# Patient Record
Sex: MALE | Race: WHITE | NOT HISPANIC OR LATINO | Employment: OTHER | URBAN - METROPOLITAN AREA
[De-identification: names, ages, dates, MRNs, and addresses within clinical notes are randomized per-mention and may not be internally consistent; named-entity substitution may affect disease eponyms.]

---

## 2017-01-12 ENCOUNTER — TELEPHONE (OUTPATIENT)
Dept: INTERNAL MEDICINE | Facility: CLINIC | Age: 62
End: 2017-01-12

## 2017-01-12 NOTE — TELEPHONE ENCOUNTER
Pt's wife stated that pt had started to feel better since office visit of 12/30/16, but now is starting to get post nasal drip and wants to know what pt should take.  Advised pt's wife that pt can take Zyrtec or Claritin 10 mg once daily and to continue using Flonase nasal spray once daily and can also try Mucinex DM.  Pt's wife verbalized understanding.

## 2017-01-12 NOTE — TELEPHONE ENCOUNTER
----- Message from Luiza Ghosh sent at 1/12/2017  9:29 AM CST -----  Contact: wife/Lisa Forbes  States she would like to see if he could be put on some sinus medicine, he is a heart patient. States she would like to speak to Leidy. Please call Lisa Forbes at [phone]. Thank you

## 2017-01-23 ENCOUNTER — HOSPITAL ENCOUNTER (OUTPATIENT)
Dept: RADIOLOGY | Facility: HOSPITAL | Age: 62
Discharge: HOME | End: 2017-01-23
Attending: INTERNAL MEDICINE
Payer: COMMERCIAL

## 2017-01-23 DIAGNOSIS — R91.1 LUNG NODULE: ICD-10-CM

## 2017-01-23 PROCEDURE — 71260 CT THORAX DX C+: CPT | Mod: TC,PO

## 2017-01-23 PROCEDURE — 71260 CT THORAX DX C+: CPT | Mod: 26,,, | Performed by: RADIOLOGY

## 2017-01-23 PROCEDURE — 25500020 PHARM REV CODE 255: Mod: PO | Performed by: INTERNAL MEDICINE

## 2017-01-23 RX ORDER — BUPROPION HYDROCHLORIDE 150 MG/1
TABLET, EXTENDED RELEASE ORAL
Qty: 30 TABLET | Refills: 0 | Status: SHIPPED | OUTPATIENT
Start: 2017-01-23 | End: 2017-01-30 | Stop reason: SDUPTHER

## 2017-01-23 RX ADMIN — IOHEXOL 75 ML: 350 INJECTION, SOLUTION INTRAVENOUS at 08:01

## 2017-01-26 ENCOUNTER — TELEPHONE (OUTPATIENT)
Dept: INTERNAL MEDICINE | Facility: CLINIC | Age: 62
End: 2017-01-26

## 2017-01-26 DIAGNOSIS — R91.1 LUNG NODULE: Primary | ICD-10-CM

## 2017-01-26 RX ORDER — LANCETS
1 EACH MISCELLANEOUS 2 TIMES DAILY
Qty: 100 EACH | Refills: 2 | Status: SHIPPED | OUTPATIENT
Start: 2017-01-26 | End: 2018-07-30 | Stop reason: SDUPTHER

## 2017-01-26 NOTE — TELEPHONE ENCOUNTER
----- Message from Jocelin Medina sent at 1/26/2017 12:52 PM CST -----  Contact: pt wife - davis  States she is rtn nurses call and can be reached at [phone]//thanks/dbw

## 2017-01-26 NOTE — TELEPHONE ENCOUNTER
----- Message from Nona Will sent at 1/26/2017  9:53 AM CST -----  Contact: wife  Pt wife states need a prescription for pt test strips and lancets cause the pt has a new meter-Contour next-ez...[phone](please notify when sent)        .  [facility] [address]  [address] \[facility]\"" [address]  Phone: [phone] Fax: [phone]

## 2017-01-26 NOTE — TELEPHONE ENCOUNTER
Notified pt's wife that scripts for test strips and lancets have been sent to pharmacy.  Pt's wife verbalized understanding.

## 2017-01-26 NOTE — TELEPHONE ENCOUNTER
Spoke with pt's wife, Lisa, informed her that it is reassuring that lung nodules have not changed. Recommend to repeat CT of chest in one year. Pt's wife states pt will schedule at upcoming appointment.

## 2017-01-30 ENCOUNTER — OFFICE VISIT (OUTPATIENT)
Dept: INTERNAL MEDICINE | Facility: CLINIC | Age: 62
End: 2017-01-30
Payer: COMMERCIAL

## 2017-01-30 VITALS
HEART RATE: 73 BPM | SYSTOLIC BLOOD PRESSURE: 122 MMHG | BODY MASS INDEX: 36.69 KG/M2 | TEMPERATURE: 97 F | WEIGHT: 242.06 LBS | HEIGHT: 68 IN | OXYGEN SATURATION: 97 % | DIASTOLIC BLOOD PRESSURE: 66 MMHG

## 2017-01-30 DIAGNOSIS — E66.01 MORBID OBESITY, UNSPECIFIED OBESITY TYPE: ICD-10-CM

## 2017-01-30 DIAGNOSIS — I15.2 HYPERTENSION ASSOCIATED WITH DIABETES: ICD-10-CM

## 2017-01-30 DIAGNOSIS — E11.59 HYPERTENSION ASSOCIATED WITH DIABETES: ICD-10-CM

## 2017-01-30 DIAGNOSIS — E55.9 VITAMIN D DEFICIENCY: ICD-10-CM

## 2017-01-30 DIAGNOSIS — E11.9 TYPE 2 DIABETES MELLITUS WITHOUT COMPLICATION, WITHOUT LONG-TERM CURRENT USE OF INSULIN: ICD-10-CM

## 2017-01-30 DIAGNOSIS — I25.10 CORONARY ARTERY DISEASE INVOLVING NATIVE CORONARY ARTERY OF NATIVE HEART WITHOUT ANGINA PECTORIS: ICD-10-CM

## 2017-01-30 DIAGNOSIS — E11.69 HYPERLIPIDEMIA ASSOCIATED WITH TYPE 2 DIABETES MELLITUS: ICD-10-CM

## 2017-01-30 DIAGNOSIS — E78.5 HYPERLIPIDEMIA ASSOCIATED WITH TYPE 2 DIABETES MELLITUS: ICD-10-CM

## 2017-01-30 DIAGNOSIS — R91.1 PULMONARY NODULE: ICD-10-CM

## 2017-01-30 DIAGNOSIS — F17.200 TOBACCO DEPENDENCE SYNDROME: ICD-10-CM

## 2017-01-30 DIAGNOSIS — F41.9 ANXIETY: ICD-10-CM

## 2017-01-30 DIAGNOSIS — J44.9 CHRONIC OBSTRUCTIVE PULMONARY DISEASE, UNSPECIFIED COPD TYPE: ICD-10-CM

## 2017-01-30 PROCEDURE — 99214 OFFICE O/P EST MOD 30 MIN: CPT | Mod: S$GLB,,, | Performed by: INTERNAL MEDICINE

## 2017-01-30 PROCEDURE — 3074F SYST BP LT 130 MM HG: CPT | Mod: S$GLB,,, | Performed by: INTERNAL MEDICINE

## 2017-01-30 PROCEDURE — 99999 PR PBB SHADOW E&M-EST. PATIENT-LVL III: CPT | Mod: PBBFAC,,, | Performed by: INTERNAL MEDICINE

## 2017-01-30 PROCEDURE — 1159F MED LIST DOCD IN RCRD: CPT | Mod: S$GLB,,, | Performed by: INTERNAL MEDICINE

## 2017-01-30 PROCEDURE — 3046F HEMOGLOBIN A1C LEVEL >9.0%: CPT | Mod: S$GLB,,, | Performed by: INTERNAL MEDICINE

## 2017-01-30 PROCEDURE — 4010F ACE/ARB THERAPY RXD/TAKEN: CPT | Mod: S$GLB,,, | Performed by: INTERNAL MEDICINE

## 2017-01-30 PROCEDURE — 3078F DIAST BP <80 MM HG: CPT | Mod: S$GLB,,, | Performed by: INTERNAL MEDICINE

## 2017-01-30 RX ORDER — BUPROPION HYDROCHLORIDE 150 MG/1
150 TABLET, EXTENDED RELEASE ORAL DAILY
Qty: 90 TABLET | Refills: 3 | Status: SHIPPED | OUTPATIENT
Start: 2017-01-30 | End: 2018-02-25 | Stop reason: SDUPTHER

## 2017-01-30 RX ORDER — METFORMIN HYDROCHLORIDE 1000 MG/1
1000 TABLET ORAL 2 TIMES DAILY WITH MEALS
Qty: 180 TABLET | Refills: 3 | Status: SHIPPED | OUTPATIENT
Start: 2017-01-30 | End: 2017-12-27 | Stop reason: SDUPTHER

## 2017-01-30 NOTE — PROGRESS NOTES
"Subjective:      Patient ID: Stefan Forbes Jr. is a 61 y.o. male.    Chief Complaint: Follow-up    HPI Comments: 62 yo with Patient Active Problem List:     Uncontrolled diabetes mellitus type 2 without complications     Hypertension associated with diabetes     Hyperlipidemia associated with type 2 diabetes mellitus     COPD (chronic obstructive pulmonary disease)     CAD (coronary artery disease)     Vitamin D deficiency     Tobacco dependence syndrome     Osteoarthritis of knee     Pulmonary nodule    Here today for management of multiple medical problems present for months to years.  He reports compliance with his medications without significant side effects.  He has decreased his tobacco use and no longer smokes cigarettes.  However he does occasionally smoke partial cigar.  His blood pressure is doing well with current antihypertensives.  He reports compliance with his cardiology follow-up.  He is working with an orthopedist for his knee arthritis.  He reports that his breathing is much improved since his last visit and has decreased his use of albuterol.  He is compliant with his Spiriva.  He reports that his glucose has been elevated at home.    Review of Systems   Constitutional: Negative for chills and fever.   HENT: Negative for ear pain and sore throat.    Respiratory: Negative for cough.    Cardiovascular: Negative for chest pain.   Gastrointestinal: Negative for abdominal pain and blood in stool.   Genitourinary: Negative for dysuria and hematuria.   Neurological: Negative for seizures and syncope.     Objective:     Visit Vitals    /66 (BP Location: Right arm, Patient Position: Sitting)    Pulse 73    Temp 97.3 °F (36.3 °C) (Tympanic)    Ht 5' 8" (1.727 m)    Wt 109.8 kg (242 lb 1 oz)    SpO2 97%    BMI 36.81 kg/m2       Physical Exam   Constitutional: He is oriented to person, place, and time. He appears well-developed and well-nourished. No distress.   HENT:   Head: Normocephalic and " atraumatic.   Mouth/Throat: Oropharynx is clear and moist.   Eyes: EOM are normal. Pupils are equal, round, and reactive to light.   Neck: Neck supple. No thyromegaly present.   Cardiovascular: Normal rate and regular rhythm.    Pulmonary/Chest: Breath sounds normal. He has no wheezes. He has no rales.   Abdominal: Soft. Bowel sounds are normal. There is no tenderness.   Musculoskeletal: He exhibits no edema.   Lymphadenopathy:     He has no cervical adenopathy.   Neurological: He is alert and oriented to person, place, and time.   Skin: Skin is warm and dry.   Psychiatric: He has a normal mood and affect. His behavior is normal.     Lab Visit on 01/23/2017   Component Date Value Ref Range Status    Hemoglobin A1C 01/23/2017 9.1* 4.5 - 6.2 % Final    Comment: According to ADA guidelines, hemoglobin A1C <7.0% represents  optimal control in non-pregnant diabetic patients.  Different  metrics may apply to specific populations.   Standards of Medical Care in Diabetes - 2016.  For the purpose of screening for the presence of diabetes:  <5.7%     Consistent with the absence of diabetes  5.7-6.4%  Consistent with increasing risk for diabetes   (prediabetes)  >or=6.5%  Consistent with diabetes  Currently no consensus exists for use of hemoglobin A1C  for diagnosis of diabetes for children.      Estimated Avg Glucose 01/23/2017 214* 68 - 131 mg/dL Final       Assessment:     1. Uncontrolled type 2 diabetes mellitus without complication, with long-term current use of insulin    2. Chronic obstructive pulmonary disease, unspecified COPD type    3. Hyperlipidemia associated with type 2 diabetes mellitus    4. Hypertension associated with diabetes    5. Coronary artery disease involving native coronary artery of native heart without angina pectoris    6. Vitamin D deficiency    7. Pulmonary nodule    8. Tobacco dependence syndrome    9. Anxiety    10. Type 2 diabetes mellitus without complication, without long-term current use  of insulin    11. Morbid obesity, unspecified obesity type      Plan:   Uncontrolled type 2 diabetes mellitus without complication, with long-term current use of insulin  -     Hemoglobin A1c; Future; Expected date: 4/30/17  -     metformin (GLUCOPHAGE) 1000 MG tablet; Take 1 tablet (1,000 mg total) by mouth 2 (two) times daily with meals.  Dispense: 180 tablet; Refill: 3    Chronic obstructive pulmonary disease, unspecified COPD type    Hyperlipidemia associated with type 2 diabetes mellitus  Comments:  controlled    Hypertension associated with diabetes  Comments:  controlled    Coronary artery disease involving native coronary artery of native heart without angina pectoris    Vitamin D deficiency  -     Vitamin D; Future; Expected date: 5/2/17    Pulmonary nodule  Comments:  stable      Tobacco dependence syndrome  Comments:  off cigarettes  still occ cigars    Anxiety  -     buPROPion (WELLBUTRIN SR) 150 MG TBSR 12 hr tablet; Take 1 tablet (150 mg total) by mouth once daily.  Dispense: 90 tablet; Refill: 3    Type 2 diabetes mellitus without complication, without long-term current use of insulin  -     liraglutide 0.6 mg/0.1 mL, 18 mg/3 mL, subq PNIJ (VICTOZA 2-CHARLES) 0.6 mg/0.1 mL (18 mg/3 mL) PnIj; Inject 0.6 mg into the skin once daily.  Dispense: 3 mL; Refill: 0    Morbid obesity, unspecified obesity type    Increase metformin to 1000mg bid.       Lab Frequency Next Occurrence   CT Chest Without Contrast Once 2/27/2017   Hemoglobin A1c Once 4/30/2017   Vitamin D Once 5/2/2017         Return in about 3 months (around 4/30/2017), or if symptoms worsen or fail to improve.

## 2017-01-30 NOTE — MR AVS SNAPSHOT
[facility] Internal Medicine  [address] [place] [address] [place]  [facility] [address]  Phone: [phone]  Fax: [phone]                  Stefan Forbes JrSb   2017 11:00 AM   Office Visit    Description:  Male : 1955   Provider:  Wallace Fisher MD   Department:  [facility] - Internal Medicine           Reason for Visit     Follow-up           Diagnoses this Visit        Comments    Uncontrolled type 2 diabetes mellitus without complication, with long-term current use of insulin    -  Primary     Chronic obstructive pulmonary disease, unspecified COPD type         Hyperlipidemia associated with type 2 diabetes mellitus     controlled    Hypertension associated with diabetes     controlled    Coronary artery disease involving native coronary artery of native heart without angina pectoris         Vitamin D deficiency         Pulmonary nodule     stable      Tobacco dependence syndrome     off cigarettes  still occ cigars    Anxiety         Type 2 diabetes mellitus without complication, without long-term current use of insulin                To Do List           Future Appointments        Provider Department Dept Phone    2017 9:15 AM LABORATORY, [facility] [phone]      Goals (5 Years of Data)     None      Follow-Up and Disposition     Return if symptoms worsen or fail to improve.       These Medications        Disp Refills Start End    buPROPion (WELLBUTRIN SR) 150 MG TBSR 12 hr tablet 90 tablet 3 2017     Take 1 tablet (150 mg total) by mouth once daily. - Oral    Pharmacy: [facility] [address] [place] [address] Ph #: [phone]       liraglutide 0.6 mg/0.1 mL, 18 mg/3 mL, subq PNIJ (VICTOZA 2-CHARLES) 0.6 mg/0.1 mL (18 mg/3 mL) PnIj 3 mL 0 2017    Inject 0.6 mg into the skin once daily. - Subcutaneous    Pharmacy: [facility] [address] Ph #: [phone]       metformin (GLUCOPHAGE) 1000 MG  tablet 180 tablet 3 1/30/2017 1/30/2018    Take 1 tablet (1,000 mg total) by mouth 2 (two) times daily with meals. - Oral    Pharmacy: [facility] [address] #: [phone]         [facility]     [facility] Nurse Care Line - 24/7 Assistance  Registered nurses in the [facility] provide clinical advisement, health education, appointment booking, and other advisory services.  Call for this free service at [phone].             Medications           START taking these NEW medications        Refills    liraglutide 0.6 mg/0.1 mL, 18 mg/3 mL, subq PNIJ (VICTOZA 2-CHARLES) 0.6 mg/0.1 mL (18 mg/3 mL) PnIj 0    Sig: Inject 0.6 mg into the skin once daily.    Class: Normal    Route: Subcutaneous    metformin (GLUCOPHAGE) 1000 MG tablet 3    Sig: Take 1 tablet (1,000 mg total) by mouth 2 (two) times daily with meals.    Class: Normal    Route: Oral      CHANGE how you are taking these medications     Start Taking Instead of    buPROPion (WELLBUTRIN SR) 150 MG TBSR 12 hr tablet buPROPion (WELLBUTRIN SR) 150 MG TBSR 12 hr tablet    Dosage:  Take 1 tablet (150 mg total) by mouth once daily. Dosage:  TAKE ONE TABLET BY MOUTH ONCE DAILY    Reason for Change:  Reorder       STOP taking these medications     methylPREDNISolone (MEDROL, CHARLES,) 4 mg tablet use as directed           Verify that the below list of medications is an accurate representation of the medications you are currently taking.  If none reported, the list may be blank. If incorrect, please contact your healthcare provider. Carry this list with you in case of emergency.           Current Medications     acetaminophen (TYLENOL) 500 MG tablet Take 500 mg by mouth as needed.    albuterol (PROAIR HFA) 90 mcg/actuation inhaler Inhale 2 puffs into the lungs every 6 (six) hours as needed for Wheezing or Shortness of Breath.    aspirin 325 MG tablet Take 325 mg by mouth once daily.    blood sugar diagnostic  "(BLOOD GLUCOSE TEST) Strp 1 strip by Misc.(Non-Drug; Combo Route) route 2 (two) times daily.    BLOOD-GLUCOSE METER (CONTOUR NEXT EZ METER MISC) by Misc.(Non-Drug; Combo Route) route.    buPROPion (WELLBUTRIN SR) 150 MG TBSR 12 hr tablet Take 1 tablet (150 mg total) by mouth once daily.    cholecalciferol, vitamin D3, (VITAMIN D3) 1,000 unit capsule Take 1,000 Units by mouth once daily.    fluticasone (FLONASE) 50 mcg/actuation nasal spray 2 sprays by Each Nare route once daily. prn    lancets Misc 1 lancet by Misc.(Non-Drug; Combo Route) route 2 (two) times daily.    lisinopril (PRINIVIL,ZESTRIL) 5 MG tablet TAKE ONE TABLET BY MOUTH ONCE DAILY    NITROGLYCERIN (NITROSTAT SL) Place 1 tablet under the tongue as needed.    omeprazole (PRILOSEC) 40 MG capsule Take 1 capsule (40 mg total) by mouth once daily.    simvastatin (ZOCOR) 20 MG tablet Take 1 tablet (20 mg total) by mouth every evening.    tiotropium (SPIRIVA) 18 mcg inhalation capsule Inhale 1 capsule (18 mcg total) into the lungs once daily.    liraglutide 0.6 mg/0.1 mL, 18 mg/3 mL, subq PNIJ (VICTOZA 2-CHARLES) 0.6 mg/0.1 mL (18 mg/3 mL) PnIj Inject 0.6 mg into the skin once daily.    metformin (GLUCOPHAGE) 1000 MG tablet Take 1 tablet (1,000 mg total) by mouth 2 (two) times daily with meals.           Clinical Reference Information           Vital Signs - Last Recorded  Most recent update: 1/30/2017 11:18 AM by Salome Soto MA    BP Pulse Temp Ht Wt SpO2    122/66 (BP Location: Right arm, Patient Position: Sitting) 73 97.3 °F (36.3 °C) (Tympanic) 5' 8" (1.727 m) 109.8 kg (242 lb 1 oz) 97%    BMI                36.81 kg/m2          Blood Pressure          Most Recent Value    BP  122/66      Allergies as of 1/30/2017     No Known Allergies      Immunizations Administered on Date of Encounter - 1/30/2017     None      Orders Placed During Today's Visit     Future Labs/Procedures Expected by Expires    Hemoglobin A1c  4/30/2017 7/29/2017    Vitamin D  " 5/2/2017 (Approximate) 7/30/2017      Instructions    Increase metformin to 1000mg bid.        Smoking Cessation     If you would like to quit smoking:   You may be eligible for free services if you are a [place] resident and started smoking cigarettes before September 1, 1988.  Call the [employer]oking [employer]essation [employer] (SCT) toll free at ([phone] [phone] or ([phone].   Call 800-QUIT-NOW if you do not meet the above criteria.

## 2017-01-31 ENCOUNTER — TELEPHONE (OUTPATIENT)
Dept: PHARMACY | Facility: CLINIC | Age: 62
End: 2017-01-31

## 2017-01-31 NOTE — TELEPHONE ENCOUNTER
Called and spoke with Patient's wife, Lisa.  Notified Ms. Gonzalez PA on Victoza was approved with a copayment of $40.00.  With Ms. Gonzalez's permission signed patient up for a Victoza copayment savings card reducing copay to $25.00.  Patient or wife will  and [facility] will  on medication.      PA Information:  [employer] [facility]  [phone]  Case ID# [id number]  Approval Dates: 1/31/17-1/31/18    Copayment Savings Card Information:  [place] [employer]  Rx Bin: [id number]  Rx Group: [id number]  Rx PCN : [employer]  ID #: [id number]

## 2017-02-01 ENCOUNTER — TELEPHONE (OUTPATIENT)
Dept: INTERNAL MEDICINE | Facility: CLINIC | Age: 62
End: 2017-02-01

## 2017-02-01 NOTE — TELEPHONE ENCOUNTER
Notified pt's wife that it is Dr. Fisher's recommendation to take the medication at any time of day as long as it's the same time everyday.  Pt's wife verbalized understanding.

## 2017-02-01 NOTE — TELEPHONE ENCOUNTER
Pt's wife wants to know if you have a recommendation on what time of day pt should take Victoza.  Stated pt is taking metformin with breakfast and dinner and blood glucose levels are running around 180 during the day.  Advised pt's wife that Victoza can be taken at any time of day as long as it's taken at the same time each day.  Pt's wife still wanted recommendation from you.

## 2017-02-01 NOTE — TELEPHONE ENCOUNTER
Yes, any time of day is ok. Try to take around same time each day. Does not need to be taken with food.

## 2017-02-01 NOTE — TELEPHONE ENCOUNTER
----- Message from Salome Hardwick sent at 2/1/2017  1:11 PM CST -----  Contact: davis/wife  Would like to speak to nurse about pt. Please call back at [phone]. Thanks//cdb

## 2017-02-06 ENCOUNTER — TELEPHONE (OUTPATIENT)
Dept: INTERNAL MEDICINE | Facility: CLINIC | Age: 62
End: 2017-02-06

## 2017-02-06 NOTE — TELEPHONE ENCOUNTER
----- Message from Luiza Ghosh sent at 2/6/2017  9:06 AM CST -----  Contact: Lisa Talboterson/wife  States he needs the Contour Next EZ test strips called in. Pt uses     [facility] [address] [facility] [place] - [address]  [address] \[facility]\"" [address]  Phone: [phone] Fax: [phone]    Please call Lisa Forbes at [phone].  Thank you

## 2017-02-06 NOTE — TELEPHONE ENCOUNTER
Notified pt's wife that test strips for Contour Next EZ were prescribed on 1/26/17 to [facility] in [place].  Pt stated pharmacist told her they did not have prescription.  Attempted to contact pharmacy; kept getting busy signal.

## 2017-02-07 NOTE — TELEPHONE ENCOUNTER
Spoke with pharmacist who stated they did receive test strip prescription on 1/26/17 and that pt picked up test strips on 2/6/17.

## 2017-02-24 RX ORDER — SIMVASTATIN 20 MG/1
TABLET, FILM COATED ORAL
Qty: 30 TABLET | Refills: 0 | Status: SHIPPED | OUTPATIENT
Start: 2017-02-24 | End: 2017-03-26 | Stop reason: SDUPTHER

## 2017-03-02 ENCOUNTER — TELEPHONE (OUTPATIENT)
Dept: INTERNAL MEDICINE | Facility: CLINIC | Age: 62
End: 2017-03-02

## 2017-03-02 NOTE — TELEPHONE ENCOUNTER
Spoke with pt wife gave her the names of Dr Murry and Dr Castro in ENT that can a look at his sinus. Pt wife verbalized understanding

## 2017-03-02 NOTE — TELEPHONE ENCOUNTER
----- Message from Kathrine Sanchez sent at 3/2/2017 11:26 AM CST -----  Contact: Cindy/wife  Cindy called to speak with the nurse; she wants to know if Dr. Fisher can recommend a sinus doctor with [facility]. She can be contacted at [phone].    Thanks,  Kathrine

## 2017-03-13 ENCOUNTER — OFFICE VISIT (OUTPATIENT)
Dept: INTERNAL MEDICINE | Facility: CLINIC | Age: 62
End: 2017-03-13
Payer: COMMERCIAL

## 2017-03-13 ENCOUNTER — HOSPITAL ENCOUNTER (OUTPATIENT)
Dept: RADIOLOGY | Facility: HOSPITAL | Age: 62
Discharge: HOME | End: 2017-03-13
Attending: INTERNAL MEDICINE
Payer: COMMERCIAL

## 2017-03-13 VITALS
SYSTOLIC BLOOD PRESSURE: 132 MMHG | OXYGEN SATURATION: 96 % | WEIGHT: 239.63 LBS | HEIGHT: 68 IN | HEART RATE: 75 BPM | TEMPERATURE: 97 F | DIASTOLIC BLOOD PRESSURE: 76 MMHG | BODY MASS INDEX: 36.32 KG/M2

## 2017-03-13 DIAGNOSIS — E55.9 VITAMIN D DEFICIENCY: ICD-10-CM

## 2017-03-13 DIAGNOSIS — I25.10 CORONARY ARTERY DISEASE INVOLVING NATIVE CORONARY ARTERY OF NATIVE HEART WITHOUT ANGINA PECTORIS: ICD-10-CM

## 2017-03-13 DIAGNOSIS — J44.9 CHRONIC OBSTRUCTIVE PULMONARY DISEASE, UNSPECIFIED COPD TYPE: ICD-10-CM

## 2017-03-13 DIAGNOSIS — E11.69 HYPERLIPIDEMIA ASSOCIATED WITH TYPE 2 DIABETES MELLITUS: ICD-10-CM

## 2017-03-13 DIAGNOSIS — Z01.818 PREOP EXAMINATION: ICD-10-CM

## 2017-03-13 DIAGNOSIS — I15.2 HYPERTENSION ASSOCIATED WITH DIABETES: ICD-10-CM

## 2017-03-13 DIAGNOSIS — Z91.09 ENVIRONMENTAL ALLERGIES: ICD-10-CM

## 2017-03-13 DIAGNOSIS — E11.59 HYPERTENSION ASSOCIATED WITH DIABETES: ICD-10-CM

## 2017-03-13 DIAGNOSIS — E78.5 HYPERLIPIDEMIA ASSOCIATED WITH TYPE 2 DIABETES MELLITUS: ICD-10-CM

## 2017-03-13 DIAGNOSIS — R91.1 PULMONARY NODULE: ICD-10-CM

## 2017-03-13 DIAGNOSIS — M17.0 OSTEOARTHRITIS OF BOTH KNEES, UNSPECIFIED OSTEOARTHRITIS TYPE: Primary | ICD-10-CM

## 2017-03-13 DIAGNOSIS — M17.9 OSTEOARTHRITIS OF KNEE, UNSPECIFIED LATERALITY, UNSPECIFIED OSTEOARTHRITIS TYPE: ICD-10-CM

## 2017-03-13 PROCEDURE — 1160F RVW MEDS BY RX/DR IN RCRD: CPT | Mod: S$GLB,,, | Performed by: INTERNAL MEDICINE

## 2017-03-13 PROCEDURE — 99214 OFFICE O/P EST MOD 30 MIN: CPT | Mod: S$GLB,,, | Performed by: INTERNAL MEDICINE

## 2017-03-13 PROCEDURE — 4010F ACE/ARB THERAPY RXD/TAKEN: CPT | Mod: S$GLB,,, | Performed by: INTERNAL MEDICINE

## 2017-03-13 PROCEDURE — 3075F SYST BP GE 130 - 139MM HG: CPT | Mod: S$GLB,,, | Performed by: INTERNAL MEDICINE

## 2017-03-13 PROCEDURE — 3078F DIAST BP <80 MM HG: CPT | Mod: S$GLB,,, | Performed by: INTERNAL MEDICINE

## 2017-03-13 PROCEDURE — 71020 XR CHEST PA AND LATERAL: CPT | Mod: TC,PO

## 2017-03-13 PROCEDURE — 3046F HEMOGLOBIN A1C LEVEL >9.0%: CPT | Mod: S$GLB,,, | Performed by: INTERNAL MEDICINE

## 2017-03-13 PROCEDURE — 99999 PR PBB SHADOW E&M-EST. PATIENT-LVL III: CPT | Mod: PBBFAC,,, | Performed by: INTERNAL MEDICINE

## 2017-03-13 PROCEDURE — 71020 XR CHEST PA AND LATERAL: CPT | Mod: 26,,, | Performed by: RADIOLOGY

## 2017-03-13 RX ORDER — MONTELUKAST SODIUM 10 MG/1
10 TABLET ORAL NIGHTLY
Qty: 30 TABLET | Refills: 0 | Status: SHIPPED | OUTPATIENT
Start: 2017-03-13 | End: 2017-03-13 | Stop reason: SDUPTHER

## 2017-03-13 RX ORDER — MONTELUKAST SODIUM 10 MG/1
10 TABLET ORAL NIGHTLY
Qty: 30 TABLET | Refills: 5 | Status: SHIPPED | OUTPATIENT
Start: 2017-03-13 | End: 2017-03-31 | Stop reason: ALTCHOICE

## 2017-03-13 RX ORDER — LORATADINE 10 MG/1
10 TABLET ORAL EVERY MORNING
COMMUNITY
End: 2017-03-31 | Stop reason: ALTCHOICE

## 2017-03-13 RX ORDER — DIPHENHYDRAMINE HCL 25 MG
50 CAPSULE ORAL NIGHTLY
COMMUNITY
End: 2017-03-31

## 2017-03-13 RX ORDER — MONTELUKAST SODIUM 10 MG/1
10 TABLET ORAL NIGHTLY
Qty: 30 TABLET | Refills: 5 | Status: SHIPPED | OUTPATIENT
Start: 2017-03-13 | End: 2017-03-13 | Stop reason: SDUPTHER

## 2017-03-13 NOTE — PROGRESS NOTES
Subjective:      Patient ID: Stefan Forbes Jr. is a 61 y.o. male.    Chief Complaint: Pre-op Exam    HPI Comments: 60 yo with Patient Active Problem List:     Uncontrolled diabetes mellitus type 2 without complications     Hypertension associated with diabetes     Hyperlipidemia associated with type 2 diabetes mellitus     COPD (chronic obstructive pulmonary disease)     CAD (coronary artery disease)     Vitamin D deficiency     Tobacco dependence syndrome     Osteoarthritis of knee     Pulmonary nodule    Past Medical History:  No date: COPD (chronic obstructive pulmonary disease)  No date: Diabetes mellitus, type 2  No date: Hyperlipidemia  No date: Hypertension      Here today for preop exam.  He is able to walk up at least 2 flights of stairs without chest pain.  He does not have a history of chronic kidney disease, congestive heart failure, rheumatoid arthritis. Quit smoking 1 year ago.      Medications     tiotropium (SPIRIVA) 18 mcg inhalation capsule 18 mcg, Daily       simvastatin (ZOCOR) 20 MG tablet        omeprazole (PRILOSEC) 40 MG capsule 40 mg, Daily       NITROGLYCERIN (NITROSTAT SL) 1 tablet, As needed (PRN)       metformin (GLUCOPHAGE) 1000 MG tablet 1,000 mg, 2 times daily with meals       loratadine (CLARITIN) 10 mg tablet 10 mg, Every morning       lisinopril (PRINIVIL,ZESTRIL) 5 MG tablet        liraglutide 0.6 mg/0.1 mL, 18 mg/3 mL, subq PNIJ (VICTOZA 2-CHARLES) 0.6 mg/0.1 mL (18 mg/3 mL) PnIj 0.6 mg, Daily       lancets Misc 1 lancet, 2 times daily       diphenhydrAMINE (BENADRYL) 25 mg capsule 50 mg, Nightly       dextromethorphan-guaifenesin  mg (MUCINEX DM)  mg per 12 hr tablet 1 tablet, 2 times daily       cholecalciferol, vitamin D3, (VITAMIN D3) 1,000 unit capsule 1,000 Units, Daily       buPROPion (WELLBUTRIN SR) 150 MG TBSR 12 hr tablet 150 mg, Daily       BLOOD-GLUCOSE METER (CONTOUR NEXT EZ METER MISC)        blood sugar diagnostic (BLOOD GLUCOSE TEST) Strp 1 strip, 2  "times daily       aspirin 325 MG tablet 325 mg, Daily       albuterol (PROAIR HFA) 90 mcg/actuation inhaler 2 puff, Every 6 hours PRN       acetaminophen (TYLENOL) 500 MG tablet 500 mg, As needed (PRN)       All: nkda    Soc: quit tob 1 year ago. No etoh. No illicit drugs.    Past Surgical History:   ANGIOPLASTY  Arthroscopy of knee    family history includes Bone Cancer in his father; Diabetes and epilepsy in his mother. Sister with mi at age 40. Another sister with epilepsy, dm, copd.  No f/h of bleeding or clotting d/o.                         Review of Systems   Constitutional: Negative for chills and fever.   HENT: Negative for ear pain and sore throat.    Respiratory: Negative for cough.    Cardiovascular: Negative for chest pain.   Gastrointestinal: Negative for abdominal pain and blood in stool.   Genitourinary: Negative for dysuria and hematuria.   Neurological: Negative for seizures and syncope.     Objective:   /76 (BP Location: Right arm, Patient Position: Sitting)  Pulse 75  Temp 96.6 °F (35.9 °C) (Tympanic)   Ht 5' 8" (1.727 m)  Wt 108.7 kg (239 lb 10.2 oz)  SpO2 96%  BMI 36.44 kg/m2    Physical Exam   Constitutional: He is oriented to person, place, and time. He appears well-developed and well-nourished. No distress.   HENT:   Head: Normocephalic and atraumatic.   Mouth/Throat: Oropharynx is clear and moist.   Eyes: EOM are normal. Pupils are equal, round, and reactive to light.   Neck: Neck supple. Carotid bruit is not present. No thyromegaly present.   Cardiovascular: Normal rate and regular rhythm.    Pulmonary/Chest: Breath sounds normal. No respiratory distress. He has no wheezes. He has no rales.   Abdominal: Soft. Bowel sounds are normal. There is no tenderness.   Musculoskeletal: He exhibits no edema.   Lymphadenopathy:     He has no cervical adenopathy.   Neurological: He is alert and oriented to person, place, and time.   Skin: Skin is warm and dry.   Psychiatric: He has a normal " mood and affect. His behavior is normal.     Lab Visit on 03/13/2017   Component Date Value Ref Range Status    Specimen UA 03/13/2017 Urine, Clean Catch   Final    Color, UA 03/13/2017 Yellow  Yellow, Straw, Marguerite Final    Appearance, UA 03/13/2017 Clear  Clear Final    pH, UA 03/13/2017 8.0  5.0 - 8.0 Final    Specific Gravity, UA 03/13/2017 1.010  1.005 - 1.030 Final    Protein, UA 03/13/2017 Negative  Negative Final    Comment: Recommend a 24 hour urine protein or a urine   protein/creatinine ratio if globulin induced proteinuria is  clinically suspected.      Glucose, UA 03/13/2017 Negative  Negative Final    Ketones, UA 03/13/2017 Negative  Negative Final    Bilirubin (UA) 03/13/2017 Negative  Negative Final    Occult Blood UA 03/13/2017 Negative  Negative Final    Nitrite, UA 03/13/2017 Negative  Negative Final    Leukocytes, UA 03/13/2017 Negative  Negative Final   Lab Visit on 03/13/2017   Component Date Value Ref Range Status    Sodium 03/13/2017 132* 136 - 145 mmol/L Final    Potassium 03/13/2017 5.4* 3.5 - 5.1 mmol/L Final    *No Visible Hemolysis    Chloride 03/13/2017 96  95 - 110 mmol/L Final    CO2 03/13/2017 25  23 - 29 mmol/L Final    Glucose 03/13/2017 82  70 - 110 mg/dL Final    BUN, Bld 03/13/2017 8  6 - 20 mg/dL Final    Creatinine 03/13/2017 0.9  0.5 - 1.4 mg/dL Final    Calcium 03/13/2017 9.7  8.7 - 10.5 mg/dL Final    Total Protein 03/13/2017 7.4  6.0 - 8.4 g/dL Final    Albumin 03/13/2017 4.0  3.5 - 5.2 g/dL Final    Total Bilirubin 03/13/2017 0.2  0.1 - 1.0 mg/dL Final    Comment: For infants and newborns, interpretation of results should be based  on gestational age, weight and in agreement with clinical  observations.  Premature Infant recommended reference ranges:  Up to 24 hours.............<8.0 mg/dL  Up to 48 hours............<12.0 mg/dL  3-5 days..................<15.0 mg/dL  6-29 days.................<15.0 mg/dL      Alkaline Phosphatase 03/13/2017 75  55 -  135 U/L Final    AST 03/13/2017 14  10 - 40 U/L Final    ALT 03/13/2017 18  10 - 44 U/L Final    Anion Gap 03/13/2017 11  8 - 16 mmol/L Final    eGFR if African American 03/13/2017 >60.0  >60 mL/min/1.73 m^2 Final    eGFR if non African American 03/13/2017 >60.0  >60 mL/min/1.73 m^2 Final    Comment: Calculation used to obtain the estimated glomerular filtration  rate (eGFR) is the CKD-EPI equation. Since race is unknown   in our information system, the eGFR values for   -American and Non--American patients are given   for each creatinine result.      WBC 03/13/2017 9.31  3.90 - 12.70 K/uL Final    RBC 03/13/2017 4.34* 4.60 - 6.20 M/uL Final    Hemoglobin 03/13/2017 13.4* 14.0 - 18.0 g/dL Final    Hematocrit 03/13/2017 39.4* 40.0 - 54.0 % Final    MCV 03/13/2017 91  82 - 98 fL Final    MCH 03/13/2017 30.9  27.0 - 31.0 pg Final    MCHC 03/13/2017 34.0  32.0 - 36.0 % Final    RDW 03/13/2017 12.3  11.5 - 14.5 % Final    Platelets 03/13/2017 246  150 - 350 K/uL Final    MPV 03/13/2017 10.4  9.2 - 12.9 fL Final    Gran # 03/13/2017 6.3  1.8 - 7.7 K/uL Final    Lymph # 03/13/2017 2.0  1.0 - 4.8 K/uL Final    Mono # 03/13/2017 0.7  0.3 - 1.0 K/uL Final    Eos # 03/13/2017 0.3  0.0 - 0.5 K/uL Final    Baso # 03/13/2017 0.03  0.00 - 0.20 K/uL Final    Gran% 03/13/2017 67.9  38.0 - 73.0 % Final    Lymph% 03/13/2017 21.1  18.0 - 48.0 % Final    Mono% 03/13/2017 7.6  4.0 - 15.0 % Final    Eosinophil% 03/13/2017 2.8  0.0 - 8.0 % Final    Basophil% 03/13/2017 0.3  0.0 - 1.9 % Final    Differential Method 03/13/2017 Automated   Final    aPTT 03/13/2017 28.2  21.0 - 32.0 sec Final    aPTT therapeutic range = 39-69 seconds    Prothrombin Time 03/13/2017 10.5  9.0 - 12.5 sec Final    INR 03/13/2017 1.0  0.8 - 1.2 Final    Comment: Coumadin Therapy:  2.0 - 3.0 for INR for all indicators except mechanical heart valves  and antiphospholipid syndromes which should use 2.5 - 3.5.      Group & Rh  03/13/2017 A POS   Final    Indirect Nelson 03/13/2017 NEG   Final     Reviewed By Joe Fisher MD on 3/13/2017  5:59 PM   External Result Report   External Result Report   Narrative   Comparison: None    Technique: PA and lateral views of the chest was obtained    Findings: The cardiac and mediastinal silhouettes are within normal limits.   The lungs are clear bilaterally. Visualized osseous structures demonstrate no acute abnormality.   Impression     No acute findings                        Assessment:     1. Osteoarthritis of both knees, unspecified osteoarthritis type    2. Hypertension associated with diabetes    3. Hyperlipidemia associated with type 2 diabetes mellitus    4. Uncontrolled type 2 diabetes mellitus without complication, without long-term current use of insulin    5. Chronic obstructive pulmonary disease, unspecified COPD type    6. Coronary artery disease involving native coronary artery of native heart without angina pectoris    7. Vitamin D deficiency    8. Osteoarthritis of knee, unspecified laterality, unspecified osteoarthritis type    9. Pulmonary nodule    10. Environmental allergies    11. Preop examination      Plan:   Osteoarthritis of both knees, unspecified osteoarthritis type    Hypertension associated with diabetes    Hyperlipidemia associated with type 2 diabetes mellitus    Uncontrolled type 2 diabetes mellitus without complication, without long-term current use of insulin    Chronic obstructive pulmonary disease, unspecified COPD type  Comments:  stable on spiriva    Coronary artery disease involving native coronary artery of native heart without angina pectoris    Vitamin D deficiency    Osteoarthritis of knee, unspecified laterality, unspecified osteoarthritis type    Pulmonary nodule    Environmental allergies  -     Discontinue: montelukast (SINGULAIR) 10 mg tablet; Take 1 tablet (10 mg total) by mouth every evening.  Dispense: 30 tablet; Refill: 0  -      Discontinue: montelukast (SINGULAIR) 10 mg tablet; Take 1 tablet (10 mg total) by mouth every evening.  Dispense: 30 tablet; Refill: 5  -     montelukast (SINGULAIR) 10 mg tablet; Take 1 tablet (10 mg total) by mouth every evening.  Dispense: 30 tablet; Refill: 5    Preop examination  Comments:  labs and xray as per surgeon request  Orders:  -     Comprehensive metabolic panel; Future; Expected date: 3/13/17  -     CBC auto differential; Future; Expected date: 3/13/17  -     X-Ray Chest PA And Lateral; Future; Expected date: 3/13/17  -     Urinalysis; Future; Expected date: 3/13/17  -     APTT; Future; Expected date: 3/13/17  -     Protime-INR; Future; Expected date: 3/13/17  -     TYPE & SCREEN; Future; Expected date: 3/13/17      Pt has seen cards and pulmonary for preop evals.    Please fax all ct chest reports to Dr. Blanco.     Mr. Forbes is cleared for óscar knee replacements from a general medicine standpoint. He has been cleared by cards and pulmonology.    Lab Frequency Next Occurrence   CT Chest Without Contrast Once 2/27/2017   Hemoglobin A1c Once 4/30/2017   Vitamin D Once 5/2/2017         Return if symptoms worsen or fail to improve.

## 2017-03-13 NOTE — MR AVS SNAPSHOT
[facility] Internal Medicine  [address] [place] [address]  Phone: [phone]  Fax: [phone]                  Stefan Forbes Jr.   3/13/2017 1:40 PM   Office Visit    Description:  Male : 1955   Provider:  Wallace Fisher MD   Department:  [facility] - Internal Medicine           Reason for Visit     Pre-op Exam           Diagnoses this Visit        Comments    Hypertension associated with diabetes    -  Primary     Hyperlipidemia associated with type 2 diabetes mellitus         Uncontrolled type 2 diabetes mellitus without complication, without long-term current use of insulin         Chronic obstructive pulmonary disease, unspecified COPD type     stable on spiriva    Coronary artery disease involving native coronary artery of native heart without angina pectoris         Vitamin D deficiency         Osteoarthritis of knee, unspecified laterality, unspecified osteoarthritis type         Pulmonary nodule         Environmental allergies         Preop examination     labs and xray as per surgeon request    Osteoarthritis of both knees, unspecified osteoarthritis type                To Do List           Future Appointments        Provider Department Dept Phone    3/13/2017 2:15 PM [facility] XR2 [facility] [phone]    3/13/2017 2:45 PM LAB, SAME DAY [facility] [phone]    3/13/2017 4:00 PM SPECIMEN, [facility] [phone]    2017 9:15 AM LABORATORY, [facility] [phone]      Goals (5 Years of Data)     None      Follow-Up and Disposition     Return if symptoms worsen or fail to improve.       These Medications        Disp Refills Start End    montelukast (SINGULAIR) 10 mg tablet 30 tablet 5 3/13/2017 2017    Take 1 tablet (10 mg total) by mouth every evening. - Oral    Pharmacy: [facility] [place]  [address] Ph #: [phone]          [facility]     [facility] Nurse Care Line - 24/7 Assistance  Registered nurses in the [facility] provide clinical advisement, health education, appointment booking, and other advisory services.  Call for this free service at [phone].             Medications           START taking these NEW medications        Refills    montelukast (SINGULAIR) 10 mg tablet 5    Sig: Take 1 tablet (10 mg total) by mouth every evening.    Class: Normal    Route: Oral           Verify that the below list of medications is an accurate representation of the medications you are currently taking.  If none reported, the list may be blank. If incorrect, please contact your healthcare provider. Carry this list with you in case of emergency.           Current Medications     acetaminophen (TYLENOL) 500 MG tablet Take 500 mg by mouth as needed.    albuterol (PROAIR HFA) 90 mcg/actuation inhaler Inhale 2 puffs into the lungs every 6 (six) hours as needed for Wheezing or Shortness of Breath.    aspirin 325 MG tablet Take 325 mg by mouth once daily.    blood sugar diagnostic (BLOOD GLUCOSE TEST) Strp 1 strip by Misc.(Non-Drug; Combo Route) route 2 (two) times daily.    BLOOD-GLUCOSE METER (CONTOUR NEXT EZ METER MISC) by Misc.(Non-Drug; Combo Route) route.    buPROPion (WELLBUTRIN SR) 150 MG TBSR 12 hr tablet Take 1 tablet (150 mg total) by mouth once daily.    cholecalciferol, vitamin D3, (VITAMIN D3) 1,000 unit capsule Take 1,000 Units by mouth once daily.    dextromethorphan-guaifenesin  mg (MUCINEX DM)  mg per 12 hr tablet Take 1 tablet by mouth 2 (two) times daily.    diphenhydrAMINE (BENADRYL) 25 mg capsule Take 50 mg by mouth every evening.    lancets Misc 1 lancet by Misc.(Non-Drug; Combo Route) route 2 (two) times daily.    liraglutide 0.6 mg/0.1 mL, 18 mg/3 mL, subq PNIJ (VICTOZA 2-CHARLES) 0.6 mg/0.1 mL (18 mg/3 mL) PnIj Inject 0.6 mg into the skin once daily.    lisinopril (PRINIVIL,ZESTRIL) 5 MG  "tablet TAKE ONE TABLET BY MOUTH ONCE DAILY    loratadine (CLARITIN) 10 mg tablet Take 10 mg by mouth every morning.    metformin (GLUCOPHAGE) 1000 MG tablet Take 1 tablet (1,000 mg total) by mouth 2 (two) times daily with meals.    NITROGLYCERIN (NITROSTAT SL) Place 1 tablet under the tongue as needed.    omeprazole (PRILOSEC) 40 MG capsule Take 1 capsule (40 mg total) by mouth once daily.    simvastatin (ZOCOR) 20 MG tablet TAKE ONE TABLET BY MOUTH IN THE EVENING    tiotropium (SPIRIVA) 18 mcg inhalation capsule Inhale 1 capsule (18 mcg total) into the lungs once daily.    montelukast (SINGULAIR) 10 mg tablet Take 1 tablet (10 mg total) by mouth every evening.           Clinical Reference Information           Your Vitals Were     BP Pulse Temp Height Weight SpO2    132/76 (BP Location: Right arm, Patient Position: Sitting) 75 96.6 °F (35.9 °C) (Tympanic) 5' 8" (1.727 m) 108.7 kg (239 lb 10.2 oz) 96%    BMI                36.44 kg/m2          Blood Pressure          Most Recent Value    BP  132/76      Allergies as of 3/13/2017     No Known Allergies      Immunizations Administered on Date of Encounter - 3/13/2017     None      Orders Placed During Today's Visit     Future Labs/Procedures Expected by Expires    APTT  3/13/2017 (Approximate) 5/12/2018    CBC auto differential  3/13/2017 5/12/2018    Comprehensive metabolic panel  3/13/2017 3/13/2018    Protime-INR  3/13/2017 5/12/2018    TYPE & SCREEN  3/13/2017 5/12/2018    Urinalysis  3/13/2017 6/11/2017    X-Ray Chest PA And Lateral  3/13/2017 3/13/2018      Language Assistance Services     ATTENTION: Language assistance services are available, free of charge. Please call [phone].      ATENCIÓN: Si habla nicole, tiene a esparza disposición servicios gratuitos de asistencia lingüística. Llame al [phone].     CHÚ Ý: N?u b?n nói Ti?ng Vi?t, có các d?ch v? h? tr? ngôn ng? mi?n phí dành cho b?n. G?i s? [phone].         [facility] " complies with applicable Federal civil rights laws and does not discriminate on the basis of race, color, national origin, age, disability, or sex.

## 2017-03-14 ENCOUNTER — TELEPHONE (OUTPATIENT)
Dept: INTERNAL MEDICINE | Facility: CLINIC | Age: 62
End: 2017-03-14

## 2017-03-14 NOTE — TELEPHONE ENCOUNTER
"Pt's wife stated pt took Singulair last night. This morning, heard pt make two gurgling sounds and then pt "crashed to the floor."  Pt was unresponsive for 15 to 20 seconds.  Pt's wife took pt's BP: 134/77; fasting blood sugar: 134.  Pt's wife stated pt hit his head on an air purifier.  Pt did not remember falling.  Pt's wife stated pt does not have a knot on head or any cut or gash, just a red kishor.  Stated pt feels fine now.  Advised pt's wife that she should call 911 if pt falls again.  Pt's wife verbalized understanding and wants to know if Singulair could cause pt to "sleep so hard it's difficult for him to fully wake up."  Please advise.  "

## 2017-03-14 NOTE — TELEPHONE ENCOUNTER
singulair unlikely to cause passing out, but stop the medication and see if pt can be evaluated with open appointment with me or [facility] this week.

## 2017-03-14 NOTE — TELEPHONE ENCOUNTER
----- Message from Salome Stovall sent at 3/14/2017  8:51 AM CDT -----  Contact: pt wife- Lisa  States she thinks the patient may have passed out when he was getting out of bed. Please adv/call [phone].//thanks. cw

## 2017-03-14 NOTE — TELEPHONE ENCOUNTER
Notified pt's wife that I haven't yet received any message back from Dr. Fisher, but as soon as I do, will call her with information.  Pt's wife verbalized understanding.

## 2017-03-14 NOTE — TELEPHONE ENCOUNTER
----- Message from Brianda Can sent at 3/14/2017  3:55 PM CDT -----  Pt is returning a call from nurse in regards to a f/u from a previous phone call.            Please call pt back at [phone]

## 2017-03-15 ENCOUNTER — TELEPHONE (OUTPATIENT)
Dept: INTERNAL MEDICINE | Facility: CLINIC | Age: 62
End: 2017-03-15

## 2017-03-15 ENCOUNTER — CLINICAL SUPPORT (OUTPATIENT)
Dept: CARDIOLOGY | Facility: CLINIC | Age: 62
End: 2017-03-15
Payer: COMMERCIAL

## 2017-03-15 ENCOUNTER — LAB VISIT (OUTPATIENT)
Dept: LAB | Facility: HOSPITAL | Age: 62
End: 2017-03-15
Attending: FAMILY MEDICINE
Payer: COMMERCIAL

## 2017-03-15 ENCOUNTER — OFFICE VISIT (OUTPATIENT)
Dept: INTERNAL MEDICINE | Facility: CLINIC | Age: 62
End: 2017-03-15
Payer: COMMERCIAL

## 2017-03-15 VITALS
BODY MASS INDEX: 36.12 KG/M2 | TEMPERATURE: 96 F | HEIGHT: 68 IN | WEIGHT: 238.31 LBS | SYSTOLIC BLOOD PRESSURE: 124 MMHG | DIASTOLIC BLOOD PRESSURE: 66 MMHG

## 2017-03-15 DIAGNOSIS — E11.59 HYPERTENSION ASSOCIATED WITH DIABETES: ICD-10-CM

## 2017-03-15 DIAGNOSIS — J44.9 CHRONIC OBSTRUCTIVE PULMONARY DISEASE, UNSPECIFIED COPD TYPE: ICD-10-CM

## 2017-03-15 DIAGNOSIS — E11.69 HYPERLIPIDEMIA ASSOCIATED WITH TYPE 2 DIABETES MELLITUS: ICD-10-CM

## 2017-03-15 DIAGNOSIS — J30.2 SEASONAL ALLERGIC RHINITIS, UNSPECIFIED ALLERGIC RHINITIS TRIGGER: ICD-10-CM

## 2017-03-15 DIAGNOSIS — R55 SYNCOPE AND COLLAPSE: Primary | ICD-10-CM

## 2017-03-15 DIAGNOSIS — R91.1 PULMONARY NODULE: ICD-10-CM

## 2017-03-15 DIAGNOSIS — R55 SYNCOPE AND COLLAPSE: ICD-10-CM

## 2017-03-15 DIAGNOSIS — E78.5 HYPERLIPIDEMIA ASSOCIATED WITH TYPE 2 DIABETES MELLITUS: ICD-10-CM

## 2017-03-15 DIAGNOSIS — I15.2 HYPERTENSION ASSOCIATED WITH DIABETES: ICD-10-CM

## 2017-03-15 DIAGNOSIS — I25.10 CORONARY ARTERY DISEASE INVOLVING NATIVE CORONARY ARTERY OF NATIVE HEART WITHOUT ANGINA PECTORIS: ICD-10-CM

## 2017-03-15 LAB
ANION GAP SERPL CALC-SCNC: 8 MMOL/L
BUN SERPL-MCNC: 8 MG/DL
CALCIUM SERPL-MCNC: 9.4 MG/DL
CHLORIDE SERPL-SCNC: 95 MMOL/L
CO2 SERPL-SCNC: 28 MMOL/L
CREAT SERPL-MCNC: 0.9 MG/DL
EST. GFR  (AFRICAN AMERICAN): >60 ML/MIN/1.73 M^2
EST. GFR  (NON AFRICAN AMERICAN): >60 ML/MIN/1.73 M^2
GLUCOSE SERPL-MCNC: 77 MG/DL
POTASSIUM SERPL-SCNC: 4.7 MMOL/L
SODIUM SERPL-SCNC: 131 MMOL/L

## 2017-03-15 PROCEDURE — 3074F SYST BP LT 130 MM HG: CPT | Mod: S$GLB,,, | Performed by: FAMILY MEDICINE

## 2017-03-15 PROCEDURE — 1160F RVW MEDS BY RX/DR IN RCRD: CPT | Mod: S$GLB,,, | Performed by: FAMILY MEDICINE

## 2017-03-15 PROCEDURE — 99214 OFFICE O/P EST MOD 30 MIN: CPT | Mod: S$GLB,,, | Performed by: FAMILY MEDICINE

## 2017-03-15 PROCEDURE — 3046F HEMOGLOBIN A1C LEVEL >9.0%: CPT | Mod: S$GLB,,, | Performed by: FAMILY MEDICINE

## 2017-03-15 PROCEDURE — 4010F ACE/ARB THERAPY RXD/TAKEN: CPT | Mod: S$GLB,,, | Performed by: FAMILY MEDICINE

## 2017-03-15 PROCEDURE — 3078F DIAST BP <80 MM HG: CPT | Mod: S$GLB,,, | Performed by: FAMILY MEDICINE

## 2017-03-15 PROCEDURE — 93000 ELECTROCARDIOGRAM COMPLETE: CPT | Mod: S$GLB,,, | Performed by: NUCLEAR MEDICINE

## 2017-03-15 PROCEDURE — 36415 COLL VENOUS BLD VENIPUNCTURE: CPT | Mod: PO

## 2017-03-15 PROCEDURE — 99999 PR PBB SHADOW E&M-EST. PATIENT-LVL III: CPT | Mod: PBBFAC,,, | Performed by: FAMILY MEDICINE

## 2017-03-15 PROCEDURE — 80048 BASIC METABOLIC PNL TOTAL CA: CPT

## 2017-03-15 NOTE — TELEPHONE ENCOUNTER
----- Message from Jake Collier sent at 3/15/2017  9:43 AM CDT -----  Contact: [phone]   Returning call, please call back @ [phone].  Thanks-[facility]

## 2017-03-15 NOTE — MR AVS SNAPSHOT
[facility] Internal Medicine  [address] [place] [address]  Phone: [phone]  Fax: [phone]                  Stefan Forbes Jr.   3/15/2017 12:40 PM   Office Visit    Description:  Male : 1955   Provider:  Karla Alexis MD   Department:  [facility] - Internal Medicine           Reason for Visit     Loss of Consciousness     Fall           Diagnoses this Visit        Comments    Syncope and collapse    -  Primary     Hypertension associated with diabetes         Chronic obstructive pulmonary disease, unspecified COPD type         Coronary artery disease involving native coronary artery of native heart without angina pectoris         Pulmonary nodule         Seasonal allergic rhinitis, unspecified allergic rhinitis trigger         Hyperlipidemia associated with type 2 diabetes mellitus         Uncontrolled type 2 diabetes mellitus without complication, without long-term current use of insulin                To Do List           Future Appointments        Provider Department Dept Phone    [facility]2017 [phone]    3/15/2017 2:00 PM [facility]AT[facility], [facility] [phone]    3/16/2017 9:45 AM [facility] [phone]    2017 9:15 AM LABORATORY, [facility] [phone]      Goals (5 Years of Data)     None      Follow-Up and Disposition     Return if symptoms worsen or fail to improve.      [facility]     [facility] Care [facility] -  Assistance  Registered nurses in the [facility] provide clinical advisement, health education, appointment booking, and other advisory services.  Call for this free service at [phone].             Medications                Verify that the below list of medications is an accurate representation of the medications you are currently taking.  If none reported, the list may be blank. If incorrect, please contact your  healthcare provider. Carry this list with you in case of emergency.           Current Medications     acetaminophen (TYLENOL) 500 MG tablet Take 500 mg by mouth as needed.    albuterol (PROAIR HFA) 90 mcg/actuation inhaler Inhale 2 puffs into the lungs every 6 (six) hours as needed for Wheezing or Shortness of Breath.    aspirin 325 MG tablet Take 325 mg by mouth once daily.    blood sugar diagnostic (BLOOD GLUCOSE TEST) Strp 1 strip by Misc.(Non-Drug; Combo Route) route 2 (two) times daily.    BLOOD-GLUCOSE METER (CONTOUR NEXT EZ METER MISC) by Misc.(Non-Drug; Combo Route) route.    buPROPion (WELLBUTRIN SR) 150 MG TBSR 12 hr tablet Take 1 tablet (150 mg total) by mouth once daily.    cholecalciferol, vitamin D3, (VITAMIN D3) 1,000 unit capsule Take 1,000 Units by mouth once daily.    dextromethorphan-guaifenesin  mg (MUCINEX DM)  mg per 12 hr tablet Take 1 tablet by mouth 2 (two) times daily.    diphenhydrAMINE (BENADRYL) 25 mg capsule Take 50 mg by mouth every evening.    lancets Misc 1 lancet by Misc.(Non-Drug; Combo Route) route 2 (two) times daily.    liraglutide 0.6 mg/0.1 mL, 18 mg/3 mL, subq PNIJ (VICTOZA 2-CHARLES) 0.6 mg/0.1 mL (18 mg/3 mL) PnIj Inject 0.6 mg into the skin once daily.    lisinopril (PRINIVIL,ZESTRIL) 5 MG tablet TAKE ONE TABLET BY MOUTH ONCE DAILY    loratadine (CLARITIN) 10 mg tablet Take 10 mg by mouth every morning.    metformin (GLUCOPHAGE) 1000 MG tablet Take 1 tablet (1,000 mg total) by mouth 2 (two) times daily with meals.    montelukast (SINGULAIR) 10 mg tablet Take 1 tablet (10 mg total) by mouth every evening.    NITROGLYCERIN (NITROSTAT SL) Place 1 tablet under the tongue as needed.    omeprazole (PRILOSEC) 40 MG capsule Take 1 capsule (40 mg total) by mouth once daily.    simvastatin (ZOCOR) 20 MG tablet TAKE ONE TABLET BY MOUTH IN THE EVENING    tiotropium (SPIRIVA) 18 mcg inhalation capsule Inhale 1 capsule (18 mcg total) into the lungs once daily.           Clinical  "Reference Information           Your Vitals Were     BP Temp Height Weight BMI    124/66 (BP Location: Right arm, Patient Position: Sitting) 96.4 °F (35.8 °C) (Tympanic) 5' 8" (1.727 m) 108.1 kg (238 lb 5.1 oz) 36.24 kg/m2      Blood Pressure          Most Recent Value    BP  124/66      Allergies as of 3/15/2017     No Known Allergies      Immunizations Administered on Date of Encounter - 3/15/2017     None      Orders Placed During Today's Visit     Future Labs/Procedures Expected by Expires    Basic metabolic panel  3/15/2017 5/14/2018    US Carotid Bilateral  3/15/2017 3/15/2018    EKG 12-lead  As directed 3/15/2018      Language Assistance Services     ATTENTION: Language assistance services are available, free of charge. Please call [phone].      ATENCIÓN: Si zina rivera, tiene a esparza disposición servicios gratuitos de asistencia lingüística. Llame al [phone].     CHÚ Ý: N?u b?n nói Ti?ng Vi?t, có các d?ch v? h? tr? ngôn ng? mi?n phí dành cho b?n. G?i s? [phone].         [facility] complies with applicable Federal civil rights laws and does not discriminate on the basis of race, color, national origin, age, disability, or sex.        "

## 2017-03-15 NOTE — TELEPHONE ENCOUNTER
Pt's wife stated pt would like to be seen today if possible.  Scheduled pt with Dr. Alexis for today at 12:40 pm.

## 2017-03-15 NOTE — TELEPHONE ENCOUNTER
Spoke with pt's wife, Lisa, notified her that per Dr. Fisher, singulair is unlikely to cause pt to pass out, pt can stop medication and come to clinic this week to be evaluated by Dr. Fisher or midlevel. Pt's wife stated pt took singulair last night with no problems. She stated she will talk to pt and give office a call back.

## 2017-03-15 NOTE — PROGRESS NOTES
Subjective:       Patient ID: Stefan Forbes Jr. is a 61 y.o. male.    Chief Complaint: Loss of Consciousness (15-20 seconds, 3/14/17) and Fall (3/14/17)    HPI Comments: 61-year-old male patient of Dr. Fisher with Patient Active Problem List:     Uncontrolled type 2 diabetes mellitus without complication, without long-term current use of insulin     Hypertension associated with diabetes     Hyperlipidemia associated with type 2 diabetes mellitus     COPD (chronic obstructive pulmonary disease)     CAD (coronary artery disease)     Vitamin D deficiency     Osteoarthritis of knee     Pulmonary nodule  Here for syncopal episode yesterday morning.  Patient reported that he took Singulair which has been prescribed recently, for ALLERGIES Monday night, after his visit with PCP.   Patient has been taking Claritin in the morning, Mucinex during the day and Benadryl at bedtime, but continues to have thick productive cough with mucus early in the morning, patient reported that he had very thick mucus Tuesday morning when he woke up, sat at the edge of the bed, and was gurgling, and the next minute he saw himself on the floor, wife reported that probably he lost consciousness 15-20 seconds.   Wife did not witness the event that she was, hearing him gurgling and when she came in to see him he was on the floor  Never had any syncopal episodes  Denies of any lightheadedness or vertigo-like symptoms, ringing sensation to the ears.  Denies of any chest pain or shortness of breath or wheezing.   Patient with history of COPD, and CAD, has been cleared by his specialists pulmonology and cardiologist Dr. Perry, for upcoming surgery bilateral knee replacement on 4/4/17.   Patient reported that he checked his blood glucose levels which was 134 immediately after syncopal episode with history of diabetes, and his blood pressure was 134/84.  Patient was concerned with her Singulair made him have syncopal episode, but reported that  "he took it last night, without any difficulty.  Denies of any palpitations  With history of pulmonary nodule patient recently had CT chest showing no acute changes.         Loss of Consciousness   Pertinent negatives include no abdominal pain, chest pain, dizziness, fever, headaches, light-headedness, nausea, palpitations or vomiting.   Fall   Pertinent negatives include no abdominal pain, fever, headaches, nausea or vomiting.     Review of Systems   Constitutional: Negative for appetite change, fatigue and fever.   HENT: Positive for postnasal drip. Negative for congestion, ear pain, sneezing, sore throat, tinnitus and trouble swallowing.    Eyes: Negative for visual disturbance.   Respiratory: Negative for shortness of breath.    Cardiovascular: Positive for syncope. Negative for chest pain and palpitations.   Gastrointestinal: Negative for abdominal pain, nausea and vomiting.   Musculoskeletal: Negative for myalgias.   Skin: Negative for rash.   Neurological: Positive for syncope. Negative for dizziness, tremors, light-headedness and headaches.   Psychiatric/Behavioral: Negative for sleep disturbance.         /66 (BP Location: Right arm, Patient Position: Sitting)  Temp 96.4 °F (35.8 °C) (Tympanic)   Ht 5' 8" (1.727 m)  Wt 108.1 kg (238 lb 5.1 oz)  BMI 36.24 kg/m2  Objective:      Physical Exam   Constitutional: He is oriented to person, place, and time. He appears well-developed and well-nourished.   HENT:   Head: Normocephalic and atraumatic.   Right Ear: External ear normal.   Left Ear: External ear normal.   Mouth/Throat: Oropharynx is clear and moist.   Postnasal drip noted in the posterior pharyngeal area   Cardiovascular: Normal rate, regular rhythm and normal heart sounds.    No murmur heard.  Negative for carotid bruit bilaterally   Pulmonary/Chest: Effort normal and breath sounds normal. He has no wheezes.   Abdominal: Soft. Bowel sounds are normal. There is no tenderness.   Musculoskeletal: " He exhibits no edema.   Lymphadenopathy:     He has no cervical adenopathy.   Neurological: He is alert and oriented to person, place, and time.   Skin: Skin is warm and dry. No rash noted.   Psychiatric: He has a normal mood and affect.         Assessment:       1. Syncope and collapse    2. Hypertension associated with diabetes    3. Chronic obstructive pulmonary disease, unspecified COPD type    4. Coronary artery disease involving native coronary artery of native heart without angina pectoris    5. Pulmonary nodule    6. Seasonal allergic rhinitis, unspecified allergic rhinitis trigger    7. Hyperlipidemia associated with type 2 diabetes mellitus    8. Uncontrolled type 2 diabetes mellitus without complication, without long-term current use of insulin        Plan:   Syncope and collapse  -     Basic metabolic panel; Future; Expected date: 3/15/17  -     EKG 12-lead; Future  -     US Carotid Bilateral; Future; Expected date: 3/15/17  Reviewed recent labs showing low sodium and elevated potassium.  Will get EKG and carotid Doppler with history of CAD for further evaluation.  Patient was informed that Singulair probably does not have caused his syncopal episode.  Advised to continue taking Singulair at this time and can discontinue Benadryl at bedtime, patient can continue Claritin in the morning and Mucinex during the day , drink adequate fluids to avoid excess mucus   with postnasal drip .  Likely vasovagal syncope .   Encouraged to follow-up with cardiology , if having any similar symptoms     Hypertension associated with diabetes-blood pressure stable today , continue current regimen     Chronic obstructive pulmonary disease, unspecified COPD type-stable on albuterol inhaler as needed and Spiriva     Coronary artery disease involving native coronary artery of native heart without angina pectoris-followed by cardiology Dr. Perry , currently taking aspirin 325 mg     Pulmonary nodule-reviewed CT scan showing  no acute findings other than stable pulmonary nodule     Seasonal allergic rhinitis, unspecified allergic rhinitis trigger-continue Claritin during the day , and Singulair in the evening and discontinue Benadryl at bedtime     Hyperlipidemia associated with type 2 diabetes mellitus-currently taking simvastatin 20 mg daily     Uncontrolled type 2 diabetes mellitus without complication, without long-term current use of insulin-reports that his blood glucose levels has been stable since started on Victoza and increase metformin to thousand gram twice daily, blood glucose levels has been running in the range of 110s to 130s, previous A1c was uncontrolled.

## 2017-03-15 NOTE — TELEPHONE ENCOUNTER
----- Message from Tatum Thacker sent at 3/15/2017 10:26 AM CDT -----  Contact: wife - Lisa Mcmahon - Ms Gonzalez is returning your call as requested - please call again at [phone]

## 2017-03-16 ENCOUNTER — HOSPITAL ENCOUNTER (OUTPATIENT)
Dept: RADIOLOGY | Facility: HOSPITAL | Age: 62
Discharge: HOME | End: 2017-03-16
Attending: FAMILY MEDICINE
Payer: COMMERCIAL

## 2017-03-16 DIAGNOSIS — R55 SYNCOPE AND COLLAPSE: ICD-10-CM

## 2017-03-16 PROCEDURE — 93880 EXTRACRANIAL BILAT STUDY: CPT | Mod: 26,,, | Performed by: RADIOLOGY

## 2017-03-16 PROCEDURE — 93880 EXTRACRANIAL BILAT STUDY: CPT | Mod: TC,PO

## 2017-03-17 ENCOUNTER — OFFICE VISIT (OUTPATIENT)
Dept: OTOLARYNGOLOGY | Facility: CLINIC | Age: 62
End: 2017-03-17
Payer: COMMERCIAL

## 2017-03-17 VITALS
HEART RATE: 66 BPM | DIASTOLIC BLOOD PRESSURE: 80 MMHG | TEMPERATURE: 98 F | WEIGHT: 239.44 LBS | HEIGHT: 68 IN | SYSTOLIC BLOOD PRESSURE: 141 MMHG | BODY MASS INDEX: 36.29 KG/M2

## 2017-03-17 DIAGNOSIS — J44.1 COPD EXACERBATION: ICD-10-CM

## 2017-03-17 DIAGNOSIS — J32.9 SINUSITIS, UNSPECIFIED CHRONICITY, UNSPECIFIED LOCATION: ICD-10-CM

## 2017-03-17 DIAGNOSIS — J32.9 CHRONIC SINUSITIS, UNSPECIFIED LOCATION: Primary | ICD-10-CM

## 2017-03-17 PROCEDURE — 3079F DIAST BP 80-89 MM HG: CPT | Mod: S$GLB,,, | Performed by: OTOLARYNGOLOGY

## 2017-03-17 PROCEDURE — 1160F RVW MEDS BY RX/DR IN RCRD: CPT | Mod: S$GLB,,, | Performed by: OTOLARYNGOLOGY

## 2017-03-17 PROCEDURE — 99999 PR PBB SHADOW E&M-EST. PATIENT-LVL III: CPT | Mod: PBBFAC,,, | Performed by: OTOLARYNGOLOGY

## 2017-03-17 PROCEDURE — 3077F SYST BP >= 140 MM HG: CPT | Mod: S$GLB,,, | Performed by: OTOLARYNGOLOGY

## 2017-03-17 PROCEDURE — 99203 OFFICE O/P NEW LOW 30 MIN: CPT | Mod: S$GLB,,, | Performed by: OTOLARYNGOLOGY

## 2017-03-17 RX ORDER — LEVOFLOXACIN 500 MG/1
500 TABLET, FILM COATED ORAL DAILY
Qty: 21 TABLET | Refills: 0 | Status: SHIPPED | OUTPATIENT
Start: 2017-03-17 | End: 2017-03-22 | Stop reason: ALTCHOICE

## 2017-03-17 RX ORDER — GUAIFENESIN 600 MG/1
1200 TABLET, EXTENDED RELEASE ORAL 2 TIMES DAILY
Qty: 40 TABLET | Refills: 0 | Status: SHIPPED | OUTPATIENT
Start: 2017-03-17 | End: 2017-03-27

## 2017-03-17 RX ORDER — FLUTICASONE PROPIONATE 50 MCG
SPRAY, SUSPENSION (ML) NASAL
Qty: 16 G | Refills: 0 | Status: SHIPPED | OUTPATIENT
Start: 2017-03-17 | End: 2017-09-12

## 2017-03-17 NOTE — PATIENT INSTRUCTIONS
PLEASE PERFORM SINUS RINSES 2-3 TIMES DAILY for the next 3 weeks.            DIRECTIONS FOR SINUS SALINE RINSE To see demonstration: Enter http://www.youtube.com/watch?[email or web address] into the browser address box, or go to You tube, and under the search box, enter sinus rinse. Click on [facility] Sinus Rinse Video    Step 1    Step 1 Please wash your hands. Fill the clean bottle with the designated volume of warm distilled water, filtered water or previously boiled water. You may warm the water in a microwave but we recommend that you warm it in increments of five seconds. This is to avoid excessive heating of the water and damage to the device or scalding your nasal passage.    Step 2    Step 2 Cut the SINUS RINSE mixture packet at the corner and pour its contents into the bottle. Tighten the cap & tube on the bottle securely, place one finger over the tip of the cap and shake the bottle gently to dissolve the mixture.      Step 3    Step 3 Standing in front of a sink, bend forward to your comfort level and tilt your head down. Keeping your mouth open without holding your breath, place cap snugly against your nasal passage and SQUEEZE BOTTLE GENTLY until the solution starts draining from the OPPOSITE nasal passage or from your mouth. Keep squeezing the bottle GENTLY until at least 1/4 to 1/2 (60 to 120 mL) of the bottle is used for a proper rinse. Do not swallow the solution.    Step 4    Blow your nose gently, without pinching your nose completely because this will apply pressure on the    eardrums. If tolerable, sniff in any residual solution remaining in the nasal passage once or twice prior to    blowing your nose as this may clean out the posterior nasopharyngeal area (the area at the back of your    nasal passage). Some solution will reach the back of the throat, so please spit it out. To help improve    drainage of any residual solution, blow your nose gently while tilting your head to the opposite side  of    the nasal passage that you just rinsed.    Step 5    Now repeat steps 3 & 4 for your other nasal passage.    Step 6 Air dry the Sinus Rinse bottle, cap, and tube on a clean paper towel, a glass plate to store the bottle cap and tube. If there is any solution leftover, please discard it. We recommend you make a fresh solution each time you rinse. Rinse 5 times each day, OR as directed by your physician. Warnings: DO NOT RINSE IF NASAL PASSAGE IS COMPLETELY BLOCKED OR IF YOU HAVE AN EAR INFECTION OR BLOCKED EARS. If you have had ear surgery, please contact your physician prior to irrigation. If you experience any pressure in your ears, stop the rinse and get further directions from your physician or contact our office during regular business hours. To avoid any ear discomfort: Heat the solution to lukewarm, do not use hot, boiling or cold water. Keep your mouth open. Do not hold your breath and if possible make the sound KASHIF....KASHIF... Make sure to take the position as shown. Gently squeeze 1/4 of the bottle at a time (60mL / 2 ounces). Stop the rinse if you feel any solution sensation near the ears. You may rinse with a partially blocked nasal passage. Please do not use for any other purposes. Please rinse at least ONE HOUR PRIOR to bedtime, in order to avoid any residual solution dripping in the throat.    >> Before using the SINUS RINSE kit, please inspect the cap, tube and bottle carefully for wear and    tear. If any of the components appear discolored or cracked, please contact [facility] to obtain a    replacement. You must follow the cleaning instructions provided in this brochure or cleaning instruction    card prior to each use.    >> The SINUS RINSE bottle and mixture are to be used only for nasalirrigation. Do not use for any other    purposes.    >> We recommend that you use the rinse ONE HOUR PRIOR to bedtime in order to avoid any residual    solution dripping in the throat.    Tips to avoid ear  discomfort while rinsing    If you have had ear surgery, please contact your physician prior to irrigation. Do not use if you have an    ear infection or blocked ears. Rinse with lukewarm water. Keep your mouth open. Do not hold your    breath while rinsing. While rinsing, make sure to tilt your. Gently squeeze the bottle while rinsing; do    not squeeze the bottle very forcefully. Stop the rinse if you feel a sensation of fluid near your ears.    Tips to avoid unexpected drainage after rinsing    In rare situations, especially if you have had sinus surgery, the saline solution can pool in the sinus    cavities and nasal passages and then drip from your nostrils hours after rinsing. To avoid this harmless    but annoying inconvenience, take one extra step after rinsing: lean forward, tilt your head sideways and    gently blow your nose. Then, tilt your head to the other side and blow again. You may need to repeat    this several times. This will help rid your nasal passages of any excess mucus and remaining saline    solution. If you find yourself experiencing delayed drainage often, do not rinse right before leaving your    house or going to bed.

## 2017-03-17 NOTE — MR AVS SNAPSHOT
[facility]  [address]  [place] [address]  Phone: [phone]  Fax: [phone]                  Stefan NOE Acampo    3/17/2017 3:15 PM   Office Visit    Description:  Male : 1955   Provider:  Stefan Castro MD   Department:  [facility] - E[facility]           Reason for Visit     Nasal Congestion     Chest Congestion           Diagnoses this Visit        Comments    Chronic sinusitis, unspecified location    -  Primary            To Do List           Future Appointments        Provider Department Dept Phone    2017 9:15 AM LABORATORY, [facility] [place] [address] [phone]      Goals (5 Years of Data)     None       These Medications        Disp Refills Start End    levoFLOXacin (LEVAQUIN) 500 MG tablet 21 tablet 0 3/17/2017 2017    Take 1 tablet (500 mg total) by mouth once daily. - Oral    Pharmacy: [facility] - [place], [place] - [address] Ph #: [phone]       guaifenesin (MUCINEX) 600 mg 12 hr tablet 40 tablet 0 3/17/2017 3/27/2017    Take 2 tablets (1,200 mg total) by mouth 2 (two) times daily. - Oral    Pharmacy: [facility] - [place], [place] - [address] Ph #: [phone]         [facility]     [facility] -  Assistance  Registered nurses in the [facility] provide clinical advisement, health education, appointment booking, and other advisory services.  Call for this free service at [phone].             Medications           START taking these NEW medications        Refills    levoFLOXacin (LEVAQUIN) 500 MG tablet 0    Sig: Take 1 tablet (500 mg total) by mouth once daily.    Class: Normal    Route: Oral    guaifenesin (MUCINEX) 600 mg 12 hr tablet 0    Sig: Take 2 tablets (1,200 mg total) by mouth 2 (two) times daily.    Class: Normal    Route: Oral      STOP taking these medications     dextromethorphan-guaifenesin  mg (MUCINEX DM)  mg per 12 hr tablet  Take 1 tablet by mouth 2 (two) times daily.           Verify that the below list of medications is an accurate representation of the medications you are currently taking.  If none reported, the list may be blank. If incorrect, please contact your healthcare provider. Carry this list with you in case of emergency.           Current Medications     acetaminophen (TYLENOL) 500 MG tablet Take 500 mg by mouth as needed.    albuterol (PROAIR HFA) 90 mcg/actuation inhaler Inhale 2 puffs into the lungs every 6 (six) hours as needed for Wheezing or Shortness of Breath.    aspirin 325 MG tablet Take 325 mg by mouth once daily.    blood sugar diagnostic (BLOOD GLUCOSE TEST) Strp 1 strip by Misc.(Non-Drug; Combo Route) route 2 (two) times daily.    BLOOD-GLUCOSE METER (CONTOUR NEXT EZ METER MISC) by Misc.(Non-Drug; Combo Route) route.    buPROPion (WELLBUTRIN SR) 150 MG TBSR 12 hr tablet Take 1 tablet (150 mg total) by mouth once daily.    cholecalciferol, vitamin D3, (VITAMIN D3) 1,000 unit capsule Take 1,000 Units by mouth once daily.    diphenhydrAMINE (BENADRYL) 25 mg capsule Take 50 mg by mouth every evening.    fluticasone (FLONASE) 50 mcg/actuation nasal spray SPRAY 2 SPRAYS IN EACH NOSTRIL ONCE DAILY    lancets Misc 1 lancet by Misc.(Non-Drug; Combo Route) route 2 (two) times daily.    liraglutide 0.6 mg/0.1 mL, 18 mg/3 mL, subq PNIJ (VICTOZA 2-CHARLES) 0.6 mg/0.1 mL (18 mg/3 mL) PnIj Inject 0.6 mg into the skin once daily.    lisinopril (PRINIVIL,ZESTRIL) 5 MG tablet TAKE ONE TABLET BY MOUTH ONCE DAILY    loratadine (CLARITIN) 10 mg tablet Take 10 mg by mouth every morning.    metformin (GLUCOPHAGE) 1000 MG tablet Take 1 tablet (1,000 mg total) by mouth 2 (two) times daily with meals.    montelukast (SINGULAIR) 10 mg tablet Take 1 tablet (10 mg total) by mouth every evening.    NITROGLYCERIN (NITROSTAT SL) Place 1 tablet under the tongue as needed.    omeprazole (PRILOSEC) 40 MG capsule Take 1 capsule (40 mg total) by  "mouth once daily.    simvastatin (ZOCOR) 20 MG tablet TAKE ONE TABLET BY MOUTH IN THE EVENING    tiotropium (SPIRIVA) 18 mcg inhalation capsule Inhale 1 capsule (18 mcg total) into the lungs once daily.    guaifenesin (MUCINEX) 600 mg 12 hr tablet Take 2 tablets (1,200 mg total) by mouth 2 (two) times daily.    levoFLOXacin (LEVAQUIN) 500 MG tablet Take 1 tablet (500 mg total) by mouth once daily.           Clinical Reference Information           Your Vitals Were     BP Pulse Temp Height Weight BMI    141/80 66 97.8 °F (36.6 °C) 5' 8" (1.727 m) 108.6 kg (239 lb 6.7 oz) 36.4 kg/m2      Blood Pressure          Most Recent Value    BP  (!)  141/80      Allergies as of 3/17/2017     No Known Allergies      Immunizations Administered on Date of Encounter - 3/17/2017     None      Instructions    PLEASE PERFORM SINUS RINSES 2-3 TIMES DAILY for the next 3 weeks.            DIRECTIONS FOR SINUS SALINE RINSE To see demonstration: Enter [email or web address]/watch?[email or web address] into the browser address box, or go to You tube, and under the search box, enter sinus rinse. Click on [facility] Sinus Rinse Video    Step 1    Step 1 Please wash your hands. Fill the clean bottle with the designated volume of warm distilled water, filtered water or previously boiled water. You may warm the water in a microwave but we recommend that you warm it in increments of five seconds. This is to avoid excessive heating of the water and damage to the device or scalding your nasal passage.    Step 2    Step 2 Cut the SINUS RINSE mixture packet at the corner and pour its contents into the bottle. Tighten the cap & tube on the bottle securely, place one finger over the tip of the cap and shake the bottle gently to dissolve the mixture.      Step 3    Step 3 Standing in front of a sink, bend forward to your comfort level and tilt your head down. Keeping your mouth open without holding your breath, place cap snugly against your nasal passage " and SQUEEZE BOTTLE GENTLY until the solution starts draining from the OPPOSITE nasal passage or from your mouth. Keep squeezing the bottle GENTLY until at least 1/4 to 1/2 (60 to 120 mL) of the bottle is used for a proper rinse. Do not swallow the solution.    Step 4    Blow your nose gently, without pinching your nose completely because this will apply pressure on the    eardrums. If tolerable, sniff in any residual solution remaining in the nasal passage once or twice prior to    blowing your nose as this may clean out the posterior nasopharyngeal area (the area at the back of your    nasal passage). Some solution will reach the back of the throat, so please spit it out. To help improve    drainage of any residual solution, blow your nose gently while tilting your head to the opposite side of    the nasal passage that you just rinsed.    Step 5    Now repeat steps 3 & 4 for your other nasal passage.    Step 6 Air dry the Sinus Rinse bottle, cap, and tube on a clean paper towel, a glass plate to store the bottle cap and tube. If there is any solution leftover, please discard it. We recommend you make a fresh solution each time you rinse. Rinse 5 times each day, OR as directed by your physician. Warnings: DO NOT RINSE IF NASAL PASSAGE IS COMPLETELY BLOCKED OR IF YOU HAVE AN EAR INFECTION OR BLOCKED EARS. If you have had ear surgery, please contact your physician prior to irrigation. If you experience any pressure in your ears, stop the rinse and get further directions from your physician or contact our office during regular business hours. To avoid any ear discomfort: Heat the solution to lukewarm, do not use hot, boiling or cold water. Keep your mouth open. Do not hold your breath and if possible make the sound KASHIF....KASHIF... Make sure to take the position as shown. Gently squeeze 1/4 of the bottle at a time (60mL / 2 ounces). Stop the rinse if you feel any solution sensation near the ears. You may rinse with a  partially blocked nasal passage. Please do not use for any other purposes. Please rinse at least ONE HOUR PRIOR to bedtime, in order to avoid any residual solution dripping in the throat.    >> Before using the SINUS RINSE kit, please inspect the cap, tube and bottle carefully for wear and    tear. If any of the components appear discolored or cracked, please contact [employer] to obtain a    replacement. You must follow the cleaning instructions provided in this brochure or cleaning instruction    card prior to each use.    >> The SINUS RINSE bottle and mixture are to be used only for nasalirrigation. Do not use for any other    purposes.    >> We recommend that you use the rinse ONE HOUR PRIOR to bedtime in order to avoid any residual    solution dripping in the throat.    Tips to avoid ear discomfort while rinsing    If you have had ear surgery, please contact your physician prior to irrigation. Do not use if you have an    ear infection or blocked ears. Rinse with lukewarm water. Keep your mouth open. Do not hold your    breath while rinsing. While rinsing, make sure to tilt your. Gently squeeze the bottle while rinsing; do    not squeeze the bottle very forcefully. Stop the rinse if you feel a sensation of fluid near your ears.    Tips to avoid unexpected drainage after rinsing    In rare situations, especially if you have had sinus surgery, the saline solution can pool in the sinus    cavities and nasal passages and then drip from your nostrils hours after rinsing. To avoid this harmless    but annoying inconvenience, take one extra step after rinsing: lean forward, tilt your head sideways and    gently blow your nose. Then, tilt your head to the other side and blow again. You may need to repeat    this several times. This will help rid your nasal passages of any excess mucus and remaining saline    solution. If you find yourself experiencing delayed drainage often, do not rinse right before leaving  your    house or going to bed.             Language Assistance Services     ATTENTION: Language assistance services are available, free of charge. Please call [phone].      ATENCIÓN: Si habla nicole, tiene a esparza disposición servicios gratuitos de asistencia lingüística. Llame al [phone].     CHÚ Ý: N?u b?n nói Ti?ng Vi?t, có các d?ch v? h? tr? ngôn ng? mi?n phí dành cho b?n. G?i s? [phone].         [facility] complies with applicable Federal civil rights laws and does not discriminate on the basis of race, color, national origin, age, disability, or sex.

## 2017-03-19 NOTE — PROGRESS NOTES
Subjective:   Patient: Stefan Forbes Jr. [id number], :1955   Visit date:3/17/2017 2:55 PM    Chief Complaint:  Nasal Congestion and Chest Congestion (mucous)    HPI:  Stefan is a 61 y.o. male is here for evaluation of the following issue(s):    MAJOR SYMPTOMS:  No  Purulent anterior nasal discharge  Yes  Purulent or discolored posterior nasal discharge  Yes  Nasal congestion or obstruction  Yes  Facial Congestion or fullness  No  Hyposmia or anosmia  No  Fever    MINOR SYMPTOMS:  No  Headache  Yes  Ear pain, pressure, or fullness  No  Halitosis  No  Dental pain  Yes  Fatigue    The diagnosis of sinusitis is based on the presence of at least 2 major or 1 major and at least 2 minor symptoms.  Stefan does meet this criteria.  Clinical Practice Guideline for Acute Bacterial Rhinosinusitis in Children and Adults. Clin Infect Dis 2012; 54:e72    Onset:  2 months ago  Exacerbating factors: No  Relieving factors: No  Timing:  worsening    Review of Systems:  Gen:  []fever   []fatigue  HENT:  []nosebleeds  []dental problem   Eyes:  []photophobia  []visual disturbance  Resp:  [x]chest tightness []wheezing  Card:  []chest pain  []leg swelling  GI:  []abdominal pain []blood in stool  :  []dysuria  []hematuria  Musc:  []joint swelling  []gait problem  Skin:  []color change  []pallor  Neuro:  []seizures  []numbness  Hem:  []bruise/bleed easily  Psych:  []hallucinations  []behavioral problems  Allergy/Imm: has No Known Allergies.    His meds, allergies, medical, surgical, social & family histories were reviewed & updated:  -     He has a current medication list which includes the following prescription(s): acetaminophen, albuterol, aspirin, blood sugar diagnostic, blood-glucose meter, bupropion, cholecalciferol (vitamin d3), diphenhydramine, fluticasone, lancets, liraglutide 0.6 mg/0.1 ml (18 mg/3 ml) subq pnij, lisinopril, loratadine, metformin, montelukast, nitroglycerin, omeprazole, simvastatin, tiotropium, guaifenesin,  "and levofloxacin.  -     He  has a past medical history of COPD (chronic obstructive pulmonary disease); Diabetes mellitus, type 2; Hyperlipidemia; and Hypertension.   -     He  does not have any pertinent problems on file.   -     He  has a past surgical history that includes Angioplasty.  -     He  reports that he quit smoking about 14 months ago. His smoking use included Cigars. He has never used smokeless tobacco. He reports that he does not drink alcohol or use illicit drugs.  -     His family history includes Cancer in his father; Diabetes in his mother.  -     He has No Known Allergies.    Objective:     Physical Exam:  Vitals:  BP (!) 141/80  Pulse 66  Temp 97.8 °F (36.6 °C)  Ht 5' 8" (1.727 m)  Wt 108.6 kg (239 lb 6.7 oz)  BMI 36.4 kg/m2  Appearance:  Well-developed, well-nourished.  Communication:  Able to communicate, no hoarseness.  Head & Face:  Normocephalic, atraumatic, no sinus tenderness, normal facial strength.  Eyes:  Extraocular motions intact.  Ears:  Otoscopy of external auditory canals and tympanic membranes was normal, clinical speech reception thresholds grossly intact, no mass/lesion of auricle.  Nose: Significant erythema, edema   Mouth:  No mass/lesion of lips, teeth, gums, hard/soft palate, tongue, tonsils, or oropharynx.  Neck & Lymphatics:  No cervical lymphadenopathy, no neck mass/crepitus/ asymmetry, trachea is midline, no thyroid enlargement/tenderness/mass.  Neuro/Psych: Alert with normal mood and affect.   Abdominal: Normal appearance.   Respiration/Chest:  Symmetric expansion during respiration, normal respiratory effort.  Skin:  Warm and intact  Cardiovascular:  No peripheral vascular edema or varicosities.  Unable to visualize larynx on mirror exam.    Assessment & Plan:   Stefan was seen today for nasal congestion and chest congestion.    Diagnoses and all orders for this visit:    Chronic sinusitis, unspecified location  -     levoFLOXacin (LEVAQUIN) 500 MG tablet; Take 1 " tablet (500 mg total) by mouth once daily.  -     guaifenesin (MUCINEX) 600 mg 12 hr tablet; Take 2 tablets (1,200 mg total) by mouth 2 (two) times daily.      UPPER RESPIRATORY INFECTION  The diagnosis of acute rhinosinusitis is based on the presence of at least 2 major or 1 major and at least 2 minor symptoms.  Stefan does meet this criteria. However, history alone has poor sensitivity for distinguishing acute bacterial from viral rhinosinusitis.  ABRS is suggested by persistent symptoms or signs of ARS lasting ?10 days without clinical improvement, onset of severe symptoms or signs (high fever, purulent nasal discharge, or facial pain) for at least three to four consecutive days at the beginning of illness, or initially symptom improvement followed by worsening symptoms or signs.   Based on this, I would recommend mucinex and levaquin.     We discussed his medical conditions, treatments and plan.  Stefan should return to clinic if any issues arise (symptoms worsen or persist), otherwise we will see him back in the clinic only as needed.

## 2017-03-20 ENCOUNTER — TELEPHONE (OUTPATIENT)
Dept: INTERNAL MEDICINE | Facility: CLINIC | Age: 62
End: 2017-03-20

## 2017-03-22 ENCOUNTER — TELEPHONE (OUTPATIENT)
Dept: OTOLARYNGOLOGY | Facility: CLINIC | Age: 62
End: 2017-03-22

## 2017-03-22 DIAGNOSIS — E11.9 TYPE 2 DIABETES MELLITUS WITHOUT COMPLICATION, WITHOUT LONG-TERM CURRENT USE OF INSULIN: ICD-10-CM

## 2017-03-22 RX ORDER — LIRAGLUTIDE 6 MG/ML
INJECTION SUBCUTANEOUS
Qty: 6 ML | Refills: 0 | Status: SHIPPED | OUTPATIENT
Start: 2017-03-22 | End: 2017-05-16 | Stop reason: SDUPTHER

## 2017-03-22 RX ORDER — AMOXICILLIN AND CLAVULANATE POTASSIUM 875; 125 MG/1; MG/1
1 TABLET, FILM COATED ORAL 2 TIMES DAILY
Qty: 28 TABLET | Refills: 0 | Status: SHIPPED | OUTPATIENT
Start: 2017-03-22 | End: 2017-04-05

## 2017-03-22 NOTE — TELEPHONE ENCOUNTER
Returned call to patient, spoke with wife as she was the contacting person. Patient was speaking in the background regarding concerns of Levaquin 500 mg given. They are inquiring to see if there is another antibiotic that is just as effective and safer. She states that she has read numerous reviews about the Levaquin and feels that they do not want to take this particular one nor anything in the Levaquin family. She also states that he is still really stuffy in his head and has coughing fits still with this being day 6 of the antibiotic.  Wishes to have an alternate sent into the [facility].

## 2017-03-22 NOTE — TELEPHONE ENCOUNTER
----- Message from Salome Stovall sent at 3/22/2017  8:43 AM CDT -----  Contact: pt wife- Lisa   States calling regarding the medication that patient is currently taking for a sinus infection. Please adv/call [phone].//thanks. cw

## 2017-03-26 DIAGNOSIS — I10 ESSENTIAL HYPERTENSION: ICD-10-CM

## 2017-03-27 RX ORDER — SIMVASTATIN 20 MG/1
TABLET, FILM COATED ORAL
Qty: 30 TABLET | Refills: 0 | Status: SHIPPED | OUTPATIENT
Start: 2017-03-27 | End: 2017-05-02 | Stop reason: SDUPTHER

## 2017-03-27 RX ORDER — LISINOPRIL 5 MG/1
TABLET ORAL
Qty: 90 TABLET | Refills: 0 | Status: SHIPPED | OUTPATIENT
Start: 2017-03-27 | End: 2017-06-25 | Stop reason: SDUPTHER

## 2017-03-31 ENCOUNTER — OFFICE VISIT (OUTPATIENT)
Dept: INTERNAL MEDICINE | Facility: CLINIC | Age: 62
End: 2017-03-31
Payer: COMMERCIAL

## 2017-03-31 ENCOUNTER — TELEPHONE (OUTPATIENT)
Dept: INTERNAL MEDICINE | Facility: CLINIC | Age: 62
End: 2017-03-31

## 2017-03-31 VITALS
SYSTOLIC BLOOD PRESSURE: 118 MMHG | WEIGHT: 233.25 LBS | TEMPERATURE: 97 F | DIASTOLIC BLOOD PRESSURE: 78 MMHG | HEART RATE: 71 BPM | OXYGEN SATURATION: 97 % | HEIGHT: 68 IN | BODY MASS INDEX: 35.35 KG/M2

## 2017-03-31 DIAGNOSIS — E11.69 HYPERLIPIDEMIA ASSOCIATED WITH TYPE 2 DIABETES MELLITUS: ICD-10-CM

## 2017-03-31 DIAGNOSIS — R55 VASOVAGAL SYNCOPE: ICD-10-CM

## 2017-03-31 DIAGNOSIS — I15.2 HYPERTENSION ASSOCIATED WITH DIABETES: ICD-10-CM

## 2017-03-31 DIAGNOSIS — E11.59 HYPERTENSION ASSOCIATED WITH DIABETES: ICD-10-CM

## 2017-03-31 DIAGNOSIS — J44.9 CHRONIC OBSTRUCTIVE PULMONARY DISEASE, UNSPECIFIED COPD TYPE: ICD-10-CM

## 2017-03-31 DIAGNOSIS — M17.9 OSTEOARTHRITIS OF KNEE, UNSPECIFIED LATERALITY, UNSPECIFIED OSTEOARTHRITIS TYPE: Primary | ICD-10-CM

## 2017-03-31 DIAGNOSIS — Z01.818 PREOP EXAMINATION: ICD-10-CM

## 2017-03-31 DIAGNOSIS — J32.9 SINUSITIS, UNSPECIFIED CHRONICITY, UNSPECIFIED LOCATION: ICD-10-CM

## 2017-03-31 DIAGNOSIS — E78.5 HYPERLIPIDEMIA ASSOCIATED WITH TYPE 2 DIABETES MELLITUS: ICD-10-CM

## 2017-03-31 PROCEDURE — 99999 PR PBB SHADOW E&M-EST. PATIENT-LVL III: CPT | Mod: PBBFAC,,, | Performed by: INTERNAL MEDICINE

## 2017-03-31 PROCEDURE — 1160F RVW MEDS BY RX/DR IN RCRD: CPT | Mod: S$GLB,,, | Performed by: INTERNAL MEDICINE

## 2017-03-31 PROCEDURE — 3074F SYST BP LT 130 MM HG: CPT | Mod: S$GLB,,, | Performed by: INTERNAL MEDICINE

## 2017-03-31 PROCEDURE — 3046F HEMOGLOBIN A1C LEVEL >9.0%: CPT | Mod: S$GLB,,, | Performed by: INTERNAL MEDICINE

## 2017-03-31 PROCEDURE — 99213 OFFICE O/P EST LOW 20 MIN: CPT | Mod: S$GLB,,, | Performed by: INTERNAL MEDICINE

## 2017-03-31 PROCEDURE — 4010F ACE/ARB THERAPY RXD/TAKEN: CPT | Mod: S$GLB,,, | Performed by: INTERNAL MEDICINE

## 2017-03-31 PROCEDURE — 3078F DIAST BP <80 MM HG: CPT | Mod: S$GLB,,, | Performed by: INTERNAL MEDICINE

## 2017-03-31 RX ORDER — GUAIFENESIN 1200 MG/1
1 TABLET, EXTENDED RELEASE ORAL 2 TIMES DAILY
COMMUNITY
End: 2022-10-20 | Stop reason: ALTCHOICE

## 2017-03-31 NOTE — MR AVS SNAPSHOT
[facility]  [address] [place] [address]  Phone: [phone]  Fax: [phone]                  Stefan Forbes Jr.   3/31/2017 11:20 AM   Office Visit    Description:  Male : 1955   Provider:  Wallace Fisher MD   Department:  [facility]           Reason for Visit     Follow-up           Diagnoses this Visit        Comments    Chronic obstructive pulmonary disease, unspecified COPD type    -  Primary     Preop cardiovascular exam                To Do List           Future Appointments        Provider Department Dept Phone    2017 9:15 AM LABORATORY, [facility] [phone]      Goals (5 Years of Data)     None      Follow-Up and Disposition     Return if symptoms worsen or fail to improve.      [facility]     [facility] Care Line -  Assistance  Unless otherwise directed by your provider, please contact [facility], our nurse care line that is available for  assistance.     Registered nurses in the [facility] provide: appointment scheduling, clinical advisement, health education, and other advisory services.  Call: [phone] (toll free)               Medications           STOP taking these medications     loratadine (CLARITIN) 10 mg tablet Take 10 mg by mouth every morning.    montelukast (SINGULAIR) 10 mg tablet Take 1 tablet (10 mg total) by mouth every evening.    diphenhydrAMINE (BENADRYL) 25 mg capsule Take 50 mg by mouth every evening.           Verify that the below list of medications is an accurate representation of the medications you are currently taking.  If none reported, the list may be blank. If incorrect, please contact your healthcare provider. Carry this list with you in case of emergency.           Current Medications     acetaminophen (TYLENOL) 500 MG tablet Take 500 mg by mouth as needed.    albuterol (PROAIR HFA) 90 mcg/actuation inhaler Inhale 2 puffs into the  "lungs every 6 (six) hours as needed for Wheezing or Shortness of Breath.    amoxicillin-clavulanate 875-125mg (AUGMENTIN) 875-125 mg per tablet Take 1 tablet by mouth 2 (two) times daily.    blood sugar diagnostic (BLOOD GLUCOSE TEST) Strp 1 strip by Misc.(Non-Drug; Combo Route) route 2 (two) times daily.    BLOOD-GLUCOSE METER (CONTOUR NEXT EZ METER MISC) by Misc.(Non-Drug; Combo Route) route.    buPROPion (WELLBUTRIN SR) 150 MG TBSR 12 hr tablet Take 1 tablet (150 mg total) by mouth once daily.    cholecalciferol, vitamin D3, (VITAMIN D3) 1,000 unit capsule Take 1,000 Units by mouth once daily.    fluticasone (FLONASE) 50 mcg/actuation nasal spray SPRAY 2 SPRAYS IN EACH NOSTRIL ONCE DAILY    guaifenesin (MUCINEX) 1,200 mg Ta12 Take 1 tablet by mouth 2 (two) times daily.    lancets Misc 1 lancet by Misc.(Non-Drug; Combo Route) route 2 (two) times daily.    lisinopril (PRINIVIL,ZESTRIL) 5 MG tablet TAKE ONE TABLET BY MOUTH ONCE DAILY    metformin (GLUCOPHAGE) 1000 MG tablet Take 1 tablet (1,000 mg total) by mouth 2 (two) times daily with meals.    NITROGLYCERIN (NITROSTAT SL) Place 1 tablet under the tongue as needed.    omeprazole (PRILOSEC) 40 MG capsule Take 1 capsule (40 mg total) by mouth once daily.    simvastatin (ZOCOR) 20 MG tablet TAKE ONE TABLET BY MOUTH IN THE EVENING    tiotropium (SPIRIVA) 18 mcg inhalation capsule Inhale 1 capsule (18 mcg total) into the lungs once daily.    VICTOZA 2-CHARLES 0.6 mg/0.1 mL (18 mg/3 mL) PnIj INJECT 0.6 MG INTO THE SKIN ONCE DAILY    aspirin 325 MG tablet Take 325 mg by mouth once daily.           Clinical Reference Information           Your Vitals Were     BP Pulse Temp Height Weight SpO2    118/78 (BP Location: Right arm, Patient Position: Sitting) 71 97.2 °F (36.2 °C) (Tympanic) 5' 8" (1.727 m) 105.8 kg (233 lb 4 oz) 97%    BMI                35.47 kg/m2          Blood Pressure          Most Recent Value    BP  118/78      Allergies as of 3/31/2017     No Known " Allergies      Immunizations Administered on Date of Encounter - 3/31/2017     None      Language Assistance Services     ATTENTION: Language assistance services are available, free of charge. Please call [phone].      ATENCIÓN: Si znia rivera, tiene a esparza disposición servicios gratuitos de asistencia lingüística. Llame al [phone].     CHÚ Ý: N?u b?n nói Ti?ng Vi?t, có các d?ch v? h? tr? ngôn ng? mi?n phí dành cho b?n. G?i s? [phone].         [facility] complies with applicable Federal civil rights laws and does not discriminate on the basis of race, color, national origin, age, disability, or sex.

## 2017-03-31 NOTE — PROGRESS NOTES
Subjective:      Patient ID: Stefan Forbes Jr. is a 61 y.o. male.    Chief Complaint: Follow-up (from sinus infection; surgery on 4/4/17)    HPI Comments: 60 yo with Patient Active Problem List:     Uncontrolled type 2 diabetes mellitus without complication, without long-term current use of insulin     Hypertension associated with diabetes     Hyperlipidemia associated with type 2 diabetes mellitus     COPD (chronic obstructive pulmonary disease)     CAD (coronary artery disease)     Vitamin D deficiency     Osteoarthritis of knee     Pulmonary nodule    Past Medical History:  No date: COPD (chronic obstructive pulmonary disease)  No date: Diabetes mellitus, type 2  No date: Hyperlipidemia  No date: Hypertension    Here today for f/u on syncopal episodes. Told by Ortho to see pcp again prior to surgery of knee planned for April 4. He is able to walk up at least 2 flights of stairs without chest pain. Rides stationary bike for 15 to 30 min without cp or dyspnea.  Pt experienced 2 syncopal episodes in past 3 weeks , both occurring during severe coughing spells. Both lasting a few seconds. Last episode 1 week ago. None since then. Saw ENT, Matthew,  3/17 and dx with chronic sinusitis.  S/p 6 days of levaquin followed by augmentin. Continues with saline flush. Now on day 9/14. Symptoms of ha have resolved. Severe cough has resolved. Continues with occ dry cough which is chronic for years. Post nasal drip is nearly resolved.  Nasal d/c has greatly improved in thickness and color. Feeling much better overall.     Current Medications:     acetaminophen (TYLENOL) 500 MG tablet 500 mg, As needed (PRN)       albuterol (PROAIR HFA) 90 mcg/actuation inhaler 2 puff, Every 6 hours PRN       amoxicillin-clavulanate 875-125mg (AUGMENTIN) 875-125 mg per tablet 1 tablet, 2 times daily       aspirin 325 MG tablet 325 mg, Daily       blood sugar diagnostic (BLOOD GLUCOSE TEST) Strp 1 strip, 2 times daily         Patient taking  "differently: 1 strip Misc.(Non-Drug; Combo Route) 3 times daily, Reported on 3/13/2017      BLOOD-GLUCOSE METER (CONTOUR NEXT EZ METER MISC)        buPROPion (WELLBUTRIN SR) 150 MG TBSR 12 hr tablet 150 mg, Daily       cholecalciferol, vitamin D3, (VITAMIN D3) 1,000 unit capsule 1,000 Units, Daily       diphenhydrAMINE (BENADRYL) 25 mg capsule 50 mg, Nightly       fluticasone (FLONASE) 50 mcg/actuation nasal spray        guaifenesin (MUCINEX) 1,200 mg Ta12 1 tablet, 2 times daily       lancets Misc 1 lancet, 2 times daily         Patient taking differently: 1 lancet Misc.(Non-Drug; Combo Route) 3 times daily, Reported on 3/13/2017      lisinopril (PRINIVIL,ZESTRIL) 5 MG tablet        loratadine (CLARITIN) 10 mg tablet 10 mg, Every morning       metformin (GLUCOPHAGE) 1000 MG tablet 1,000 mg, 2 times daily with meals       montelukast (SINGULAIR) 10 mg tablet 10 mg, Nightly       NITROGLYCERIN (NITROSTAT SL) 1 tablet, As needed (PRN)       omeprazole (PRILOSEC) 40 MG capsule 40 mg, Daily       simvastatin (ZOCOR) 20 MG tablet        tiotropium (SPIRIVA) 18 mcg inhalation capsule 18 mcg, Daily       VICTOZA 2-CHARLES 0.6 mg/0.1 mL (18 mg/3 mL) PnIj            Neg carotid us 3/16/17.     Review of Systems   Constitutional: Negative for chills and fever.   HENT: Positive for postnasal drip (trace) and rhinorrhea. Negative for congestion, ear pain, sinus pressure and sore throat.    Respiratory: Positive for cough. Negative for shortness of breath and wheezing.    Cardiovascular: Negative for chest pain.   Gastrointestinal: Negative for abdominal pain and blood in stool.   Genitourinary: Negative for dysuria and hematuria.   Neurological: Negative for seizures.     Objective:   /78 (BP Location: Right arm, Patient Position: Sitting)  Pulse 71  Temp 97.2 °F (36.2 °C) (Tympanic)   Ht 5' 8" (1.727 m)  Wt 105.8 kg (233 lb 4 oz)  SpO2 97%  BMI 35.47 kg/m2    Physical Exam   Constitutional: He is oriented to person, " place, and time. He appears well-developed and well-nourished. No distress.   HENT:   Head: Normocephalic and atraumatic.   Mouth/Throat: Oropharynx is clear and moist.   Eyes: EOM are normal. Pupils are equal, round, and reactive to light.   Neck: Neck supple. Carotid bruit is not present. No thyromegaly present.   Cardiovascular: Normal rate and regular rhythm.    Pulmonary/Chest: Breath sounds normal. He has no wheezes. He has no rales.   Abdominal: Soft. Bowel sounds are normal. There is no tenderness.   Lymphadenopathy:     He has no cervical adenopathy.   Neurological: He is alert and oriented to person, place, and time. He displays normal reflexes. No cranial nerve deficit. He exhibits normal muscle tone. Coordination normal.   Skin: Skin is warm and dry.   Psychiatric: He has a normal mood and affect. His behavior is normal.       Assessment:     1. Osteoarthritis of knee, unspecified laterality, unspecified osteoarthritis type    2. Chronic obstructive pulmonary disease, unspecified COPD type    3. Hyperlipidemia associated with type 2 diabetes mellitus    4. Hypertension associated with diabetes    5. Sinusitis, unspecified chronicity, unspecified location    6. Vasovagal syncope    7. Preop examination      Plan:   Osteoarthritis of knee, unspecified laterality, unspecified osteoarthritis type    Chronic obstructive pulmonary disease, unspecified COPD type    Hyperlipidemia associated with type 2 diabetes mellitus    Hypertension associated with diabetes    Sinusitis, unspecified chronicity, unspecified location  Comments:  much improved  complete abx as directed by ent    Vasovagal syncope  Comments:  no episodes or severe cough in one week    Preop examination     patient's syncopal episodes are consistent with vasovagal syncope associated with severe coughing spells.  He has had a negative ultrasound of the carotids along with a normal EKG since his syncopal episodes.  I do not feel that these episodes  change his cardiovascular risk for his knee replacement surgery.    Lab Frequency Next Occurrence   CT Chest Without Contrast Once 2/27/2017   Hemoglobin A1c Once 4/30/2017   Vitamin D Once 5/2/2017         Return if symptoms worsen or fail to improve.

## 2017-04-21 ENCOUNTER — TELEPHONE (OUTPATIENT)
Dept: INTERNAL MEDICINE | Facility: CLINIC | Age: 62
End: 2017-04-21

## 2017-04-21 NOTE — TELEPHONE ENCOUNTER
Spoke with pt's wife who stated pt's fasting blood sugar level was 111 this morning.  Pt took metformin 1000 mg this morning.  At 1 pm, before lunch, blood sugar level was 70 and dropped to 66 minutes later.  Pt had no complaints of symptoms.  Pt ate a cookie, half of a [employer] sandwich, and a soda and level is now at 90.  Pt normally takes Victoza 0.6 mg at 1 pm, but has not taken it today.  Pt's wife is very concerned about pt's glucose levels.

## 2017-04-21 NOTE — TELEPHONE ENCOUNTER
Notified pt's wife of Dr. Fisher's recommendation to decrease metformin to 500 mg bid and to keep taking Victoza daily.  Pt's wife verbalized understanding and scheduled a f/u appt with Dr. Fisher for 4/26/17.

## 2017-04-26 ENCOUNTER — HOSPITAL ENCOUNTER (OUTPATIENT)
Dept: RADIOLOGY | Facility: HOSPITAL | Age: 62
Discharge: HOME | End: 2017-04-26
Attending: INTERNAL MEDICINE
Payer: COMMERCIAL

## 2017-04-26 ENCOUNTER — OFFICE VISIT (OUTPATIENT)
Dept: INTERNAL MEDICINE | Facility: CLINIC | Age: 62
End: 2017-04-26
Payer: COMMERCIAL

## 2017-04-26 VITALS
WEIGHT: 228.38 LBS | OXYGEN SATURATION: 98 % | BODY MASS INDEX: 34.61 KG/M2 | SYSTOLIC BLOOD PRESSURE: 124 MMHG | HEART RATE: 81 BPM | HEIGHT: 68 IN | DIASTOLIC BLOOD PRESSURE: 68 MMHG | TEMPERATURE: 97 F

## 2017-04-26 DIAGNOSIS — J32.9 SINUSITIS, UNSPECIFIED CHRONICITY, UNSPECIFIED LOCATION: Primary | ICD-10-CM

## 2017-04-26 DIAGNOSIS — J32.9 SINUSITIS, UNSPECIFIED CHRONICITY, UNSPECIFIED LOCATION: ICD-10-CM

## 2017-04-26 PROCEDURE — 3074F SYST BP LT 130 MM HG: CPT | Mod: S$GLB,,, | Performed by: INTERNAL MEDICINE

## 2017-04-26 PROCEDURE — 1160F RVW MEDS BY RX/DR IN RCRD: CPT | Mod: S$GLB,,, | Performed by: INTERNAL MEDICINE

## 2017-04-26 PROCEDURE — 3078F DIAST BP <80 MM HG: CPT | Mod: S$GLB,,, | Performed by: INTERNAL MEDICINE

## 2017-04-26 PROCEDURE — 99999 PR PBB SHADOW E&M-EST. PATIENT-LVL III: CPT | Mod: PBBFAC,,, | Performed by: INTERNAL MEDICINE

## 2017-04-26 PROCEDURE — 99213 OFFICE O/P EST LOW 20 MIN: CPT | Mod: S$GLB,,, | Performed by: INTERNAL MEDICINE

## 2017-04-26 PROCEDURE — 70486 CT MAXILLOFACIAL W/O DYE: CPT | Mod: 26,,, | Performed by: RADIOLOGY

## 2017-04-26 PROCEDURE — 70486 CT MAXILLOFACIAL W/O DYE: CPT | Mod: TC,PO

## 2017-04-26 RX ORDER — MELOXICAM 7.5 MG/1
TABLET ORAL
Refills: 0 | COMMUNITY
Start: 2017-04-06 | End: 2017-04-26

## 2017-04-26 RX ORDER — DOXYCYCLINE HYCLATE 100 MG
100 TABLET ORAL EVERY 12 HOURS
Qty: 20 TABLET | Refills: 0 | Status: SHIPPED | OUTPATIENT
Start: 2017-04-26 | End: 2017-05-01

## 2017-04-26 RX ORDER — TRAMADOL HYDROCHLORIDE 50 MG/1
50 TABLET ORAL 2 TIMES DAILY PRN
COMMUNITY
Start: 2017-04-20 | End: 2017-09-12

## 2017-04-26 RX ORDER — GABAPENTIN 300 MG/1
300 CAPSULE ORAL 2 TIMES DAILY
COMMUNITY
Start: 2017-04-24 | End: 2017-05-16

## 2017-04-26 RX ORDER — OXYCODONE AND ACETAMINOPHEN 10; 325 MG/1; MG/1
1 TABLET ORAL 2 TIMES DAILY PRN
COMMUNITY
Start: 2017-04-24 | End: 2017-09-12

## 2017-04-26 NOTE — PROGRESS NOTES
"Subjective:      Patient ID: Stefan Forbes Jr. is a 61 y.o. male.    Chief Complaint: Follow-up    HPI Comments: 60 yo with Patient Active Problem List:     Uncontrolled type 2 diabetes mellitus without complication, without long-term current use of insulin     Hypertension associated with diabetes     Hyperlipidemia associated with type 2 diabetes mellitus     COPD (chronic obstructive pulmonary disease)     CAD (coronary artery disease)     Vitamin D deficiency     Osteoarthritis of knee     Pulmonary nodule    Here today c/o post nasal drip, coughing small thick yellow sputum worsening over the past one week.  This is a recurrent problem.  It was resolved approximately one month ago after a short course of Levaquin followed by an extended course of Augmentin as prescribed by his ENT.  He has had similar symptoms multiple times in the past.  He denies chest congestion shortness of breath and wheezing.  He is not trying any medications for this currently.    Review of Systems   Constitutional: Negative for chills and fever.   HENT: Positive for congestion (nasal), postnasal drip, rhinorrhea and sinus pressure. Negative for ear pain and sore throat.    Respiratory: Positive for cough. Negative for shortness of breath and wheezing.    Cardiovascular: Negative for chest pain and palpitations.   Gastrointestinal: Negative for abdominal pain, blood in stool, nausea and vomiting.   Genitourinary: Negative for dysuria and hematuria.   Neurological: Negative for syncope.     Objective:   /68 (BP Location: Right arm, Patient Position: Sitting)  Pulse 81  Temp 96.8 °F (36 °C) (Tympanic)   Ht 5' 8" (1.727 m)  Wt 103.6 kg (228 lb 6.3 oz)  SpO2 98%  BMI 34.73 kg/m2    Physical Exam   Constitutional: He appears well-developed and well-nourished. No distress.   HENT:   Right Ear: Tympanic membrane normal.   Left Ear: Tympanic membrane normal.   Nose: Mucosal edema and rhinorrhea present. Right sinus exhibits no " maxillary sinus tenderness and no frontal sinus tenderness. Left sinus exhibits no maxillary sinus tenderness and no frontal sinus tenderness.   Mouth/Throat: Posterior oropharyngeal erythema present. No oropharyngeal exudate or posterior oropharyngeal edema.   Eyes: Conjunctivae and EOM are normal.   Cardiovascular: Normal rate and regular rhythm.    Pulmonary/Chest: Effort normal and breath sounds normal.   Musculoskeletal: He exhibits no edema.   Skin: Skin is warm and dry.   Psychiatric: He has a normal mood and affect. His behavior is normal.       Assessment:     1. Sinusitis, unspecified chronicity, unspecified location      Plan:   Sinusitis, unspecified chronicity, unspecified location  -     doxycycline (VIBRA-TABS) 100 MG tablet; Take 1 tablet (100 mg total) by mouth every 12 (twelve) hours.  Dispense: 20 tablet; Refill: 0  -     CT Sinuses without Contrast; Future; Expected date: 4/26/17    resume flonase, mucinex, and saline washes.  Needs f/u with ENT      Lab Frequency Next Occurrence   CT Chest Without Contrast Once 2/27/2017   Hemoglobin A1c Once 4/30/2017   Vitamin D Once 5/2/2017         Return in about 2 weeks (around 5/10/2017), or if symptoms worsen or fail to improve.

## 2017-04-26 NOTE — MR AVS SNAPSHOT
[facility]  [address] [address]  Phone: [phone]  Fax: [phone]                  Stefan NOE Leidy    2017 11:00 AM   Office Visit    Description:  Male : 1955   Provider:  Wallace Fisher MD   Department:  [facility] Internal Medicine           Reason for Visit     Follow-up           Diagnoses this Visit        Comments    Sinusitis, unspecified chronicity, unspecified location    -  Primary            To Do List           Future Appointments        Provider Department Dept Phone    2017 12:00 PM LABORATORY, [facility] [phone]    2017 1:30 PM [facility] CT1 LIMIT 500 LBS [facility] -  [phone]    2017 2:30 PM Stefan Castro MD [facility] [phone]    2017 11:00 AM Wallace Fisher MD [facility] Internal Medicine [phone]      Goals (5 Years of Data)     None      Follow-Up and Disposition     Return in about 2 weeks (around 5/10/2017), or if symptoms worsen or fail to improve.       These Medications        Disp Refills Start End    doxycycline (VIBRA-TABS) 100 MG tablet 20 tablet 0 2017    Take 1 tablet (100 mg total) by mouth every 12 (twelve) hours. - Oral    Pharmacy: [facility] [address] [place] [address] Ph #: [phone]         [facility]     [facility] Nurse Care Line -  Assistance  Unless otherwise directed by your provider, please contact [facility], our nurse care line that is available for  assistance.     Registered nurses in the [facility] provide: appointment scheduling, clinical advisement, health education, and other advisory services.  Call: [phone] (toll free)               Medications           START taking these NEW medications        Refills    doxycycline (VIBRA-TABS) 100 MG tablet 0    Sig: Take 1 tablet (100 mg total) by mouth every 12 (twelve) hours.    Class: Normal     Route: Oral      STOP taking these medications     meloxicam (MOBIC) 7.5 MG tablet TK 1 T PO  BID WF FOR 2 WEEKS           Verify that the below list of medications is an accurate representation of the medications you are currently taking.  If none reported, the list may be blank. If incorrect, please contact your healthcare provider. Carry this list with you in case of emergency.           Current Medications     acetaminophen (TYLENOL) 500 MG tablet Take 500 mg by mouth as needed.    albuterol (PROAIR HFA) 90 mcg/actuation inhaler Inhale 2 puffs into the lungs every 6 (six) hours as needed for Wheezing or Shortness of Breath.    aspirin 325 MG tablet Take 325 mg by mouth 2 (two) times daily.     blood sugar diagnostic (BLOOD GLUCOSE TEST) Strp 1 strip by Misc.(Non-Drug; Combo Route) route 2 (two) times daily.    BLOOD-GLUCOSE METER (CONTOUR NEXT EZ METER MISC) by Misc.(Non-Drug; Combo Route) route.    buPROPion (WELLBUTRIN SR) 150 MG TBSR 12 hr tablet Take 1 tablet (150 mg total) by mouth once daily.    cholecalciferol, vitamin D3, (VITAMIN D3) 1,000 unit capsule Take 1,000 Units by mouth once daily.    gabapentin (NEURONTIN) 300 MG capsule Take 300 mg by mouth 2 (two) times daily.     guaifenesin (MUCINEX) 1,200 mg Ta12 Take 1 tablet by mouth 2 (two) times daily.    lancets Misc 1 lancet by Misc.(Non-Drug; Combo Route) route 2 (two) times daily.    lisinopril (PRINIVIL,ZESTRIL) 5 MG tablet TAKE ONE TABLET BY MOUTH ONCE DAILY    metformin (GLUCOPHAGE) 1000 MG tablet Take 1 tablet (1,000 mg total) by mouth 2 (two) times daily with meals.    NITROGLYCERIN (NITROSTAT SL) Place 1 tablet under the tongue as needed.    omeprazole (PRILOSEC) 40 MG capsule Take 1 capsule (40 mg total) by mouth once daily.    oxycodone-acetaminophen (PERCOCET)  mg per tablet Take 1 tablet by mouth 2 (two) times daily as needed.     simvastatin (ZOCOR) 20 MG tablet TAKE ONE TABLET BY MOUTH IN THE EVENING    tiotropium (SPIRIVA) 18 mcg  "inhalation capsule Inhale 1 capsule (18 mcg total) into the lungs once daily.    tramadol (ULTRAM) 50 mg tablet Take 50 mg by mouth 2 (two) times daily as needed.     VICTOZA 2-CHARLES 0.6 mg/0.1 mL (18 mg/3 mL) PnIj INJECT 0.6 MG INTO THE SKIN ONCE DAILY    doxycycline (VIBRA-TABS) 100 MG tablet Take 1 tablet (100 mg total) by mouth every 12 (twelve) hours.    fluticasone (FLONASE) 50 mcg/actuation nasal spray SPRAY 2 SPRAYS IN EACH NOSTRIL ONCE DAILY           Clinical Reference Information           Your Vitals Were     BP Pulse Temp Height Weight SpO2    124/68 (BP Location: Right arm, Patient Position: Sitting) 81 96.8 °F (36 °C) (Tympanic) 5' 8" (1.727 m) 103.6 kg (228 lb 6.3 oz) 98%    BMI                34.73 kg/m2          Blood Pressure          Most Recent Value    BP  124/68      Allergies as of 4/26/2017     No Known Allergies      Immunizations Administered on Date of Encounter - 4/26/2017     None      Orders Placed During Today's Visit     Future Labs/Procedures Expected by Expires    CT Sinuses without Contrast  4/26/2017 4/26/2018      Language Assistance Services     ATTENTION: Language assistance services are available, free of charge. Please call [phone].      ATENCIÓN: Si carsonla nicole, tiene a esparza disposición servicios gratuitos de asistencia lingüística. Llame al [phone].     [facility] Ý: N?u b?n nói Ti?ng Vi?t, có các d?ch v? h? tr? ngôn ng? mi?n phí dành cho b?n. G?i s? [phone].         [facility] complies with applicable Federal civil rights laws and does not discriminate on the basis of race, color, national origin, age, disability, or sex.        "

## 2017-05-01 ENCOUNTER — OFFICE VISIT (OUTPATIENT)
Dept: OTOLARYNGOLOGY | Facility: CLINIC | Age: 62
End: 2017-05-01
Payer: COMMERCIAL

## 2017-05-01 VITALS
TEMPERATURE: 98 F | WEIGHT: 228.38 LBS | DIASTOLIC BLOOD PRESSURE: 82 MMHG | HEART RATE: 76 BPM | SYSTOLIC BLOOD PRESSURE: 121 MMHG | RESPIRATION RATE: 18 BRPM | HEIGHT: 68 IN | BODY MASS INDEX: 34.61 KG/M2

## 2017-05-01 DIAGNOSIS — H91.90 HEARING LOSS, UNSPECIFIED HEARING LOSS TYPE, UNSPECIFIED LATERALITY: ICD-10-CM

## 2017-05-01 DIAGNOSIS — J44.9 CHRONIC OBSTRUCTIVE PULMONARY DISEASE, UNSPECIFIED COPD TYPE: Primary | ICD-10-CM

## 2017-05-01 PROCEDURE — 99999 PR PBB SHADOW E&M-EST. PATIENT-LVL IV: CPT | Mod: PBBFAC,,, | Performed by: OTOLARYNGOLOGY

## 2017-05-01 PROCEDURE — 99214 OFFICE O/P EST MOD 30 MIN: CPT | Mod: S$GLB,,, | Performed by: OTOLARYNGOLOGY

## 2017-05-01 PROCEDURE — 1160F RVW MEDS BY RX/DR IN RCRD: CPT | Mod: S$GLB,,, | Performed by: OTOLARYNGOLOGY

## 2017-05-01 PROCEDURE — 3074F SYST BP LT 130 MM HG: CPT | Mod: S$GLB,,, | Performed by: OTOLARYNGOLOGY

## 2017-05-01 PROCEDURE — 3079F DIAST BP 80-89 MM HG: CPT | Mod: S$GLB,,, | Performed by: OTOLARYNGOLOGY

## 2017-05-01 NOTE — MR AVS SNAPSHOT
[facility] - ENT  [address] [facility] [address] [place] [address]  Phone: [phone]  Fax: [phone]                  Stefan Forbes Jr.   2017 2:30 PM   Office Visit    Description:  Male : 1955   Provider:  Stefan Castro MD   Department:  [facility] - ENT           Reason for Visit     Follow-up           Diagnoses this Visit        Comments    Chronic obstructive pulmonary disease, unspecified COPD type    -  Primary     Hearing loss, unspecified hearing loss type, unspecified laterality                To Do List           Future Appointments        Provider Department Dept Phone    2017 10:00 AM Cher Oconnor CCC-DANNY [facility] Audiology [phone]    2017 11:00 AM Wallace Fisher MD [facility] Internal Medicine [phone]      Goals (5 Years of Data)     None      [facility]     [facility] On Call Nurse Care Line -  Assistance  Unless otherwise directed by your provider, please contact [facility], our nurse care line that is available for  assistance.     Registered nurses in the [facility] On Call Center provide: appointment scheduling, clinical advisement, health education, and other advisory services.  Call: [phone] (toll free)               Medications           STOP taking these medications     doxycycline (VIBRA-TABS) 100 MG tablet Take 1 tablet (100 mg total) by mouth every 12 (twelve) hours.           Verify that the below list of medications is an accurate representation of the medications you are currently taking.  If none reported, the list may be blank. If incorrect, please contact your healthcare provider. Carry this list with you in case of emergency.           Current Medications     acetaminophen (TYLENOL) 500 MG tablet Take 500 mg by mouth as needed.    albuterol (PROAIR HFA) 90 mcg/actuation inhaler Inhale 2 puffs into the lungs every 6 (six) hours as needed for Wheezing or Shortness of Breath.    aspirin 325 MG tablet Take 325 mg by mouth 2 (two) times daily.   "   blood sugar diagnostic (BLOOD GLUCOSE TEST) Strp 1 strip by Misc.(Non-Drug; Combo Route) route 2 (two) times daily.    BLOOD-GLUCOSE METER (CONTOUR NEXT EZ METER MISC) by Misc.(Non-Drug; Combo Route) route.    buPROPion (WELLBUTRIN SR) 150 MG TBSR 12 hr tablet Take 1 tablet (150 mg total) by mouth once daily.    cholecalciferol, vitamin D3, (VITAMIN D3) 1,000 unit capsule Take 1,000 Units by mouth once daily.    gabapentin (NEURONTIN) 300 MG capsule Take 300 mg by mouth 2 (two) times daily.     guaifenesin (MUCINEX) 1,200 mg Ta12 Take 1 tablet by mouth 2 (two) times daily.    lancets Misc 1 lancet by Misc.(Non-Drug; Combo Route) route 2 (two) times daily.    lisinopril (PRINIVIL,ZESTRIL) 5 MG tablet TAKE ONE TABLET BY MOUTH ONCE DAILY    metformin (GLUCOPHAGE) 1000 MG tablet Take 1 tablet (1,000 mg total) by mouth 2 (two) times daily with meals.    omeprazole (PRILOSEC) 40 MG capsule Take 1 capsule (40 mg total) by mouth once daily.    oxycodone-acetaminophen (PERCOCET)  mg per tablet Take 1 tablet by mouth 2 (two) times daily as needed.     simvastatin (ZOCOR) 20 MG tablet TAKE ONE TABLET BY MOUTH IN THE EVENING    tiotropium (SPIRIVA) 18 mcg inhalation capsule Inhale 1 capsule (18 mcg total) into the lungs once daily.    tramadol (ULTRAM) 50 mg tablet Take 50 mg by mouth 2 (two) times daily as needed.     VICTOZA 2-CHARLES 0.6 mg/0.1 mL (18 mg/3 mL) PnIj INJECT 0.6 MG INTO THE SKIN ONCE DAILY    fluticasone (FLONASE) 50 mcg/actuation nasal spray SPRAY 2 SPRAYS IN EACH NOSTRIL ONCE DAILY    NITROGLYCERIN (NITROSTAT SL) Place 1 tablet under the tongue as needed.           Clinical Reference Information           Your Vitals Were     BP Pulse Temp Resp Height Weight    121/82 76 97.5 °F (36.4 °C) (Tympanic) 18 5' 8" (1.727 m) 103.6 kg (228 lb 6.3 oz)    BMI                34.73 kg/m2          Blood Pressure          Most Recent Value    BP  121/82      Allergies as of 5/1/2017     No Known Allergies    "   Immunizations Administered on Date of Encounter - 5/1/2017     None      Language Assistance Services     ATTENTION: Language assistance services are available, free of charge. Please call [phone].      ATENCIÓN: Si habcarson rivera, tiene a esparza disposición servicios gratuitos de asistencia lingüística. Llame al [phone].     CH[facility] Ý: N?u b?n nói Ti?ng Vi?t, có các d?ch v? h? tr? ngôn ng? mi?n phí dành cho b?n. G?i s? [phone].         [facility] complies with applicable Federal civil rights laws and does not discriminate on the basis of race, color, national origin, age, disability, or sex.

## 2017-05-01 NOTE — PROGRESS NOTES
Subjective:   Patient: Stefan Forbes Jr. [id number], :1955   Visit date:2017 3:00 PM    Chief Complaint:  Follow-up    HPI:  Stefan is a 61 y.o. male who is here for follow-up. He was last here in March with symptoms consistent with chronic sinusitis.  I placed him on levofloxacin as well as Mucinex.  He reports that after taking this, he had significant decrease in his cough.  He no longer experiences any facial pressure or pain but his cough has now become quite severe again.  He reports that it is thick and yellow.  He had a CT scan of the sinuses ordered by his primary care physician which I reviewed today.  He does have a history of COPD but has recently had essentially normal chest x-rays.  Finally, he reports that he has significant hearing loss bilaterally.  He does have a history of significant noise exposure at work.  He denies drainage from the ears or pain in the ears.  This is been going on for several years and has been progressively worse.  Hearing losses muffled with more difficulty with speech discrimination.  He notes that high or soft voices her particularly difficult such as his wife's.  He cannot identify any exacerbating or relieving factors.          Review of Systems:  -     Allergic/Immunologic: has No Known Allergies..  -     Constitutional: Current temp: 97.5 °F (36.4 °C) (Tympanic)    His meds, allergies, medical, surgical, social & family histories were reviewed & updated:  -     He has a current medication list which includes the following prescription(s): acetaminophen, albuterol, aspirin, blood sugar diagnostic, blood-glucose meter, bupropion, cholecalciferol (vitamin d3), gabapentin, guaifenesin, lancets, lisinopril, metformin, omeprazole, oxycodone-acetaminophen, simvastatin, tiotropium, tramadol, victoza 2-bruna, fluticasone, and nitroglycerin.  -     He  has a past medical history of COPD (chronic obstructive pulmonary disease); Diabetes mellitus, type 2; Hyperlipidemia;  "and Hypertension.   -     He  does not have any pertinent problems on file.   -     He  has a past surgical history that includes Angioplasty.  -     He  reports that he quit smoking about 16 months ago. His smoking use included Cigars. He has never used smokeless tobacco. He reports that he does not drink alcohol or use illicit drugs.  -     His family history includes Cancer in his father; Diabetes in his mother.  -     He has No Known Allergies.    Objective:     Physical Exam:  Vitals:  /82  Pulse 76  Temp 97.5 °F (36.4 °C) (Tympanic)   Resp 18  Ht 5' 8" (1.727 m)  Wt 103.6 kg (228 lb 6.3 oz)  BMI 34.73 kg/m2  Communication:  Able to communicate, no hoarseness.  Head & Face:  Normocephalic, atraumatic, no sinus tenderness.  Eyes:  Extraocular motions intact.  Ears:  Otoscopy of external auditory canals and tympanic membranes was normal, clinical speech reception thresholds grossly intact, no mass/lesion of auricle.  Nose:  No masses/lesions of external nose, nasal mucosa, septum, and turbinates were within normal limits.  Mouth:  No mass/lesion of lips, teeth, gums, hard/soft palate, tongue, tonsils, or oropharynx.  Neck & Lymphatics:  No cervical lymphadenopathy, no neck mass/crepitus/ asymmetry, trachea is midline, no thyroid enlargement/tenderness/mass.  Neuro/Psych: Alert with normal mood and affect.   Respiration/Chest:  Symmetric expansion during respiration, normal respiratory effort.  Skin:  Warm and intact.    Assessment & Plan:   Stefan was seen today for follow-up.    Diagnoses and all orders for this visit:    Chronic obstructive pulmonary disease, unspecified COPD type    Hearing loss, unspecified hearing loss type, unspecified laterality      Hearing loss- recommend audiogram  Will call with results    Chronic productive cough- no evidence of sinus disease on CT.  Likely secondary to COPD      We discussed his medical conditions, treatments and plan.  Stefan should return to clinic if any " issues arise (symptoms worsen or persist), otherwise we will see him back in the clinic only as needed.

## 2017-05-02 RX ORDER — SIMVASTATIN 20 MG/1
TABLET, FILM COATED ORAL
Qty: 30 TABLET | Refills: 0 | Status: SHIPPED | OUTPATIENT
Start: 2017-05-02 | End: 2017-05-25 | Stop reason: SDUPTHER

## 2017-05-05 RX ORDER — PEN NEEDLE, DIABETIC 31 GX5/16"
NEEDLE, DISPOSABLE MISCELLANEOUS
Qty: 100 EACH | Refills: 0 | Status: SHIPPED | OUTPATIENT
Start: 2017-05-05 | End: 2017-11-20

## 2017-05-16 ENCOUNTER — LAB VISIT (OUTPATIENT)
Dept: LAB | Facility: HOSPITAL | Age: 62
End: 2017-05-16
Attending: INTERNAL MEDICINE
Payer: COMMERCIAL

## 2017-05-16 ENCOUNTER — OFFICE VISIT (OUTPATIENT)
Dept: INTERNAL MEDICINE | Facility: CLINIC | Age: 62
End: 2017-05-16
Payer: COMMERCIAL

## 2017-05-16 VITALS
TEMPERATURE: 97 F | DIASTOLIC BLOOD PRESSURE: 68 MMHG | SYSTOLIC BLOOD PRESSURE: 136 MMHG | WEIGHT: 229.25 LBS | BODY MASS INDEX: 34.75 KG/M2 | HEART RATE: 62 BPM | OXYGEN SATURATION: 98 % | HEIGHT: 68 IN

## 2017-05-16 DIAGNOSIS — N41.9 PROSTATITIS, UNSPECIFIED PROSTATITIS TYPE: ICD-10-CM

## 2017-05-16 DIAGNOSIS — Z23 NEED FOR ZOSTER VACCINATION: ICD-10-CM

## 2017-05-16 DIAGNOSIS — E11.9 TYPE 2 DIABETES MELLITUS WITHOUT COMPLICATION, WITHOUT LONG-TERM CURRENT USE OF INSULIN: ICD-10-CM

## 2017-05-16 DIAGNOSIS — R39.11 HESITANCY: ICD-10-CM

## 2017-05-16 DIAGNOSIS — E11.59 HYPERTENSION ASSOCIATED WITH DIABETES: Primary | ICD-10-CM

## 2017-05-16 DIAGNOSIS — I15.2 HYPERTENSION ASSOCIATED WITH DIABETES: Primary | ICD-10-CM

## 2017-05-16 DIAGNOSIS — R09.82 POST-NASAL DRIP: ICD-10-CM

## 2017-05-16 LAB
BILIRUB UR QL STRIP: NEGATIVE
CLARITY UR: CLEAR
COLOR UR: YELLOW
GLUCOSE UR QL STRIP: NEGATIVE
HGB UR QL STRIP: NEGATIVE
KETONES UR QL STRIP: NEGATIVE
LEUKOCYTE ESTERASE UR QL STRIP: NEGATIVE
NITRITE UR QL STRIP: NEGATIVE
PH UR STRIP: 5 [PH] (ref 5–8)
PROT UR QL STRIP: NEGATIVE
SP GR UR STRIP: 1.02 (ref 1–1.03)
URN SPEC COLLECT METH UR: NORMAL

## 2017-05-16 PROCEDURE — 3078F DIAST BP <80 MM HG: CPT | Mod: S$GLB,,, | Performed by: INTERNAL MEDICINE

## 2017-05-16 PROCEDURE — 4010F ACE/ARB THERAPY RXD/TAKEN: CPT | Mod: S$GLB,,, | Performed by: INTERNAL MEDICINE

## 2017-05-16 PROCEDURE — 99999 PR PBB SHADOW E&M-EST. PATIENT-LVL III: CPT | Mod: PBBFAC,,, | Performed by: INTERNAL MEDICINE

## 2017-05-16 PROCEDURE — 3044F HG A1C LEVEL LT 7.0%: CPT | Mod: S$GLB,,, | Performed by: INTERNAL MEDICINE

## 2017-05-16 PROCEDURE — 99214 OFFICE O/P EST MOD 30 MIN: CPT | Mod: S$GLB,,, | Performed by: INTERNAL MEDICINE

## 2017-05-16 PROCEDURE — 1160F RVW MEDS BY RX/DR IN RCRD: CPT | Mod: S$GLB,,, | Performed by: INTERNAL MEDICINE

## 2017-05-16 PROCEDURE — 81003 URINALYSIS AUTO W/O SCOPE: CPT | Mod: PO

## 2017-05-16 PROCEDURE — 3075F SYST BP GE 130 - 139MM HG: CPT | Mod: S$GLB,,, | Performed by: INTERNAL MEDICINE

## 2017-05-16 RX ORDER — CIPROFLOXACIN 500 MG/1
500 TABLET ORAL 2 TIMES DAILY
Qty: 60 TABLET | Refills: 0 | Status: SHIPPED | OUTPATIENT
Start: 2017-05-16 | End: 2017-06-15

## 2017-05-16 NOTE — PROGRESS NOTES
"Subjective:      Patient ID: Stefan Forbes Jr. is a 62 y.o. male.    Chief Complaint: Follow-up    HPI Comments: 63 yo with Patient Active Problem List:     Uncontrolled type 2 diabetes mellitus without complication, without long-term current use of insulin     Hypertension associated with diabetes     Hyperlipidemia associated with type 2 diabetes mellitus     COPD (chronic obstructive pulmonary disease)     CAD (coronary artery disease)     Vitamin D deficiency     Osteoarthritis of knee     Pulmonary nodule    Here today for follow-up of hypertension and diabetes.  He also c/o 3 to 4 months of hesitancy, interrupted stream, incomplete emptying. Nocturia x  1 or 2. No pain. Admits to coffee daily. Some worsening of frequency with more coffee.  He is compliant with his diabetes and hypertension medications without significant side effects.  He reports that he continues with postnasal drip and some ALLERGY symptoms which induce coughing.  He has had multiple visits with the ENT clinic to have reported that this is not related to sinusitis but have not resolved his symptoms.    Review of Systems   Constitutional: Negative for chills and fever.   HENT: Positive for postnasal drip. Negative for ear pain and sinus pressure.    Respiratory: Positive for cough. Negative for shortness of breath and wheezing.    Cardiovascular: Negative for chest pain.   Gastrointestinal: Negative for abdominal pain and blood in stool.   Genitourinary: Positive for frequency and urgency. Negative for discharge, dysuria, enuresis, flank pain, hematuria, penile pain and penile swelling.   Neurological: Negative for seizures and syncope.     Objective:   /68 (BP Location: Right arm, Patient Position: Sitting)  Pulse 62  Temp 96.5 °F (35.8 °C) (Tympanic)   Ht 5' 8" (1.727 m)  Wt 104 kg (229 lb 4.5 oz)  SpO2 98%  BMI 34.86 kg/m2    Physical Exam   Constitutional: He is oriented to person, place, and time. He appears well-developed " and well-nourished. No distress.   HENT:   Head: Normocephalic and atraumatic.   Nose: Right sinus exhibits no maxillary sinus tenderness and no frontal sinus tenderness. Left sinus exhibits no maxillary sinus tenderness and no frontal sinus tenderness.   Mouth/Throat: Posterior oropharyngeal erythema present. No oropharyngeal exudate or posterior oropharyngeal edema.   Eyes: Conjunctivae and EOM are normal.   Neck: Neck supple. Carotid bruit is not present.   Cardiovascular: Normal rate and regular rhythm.    Pulmonary/Chest: Effort normal and breath sounds normal. No respiratory distress. He has no wheezes. He has no rales.   Abdominal: Soft. Bowel sounds are normal. There is no tenderness.   Genitourinary:   Genitourinary Comments: Prostate edematous with mild tenderness to palpation.  Worse on the left.  No nodules palpated.   Musculoskeletal: He exhibits no edema.   Lymphadenopathy:     He has no cervical adenopathy.   Neurological: He is alert and oriented to person, place, and time.   Skin: Skin is warm and dry.   Psychiatric: He has a normal mood and affect. His behavior is normal.       Assessment:     1. Hypertension associated with diabetes    2. Type 2 diabetes mellitus without complication, without long-term current use of insulin    3. Post-nasal drip    4. Need for zoster vaccination    5. Hesitancy    6. Prostatitis, unspecified prostatitis type      Plan:   Hypertension associated with diabetes  Controlled  Cont current meds    Type 2 diabetes mellitus without complication, without long-term current use of insulin  Much improved  Increase victoza to 1.2mg dialy  -     liraglutide 0.6 mg/0.1 mL, 18 mg/3 mL, subq PNIJ (VICTOZA 2-CHARLES) 0.6 mg/0.1 mL (18 mg/3 mL) PnIj; Inject 1.2 mg into the skin once daily at 6am.  Dispense: 18 mL; Refill: 3  -     Hemoglobin A1c; Future; Expected date: 9/13/17    Post-nasal drip  -     Ambulatory referral to Allergy    Need for zoster vaccination  -     Zoster Vaccine  - Live    Hesitancy  -     Urinalysis; Future; Expected date: 5/16/17    Prostatitis, unspecified prostatitis type  -     ciprofloxacin HCl (CIPRO) 500 MG tablet; Take 1 tablet (500 mg total) by mouth 2 (two) times daily.  Dispense: 60 tablet; Refill: 0        Lab Frequency Next Occurrence   CT Chest Without Contrast Once 2/27/2017         Return in about 4 months (around 9/16/2017), or if symptoms worsen or fail to improve.

## 2017-05-16 NOTE — MR AVS SNAPSHOT
[facility]  [address] [facility] [address] [place] [address]  Phone: [phone]  Fax: [phone]                  Stefan Forbes Jr.   2017 1:20 PM   Office Visit    Description:  Male : 1955   Provider:  Wallace Fisher MD   Department:  [facility]           Reason for Visit     Follow-up           Diagnoses this Visit        Comments    Hypertension associated with diabetes    -  Primary     Type 2 diabetes mellitus without complication, without long-term current use of insulin         Post-nasal drip         Need for zoster vaccination         Hesitancy         Prostatitis, unspecified prostatitis type                To Do List           Future Appointments        Provider Department Dept Phone    2017 2:40 PM SPECIMEN, [facility] [phone]    2017 9:00 AM LABORATORY, [facility] [phone]    2017 1:00 PM Wallace Fisher MD [facility] [phone]      Goals (5 Years of Data)     None      Follow-Up and Disposition     Return in about 4 months (around 2017), or if symptoms worsen or fail to improve.       These Medications        Disp Refills Start End    liraglutide 0.6 mg/0.1 mL, 18 mg/3 mL, subq PNIJ (VICTOZA 2-CHARLES) 0.6 mg/0.1 mL (18 mg/3 mL) PnIj 18 mL 3 2017     Inject 1.2 mg into the skin once daily at 6am. - Subcutaneous    Pharmacy: [facility] [address] [place] [address] RD Ph #: [phone]       ciprofloxacin HCl (CIPRO) 500 MG tablet 60 tablet 0 2017 6/15/2017    Take 1 tablet (500 mg total) by mouth 2 (two) times daily. - Oral    Pharmacy: [facility] [address] [place] [address] RD Ph #: [phone]         [facility] On [facility]     [facility] Nurse Care Line - 24/7 Assistance  Unless otherwise directed by your provider, please contact [facility], our nurse care line that is available for 24/7 assistance.  "    Registered nurses in the [facility] provide: appointment scheduling, clinical advisement, health education, and other advisory services.  Call: [phone] (toll free)               Medications           START taking these NEW medications        Refills    ciprofloxacin HCl (CIPRO) 500 MG tablet 0    Sig: Take 1 tablet (500 mg total) by mouth 2 (two) times daily.    Class: Normal    Route: Oral      CHANGE how you are taking these medications     Start Taking Instead of    liraglutide 0.6 mg/0.1 mL, 18 mg/3 mL, subq PNIJ (VICTOZA 2-CHARLES) 0.6 mg/0.1 mL (18 mg/3 mL) PnIj VICTOZA 2-CHARLES 0.6 mg/0.1 mL (18 mg/3 mL) PnIj    Dosage:  Inject 1.2 mg into the skin once daily at 6am. Dosage:  INJECT 0.6 MG INTO THE SKIN ONCE DAILY    Reason for Change:  Reorder       STOP taking these medications     gabapentin (NEURONTIN) 300 MG capsule Take 300 mg by mouth 2 (two) times daily.            Verify that the below list of medications is an accurate representation of the medications you are currently taking.  If none reported, the list may be blank. If incorrect, please contact your healthcare provider. Carry this list with you in case of emergency.           Current Medications     acetaminophen (TYLENOL) 500 MG tablet Take 500 mg by mouth as needed.    albuterol (PROAIR HFA) 90 mcg/actuation inhaler Inhale 2 puffs into the lungs every 6 (six) hours as needed for Wheezing or Shortness of Breath.    aspirin 325 MG tablet Take 325 mg by mouth 2 (two) times daily.     BD ULTRA-FINE BISI PEN NEEDLES 32 gauge x 5/32" Ndle USE AS DIRECTED WITH VICTOZA    blood sugar diagnostic (BLOOD GLUCOSE TEST) Strp 1 strip by Misc.(Non-Drug; Combo Route) route 2 (two) times daily.    BLOOD-GLUCOSE METER (CONTOUR NEXT EZ METER MISC) by Misc.(Non-Drug; Combo Route) route.    buPROPion (WELLBUTRIN SR) 150 MG TBSR 12 hr tablet Take 1 tablet (150 mg total) by mouth once daily.    cholecalciferol, vitamin D3, (VITAMIN D3) 1,000 unit " "capsule Take 1,000 Units by mouth once daily.    fluticasone (FLONASE) 50 mcg/actuation nasal spray SPRAY 2 SPRAYS IN EACH NOSTRIL ONCE DAILY    lancets Misc 1 lancet by Misc.(Non-Drug; Combo Route) route 2 (two) times daily.    liraglutide 0.6 mg/0.1 mL, 18 mg/3 mL, subq PNIJ (VICTOZA 2-CHARLES) 0.6 mg/0.1 mL (18 mg/3 mL) PnIj Inject 1.2 mg into the skin once daily at 6am.    lisinopril (PRINIVIL,ZESTRIL) 5 MG tablet TAKE ONE TABLET BY MOUTH ONCE DAILY    metformin (GLUCOPHAGE) 1000 MG tablet Take 1 tablet (1,000 mg total) by mouth 2 (two) times daily with meals.    omeprazole (PRILOSEC) 40 MG capsule Take 1 capsule (40 mg total) by mouth once daily.    oxycodone-acetaminophen (PERCOCET)  mg per tablet Take 1 tablet by mouth 2 (two) times daily as needed.     simvastatin (ZOCOR) 20 MG tablet TAKE ONE TABLET BY MOUTH IN THE EVENING    tiotropium (SPIRIVA) 18 mcg inhalation capsule Inhale 1 capsule (18 mcg total) into the lungs once daily.    tramadol (ULTRAM) 50 mg tablet Take 50 mg by mouth 2 (two) times daily as needed.     ciprofloxacin HCl (CIPRO) 500 MG tablet Take 1 tablet (500 mg total) by mouth 2 (two) times daily.    guaifenesin (MUCINEX) 1,200 mg Ta12 Take 1 tablet by mouth 2 (two) times daily.    NITROGLYCERIN (NITROSTAT SL) Place 1 tablet under the tongue as needed.           Clinical Reference Information           Your Vitals Were     BP Pulse Temp Height Weight SpO2    136/68 (BP Location: Right arm, Patient Position: Sitting) 62 96.5 °F (35.8 °C) (Tympanic) 5' 8" (1.727 m) 104 kg (229 lb 4.5 oz) 98%    BMI                34.86 kg/m2          Blood Pressure          Most Recent Value    BP  136/68      Allergies as of 5/16/2017     No Known Allergies      Immunizations Administered on Date of Encounter - 5/16/2017     Name Date Dose VIS Date Route    Zoster  Incomplete 0.65 mL 10/6/2009 Subcutaneous      Orders Placed During Today's Visit      Normal Orders This Visit    Ambulatory referral to " Allergy     Zoster Vaccine - Live     Future Labs/Procedures Expected by Expires    Urinalysis  5/16/2017 8/14/2017    Hemoglobin A1c  9/13/2017 (Approximate) 11/12/2017      Language Assistance Services     ATTENTION: Language assistance services are available, free of charge. Please call [phone].      ATENCIÓN: Si habla nicole, tiene a esparza disposición servicios gratuitos de asistencia lingüística. Llame al [phone].     CHÚ Ý: N?u b?n nói Ti?ng Vi?t, có các d?ch v? h? tr? ngôn ng? mi?n phí dành cho b?n. G?i s? [phone].         [facility] complies with applicable Federal civil rights laws and does not discriminate on the basis of race, color, national origin, age, disability, or sex.

## 2017-05-17 ENCOUNTER — TELEPHONE (OUTPATIENT)
Dept: INTERNAL MEDICINE | Facility: CLINIC | Age: 62
End: 2017-05-17

## 2017-05-17 NOTE — TELEPHONE ENCOUNTER
One month of cipro if the recommended treatment for prostatitis. Bactrim for one month is an alternative. At this point I recommend that pt see a urologist to get expert prostate exam to make sure pt does not take any unnecessary antibiotics. Let me know if referral is needed. Let urologist know of any medications that the orthopedist wants him to avoid.

## 2017-05-17 NOTE — TELEPHONE ENCOUNTER
Notified pt's wife of Dr. Fisher's recommendation to see a urologist prior to starting antibiotic.  Pt stated she will discuss recommendation with pt and call clinic in the morning.

## 2017-05-17 NOTE — TELEPHONE ENCOUNTER
Spoke with pt's wife, Lisa, she is calling in regards to the antibiotic that was prescribed at office visit on 5/16/17. She states that there are a lot of warning labels and side effects for Cipro and she talked with Dr. Hernandez's nurse and she wanted pt to clarify with Dr. Fisher if this medication is appropriate for patient to take for 30 days as it was prescribed.   Please advise?

## 2017-05-17 NOTE — TELEPHONE ENCOUNTER
----- Message from Varghese Chavez sent at 5/17/2017  1:00 PM CDT -----  Contact: pt wife/ Lisa  Attn: Maribell Norman is calling nurse staff regarding pt medication. Pt call back to be advised [phone] thanks

## 2017-05-17 NOTE — TELEPHONE ENCOUNTER
Spoke with pt's wife, Lisa, notified her that Cipro for one month is the recommended treatment for prostatitis. Another alternative is Bactrim for one month. Dr. Fisher does recommend to see a Urologist for a prostate exam to make sure pt is not taking any unnecessary antibiotics and to discuss with the Orthopedist which medications to avoid. Pt's wife verbalized understanding and stated she would talk to patient and see what he wants to do. Pt's wife would like patient to start taking Bactrim and would like medication sent to [facility].

## 2017-05-17 NOTE — TELEPHONE ENCOUNTER
rec pt see urology soon and prior to antibiotic initiation as antibiotic may change outcome of urologic exam.

## 2017-05-18 ENCOUNTER — TELEPHONE (OUTPATIENT)
Dept: INTERNAL MEDICINE | Facility: CLINIC | Age: 62
End: 2017-05-18

## 2017-05-18 NOTE — TELEPHONE ENCOUNTER
Spoke with pt's wife, Lisa, who stated pt would like an appt with urology.  Notified pt's wife that first available appt with Dr. Brito is in July.  Pt's wife stated she will contact insurance company to find out which urologists are in network to see if they can get an earlier appt.  Will call clinic back on Monday to let me know if appt with Dr. Brito needs to be scheduled.

## 2017-05-18 NOTE — TELEPHONE ENCOUNTER
----- Message from Kathrine Sanchez sent at 5/18/2017 11:59 AM CDT -----  Contact: Lisa/wife  Lisa returned call to Maribell. She can be contacted at [phone].    Thanks,  Kathrine

## 2017-05-22 RX ORDER — ALBUTEROL SULFATE 90 UG/1
2 AEROSOL, METERED RESPIRATORY (INHALATION) EVERY 6 HOURS PRN
Qty: 8.5 G | Refills: 3 | Status: SHIPPED | OUTPATIENT
Start: 2017-05-22 | End: 2017-09-12 | Stop reason: SDUPTHER

## 2017-05-23 ENCOUNTER — OFFICE VISIT (OUTPATIENT)
Dept: ALLERGY | Facility: CLINIC | Age: 62
End: 2017-05-23
Payer: COMMERCIAL

## 2017-05-23 VITALS
RESPIRATION RATE: 15 BRPM | HEART RATE: 70 BPM | SYSTOLIC BLOOD PRESSURE: 130 MMHG | HEIGHT: 70 IN | WEIGHT: 227.5 LBS | TEMPERATURE: 98 F | BODY MASS INDEX: 32.57 KG/M2 | DIASTOLIC BLOOD PRESSURE: 72 MMHG

## 2017-05-23 DIAGNOSIS — J30.89 ALLERGIC RHINITIS DUE TO OTHER ALLERGEN: ICD-10-CM

## 2017-05-23 DIAGNOSIS — E11.69 HYPERLIPIDEMIA ASSOCIATED WITH TYPE 2 DIABETES MELLITUS: ICD-10-CM

## 2017-05-23 DIAGNOSIS — J44.9 CHRONIC OBSTRUCTIVE PULMONARY DISEASE, UNSPECIFIED COPD TYPE: ICD-10-CM

## 2017-05-23 DIAGNOSIS — I15.2 HYPERTENSION ASSOCIATED WITH DIABETES: ICD-10-CM

## 2017-05-23 DIAGNOSIS — J31.0 CHRONIC RHINITIS: Primary | ICD-10-CM

## 2017-05-23 DIAGNOSIS — E11.59 HYPERTENSION ASSOCIATED WITH DIABETES: ICD-10-CM

## 2017-05-23 DIAGNOSIS — E78.5 HYPERLIPIDEMIA ASSOCIATED WITH TYPE 2 DIABETES MELLITUS: ICD-10-CM

## 2017-05-23 PROCEDURE — 99999 PR PBB SHADOW E&M-EST. PATIENT-LVL III: CPT | Mod: PBBFAC,,, | Performed by: ALLERGY & IMMUNOLOGY

## 2017-05-23 PROCEDURE — 3078F DIAST BP <80 MM HG: CPT | Mod: S$GLB,,, | Performed by: ALLERGY & IMMUNOLOGY

## 2017-05-23 PROCEDURE — 1160F RVW MEDS BY RX/DR IN RCRD: CPT | Mod: S$GLB,,, | Performed by: ALLERGY & IMMUNOLOGY

## 2017-05-23 PROCEDURE — 99204 OFFICE O/P NEW MOD 45 MIN: CPT | Mod: S$GLB,,, | Performed by: ALLERGY & IMMUNOLOGY

## 2017-05-23 PROCEDURE — 3075F SYST BP GE 130 - 139MM HG: CPT | Mod: S$GLB,,, | Performed by: ALLERGY & IMMUNOLOGY

## 2017-05-23 PROCEDURE — 3044F HG A1C LEVEL LT 7.0%: CPT | Mod: S$GLB,,, | Performed by: ALLERGY & IMMUNOLOGY

## 2017-05-23 PROCEDURE — 4010F ACE/ARB THERAPY RXD/TAKEN: CPT | Mod: S$GLB,,, | Performed by: ALLERGY & IMMUNOLOGY

## 2017-05-23 NOTE — PROGRESS NOTES
Chief complaint: Troublesome upper respiratory symptoms, drainage and coughing    This note was dictated using voice recognition software and may contain errors.    History:    He had a 10 AM appointment on Tuesday, May 23, 2017.    He stated he is been symptomatic since approximately February 2017.  Earlier this year he is had several appointments with Dr. Castro in ENT.  Endoscopy of the upper airway has been performed.  His CT scan of the paranasal sinuses was performed.  No significant sinusitis was noted on the CT scan.    He experiences excessive nasal mucus production.  This occurs in an unpredictable fashion.  Occasionally he experiences sneezing.  He may develop itching of the eyes upon occasion.  He has no history of glaucoma.  In the past and ALLERGY evaluation has not been performed.    He does not believe that he is ALLERGIC to any animals or foods.    Information in his medical record regarding his past medical history family history and social history was reviewed and updated today.  Significant additions if any are as noted above or below.    Past medical history: In the past he required cauterization of a blood vessel in the left nasal passage.  He is not had sinus surgery.  He experienced a myocardial infarction in 2012.  An angioplasty was performed at that time.  He does not have glaucoma.  He stated in his late teens he did experience symptoms consistent with ALLERGIC rhinitis and ALLERGIC conjunctivitis.  This was especially noted when he was engaged in the baling of hay.    Social history: currently he is not working due to disability.  About 5 weeks ago he had surgery on his right knee.  An artificial joint was placed.  He is anticipating having surgery on his left knee in the not too distant future.  He lives in a house.  The house has not had water damage.  There is no carpeting indoors.  6 dogs are present.  The house has central heating and cooling.  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  He stated that he has worked as a  and is looking forward to returning to work.    Review of systems: See above.  At the time of his appointment this morning he had no additional new symptoms to mentioned.    Exam: In general he is in no distress.  He is alert and oriented well-developed in good mood and attentive  Gait steady.  Currently he does walk with a cane.  Head no swelling noted  Skin no rash noted  Eyes sclera white conjunctiva pink  Nose patent no polyps seen  Mouth no swelling or inflammation of the lips tongue or in the throat noted  Heart regular rhythm no murmurs heard  Lungs clear to auscultation  Ears not inflamed tympanic membranes not inflamed  Neck no masses or thyromegaly noted  Lymph nodes no significant cervical or epitrochlear lymphadenopathy noted  Extremities no swelling or inflammation of the hands or legs noted    Impression:    #1 chronic rhinitis  #2 ALLERGIC rhinitis  #3 coronary artery disease status post myocardial infarction and status post angioplasty  #4 multiple other health concerns as noted in his medical record  #5 chronic obstructive lung disease occurring in a former smoker  #6 hypertension associated with diabetes    Assessment and plan:    His appointment was 50 minutes in duration spent entirely in face-to-face contact.  More than 50% of the visit was spent in counseling and coordination of care.    We discussed options to consider. At this time,  He has elected not to have any diagnostic immediate hypersensitivity skin testing performed.  He has elected to evaluate the use of Atrovent nasal spray 0.03%.    While he was here today I called his pharmacy in [place], at his request, and spoke with the pharmacist.  A prescription was issued for Atrovent nasal spray with several refills.  He may use 1 or 2 sprays in each nasal passage as often as every 8 hours if needed.    Using anatomical teaching models of the nose had neck and chest I reviewed  anatomy and pathophysiology with him.  I emphasized the importance of keeping his GE reflux under good control.  I reviewed with him that reflux may provoke respiratory symptoms such as those which she has been experiencing, in some individuals upon occasion.  I informed him that the nose is an organ.  We discussed ALLERGIC rhinitis and nonallergic rhinitis.  We discussed normal nasal functioning and also nasal malfunctioning for dysfunction.    He was instructed in the proper technique for using a nasal spray.    I reviewed with him that sinus disease may provoke some of the symptoms which he has been experiencing.  A CT scan of the paranasal sinuses which was recently performed did not reveal findings of significant sinus disease.    I have requested that he investigate as to whether or not the room unit air filter which he operates in his bedroom generates ozone when it is in operation.  I reviewed with him that ozone present in the indoor environment may provoke respiratory symptoms.    He was given the office phone number.  Should he not improve or have additional questions or concerns he was instructed to call.

## 2017-05-23 NOTE — LETTER
May 23, 2017      Wallace Fisher MD  [address] [facility] [place] [address]           [facility] - Allergy/ Immunology  [address] [facility] [place] [address]  Phone: [phone]  Fax: [phone]          Patient: Stefan Forbes Jr.   MR Number: [id number]   YOB: 1955   Date of Visit: 5/23/2017       Dear Dr. Wallace Fisher:    Thank you for referring Stefan Forbes to me for evaluation. Attached you will find relevant portions of my assessment and plan of care.    If you have questions, please do not hesitate to call me. I look forward to following Stefan Forbes along with you.    Sincerely,    Stefan Menjivar MD    Enclosure  CC:  No Recipients    If you would like to receive this communication electronically, please contact [email or web address] or [phone] to request more information on [employer] access.    For providers and/or their staff who would like to refer a patient to [facility], please contact us through our one-stop-shop provider referral line, [facility], at [phone].    If you feel you have received this communication in error or would no longer like to receive these types of communications, please e-mail [email or web address]

## 2017-05-25 RX ORDER — SIMVASTATIN 20 MG/1
TABLET, FILM COATED ORAL
Qty: 30 TABLET | Refills: 0 | Status: SHIPPED | OUTPATIENT
Start: 2017-05-25 | End: 2017-06-25 | Stop reason: SDUPTHER

## 2017-05-30 ENCOUNTER — TELEPHONE (OUTPATIENT)
Dept: INTERNAL MEDICINE | Facility: CLINIC | Age: 62
End: 2017-05-30

## 2017-05-30 NOTE — TELEPHONE ENCOUNTER
----- Message from Irma Stovall sent at 5/30/2017  1:39 PM CDT -----  Contact: wife  Request the pt lab results to be faxed to Dr. Sanchez at [phone]///[id number]

## 2017-06-05 ENCOUNTER — CLINICAL SUPPORT (OUTPATIENT)
Dept: AUDIOLOGY | Facility: CLINIC | Age: 62
End: 2017-06-05
Payer: COMMERCIAL

## 2017-06-05 DIAGNOSIS — H90.3 SENSORY HEARING LOSS, BILATERAL: Primary | ICD-10-CM

## 2017-06-05 PROCEDURE — 92557 COMPREHENSIVE HEARING TEST: CPT | Mod: S$GLB,,, | Performed by: AUDIOLOGIST

## 2017-06-05 PROCEDURE — 92567 TYMPANOMETRY: CPT | Mod: S$GLB,,, | Performed by: AUDIOLOGIST

## 2017-06-05 NOTE — PROGRESS NOTES
Stefan Talbotniharika Bland was seen 06/05/2017 for an audiological evaluation.  Patient complains of difficulty hearing over the past several years.  He claims that his hearing is not too bad, but his family members complain that they have to repeat frequently during conversation.  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     Results reveal a mild-to-severe sensorineural hearing loss 250-8000 Hz for the right ear, and  mild-to-severe sensorineural hearing loss 250-8000 Hz for the left ear.   Speech Reception Thresholds were  25 dBHL for the right ear and 15 dBHL for the left ear.   Word recognition scores were good for the right ear and good for the left ear.   Tympanograms were Type A for the right ear and Type A for the left ear.    Patient was counseled on the above findings.    REC:  [facility] should pt become interested  Annual audiogram

## 2017-06-25 DIAGNOSIS — I10 ESSENTIAL HYPERTENSION: ICD-10-CM

## 2017-06-25 DIAGNOSIS — K21.9 GASTROESOPHAGEAL REFLUX DISEASE, ESOPHAGITIS PRESENCE NOT SPECIFIED: ICD-10-CM

## 2017-06-25 RX ORDER — LISINOPRIL 5 MG/1
TABLET ORAL
Qty: 90 TABLET | Refills: 0 | Status: SHIPPED | OUTPATIENT
Start: 2017-06-25 | End: 2017-09-25 | Stop reason: SDUPTHER

## 2017-06-25 RX ORDER — OMEPRAZOLE 40 MG/1
CAPSULE, DELAYED RELEASE ORAL
Qty: 30 CAPSULE | Refills: 0 | Status: SHIPPED | OUTPATIENT
Start: 2017-06-25 | End: 2017-07-27 | Stop reason: SDUPTHER

## 2017-06-25 RX ORDER — SIMVASTATIN 20 MG/1
TABLET, FILM COATED ORAL
Qty: 30 TABLET | Refills: 0 | Status: SHIPPED | OUTPATIENT
Start: 2017-06-25 | End: 2017-07-31 | Stop reason: SDUPTHER

## 2017-06-27 ENCOUNTER — TELEPHONE (OUTPATIENT)
Dept: INTERNAL MEDICINE | Facility: CLINIC | Age: 62
End: 2017-06-27

## 2017-06-27 NOTE — TELEPHONE ENCOUNTER
Pt c/o diarrhea since increasing Victoza from 0.6 mg to 1.2 mg.  Stating he stopped taking Victoza for a few days and diarrhea stopped.  Started Victoza again and diarrhea started again.  Wants to know if he should decrease Victoza to 0.6 mg or try a different med.  Please advise.

## 2017-06-27 NOTE — TELEPHONE ENCOUNTER
Notified pt of recommendation to decrease Victoza to 0.6 mg dosing and to let us know if diarrhea does not resolve or worsens.  Pt verbalized understanding.

## 2017-06-27 NOTE — TELEPHONE ENCOUNTER
----- Message from Raul Alba sent at 6/27/2017 10:44 AM CDT -----  Contact: pt's spouse  She's calling in regards to a missed call, please advise, [phone] (home)

## 2017-07-07 ENCOUNTER — TELEPHONE (OUTPATIENT)
Dept: INTERNAL MEDICINE | Facility: CLINIC | Age: 62
End: 2017-07-07

## 2017-07-07 NOTE — TELEPHONE ENCOUNTER
----- Message from Salome Hardwick sent at 7/7/2017  9:27 AM CDT -----  Contact: davis/wife  Would like to speak to nurse about pt side effect to medication. Please call back at [phone]. thanks

## 2017-07-07 NOTE — TELEPHONE ENCOUNTER
Pt stated he started taking Victoza 0.6 mg daily on 6/28/17 and started having diarrhea again a few days later.  Stated he has had diarrhea for 4 days.  Please advise.

## 2017-07-11 ENCOUNTER — TELEPHONE (OUTPATIENT)
Dept: INTERNAL MEDICINE | Facility: CLINIC | Age: 62
End: 2017-07-11

## 2017-07-11 DIAGNOSIS — E11.9 CONTROLLED TYPE 2 DIABETES MELLITUS WITHOUT COMPLICATION, WITHOUT LONG-TERM CURRENT USE OF INSULIN: Primary | ICD-10-CM

## 2017-07-11 NOTE — TELEPHONE ENCOUNTER
Pt stated he stopped taking Victoza on 7/7/17 and diarrhea resolved on 7/9/17.  Please advise as to if pt should try to restart Victoza 0.6 mg or needs a different med prescribed.

## 2017-07-11 NOTE — TELEPHONE ENCOUNTER
----- Message from Jessie Domingo sent at 7/11/2017 10:39 AM CDT -----  Contact: Lisa - wife  Patient returned your call.  Call him at [phone].                                          zhao

## 2017-07-12 ENCOUNTER — TELEPHONE (OUTPATIENT)
Dept: INTERNAL MEDICINE | Facility: CLINIC | Age: 62
End: 2017-07-12

## 2017-07-12 NOTE — TELEPHONE ENCOUNTER
Notified pt that once weekly Trulicity has been sent to pharmacy and to completely discontinue Victoza.  Pt verbalized understanding.

## 2017-07-12 NOTE — TELEPHONE ENCOUNTER
----- Message from Jose Alfredo Stovall sent at 7/12/2017  8:23 AM CDT -----  Pt is returning a missed call.  Please advise [phone]

## 2017-07-14 ENCOUNTER — TELEPHONE (OUTPATIENT)
Dept: INTERNAL MEDICINE | Facility: CLINIC | Age: 62
End: 2017-07-14

## 2017-07-14 NOTE — TELEPHONE ENCOUNTER
----- Message from Margie Pendleton sent at 7/14/2017 10:12 AM CDT -----  Contact: Pt/Wife  Pt wife states that pt needs prior authorization for a medication. Please give pt wife a call at ..[phone] (home)

## 2017-07-14 NOTE — TELEPHONE ENCOUNTER
Notified pt's wife that I have initiated prior authorization on Trulicity and that it will take 24 to 72 business hours to receive determination from insurance company.  Pt's wife verbalized understanding.

## 2017-07-21 DIAGNOSIS — E11.9 TYPE 2 DIABETES MELLITUS WITHOUT COMPLICATION: ICD-10-CM

## 2017-07-27 DIAGNOSIS — K21.9 GASTROESOPHAGEAL REFLUX DISEASE, ESOPHAGITIS PRESENCE NOT SPECIFIED: ICD-10-CM

## 2017-07-27 RX ORDER — OMEPRAZOLE 40 MG/1
CAPSULE, DELAYED RELEASE ORAL
Qty: 30 CAPSULE | Refills: 0 | Status: SHIPPED | OUTPATIENT
Start: 2017-07-27 | End: 2017-08-24 | Stop reason: SDUPTHER

## 2017-07-31 RX ORDER — SIMVASTATIN 20 MG/1
TABLET, FILM COATED ORAL
Qty: 30 TABLET | Refills: 0 | Status: SHIPPED | OUTPATIENT
Start: 2017-07-31 | End: 2017-08-24 | Stop reason: SDUPTHER

## 2017-08-04 NOTE — TELEPHONE ENCOUNTER
----- Message from Sebastián Arroyo sent at 8/4/2017 11:19 AM CDT -----  Contact: [phone]  Would like to consult with nurse about meter test strips. Need contour next.. [facility]. Requesting 50 test strips. Please call pt back at [phone]. Thx. victoriano     [facility] [address]  [address] [facility] [address]  Phone: [phone] Fax: [phone]

## 2017-08-04 NOTE — TELEPHONE ENCOUNTER
Spoke with pt's wife, Lisa, she states that patient needs refill on test strips. Notified her that request would be sent to Dr. Fisher for approval. She verbalizied understanding.

## 2017-08-08 RX ORDER — ALBUTEROL SULFATE 90 UG/1
AEROSOL, METERED RESPIRATORY (INHALATION)
Qty: 9 EACH | Refills: 3 | Status: SHIPPED | OUTPATIENT
Start: 2017-08-08 | End: 2018-05-14 | Stop reason: SDUPTHER

## 2017-08-10 RX ORDER — TIOTROPIUM BROMIDE 18 UG/1
CAPSULE ORAL; RESPIRATORY (INHALATION)
Qty: 30 CAPSULE | Refills: 11 | Status: SHIPPED | OUTPATIENT
Start: 2017-08-10 | End: 2018-08-13 | Stop reason: SDUPTHER

## 2017-08-22 ENCOUNTER — TELEPHONE (OUTPATIENT)
Dept: INTERNAL MEDICINE | Facility: CLINIC | Age: 62
End: 2017-08-22

## 2017-08-22 NOTE — TELEPHONE ENCOUNTER
----- Message from Brittany Cardoso sent at 8/22/2017  1:02 PM CDT -----  Contact: Pt Spouse/Lisa  Caller request call from nurse to see when pt had his last tetanus shot because [facility], please contact caller at [phone]

## 2017-08-22 NOTE — TELEPHONE ENCOUNTER
Spoke with pt's wife, Lisa, she was wanting to know when patient had tetanus shot because he cut his hand and needs stitches. Informed her that according to our chart, he had tdap on 2/2/15. She verbalized understanding.

## 2017-08-24 DIAGNOSIS — K21.9 GASTROESOPHAGEAL REFLUX DISEASE, ESOPHAGITIS PRESENCE NOT SPECIFIED: ICD-10-CM

## 2017-08-24 RX ORDER — OMEPRAZOLE 40 MG/1
CAPSULE, DELAYED RELEASE ORAL
Qty: 30 CAPSULE | Refills: 4 | Status: SHIPPED | OUTPATIENT
Start: 2017-08-24 | End: 2018-01-27 | Stop reason: SDUPTHER

## 2017-08-24 RX ORDER — SIMVASTATIN 20 MG/1
TABLET, FILM COATED ORAL
Qty: 30 TABLET | Refills: 0 | Status: SHIPPED | OUTPATIENT
Start: 2017-08-24 | End: 2017-09-12 | Stop reason: ALTCHOICE

## 2017-08-29 ENCOUNTER — PATIENT OUTREACH (OUTPATIENT)
Dept: ADMINISTRATIVE | Facility: HOSPITAL | Age: 62
End: 2017-08-29

## 2017-09-05 ENCOUNTER — LAB VISIT (OUTPATIENT)
Dept: LAB | Facility: HOSPITAL | Age: 62
End: 2017-09-05
Attending: INTERNAL MEDICINE
Payer: COMMERCIAL

## 2017-09-05 DIAGNOSIS — E11.9 TYPE 2 DIABETES MELLITUS WITHOUT COMPLICATION: ICD-10-CM

## 2017-09-05 DIAGNOSIS — E11.9 TYPE 2 DIABETES MELLITUS WITHOUT COMPLICATION, WITHOUT LONG-TERM CURRENT USE OF INSULIN: ICD-10-CM

## 2017-09-05 LAB
CHOLEST SERPL-MCNC: 156 MG/DL
CHOLEST/HDLC SERPL: 3.1 {RATIO}
ESTIMATED AVG GLUCOSE: 120 MG/DL
HBA1C MFR BLD HPLC: 5.8 %
HDLC SERPL-MCNC: 50 MG/DL
HDLC SERPL: 32.1 %
LDLC SERPL CALC-MCNC: 80 MG/DL
NONHDLC SERPL-MCNC: 106 MG/DL
TRIGL SERPL-MCNC: 130 MG/DL

## 2017-09-05 PROCEDURE — 80061 LIPID PANEL: CPT

## 2017-09-05 PROCEDURE — 36415 COLL VENOUS BLD VENIPUNCTURE: CPT | Mod: PO

## 2017-09-05 PROCEDURE — 83036 HEMOGLOBIN GLYCOSYLATED A1C: CPT

## 2017-09-12 ENCOUNTER — OFFICE VISIT (OUTPATIENT)
Dept: INTERNAL MEDICINE | Facility: CLINIC | Age: 62
End: 2017-09-12
Payer: COMMERCIAL

## 2017-09-12 VITALS
HEART RATE: 74 BPM | OXYGEN SATURATION: 96 % | HEIGHT: 70 IN | TEMPERATURE: 97 F | WEIGHT: 238.31 LBS | DIASTOLIC BLOOD PRESSURE: 72 MMHG | BODY MASS INDEX: 34.12 KG/M2 | SYSTOLIC BLOOD PRESSURE: 122 MMHG

## 2017-09-12 DIAGNOSIS — E78.5 HYPERLIPIDEMIA ASSOCIATED WITH TYPE 2 DIABETES MELLITUS: ICD-10-CM

## 2017-09-12 DIAGNOSIS — R91.1 PULMONARY NODULE: ICD-10-CM

## 2017-09-12 DIAGNOSIS — E11.59 HYPERTENSION ASSOCIATED WITH DIABETES: ICD-10-CM

## 2017-09-12 DIAGNOSIS — I15.2 HYPERTENSION ASSOCIATED WITH DIABETES: ICD-10-CM

## 2017-09-12 DIAGNOSIS — I25.10 CORONARY ARTERY DISEASE INVOLVING NATIVE CORONARY ARTERY OF NATIVE HEART WITHOUT ANGINA PECTORIS: ICD-10-CM

## 2017-09-12 DIAGNOSIS — J44.9 CHRONIC OBSTRUCTIVE PULMONARY DISEASE, UNSPECIFIED COPD TYPE: ICD-10-CM

## 2017-09-12 DIAGNOSIS — Z00.00 ROUTINE GENERAL MEDICAL EXAMINATION AT A HEALTH CARE FACILITY: Primary | ICD-10-CM

## 2017-09-12 DIAGNOSIS — E11.69 HYPERLIPIDEMIA ASSOCIATED WITH TYPE 2 DIABETES MELLITUS: ICD-10-CM

## 2017-09-12 DIAGNOSIS — E11.9 CONTROLLED TYPE 2 DIABETES MELLITUS WITHOUT COMPLICATION, WITHOUT LONG-TERM CURRENT USE OF INSULIN: ICD-10-CM

## 2017-09-12 PROCEDURE — 99999 PR PBB SHADOW E&M-EST. PATIENT-LVL IV: CPT | Mod: PBBFAC,,, | Performed by: INTERNAL MEDICINE

## 2017-09-12 PROCEDURE — 99396 PREV VISIT EST AGE 40-64: CPT | Mod: S$GLB,,, | Performed by: INTERNAL MEDICINE

## 2017-09-12 RX ORDER — IPRATROPIUM BROMIDE 21 UG/1
2 SPRAY, METERED NASAL 2 TIMES DAILY
COMMUNITY
Start: 2017-08-12 | End: 2018-02-22

## 2017-09-12 RX ORDER — ROSUVASTATIN CALCIUM 10 MG/1
10 TABLET, COATED ORAL DAILY
Qty: 90 TABLET | Refills: 1 | Status: SHIPPED | OUTPATIENT
Start: 2017-09-12 | End: 2018-04-06 | Stop reason: SDUPTHER

## 2017-09-12 RX ORDER — TAMSULOSIN HYDROCHLORIDE 0.4 MG/1
1 CAPSULE ORAL DAILY
COMMUNITY
Start: 2017-07-19 | End: 2019-01-15 | Stop reason: SDUPTHER

## 2017-09-12 NOTE — PROGRESS NOTES
"Subjective:      Patient ID: Stefan Forbes Jr. is a 62 y.o. male.    Chief Complaint: Follow-up    63 yo with Patient Active Problem List:     Controlled type 2 diabetes mellitus without complication, without long-term current use of insulin     Hypertension associated with diabetes     Hyperlipidemia associated with type 2 diabetes mellitus     COPD (chronic obstructive pulmonary disease)     CAD (coronary artery disease)     Vitamin D deficiency     Osteoarthritis of knee     Pulmonary nodule     Routine general medical examination at a health care facility    Here today for annaul prev exam.  Compliant with meds without significant side effects.  He is feeling well in his usual state of health.  Home glucose and blood pressures have improved.    Social History    Marital status:              Spouse name:                       Years of education:                 Number of children:               Occupational History    None on file    Social History Main Topics    Smoking status: Former Smoker                                                                Packs/day: 0.00      Years: 0.00           Types: Cigars       Quit date: 12/30/2015    Smokeless tobacco: Never Used                        Alcohol use: No              Drug use: No              Sexual activity: Not on file          Other Topics            Concern    None on file    Social History Narrative    None on file            Review of Systems   Constitutional: Negative for chills and fever.   HENT: Negative for ear pain and sore throat.    Respiratory: Negative for cough.    Cardiovascular: Negative for chest pain.   Gastrointestinal: Negative for abdominal pain and blood in stool.   Genitourinary: Negative for dysuria and hematuria.   Neurological: Negative for seizures and syncope.     Objective:   /72 (BP Location: Right arm, Patient Position: Sitting)   Pulse 74   Temp 97.4 °F (36.3 °C) (Tympanic)   Ht 5' 10" (1.778 m)   Wt 108.1 " kg (238 lb 5.1 oz)   SpO2 96%   BMI 34.20 kg/m²     Physical Exam   Constitutional: He is oriented to person, place, and time. He appears well-developed and well-nourished. No distress.   HENT:   Head: Normocephalic and atraumatic.   Mouth/Throat: Oropharynx is clear and moist.   Eyes: EOM are normal. Pupils are equal, round, and reactive to light.   Neck: Neck supple. No thyromegaly present.   Cardiovascular: Normal rate and regular rhythm.    Pulses:       Dorsalis pedis pulses are 2+ on the right side, and 2+ on the left side.   Pulmonary/Chest: Breath sounds normal. He has no wheezes. He has no rales.   Abdominal: Soft. Bowel sounds are normal. There is no tenderness.   Musculoskeletal: He exhibits no edema.   Feet:   Right Foot:   Protective Sensation: 8 sites tested. 8 sites sensed.   Left Foot:   Protective Sensation: 8 sites tested. 8 sites sensed.   Lymphadenopathy:     He has no cervical adenopathy.   Neurological: He is alert and oriented to person, place, and time.   Skin: Skin is warm and dry.   Psychiatric: He has a normal mood and affect. His behavior is normal.     Lab Visit on 09/05/2017   Component Date Value Ref Range Status    Hemoglobin A1C 09/05/2017 5.8* 4.0 - 5.6 % Final    Comment: According to ADA guidelines, hemoglobin A1c <7.0% represents  optimal control in non-pregnant diabetic patients. Different  metrics may apply to specific patient populations.   Standards of Medical Care in Diabetes-2016.  For the purpose of screening for the presence of diabetes:  <5.7%     Consistent with the absence of diabetes  5.7-6.4%  Consistent with increasing risk for diabetes   (prediabetes)  >or=6.5%  Consistent with diabetes  Currently, no consensus exists for use of hemoglobin A1c  for diagnosis of diabetes for children.  This Hemoglobin A1c assay has significant interference with fetal   hemoglobin   (HbF). The results are invalid for patients with abnormal amounts of   HbF,   including those with  known Hereditary Persistence   of Fetal Hemoglobin. Heterozygous hemoglobin variants (HbAS, HbAC,   HbAD, HbAE, HbA2) do not significantly interfere with this assay;   however, presence of multiple variants in a sample may impact the %   interference.      Estimated Avg Glucose 09/05/2017 120  68 - 131 mg/dL Final    Cholesterol 09/05/2017 156  120 - 199 mg/dL Final    Comment: The National Cholesterol Education Program (NCEP) has set the  following guidelines (reference ranges) for Cholesterol:  Optimal.....................<200 mg/dL  Borderline High.............200-239 mg/dL  High........................> or = 240 mg/dL      Triglycerides 09/05/2017 130  30 - 150 mg/dL Final    Comment: The National Cholesterol Education Program (NCEP) has set the  following guidelines (reference values) for triglycerides:  Normal......................<150 mg/dL  Borderline High.............150-199 mg/dL  High........................200-499 mg/dL      HDL 09/05/2017 50  40 - 75 mg/dL Final    Comment: The National Cholesterol Education Program (NCEP) has set the  following guidelines (reference values) for HDL Cholesterol:  Low...............<40 mg/dL  Optimal...........>60 mg/dL      LDL Cholesterol 09/05/2017 80.0  63.0 - 159.0 mg/dL Final    Comment: The National Cholesterol Education Program (NCEP) has set the  following guidelines (reference values) for LDL Cholesterol:  Optimal.......................<130 mg/dL  Borderline High...............130-159 mg/dL  High..........................160-189 mg/dL  Very High.....................>190 mg/dL      HDL/Chol Ratio 09/05/2017 32.1  20.0 - 50.0 % Final    Total Cholesterol/HDL Ratio 09/05/2017 3.1  2.0 - 5.0 Final    Non-HDL Cholesterol 09/05/2017 106  mg/dL Final    Comment: Risk category and Non-HDL cholesterol goals:  Coronary heart disease (CHD)or equivalent (10-year risk of CHD >20%):  Non-HDL cholesterol goal     <130 mg/dL  Two or more CHD risk factors and 10-year  risk of CHD <= 20%:  Non-HDL cholesterol goal     <160 mg/dL  0 to 1 CHD risk factor:  Non-HDL cholesterol goal     <190 mg/dL         Assessment:     1. Routine general medical examination at a health care facility    2. Hyperlipidemia associated with type 2 diabetes mellitus    3. Controlled type 2 diabetes mellitus without complication, without long-term current use of insulin    4. Hypertension associated with diabetes    5. Coronary artery disease involving native coronary artery of native heart without angina pectoris    6. Chronic obstructive pulmonary disease, unspecified COPD type    7. Pulmonary nodule      Plan:   Routine general medical examination at a health care facility  Heart healthy diet and regular exercise  Health maintenance reviewed with patient    Hyperlipidemia associated with type 2 diabetes mellitus  Not at goal for patient with diabetes and heart disease  Change simvastatin to Crestor  -     rosuvastatin (CRESTOR) 10 MG tablet; Take 1 tablet (10 mg total) by mouth once daily.  Dispense: 90 tablet; Refill: 1  -     Lipid panel; Future; Expected date: 12/11/2017  -     ALT (SGPT); Future; Expected date: 12/11/2017    Controlled type 2 diabetes mellitus without complication, without long-term current use of insulin  -     Ambulatory referral to Optometry  -     Hemoglobin A1c; Future; Expected date: 03/11/2018    Hypertension associated with diabetes  Controlled    Coronary artery disease involving native coronary artery of native heart without angina pectoris  [facility] [place]  Continue aspirin    Chronic obstructive pulmonary disease, unspecified COPD type  Stable  Cont f/u and recs as per pulm      Pulmonary nodule  Sees [facility]  Cont f/u and recs as per pulm    Check with your pharmacy about your shingles vaccine.      Lab Frequency Next Occurrence   CT Chest Without Contrast Once 02/27/2017   Lipid panel Once 12/11/2017   ALT (SGPT) Once 12/11/2017   Hemoglobin A1c Once 03/11/2018        Problem List Items Addressed This Visit        Pulmonary    COPD (chronic obstructive pulmonary disease)    Pulmonary nodule    Overview     stable              Cardiac/Vascular    Hypertension associated with diabetes    Hyperlipidemia associated with type 2 diabetes mellitus    Relevant Medications    rosuvastatin (CRESTOR) 10 MG tablet    Other Relevant Orders    Lipid panel    ALT (SGPT)    CAD (coronary artery disease)       Endocrine    Controlled type 2 diabetes mellitus without complication, without long-term current use of insulin    Relevant Orders    Ambulatory referral to Optometry    Hemoglobin A1c       Other    Routine general medical examination at a health care facility - Primary      Other Visit Diagnoses    None.         Return in about 6 months (around 3/12/2018), or if symptoms worsen or fail to improve.

## 2017-09-25 ENCOUNTER — OFFICE VISIT (OUTPATIENT)
Dept: OPHTHALMOLOGY | Facility: CLINIC | Age: 62
End: 2017-09-25
Payer: COMMERCIAL

## 2017-09-25 DIAGNOSIS — I10 ESSENTIAL HYPERTENSION: ICD-10-CM

## 2017-09-25 DIAGNOSIS — E11.9 CONTROLLED TYPE 2 DIABETES MELLITUS WITHOUT COMPLICATION, WITHOUT LONG-TERM CURRENT USE OF INSULIN: ICD-10-CM

## 2017-09-25 DIAGNOSIS — D31.31 NEVUS OF CHOROID OF RIGHT EYE: Primary | ICD-10-CM

## 2017-09-25 PROCEDURE — 92250 FUNDUS PHOTOGRAPHY W/I&R: CPT | Mod: S$GLB,,, | Performed by: OPHTHALMOLOGY

## 2017-09-25 PROCEDURE — 99999 PR PBB SHADOW E&M-EST. PATIENT-LVL II: CPT | Mod: PBBFAC,,, | Performed by: OPHTHALMOLOGY

## 2017-09-25 PROCEDURE — 92014 COMPRE OPH EXAM EST PT 1/>: CPT | Mod: S$GLB,,, | Performed by: OPHTHALMOLOGY

## 2017-09-25 RX ORDER — LISINOPRIL 5 MG/1
TABLET ORAL
Qty: 90 TABLET | Refills: 0 | Status: SHIPPED | OUTPATIENT
Start: 2017-09-25 | End: 2018-02-06

## 2017-09-25 NOTE — LETTER
September 25, 2017      Wallace Fisher MD  [address] [facility] [place] [address]           [facility] - Ophthalmology  [address] [facility] [place] [address]  Phone: [phone]  Fax: [phone]          Patient: Stefan Forbes Jr.   MR Number: [id number]   YOB: 1955   Date of Visit: 9/25/2017       Dear Dr. Wallace Fisher:    Thank you for referring Stefan Forbes to me for evaluation. Attached you will find relevant portions of my assessment and plan of care.    If you have questions, please do not hesitate to call me. I look forward to following Stefan Forbes along with you.    Sincerely,    MICHAEL Sousa MD    Enclosure  CC:  No Recipients    If you would like to receive this communication electronically, please contact [email or web address] or [phone] to request more information on [employer] access.    For providers and/or their staff who would like to refer a patient to [facility], please contact us through our one-stop-shop provider referral line, [facility], at [phone].    If you feel you have received this communication in error or would no longer like to receive these types of communications, please e-mail [email or web address]

## 2017-09-25 NOTE — PROGRESS NOTES
===============================  09/25/2017   Stefan Forbes Jr.,   62 y.o. male   Last visit [facility]: :7/29/2016   Last visit eye dept. Visit date not found  [facility]:  Corrected distance visual acuity was 20/20 in the right eye and 20/25 in the left eye.  Tonometry     Tonometry (Applanation, 12:33 PM)       Right Left    Pressure 22 22               Not recorded         Not recorded        Chief Complaint   Patient presents with    CHOROIDAL NEVUS     1 YEAR CHOROIDAL NEVUS OF OD    Diabetic Eye Exam        HPI     CHOROIDAL NEVUS    Additional comments: 1 YEAR CHOROIDAL NEVUS OF OD           Comments   Dm since 2012  No bdr  Choroidal nevus of od       Last edited by Ayse Bear on 9/25/2017 12:27 PM. (History)      Read Studies:   Vitals  ________________  9/25/2017  Problem List Items Addressed This Visit        Eye/Vision problems    Controlled type 2 diabetes mellitus without complication, without long-term current use of insulin  No DR    Relevant Orders    Color Fundus Photography - [facility] - Both Eyes    Nevus of choroid of right eye - Primary  Benign, measurements below    Relevant Orders    Color Fundus Photography - [facility] - Both Eyes      Other Visit Diagnoses    None.       .  .      1678 x 241       ===========================

## 2017-10-04 ENCOUNTER — HOSPITAL ENCOUNTER (OUTPATIENT)
Dept: RADIOLOGY | Facility: HOSPITAL | Age: 62
Discharge: HOME | End: 2017-10-04
Attending: INTERNAL MEDICINE
Payer: COMMERCIAL

## 2017-10-04 ENCOUNTER — CLINICAL SUPPORT (OUTPATIENT)
Dept: CARDIOLOGY | Facility: CLINIC | Age: 62
End: 2017-10-04
Payer: COMMERCIAL

## 2017-10-04 ENCOUNTER — OFFICE VISIT (OUTPATIENT)
Dept: INTERNAL MEDICINE | Facility: CLINIC | Age: 62
End: 2017-10-04
Payer: COMMERCIAL

## 2017-10-04 VITALS
HEART RATE: 74 BPM | HEIGHT: 70 IN | WEIGHT: 234.56 LBS | SYSTOLIC BLOOD PRESSURE: 120 MMHG | TEMPERATURE: 97 F | OXYGEN SATURATION: 97 % | BODY MASS INDEX: 33.58 KG/M2 | DIASTOLIC BLOOD PRESSURE: 76 MMHG

## 2017-10-04 DIAGNOSIS — E11.69 HYPERLIPIDEMIA ASSOCIATED WITH TYPE 2 DIABETES MELLITUS: ICD-10-CM

## 2017-10-04 DIAGNOSIS — I25.10 CORONARY ARTERY DISEASE INVOLVING NATIVE CORONARY ARTERY OF NATIVE HEART WITHOUT ANGINA PECTORIS: ICD-10-CM

## 2017-10-04 DIAGNOSIS — J44.9 CHRONIC OBSTRUCTIVE PULMONARY DISEASE, UNSPECIFIED COPD TYPE: ICD-10-CM

## 2017-10-04 DIAGNOSIS — Z01.818 PREOP EXAMINATION: ICD-10-CM

## 2017-10-04 DIAGNOSIS — E11.9 CONTROLLED TYPE 2 DIABETES MELLITUS WITHOUT COMPLICATION, WITHOUT LONG-TERM CURRENT USE OF INSULIN: ICD-10-CM

## 2017-10-04 DIAGNOSIS — E78.5 HYPERLIPIDEMIA ASSOCIATED WITH TYPE 2 DIABETES MELLITUS: ICD-10-CM

## 2017-10-04 DIAGNOSIS — I15.2 HYPERTENSION ASSOCIATED WITH DIABETES: ICD-10-CM

## 2017-10-04 DIAGNOSIS — E55.9 VITAMIN D DEFICIENCY: ICD-10-CM

## 2017-10-04 DIAGNOSIS — E11.59 HYPERTENSION ASSOCIATED WITH DIABETES: ICD-10-CM

## 2017-10-04 DIAGNOSIS — R91.1 PULMONARY NODULE: ICD-10-CM

## 2017-10-04 DIAGNOSIS — M17.12 ARTHRITIS OF LEFT KNEE: Primary | ICD-10-CM

## 2017-10-04 PROBLEM — Z00.00 ROUTINE GENERAL MEDICAL EXAMINATION AT A HEALTH CARE FACILITY: Status: RESOLVED | Noted: 2017-09-12 | Resolved: 2017-10-04

## 2017-10-04 PROCEDURE — 99214 OFFICE O/P EST MOD 30 MIN: CPT | Mod: S$GLB,,, | Performed by: INTERNAL MEDICINE

## 2017-10-04 PROCEDURE — 71020 XR CHEST PA AND LATERAL: CPT | Mod: TC,PO

## 2017-10-04 PROCEDURE — 99999 PR PBB SHADOW E&M-EST. PATIENT-LVL III: CPT | Mod: PBBFAC,,, | Performed by: INTERNAL MEDICINE

## 2017-10-04 PROCEDURE — 71020 XR CHEST PA AND LATERAL: CPT | Mod: 26,,, | Performed by: RADIOLOGY

## 2017-10-04 PROCEDURE — 93000 ELECTROCARDIOGRAM COMPLETE: CPT | Mod: S$GLB,,, | Performed by: INTERNAL MEDICINE

## 2017-10-04 NOTE — PROGRESS NOTES
Subjective:      Patient ID: Stefan Forbes Jr. is a 62 y.o. male.    Chief Complaint: Pre-op Exam    63 yo with Past Medical History:  No date: COPD (chronic obstructive pulmonary disease)  No date: Diabetes mellitus, type 2  No date: Hyperlipidemia  No date: Hypertension  Patient Active Problem List:     Controlled type 2 diabetes mellitus without complication, without long-term current use of insulin     Hypertension associated with diabetes     Hyperlipidemia associated with type 2 diabetes mellitus     COPD (chronic obstructive pulmonary disease)     CAD (coronary artery disease)     Vitamin D deficiency     Osteoarthritis of knee     Pulmonary nodule     Nevus of choroid of right eye    Here today for preop for left knee replacement. No known h/o chf, ra, cvc, ckd. Has diabetes but not on insulin. Has cad followed by [facility]. Able to walk up at least one flight of stairs without cp or dyspnea. Walks 15 to 20 min daily without cp.  Past Surgical History:  No date: ANGIOPLASTY    Social History    Marital status:              Spouse name:                       Years of education:                 Number of children:               Occupational History    None on file    Social History Main Topics    Smoking status: Former Smoker                                                                  Types: Cigars       Quit date: 12/30/2015    Smokeless tobacco: Never Used                        Alcohol use: No              Drug use: No              Sexual activity: Not on file          Other Topics            Concern    None on file    Social History Narrative    None on file    Review of patient's allergies indicates:  No Known Allergies    Medications      tamsulosin (FLOMAX) 0.4 mg Cp24 1 capsule, Daily       SPIRIVA WITH HANDIHALER 18 mcg inhalation capsule        rosuvastatin (CRESTOR) 10 MG tablet 10 mg, Daily       PROAIR HFA 90 mcg/actuation inhaler        omeprazole (PRILOSEC) 40 MG  "capsule        NITROGLYCERIN (NITROSTAT SL) 1 tablet, As needed (PRN)       metformin (GLUCOPHAGE) 1000 MG tablet 1,000 mg, 2 times daily with meals       lisinopril (PRINIVIL,ZESTRIL) 5 MG tablet        lancets Misc 1 lancet, 2 times daily       ipratropium (ATROVENT) 0.03 % nasal spray 2 spray, 2 times daily       guaifenesin (MUCINEX) 1,200 mg Ta12 1 tablet, 2 times daily          dulaglutide 0.75 mg/0.5 mL PnIj 0.75 mg, Every 7 days       cholecalciferol, vitamin D3, (VITAMIN D3) 1,000 unit capsule 1,000 Units, Daily       buPROPion (WELLBUTRIN SR) 150 MG TBSR 12 hr tablet 150 mg, Daily       BLOOD-GLUCOSE METER (CONTOUR NEXT EZ METER MISC)        blood sugar diagnostic (BLOOD GLUCOSE TEST) Strp 1 strip, 3 times daily       BD ULTRA-FINE BISI PEN NEEDLES 32 gauge x 5/32" Ndle        aspirin 325 MG tablet 325 mg, Daily       acetaminophen (TYLENOL) 500 MG tablet 500 mg, As needed (PRN)                Review of Systems   Constitutional: Negative for chills and fever.   HENT: Negative for ear pain and sore throat.    Respiratory: Negative for cough.    Cardiovascular: Negative for chest pain.   Gastrointestinal: Negative for abdominal pain and blood in stool.   Genitourinary: Negative for dysuria and hematuria.   Neurological: Negative for seizures and syncope.     Objective:   /76 (BP Location: Right arm, Patient Position: Sitting)   Pulse 74   Temp 97.2 °F (36.2 °C) (Tympanic)   Ht 5' 10" (1.778 m)   Wt 106.4 kg (234 lb 9.1 oz)   SpO2 97%   BMI 33.66 kg/m²     Physical Exam   Constitutional: He is oriented to person, place, and time. He appears well-developed and well-nourished. No distress.   HENT:   Head: Normocephalic and atraumatic.   Mouth/Throat: Oropharynx is clear and moist.   Eyes: EOM are normal. Pupils are equal, round, and reactive to light.   Neck: Neck supple. Carotid bruit is not present. No thyromegaly present.   Cardiovascular: Normal rate and regular rhythm.    Pulses:       Dorsalis " pedis pulses are 2+ on the right side, and 2+ on the left side.   Pulmonary/Chest: Breath sounds normal. He has no wheezes. He has no rales.   Abdominal: Soft. Bowel sounds are normal. There is no tenderness.   Musculoskeletal: He exhibits no edema.   Feet:   Right Foot:   Protective Sensation: 8 sites tested. 8 sites sensed.   Skin Integrity: Negative for ulcer or blister.   Left Foot:   Protective Sensation: 8 sites tested. 8 sites sensed.   Skin Integrity: Negative for ulcer or blister.   Lymphadenopathy:     He has no cervical adenopathy.   Neurological: He is alert and oriented to person, place, and time.   Skin: Skin is warm and dry.   Psychiatric: He has a normal mood and affect. His behavior is normal.       Assessment:     1. Arthritis of left knee    2. Coronary artery disease involving native coronary artery of native heart without angina pectoris    3. Controlled type 2 diabetes mellitus without complication, without long-term current use of insulin    4. Chronic obstructive pulmonary disease, unspecified COPD type    5. Hyperlipidemia associated with type 2 diabetes mellitus    6. Hypertension associated with diabetes    7. Pulmonary nodule    8. Vitamin D deficiency    9. Preop examination      Plan:   Arthritis of left knee  Planning for replacement with Stefan Monique    Coronary artery disease involving native coronary artery of native heart without angina pectoris  Sees [facility]. Up to date with appts  -     EKG 12-lead; Future; Expected date: 10/04/2017    Controlled type 2 diabetes mellitus without complication, without long-term current use of insulin  stable    Chronic obstructive pulmonary disease, unspecified COPD type  -     X-Ray Chest PA And Lateral; Future; Expected date: 10/04/2017    Hyperlipidemia associated with type 2 diabetes mellitus  controlled    Hypertension associated with diabetes  Controlled  -     CBC auto differential; Future; Expected date: 10/04/2017  -      Basic metabolic panel; Future; Expected date: 10/04/2017    Pulmonary nodule  stable    Vitamin D deficiency  On otc replacement    Preop examination  -     CBC auto differential; Future; Expected date: 10/04/2017  -     Basic metabolic panel; Future; Expected date: 10/04/2017  -     EKG 12-lead; Future; Expected date: 10/04/2017  -     X-Ray Chest PA And Lateral; Future; Expected date: 10/04/2017  -     APTT; Future; Expected date: 10/04/2017  -     Protime-INR; Future; Expected date: 10/04/2017    Preop note to be completed once labs and radiology resulted    Preop addendum:    Lab Visit on 10/04/2017   Component Date Value Ref Range Status    WBC 10/04/2017 7.77  3.90 - 12.70 K/uL Final    RBC 10/04/2017 4.47* 4.60 - 6.20 M/uL Final    Hemoglobin 10/04/2017 13.3* 14.0 - 18.0 g/dL Final    Hematocrit 10/04/2017 40.4  40.0 - 54.0 % Final    MCV 10/04/2017 90  82 - 98 fL Final    MCH 10/04/2017 29.8  27.0 - 31.0 pg Final    MCHC 10/04/2017 32.9  32.0 - 36.0 g/dL Final    RDW 10/04/2017 13.2  11.5 - 14.5 % Final    Platelets 10/04/2017 239  150 - 350 K/uL Final    MPV 10/04/2017 10.9  9.2 - 12.9 fL Final    Gran # 10/04/2017 4.9  1.8 - 7.7 K/uL Final    Lymph # 10/04/2017 1.9  1.0 - 4.8 K/uL Final    Mono # 10/04/2017 0.7  0.3 - 1.0 K/uL Final    Eos # 10/04/2017 0.2  0.0 - 0.5 K/uL Final    Baso # 10/04/2017 0.02  0.00 - 0.20 K/uL Final    Gran% 10/04/2017 63.2  38.0 - 73.0 % Final    Lymph% 10/04/2017 23.9  18.0 - 48.0 % Final    Mono% 10/04/2017 9.3  4.0 - 15.0 % Final    Eosinophil% 10/04/2017 3.0  0.0 - 8.0 % Final    Basophil% 10/04/2017 0.3  0.0 - 1.9 % Final    Differential Method 10/04/2017 Automated   Final    Sodium 10/04/2017 134* 136 - 145 mmol/L Final    Potassium 10/04/2017 4.9  3.5 - 5.1 mmol/L Final    Chloride 10/04/2017 98  95 - 110 mmol/L Final    CO2 10/04/2017 27  23 - 29 mmol/L Final    Glucose 10/04/2017 87  70 - 110 mg/dL Final    BUN, Bld 10/04/2017 13  6 - 20  mg/dL Final    Creatinine 10/04/2017 0.9  0.5 - 1.4 mg/dL Final    Calcium 10/04/2017 9.7  8.7 - 10.5 mg/dL Final    Anion Gap 10/04/2017 9  8 - 16 mmol/L Final    eGFR if African American 10/04/2017 >60.0  >60 mL/min/1.73 m^2 Final    eGFR if non African American 10/04/2017 >60.0  >60 mL/min/1.73 m^2 Final    Comment: Calculation used to obtain the estimated glomerular filtration  rate (eGFR) is the CKD-EPI equation. Since race is unknown   in our information system, the eGFR values for   -American and Non--American patients are given   for each creatinine result.      aPTT 10/04/2017 27.4  21.0 - 32.0 sec Final    Prothrombin Time 10/04/2017 10.3  9.0 - 12.5 sec Final    INR 10/04/2017 0.9  0.8 - 1.2 Final    Comment: Coumadin Therapy:  2.0 - 3.0 for INR for all indicators except mechanical heart valves  and antiphospholipid syndromes which should use 2.5 - 3.5.       Result Image Hyperlink     Show images for EKG 12-lead    Reviewed By     Wallace Fisher MD on 10/5/2017 18:12      EKG 12-lead   Order: [id number]   Status:  Final result   Visible to patient:  Yes (Patient Portal) Next appt:  12/12/2017 at 08:05 AM in Lab (LABORATORY, [facility]) Dx:  Preop examination; Coronary artery di...   Narrative     Test Reason : I25.10  Blood Pressure :  mmHG  Vent. Rate : 070 BPM     Atrial Rate : 070 BPM     P-R Int : 142 ms          QRS Dur : 098 ms      QT Int : 362 ms       P-R-T Axes : 070 021 070 degrees     QTc Int : 390 ms    Normal sinus rhythm  Low voltage QRS  Possible Lateral infarct ,age undetermined  Abnormal ECG  No previous ECGs available  Confirmed by LAUREN MELTON, ERNESTINA ([id number]) on 10/5/2017 7:28:11 AM              Narrative     Comparison: 03/13/2017    2 views    Findings:    Chronic obstructive pulmonary disease changes without consolidation or effusion.  Heart and pulmonary vasculature are within normal limits the trachea is normal in position.  Prominent LEFT epicardial fat pad.  CP  angles are sharp.  Osteopenia and spondylosis.  Minimal accentuated kyphosis.   Impression           Stable exam without acute infiltrate.       Electronically signed by: NATHANIEL PHIPPS III, MD  Date: 10/04/17  Time: 14:13          Lab Frequency Next Occurrence   CT Chest Without Contrast Once 02/27/2017   Lipid panel Once 12/11/2017   ALT (SGPT) Once 12/11/2017   Hemoglobin A1c Once 03/11/2018   CBC auto differential Once 10/04/2017   Basic metabolic panel Once 10/04/2017   EKG 12-lead Once 10/04/2017   X-Ray Chest PA And Lateral Once 10/04/2017   APTT Once 10/04/2017   Protime-INR Once 10/04/2017     Mr. Forbes is acceptable for knee surgery from a general medicine standpoint. Recommend cardiac clearance per [facility]arton.     Problem List Items Addressed This Visit        Pulmonary    COPD (chronic obstructive pulmonary disease)    Relevant Orders    X-Ray Chest PA And Lateral (Completed)    Pulmonary nodule    Overview     stable              Cardiac/Vascular    Hypertension associated with diabetes    Relevant Orders    CBC auto differential    Basic metabolic panel    Hyperlipidemia associated with type 2 diabetes mellitus    CAD (coronary artery disease)    Relevant Orders    EKG 12-lead       Endocrine    Controlled type 2 diabetes mellitus without complication, without long-term current use of insulin    Vitamin D deficiency      Other Visit Diagnoses     Arthritis of left knee    -  Primary    Preop examination        Relevant Orders    CBC auto differential    Basic metabolic panel    EKG 12-lead    X-Ray Chest PA And Lateral (Completed)    APTT (Completed)    Protime-INR (Completed)          Return if symptoms worsen or fail to improve.

## 2017-10-06 ENCOUNTER — TELEPHONE (OUTPATIENT)
Dept: INTERNAL MEDICINE | Facility: CLINIC | Age: 62
End: 2017-10-06

## 2017-10-06 NOTE — TELEPHONE ENCOUNTER
----- Message from Brianda Can sent at 10/6/2017  2:56 PM CDT -----  Contact: Lisa Forbes (Spouse)  Lisa Forbes (Spouse) is requesting a call from nurse to discuss surgery date and other information.        Please call Lisa Forbes (Spouse) back at [phone]

## 2017-10-10 ENCOUNTER — TELEPHONE (OUTPATIENT)
Dept: INTERNAL MEDICINE | Facility: CLINIC | Age: 62
End: 2017-10-10

## 2017-10-10 NOTE — TELEPHONE ENCOUNTER
----- Message from Jose Alfredo Stovall sent at 10/10/2017 10:21 AM CDT -----  Pt is returning a missed call.  Please advise [phone]

## 2017-10-10 NOTE — TELEPHONE ENCOUNTER
Spoke with pt's wife, Lisa, notified her that we will fax notes and labs over to Dr. Obregon's office. Pt's wife verbalized understanding.

## 2017-10-10 NOTE — TELEPHONE ENCOUNTER
Please send a copy of my last PN, labs, ekg, cxr to Dr. Stefan Obregon. Fax [phone]. Please notify pt that fax has been completed.

## 2017-10-10 NOTE — TELEPHONE ENCOUNTER
Spoke with pt's wife, Lisa, she stated that she was returning a call, notified her that our office did not call her again. She verbalized understanding.

## 2017-10-24 ENCOUNTER — TELEPHONE (OUTPATIENT)
Dept: INTERNAL MEDICINE | Facility: CLINIC | Age: 62
End: 2017-10-24

## 2017-10-24 NOTE — TELEPHONE ENCOUNTER
----- Message from Madalyn Grajeda sent at 10/24/2017  8:48 AM CDT -----  Contact: Ana Charge nurse Dr. Barajas  Calling concerning last few months of medical records on patient with BMP and CMP and office notes. Please call Ana @ [phone] and fax # [phone]. Thanks, jm

## 2017-10-27 ENCOUNTER — TELEPHONE (OUTPATIENT)
Dept: INTERNAL MEDICINE | Facility: CLINIC | Age: 62
End: 2017-10-27

## 2017-10-27 ENCOUNTER — OFFICE VISIT (OUTPATIENT)
Dept: INTERNAL MEDICINE | Facility: CLINIC | Age: 62
End: 2017-10-27
Payer: COMMERCIAL

## 2017-10-27 VITALS
HEIGHT: 68 IN | TEMPERATURE: 97 F | OXYGEN SATURATION: 96 % | WEIGHT: 230.38 LBS | HEART RATE: 92 BPM | BODY MASS INDEX: 34.92 KG/M2

## 2017-10-27 DIAGNOSIS — J44.0 COPD WITH ACUTE BRONCHITIS: Primary | ICD-10-CM

## 2017-10-27 DIAGNOSIS — J20.9 COPD WITH ACUTE BRONCHITIS: Primary | ICD-10-CM

## 2017-10-27 PROCEDURE — 99213 OFFICE O/P EST LOW 20 MIN: CPT | Mod: S$GLB,,, | Performed by: PHYSICIAN ASSISTANT

## 2017-10-27 PROCEDURE — 99999 PR PBB SHADOW E&M-EST. PATIENT-LVL IV: CPT | Mod: PBBFAC,,, | Performed by: PHYSICIAN ASSISTANT

## 2017-10-27 RX ORDER — DOXYCYCLINE HYCLATE 100 MG
100 TABLET ORAL DAILY
Qty: 20 TABLET | Refills: 0 | Status: SHIPPED | OUTPATIENT
Start: 2017-10-27 | End: 2017-10-27 | Stop reason: SDUPTHER

## 2017-10-27 RX ORDER — OXYCODONE AND ACETAMINOPHEN 10; 325 MG/1; MG/1
1 TABLET ORAL EVERY 4 HOURS PRN
COMMUNITY
End: 2018-02-06

## 2017-10-27 RX ORDER — FUROSEMIDE 10 MG/ML
SOLUTION ORAL
COMMUNITY
End: 2017-11-02

## 2017-10-27 RX ORDER — DOXYCYCLINE HYCLATE 100 MG
100 TABLET ORAL DAILY
Qty: 20 TABLET | Refills: 0 | Status: SHIPPED | OUTPATIENT
Start: 2017-10-27 | End: 2017-11-06

## 2017-10-27 RX ORDER — TRAMADOL HYDROCHLORIDE 50 MG/1
50 TABLET ORAL EVERY 4 HOURS PRN
COMMUNITY
End: 2018-02-06

## 2017-10-27 NOTE — TELEPHONE ENCOUNTER
----- Message from Irma Stovall sent at 10/27/2017 11:33 AM CDT -----  Contact: wife  States the pt may have a poss chest infection and wants to be worked in today, pt can be reached at [phone]///[id number]

## 2017-10-27 NOTE — TELEPHONE ENCOUNTER
Spoke with pt's wife, Lisa, she states that patient is coughing up green phlegm and would like to be seen today. Informed her that Dr. Fisher is not in the clinic today but I can get him scheduled with another provider. She verbalized understanding and pt will see Mr. Ricks today at 4:20 pm.

## 2017-10-27 NOTE — PROGRESS NOTES
Subjective:       Patient ID: Stefan Forbes Jr. is a 62 y.o.W/ male.    Chief Complaint: Cough    HPI         He comes in today accompanied by his wife and he is in one of her wheelchairs.  He had his knee replaced a couple of weeks ago.  After his surgery, he developed some electrolyte imbalance and had to go back in the hospital for them to regulate his balance over about 4-5 days.  He just got out yesterday.  His wife says he's wheezing now little bit.  He does have COPD and he chronically brings up anywhere from clear to mostly whitish sputum out of his lungs.  Now his sputum has turned medium to dark green in color and has him worried.  He normally takes Mucinex 1200 mg each day.  He does not have any increased frequency of coughing.  He isn't straining any more than usual.  There has been no dyspnea of any modality more so than usual.  He hasn't broken not in any sweats and there has been no fever or chills or rigors.  His appetite has been good and he hasn't had any nausea or vomiting.  There has been no irregularity with his heart and no palpitations.  He does not have any swelling of his extremities any worse than usual.  He says the hospital are trying to get him to drink less than 50 to ounces of fluid per day because apparently he had a little SIADH.  He has had his usual amount of nasal congestion and drainage and is not out of the ordinary.  He doesn't have any sore throat or earache.    Review of Systems    Otherwise negative concerning the ENT, RESPIRATORY, PULMONARY, CV, VASCULAR, and GI system review.      Objective:      Physical Exam    ENT: His ears are normal.  Nose is open and clear without any turbinate edema or redness.  His pharynx appears normal with just a trace bit of posterior middle pharyngeal redness present.  There is no swelling to the uvula or the soft palate.  He doesn't have any tender glands or adenopathy present in the neck.  CHEST: Clear BS anterior to posterior.  There was  only a trace bit of wheeze present in his right upper anterior chest.  Otherwise his lungs are clear without any rhonchi or rales in the chest.  He is not in any respiratory distress.  He is not utilizing any rib muscles or accessory muscles to breathe.    Assessment:       1. COPD with acute bronchitis        Plan:     1.  Asked him to continue taking the Mucinex 1200 mg and we'll put him on doxycycline 100 mg twice a day for 10 days.  Recheck with us in 7-10 days if he is still bringing up discolored sputum or having any other new symptoms.

## 2017-11-02 ENCOUNTER — OFFICE VISIT (OUTPATIENT)
Dept: INTERNAL MEDICINE | Facility: CLINIC | Age: 62
End: 2017-11-02
Payer: COMMERCIAL

## 2017-11-02 ENCOUNTER — HOSPITAL ENCOUNTER (OUTPATIENT)
Dept: RADIOLOGY | Facility: HOSPITAL | Age: 62
Discharge: HOME | End: 2017-11-02
Attending: INTERNAL MEDICINE
Payer: COMMERCIAL

## 2017-11-02 VITALS
TEMPERATURE: 98 F | BODY MASS INDEX: 34.08 KG/M2 | HEIGHT: 68 IN | HEART RATE: 92 BPM | DIASTOLIC BLOOD PRESSURE: 74 MMHG | SYSTOLIC BLOOD PRESSURE: 110 MMHG | WEIGHT: 224.88 LBS | OXYGEN SATURATION: 95 %

## 2017-11-02 DIAGNOSIS — R91.1 PULMONARY NODULE: ICD-10-CM

## 2017-11-02 DIAGNOSIS — L72.3 SEBACEOUS CYST: ICD-10-CM

## 2017-11-02 DIAGNOSIS — I15.2 HYPERTENSION ASSOCIATED WITH DIABETES: ICD-10-CM

## 2017-11-02 DIAGNOSIS — E11.59 HYPERTENSION ASSOCIATED WITH DIABETES: ICD-10-CM

## 2017-11-02 DIAGNOSIS — R06.09 DOE (DYSPNEA ON EXERTION): ICD-10-CM

## 2017-11-02 DIAGNOSIS — E87.1 HYPONATREMIA: Primary | ICD-10-CM

## 2017-11-02 DIAGNOSIS — E87.1 HYPONATREMIA: ICD-10-CM

## 2017-11-02 DIAGNOSIS — J44.9 CHRONIC OBSTRUCTIVE PULMONARY DISEASE, UNSPECIFIED COPD TYPE: ICD-10-CM

## 2017-11-02 DIAGNOSIS — R05.9 COUGH: ICD-10-CM

## 2017-11-02 DIAGNOSIS — I26.99 OTHER PULMONARY EMBOLISM WITHOUT ACUTE COR PULMONALE, UNSPECIFIED CHRONICITY: ICD-10-CM

## 2017-11-02 PROCEDURE — 99999 PR PBB SHADOW E&M-EST. PATIENT-LVL V: CPT | Mod: PBBFAC,,, | Performed by: INTERNAL MEDICINE

## 2017-11-02 PROCEDURE — 71275 CT ANGIOGRAPHY CHEST: CPT | Mod: TC,PO

## 2017-11-02 PROCEDURE — 99215 OFFICE O/P EST HI 40 MIN: CPT | Mod: S$GLB,,, | Performed by: INTERNAL MEDICINE

## 2017-11-02 PROCEDURE — 71275 CT ANGIOGRAPHY CHEST: CPT | Mod: 26,,, | Performed by: RADIOLOGY

## 2017-11-02 PROCEDURE — 25500020 PHARM REV CODE 255: Mod: PO | Performed by: INTERNAL MEDICINE

## 2017-11-02 RX ORDER — GABAPENTIN 300 MG/1
300 CAPSULE ORAL 2 TIMES DAILY
COMMUNITY
Start: 2017-10-19 | End: 2018-02-06

## 2017-11-02 RX ORDER — CELECOXIB 200 MG/1
CAPSULE ORAL
COMMUNITY
Start: 2017-10-19 | End: 2017-11-02

## 2017-11-02 RX ORDER — TORSEMIDE 20 MG/1
1 TABLET ORAL EVERY OTHER DAY
Refills: 0 | COMMUNITY
Start: 2017-10-26 | End: 2018-04-13

## 2017-11-02 RX ORDER — HYDROCODONE BITARTRATE AND ACETAMINOPHEN 5; 325 MG/1; MG/1
1 TABLET ORAL
COMMUNITY
Start: 2017-08-25 | End: 2017-11-10 | Stop reason: DRUGHIGH

## 2017-11-02 RX ADMIN — IOHEXOL 100 ML: 350 INJECTION, SOLUTION INTRAVENOUS at 01:11

## 2017-11-02 NOTE — PROGRESS NOTES
Subjective:      Patient ID: Stefan Forbes Jr. is a 62 y.o. male.    Chief Complaint: Hospital Follow Up (BRG)    61 yo with Patient Active Problem List:     Controlled type 2 diabetes mellitus without complication, without long-term current use of insulin     Hypertension associated with diabetes     Hyperlipidemia associated with type 2 diabetes mellitus     COPD (chronic obstructive pulmonary disease)     CAD (coronary artery disease)     Vitamin D deficiency     Osteoarthritis of knee     Pulmonary nodule     Nevus of choroid of right eye    Here today for hosp d/c appt for hyponatremia.  Patient saw nephrology as inpatient with concern for SIADH possibly paraneoplastic.  Patient has history of lung nodules that have been unchanged on repeat CT scanning.  He does have a history of smoking.  Patient returned home after left knee replacement on October 17, 2017.  He was noted to have some shortness of breath and coughing which prompted him to present to the emergency room at the [facility] on 10/21.  Upon presentation the patient was found to be hyponatremic with a sodium of 118.  His chest x-ray was without any new findings.  His hemoglobin was 8.5.  His BNP was 21.  His urinalysis was unremarkable. tsh 1.66. Urine k 32. Cr 0.8. Am cortisol 21. Ultrasound venous for DVT was negative.  Urine sodium was 41, urine osmolality was 229.  Serum osmolality was 251.  During hospitalization lisinopril and Celebrex discontinued.  He was given salt and fluid restriction.  Upon discharge his sodium had risen to 124.  He was also discharged with a prescription for Demadex.  Patient reports that he continued with coughing sometimes productive along with some occasional wheezing and dyspnea.  He was seen in urgent care on October 27 and diagnosed with bronchitis.  He was prescribed doxycycline.  He feels that his cough is improving but he continues with some wheezing and dyspnea on exertion.  Today his  dyspnea on exertion occurs after approximately 20 yards.  Hospital discharge records were reviewed with the patient.  BMP collected by home health on 1027 revealed sodium of 127, potassium of 5.2, chloride 89, CO2 of 28, calcium 9.3, creatinine 0.87, BUN 20, GFR 90.  Patient reports compliance with his medications and discharge instructions.  He currently has follow-up with renal scheduled for November 10.  Patient also complain of a knot to his right chest present for over one year.  He feels that it is mildly enlarged and has some mild increased tenderness over the last 2 weeks.  No discharge.  No trauma.      Past Surgical History:   Procedure Laterality Date    ANGIOPLASTY      TOTAL KNEE ARTHROPLASTY       Social History     Social History    Marital status:      Spouse name: N/A    Number of children: N/A    Years of education: N/A     Occupational History    Not on file.     Social History Main Topics    Smoking status: Former Smoker     Types: Cigars     Quit date: 12/30/2015    Smokeless tobacco: Never Used    Alcohol use No    Drug use: No    Sexual activity: Not on file     Other Topics Concern    Not on file     Social History Narrative    No narrative on file     family history includes Cancer in his father; Diabetes in his mother.    Review of Systems   Constitutional: Negative for chills and fever.   HENT: Positive for congestion and postnasal drip. Negative for ear pain, rhinorrhea and sore throat.    Eyes: Negative for visual disturbance.   Respiratory: Positive for cough, shortness of breath and wheezing.    Cardiovascular: Negative for chest pain, palpitations and leg swelling.   Gastrointestinal: Negative for abdominal pain and blood in stool.   Genitourinary: Negative for dysuria and hematuria.   Musculoskeletal: Positive for arthralgias.   Skin: Negative for rash.   Neurological: Negative for seizures, syncope, weakness and headaches.   Psychiatric/Behavioral: Negative for  "agitation and confusion.       Objective:   /74 (BP Location: Right arm, Patient Position: Sitting)   Pulse 92   Temp 97.5 °F (36.4 °C) (Oral)   Ht 5' 8" (1.727 m)   Wt 102 kg (224 lb 13.9 oz)   SpO2 95%   BMI 34.19 kg/m²     Physical Exam   Constitutional: He is oriented to person, place, and time. He appears well-developed and well-nourished. No distress.   HENT:   Head: Normocephalic and atraumatic.   Mouth/Throat: Oropharynx is clear and moist.   Eyes: EOM are normal. Pupils are equal, round, and reactive to light.   Neck: Neck supple. No thyromegaly present.   Cardiovascular: Normal rate and regular rhythm.    Pulmonary/Chest: He has wheezes (right lower zone). He has no rales.   Abdominal: Soft. Bowel sounds are normal. There is no tenderness.   Musculoskeletal: He exhibits no edema.   Lymphadenopathy:     He has no cervical adenopathy.   Neurological: He is alert and oriented to person, place, and time.   Skin: Skin is warm and dry.   Sebaceous cyst to right lower chest.  Mild opaque discharge expressed.  No blood.  Very mild tenderness.  No redness or surrounding erythema or warmth.   Psychiatric: He has a normal mood and affect. His behavior is normal.     Stat labs:    Lab Visit on 11/02/2017   Component Date Value Ref Range Status    Specimen UA 11/02/2017 Urine, Clean Catch   Final    Color, UA 11/02/2017 Yellow  Yellow, Straw, Marguerite Final    Appearance, UA 11/02/2017 Clear  Clear Final    pH, UA 11/02/2017 5.0  5.0 - 8.0 Final    Specific Gravity, UA 11/02/2017 <=1.005* 1.005 - 1.030 Final    Protein, UA 11/02/2017 Negative  Negative Final    Comment: Recommend a 24 hour urine protein or a urine   protein/creatinine ratio if globulin induced proteinuria is  clinically suspected.      Glucose, UA 11/02/2017 Negative  Negative Final    Ketones, UA 11/02/2017 Negative  Negative Final    Bilirubin (UA) 11/02/2017 Negative  Negative Final    Occult Blood UA 11/02/2017 Negative  Negative " Final    Nitrite, UA 11/02/2017 Negative  Negative Final    Leukocytes, UA 11/02/2017 Negative  Negative Final   Lab Visit on 11/02/2017   Component Date Value Ref Range Status    WBC 11/02/2017 8.98  3.90 - 12.70 K/uL Final    RBC 11/02/2017 3.38* 4.60 - 6.20 M/uL Final    Hemoglobin 11/02/2017 10.2* 14.0 - 18.0 g/dL Final    Hematocrit 11/02/2017 31.5* 40.0 - 54.0 % Final    MCV 11/02/2017 93  82 - 98 fL Final    MCH 11/02/2017 30.2  27.0 - 31.0 pg Final    MCHC 11/02/2017 32.4  32.0 - 36.0 g/dL Final    RDW 11/02/2017 13.5  11.5 - 14.5 % Final    Platelets 11/02/2017 367* 150 - 350 K/uL Final    MPV 11/02/2017 8.8* 9.2 - 12.9 fL Final    Gran # 11/02/2017 5.8  1.8 - 7.7 K/uL Final    Lymph # 11/02/2017 2.0  1.0 - 4.8 K/uL Final    Mono # 11/02/2017 0.6  0.3 - 1.0 K/uL Final    Eos # 11/02/2017 0.5  0.0 - 0.5 K/uL Final    Baso # 11/02/2017 0.05  0.00 - 0.20 K/uL Final    Gran% 11/02/2017 64.2  38.0 - 73.0 % Final    Lymph% 11/02/2017 22.2  18.0 - 48.0 % Final    Mono% 11/02/2017 7.0  4.0 - 15.0 % Final    Eosinophil% 11/02/2017 6.0  0.0 - 8.0 % Final    Basophil% 11/02/2017 0.6  0.0 - 1.9 % Final    Differential Method 11/02/2017 Automated   Final    Sodium 11/02/2017 129* 136 - 145 mmol/L Final    Potassium 11/02/2017 4.5  3.5 - 5.1 mmol/L Final    Chloride 11/02/2017 93* 95 - 110 mmol/L Final    CO2 11/02/2017 27  23 - 29 mmol/L Final    Glucose 11/02/2017 113* 70 - 110 mg/dL Final    BUN, Bld 11/02/2017 26* 6 - 20 mg/dL Final    Creatinine 11/02/2017 1.0  0.5 - 1.4 mg/dL Final    Calcium 11/02/2017 9.6  8.7 - 10.5 mg/dL Final    Total Protein 11/02/2017 7.1  6.0 - 8.4 g/dL Final    Albumin 11/02/2017 3.5  3.5 - 5.2 g/dL Final    Total Bilirubin 11/02/2017 0.5  0.1 - 1.0 mg/dL Final    Comment: For infants and newborns, interpretation of results should be based  on gestational age, weight and in agreement with clinical  observations.  Premature Infant recommended reference  ranges:  Up to 24 hours.............<8.0 mg/dL  Up to 48 hours............<12.0 mg/dL  3-5 days..................<15.0 mg/dL  6-29 days.................<15.0 mg/dL      Alkaline Phosphatase 11/02/2017 77  55 - 135 U/L Final    AST 11/02/2017 14  10 - 40 U/L Final    ALT 11/02/2017 14  10 - 44 U/L Final    Anion Gap 11/02/2017 9  8 - 16 mmol/L Final    eGFR if African American 11/02/2017 >60  >60 mL/min/1.73 m^2 Final    eGFR if non African American 11/02/2017 >60  >60 mL/min/1.73 m^2 Final    Comment: Calculation used to obtain the estimated glomerular filtration  rate (eGFR) is the CKD-EPI equation.        CT of chest revealed stable pulmonary nodules with multiple areas of pulmonary embolism.    Assessment:     1. Hyponatremia    2. Chronic obstructive pulmonary disease, unspecified COPD type    3. Hypertension associated with diabetes    4. INGRAM (dyspnea on exertion)    5. Sebaceous cyst    6. Cough    7. Pulmonary nodule    8. Other pulmonary embolism without acute cor pulmonale, unspecified chronicity      Plan:   Hyponatremia  Comments:  recent hospital w/u concerning for for SIADH.  Orders:  -     Comprehensive metabolic panel; Future; Expected date: 11/02/2017  -     Urinalysis; Future; Expected date: 11/02/2017  -     Cancel: Ambulatory referral to Nephrology  -     CTA Chest Non Coronary; Future; Expected date: 11/02/2017    Chronic obstructive pulmonary disease, unspecified COPD type  Complete Doxy and use nebulizer 4 times a day scheduled and every 4 hours when necessary shortness of breath or wheezing    Hypertension associated with diabetes  Stable    INGRAM (dyspnea on exertion)  Comments:  O2 sat 95% on room air.  Patient typically 97 - 98%  Orders:  -     CBC auto differential; Future; Expected date: 11/02/2017  -     CTA Chest Non Coronary; Future; Expected date: 11/02/2017    Sebaceous cyst  -     Ambulatory referral to General Surgery    Cough  Comments:  try your nebulizer four times daily.   Completed Doxy twice a day  holding steroids due to recent sx.    Pulmonary nodule  -     CTA Chest Non Coronary; Future; Expected date: 11/02/2017    Other pulmonary embolism without acute cor pulmonale, unspecified chronicity  Patient advised to go immediately to emergency room.  Patient and wife report understanding.  They report will see [facility] emergency room now    Lab Frequency Next Occurrence   CT Chest Without Contrast Once 02/27/2017   Lipid panel Once 12/11/2017   ALT (SGPT) Once 12/11/2017   Hemoglobin A1c Once 03/11/2018       Problem List Items Addressed This Visit        Pulmonary    COPD (chronic obstructive pulmonary disease)    Pulmonary nodule    Overview     stable           Relevant Orders    CTA Chest Non Coronary (Completed)       Cardiac/Vascular    Hypertension associated with diabetes      Other Visit Diagnoses     Hyponatremia    -  Primary    recent hospital w/u concerning for for SIADH.    Relevant Orders    Comprehensive metabolic panel (Completed)    Urinalysis (Completed)    CTA Chest Non Coronary (Completed)    INGRAM (dyspnea on exertion)        O2 sat 95% on room air.  Patient typically 97 - 98%    Relevant Orders    CBC auto differential (Completed)    CTA Chest Non Coronary (Completed)    Sebaceous cyst        Relevant Orders    Ambulatory referral to General Surgery    Cough        try your nebulizer four times daily.  Completed Doxy twice a day  holding steroids due to recent sx.    Other pulmonary embolism without acute cor pulmonale, unspecified chronicity              No Follow-up on file.

## 2017-11-07 RX ORDER — DULAGLUTIDE 0.75 MG/.5ML
INJECTION, SOLUTION SUBCUTANEOUS
Qty: 4 SYRINGE | Refills: 3 | Status: SHIPPED | OUTPATIENT
Start: 2017-11-07 | End: 2018-03-01 | Stop reason: SDUPTHER

## 2017-11-10 ENCOUNTER — OFFICE VISIT (OUTPATIENT)
Dept: INTERNAL MEDICINE | Facility: CLINIC | Age: 62
End: 2017-11-10
Payer: COMMERCIAL

## 2017-11-10 VITALS
OXYGEN SATURATION: 95 % | BODY MASS INDEX: 33.71 KG/M2 | HEIGHT: 68 IN | TEMPERATURE: 97 F | HEART RATE: 82 BPM | SYSTOLIC BLOOD PRESSURE: 102 MMHG | WEIGHT: 222.44 LBS | DIASTOLIC BLOOD PRESSURE: 68 MMHG

## 2017-11-10 DIAGNOSIS — R91.8 LUNG NODULES: ICD-10-CM

## 2017-11-10 DIAGNOSIS — E22.2 SIADH (SYNDROME OF INAPPROPRIATE ADH PRODUCTION): ICD-10-CM

## 2017-11-10 DIAGNOSIS — I26.99 OTHER PULMONARY EMBOLISM WITHOUT ACUTE COR PULMONALE, UNSPECIFIED CHRONICITY: Primary | ICD-10-CM

## 2017-11-10 PROCEDURE — 99999 PR PBB SHADOW E&M-EST. PATIENT-LVL III: CPT | Mod: PBBFAC,,, | Performed by: INTERNAL MEDICINE

## 2017-11-10 PROCEDURE — 99213 OFFICE O/P EST LOW 20 MIN: CPT | Mod: S$GLB,,, | Performed by: INTERNAL MEDICINE

## 2017-11-10 RX ORDER — HYDROCODONE BITARTRATE AND ACETAMINOPHEN 10; 325 MG/1; MG/1
1 TABLET ORAL
COMMUNITY
Start: 2017-11-03 | End: 2017-11-20

## 2017-11-10 RX ORDER — APIXABAN 5 MG/1
1 TABLET, FILM COATED ORAL 2 TIMES DAILY
COMMUNITY
Start: 2017-11-03 | End: 2017-11-10 | Stop reason: SDUPTHER

## 2017-11-10 NOTE — PROGRESS NOTES
"Subjective:      Patient ID: Stefan Forbes Jr. is a 62 y.o. male.    Chief Complaint: Hospital Follow Up    63 yo with Patient Active Problem List:     Controlled type 2 diabetes mellitus without complication, without long-term current use of insulin     Hypertension associated with diabetes     Hyperlipidemia associated with type 2 diabetes mellitus     COPD (chronic obstructive pulmonary disease)     CAD (coronary artery disease)     Vitamin D deficiency     Osteoarthritis of knee     Pulmonary nodule     Nevus of choroid of right eye    Here today for f/u after recent hospitalization for pulmonary embolism.  He was discharged after one night of hospitalization.  He was discharged on Eliquis.  He has been compliant with his medication since discharge.  He has been on Eliquis just over one week.  He has now decreased his dose to 5 mg twice daily.  He also had a recent hospitalization shortly after his knee surgery secondary to SIADH.  He has followed up with his nephrologist today who has decreased his salt tablet and decreased his torsemide with plans to recheck chemistries in 3 weeks.  Patient reports that his nephrologist is concerned that this is not a "water" problem.  His nephrologist advised him to follow the pulmonary nodules closely.  Sodium level was 133 on November 3 during his last hospitalization.  He feels that his dyspnea on exertion has improved but is not back to baseline.      Review of Systems   Constitutional: Negative for chills and fever.   HENT: Negative for ear pain and sore throat.    Respiratory: Positive for shortness of breath (mild improving). Negative for cough and wheezing.    Cardiovascular: Negative for chest pain.   Gastrointestinal: Negative for abdominal pain and blood in stool.   Genitourinary: Negative for dysuria and hematuria.   Neurological: Negative for seizures and syncope.     Objective:   /68 (BP Location: Right arm, Patient Position: Sitting)   Pulse 82   " "Temp 97.4 °F (36.3 °C) (Oral)   Ht 5' 8" (1.727 m)   Wt 100.9 kg (222 lb 7.1 oz)   SpO2 95%   BMI 33.82 kg/m²     Physical Exam   Constitutional: He is oriented to person, place, and time. He appears well-developed and well-nourished. No distress.   HENT:   Head: Normocephalic and atraumatic.   Mouth/Throat: Oropharynx is clear and moist.   Eyes: EOM are normal. Pupils are equal, round, and reactive to light.   Neck: Neck supple. No thyromegaly present.   Cardiovascular: Normal rate and regular rhythm.    Pulmonary/Chest: Breath sounds normal. He has no wheezes. He has no rales.   Abdominal: Soft. Bowel sounds are normal. There is no tenderness.   Musculoskeletal: He exhibits no edema.   Lymphadenopathy:     He has no cervical adenopathy.   Neurological: He is alert and oriented to person, place, and time.   Skin: Skin is warm and dry.   Psychiatric: He has a normal mood and affect. His behavior is normal.       Assessment:     1. Other pulmonary embolism without acute cor pulmonale, unspecified chronicity    2. SIADH (syndrome of inappropriate ADH production)      Plan:   Other pulmonary embolism without acute cor pulmonale, unspecified chronicity  -     Ambulatory referral to Hematology / Oncology  -     apixaban (ELIQUIS) 5 mg Tab; Take 1 tablet (5 mg total) by mouth 2 (two) times daily.  Dispense: 180 tablet; Refill: 0    SIADH (syndrome of inappropriate ADH production)  -     Ambulatory referral to Hematology / Oncology        Lab Frequency Next Occurrence   CT Chest Without Contrast Once 02/27/2017   Lipid panel Once 12/11/2017   ALT (SGPT) Once 12/11/2017   Hemoglobin A1c Once 03/11/2018       Problem List Items Addressed This Visit     None      Visit Diagnoses     Other pulmonary embolism without acute cor pulmonale, unspecified chronicity    -  Primary    Relevant Medications    apixaban (ELIQUIS) 5 mg Tab    Other Relevant Orders    Ambulatory referral to Hematology / Oncology    SIADH (syndrome of " inappropriate ADH production)        Relevant Orders    Ambulatory referral to Hematology / Oncology          Return if symptoms worsen or fail to improve.

## 2017-11-20 ENCOUNTER — OFFICE VISIT (OUTPATIENT)
Dept: ALLERGY | Facility: CLINIC | Age: 62
End: 2017-11-20
Payer: COMMERCIAL

## 2017-11-20 ENCOUNTER — TELEPHONE (OUTPATIENT)
Dept: INTERNAL MEDICINE | Facility: CLINIC | Age: 62
End: 2017-11-20

## 2017-11-20 ENCOUNTER — HOSPITAL ENCOUNTER (OUTPATIENT)
Dept: RADIOLOGY | Facility: HOSPITAL | Age: 62
Discharge: HOME | End: 2017-11-20
Attending: ALLERGY & IMMUNOLOGY
Payer: COMMERCIAL

## 2017-11-20 VITALS
SYSTOLIC BLOOD PRESSURE: 120 MMHG | DIASTOLIC BLOOD PRESSURE: 70 MMHG | WEIGHT: 227.06 LBS | RESPIRATION RATE: 15 BRPM | HEART RATE: 74 BPM | TEMPERATURE: 97 F | BODY MASS INDEX: 34.53 KG/M2

## 2017-11-20 DIAGNOSIS — J31.0 OTHER CHRONIC RHINITIS: ICD-10-CM

## 2017-11-20 DIAGNOSIS — J31.0 OTHER CHRONIC RHINITIS: Primary | ICD-10-CM

## 2017-11-20 DIAGNOSIS — R05.9 COUGH: ICD-10-CM

## 2017-11-20 PROCEDURE — 70220 X-RAY EXAM OF SINUSES: CPT | Mod: TC,PO

## 2017-11-20 PROCEDURE — 70220 X-RAY EXAM OF SINUSES: CPT | Mod: 26,,, | Performed by: RADIOLOGY

## 2017-11-20 PROCEDURE — 99999 PR PBB SHADOW E&M-EST. PATIENT-LVL III: CPT | Mod: PBBFAC,,, | Performed by: ALLERGY & IMMUNOLOGY

## 2017-11-20 PROCEDURE — 99214 OFFICE O/P EST MOD 30 MIN: CPT | Mod: S$GLB,,, | Performed by: ALLERGY & IMMUNOLOGY

## 2017-11-20 RX ORDER — AMLODIPINE BESYLATE 5 MG/1
5 TABLET ORAL DAILY
Qty: 30 TABLET | Refills: 1 | Status: SHIPPED | OUTPATIENT
Start: 2017-11-20 | End: 2017-12-19 | Stop reason: SDUPTHER

## 2017-11-20 NOTE — TELEPHONE ENCOUNTER
Pt's wife stated pt's BP for the past week has been ranging between 155/70 to 188/90.  No complaint of headache or dizziness but stated pt has had two episodes of an overwhelming sensation of nausea.  Stated pt's lisinopril 5 mg qd was discontinued by Dr. Chapman, nephrologist at [facility].  Please advise.

## 2017-11-20 NOTE — TELEPHONE ENCOUNTER
----- Message from Yamile Winchester sent at 11/20/2017  8:23 AM CST -----  Contact: Pt wife/ Lisa   Caller states pt's blood pressure has been high for about a week. Caller would like a portal oxygen tank. ..[phone] (home)

## 2017-11-20 NOTE — TELEPHONE ENCOUNTER
Notified pt's wife that amlodipine 5 mg has been sent to pharmacy with recommendation to f/u with Dr. Fisher in one month.  Pt's wife verbalized understanding.

## 2017-11-21 ENCOUNTER — TELEPHONE (OUTPATIENT)
Dept: ALLERGY | Facility: CLINIC | Age: 62
End: 2017-11-21

## 2017-11-21 DIAGNOSIS — J31.0 OTHER CHRONIC RHINITIS: Primary | ICD-10-CM

## 2017-11-21 RX ORDER — METHSCOPOLAMINE BROMIDE 2.5 MG/1
TABLET ORAL
Qty: 10 TABLET | Refills: 3 | Status: SHIPPED | OUTPATIENT
Start: 2017-11-21 | End: 2018-06-28

## 2017-11-21 NOTE — TELEPHONE ENCOUNTER
This note was dictated using voice recognition software and may contain errors.    I called him twice on the morning of November 21.  The first time I did not reach him.  I left a message for him.  The second time I called I was able to speak with him and his wife.    Sinus x-rays did not reveal findings suggestive of acute sinusitis.  I shared this information with him.    I have suggested he evaluate methscopolamine 2.5 mg.  Potential side effects were reviewed.  Initially I suggested that he take one half pill.  If tolerated and not be helpful he may take 1 pill.  He may take one half pill or pill every 12 hours if needed.  At his request a prescription was issued and was transmitted electronically to his pharmacy.  I requested that he informed me of his assessment of the medicine.

## 2017-11-21 NOTE — PROGRESS NOTES
Chief complaint: troublesome drainage, nasal symptoms    This note was dictated using voice recognition software and may contain errors.    History:    He had a 4 PM appointment on Monday, November 20, 2017.  He was accompanied by his wife.  She was present throughout the entire office visit.    He had an appointment with me once in the past, May 23, 2017.  Information in that note was read reviewed and updated today.  Significant additions if any are as noted below.    Information in his medical record regarding his past medical history family history and social history was reviewed and updated today.  Significant additions if any are as noted below.    He stated that the use of Atrovent nasal spray 0.03% was helpful until the past month.    He had surgery on his left knee on October 17, 2017.  An artificial joint was inserted.  Postoperatively he experienced pulmonary emboli.  He is receiving treatment for this concern.    He is experiencing posterior rhinorrhea.  He believes that this is been provoking a cough.  He has no history of glaucoma.  He does have GE reflux and is receiving treatment with Prilosec.    Review of systems: See above.    Exam:    In general he is in no distress.  He is alert oriented well-developed in good mood attentive.  Gait steady he is walking with a cane  Head no swelling noted  Eyes sclera white conjunctiva pink  Nose patent no polyps seen  Ears not inflamed  Mouth no inflammation or swelling of the lips tongue or in the throat noted  Neck no thyromegaly or masses noted  Lymph nodes no significant cervical or epitrochlear lymphadenopathy noted  Lungs good air exchange no wheezing heard.  Upon exhalation an occasional rhonchus is heard  Heart no murmurs heard regular rhythm  Extremities no swelling or inflammation of the hands or legs noted    Impression:    #1 chronic rhinitis  #2 cough, perhaps multifactorial in origin  #3 GE reflux  #4 multiple other health concerns as noted in his  medical record    Assessment and plan:    I have recommended that sinus x-rays be performed.  Earlier this year a CT scan of the paranasal sinuses was performed on April 26, 2017.  I reviewed the report with him.    He may continue to use Atrovent nasal spray 0.03% as directed additional recommendations regarding treatment may be offered once results of the sinus x-rays have been reviewed.  Once the results are available to review with him I will contact him and do so.    His appointment was 30 minutes in duration spent entirely in face-to-face contact.  More than 50% of the visit was spent in counseling and coordination of care.

## 2017-11-21 NOTE — TELEPHONE ENCOUNTER
----- Message from Kay Ramirez sent at 11/21/2017  8:14 AM CST -----  Contact: pt wife  Calling about a missed call. Please advise [phone] (home)

## 2017-11-27 ENCOUNTER — TELEPHONE (OUTPATIENT)
Dept: ALLERGY | Facility: CLINIC | Age: 62
End: 2017-11-27

## 2017-11-27 ENCOUNTER — TELEPHONE (OUTPATIENT)
Dept: PULMONOLOGY | Facility: CLINIC | Age: 62
End: 2017-11-27

## 2017-11-27 NOTE — TELEPHONE ENCOUNTER
----- Message from Rufina Vasquez sent at 11/27/2017  9:20 AM CST -----  Contact: Lisa/wife  Patient request a call back at [phone], Regards to patient medication.    Thanks  td

## 2017-11-27 NOTE — TELEPHONE ENCOUNTER
----- Message from Mana Gerber sent at 11/27/2017  9:58 AM CST -----  Contact: wife  Calling for an appt before the end of the year for pt with COPD.

## 2017-11-30 ENCOUNTER — PATIENT OUTREACH (OUTPATIENT)
Dept: ADMINISTRATIVE | Facility: HOSPITAL | Age: 62
End: 2017-11-30

## 2017-12-01 ENCOUNTER — LAB VISIT (OUTPATIENT)
Dept: LAB | Facility: HOSPITAL | Age: 62
End: 2017-12-01
Attending: INTERNAL MEDICINE
Payer: COMMERCIAL

## 2017-12-01 ENCOUNTER — INITIAL CONSULT (OUTPATIENT)
Dept: HEMATOLOGY/ONCOLOGY | Facility: CLINIC | Age: 62
End: 2017-12-01
Payer: COMMERCIAL

## 2017-12-01 VITALS
DIASTOLIC BLOOD PRESSURE: 74 MMHG | HEART RATE: 75 BPM | RESPIRATION RATE: 18 BRPM | TEMPERATURE: 98 F | OXYGEN SATURATION: 96 % | WEIGHT: 225.06 LBS | BODY MASS INDEX: 34.11 KG/M2 | SYSTOLIC BLOOD PRESSURE: 122 MMHG | HEIGHT: 68 IN

## 2017-12-01 DIAGNOSIS — I26.99 OTHER ACUTE PULMONARY EMBOLISM WITHOUT ACUTE COR PULMONALE: ICD-10-CM

## 2017-12-01 DIAGNOSIS — G44.89 OTHER HEADACHE SYNDROME: Primary | ICD-10-CM

## 2017-12-01 DIAGNOSIS — G44.89 OTHER HEADACHE SYNDROME: ICD-10-CM

## 2017-12-01 LAB
BASOPHILS # BLD AUTO: 0.04 K/UL
BASOPHILS NFR BLD: 0.5 %
D DIMER PPP IA.FEU-MCNC: 2.34 MG/L FEU
DIFFERENTIAL METHOD: ABNORMAL
EOSINOPHIL # BLD AUTO: 0.5 K/UL
EOSINOPHIL NFR BLD: 7.2 %
ERYTHROCYTE [DISTWIDTH] IN BLOOD BY AUTOMATED COUNT: 12.8 %
HCT VFR BLD AUTO: 36.6 %
HGB BLD-MCNC: 11.9 G/DL
LYMPHOCYTES # BLD AUTO: 2.2 K/UL
LYMPHOCYTES NFR BLD: 29 %
MCH RBC QN AUTO: 30.1 PG
MCHC RBC AUTO-ENTMCNC: 32.5 G/DL
MCV RBC AUTO: 93 FL
MONOCYTES # BLD AUTO: 0.6 K/UL
MONOCYTES NFR BLD: 8.2 %
NEUTROPHILS # BLD AUTO: 4.2 K/UL
NEUTROPHILS NFR BLD: 55.1 %
PLATELET # BLD AUTO: 218 K/UL
PMV BLD AUTO: 9.8 FL
RBC # BLD AUTO: 3.95 M/UL
WBC # BLD AUTO: 7.55 K/UL

## 2017-12-01 PROCEDURE — 99205 OFFICE O/P NEW HI 60 MIN: CPT | Mod: S$GLB,,, | Performed by: INTERNAL MEDICINE

## 2017-12-01 PROCEDURE — 36415 COLL VENOUS BLD VENIPUNCTURE: CPT | Mod: PO

## 2017-12-01 PROCEDURE — 85379 FIBRIN DEGRADATION QUANT: CPT

## 2017-12-01 PROCEDURE — 84153 ASSAY OF PSA TOTAL: CPT

## 2017-12-01 PROCEDURE — 99999 PR PBB SHADOW E&M-EST. PATIENT-LVL V: CPT | Mod: PBBFAC,,, | Performed by: INTERNAL MEDICINE

## 2017-12-01 PROCEDURE — 85025 COMPLETE CBC W/AUTO DIFF WBC: CPT | Mod: PO

## 2017-12-01 NOTE — PROGRESS NOTES
Hematology/Oncology Office Note    Reason for referral:  PE      CC:  Bilateral pulmonary emboli    Referred by:  Wallace Fisher MD    Diagnosis:  Bilateral pulmonary emboli  Stable pulmonary nodules  SIADH  Chronic/worsening headaches    History of present illness:  62-year-old gentleman with a history of COPD, coronary artery disease, diabetes mellitus type 2, dyslipidemia, hypertension, and pulmonary nodules which is been stable on serial CT scans.  He underwent left knee replacement on 10/17/2017 and developed shortness of breath approximately 4-5 days after the surgery.  He was evaluated with a CTA on 11/2/17 and noted to have bilateral pulmonary emboli.  He was placed on Eliquis and is currently taking Eliquis 5 mg by mouth twice a day.      Past Medical History:   Diagnosis Date    COPD (chronic obstructive pulmonary disease)     Diabetes mellitus, type 2     Hyperlipidemia     Hypertension          Social History:  Former smoker quit 12/2015  No alcohol or illicit drugs      Family History: family history includes Cancer in his father; Diabetes in his mother.    HPI  Review of Systems   Constitutional: Positive for activity change. Negative for appetite change, fatigue, fever and unexpected weight change.   HENT: Negative for congestion, facial swelling, nosebleeds, rhinorrhea, sinus pressure, sneezing, sore throat, trouble swallowing and voice change.    Eyes: Negative for photophobia and visual disturbance.   Respiratory: Positive for shortness of breath (mproving). Negative for apnea, cough, choking and chest tightness.    Cardiovascular: Negative for chest pain, palpitations and leg swelling.   Gastrointestinal: Negative for abdominal distention, abdominal pain, anal bleeding, blood in stool, constipation, diarrhea, nausea and vomiting.   Endocrine: Negative for cold intolerance, heat intolerance, polydipsia, polyphagia and polyuria.   Genitourinary: Negative for decreased urine volume, difficulty  urinating, frequency, genital sores, hematuria, testicular pain and urgency.   Musculoskeletal: Negative for arthralgias, back pain, gait problem, joint swelling, myalgias, neck pain and neck stiffness.   Skin: Negative for color change, pallor, rash and wound.   Allergic/Immunologic: Negative for environmental allergies, food allergies and immunocompromised state.   Neurological: Positive for headaches. Negative for dizziness, tremors, syncope, speech difficulty, weakness, light-headedness and numbness.   Hematological: Negative for adenopathy. Does not bruise/bleed easily.   Psychiatric/Behavioral: Negative for agitation, confusion and hallucinations. The patient is not nervous/anxious and is not hyperactive.        Objective:       Vitals:    12/01/17 1513   BP: 122/74   Pulse: 75   Resp: 18   Temp: 98 °F (36.7 °C)       Physical Exam   Constitutional: He is oriented to person, place, and time. He appears well-developed and well-nourished.  Non-toxic appearance. He does not appear ill. No distress.   HENT:   Head: Normocephalic and atraumatic.   Right Ear: Tympanic membrane and ear canal normal.   Left Ear: Tympanic membrane and ear canal normal.   Nose: Nose normal. Right sinus exhibits no maxillary sinus tenderness and no frontal sinus tenderness. Left sinus exhibits no maxillary sinus tenderness and no frontal sinus tenderness.   Mouth/Throat: Oropharynx is clear and moist and mucous membranes are normal. No oral lesions. Normal dentition. No oropharyngeal exudate, posterior oropharyngeal edema or posterior oropharyngeal erythema.   Eyes: Conjunctivae, EOM and lids are normal. Pupils are equal, round, and reactive to light. Right conjunctiva is not injected. Right conjunctiva has no hemorrhage. Left conjunctiva is not injected. Left conjunctiva has no hemorrhage. No scleral icterus.   Neck: Normal range of motion. Neck supple. No JVD present. No spinous process tenderness and no muscular tenderness present. No  tracheal deviation present. No thyromegaly present.   Cardiovascular: Normal rate, regular rhythm, normal heart sounds and intact distal pulses.    No murmur heard.  Pulmonary/Chest: Effort normal and breath sounds normal. No respiratory distress. He has no decreased breath sounds. He has no wheezes. He has no rhonchi. He has no rales. He exhibits no tenderness.   Abdominal: Soft. Bowel sounds are normal. He exhibits no distension and no mass. There is no hepatosplenomegaly. There is no tenderness. There is no rebound and no guarding.   Musculoskeletal: Normal range of motion. He exhibits no edema or tenderness.   Lymphadenopathy:        Head (right side): No submental and no submandibular adenopathy present.        Head (left side): No submental and no submandibular adenopathy present.     He has no cervical adenopathy.     He has no axillary adenopathy.   Neurological: He is alert and oriented to person, place, and time. No cranial nerve deficit. He exhibits normal muscle tone. Coordination normal.   Skin: Skin is warm and dry. No rash noted. He is not diaphoretic. No cyanosis or erythema. No pallor. Nails show no clubbing.   Psychiatric: He has a normal mood and affect. His speech is normal and behavior is normal. Judgment and thought content normal.       Lab Results   Component Value Date    WBC 8.98 11/02/2017    HGB 10.2 (L) 11/02/2017    HCT 31.5 (L) 11/02/2017    MCV 93 11/02/2017     (H) 11/02/2017     Lab Results   Component Value Date    CREATININE 1.0 11/02/2017    BUN 26 (H) 11/02/2017     (L) 11/02/2017    K 4.5 11/02/2017    CL 93 (L) 11/02/2017    CO2 27 11/02/2017     Lab Results   Component Value Date    ALT 14 11/02/2017    AST 14 11/02/2017    ALKPHOS 77 11/02/2017    BILITOT 0.5 11/02/2017         Assessment:           62-year-old gentleman with a history of pulmonary nodules, SIADH, and diabetes mellitus who was been referred for provoked bilateral pulmonary emboli.  He is  currently on Eliquis 5 mg twice a day and tolerating treatment well without bleeding complications.  However, the patient reports worsening headaches which have been present for the past 3-4 months.  We will proceed with CT scan of the brain and plan to continue anticoagulation for 3 months.  He will need age-appropriate cancer screening including colonoscopy delayed until anticoagulation is completed in 3 months.    He will follow-up after CT of the brain to discuss results.      Pulmonary emboli with worsening headaches:  --CT of the brain  --Continue Eliquis 5 mg by mouth twice a day  --Plan to continue anticoagulation for 3 months  --Proceed with colonoscopy after completing anticoagulation    Bilateral pulmonary nodules:  Pulmonary nodules are too small to be avid on PET scan  --continue serial CT scans    SIADH:  --Continue to monitor pulmonary nodules  --Needs colonoscopy after completing 3 months of anticoagulation  --CT of the brain to evaluate for  Masses/tumor/bleed

## 2017-12-01 NOTE — LETTER
December 1, 2017      Wallace Fisher MD  [address] [place] [address]           [facility]  [address]  [place] [address]  Phone: [phone]  Fax: [phone]          Patient: Stefan Forbes Jr.   MR Number: [id number]   YOB: 1955   Date of Visit: 12/1/2017       Dear Dr. Wallace Fisher:    Thank you for referring Stefan Forbes to me for evaluation. Attached you will find relevant portions of my assessment and plan of care.    If you have questions, please do not hesitate to call me. I look forward to following Stefan Forbes along with you.    Sincerely,    Gopal Power Jr., MD    Enclosure  CC:  No Recipients    If you would like to receive this communication electronically, please contact [email or web address] or [phone] to request more information on [facility] [employer] access.    For providers and/or their staff who would like to refer a patient to [facility], please contact us through our one-stop-shop provider referral line, [facility], at [phone].    If you feel you have received this communication in error or would no longer like to receive these types of communications, please e-mail [email or web address]

## 2017-12-02 LAB — COMPLEXED PSA SERPL-MCNC: 0.18 NG/ML

## 2017-12-06 ENCOUNTER — TELEPHONE (OUTPATIENT)
Dept: ALLERGY | Facility: CLINIC | Age: 62
End: 2017-12-06

## 2017-12-06 DIAGNOSIS — J31.0 OTHER CHRONIC RHINITIS: Primary | ICD-10-CM

## 2017-12-06 NOTE — TELEPHONE ENCOUNTER
This note was dictated using voice recognition software may contain errors.    I spoke with him on the telephone about 4:35 PM on December 6.  He had his wife call on November 27 when I was not in the office.  He stated he continues to experience troublesome excessive respiratory tract mucus.  He is concerned about being ALLERGIC.  He is scheduled to have blood drawn next week.  Arrangements will be made to perform assays for Ig E directed against significant airborne allergens.  When the results are available to review with him I will do so.    He has been taking methscopolamine 2.5 mg one half pill every 12 hours if needed.  He is taking this medication in conjunction with Zyrtec.  He stated this combination may be of some benefit.  Despite taking the medicines he continues to be significantly symptomatic.

## 2017-12-07 ENCOUNTER — HOSPITAL ENCOUNTER (OUTPATIENT)
Dept: RADIOLOGY | Facility: HOSPITAL | Age: 62
Discharge: HOME | End: 2017-12-07
Attending: INTERNAL MEDICINE
Payer: COMMERCIAL

## 2017-12-07 ENCOUNTER — OFFICE VISIT (OUTPATIENT)
Dept: HEMATOLOGY/ONCOLOGY | Facility: CLINIC | Age: 62
End: 2017-12-07
Payer: COMMERCIAL

## 2017-12-07 VITALS
SYSTOLIC BLOOD PRESSURE: 127 MMHG | BODY MASS INDEX: 34.38 KG/M2 | DIASTOLIC BLOOD PRESSURE: 81 MMHG | HEIGHT: 68 IN | TEMPERATURE: 97 F | RESPIRATION RATE: 18 BRPM | WEIGHT: 226.88 LBS | HEART RATE: 82 BPM | OXYGEN SATURATION: 98 %

## 2017-12-07 DIAGNOSIS — G44.89 OTHER HEADACHE SYNDROME: Primary | ICD-10-CM

## 2017-12-07 DIAGNOSIS — D53.9 NUTRITIONAL ANEMIA: ICD-10-CM

## 2017-12-07 DIAGNOSIS — G44.89 OTHER HEADACHE SYNDROME: ICD-10-CM

## 2017-12-07 DIAGNOSIS — I26.99 OTHER ACUTE PULMONARY EMBOLISM WITHOUT ACUTE COR PULMONALE: ICD-10-CM

## 2017-12-07 DIAGNOSIS — D64.9 NORMOCYTIC ANEMIA: ICD-10-CM

## 2017-12-07 DIAGNOSIS — R79.89 D-DIMER, ELEVATED: ICD-10-CM

## 2017-12-07 PROCEDURE — 70470 CT HEAD/BRAIN W/O & W/DYE: CPT | Mod: TC

## 2017-12-07 PROCEDURE — 99214 OFFICE O/P EST MOD 30 MIN: CPT | Mod: S$GLB,,, | Performed by: INTERNAL MEDICINE

## 2017-12-07 PROCEDURE — 25500020 PHARM REV CODE 255: Performed by: INTERNAL MEDICINE

## 2017-12-07 PROCEDURE — 99999 PR PBB SHADOW E&M-EST. PATIENT-LVL III: CPT | Mod: PBBFAC,,, | Performed by: INTERNAL MEDICINE

## 2017-12-07 RX ADMIN — IOHEXOL 75 ML: 350 INJECTION, SOLUTION INTRAVENOUS at 02:12

## 2017-12-07 NOTE — PROGRESS NOTES
Hematology/Oncology Office Note    Reason for referral:  PE      CC:  Bilateral pulmonary emboli    Referred by:  No ref. provider found    Diagnosis:  Provoked Bilateral pulmonary emboli (left knee replacement)  Stable pulmonary nodules  SIADH  Chronic/worsening headaches    History of present illness:  62-year-old gentleman with a history of COPD, coronary artery disease, diabetes mellitus type 2, dyslipidemia, hypertension, and pulmonary nodules which is been stable on serial CT scans.  He underwent left knee replacement on 10/17/2017 and developed shortness of breath approximately 4-5 days after the surgery.  He was evaluated with a CTA on 11/2/17 and noted to have bilateral pulmonary emboli.  He was placed on Eliquis and is currently taking Eliquis 5 mg by mouth twice a day.      I have reviewed and updated the HPI, ROS, PMHx, Social Hx, Family Hx and treatment history.    Today's visit:  Patient continues to tolerate anticoagulation well and denies any bleeding complications.    Past Medical History:   Diagnosis Date    COPD (chronic obstructive pulmonary disease)     Diabetes mellitus, type 2     Hyperlipidemia     Hypertension          Social History:  Former smoker quit 12/2015  No alcohol or illicit drugs      Family History: family history includes Cancer in his father; Diabetes in his mother.    HPI    Review of Systems   Constitutional: Positive for activity change. Negative for appetite change, fatigue, fever and unexpected weight change.   HENT: Negative for congestion, dental problem, drooling, facial swelling, nosebleeds, rhinorrhea, sore throat, trouble swallowing and voice change.    Respiratory: Positive for shortness of breath (mproving). Negative for apnea, cough, choking and chest tightness.    Cardiovascular: Negative for chest pain, palpitations and leg swelling.   Gastrointestinal: Negative for abdominal distention, abdominal pain, constipation, diarrhea, nausea and vomiting.    Endocrine: Negative for heat intolerance, polydipsia, polyphagia and polyuria.   Genitourinary: Negative for decreased urine volume, difficulty urinating, frequency, genital sores, hematuria, testicular pain and urgency.   Musculoskeletal: Negative for arthralgias, neck pain and neck stiffness.   Skin: Negative for color change, pallor, rash and wound.   Allergic/Immunologic: Negative for immunocompromised state.   Neurological: Positive for headaches. Negative for dizziness, tremors, syncope, speech difficulty, weakness, light-headedness and numbness.   Hematological: Negative for adenopathy. Does not bruise/bleed easily.   Psychiatric/Behavioral: Negative for agitation and hallucinations. The patient is not nervous/anxious and is not hyperactive.        Objective:       Vitals:    12/07/17 1507   BP: 127/81   Pulse: 82   Resp: 18   Temp: 97.3 °F (36.3 °C)       Physical Exam   Constitutional: He is oriented to person, place, and time. He appears well-developed and well-nourished.  Non-toxic appearance. He does not appear ill. No distress.   HENT:   Head: Normocephalic and atraumatic.   Right Ear: Tympanic membrane and ear canal normal.   Left Ear: Tympanic membrane and ear canal normal.   Nose: Nose normal. Right sinus exhibits no maxillary sinus tenderness and no frontal sinus tenderness. Left sinus exhibits no maxillary sinus tenderness and no frontal sinus tenderness.   Mouth/Throat: Oropharynx is clear and moist and mucous membranes are normal. No oral lesions. Normal dentition. No oropharyngeal exudate, posterior oropharyngeal edema or posterior oropharyngeal erythema.   Eyes: Conjunctivae, EOM and lids are normal. Pupils are equal, round, and reactive to light. Right conjunctiva is not injected. Right conjunctiva has no hemorrhage. Left conjunctiva is not injected. Left conjunctiva has no hemorrhage. No scleral icterus.   Neck: Normal range of motion. Neck supple. No JVD present. No spinous process  tenderness and no muscular tenderness present. No tracheal deviation present. No thyromegaly present.   Cardiovascular: Normal rate, regular rhythm, normal heart sounds and intact distal pulses.    No murmur heard.  Pulmonary/Chest: Effort normal and breath sounds normal. No respiratory distress. He has no decreased breath sounds. He has no wheezes. He has no rhonchi. He has no rales. He exhibits no tenderness.   Abdominal: Soft. Bowel sounds are normal. He exhibits no distension and no mass. There is no hepatosplenomegaly. There is no tenderness. There is no rebound and no guarding.   Musculoskeletal: Normal range of motion. He exhibits no edema or tenderness.   Lymphadenopathy:     He has no cervical adenopathy.     He has no axillary adenopathy.        Right: No supraclavicular adenopathy present.        Left: No supraclavicular adenopathy present.   Neurological: He is alert and oriented to person, place, and time. No cranial nerve deficit. He exhibits normal muscle tone. Coordination normal.   Skin: Skin is warm and dry. Capillary refill takes less than 2 seconds. No abrasion, no bruising and no rash noted. Rash is not pustular and not urticarial. He is not diaphoretic. No cyanosis. Nails show no clubbing.   Psychiatric: He has a normal mood and affect. His speech is normal and behavior is normal. Judgment and thought content normal.       Lab Results   Component Value Date    WBC 7.55 12/01/2017    HGB 11.9 (L) 12/01/2017    HCT 36.6 (L) 12/01/2017    MCV 93 12/01/2017     12/01/2017     Lab Results   Component Value Date    CREATININE 0.9 12/07/2017    BUN 26 (H) 11/02/2017     (L) 11/02/2017    K 4.5 11/02/2017    CL 93 (L) 11/02/2017    CO2 27 11/02/2017     Lab Results   Component Value Date    ALT 14 11/02/2017    AST 14 11/02/2017    ALKPHOS 77 11/02/2017    BILITOT 0.5 11/02/2017         Assessment:           62-year-old gentleman with a history of pulmonary nodules, SIADH, and diabetes  mellitus who was been referred for provoked bilateral pulmonary emboli.  He is currently on Eliquis 5 mg twice a day and tolerating treatment well without bleeding complications.  However, the patient reports worsening headaches which have been present for the past 3-4 months.  Which prompted a CT of the brain which has been reported as negative.   He continues to tolerate anticoagulation well and we will have the patient follow-up in 2 months   He will need age-appropriate cancer screening including colonoscopy delayed until anticoagulation is completed after 3 months of treatment      Provoked Pulmonary emboli (left knee replacement):  --Continue Eliquis 5 mg by mouth twice a day  --Plan to continue anticoagulation for 3 months will be complete February 1  --Proceed with colonoscopy after completing anticoagulation    Bilateral pulmonary nodules:  Pulmonary nodules are too small to be avid on PET scan  --continue serial CT scans    SIADH:  --Continue to monitor pulmonary nodules  --Needs colonoscopy after completing 3 months of anticoagulation

## 2017-12-08 ENCOUNTER — TELEPHONE (OUTPATIENT)
Dept: INTERNAL MEDICINE | Facility: CLINIC | Age: 62
End: 2017-12-08

## 2017-12-08 ENCOUNTER — TELEPHONE (OUTPATIENT)
Dept: HEMATOLOGY/ONCOLOGY | Facility: CLINIC | Age: 62
End: 2017-12-08

## 2017-12-08 NOTE — TELEPHONE ENCOUNTER
Patient's wife called to inform our office that patient took a metformin on yesterday after CT scan.  Patient was advised to drink plenty of water and to hold metformin. Patient's wife verbalized understanding of all medical information given.

## 2017-12-08 NOTE — TELEPHONE ENCOUNTER
----- Message from Jocelin Medina sent at 12/8/2017  1:06 PM CST -----  Contact: pt wife - davis   States she's calling rg having some questions about pt's appt from yesterday and can be reached at [phone]//thanks/dbw

## 2017-12-08 NOTE — TELEPHONE ENCOUNTER
----- Message from Kathrine Sanchez sent at 12/8/2017  9:16 AM CST -----  Contact: Lisa/wife  Lisa called to speak with the nurse; she stated he had a CT scan with contrast and when they got home she gave him his Metformin and he wasn't supposed to take it. She is wondering if he should come in to get labs because he wasn't supposed to take it for 48 hrs. She can be contacted at [phone].    Thanks,  Kathrine

## 2017-12-12 ENCOUNTER — LAB VISIT (OUTPATIENT)
Dept: LAB | Facility: HOSPITAL | Age: 62
End: 2017-12-12
Attending: INTERNAL MEDICINE
Payer: COMMERCIAL

## 2017-12-12 DIAGNOSIS — E78.5 HYPERLIPIDEMIA ASSOCIATED WITH TYPE 2 DIABETES MELLITUS: ICD-10-CM

## 2017-12-12 DIAGNOSIS — E11.69 HYPERLIPIDEMIA ASSOCIATED WITH TYPE 2 DIABETES MELLITUS: ICD-10-CM

## 2017-12-12 LAB
ALT SERPL W/O P-5'-P-CCNC: 13 U/L
CHOLEST SERPL-MCNC: 120 MG/DL
CHOLEST/HDLC SERPL: 2.1 {RATIO}
HDLC SERPL-MCNC: 56 MG/DL
HDLC SERPL: 46.7 %
LDLC SERPL CALC-MCNC: 47.2 MG/DL
NONHDLC SERPL-MCNC: 64 MG/DL
TRIGL SERPL-MCNC: 84 MG/DL

## 2017-12-12 PROCEDURE — 36415 COLL VENOUS BLD VENIPUNCTURE: CPT | Mod: PO

## 2017-12-12 PROCEDURE — 84460 ALANINE AMINO (ALT) (SGPT): CPT

## 2017-12-12 PROCEDURE — 80061 LIPID PANEL: CPT

## 2017-12-14 ENCOUNTER — LAB VISIT (OUTPATIENT)
Dept: LAB | Facility: HOSPITAL | Age: 62
End: 2017-12-14
Attending: ALLERGY & IMMUNOLOGY
Payer: COMMERCIAL

## 2017-12-14 ENCOUNTER — OFFICE VISIT (OUTPATIENT)
Dept: INTERNAL MEDICINE | Facility: CLINIC | Age: 62
End: 2017-12-14
Payer: COMMERCIAL

## 2017-12-14 VITALS
TEMPERATURE: 97 F | SYSTOLIC BLOOD PRESSURE: 122 MMHG | DIASTOLIC BLOOD PRESSURE: 70 MMHG | BODY MASS INDEX: 34.35 KG/M2 | OXYGEN SATURATION: 95 % | WEIGHT: 226.63 LBS | HEIGHT: 68 IN | HEART RATE: 81 BPM

## 2017-12-14 DIAGNOSIS — E11.9 CONTROLLED TYPE 2 DIABETES MELLITUS WITHOUT COMPLICATION, WITHOUT LONG-TERM CURRENT USE OF INSULIN: ICD-10-CM

## 2017-12-14 DIAGNOSIS — E78.5 HYPERLIPIDEMIA ASSOCIATED WITH TYPE 2 DIABETES MELLITUS: ICD-10-CM

## 2017-12-14 DIAGNOSIS — E55.9 VITAMIN D DEFICIENCY: ICD-10-CM

## 2017-12-14 DIAGNOSIS — J31.0 OTHER CHRONIC RHINITIS: ICD-10-CM

## 2017-12-14 DIAGNOSIS — E11.69 HYPERLIPIDEMIA ASSOCIATED WITH TYPE 2 DIABETES MELLITUS: ICD-10-CM

## 2017-12-14 DIAGNOSIS — J44.9 CHRONIC OBSTRUCTIVE PULMONARY DISEASE, UNSPECIFIED COPD TYPE: Primary | ICD-10-CM

## 2017-12-14 DIAGNOSIS — I15.2 HYPERTENSION ASSOCIATED WITH DIABETES: ICD-10-CM

## 2017-12-14 DIAGNOSIS — R91.1 PULMONARY NODULE: ICD-10-CM

## 2017-12-14 DIAGNOSIS — I25.10 CORONARY ARTERY DISEASE INVOLVING NATIVE CORONARY ARTERY OF NATIVE HEART WITHOUT ANGINA PECTORIS: ICD-10-CM

## 2017-12-14 DIAGNOSIS — E11.59 HYPERTENSION ASSOCIATED WITH DIABETES: ICD-10-CM

## 2017-12-14 PROCEDURE — 86003 ALLG SPEC IGE CRUDE XTRC EA: CPT | Mod: 59

## 2017-12-14 PROCEDURE — 36415 COLL VENOUS BLD VENIPUNCTURE: CPT | Mod: PO

## 2017-12-14 PROCEDURE — 86003 ALLG SPEC IGE CRUDE XTRC EA: CPT

## 2017-12-14 PROCEDURE — 99214 OFFICE O/P EST MOD 30 MIN: CPT | Mod: S$GLB,,, | Performed by: INTERNAL MEDICINE

## 2017-12-14 PROCEDURE — 99999 PR PBB SHADOW E&M-EST. PATIENT-LVL V: CPT | Mod: PBBFAC,,, | Performed by: INTERNAL MEDICINE

## 2017-12-16 NOTE — PROGRESS NOTES
"Subjective:      Patient ID: Stefan Forbes Jr. is a 62 y.o. male.    Chief Complaint: Follow-up    61 yo with Patient Active Problem List:     Controlled type 2 diabetes mellitus without complication, without long-term current use of insulin     Hypertension associated with diabetes     Hyperlipidemia associated with type 2 diabetes mellitus     COPD (chronic obstructive pulmonary disease)     CAD (coronary artery disease)     Vitamin D deficiency     Osteoarthritis of knee     Pulmonary nodule     Nevus of choroid of right eye    Here today for management of mult med problems present for years.  Compliant with meds without significant side effects. Dyspnea is slowly improving. Has recently seen renal for SIADH. Has seen heme/onc recently without rec for urgent f/u on lung nodules. Home glucose is doing well.       Review of Systems   Constitutional: Negative for chills and fever.   HENT: Negative for ear pain and sore throat.    Respiratory: Negative for cough.    Cardiovascular: Negative for chest pain.   Gastrointestinal: Negative for abdominal pain and blood in stool.   Genitourinary: Negative for dysuria and hematuria.   Neurological: Negative for seizures and syncope.     Objective:   /70 (BP Location: Right arm, Patient Position: Sitting)   Pulse 81   Temp 97.1 °F (36.2 °C) (Tympanic)   Ht 5' 8" (1.727 m)   Wt 102.8 kg (226 lb 10.1 oz)   SpO2 95%   BMI 34.46 kg/m²     Physical Exam   Constitutional: He is oriented to person, place, and time. He appears well-developed and well-nourished. No distress.   HENT:   Head: Normocephalic and atraumatic.   Mouth/Throat: Oropharynx is clear and moist.   Eyes: EOM are normal. Pupils are equal, round, and reactive to light.   Neck: Neck supple. No thyromegaly present.   Cardiovascular: Normal rate and regular rhythm.    Pulmonary/Chest: Breath sounds normal. He has no wheezes. He has no rales.   Abdominal: Soft. Bowel sounds are normal. There is no " tenderness.   Lymphadenopathy:     He has no cervical adenopathy.   Neurological: He is alert and oriented to person, place, and time.   Skin: Skin is warm and dry.   Psychiatric: He has a normal mood and affect. His behavior is normal.     Lab Visit on 12/12/2017   Component Date Value Ref Range Status    Cholesterol 12/12/2017 120  120 - 199 mg/dL Final    Comment: The National Cholesterol Education Program (NCEP) has set the  following guidelines (reference ranges) for Cholesterol:  Optimal.....................<200 mg/dL  Borderline High.............200-239 mg/dL  High........................> or = 240 mg/dL      Triglycerides 12/12/2017 84  30 - 150 mg/dL Final    Comment: The National Cholesterol Education Program (NCEP) has set the  following guidelines (reference values) for triglycerides:  Normal......................<150 mg/dL  Borderline High.............150-199 mg/dL  High........................200-499 mg/dL      HDL 12/12/2017 56  40 - 75 mg/dL Final    Comment: The National Cholesterol Education Program (NCEP) has set the  following guidelines (reference values) for HDL Cholesterol:  Low...............<40 mg/dL  Optimal...........>60 mg/dL      LDL Cholesterol 12/12/2017 47.2* 63.0 - 159.0 mg/dL Final    Comment: The National Cholesterol Education Program (NCEP) has set the  following guidelines (reference values) for LDL Cholesterol:  Optimal.......................<130 mg/dL  Borderline High...............130-159 mg/dL  High..........................160-189 mg/dL  Very High.....................>190 mg/dL      HDL/Chol Ratio 12/12/2017 46.7  20.0 - 50.0 % Final    Total Cholesterol/HDL Ratio 12/12/2017 2.1  2.0 - 5.0 Final    Non-HDL Cholesterol 12/12/2017 64  mg/dL Final    Comment: Risk category and Non-HDL cholesterol goals:  Coronary heart disease (CHD)or equivalent (10-year risk of CHD >20%):  Non-HDL cholesterol goal     <130 mg/dL  Two or more CHD risk factors and 10-year risk of CHD <=  20%:  Non-HDL cholesterol goal     <160 mg/dL  0 to 1 CHD risk factor:  Non-HDL cholesterol goal     <190 mg/dL      ALT 12/12/2017 13  10 - 44 U/L Final   Lab Visit on 12/07/2017   Component Date Value Ref Range Status    Creatinine 12/07/2017 0.9  0.5 - 1.4 mg/dL Final    eGFR if African American 12/07/2017 >60  >60 mL/min/1.73 m^2 Final    eGFR if non African American 12/07/2017 >60  >60 mL/min/1.73 m^2 Final    Comment: Calculation used to obtain the estimated glomerular filtration  rate (eGFR) is the CKD-EPI equation.      Lab Visit on 12/01/2017   Component Date Value Ref Range Status    WBC 12/01/2017 7.55  3.90 - 12.70 K/uL Final    RBC 12/01/2017 3.95* 4.60 - 6.20 M/uL Final    Hemoglobin 12/01/2017 11.9* 14.0 - 18.0 g/dL Final    Hematocrit 12/01/2017 36.6* 40.0 - 54.0 % Final    MCV 12/01/2017 93  82 - 98 fL Final    MCH 12/01/2017 30.1  27.0 - 31.0 pg Final    MCHC 12/01/2017 32.5  32.0 - 36.0 g/dL Final    RDW 12/01/2017 12.8  11.5 - 14.5 % Final    Platelets 12/01/2017 218  150 - 350 K/uL Final    MPV 12/01/2017 9.8  9.2 - 12.9 fL Final    Gran # 12/01/2017 4.2  1.8 - 7.7 K/uL Final    Lymph # 12/01/2017 2.2  1.0 - 4.8 K/uL Final    Mono # 12/01/2017 0.6  0.3 - 1.0 K/uL Final    Eos # 12/01/2017 0.5  0.0 - 0.5 K/uL Final    Baso # 12/01/2017 0.04  0.00 - 0.20 K/uL Final    Gran% 12/01/2017 55.1  38.0 - 73.0 % Final    Lymph% 12/01/2017 29.0  18.0 - 48.0 % Final    Mono% 12/01/2017 8.2  4.0 - 15.0 % Final    Eosinophil% 12/01/2017 7.2  0.0 - 8.0 % Final    Basophil% 12/01/2017 0.5  0.0 - 1.9 % Final    Differential Method 12/01/2017 Automated   Final    D-Dimer 12/01/2017 2.34* <0.50 mg/L FEU Final    Comment: The quantitative D-dimer assay should be used as an aid in   the diagnosis of deep vein thrombosis and pulmonary embolism  in patients with the appropriate presentation and clinical  history. Causes of a positive (>0.50 mg/L FEU) D-Dimer test  include, but are not limited  to: DVT, PE, DIC, thrombolytic   therapy, anticoagulant therapy, recent surgery, trauma, or   pregnancy, disseminated malignancy, aortic aneurysm, cirrhosis,  and severe infection. False negative results may occur in   patients with distal DVT.      PSA, SCREEN 12/01/2017 0.18  0.00 - 4.00 ng/mL Final    Comment: PSA Expected levels:  Hormonal Therapy: <0.05 ng/ml  Prostatectomy: <0.01 ng/ml  Radiation Therapy: <1.00 ng/ml         Assessment:     1. Chronic obstructive pulmonary disease, unspecified COPD type    2. Pulmonary nodule    3. Hypertension associated with diabetes    4. Hyperlipidemia associated with type 2 diabetes mellitus    5. Coronary artery disease involving native coronary artery of native heart without angina pectoris    6. Controlled type 2 diabetes mellitus without complication, without long-term current use of insulin    7. Vitamin D deficiency      Plan:   Chronic obstructive pulmonary disease, unspecified COPD type  Stable    Pulmonary nodule  Stable    Hypertension associated with diabetes  Controlled    Hyperlipidemia associated with type 2 diabetes mellitus  Controlled    Coronary artery disease involving native coronary artery of native heart without angina pectoris  Stable    Controlled type 2 diabetes mellitus without complication, without long-term current use of insulin  -     Hemoglobin A1c; Future; Expected date: 06/12/2018    Vitamin D deficiency        Lab Frequency Next Occurrence   CT Chest Without Contrast Once 02/27/2017   Hemoglobin A1c Once 03/11/2018   CBC auto differential Once 12/07/2017   Comprehensive metabolic panel Once 12/07/2017   Ferritin Once 12/07/2017   Iron and TIBC Once 12/07/2017   Lactate dehydrogenase Once 12/07/2017   Vitamin B12 Once 12/07/2017   Reticulocytes Once 12/07/2017   Protein electrophoresis, serum Once 12/07/2017   Immunofixation electrophoresis Once 12/07/2017   Immunoglobulin free LT chains blood Once 12/07/2017   D dimer, quantitative Once  12/07/2017   Hemoglobin A1c Once 06/12/2018       Problem List Items Addressed This Visit        Pulmonary    COPD (chronic obstructive pulmonary disease) - Primary    Pulmonary nodule    Overview     stable              Cardiac/Vascular    Hypertension associated with diabetes    Hyperlipidemia associated with type 2 diabetes mellitus    CAD (coronary artery disease)       Endocrine    Controlled type 2 diabetes mellitus without complication, without long-term current use of insulin    Relevant Orders    Hemoglobin A1c    Vitamin D deficiency          Return in about 6 months (around 6/14/2018).

## 2017-12-18 LAB
A ALTERNATA IGE QN: <0.35 KU/L
A FUMIGATUS IGE QN: <0.35 KU/L
BAHIA GRASS IGE QN: <0.35 KU/L
BERMUDA GRASS IGE QN: <0.35 KU/L
C HERBARUM IGE QN: <0.35 KU/L
CAT DANDER IGE QN: <0.35 KU/L
COMMON RAGWEED IGE QN: <0.35 KU/L
D FARINAE IGE QN: <0.35 KU/L
D PTERONYSS IGE QN: <0.35 KU/L
DEPRECATED A ALTERNATA IGE RAST QL: NORMAL
DEPRECATED A FUMIGATUS IGE RAST QL: NORMAL
DEPRECATED BAHIA GRASS IGE RAST QL: NORMAL
DEPRECATED BERMUDA GRASS IGE RAST QL: NORMAL
DEPRECATED C HERBARUM IGE RAST QL: NORMAL
DEPRECATED CAT DANDER IGE RAST QL: NORMAL
DEPRECATED COMMON RAGWEED IGE RAST QL: NORMAL
DEPRECATED D FARINAE IGE RAST QL: NORMAL
DEPRECATED D PTERONYSS IGE RAST QL: NORMAL
DEPRECATED DOG DANDER IGE RAST QL: NORMAL
DEPRECATED TIMOTHY IGE RAST QL: ABNORMAL
DEPRECATED WHITE OAK IGE RAST QL: NORMAL
DOG DANDER IGE QN: <0.35 KU/L
TIMOTHY IGE QN: 1.74 KU/L
WHITE OAK IGE QN: <0.35 KU/L

## 2017-12-19 DIAGNOSIS — J31.0 OTHER CHRONIC RHINITIS: ICD-10-CM

## 2017-12-19 RX ORDER — AMLODIPINE BESYLATE 5 MG/1
TABLET ORAL
Qty: 30 TABLET | Refills: 1 | Status: SHIPPED | OUTPATIENT
Start: 2017-12-19 | End: 2018-02-14 | Stop reason: SDUPTHER

## 2017-12-19 RX ORDER — METHSCOPOLAMINE BROMIDE 2.5 MG/1
TABLET ORAL
Qty: 10 TABLET | Refills: 3 | OUTPATIENT
Start: 2017-12-19

## 2017-12-19 NOTE — TELEPHONE ENCOUNTER
This note was dictated using voice recognition software and may contain errors.    I spoke with him on the telephone on the afternoon of the Tuesday, December 19, 2017.  He   has found the use of methscopolamine 2.5 mg every 12 hours to be helpful.  He requested a prescription for this medicine and also for Atrovent nasal spray 0.03%.    I called his [facility] pharmacy, [phone].  I spoke with a pharmacist and issued 90 day supply prescriptions for each medication.  One refill was permitted on each prescription.    The methscopolamine may be taken 1 pill every 12 hours if needed.  The nasal spray may be used 1 or 2 sprays in each nasal passage every 8-12 hours if needed.    I reviewed with him the results of the laboratory tests which were recently performed.    I told him in recent weeks I was not highly suspicious that much Jordin grass pollen was in the air.  I reviewed with him that some individuals may experience ALLERGIC nasal symptoms and some symptoms which might be due to nonallergic malfunctioning of the nose.    4 other questions or concerns he may call.

## 2017-12-28 RX ORDER — METFORMIN HYDROCHLORIDE 1000 MG/1
TABLET ORAL
Qty: 180 TABLET | Refills: 2 | Status: SHIPPED | OUTPATIENT
Start: 2017-12-28 | End: 2018-10-23

## 2018-01-26 ENCOUNTER — PATIENT OUTREACH (OUTPATIENT)
Dept: ADMINISTRATIVE | Facility: HOSPITAL | Age: 63
End: 2018-01-26

## 2018-01-27 DIAGNOSIS — K21.9 GASTROESOPHAGEAL REFLUX DISEASE, ESOPHAGITIS PRESENCE NOT SPECIFIED: ICD-10-CM

## 2018-01-27 RX ORDER — OMEPRAZOLE 40 MG/1
CAPSULE, DELAYED RELEASE ORAL
Qty: 30 CAPSULE | Refills: 4 | Status: SHIPPED | OUTPATIENT
Start: 2018-01-27 | End: 2018-06-28 | Stop reason: SDUPTHER

## 2018-02-01 RX ORDER — ALBUTEROL SULFATE 90 UG/1
AEROSOL, METERED RESPIRATORY (INHALATION)
Qty: 8.5 G | Refills: 0 | Status: SHIPPED | OUTPATIENT
Start: 2018-02-01 | End: 2018-02-06 | Stop reason: SDUPTHER

## 2018-02-05 PROBLEM — I26.99 OTHER PULMONARY EMBOLISM WITHOUT ACUTE COR PULMONALE: Status: ACTIVE | Noted: 2018-02-05

## 2018-02-05 NOTE — PROGRESS NOTES
History & Physical    SUBJECTIVE:     History of Present Illness:  Patient is a 62 y.o. male presents with COPD diagnosis.  Referred to me by Dr. Wallace Fisher  Previously seen by Dr. Blanco.    Prescription for Spiriva and ProAir HFA.  Spirometry documented in the chart a moderate obstructive defect  Retired  on disability.  MRC grade 2.  Patient tells me that he has poor exercise tolerance is a fatigability.  He denies any chest pain.  He did see Dr. Marco Perry on cardiology  Concerned because of persistent cough congestion wheezing  He uses his wife's nebulizer  COPD test score is 24  Former smoker 35-40 pack year smoking history  He has a pulmonary nodules 5 mm noted in the right upper lobe  We discussed about smoking cessation and adherence  On his last spirometry FEV1 was 76% predicted.  FVC was 9% predicted.  FEV1/FVC ratio is 85  He is not on oxygen on never been  No TB exposure no hemoptysis no occupational hazardous exposure  Is currently using his wife's nebulizer.  Provide him with a nebulizer and albuterol  He had bilateral total knee replacement last year and after that developed pulmonary embolus.  Venous thromboembolic disease and was provoked  He is currently in Eliquis for a six-month.    I reviewed his CT scan which shows multiple areas of emphysema which would support the diagnosis    He also concerned about frequent nasal congestion worse in the morning  Seen Dr Roach taking Atrovent Nasal spray and scopolamine  Proper instruction on Flonase use and a prescription was given  Ocean spray was also added  I like to see him in 4 weeks to clarify the severity of his disease            Chief Complaint   Patient presents with    COPD    Cough       Review of patient's allergies indicates:  No Known Allergies    Current Outpatient Prescriptions   Medication Sig Dispense Refill    acetaminophen (TYLENOL) 500 MG tablet Take 500 mg by mouth as needed.      albuterol (ACCUNEB) 0.63  mg/3 mL Nebu Take 0.63 mg by nebulization every 6 (six) hours as needed. Rescue      amLODIPine (NORVASC) 5 MG tablet TAKE ONE TABLET BY MOUTH ONCE DAILY 30 tablet 1    apixaban (ELIQUIS) 5 mg Tab Take 1 tablet (5 mg total) by mouth 2 (two) times daily. 180 tablet 0    blood sugar diagnostic (BLOOD GLUCOSE TEST) Strp 1 strip by Misc.(Non-Drug; Combo Route) route 3 (three) times daily. (Patient taking differently: 1 strip by Misc.(Non-Drug; Combo Route) route once daily. ) 100 each 2    BLOOD-GLUCOSE METER (CONTOUR NEXT EZ METER MISC) by Misc.(Non-Drug; Combo Route) route.      buPROPion (WELLBUTRIN SR) 150 MG TBSR 12 hr tablet Take 1 tablet (150 mg total) by mouth once daily. 90 tablet 3    cholecalciferol, vitamin D3, (VITAMIN D3) 1,000 unit capsule Take 1,000 Units by mouth once daily.      guaifenesin (MUCINEX) 1,200 mg Ta12 Take 1 tablet by mouth 2 (two) times daily.      ipratropium (ATROVENT) 0.03 % nasal spray 2 sprays by Nasal route 2 (two) times daily.      lancets Misc 1 lancet by Misc.(Non-Drug; Combo Route) route 2 (two) times daily. (Patient taking differently: 1 lancet by Misc.(Non-Drug; Combo Route) route once daily. ) 100 each 2    metFORMIN (GLUCOPHAGE) 1000 MG tablet TAKE ONE TABLET BY MOUTH TWICE DAILY WITH MEALS 180 tablet 2    methscopolamine (PAMINE) 2.5 mg Tab Take orally as directed:  1/2 pill or 1 pill every 12 hours, if needed. 10 tablet 3    NITROGLYCERIN (NITROSTAT SL) Place 1 tablet under the tongue as needed.      omeprazole (PRILOSEC) 40 MG capsule TAKE ONE CAPSULE BY MOUTH ONCE DAILY 30 capsule 4    PROAIR HFA 90 mcg/actuation inhaler INHALE TWO PUFFS BY MOUTH EVERY 6 HOURS AS NEEDED FOR WHEEZING OR SHORTNESS OF BREATH 9 each 3    rosuvastatin (CRESTOR) 10 MG tablet Take 1 tablet (10 mg total) by mouth once daily. 90 tablet 1    SPIRIVA WITH HANDIHALER 18 mcg inhalation capsule INHALE ONE DOSE BY MOUTH ONCE DAILY 30 capsule 11    tamsulosin (FLOMAX) 0.4 mg Cp24 Take 1  capsule by mouth once daily.      torsemide (DEMADEX) 20 MG Tab Take 1 tablet by mouth every other day.   0    TRULICITY 0.75 mg/0.5 mL PnIj INJECT 0.5MLS(0.75 MG TOTAL) INTO SKIN EVERY 7 DAYS 4 Syringe 3    albuterol (PROVENTIL) 2.5 mg /3 mL (0.083 %) nebulizer solution Take 3 mLs (2.5 mg total) by nebulization every 6 (six) hours as needed for Wheezing. Rescue 1 Box 3    azithromycin (Z-CHARLES) 250 MG tablet Take 2 tablets by mouth on day 1; Take 1 tablet by mouth on days 2-5 6 tablet 0    fluticasone (FLONASE) 50 mcg/actuation nasal spray 1 spray (50 mcg total) by Each Nare route once daily. 1 Bottle 3    methylPREDNISolone (MEDROL DOSEPACK) 4 mg tablet use as directed 1 Package 1    sodium chloride (OCEAN NASAL) 0.65 % nasal spray 1 spray by Nasal route as needed for Congestion. 1 Bottle 12     No current facility-administered medications for this visit.        Past Medical History:   Diagnosis Date    COPD (chronic obstructive pulmonary disease)     Diabetes mellitus, type 2     Hyperlipidemia     Hypertension      Past Surgical History:   Procedure Laterality Date    ANGIOPLASTY      TOTAL KNEE ARTHROPLASTY       Family History   Problem Relation Age of Onset    Diabetes Mother     Cancer Father      bone     Social History   Substance Use Topics    Smoking status: Former Smoker     Types: Cigars     Quit date: 12/30/2015    Smokeless tobacco: Never Used    Alcohol use No        Review of Systems:  Review of Systems   Constitutional: Negative.    HENT: Positive for congestion.    Eyes: Negative.    Respiratory: Positive for cough, shortness of breath and wheezing.    Cardiovascular: Negative.    Gastrointestinal: Negative.    Endocrine: Negative.    Genitourinary: Negative.    Musculoskeletal: Negative.    Skin: Negative.    Allergic/Immunologic: Negative.    Neurological: Negative.    Hematological: Negative.    Psychiatric/Behavioral: Positive for sleep disturbance.       OBJECTIVE:     Vital  "Signs (Most Recent)  Pulse: 70 (02/06/18 0810)  Resp: 18 (02/06/18 0810)  BP: 118/72 (02/06/18 0810)  SpO2: 98 % (02/06/18 0810)  5' 8" (1.727 m)  107.6 kg (237 lb 1.7 oz)     Physical Exam:  Physical Exam   Constitutional: He is oriented to person, place, and time. He appears well-developed and well-nourished.   HENT:   Head: Normocephalic and atraumatic.   Nose: Mucosal edema and rhinorrhea present.   Mouth/Throat: Oropharynx is clear and moist. No oropharyngeal exudate.   Eyes: Conjunctivae and EOM are normal. Pupils are equal, round, and reactive to light. Left eye exhibits no discharge. No scleral icterus.   Neck: Normal range of motion. Neck supple. No tracheal deviation present. No thyromegaly present.   Cardiovascular: Normal rate, regular rhythm, normal heart sounds and intact distal pulses.    No murmur heard.  Pulmonary/Chest: Effort normal. He has wheezes.   Abdominal: Soft. Bowel sounds are normal. He exhibits no distension.   Musculoskeletal: Normal range of motion. He exhibits no tenderness or deformity.   Neurological: He is alert and oriented to person, place, and time. No cranial nerve deficit.   Skin: Skin is warm and dry. Capillary refill takes 2 to 3 seconds.   Psychiatric: He has a normal mood and affect.   Nursing note and vitals reviewed.      Laboratory  CBC: Reviewed  BMP: Reviewed    Diagnostic Results:  PFT  Moderate obstructive ventilatory impairment with significant   improvement in the M. MEF after bronchodilator administration.   The lung volumes are increased consistent with air trapping. The   diffusion capacity however is normal. The study is most   consistent with moderate COPD    CTA reviewed  Lungs: Centrilobular and paraseptal emphysematous disease again noted with upper lung predominance.    There is a subtle groundglass nodular opacity in the left lower lobe measuring an average of 5.5 mm as seen on image 79, series 3 which is favored to be unchanged from prior.    Previously " described 5 mm pleural-based pulmonary nodule in the left upper lobe is unchanged and most in keeping with a benign interfissural node.    Other scattered miniscule nodules are unchanged measuring 2-3 mm in the periphery of the right upper lobe.    Previously described right lower lobe pulmonary nodule is not definitively visualized.    No parenchymal consolidations or pleural effusions demonstrated.      Visualized Upper Abdomen:  Large partially visualized cyst at the upper pole of the left kidney appears grossly unchanged measuring at least 9.9 cm in long axis.    CXR reviewed  Chronic obstructive pulmonary disease changes without consolidation or effusion.    Heart and pulmonary vasculature are within normal limits the trachea is normal in position.    Prominent LEFT epicardial fat pad.  CP angles are sharp.  Osteopenia and spondylosis.  Minimal accentuated kyphosis.      ASSESSMENT/PLAN:     Problem List Items Addressed This Visit     COPD (chronic obstructive pulmonary disease) - Primary     CAT score 24  Seen Dr Blanco before  Immunizations:   Not on oxygen  Meds; Spiriva and albuterol HFA  Has nebuliser  Cough congestion           Relevant Medications    albuterol (PROVENTIL) 2.5 mg /3 mL (0.083 %) nebulizer solution    Other Relevant Orders    NEBULIZER FOR HOME USE    Alpha 1 Antitrypsin Phenotype    Alpha-1-antitrypsin    Complete PFT without bronchodilator - Clinic    PULM - Arterial Blood Gases--in addition to PFT only    Stress test, pulmonary    PULSE OXIMETRY OVERNIGHT    Pulmonary nodule     2 nodules RUl: 5.5mm  35-40 pack year smoker, quit 8 months ago  Imaging 07/2016  Need follow up 12 months         Other pulmonary embolism without acute cor pulmonale     Appears to be Provoke VTE after Phong TKR  On Elliquis managed by Hematology             Other Visit Diagnoses     Nasal congestion        Relevant Medications    fluticasone (FLONASE) 50 mcg/actuation nasal spray    sodium chloride (OCEAN NASAL)  0.65 % nasal spray    Bronchitis, chronic obstructive w acute bronchitis        Relevant Medications    azithromycin (Z-CHARLES) 250 MG tablet    methylPREDNISolone (MEDROL DOSEPACK) 4 mg tablet          PLAN:Plan      Consider add Daliresp  Home Nebuliser  May need MACHO testing  GROUP B mRC 2, CAT score > 10    Follow-up in about 4 weeks (around 3/6/2018) for 6MWD test, ABG on RA, PFT, Overnight SAT, home nebulizer, Labs today.    This note was prepared using voice recognition system and is likely to have sound alike errors that may have been overlooked even after proof reading.  Please call me with any questions    Discussed diagnosis, its evaluation, treatment and usual course. All questions answered.    Thank you for the courtesy of participating in the care of this patient    Mazin Wells MD

## 2018-02-06 ENCOUNTER — OFFICE VISIT (OUTPATIENT)
Dept: PULMONOLOGY | Facility: CLINIC | Age: 63
End: 2018-02-06
Payer: COMMERCIAL

## 2018-02-06 ENCOUNTER — LAB VISIT (OUTPATIENT)
Dept: LAB | Facility: HOSPITAL | Age: 63
End: 2018-02-06
Attending: INTERNAL MEDICINE
Payer: COMMERCIAL

## 2018-02-06 VITALS
WEIGHT: 237.13 LBS | DIASTOLIC BLOOD PRESSURE: 72 MMHG | RESPIRATION RATE: 18 BRPM | HEIGHT: 68 IN | SYSTOLIC BLOOD PRESSURE: 118 MMHG | HEART RATE: 70 BPM | BODY MASS INDEX: 35.94 KG/M2 | OXYGEN SATURATION: 98 %

## 2018-02-06 DIAGNOSIS — R09.81 NASAL CONGESTION: ICD-10-CM

## 2018-02-06 DIAGNOSIS — R91.1 PULMONARY NODULE: ICD-10-CM

## 2018-02-06 DIAGNOSIS — J20.9 BRONCHITIS, CHRONIC OBSTRUCTIVE W ACUTE BRONCHITIS: ICD-10-CM

## 2018-02-06 DIAGNOSIS — J44.9 CHRONIC OBSTRUCTIVE PULMONARY DISEASE, UNSPECIFIED COPD TYPE: Primary | ICD-10-CM

## 2018-02-06 DIAGNOSIS — J44.9 CHRONIC OBSTRUCTIVE PULMONARY DISEASE, UNSPECIFIED COPD TYPE: ICD-10-CM

## 2018-02-06 DIAGNOSIS — I27.82 OTHER CHRONIC PULMONARY EMBOLISM WITHOUT ACUTE COR PULMONALE: ICD-10-CM

## 2018-02-06 DIAGNOSIS — J44.0 BRONCHITIS, CHRONIC OBSTRUCTIVE W ACUTE BRONCHITIS: ICD-10-CM

## 2018-02-06 LAB — A1AT SERPL-MCNC: 148 MG/DL

## 2018-02-06 PROCEDURE — 3008F BODY MASS INDEX DOCD: CPT | Mod: S$GLB,,, | Performed by: INTERNAL MEDICINE

## 2018-02-06 PROCEDURE — 99205 OFFICE O/P NEW HI 60 MIN: CPT | Mod: S$GLB,,, | Performed by: INTERNAL MEDICINE

## 2018-02-06 PROCEDURE — 82103 ALPHA-1-ANTITRYPSIN TOTAL: CPT | Mod: 91

## 2018-02-06 PROCEDURE — 82103 ALPHA-1-ANTITRYPSIN TOTAL: CPT

## 2018-02-06 PROCEDURE — 99999 PR PBB SHADOW E&M-EST. PATIENT-LVL IV: CPT | Mod: PBBFAC,,, | Performed by: INTERNAL MEDICINE

## 2018-02-06 RX ORDER — AZITHROMYCIN 250 MG/1
TABLET, FILM COATED ORAL
Qty: 6 TABLET | Refills: 0 | Status: SHIPPED | OUTPATIENT
Start: 2018-02-06 | End: 2018-02-22 | Stop reason: ALTCHOICE

## 2018-02-06 RX ORDER — FLUTICASONE PROPIONATE 50 MCG
1 SPRAY, SUSPENSION (ML) NASAL DAILY
Qty: 16 G | Refills: 3 | Status: SHIPPED | OUTPATIENT
Start: 2018-02-06 | End: 2018-07-06

## 2018-02-06 RX ORDER — ALBUTEROL SULFATE 0.83 MG/ML
2.5 SOLUTION RESPIRATORY (INHALATION) EVERY 6 HOURS PRN
Qty: 1 BOX | Refills: 3 | Status: SHIPPED | OUTPATIENT
Start: 2018-02-06 | End: 2018-04-02 | Stop reason: SDUPTHER

## 2018-02-06 RX ORDER — METHYLPREDNISOLONE 4 MG/1
TABLET ORAL
Qty: 1 PACKAGE | Refills: 1 | Status: SHIPPED | OUTPATIENT
Start: 2018-02-06 | End: 2018-02-22 | Stop reason: ALTCHOICE

## 2018-02-06 RX ORDER — ALBUTEROL SULFATE 0.63 MG/3ML
0.63 SOLUTION RESPIRATORY (INHALATION) EVERY 6 HOURS PRN
COMMUNITY
End: 2022-08-01

## 2018-02-06 NOTE — ASSESSMENT & PLAN NOTE
2 nodules RUl: 5.5mm  35-40 pack year smoker, quit 8 months ago  Imaging 07/2016  Need follow up 12 months

## 2018-02-06 NOTE — ASSESSMENT & PLAN NOTE
CAT score 24  Seen Dr Blanco before  Immunizations:   Not on oxygen  Meds; Spiriva and albuterol HFA  Has nebuliser  Cough congestion

## 2018-02-09 DIAGNOSIS — E11.9 TYPE 2 DIABETES MELLITUS WITHOUT COMPLICATION: ICD-10-CM

## 2018-02-09 LAB
A1AT PHENOTYP SERPL-IMP: NORMAL BANDS
A1AT SERPL NEPH-MCNC: 161 MG/DL

## 2018-02-15 ENCOUNTER — LAB VISIT (OUTPATIENT)
Dept: LAB | Facility: HOSPITAL | Age: 63
End: 2018-02-15
Attending: INTERNAL MEDICINE
Payer: COMMERCIAL

## 2018-02-15 DIAGNOSIS — G44.89 OTHER HEADACHE SYNDROME: ICD-10-CM

## 2018-02-15 DIAGNOSIS — I26.99 OTHER ACUTE PULMONARY EMBOLISM WITHOUT ACUTE COR PULMONALE: ICD-10-CM

## 2018-02-15 DIAGNOSIS — D64.9 NORMOCYTIC ANEMIA: ICD-10-CM

## 2018-02-15 DIAGNOSIS — R79.89 D-DIMER, ELEVATED: ICD-10-CM

## 2018-02-15 DIAGNOSIS — D53.9 NUTRITIONAL ANEMIA: ICD-10-CM

## 2018-02-15 LAB
ALBUMIN SERPL BCP-MCNC: 3.7 G/DL
ALP SERPL-CCNC: 75 U/L
ALT SERPL W/O P-5'-P-CCNC: 12 U/L
ANION GAP SERPL CALC-SCNC: 7 MMOL/L
AST SERPL-CCNC: 8 U/L
BASOPHILS # BLD AUTO: 0.03 K/UL
BASOPHILS NFR BLD: 0.5 %
BILIRUB SERPL-MCNC: 0.3 MG/DL
BUN SERPL-MCNC: 22 MG/DL
CALCIUM SERPL-MCNC: 9.3 MG/DL
CHLORIDE SERPL-SCNC: 100 MMOL/L
CO2 SERPL-SCNC: 27 MMOL/L
CREAT SERPL-MCNC: 1 MG/DL
D DIMER PPP IA.FEU-MCNC: 1.98 MG/L FEU
DIFFERENTIAL METHOD: ABNORMAL
EOSINOPHIL # BLD AUTO: 0.3 K/UL
EOSINOPHIL NFR BLD: 4.2 %
ERYTHROCYTE [DISTWIDTH] IN BLOOD BY AUTOMATED COUNT: 13.4 %
EST. GFR  (AFRICAN AMERICAN): >60 ML/MIN/1.73 M^2
EST. GFR  (NON AFRICAN AMERICAN): >60 ML/MIN/1.73 M^2
FERRITIN SERPL-MCNC: 12 NG/ML
GLUCOSE SERPL-MCNC: 219 MG/DL
HCT VFR BLD AUTO: 37.6 %
HGB BLD-MCNC: 12 G/DL
IRON SERPL-MCNC: 75 UG/DL
LDH SERPL L TO P-CCNC: 101 U/L
LYMPHOCYTES # BLD AUTO: 1.8 K/UL
LYMPHOCYTES NFR BLD: 27 %
MCH RBC QN AUTO: 28.4 PG
MCHC RBC AUTO-ENTMCNC: 31.9 G/DL
MCV RBC AUTO: 89 FL
MONOCYTES # BLD AUTO: 0.6 K/UL
MONOCYTES NFR BLD: 8.6 %
NEUTROPHILS # BLD AUTO: 4 K/UL
NEUTROPHILS NFR BLD: 59.7 %
PLATELET # BLD AUTO: 193 K/UL
PMV BLD AUTO: 9.5 FL
POTASSIUM SERPL-SCNC: 4.4 MMOL/L
PROT SERPL-MCNC: 6.8 G/DL
RBC # BLD AUTO: 4.22 M/UL
RETICS/RBC NFR AUTO: 1.6 %
SATURATED IRON: 15 %
SODIUM SERPL-SCNC: 134 MMOL/L
TOTAL IRON BINDING CAPACITY: 515 UG/DL
TRANSFERRIN SERPL-MCNC: 348 MG/DL
VIT B12 SERPL-MCNC: 190 PG/ML
WBC # BLD AUTO: 6.64 K/UL

## 2018-02-15 PROCEDURE — 85045 AUTOMATED RETICULOCYTE COUNT: CPT

## 2018-02-15 PROCEDURE — 80053 COMPREHEN METABOLIC PANEL: CPT | Mod: PO

## 2018-02-15 PROCEDURE — 82607 VITAMIN B-12: CPT

## 2018-02-15 PROCEDURE — 83615 LACTATE (LD) (LDH) ENZYME: CPT | Mod: PO

## 2018-02-15 PROCEDURE — 83540 ASSAY OF IRON: CPT

## 2018-02-15 PROCEDURE — 82728 ASSAY OF FERRITIN: CPT

## 2018-02-15 PROCEDURE — 84165 PROTEIN E-PHORESIS SERUM: CPT

## 2018-02-15 PROCEDURE — 85025 COMPLETE CBC W/AUTO DIFF WBC: CPT | Mod: PO

## 2018-02-15 PROCEDURE — 86334 IMMUNOFIX E-PHORESIS SERUM: CPT

## 2018-02-15 PROCEDURE — 85379 FIBRIN DEGRADATION QUANT: CPT

## 2018-02-15 PROCEDURE — 36415 COLL VENOUS BLD VENIPUNCTURE: CPT | Mod: PO

## 2018-02-15 PROCEDURE — 84165 PROTEIN E-PHORESIS SERUM: CPT | Mod: 26,,, | Performed by: PATHOLOGY

## 2018-02-15 PROCEDURE — 83520 IMMUNOASSAY QUANT NOS NONAB: CPT

## 2018-02-15 PROCEDURE — 86334 IMMUNOFIX E-PHORESIS SERUM: CPT | Mod: 26,,, | Performed by: PATHOLOGY

## 2018-02-15 RX ORDER — AMLODIPINE BESYLATE 5 MG/1
TABLET ORAL
Qty: 30 TABLET | Refills: 1 | Status: SHIPPED | OUTPATIENT
Start: 2018-02-15 | End: 2018-04-18 | Stop reason: SDUPTHER

## 2018-02-16 LAB
ALBUMIN SERPL ELPH-MCNC: 3.78 G/DL
ALPHA1 GLOB SERPL ELPH-MCNC: 0.28 G/DL
ALPHA2 GLOB SERPL ELPH-MCNC: 0.65 G/DL
B-GLOBULIN SERPL ELPH-MCNC: 0.88 G/DL
GAMMA GLOB SERPL ELPH-MCNC: 1 G/DL
INTERPRETATION SERPL IFE-IMP: NORMAL
KAPPA LC SER QL IA: 3.5 MG/DL
KAPPA LC/LAMBDA SER IA: 2.41
LAMBDA LC SER QL IA: 1.45 MG/DL
PATHOLOGIST INTERPRETATION IFE: NORMAL
PATHOLOGIST INTERPRETATION SPE: NORMAL
PROT SERPL-MCNC: 6.6 G/DL

## 2018-02-22 ENCOUNTER — OFFICE VISIT (OUTPATIENT)
Dept: INTERNAL MEDICINE | Facility: CLINIC | Age: 63
End: 2018-02-22
Payer: COMMERCIAL

## 2018-02-22 ENCOUNTER — OFFICE VISIT (OUTPATIENT)
Dept: HEMATOLOGY/ONCOLOGY | Facility: CLINIC | Age: 63
End: 2018-02-22
Payer: COMMERCIAL

## 2018-02-22 ENCOUNTER — LAB VISIT (OUTPATIENT)
Dept: LAB | Facility: HOSPITAL | Age: 63
End: 2018-02-22
Attending: INTERNAL MEDICINE
Payer: COMMERCIAL

## 2018-02-22 VITALS
HEART RATE: 67 BPM | WEIGHT: 236.31 LBS | SYSTOLIC BLOOD PRESSURE: 112 MMHG | HEIGHT: 68 IN | TEMPERATURE: 96 F | DIASTOLIC BLOOD PRESSURE: 68 MMHG | OXYGEN SATURATION: 98 % | BODY MASS INDEX: 35.81 KG/M2

## 2018-02-22 VITALS
HEART RATE: 67 BPM | DIASTOLIC BLOOD PRESSURE: 68 MMHG | RESPIRATION RATE: 18 BRPM | BODY MASS INDEX: 35.95 KG/M2 | WEIGHT: 237.19 LBS | OXYGEN SATURATION: 98 % | SYSTOLIC BLOOD PRESSURE: 112 MMHG | HEIGHT: 68 IN | TEMPERATURE: 96 F

## 2018-02-22 DIAGNOSIS — E11.59 HYPERTENSION ASSOCIATED WITH DIABETES: Primary | ICD-10-CM

## 2018-02-22 DIAGNOSIS — D50.0 IRON DEFICIENCY ANEMIA DUE TO CHRONIC BLOOD LOSS: ICD-10-CM

## 2018-02-22 DIAGNOSIS — I15.2 HYPERTENSION ASSOCIATED WITH DIABETES: Primary | ICD-10-CM

## 2018-02-22 DIAGNOSIS — E87.1 HYPONATREMIA: ICD-10-CM

## 2018-02-22 DIAGNOSIS — R79.89 D-DIMER, ELEVATED: ICD-10-CM

## 2018-02-22 DIAGNOSIS — I25.10 CORONARY ARTERY DISEASE INVOLVING NATIVE CORONARY ARTERY OF NATIVE HEART WITHOUT ANGINA PECTORIS: ICD-10-CM

## 2018-02-22 DIAGNOSIS — D64.9 NORMOCYTIC ANEMIA: ICD-10-CM

## 2018-02-22 DIAGNOSIS — J44.9 CHRONIC OBSTRUCTIVE PULMONARY DISEASE, UNSPECIFIED COPD TYPE: ICD-10-CM

## 2018-02-22 DIAGNOSIS — E11.9 CONTROLLED TYPE 2 DIABETES MELLITUS WITHOUT COMPLICATION, WITHOUT LONG-TERM CURRENT USE OF INSULIN: ICD-10-CM

## 2018-02-22 DIAGNOSIS — E78.5 HYPERLIPIDEMIA ASSOCIATED WITH TYPE 2 DIABETES MELLITUS: ICD-10-CM

## 2018-02-22 DIAGNOSIS — R91.1 PULMONARY NODULE: ICD-10-CM

## 2018-02-22 DIAGNOSIS — R60.0 BILATERAL LOWER EXTREMITY EDEMA: ICD-10-CM

## 2018-02-22 DIAGNOSIS — I26.99 OTHER ACUTE PULMONARY EMBOLISM WITHOUT ACUTE COR PULMONALE: ICD-10-CM

## 2018-02-22 DIAGNOSIS — G44.89 OTHER HEADACHE SYNDROME: Primary | ICD-10-CM

## 2018-02-22 DIAGNOSIS — I26.99 OTHER PULMONARY EMBOLISM WITHOUT ACUTE COR PULMONALE, UNSPECIFIED CHRONICITY: ICD-10-CM

## 2018-02-22 DIAGNOSIS — E11.69 HYPERLIPIDEMIA ASSOCIATED WITH TYPE 2 DIABETES MELLITUS: ICD-10-CM

## 2018-02-22 LAB
ANION GAP SERPL CALC-SCNC: 9 MMOL/L
BUN SERPL-MCNC: 15 MG/DL
CALCIUM SERPL-MCNC: 9.8 MG/DL
CHLORIDE SERPL-SCNC: 100 MMOL/L
CO2 SERPL-SCNC: 27 MMOL/L
CREAT SERPL-MCNC: 1.2 MG/DL
EST. GFR  (AFRICAN AMERICAN): >60 ML/MIN/1.73 M^2
EST. GFR  (NON AFRICAN AMERICAN): >60 ML/MIN/1.73 M^2
GLUCOSE SERPL-MCNC: 72 MG/DL
POTASSIUM SERPL-SCNC: 5 MMOL/L
SODIUM SERPL-SCNC: 136 MMOL/L

## 2018-02-22 PROCEDURE — 3074F SYST BP LT 130 MM HG: CPT | Mod: S$GLB,,, | Performed by: INTERNAL MEDICINE

## 2018-02-22 PROCEDURE — 99214 OFFICE O/P EST MOD 30 MIN: CPT | Mod: S$GLB,,, | Performed by: INTERNAL MEDICINE

## 2018-02-22 PROCEDURE — 3008F BODY MASS INDEX DOCD: CPT | Mod: S$GLB,,, | Performed by: INTERNAL MEDICINE

## 2018-02-22 PROCEDURE — 80048 BASIC METABOLIC PNL TOTAL CA: CPT

## 2018-02-22 PROCEDURE — 99999 PR PBB SHADOW E&M-EST. PATIENT-LVL V: CPT | Mod: PBBFAC,,, | Performed by: INTERNAL MEDICINE

## 2018-02-22 PROCEDURE — 99215 OFFICE O/P EST HI 40 MIN: CPT | Mod: S$GLB,,, | Performed by: INTERNAL MEDICINE

## 2018-02-22 PROCEDURE — 3078F DIAST BP <80 MM HG: CPT | Mod: S$GLB,,, | Performed by: INTERNAL MEDICINE

## 2018-02-22 PROCEDURE — 99999 PR PBB SHADOW E&M-EST. PATIENT-LVL III: CPT | Mod: PBBFAC,,, | Performed by: INTERNAL MEDICINE

## 2018-02-22 PROCEDURE — 36415 COLL VENOUS BLD VENIPUNCTURE: CPT | Mod: PO

## 2018-02-22 RX ORDER — HEPARIN 100 UNIT/ML
500 SYRINGE INTRAVENOUS
Status: CANCELLED | OUTPATIENT
Start: 2018-02-22

## 2018-02-22 RX ORDER — SODIUM CHLORIDE 0.9 % (FLUSH) 0.9 %
10 SYRINGE (ML) INJECTION
Status: CANCELLED | OUTPATIENT
Start: 2018-03-02

## 2018-02-22 RX ORDER — SODIUM CHLORIDE 0.9 % (FLUSH) 0.9 %
10 SYRINGE (ML) INJECTION
Status: CANCELLED | OUTPATIENT
Start: 2018-02-22

## 2018-02-22 RX ORDER — CETIRIZINE HYDROCHLORIDE 10 MG/1
10 TABLET ORAL NIGHTLY
COMMUNITY

## 2018-02-22 RX ORDER — HEPARIN 100 UNIT/ML
500 SYRINGE INTRAVENOUS
Status: CANCELLED | OUTPATIENT
Start: 2018-03-02

## 2018-02-22 RX ORDER — LANOLIN ALCOHOL/MO/W.PET/CERES
500 CREAM (GRAM) TOPICAL DAILY
Qty: 30 TABLET | Refills: 5 | Status: SHIPPED | OUTPATIENT
Start: 2018-02-22 | End: 2018-03-22 | Stop reason: DRUGHIGH

## 2018-02-22 NOTE — PROGRESS NOTES
"Subjective:      Patient ID: Stefan Forbes Jr. is a 62 y.o. male.    Chief Complaint: Follow-up    61 yo with Patient Active Problem List:     Controlled type 2 diabetes mellitus without complication, without long-term current use of insulin     Hypertension associated with diabetes     Hyperlipidemia associated with type 2 diabetes mellitus     COPD (chronic obstructive pulmonary disease)     CAD (coronary artery disease)     Vitamin D deficiency     Osteoarthritis of knee     Pulmonary nodule     Nevus of choroid of right eye     Other pulmonary embolism without acute cor pulmonale     Hyponatremia     Iron deficiency anemia due to chronic blood loss    Past Medical History:  No date: COPD (chronic obstructive pulmonary disease)  No date: Diabetes mellitus, type 2  No date: Hyperlipidemia  No date: Hypertension    Here today management of mult med problems as below.  Compliant with meds without significant side effects. Reports no further w/u rec by renal. Feeling well overall today. Energy good and appetite good.             Review of Systems   Constitutional: Negative for chills and fever.   HENT: Negative for ear pain and sore throat.    Respiratory: Negative for cough.    Cardiovascular: Negative for chest pain.   Gastrointestinal: Negative for abdominal pain and blood in stool.   Genitourinary: Negative for dysuria and hematuria.   Neurological: Negative for seizures and syncope.     Objective:   /68 (BP Location: Right arm, Patient Position: Sitting)   Pulse 67   Temp 96 °F (35.6 °C) (Tympanic)   Ht 5' 8" (1.727 m)   Wt 107.2 kg (236 lb 5.3 oz)   SpO2 98%   BMI 35.93 kg/m²     Physical Exam   Constitutional: He is oriented to person, place, and time. He appears well-developed and well-nourished. No distress.   HENT:   Head: Normocephalic and atraumatic.   Mouth/Throat: Oropharynx is clear and moist.   Eyes: EOM are normal. Pupils are equal, round, and reactive to light.   Neck: Neck supple. No " thyromegaly present.   Cardiovascular: Normal rate and regular rhythm.    Pulmonary/Chest: Breath sounds normal. He has no wheezes. He has no rales.   Abdominal: Soft. Bowel sounds are normal. There is no tenderness.   Musculoskeletal: He exhibits edema.   Lymphadenopathy:     He has no cervical adenopathy.   Neurological: He is alert and oriented to person, place, and time.   Skin: Skin is warm and dry.   Psychiatric: He has a normal mood and affect. His behavior is normal.       Assessment:     1. Hypertension associated with diabetes    2. Hyperlipidemia associated with type 2 diabetes mellitus    3. Coronary artery disease involving native coronary artery of native heart without angina pectoris    4. Controlled type 2 diabetes mellitus without complication, without long-term current use of insulin    5. Pulmonary nodule    6. Hyponatremia    7. Chronic obstructive pulmonary disease, unspecified COPD type      Plan:   Hypertension associated with diabetes  Controlled    Hyperlipidemia associated with type 2 diabetes mellitus  stable    Coronary artery disease involving native coronary artery of native heart without angina pectoris  Stable cont current meds    Controlled type 2 diabetes mellitus without complication, without long-term current use of insulin  -     Microalbumin/creatinine urine ratio; Future; Expected date: 02/22/2018    Pulmonary nodule  Stable  Cont recs and f/u as per heme/onc    Hyponatremia  -     Basic metabolic panel; Future; Expected date: 02/22/2018  -     Basic metabolic panel; Future; Expected date: 02/22/2018    Chronic obstructive pulmonary disease, unspecified COPD type    [facility] get last [facility]  Decrease torsemide to 10mg every other day  Recheck Na today and in 2 weeks.     Lab Frequency Next Occurrence   Hemoglobin A1c Once 03/11/2018   Hemoglobin A1c Once 06/12/2018   Microalbumin/creatinine urine ratio Once 08/10/2018       Problem List Items  Addressed This Visit        Pulmonary    COPD (chronic obstructive pulmonary disease)    Pulmonary nodule    Overview     stable              Cardiac/Vascular    Hypertension associated with diabetes - Primary    Hyperlipidemia associated with type 2 diabetes mellitus    CAD (coronary artery disease)       Renal/    Hyponatremia    Relevant Orders    Basic metabolic panel (Completed)    Basic metabolic panel       Endocrine    Controlled type 2 diabetes mellitus without complication, without long-term current use of insulin    Relevant Orders    Microalbumin/creatinine urine ratio (Completed)          Follow-up in about 4 weeks (around 3/22/2018), or if symptoms worsen or fail to improve.

## 2018-02-22 NOTE — PROGRESS NOTES
Hematology/Oncology Office Note    Reason for referral:  PE      CC:  Bilateral pulmonary emboli    Referred by:  No ref. provider found    Diagnosis:  Provoked Bilateral pulmonary emboli (left knee replacement)  Stable pulmonary nodules  SIADH  Chronic/worsening headaches    History of present illness:  62-year-old gentleman with a history of COPD, coronary artery disease, diabetes mellitus type 2, dyslipidemia, hypertension, and pulmonary nodules which is been stable on serial CT scans.  He underwent left knee replacement on 10/17/2017 and developed shortness of breath approximately 4-5 days after the surgery.  He was evaluated with a CTA on 11/2/17 and noted to have bilateral pulmonary emboli.  He was placed on Eliquis and is currently taking Eliquis 5 mg by mouth twice a day.      I have reviewed and updated the HPI, ROS, PMHx, Social Hx, Family Hx and treatment history.    Today's visit:  Patient presents today with complaints of bilateral lower extremity edema..  He reports that edema has been persistent over the previous 2 months.  He denies worsening shortness of breath, palpitations, or chest pain.    Past Medical History:   Diagnosis Date    COPD (chronic obstructive pulmonary disease)     Diabetes mellitus, type 2     Hyperlipidemia     Hypertension          Social History:  Former smoker quit 12/2015  No alcohol or illicit drugs      Family History: family history includes Cancer in his father; Diabetes in his mother.    HPI    Review of Systems   Constitutional: Negative for activity change, appetite change, fatigue, fever and unexpected weight change.   HENT: Negative for congestion, facial swelling, nosebleeds, rhinorrhea, sore throat and trouble swallowing.    Respiratory: Negative for apnea, cough, shortness of breath and stridor.    Cardiovascular: Negative for chest pain, palpitations and leg swelling.   Gastrointestinal: Negative for abdominal distention, abdominal pain, constipation,  diarrhea, nausea and vomiting.   Endocrine: Negative for cold intolerance, heat intolerance, polydipsia, polyphagia and polyuria.   Genitourinary: Negative for decreased urine volume, difficulty urinating, frequency, genital sores, hematuria, testicular pain and urgency.   Musculoskeletal: Negative for arthralgias, gait problem and joint swelling.   Skin: Negative for color change, rash and wound.   Allergic/Immunologic: Negative for immunocompromised state.   Neurological: Negative for dizziness, syncope, facial asymmetry, speech difficulty, weakness, light-headedness, numbness and headaches.   Hematological: Negative for adenopathy. Does not bruise/bleed easily.   Psychiatric/Behavioral: Negative for agitation, confusion, decreased concentration and hallucinations. The patient is not nervous/anxious and is not hyperactive.        Objective:       Vitals:    02/22/18 1127   BP: 112/68   Pulse: 67   Resp: 18   Temp: 96 °F (35.6 °C)       Physical Exam   Constitutional: He is oriented to person, place, and time. He appears well-developed and well-nourished.  Non-toxic appearance. He does not appear ill. No distress.   HENT:   Head: Normocephalic and atraumatic.   Right Ear: Tympanic membrane and ear canal normal.   Left Ear: Tympanic membrane and ear canal normal.   Nose: Nose normal. Right sinus exhibits no maxillary sinus tenderness and no frontal sinus tenderness. Left sinus exhibits no maxillary sinus tenderness and no frontal sinus tenderness.   Mouth/Throat: Uvula is midline, oropharynx is clear and moist and mucous membranes are normal. Normal dentition.   Eyes: Conjunctivae, EOM and lids are normal. Pupils are equal, round, and reactive to light. Right conjunctiva is not injected. Right conjunctiva has no hemorrhage. Left conjunctiva is not injected. Left conjunctiva has no hemorrhage. No scleral icterus.   Neck: Normal range of motion. Neck supple. No JVD present. No spinous process tenderness and no  muscular tenderness present. No tracheal deviation present. No thyromegaly present.   Cardiovascular: Normal rate, regular rhythm, normal heart sounds and intact distal pulses.    No murmur heard.  Pulmonary/Chest: Effort normal and breath sounds normal. No accessory muscle usage. No respiratory distress. He has no decreased breath sounds. He has no wheezes. He has no rhonchi. He has no rales. He exhibits no tenderness.   Abdominal: Soft. Bowel sounds are normal. He exhibits no distension and no mass. There is no hepatosplenomegaly. There is no tenderness. There is no rebound and no guarding.   Musculoskeletal: Normal range of motion. He exhibits edema (mild bilateral lower extremity edema).   Lymphadenopathy:     He has no cervical adenopathy.     He has no axillary adenopathy.        Right: No supraclavicular adenopathy present.        Left: No supraclavicular adenopathy present.   Neurological: He is alert and oriented to person, place, and time. No cranial nerve deficit. He exhibits normal muscle tone.   Skin: Skin is warm, dry and intact. Capillary refill takes less than 2 seconds. No rash noted. He is not diaphoretic. No cyanosis. Nails show no clubbing.   Psychiatric: He has a normal mood and affect. His speech is normal and behavior is normal. Judgment and thought content normal.       Lab Results   Component Value Date    WBC 6.64 02/15/2018    HGB 12.0 (L) 02/15/2018    HCT 37.6 (L) 02/15/2018    MCV 89 02/15/2018     02/15/2018     Lab Results   Component Value Date    CREATININE 1.0 02/15/2018    BUN 22 (H) 02/15/2018     (L) 02/15/2018    K 4.4 02/15/2018     02/15/2018    CO2 27 02/15/2018     Lab Results   Component Value Date    ALT 12 02/15/2018    AST 8 (L) 02/15/2018    ALKPHOS 75 02/15/2018    BILITOT 0.3 02/15/2018         Assessment:           62-year-old gentleman with a history of pulmonary nodules, SIADH, and diabetes mellitus who was been referred for provoked bilateral  pulmonary emboli.  He is currently on Eliquis 5 mg twice a day and tolerating treatment well without bleeding complications.    He presents today with persistent lower extremity edema and elevated d-dimer noted on labs performed 2 days ago.  We will proceed with bilateral lower extremity ultrasound and continue Eliquis 5 mg by mouth twice a day in the meantime.  He will follow-up in 2 weeks to discuss results of ultrasound    Bilateral lower extremity edema:  --Proceed with bilateral lower extremity ultrasound  --Continue Eliquis    Iron deficiency anemia:  --Injectafer 750 mg IV ×2 doses  --Proceed with colonoscopy after discontinuing Eliquis      Provoked Pulmonary emboli (left knee replacement):  --Continue Eliquis 5 mg by mouth twice a day  --Follow-up in 2 weeks to discuss results of ultrasound  --Proceed with colonoscopy after completing anticoagulation    Bilateral pulmonary nodules:  Pulmonary nodules are too small to be avid on PET scan  --continue serial CT scans    SIADH:  --Continue to monitor pulmonary nodules  --Needs colonoscopy after completing 3 months of anticoagulation

## 2018-02-25 DIAGNOSIS — F41.9 ANXIETY: ICD-10-CM

## 2018-02-25 RX ORDER — BUPROPION HYDROCHLORIDE 150 MG/1
TABLET, EXTENDED RELEASE ORAL
Qty: 90 TABLET | Refills: 3 | Status: SHIPPED | OUTPATIENT
Start: 2018-02-25 | End: 2019-04-01

## 2018-02-26 ENCOUNTER — TELEPHONE (OUTPATIENT)
Dept: PULMONOLOGY | Facility: CLINIC | Age: 63
End: 2018-02-26

## 2018-02-26 NOTE — TELEPHONE ENCOUNTER
Pt's wife called wanting to see what dates pts labs and appointment were scheduled for. Informed her that pt has labs on 3/8/18 and appointment on 3/22/18 with Dr. Fisher. Pt's wife verbalized understanding.

## 2018-02-26 NOTE — TELEPHONE ENCOUNTER
----- Message from Chris Vaughn sent at 2/26/2018 10:57 AM CST -----  Contact: Wife [phone]  Would like to consult with nurse regarding questions for breathing test.  Please call back at [phone].  Md Khoi

## 2018-02-26 NOTE — TELEPHONE ENCOUNTER
----- Message from Irma Stovall sent at 2/26/2018 10:54 AM CST -----  Contact: Lisa - Wife  Request a call concerning the pt lab appts, can be reached at [phone]///[email or web address]

## 2018-02-27 ENCOUNTER — PROCEDURE VISIT (OUTPATIENT)
Dept: PULMONOLOGY | Facility: CLINIC | Age: 63
End: 2018-02-27
Payer: COMMERCIAL

## 2018-02-27 VITALS — WEIGHT: 237 LBS | BODY MASS INDEX: 35.92 KG/M2 | HEIGHT: 68 IN

## 2018-02-27 DIAGNOSIS — J44.9 CHRONIC OBSTRUCTIVE PULMONARY DISEASE, UNSPECIFIED COPD TYPE: ICD-10-CM

## 2018-02-27 DIAGNOSIS — J44.9 COPD (CHRONIC OBSTRUCTIVE PULMONARY DISEASE): Primary | ICD-10-CM

## 2018-02-27 LAB
ALLENS TEST: ABNORMAL
DELSYS: ABNORMAL
HCO3 UR-SCNC: 24.2 MMOL/L (ref 24–28)
MODE: ABNORMAL
PCO2 BLDA: 39.9 MMHG (ref 35–45)
PH SMN: 7.39 [PH] (ref 7.35–7.45)
PO2 BLDA: 74 MMHG (ref 80–100)
POC BE: -1 MMOL/L
POC SATURATED O2: 95 % (ref 95–100)
POST FEF 25 75: 0.87 L/S (ref 2.04–3.56)
POST FET 100: 16.02 S
POST FEV1 FVC: 53 %
POST FEV1: 2.35 L (ref 3–3.74)
POST FIF 50: 2.38 L/S
POST FVC: 4.45 L (ref 4.02–4.9)
POST PEF: 4.61 L/S (ref 7.71–9.9)
PRE DLCO: 16.11 ML/MMHG/MIN (ref 22.51–30.8)
PRE ERV: 0.88 L
PRE FEF 25 75: 0.92 L/S (ref 2.04–3.56)
PRE FET 100: 11.3 S
PRE FEV1 FVC: 54 %
PRE FEV1: 2.01 L (ref 3–3.74)
PRE FIF 50: 3.94 L/S
PRE FRC PL: 5.53 L (ref 2.35–3.56)
PRE FVC: 3.72 L (ref 4.02–4.9)
PRE KROGHS K: 2.32 1/MIN
PRE PEF: 4.74 L/S (ref 7.71–9.9)
PRE RV: 4.7 L (ref 1.84–2.63)
PRE SVC: 3.72 L
PRE TLC: 8.42 L (ref 5.68–6.68)
PREDICTED DLCO: 26.65 ML/MMHG/MIN (ref 22.51–30.8)
PREDICTED FEV1 FVC: 75.67 % (ref 70.83–80.51)
PREDICTED FEV1: 3.37 L (ref 3–3.74)
PREDICTED FRC N2: 2.95 L (ref 2.35–3.56)
PREDICTED FRC PL: 2.95 L (ref 2.35–3.56)
PREDICTED FVC: 4.46 L (ref 4.02–4.9)
PREDICTED RV: 2.23 L (ref 1.84–2.63)
PREDICTED SVC: 4.03 L
PREDICTED TLC: 6.18 L (ref 5.68–6.68)
PROVOCATION PROTOCOL: ABNORMAL
SAMPLE: ABNORMAL
SITE: ABNORMAL

## 2018-02-27 PROCEDURE — 94618 PULMONARY STRESS TESTING: CPT | Mod: S$GLB,,, | Performed by: INTERNAL MEDICINE

## 2018-02-27 PROCEDURE — 94060 EVALUATION OF WHEEZING: CPT | Mod: S$GLB,,, | Performed by: INTERNAL MEDICINE

## 2018-02-27 PROCEDURE — 82803 BLOOD GASES ANY COMBINATION: CPT | Mod: S$GLB,,, | Performed by: INTERNAL MEDICINE

## 2018-02-27 PROCEDURE — 94727 GAS DIL/WSHOT DETER LNG VOL: CPT | Mod: S$GLB,,, | Performed by: INTERNAL MEDICINE

## 2018-02-27 PROCEDURE — 94726 PLETHYSMOGRAPHY LUNG VOLUMES: CPT | Mod: S$GLB,,, | Performed by: INTERNAL MEDICINE

## 2018-02-27 PROCEDURE — 36600 WITHDRAWAL OF ARTERIAL BLOOD: CPT | Mod: S$GLB,,, | Performed by: INTERNAL MEDICINE

## 2018-02-27 NOTE — PROCEDURES
"[facility]  Six Minute Walk     SUMMARY     Ordering Provider: Dr Wells   Interpreting Provider: Dr Wells  Performing nurse/tech/RT: ILIR Clinton  Diagnosis: COPD  Height: 5' 8" (172.7 cm)  Weight: 107.5 kg (237 lb)  BMI (Calculated): 36.1   Patient Race:             Phase Oxygen Assessment Supplemental O2 Heart   Rate Blood Pressure Huong Dyspnea Scale Rating   Resting 97 % Room Air 74 bpm 109/60 3   Exercise        Minute        1 92 % Room Air 96 bpm     2 94 % Room Air 96 bpm     3 94 % Room Air 106 bpm     4 94 % Room Air 104 bpm     5 96 % Room Air 104 bpm     6  96 % Room Air 97 bpm 108/59 5-6   Recovery        Minute        1 97 % Room Air 82 bpm     2 97 % Room Air 74 bpm     3 97 % Room Air 72 bpm     4 97 % Room Air 71 bpm 116/61 3     Six Minute Walk Summary  6MWT Status: completed without stopping  Patient Reported: No complaints     Interpretation:  Did the patient stop or pause?: No        Total Time Walked (Calculated): 360 seconds  Final Partial Lap Distance (feet): 0 feet  Total Distance Meters (Calculated): 365.76 meters  Predicted Distance Meters (Calculated): 507.94 meters  Percentage of Predicted (Calculated): 72.01  Peak VO2 (Calculated): 14.95  Mets: 4.27  Has The Patient Had a Previous Six Minute Walk Test?: No       Previous 6MWT Results  Has The Patient Had a Previous Six Minute Walk Test?: No      REPORT    CLINICAL INTERPRETATION:  Six minute walk distance is 365.76m (72.01 % predicted) with moderate dyspnea.  During exercise, there mild desaturation while breathing room air.  Blood pressure remained stable with walking..  The patient had no non-pulmonary symptoms during exercise.  The patient did complete the study, walking 360 seconds of the 360 second test.  No previous study performed.  Based upon age and body mass index, exercise capacity is normal.    Mazin Wells MD    "

## 2018-02-28 PROCEDURE — 94762 N-INVAS EAR/PLS OXIMTRY CONT: CPT | Mod: S$GLB,,, | Performed by: INTERNAL MEDICINE

## 2018-02-28 NOTE — PROCEDURES
[facility]  [address] * [place] [address]  Telephone: [phone]  Test date: 18 Start: 18 00:46:50 Stefan Forbes  Doctor: DR Wells End: 18 08:05:10 [id number]  Oximetry: Summary Report  Comments: Overnight study breathing room air.  Recording time: 07:18:20 Highest pulse: 92 Highest SpO2: 98%  Excluded samplin:04:16 Lowest pulse: 54 Lowest SpO2: 81%  Total valid samplin:14:04 Mean pulse: 64 Mean SpO2: 90.5%  1 S.D.: 3.6 1 S.D.: 2.2  Time with SpO2<90: 1:55:32, 26.6%  Time with SpO2<80: 0:00:00, 0.0%  Time with SpO2<70: 0:00:00, 0.0%  Time with SpO2<60: 0:00:00, 0.0%  Time with SpO2<88: 0:37:32, 8.6%  Time with SpO2 =>90: 5:18:32, 73.4%  Time with SpO2=>80 & <90: 1:55:32, 26.6%  Time with SpO2=>70 & <80: 0:00:00, 0.0%  Time with SpO2=>60 & <70: 0:00:00, 0.0%  The longest continuous time with saturation <=88 was 00:08:36, which started at  18 04:21:34.  A desaturation event was defined as a decrease of saturation by 4 or more.  One event was excluded due to artifact.  There were 21 desaturation events over 3 minutes duration.  There were 33 desaturation events of less than 3 minutes duration during which:  The mean high was 92.1%. The mean low was 86.5%.  The number of these events that were:  > 0 & <10 seconds: 0 > 0 seconds: 33  =>10 & <20 seconds: 3 =>10 seconds: 33  =>20 & <30 seconds: 6 =>20 seconds: 30  =>30 & <40 seconds: 3 =>30 seconds: 24  =>40 & <50 seconds: 6 =>40 seconds: 21  =>50 & <60 seconds: 2 =>50 seconds: 15  =>60 seconds: 13 =>60 seconds: 13  The mean length of desaturation events that were >=10 sec & <=3 mins was: 60.4 sec.  Desaturation event index (events >=10 sec per sampled hour): 4.6  Desaturation event index (events >= 0 sec per sampled hour): 4.6    REPORT    OVERNIGHT OXIMETRY REPORT:    Dictated by: Mazin Wells MD  Test date: 2018  Dictated on: 2018      Comment: This test was performed on Room Air     A desaturation  event was defined as a decrease of saturation by 4 or more.    REPORT SUMMARY  Total valid samplin:14:04   High SpO2: 98%    Low SpO2: 81%    Mean SpO2  90.5 %  Cumulative time with oxygen saturation less than 88% (TC88): 00:37:32    CLINICAL INTERPRETATION   There is  significant nocturnal oxygen desaturation,  Clinical correlation is advised and  Recommend overnight polysomnography if clinically indicated    Medicare Criteria Comments:   Oximetry test results suggest the patient falls under Medicare Group 1 Criteria. ( Arterial oxygen saturation at or below 88% for at least 5 minutes taken during sleep)  Mazin Wells MD    Details about Medicare Group Criteria coverage can be found at http://www.cms.hhs.gov/manuals/downloads/

## 2018-03-01 ENCOUNTER — INFUSION (OUTPATIENT)
Dept: INFUSION THERAPY | Facility: HOSPITAL | Age: 63
End: 2018-03-01
Attending: INTERNAL MEDICINE
Payer: COMMERCIAL

## 2018-03-01 ENCOUNTER — HOSPITAL ENCOUNTER (OUTPATIENT)
Dept: RADIOLOGY | Facility: HOSPITAL | Age: 63
Discharge: HOME | End: 2018-03-01
Attending: INTERNAL MEDICINE
Payer: COMMERCIAL

## 2018-03-01 VITALS
OXYGEN SATURATION: 97 % | DIASTOLIC BLOOD PRESSURE: 72 MMHG | HEART RATE: 62 BPM | RESPIRATION RATE: 16 BRPM | SYSTOLIC BLOOD PRESSURE: 120 MMHG | TEMPERATURE: 98 F

## 2018-03-01 DIAGNOSIS — I26.99 OTHER ACUTE PULMONARY EMBOLISM WITHOUT ACUTE COR PULMONALE: ICD-10-CM

## 2018-03-01 DIAGNOSIS — G44.89 OTHER HEADACHE SYNDROME: ICD-10-CM

## 2018-03-01 DIAGNOSIS — R60.0 BILATERAL LOWER EXTREMITY EDEMA: ICD-10-CM

## 2018-03-01 DIAGNOSIS — D64.9 NORMOCYTIC ANEMIA: ICD-10-CM

## 2018-03-01 DIAGNOSIS — D50.0 IRON DEFICIENCY ANEMIA DUE TO CHRONIC BLOOD LOSS: Primary | ICD-10-CM

## 2018-03-01 PROCEDURE — 25000003 PHARM REV CODE 250: Mod: PO | Performed by: INTERNAL MEDICINE

## 2018-03-01 PROCEDURE — 93970 EXTREMITY STUDY: CPT | Mod: 26,,, | Performed by: RADIOLOGY

## 2018-03-01 PROCEDURE — 96365 THER/PROPH/DIAG IV INF INIT: CPT | Mod: PO

## 2018-03-01 PROCEDURE — 63600175 PHARM REV CODE 636 W HCPCS: Mod: JG,PO | Performed by: INTERNAL MEDICINE

## 2018-03-01 PROCEDURE — 93970 EXTREMITY STUDY: CPT | Mod: TC,PO

## 2018-03-01 RX ORDER — DULAGLUTIDE 0.75 MG/.5ML
INJECTION, SOLUTION SUBCUTANEOUS
Qty: 4 SYRINGE | Refills: 3 | Status: SHIPPED | OUTPATIENT
Start: 2018-03-01 | End: 2018-06-13 | Stop reason: SDUPTHER

## 2018-03-01 RX ADMIN — FERRIC CARBOXYMALTOSE INJECTION 750 MG: 50 INJECTION, SOLUTION INTRAVENOUS at 01:03

## 2018-03-01 NOTE — PLAN OF CARE
Problem: Patient Care Overview  Goal: Plan of Care Review  Outcome: Ongoing (interventions implemented as appropriate)  I'm feeling fine. Nothing going on.

## 2018-03-01 NOTE — PATIENT INSTRUCTIONS
[facility]  [address]  [phone] phone     [phone] fax  Hours of Operation: Monday- Friday 8:00am- 5:00pm  After hours phone  [phone]  Hematology / Oncology Physicians on call      Dr. Norris Power                        Please call with any concerns regarding your appointment today.

## 2018-03-08 ENCOUNTER — LAB VISIT (OUTPATIENT)
Dept: LAB | Facility: HOSPITAL | Age: 63
End: 2018-03-08
Attending: INTERNAL MEDICINE
Payer: COMMERCIAL

## 2018-03-08 ENCOUNTER — PATIENT OUTREACH (OUTPATIENT)
Dept: ADMINISTRATIVE | Facility: HOSPITAL | Age: 63
End: 2018-03-08

## 2018-03-08 ENCOUNTER — OFFICE VISIT (OUTPATIENT)
Dept: SLEEP MEDICINE | Facility: CLINIC | Age: 63
End: 2018-03-08
Payer: COMMERCIAL

## 2018-03-08 ENCOUNTER — INFUSION (OUTPATIENT)
Dept: INFUSION THERAPY | Facility: HOSPITAL | Age: 63
End: 2018-03-08
Attending: INTERNAL MEDICINE
Payer: COMMERCIAL

## 2018-03-08 ENCOUNTER — OFFICE VISIT (OUTPATIENT)
Dept: HEMATOLOGY/ONCOLOGY | Facility: CLINIC | Age: 63
End: 2018-03-08
Payer: COMMERCIAL

## 2018-03-08 VITALS — HEART RATE: 61 BPM | SYSTOLIC BLOOD PRESSURE: 124 MMHG | DIASTOLIC BLOOD PRESSURE: 74 MMHG

## 2018-03-08 VITALS
RESPIRATION RATE: 18 BRPM | HEART RATE: 63 BPM | DIASTOLIC BLOOD PRESSURE: 62 MMHG | OXYGEN SATURATION: 96 % | HEIGHT: 68 IN | SYSTOLIC BLOOD PRESSURE: 120 MMHG | BODY MASS INDEX: 36.62 KG/M2 | WEIGHT: 241.63 LBS

## 2018-03-08 VITALS
HEART RATE: 75 BPM | WEIGHT: 242.06 LBS | DIASTOLIC BLOOD PRESSURE: 60 MMHG | TEMPERATURE: 98 F | OXYGEN SATURATION: 98 % | BODY MASS INDEX: 36.69 KG/M2 | RESPIRATION RATE: 18 BRPM | SYSTOLIC BLOOD PRESSURE: 124 MMHG | HEIGHT: 68 IN

## 2018-03-08 DIAGNOSIS — R91.1 PULMONARY NODULE: ICD-10-CM

## 2018-03-08 DIAGNOSIS — E87.1 HYPONATREMIA: ICD-10-CM

## 2018-03-08 DIAGNOSIS — D50.0 IRON DEFICIENCY ANEMIA DUE TO CHRONIC BLOOD LOSS: Primary | ICD-10-CM

## 2018-03-08 DIAGNOSIS — G47.33 SLEEP APNEA, OBSTRUCTIVE: ICD-10-CM

## 2018-03-08 DIAGNOSIS — D53.9 NUTRITIONAL ANEMIA: ICD-10-CM

## 2018-03-08 DIAGNOSIS — D50.0 IRON DEFICIENCY ANEMIA DUE TO CHRONIC BLOOD LOSS: ICD-10-CM

## 2018-03-08 DIAGNOSIS — G47.34 NOCTURNAL OXYGEN DESATURATION: ICD-10-CM

## 2018-03-08 DIAGNOSIS — E11.9 CONTROLLED TYPE 2 DIABETES MELLITUS WITHOUT COMPLICATION, WITHOUT LONG-TERM CURRENT USE OF INSULIN: ICD-10-CM

## 2018-03-08 DIAGNOSIS — J44.9 MODERATE COPD (CHRONIC OBSTRUCTIVE PULMONARY DISEASE): Primary | ICD-10-CM

## 2018-03-08 DIAGNOSIS — D64.9 NORMOCYTIC ANEMIA: ICD-10-CM

## 2018-03-08 DIAGNOSIS — G44.89 OTHER HEADACHE SYNDROME: Primary | ICD-10-CM

## 2018-03-08 DIAGNOSIS — I26.99 OTHER ACUTE PULMONARY EMBOLISM WITHOUT ACUTE COR PULMONALE: ICD-10-CM

## 2018-03-08 LAB
ANION GAP SERPL CALC-SCNC: 10 MMOL/L
BUN SERPL-MCNC: 12 MG/DL
CALCIUM SERPL-MCNC: 9.4 MG/DL
CHLORIDE SERPL-SCNC: 102 MMOL/L
CO2 SERPL-SCNC: 25 MMOL/L
CREAT SERPL-MCNC: 0.9 MG/DL
EST. GFR  (AFRICAN AMERICAN): >60 ML/MIN/1.73 M^2
EST. GFR  (NON AFRICAN AMERICAN): >60 ML/MIN/1.73 M^2
GLUCOSE SERPL-MCNC: 110 MG/DL
POTASSIUM SERPL-SCNC: 4.5 MMOL/L
SODIUM SERPL-SCNC: 137 MMOL/L

## 2018-03-08 PROCEDURE — 3074F SYST BP LT 130 MM HG: CPT | Mod: S$GLB,,, | Performed by: INTERNAL MEDICINE

## 2018-03-08 PROCEDURE — 83036 HEMOGLOBIN GLYCOSYLATED A1C: CPT

## 2018-03-08 PROCEDURE — 3078F DIAST BP <80 MM HG: CPT | Mod: S$GLB,,, | Performed by: INTERNAL MEDICINE

## 2018-03-08 PROCEDURE — 36415 COLL VENOUS BLD VENIPUNCTURE: CPT | Mod: PO

## 2018-03-08 PROCEDURE — 99215 OFFICE O/P EST HI 40 MIN: CPT | Mod: S$GLB,,, | Performed by: INTERNAL MEDICINE

## 2018-03-08 PROCEDURE — 99999 PR PBB SHADOW E&M-EST. PATIENT-LVL III: CPT | Mod: PBBFAC,,, | Performed by: INTERNAL MEDICINE

## 2018-03-08 PROCEDURE — 63600175 PHARM REV CODE 636 W HCPCS: Mod: JG,PO | Performed by: INTERNAL MEDICINE

## 2018-03-08 PROCEDURE — 25000003 PHARM REV CODE 250: Mod: PO | Performed by: INTERNAL MEDICINE

## 2018-03-08 PROCEDURE — 99214 OFFICE O/P EST MOD 30 MIN: CPT | Mod: S$GLB,,, | Performed by: INTERNAL MEDICINE

## 2018-03-08 PROCEDURE — 96365 THER/PROPH/DIAG IV INF INIT: CPT | Mod: PO

## 2018-03-08 PROCEDURE — 80048 BASIC METABOLIC PNL TOTAL CA: CPT

## 2018-03-08 RX ORDER — FLUTICASONE FUROATE AND VILANTEROL 200; 25 UG/1; UG/1
1 POWDER RESPIRATORY (INHALATION) DAILY
Qty: 60 EACH | Refills: 11 | Status: SHIPPED | OUTPATIENT
Start: 2018-03-08 | End: 2018-10-19

## 2018-03-08 RX ADMIN — FERRIC CARBOXYMALTOSE INJECTION 750 MG: 50 INJECTION, SOLUTION INTRAVENOUS at 12:03

## 2018-03-08 NOTE — PATIENT INSTRUCTIONS
[facility]  [address]  [phone] phone     [phone] fax  Hours of Operation: Monday- Friday 8:00am- 5:00pm  After hours phone  [phone]  Hematology / Oncology Physicians on call      Dr. Norris Power                        Please call with any concerns regarding your appointment today.

## 2018-03-08 NOTE — ASSESSMENT & PLAN NOTE
2 nodules RUl: 5.5mm  35-40 pack year smoker, quit 8 months ago  Imaging 07/2016  Need follow up 12 months  Oct 2018

## 2018-03-08 NOTE — PROGRESS NOTES
Hematology/Oncology Office Note    Reason for referral:  PE      CC:  Bilateral pulmonary emboli    Referred by:  No ref. provider found    Diagnosis:  Provoked Bilateral pulmonary emboli (left knee replacement)  Stable pulmonary nodules  SIADH  Chronic/worsening headaches    History of present illness:  62-year-old gentleman with a history of COPD, coronary artery disease, diabetes mellitus type 2, dyslipidemia, hypertension, and pulmonary nodules which is been stable on serial CT scans.  He underwent left knee replacement on 10/17/2017 and developed shortness of breath approximately 4-5 days after the surgery.  He was evaluated with a CTA on 11/2/17 and noted to have bilateral pulmonary emboli.  He was placed on Eliquis and is currently taking Eliquis 5 mg by mouth twice a day.      I have reviewed and updated the HPI, ROS, PMHx, Social Hx, Family Hx and treatment history.    Today's visit:  Patient presents today with complaints of bilateral lower extremity edema..  He reports that edema has been persistent over the previous 2 months.  He denies worsening shortness of breath, palpitations, or chest pain.    Past Medical History:   Diagnosis Date    COPD (chronic obstructive pulmonary disease)     Diabetes mellitus, type 2     Hyperlipidemia     Hypertension          Social History:  Former smoker quit 12/2015  No alcohol or illicit drugs      Family History: family history includes Cancer in his father; Diabetes in his mother.    HPI    Review of Systems   Constitutional: Negative for activity change, appetite change, fatigue, fever and unexpected weight change.   HENT: Negative for congestion, facial swelling, nosebleeds, rhinorrhea, sore throat and trouble swallowing.    Respiratory: Negative for apnea, cough, shortness of breath and stridor.    Cardiovascular: Negative for chest pain, palpitations and leg swelling.   Gastrointestinal: Negative for abdominal distention, abdominal pain, constipation,  diarrhea, nausea and vomiting.   Endocrine: Negative for cold intolerance, heat intolerance, polydipsia, polyphagia and polyuria.   Genitourinary: Negative for decreased urine volume, difficulty urinating, frequency, genital sores, hematuria, testicular pain and urgency.   Musculoskeletal: Negative for arthralgias, gait problem and joint swelling.   Skin: Negative for color change, rash and wound.   Allergic/Immunologic: Negative for immunocompromised state.   Neurological: Negative for dizziness, syncope, facial asymmetry, speech difficulty, weakness, light-headedness, numbness and headaches.   Hematological: Negative for adenopathy. Does not bruise/bleed easily.   Psychiatric/Behavioral: Negative for agitation, confusion, decreased concentration and hallucinations. The patient is not nervous/anxious and is not hyperactive.        Objective:       Vitals:    03/08/18 1132   BP: 124/60   Pulse: 75   Resp: 18   Temp: 97.6 °F (36.4 °C)       Physical Exam   Constitutional: He is oriented to person, place, and time. He appears well-developed and well-nourished.  Non-toxic appearance. He does not appear ill. No distress.   HENT:   Head: Normocephalic and atraumatic.   Right Ear: Tympanic membrane and ear canal normal.   Left Ear: Tympanic membrane and ear canal normal.   Nose: Nose normal. Right sinus exhibits no maxillary sinus tenderness and no frontal sinus tenderness. Left sinus exhibits no maxillary sinus tenderness and no frontal sinus tenderness.   Mouth/Throat: Uvula is midline, oropharynx is clear and moist and mucous membranes are normal. Normal dentition.   Eyes: Conjunctivae, EOM and lids are normal. Pupils are equal, round, and reactive to light. Right conjunctiva is not injected. Right conjunctiva has no hemorrhage. Left conjunctiva is not injected. Left conjunctiva has no hemorrhage. No scleral icterus.   Neck: Normal range of motion. Neck supple. No JVD present. No spinous process tenderness and no  muscular tenderness present. No tracheal deviation present. No thyromegaly present.   Cardiovascular: Normal rate, regular rhythm, normal heart sounds and intact distal pulses.    No murmur heard.  Pulmonary/Chest: Effort normal and breath sounds normal. No accessory muscle usage. No respiratory distress. He has no decreased breath sounds. He has no wheezes. He has no rhonchi. He has no rales. He exhibits no tenderness.   Abdominal: Soft. Bowel sounds are normal. He exhibits no distension and no mass. There is no hepatosplenomegaly. There is no tenderness. There is no rebound and no guarding.   Musculoskeletal: Normal range of motion. He exhibits edema (mild bilateral lower extremity edema).   Lymphadenopathy:     He has no cervical adenopathy.     He has no axillary adenopathy.        Right: No supraclavicular adenopathy present.        Left: No supraclavicular adenopathy present.   Neurological: He is alert and oriented to person, place, and time. No cranial nerve deficit. He exhibits normal muscle tone.   Skin: Skin is warm, dry and intact. Capillary refill takes less than 2 seconds. No rash noted. He is not diaphoretic. No cyanosis. Nails show no clubbing.   Psychiatric: He has a normal mood and affect. His speech is normal and behavior is normal. Judgment and thought content normal.       Lab Results   Component Value Date    WBC 6.64 02/15/2018    HGB 12.0 (L) 02/15/2018    HCT 37.6 (L) 02/15/2018    MCV 89 02/15/2018     02/15/2018     Lab Results   Component Value Date    CREATININE 1.2 02/22/2018    BUN 15 02/22/2018     02/22/2018    K 5.0 02/22/2018     02/22/2018    CO2 27 02/22/2018     Lab Results   Component Value Date    ALT 12 02/15/2018    AST 8 (L) 02/15/2018    ALKPHOS 75 02/15/2018    BILITOT 0.3 02/15/2018         Assessment:           62-year-old gentleman with a history of pulmonary nodules, SIADH, and diabetes mellitus who was been referred for provoked bilateral pulmonary  emboli.  He is currently on Eliquis 5 mg twice a day and tolerating treatment well without bleeding complications.    He presented with persistent lower extremity edema and elevated d-dimer.  We repeated bilateral lower extremity ultrasound which was negative for thrombosis.  Lower extremity symptoms have improved over the previous 2 weeks.  Therefore, I have recommended that the patient discontinue Eliquis and follow-up with GI for endoscopies to evaluate etiology of iron deficiency.  He will receive second dose of Feraheme today and follow-up in 2-3 months with repeat CBC/iron studies      Bilateral lower extremity edema:  Spontaneously improving with negative bilateral lower extremity ultrasound      Iron deficiency anemia:  --Will receive second dose of Injectafer today  --He will follow[facility] to arrange endoscopies to evaluate etiology of iron deficiency    Provoked Pulmonary emboli (left knee replacement):  --Patient is completed 6 months of anticoagulation  --Okay to discontinue Eliquis    Bilateral pulmonary nodules:  Pulmonary nodules are too small to be avid on PET scan  --Plan to repeat CT scans in 10/2018    Critical access hospital:  --Continue to monitor pulmonary nodules

## 2018-03-08 NOTE — PROGRESS NOTES
SUBJECTIVE:     Patient is a 62 y.o. male presents with Moderate COPD diagnosis.  CAT score 19. mRC score 1-2  Former smoker 35-40 pack year smoking history  All results reveiwed  Has signioficant destaturation at night: oxygen ordered  Will need Oxygen  FEV1 declined,  Will add ultra LABA ICS  He had bilateral total knee replacement last year and after that developed pulmonary embolus.  Venous thromboembolic disease and was provoked  He is currently in [facility] for a six-month.  I have reviewed the patient's medical history in detail and updated the computerized patient record.            Chief Complaint   Patient presents with    COPD       Review of patient's allergies indicates:  No Known Allergies    Current Outpatient Prescriptions   Medication Sig Dispense Refill    acetaminophen (TYLENOL) 500 MG tablet Take 500 mg by mouth as needed.      albuterol (ACCUNEB) 0.63 mg/3 mL Nebu Take 0.63 mg by nebulization every 6 (six) hours as needed. Rescue      albuterol (PROVENTIL) 2.5 mg /3 mL (0.083 %) nebulizer solution Take 3 mLs (2.5 mg total) by nebulization every 6 (six) hours as needed for Wheezing. Rescue 1 Box 3    amLODIPine (NORVASC) 5 MG tablet TAKE ONE TABLET BY MOUTH ONCE DAILY 30 tablet 1    blood sugar diagnostic (BLOOD GLUCOSE TEST) Strp 1 strip by Misc.(Non-Drug; Combo Route) route 3 (three) times daily. (Patient taking differently: 1 strip by Misc.(Non-Drug; Combo Route) route once daily. ) 100 each 2    BLOOD-GLUCOSE METER (CONTOUR NEXT EZ METER MISC) by Misc.(Non-Drug; Combo Route) route.      buPROPion (WELLBUTRIN SR) 150 MG TBSR 12 hr tablet TAKE ONE TABLET BY MOUTH ONCE DAILY 90 tablet 3    cetirizine (ZYRTEC) 10 MG tablet Take 10 mg by mouth every evening.      cholecalciferol, vitamin D3, (VITAMIN D3) 1,000 unit capsule Take 1,000 Units by mouth once daily.      cyanocobalamin (VITAMIN B-12) 1000 MCG tablet Take 0.5 tablets (500 mcg total) by mouth once daily. 30 tablet 5     fluticasone (FLONASE) 50 mcg/actuation nasal spray 1 spray (50 mcg total) by Each Nare route once daily. 1 Bottle 3    guaifenesin (MUCINEX) 1,200 mg Ta12 Take 1 tablet by mouth 2 (two) times daily.      lancets Misc 1 lancet by Misc.(Non-Drug; Combo Route) route 2 (two) times daily. (Patient taking differently: 1 lancet by Misc.(Non-Drug; Combo Route) route once daily. ) 100 each 2    metFORMIN (GLUCOPHAGE) 1000 MG tablet TAKE ONE TABLET BY MOUTH TWICE DAILY WITH MEALS 180 tablet 2    methscopolamine (PAMINE) 2.5 mg Tab Take orally as directed:  1/2 pill or 1 pill every 12 hours, if needed. 10 tablet 3    NITROGLYCERIN (NITROSTAT SL) Place 1 tablet under the tongue as needed.      omeprazole (PRILOSEC) 40 MG capsule TAKE ONE CAPSULE BY MOUTH ONCE DAILY 30 capsule 4    PROAIR HFA 90 mcg/actuation inhaler INHALE TWO PUFFS BY MOUTH EVERY 6 HOURS AS NEEDED FOR WHEEZING OR SHORTNESS OF BREATH 9 each 3    rosuvastatin (CRESTOR) 10 MG tablet Take 1 tablet (10 mg total) by mouth once daily. 90 tablet 1    sodium chloride (OCEAN NASAL) 0.65 % nasal spray 1 spray by Nasal route as needed for Congestion. 1 Bottle 12    SPIRIVA WITH HANDIHALER 18 mcg inhalation capsule INHALE ONE DOSE BY MOUTH ONCE DAILY 30 capsule 11    tamsulosin (FLOMAX) 0.4 mg Cp24 Take 1 capsule by mouth once daily.      torsemide (DEMADEX) 20 MG Tab Take 1 tablet by mouth every other day.   0    TRULICITY 0.75 mg/0.5 mL PnIj INJECT 0.5ML(0.75MG TOTAL) INTO THE SKIN EVERY 7 DAYS 4 Syringe 3    fluticasone-vilanterol (BREO ELLIPTA) 200-25 mcg/dose DsDv diskus inhaler Inhale 1 puff into the lungs once daily. Controller 60 each 11     No current facility-administered medications for this visit.        Past Medical History:   Diagnosis Date    COPD (chronic obstructive pulmonary disease)     Diabetes mellitus, type 2     Hyperlipidemia     Hypertension      Past Surgical History:   Procedure Laterality Date    ANGIOPLASTY      TOTAL KNEE  "ARTHROPLASTY       Family History   Problem Relation Age of Onset    Diabetes Mother     Cancer Father      bone     Social History   Substance Use Topics    Smoking status: Former Smoker     Types: Cigars     Quit date: 12/30/2015    Smokeless tobacco: Never Used    Alcohol use No        Review of Systems:  Review of Systems   Constitutional: Negative.    HENT: Positive for congestion.    Eyes: Negative.    Respiratory: Positive for apnea, cough, shortness of breath and wheezing.         [place] score 16  Comorbidity: HTN, DM, CAD, COPD, Hypoxemia   Cardiovascular: Negative.    Gastrointestinal: Negative.    Endocrine: Negative.    Genitourinary: Negative.    Musculoskeletal: Negative.    Skin: Negative.    Allergic/Immunologic: Negative.    Neurological: Negative.    Hematological: Negative.    Psychiatric/Behavioral: Positive for sleep disturbance.       OBJECTIVE:     Vital Signs (Most Recent)  Pulse: 63 (03/08/18 1309)  Resp: 18 (03/08/18 1309)  BP: 120/62 (03/08/18 1309)  SpO2: 96 % (03/08/18 1309)  5' 8" (1.727 m)  109.6 kg (241 lb 10 oz)     Physical Exam:  Physical Exam   Constitutional: He is oriented to person, place, and time. He appears well-developed and well-nourished.   HENT:   Head: Normocephalic and atraumatic.   Nose: Mucosal edema and rhinorrhea present.   Mouth/Throat: Oropharynx is clear and moist. No oropharyngeal exudate.   Eyes: Conjunctivae and EOM are normal. Pupils are equal, round, and reactive to light. Left eye exhibits no discharge. No scleral icterus.   Neck: Normal range of motion. Neck supple. No tracheal deviation present. No thyromegaly present.   Cardiovascular: Normal rate, regular rhythm, normal heart sounds and intact distal pulses.    No murmur heard.  Pulmonary/Chest: Effort normal. He has wheezes.   Abdominal: Soft. Bowel sounds are normal. He exhibits no distension.   Musculoskeletal: Normal range of motion. He exhibits no tenderness or deformity.   Neurological: He " is alert and oriented to person, place, and time. No cranial nerve deficit.   Skin: Skin is warm and dry. Capillary refill takes 2 to 3 seconds.   Psychiatric: He has a normal mood and affect.   Nursing note and vitals reviewed.      Laboratory  CBC: Reviewed  BMP: Reviewed    Diagnostic Results:  PFT  FEV1 declined from 765 to 60%  FVC declined from 90% to 84%    FEV1 2.01( 60%), FVC 3.72( 84%)  FEV1/FVC 54  TLC 8.42( 136%)  DLCO 16.1( 60%)    6MWD  CLINICAL INTERPRETATION:  Six minute walk distance is 365.76m (72.01 % predicted) with   moderate dyspnea.  During exercise, there mild desaturation while breathing room   air.  Blood pressure remained stable with walking..  The patient had no non-pulmonary symptoms during exercise.  The patient did complete the study, walking 360 seconds of the   360 second test.  No previous study performed.  Based upon age and body mass index, exercise capacity is normal    Overnight sat  REPORT SUMMARY  Total valid samplin:14:04   High SpO2: 98%    Low SpO2: 81%    Mean SpO2  90.5 %  Cumulative time with oxygen saturation less than 88% (TC88):   00:37:32    CLINICAL INTERPRETATION   There is  significant nocturnal oxygen desaturation,  Clinical   correlation is advised and  Recommend overnight polysomnography   if clinically indicated    Medicare Criteria Comments:   Oximetry test results suggest the patient falls under Medicare   Group 1 Criteria. ( Arterial oxygen saturation at or below 88%   for at least 5 minutes taken during sleep)  Mazin Wells MD    Arterial Blood Gas result:  pO2 74; pCO2 39.9; pH 7.39;  HCO3 24.2, %O2 Sat 95.    ASSESSMENT/PLAN:     Problem List Items Addressed This Visit     Moderate COPD (chronic obstructive pulmonary disease) - Primary     CAT score 19  Decline in FEV1  Added ultraLABA/ICS         Relevant Medications    fluticasone-vilanterol (BREO ELLIPTA) 200-25 mcg/dose DsDv diskus inhaler    Other Relevant Orders    OXYGEN FOR HOME USE     Polysomnogram (CPAP will be added if patient meets diagnostic criteria.)    Pulmonary nodule     2 nodules RUl: 5.5mm  35-40 pack year smoker, quit 8 months ago  Imaging 07/2016  Need follow up 12 months  Oct 2018           Other Visit Diagnoses     Nocturnal oxygen desaturation        Relevant Orders    OXYGEN FOR HOME USE    Polysomnogram (CPAP will be added if patient meets diagnostic criteria.)    Sleep apnea, obstructive        Snoring, Apnea, Low SpO2 overnight    Relevant Orders    Polysomnogram (CPAP will be added if patient meets diagnostic criteria.)          PLAN:Plan      Add BREO ellipta  Home Nebuliser  Home oxygen orders  Sleep study  GROUP B mRC 2, CAT score > 10      Follow-up in about 6 weeks (around 4/19/2018) for Incruse/breo/anoro / Trelegy coupon, Follow up [facility] after test PSG/HSAT/CPAP.    This note was prepared using voice recognition system and is likely to have sound alike errors that may have been overlooked even after proof reading.  Please call me with any questions    Discussed diagnosis, its evaluation, treatment and usual course. All questions answered.    Thank you for the courtesy of participating in the care of this patient    Mazin Wells MD

## 2018-03-08 NOTE — PATIENT INSTRUCTIONS
Fluticasone; Vilanterol inhalation powder  What is this medicine?  FLUTICASONE; VILANTEROL (floo TIK a sone; vye BRANDY ter ol) inhalation is a combination of two medicines that decrease inflammation and help to open up the airways of your lungs. It is for chronic obstructive pulmonary disease (COPD), including chronic bronchitis or emphysema. It is also used for asthma in adults to help control symptoms. Do NOT use for an acute asthma attack or COPD attack.  How should I use this medicine?  This medicine is inhaled through the mouth. It is used once per day. Follow the directions on the prescription label. Do not use a spacer device with this inhaler. Take your medicine at regular intervals. Do not take your medicine more often than directed. Do not stop taking except on your doctor's advice. Make sure that you are using your inhaler correctly. Ask you doctor or health care provider if you have any questions.  A special MedGuide will be given to you by the pharmacist with each prescription and refill. Be sure to read this information carefully each time.  Talk to your pediatrician regarding the use of this medicine in children. Special care may be needed. This medicine is not approved for use in children under 18 years of age.  What side effects may I notice from receiving this medicine?  Side effects that you should report to your doctor or health care professional as soon as possible:  · allergic reactions like skin rash or hives, swelling of the face, lips, or tongue  · breathing problems right after inhaling your medicine  · changes in vision  · chest pain  · fast, irregular heartbeat  · feeling faint or lightheaded, falls  · fever or chills  · nausea, vomiting  · tiredness  Side effects that usually do not require medical attention (Report these to your doctor or health care professional if they continue or are bothersome.):  · cough  · headache  · nervousness  · sore throat  · tremor  What may interact with  this medicine?  Do not take this medicine with any of the following medications:  · cisapride  · dofetilide  · dronedarone  · MAOIs like Carbex, Eldepryl, Marplan, Nardil, and Parnate  · pimozide  · thioridazine  · ziprasidone  This medicine may also interact with the following medications:  · antiviral medicines for HIV or AIDS  · beta-blockers like metoprolol and propranolol  · certain medicines for depression, anxiety, or psychotic disturbances  · diuretics  · medicines for colds  · medicines for fungal infections like ketoconazole and itraconazole  · other medicines for breathing problems  · other medicines that prolong the QT interval (cause an abnormal heart rhythm)  What if I miss a dose?  If you miss a dose, use it as soon as you can. If it is almost time for your next dose, use only that dose and continue with your regular schedule. Do not use double or extra doses.  Where should I keep my medicine?  Keep out of the reach of children.  Store at room temperature between 15 and 30 degrees C (59 and 86 degrees F). Store in a dry place away from direct heat or sunlight. Throw away 6 weeks after you remove the inhaler from the foil tray, or after the dose indicator reads 0, whichever comes first. Throw away any unopened packages after the expiration date.  What should I tell my health care provider before I take this medicine?  They need to know if you have any of these conditions:  · bone problems  · immune system problems  · diabetes  · heart disease or irregular heartbeat  · high blood pressure  · infection  · pheochromocytoma  · seizures  · thyroid disease  · an unusual or allergic reaction to fluticasone, vilanterol, milk proteins, corticosteroids, other medicines, foods, dyes, or preservatives  · pregnant or trying to get pregnant  · breast-feeding  What should I watch for while using this medicine?  Visit your doctor or health care professional for regular checkups. Tell your doctor or health care  professional if your symptoms do not get better.  If your symptoms get worse or if you need your short-acting inhalers more often, call your doctor right away. Do not use this medicine more than every 24 hours.  If you are going to have surgery tell your doctor or health care professional that you are using this medicine. Try not to come in contact with people with the chicken pox or measles. If you do, call your doctor.  NOTE:This sheet is a summary. It may not cover all possible information. If you have questions about this medicine, talk to your doctor, pharmacist, or health care provider. Copyright© 2017 [employer]

## 2018-03-08 NOTE — PLAN OF CARE
Problem: Patient Care Overview  Goal: Plan of Care Review  Outcome: Ongoing (interventions implemented as appropriate)  I feel just fine.

## 2018-03-09 LAB
ESTIMATED AVG GLUCOSE: 131 MG/DL
HBA1C MFR BLD HPLC: 6.2 %

## 2018-03-14 ENCOUNTER — TELEPHONE (OUTPATIENT)
Dept: PULMONOLOGY | Facility: CLINIC | Age: 63
End: 2018-03-14

## 2018-03-15 ENCOUNTER — TELEPHONE (OUTPATIENT)
Dept: PULMONOLOGY | Facility: CLINIC | Age: 63
End: 2018-03-15

## 2018-03-15 NOTE — TELEPHONE ENCOUNTER
----- Message from Madalyn Grajeda sent at 3/15/2018 12:54 PM CDT -----  Contact: wife Lisa  Calling concerning the [employer] company were suppose to call to bring oxygen did no response. Please call wife to advise @ [phone]. Thanks, jm

## 2018-03-16 NOTE — TELEPHONE ENCOUNTER
Informed pt wife what Kerri in DME said. Pt wife said that company had already called her and taking care of them.

## 2018-03-22 ENCOUNTER — OFFICE VISIT (OUTPATIENT)
Dept: INTERNAL MEDICINE | Facility: CLINIC | Age: 63
End: 2018-03-22
Payer: COMMERCIAL

## 2018-03-22 ENCOUNTER — LAB VISIT (OUTPATIENT)
Dept: LAB | Facility: HOSPITAL | Age: 63
End: 2018-03-22
Attending: INTERNAL MEDICINE
Payer: COMMERCIAL

## 2018-03-22 VITALS
TEMPERATURE: 96 F | BODY MASS INDEX: 36.75 KG/M2 | HEIGHT: 68 IN | SYSTOLIC BLOOD PRESSURE: 112 MMHG | OXYGEN SATURATION: 98 % | DIASTOLIC BLOOD PRESSURE: 62 MMHG | HEART RATE: 70 BPM | WEIGHT: 242.5 LBS

## 2018-03-22 DIAGNOSIS — L25.9 CONTACT DERMATITIS, UNSPECIFIED CONTACT DERMATITIS TYPE, UNSPECIFIED TRIGGER: ICD-10-CM

## 2018-03-22 DIAGNOSIS — E87.1 HYPONATREMIA: Primary | ICD-10-CM

## 2018-03-22 DIAGNOSIS — I15.2 HYPERTENSION ASSOCIATED WITH DIABETES: ICD-10-CM

## 2018-03-22 DIAGNOSIS — E87.1 HYPONATREMIA: ICD-10-CM

## 2018-03-22 DIAGNOSIS — E11.59 HYPERTENSION ASSOCIATED WITH DIABETES: ICD-10-CM

## 2018-03-22 DIAGNOSIS — E11.9 CONTROLLED TYPE 2 DIABETES MELLITUS WITHOUT COMPLICATION, WITHOUT LONG-TERM CURRENT USE OF INSULIN: ICD-10-CM

## 2018-03-22 PROCEDURE — 3078F DIAST BP <80 MM HG: CPT | Mod: CPTII,S$GLB,, | Performed by: INTERNAL MEDICINE

## 2018-03-22 PROCEDURE — 99999 PR PBB SHADOW E&M-EST. PATIENT-LVL III: CPT | Mod: PBBFAC,,, | Performed by: INTERNAL MEDICINE

## 2018-03-22 PROCEDURE — 99214 OFFICE O/P EST MOD 30 MIN: CPT | Mod: S$GLB,,, | Performed by: INTERNAL MEDICINE

## 2018-03-22 PROCEDURE — 3044F HG A1C LEVEL LT 7.0%: CPT | Mod: CPTII,S$GLB,, | Performed by: INTERNAL MEDICINE

## 2018-03-22 PROCEDURE — 3074F SYST BP LT 130 MM HG: CPT | Mod: CPTII,S$GLB,, | Performed by: INTERNAL MEDICINE

## 2018-03-22 PROCEDURE — 36415 COLL VENOUS BLD VENIPUNCTURE: CPT | Mod: PO

## 2018-03-22 PROCEDURE — 80048 BASIC METABOLIC PNL TOTAL CA: CPT

## 2018-03-22 RX ORDER — CYANOCOBALAMIN (VITAMIN B-12) 500 MCG
500 TABLET ORAL DAILY
COMMUNITY

## 2018-03-22 NOTE — PROGRESS NOTES
Subjective:      Patient ID: Stefan Forbes Jr. is a 62 y.o. male.    Chief Complaint: Follow-up    63 yo with Patient Active Problem List:     Controlled type 2 diabetes mellitus without complication, without long-term current use of insulin     Hypertension associated with diabetes     Hyperlipidemia associated with type 2 diabetes mellitus     Moderate COPD (chronic obstructive pulmonary disease)     CAD (coronary artery disease)     Vitamin D deficiency     Osteoarthritis of knee     Pulmonary nodule     Nevus of choroid of right eye     Other pulmonary embolism without acute cor pulmonale     Hyponatremia     Iron deficiency anemia due to chronic blood loss    Past Medical History:  No date: COPD (chronic obstructive pulmonary disease)  No date: Diabetes mellitus, type 2  No date: Hyperlipidemia  No date: Hypertension    Here today for f/u for medical problems as outlined below.  Present for months to years.  hyponatremia has been present for several months.  Patient has seen nephrology and hematology without specific cause identified.  Patient does admit to having dry mouth he feels associated with medications that increase his drinking of water significantly.  His torsemide has been decreased to every other day and his sodium has remained in the mid 130s.  He is feeling well today.  He reports that he feels per Silvia and Brio have significantly improved his breathing.  Energy and appetite are good.      Rash   This is a new problem. The current episode started 1 to 4 weeks ago. The problem has been rapidly improving since onset. The affected locations include the left lower leg and right lower leg. The rash is characterized by redness and itchiness. He was exposed to nothing. Pertinent negatives include no anorexia, congestion, cough, eye pain, facial edema, fatigue, fever, joint pain, rhinorrhea, shortness of breath or sore throat. Past treatments include topical steroids. The treatment provided significant  "relief. His past medical history is significant for allergies.      Rash to legs starting 2 weeks ago. Much improved with otc cortisone cream.   Review of Systems   Constitutional: Negative for fatigue and fever.   HENT: Negative for congestion, rhinorrhea and sore throat.    Eyes: Negative for pain.   Respiratory: Negative for cough and shortness of breath.    Gastrointestinal: Negative for anorexia.   Musculoskeletal: Negative for joint pain.   Skin: Positive for rash.     Objective:   /62 (BP Location: Right arm, Patient Position: Sitting)   Pulse 70   Temp 96 °F (35.6 °C) (Tympanic)   Ht 5' 8" (1.727 m)   Wt 110 kg (242 lb 8.1 oz)   SpO2 98%   BMI 36.87 kg/m²     Physical Exam   Constitutional: He is oriented to person, place, and time. He appears well-developed and well-nourished. No distress.   HENT:   Head: Normocephalic and atraumatic.   Mouth/Throat: Oropharynx is clear and moist.   Eyes: EOM are normal. Pupils are equal, round, and reactive to light.   Neck: Neck supple. No thyromegaly present.   Cardiovascular: Normal rate and regular rhythm.    Pulmonary/Chest: Breath sounds normal. He has no wheezes. He has no rales.   Abdominal: Soft. Bowel sounds are normal. There is no tenderness.   Lymphadenopathy:     He has no cervical adenopathy.   Neurological: He is alert and oriented to person, place, and time.   Skin: Skin is warm and dry.   Multiple pinpoint scabbing type papules to bilateral distal lower legs.  No significant excoriations.  No discharge.  No fluctuance.  No surrounding erythema.   Psychiatric: He has a normal mood and affect. His behavior is normal.     No visits with results within 2 Week(s) from this visit.   Latest known visit with results is:   Lab Visit on 03/08/2018   Component Date Value Ref Range Status    Hemoglobin A1C 03/08/2018 6.2* 4.0 - 5.6 % Final    Comment: According to ADA guidelines, hemoglobin A1c <7.0% represents  optimal control in non-pregnant diabetic " patients. Different  metrics may apply to specific patient populations.   Standards of Medical Care in Diabetes-2016.  For the purpose of screening for the presence of diabetes:  <5.7%     Consistent with the absence of diabetes  5.7-6.4%  Consistent with increasing risk for diabetes   (prediabetes)  >or=6.5%  Consistent with diabetes  Currently, no consensus exists for use of hemoglobin A1c  for diagnosis of diabetes for children.  This Hemoglobin A1c assay has significant interference with fetal   hemoglobin   (HbF). The results are invalid for patients with abnormal amounts of   HbF,   including those with known Hereditary Persistence   of Fetal Hemoglobin. Heterozygous hemoglobin variants (HbAS, HbAC,   HbAD, HbAE, HbA2) do not significantly interfere with this assay;   however, presence of multiple variants in a sample may impact the %   interference.      Estimated Avg Glucose 03/08/2018 131  68 - 131 mg/dL Final    Sodium 03/08/2018 137  136 - 145 mmol/L Final    Potassium 03/08/2018 4.5  3.5 - 5.1 mmol/L Final    Chloride 03/08/2018 102  95 - 110 mmol/L Final    CO2 03/08/2018 25  23 - 29 mmol/L Final    Glucose 03/08/2018 110  70 - 110 mg/dL Final    BUN, Bld 03/08/2018 12  6 - 20 mg/dL Final    Creatinine 03/08/2018 0.9  0.5 - 1.4 mg/dL Final    Calcium 03/08/2018 9.4  8.7 - 10.5 mg/dL Final    Anion Gap 03/08/2018 10  8 - 16 mmol/L Final    eGFR if African American 03/08/2018 >60.0  >60 mL/min/1.73 m^2 Final    eGFR if non African American 03/08/2018 >60.0  >60 mL/min/1.73 m^2 Final    Comment: Calculation used to obtain the estimated glomerular filtration  rate (eGFR) is the CKD-EPI equation.          Assessment:     1. Hyponatremia    2. Hypertension associated with diabetes    3. Controlled type 2 diabetes mellitus without complication, without long-term current use of insulin    4. Contact dermatitis, unspecified contact dermatitis type, unspecified trigger      Plan:    Hyponatremia  Comments:  unclear cause  try stop torsemide  Orders:  -     Basic metabolic panel; Future; Expected date: 03/22/2018  -     Basic metabolic panel; Future; Expected date: 03/29/2018  -     Basic metabolic panel; Future; Expected date: 04/05/2018    Hypertension associated with diabetes  Stable.  Continue to monitor on decreased torsemide    Controlled type 2 diabetes mellitus without complication, without long-term current use of insulin  Continue current medications    Contact dermatitis, unspecified contact dermatitis type, unspecified trigger  Continue topical steroid.  Notify me if no resolution      Lab Frequency Next Occurrence   Hemoglobin A1c Once 06/12/2018   Microalbumin/creatinine urine ratio Once 08/10/2018   CBC auto differential Once 02/22/2018   Comprehensive metabolic panel Once 02/22/2018   Methylmalonic acid, serum Once 02/22/2018   D dimer, quantitative Once 02/22/2018   CBC auto differential Once 03/08/2018   Comprehensive metabolic panel Once 03/08/2018   Ferritin Once 03/08/2018   Iron and TIBC Once 03/08/2018   Lactate dehydrogenase Once 03/08/2018   Reticulocytes Once 03/08/2018   Vitamin B12 Once 03/08/2018       Problem List Items Addressed This Visit        Cardiac/Vascular    Hypertension associated with diabetes       Renal/    Hyponatremia - Primary    Relevant Orders    Basic metabolic panel    Basic metabolic panel    Basic metabolic panel       Endocrine    Controlled type 2 diabetes mellitus without complication, without long-term current use of insulin      Other Visit Diagnoses     Contact dermatitis, unspecified contact dermatitis type, unspecified trigger              Follow-up in about 4 weeks (around 4/19/2018), or if symptoms worsen or fail to improve.

## 2018-03-23 LAB
ANION GAP SERPL CALC-SCNC: 9 MMOL/L
BUN SERPL-MCNC: 8 MG/DL
CALCIUM SERPL-MCNC: 8.9 MG/DL
CHLORIDE SERPL-SCNC: 103 MMOL/L
CO2 SERPL-SCNC: 25 MMOL/L
CREAT SERPL-MCNC: 0.9 MG/DL
EST. GFR  (AFRICAN AMERICAN): >60 ML/MIN/1.73 M^2
EST. GFR  (NON AFRICAN AMERICAN): >60 ML/MIN/1.73 M^2
GLUCOSE SERPL-MCNC: 135 MG/DL
POTASSIUM SERPL-SCNC: 4.4 MMOL/L
SODIUM SERPL-SCNC: 137 MMOL/L

## 2018-03-28 ENCOUNTER — HOSPITAL ENCOUNTER (OUTPATIENT)
Dept: SLEEP MEDICINE | Facility: HOSPITAL | Age: 63
Discharge: HOME | End: 2018-03-28
Attending: INTERNAL MEDICINE
Payer: COMMERCIAL

## 2018-03-28 DIAGNOSIS — G47.34 NOCTURNAL OXYGEN DESATURATION: ICD-10-CM

## 2018-03-28 DIAGNOSIS — J44.9 MODERATE COPD (CHRONIC OBSTRUCTIVE PULMONARY DISEASE): ICD-10-CM

## 2018-03-28 DIAGNOSIS — G47.61 PLMD (PERIODIC LIMB MOVEMENT DISORDER): ICD-10-CM

## 2018-03-28 DIAGNOSIS — G47.33 SLEEP APNEA, OBSTRUCTIVE: ICD-10-CM

## 2018-03-28 PROCEDURE — 95810 POLYSOM 6/> YRS 4/> PARAM: CPT | Mod: 26,,, | Performed by: INTERNAL MEDICINE

## 2018-03-28 PROCEDURE — 95810 POLYSOM 6/> YRS 4/> PARAM: CPT

## 2018-03-28 NOTE — Clinical Note
"SUMMARY STATEMENTS: 1. Findings related to sleep diagnoses: " The overall AHI was 7.6 (50 events; Note:  AASM Rule A was used to score hypopneas:  30% decrease in PTAF airflow, plus a 3% desaturation or an arousal). " Oxygen saturations were below ? 88% for 35.5 minutes of the time spent asleep. " No Supine sleep recorded. REM AHI was 22.5/hr. 2. EEG abnormalities: " Moderate sleep efficiency. Reduced slow wave sleep. " Overall severe periodic limb movements with only mild arousal. " Overall sleep arousal was high: 31.1/hr. 3. ECG abnormalities: " Heart rate variability 4. Behavioral observations: a. Recording Tech Comments: 86.1% sleep in prone position.  INTERPRETATION:   1. Mild Obstructive sleep apnea overall, Moderate severity in REM sleep. 2. Moderate severe continuous hypoxemia during sleep. 3. Severity underestimated due to 86% sleep recorded in PRONE. 4. Obesity based on BMI     RECOMMENDATIONS:   1. Treatment intervention with CPAP indicated. Proceed with CPAP titration 2."

## 2018-03-29 ENCOUNTER — OFFICE VISIT (OUTPATIENT)
Dept: INTERNAL MEDICINE | Facility: CLINIC | Age: 63
End: 2018-03-29
Payer: COMMERCIAL

## 2018-03-29 ENCOUNTER — HOSPITAL ENCOUNTER (OUTPATIENT)
Dept: RADIOLOGY | Facility: HOSPITAL | Age: 63
Discharge: HOME | End: 2018-03-29
Attending: INTERNAL MEDICINE
Payer: COMMERCIAL

## 2018-03-29 VITALS
HEART RATE: 70 BPM | TEMPERATURE: 96 F | BODY MASS INDEX: 36.38 KG/M2 | SYSTOLIC BLOOD PRESSURE: 120 MMHG | WEIGHT: 240.06 LBS | DIASTOLIC BLOOD PRESSURE: 72 MMHG | OXYGEN SATURATION: 98 % | HEIGHT: 68 IN

## 2018-03-29 DIAGNOSIS — R60.0 LEG EDEMA, RIGHT: Primary | ICD-10-CM

## 2018-03-29 DIAGNOSIS — R60.0 LEG EDEMA, RIGHT: ICD-10-CM

## 2018-03-29 DIAGNOSIS — Z86.711 HISTORY OF PULMONARY EMBOLISM: ICD-10-CM

## 2018-03-29 PROCEDURE — 99999 PR PBB SHADOW E&M-EST. PATIENT-LVL III: CPT | Mod: PBBFAC,,, | Performed by: INTERNAL MEDICINE

## 2018-03-29 PROCEDURE — 3074F SYST BP LT 130 MM HG: CPT | Mod: CPTII,S$GLB,, | Performed by: INTERNAL MEDICINE

## 2018-03-29 PROCEDURE — 93971 EXTREMITY STUDY: CPT | Mod: 26,,, | Performed by: RADIOLOGY

## 2018-03-29 PROCEDURE — 3078F DIAST BP <80 MM HG: CPT | Mod: CPTII,S$GLB,, | Performed by: INTERNAL MEDICINE

## 2018-03-29 PROCEDURE — 99213 OFFICE O/P EST LOW 20 MIN: CPT | Mod: S$GLB,,, | Performed by: INTERNAL MEDICINE

## 2018-03-29 PROCEDURE — 93971 EXTREMITY STUDY: CPT | Mod: TC,PO

## 2018-03-29 RX ORDER — ASPIRIN 325 MG
325 TABLET ORAL DAILY
COMMUNITY

## 2018-03-30 NOTE — PROGRESS NOTES
"Subjective:      Patient ID: Stefan Forbes Jr. is a 62 y.o. male.    Chief Complaint: Leg Swelling (right)    63 yo with Patient Active Problem List:     Controlled type 2 diabetes mellitus without complication, without long-term current use of insulin     Hypertension associated with diabetes     Hyperlipidemia associated with type 2 diabetes mellitus     Moderate COPD (chronic obstructive pulmonary disease)     CAD (coronary artery disease)     Vitamin D deficiency     Osteoarthritis of knee     Pulmonary nodule     Nevus of choroid of right eye     Other pulmonary embolism without acute cor pulmonale     Hyponatremia     Iron deficiency anemia due to chronic blood loss    Past Medical History:  No date: COPD (chronic obstructive pulmonary disease)  No date: Diabetes mellitus, type 2  No date: Hyperlipidemia  No date: Hypertension    Here today c/o noticing increasing rle edema yesterday. Of note pt has been off of torsemide x one week. No tenderness or redness. No dyspnea.       Review of Systems   Constitutional: Negative for chills and fever.   HENT: Negative for ear pain and sore throat.    Respiratory: Negative for cough.    Cardiovascular: Negative for chest pain.   Gastrointestinal: Negative for abdominal pain and blood in stool.   Genitourinary: Negative for dysuria and hematuria.   Neurological: Negative for seizures and syncope.     Objective:   /72 (BP Location: Right arm, Patient Position: Sitting)   Pulse 70   Temp 96.2 °F (35.7 °C) (Tympanic)   Ht 5' 8" (1.727 m)   Wt 108.9 kg (240 lb 1.3 oz)   SpO2 98%   BMI 36.50 kg/m²     Physical Exam   Constitutional: He appears well-developed and well-nourished. No distress.   Cardiovascular: Normal rate.    Pulmonary/Chest: Effort normal and breath sounds normal.   Skin: Skin is warm and dry.   Psychiatric: He has a normal mood and affect. His behavior is normal.   rle with mild edema. Severe varicose veins. No ttp. No redness.     Assessment: "     1. Leg edema, right    2. History of pulmonary embolism      Plan:   Leg edema, right  -     US Lower Extremity Veins Right; Future; Expected date: 03/29/2018    History of pulmonary embolism  -     US Lower Extremity Veins Right; Future; Expected date: 03/29/2018        Lab Frequency Next Occurrence   Hemoglobin A1c Once 06/12/2018   Microalbumin/creatinine urine ratio Once 08/10/2018   CBC auto differential Once 02/22/2018   Comprehensive metabolic panel Once 02/22/2018   Methylmalonic acid, serum Once 02/22/2018   D dimer, quantitative Once 02/22/2018   CBC auto differential Once 03/08/2018   Comprehensive metabolic panel Once 03/08/2018   Ferritin Once 03/08/2018   Iron and TIBC Once 03/08/2018   Lactate dehydrogenase Once 03/08/2018   Reticulocytes Once 03/08/2018   Vitamin B12 Once 03/08/2018   Basic metabolic panel Once 04/05/2018       Problem List Items Addressed This Visit     None      Visit Diagnoses     Leg edema, right    -  Primary    Relevant Orders    US Lower Extremity Veins Right (Completed)    History of pulmonary embolism        Relevant Orders    US Lower Extremity Veins Right (Completed)          No Follow-up on file.

## 2018-04-01 ENCOUNTER — PATIENT MESSAGE (OUTPATIENT)
Dept: SLEEP MEDICINE | Facility: HOSPITAL | Age: 63
End: 2018-04-01

## 2018-04-01 DIAGNOSIS — G47.34 NOCTURNAL OXYGEN DESATURATION: ICD-10-CM

## 2018-04-01 DIAGNOSIS — G47.33 SLEEP APNEA, OBSTRUCTIVE: Primary | ICD-10-CM

## 2018-04-01 DIAGNOSIS — G47.61 PLMD (PERIODIC LIMB MOVEMENT DISORDER): ICD-10-CM

## 2018-04-02 DIAGNOSIS — J44.9 CHRONIC OBSTRUCTIVE PULMONARY DISEASE, UNSPECIFIED COPD TYPE: ICD-10-CM

## 2018-04-02 RX ORDER — ALBUTEROL SULFATE 0.83 MG/ML
SOLUTION RESPIRATORY (INHALATION)
Qty: 75 EACH | Refills: 2 | Status: SHIPPED | OUTPATIENT
Start: 2018-04-02 | End: 2018-05-29 | Stop reason: SDUPTHER

## 2018-04-02 NOTE — PROGRESS NOTES
SUMMARY STATEMENTS:  1. Findings related to sleep diagnoses:   The overall AHI was 7.6 (50 events; Note:  AASM Rule A was used to score hypopneas:  30% decrease in PTAF airflow, plus a 3% desaturation or an arousal).   Oxygen saturations were below ? 88% for 35.5 minutes of the time spent asleep.   No Supine sleep recorded. REM AHI was 22.5/hr.  2. EEG abnormalities:   Moderate sleep efficiency. Reduced slow wave sleep.   Overall severe periodic limb movements with only mild arousal.   Overall sleep arousal was high: 31.1/hr.  3. ECG abnormalities:   Heart rate variability  4. Behavioral observations:  a. Recording Tech Comments: 86.1% sleep in prone position.    INTERPRETATION:     1. Mild Obstructive sleep apnea overall, Moderate severity in REM sleep.  2. Moderate severe continuous hypoxemia during sleep.  3. Severity underestimated due to 86% sleep recorded in PRONE.  4. Obesity based on BMI          RECOMMENDATIONS:     1. Treatment intervention with CPAP indicated. Proceed with CPAP titration  2. Weight loss.    Yours Sincerely,    Dr. Mazin Wells,  Medical Director  [facility]

## 2018-04-02 NOTE — PROCEDURES
"SUMMARY STATEMENTS:  1. Findings related to sleep diagnoses:   The overall AHI was 7.6 (50 events; Note:  AASM Rule A was used to score hypopneas:  30% decrease in PTAF airflow, plus a 3% desaturation or an arousal).   Oxygen saturations were below ? 88% for 35.5 minutes of the time spent asleep.   No Supine sleep recorded. REM AHI was 22.5/hr.  2. EEG abnormalities:   Moderate sleep efficiency. Reduced slow wave sleep.   Overall severe periodic limb movements with only mild arousal.   Overall sleep arousal was high: 31.1/hr.  3. ECG abnormalities:   Heart rate variability  4. Behavioral observations:  a. Recording Tech Comments: 86.1% sleep in prone position.    INTERPRETATION:     1. Mild Obstructive sleep apnea overall, Moderate severity in REM sleep.  2. Moderate severe continuous hypoxemia during sleep.  3. Severity underestimated due to 86% sleep recorded in PRONE.  4. Obesity based on BMI          RECOMMENDATIONS:     1. Treatment intervention with CPAP indicated. Proceed with CPAP titration  2. Weight loss.    Yours Sincerely,    Dr. Mazin Wells,  Medical Director  [facility]      See imported Sleep Study result in "Chart Review" under the   "Media tab".      (This Sleep Study was interpreted by a Board Certified Sleep   Specialist who conducted an epoch-by-epoch review of the entire   raw data recording.)     (The indication for this sleep study was reviewed and deemed   appropriate by AASM Practice Parameters or other reasons by a   Board Certified Sleep Specialist.)    Mazin Wells MD      "

## 2018-04-04 ENCOUNTER — HOSPITAL ENCOUNTER (OUTPATIENT)
Dept: SLEEP MEDICINE | Facility: HOSPITAL | Age: 63
Discharge: HOME | End: 2018-04-04
Attending: INTERNAL MEDICINE
Payer: COMMERCIAL

## 2018-04-04 DIAGNOSIS — G47.61 PLMD (PERIODIC LIMB MOVEMENT DISORDER): ICD-10-CM

## 2018-04-04 DIAGNOSIS — G47.33 SLEEP APNEA, OBSTRUCTIVE: ICD-10-CM

## 2018-04-04 DIAGNOSIS — G47.34 NOCTURNAL OXYGEN DESATURATION: ICD-10-CM

## 2018-04-04 PROCEDURE — 95811 POLYSOM 6/>YRS CPAP 4/> PARM: CPT

## 2018-04-04 PROCEDURE — 95811 POLYSOM 6/>YRS CPAP 4/> PARM: CPT | Mod: 26,,, | Performed by: INTERNAL MEDICINE

## 2018-04-05 ENCOUNTER — LAB VISIT (OUTPATIENT)
Dept: LAB | Facility: HOSPITAL | Age: 63
End: 2018-04-05
Attending: INTERNAL MEDICINE
Payer: COMMERCIAL

## 2018-04-05 DIAGNOSIS — E87.1 HYPONATREMIA: ICD-10-CM

## 2018-04-05 LAB
ANION GAP SERPL CALC-SCNC: 9 MMOL/L
BUN SERPL-MCNC: 10 MG/DL
CALCIUM SERPL-MCNC: 9.1 MG/DL
CHLORIDE SERPL-SCNC: 102 MMOL/L
CO2 SERPL-SCNC: 27 MMOL/L
CREAT SERPL-MCNC: 0.9 MG/DL
EST. GFR  (AFRICAN AMERICAN): >60 ML/MIN/1.73 M^2
EST. GFR  (NON AFRICAN AMERICAN): >60 ML/MIN/1.73 M^2
GLUCOSE SERPL-MCNC: 159 MG/DL
POTASSIUM SERPL-SCNC: 4.3 MMOL/L
SODIUM SERPL-SCNC: 138 MMOL/L

## 2018-04-05 PROCEDURE — 80048 BASIC METABOLIC PNL TOTAL CA: CPT

## 2018-04-05 PROCEDURE — 36415 COLL VENOUS BLD VENIPUNCTURE: CPT | Mod: PO

## 2018-04-06 ENCOUNTER — PATIENT MESSAGE (OUTPATIENT)
Dept: SLEEP MEDICINE | Facility: HOSPITAL | Age: 63
End: 2018-04-06

## 2018-04-06 DIAGNOSIS — G47.33 SLEEP APNEA, OBSTRUCTIVE: Primary | ICD-10-CM

## 2018-04-06 DIAGNOSIS — E11.69 HYPERLIPIDEMIA ASSOCIATED WITH TYPE 2 DIABETES MELLITUS: ICD-10-CM

## 2018-04-06 DIAGNOSIS — E78.5 HYPERLIPIDEMIA ASSOCIATED WITH TYPE 2 DIABETES MELLITUS: ICD-10-CM

## 2018-04-06 RX ORDER — ROSUVASTATIN CALCIUM 10 MG/1
TABLET, COATED ORAL
Qty: 90 TABLET | Refills: 1 | Status: SHIPPED | OUTPATIENT
Start: 2018-04-06 | End: 2018-10-08

## 2018-04-07 NOTE — PROCEDURES
"SUMMARY STATEMENTS:  1. Findings related to sleep diagnoses:   This was a full night CPAP titration study.  CPAP was initiated at 4 cm with a standard ResMed Mirage FX nasal mask.  C-flex (set to 3) and heated humidification were used throughout.   CPAP 5 cm water pressure was adequately tolerated with REM sleep.   Saturation rolo was 89.0%.  2. EEG abnormalities:   Normal sleep latency normal REM latency.  Sleep efficiency was moderate.  Sleep architecture reduced slow-wave sleep.   Arousal was moderate: 23.2 events per hour.  Mild periodic limb movements with arousal overall.   No alpha intrusion.  3. ECG abnormalities:   Bradycardia      INTERPRETATION:     1. Mild to moderate periodic limb movements with arousal  2. Adequate CPAP titration with a 5-cm water pressure  3. Reversal of hypoxemia related to sleep disorder breathing  4. Bradycardia noted    RECOMMENDATIONS:     1. Commenced therapy with CPAP 5 cm water pressure and mask of choice.  2. Close follow-up to ensure resolution of symptoms  3. Clinical correlation for restless leg symptoms.  Check serum ferritin.    Yours Sincerely,    Dr. Mazin Wells,  Medical Director  [facility]          See imported Sleep Study result in "Chart Review" under the   "Media tab".      (This Sleep Study was interpreted by a Board Certified Sleep   Specialist who conducted an epoch-by-epoch review of the entire   raw data recording.)     (The indication for this sleep study was reviewed and deemed   appropriate by AASM Practice Parameters or other reasons by a   Board Certified Sleep Specialist.)    Mazin Wells MD      "

## 2018-04-07 NOTE — PROGRESS NOTES
SUMMARY STATEMENTS:  1. Findings related to sleep diagnoses:   This was a full night CPAP titration study.  CPAP was initiated at 4 cm with a standard ResMed Mirage FX nasal mask.  C-flex (set to 3) and heated humidification were used throughout.   CPAP 5 cm water pressure was adequately tolerated with REM sleep.   Saturation rolo was 89.0%.  2. EEG abnormalities:   Normal sleep latency normal REM latency.  Sleep efficiency was moderate.  Sleep architecture reduced slow-wave sleep.   Arousal was moderate: 23.2 events per hour.  Mild periodic limb movements with arousal overall.   No alpha intrusion.  3. ECG abnormalities:   Bradycardia      INTERPRETATION:     1. Mild to moderate periodic limb movements with arousal  2. Adequate CPAP titration with a 5-cm water pressure  3. Reversal of hypoxemia related to sleep disorder breathing  4. Bradycardia noted    RECOMMENDATIONS:     1. Commenced therapy with CPAP 5 cm water pressure and mask of choice.  2. Close follow-up to ensure resolution of symptoms  3. Clinical correlation for restless leg symptoms.  Check serum ferritin.    Yours Sincerely,    Dr. Mazin Wells,  Medical Director  [facility]

## 2018-04-13 ENCOUNTER — OFFICE VISIT (OUTPATIENT)
Dept: PULMONOLOGY | Facility: CLINIC | Age: 63
End: 2018-04-13
Payer: COMMERCIAL

## 2018-04-13 VITALS
SYSTOLIC BLOOD PRESSURE: 125 MMHG | WEIGHT: 248.25 LBS | OXYGEN SATURATION: 98 % | BODY MASS INDEX: 37.62 KG/M2 | HEIGHT: 68 IN | RESPIRATION RATE: 18 BRPM | HEART RATE: 68 BPM | DIASTOLIC BLOOD PRESSURE: 60 MMHG

## 2018-04-13 DIAGNOSIS — J44.9 MODERATE COPD (CHRONIC OBSTRUCTIVE PULMONARY DISEASE): ICD-10-CM

## 2018-04-13 DIAGNOSIS — G47.61 PLMD (PERIODIC LIMB MOVEMENT DISORDER): ICD-10-CM

## 2018-04-13 DIAGNOSIS — G47.33 SLEEP APNEA, OBSTRUCTIVE: Primary | ICD-10-CM

## 2018-04-13 DIAGNOSIS — R91.1 PULMONARY NODULE: ICD-10-CM

## 2018-04-13 PROCEDURE — 3078F DIAST BP <80 MM HG: CPT | Mod: CPTII,S$GLB,, | Performed by: INTERNAL MEDICINE

## 2018-04-13 PROCEDURE — 3074F SYST BP LT 130 MM HG: CPT | Mod: CPTII,S$GLB,, | Performed by: INTERNAL MEDICINE

## 2018-04-13 PROCEDURE — 99214 OFFICE O/P EST MOD 30 MIN: CPT | Mod: S$GLB,,, | Performed by: INTERNAL MEDICINE

## 2018-04-13 PROCEDURE — 99999 PR PBB SHADOW E&M-EST. PATIENT-LVL III: CPT | Mod: PBBFAC,,, | Performed by: INTERNAL MEDICINE

## 2018-04-13 NOTE — PROGRESS NOTES
SUBJECTIVE:     Patient is a 62 y.o. male presents with Moderate COPD diagnosis.  Has MACHO on CPAP   AHI was 7.6/hr and REM AHI was 22.5/hr  Spo2 was below 88% or 35.5 mins  CPAP 5 cm was adequate pressure  Goals of PAP discussed  Also has PLM: 54.0/hr and PLMAI 8.5/hr  COPD stable  CAT score 8. mRC score 1-2  Former smoker 35-40 pack year smoking history  All results reviewed: Meds BREO ellipta and Spiriva    He had bilateral total knee replacement last year and after that developed pulmonary embolus.  Venous thromboembolic disease and was provoked  He is currently in [facility] for a six-month.  I have reviewed the patient's medical history in detail and updated the computerized patient record.            Chief Complaint   Patient presents with    COPD    Sleep Apnea       Review of patient's allergies indicates:  No Known Allergies    Current Outpatient Prescriptions   Medication Sig Dispense Refill    acetaminophen (TYLENOL) 500 MG tablet Take 500 mg by mouth as needed.      albuterol (ACCUNEB) 0.63 mg/3 mL Nebu Take 0.63 mg by nebulization every 6 (six) hours as needed. Rescue      albuterol (PROVENTIL) 2.5 mg /3 mL (0.083 %) nebulizer solution USE 1 VIAL IN NEBULIZER EVERY 6 HOURS AS NEEDED FOR WHEEZING /RESCUE 75 each 2    amLODIPine (NORVASC) 5 MG tablet TAKE ONE TABLET BY MOUTH ONCE DAILY 30 tablet 1    aspirin 325 MG tablet Take 325 mg by mouth once daily.      blood sugar diagnostic (BLOOD GLUCOSE TEST) Strp 1 strip by Misc.(Non-Drug; Combo Route) route 3 (three) times daily. (Patient taking differently: 1 strip by Misc.(Non-Drug; Combo Route) route once daily. ) 100 each 2    BLOOD-GLUCOSE METER (CONTOUR NEXT EZ METER MISC) by Misc.(Non-Drug; Combo Route) route.      buPROPion (WELLBUTRIN SR) 150 MG TBSR 12 hr tablet TAKE ONE TABLET BY MOUTH ONCE DAILY 90 tablet 3    cetirizine (ZYRTEC) 10 MG tablet Take 10 mg by mouth every evening.      cholecalciferol, vitamin D3, (VITAMIN D3) 1,000 unit  capsule Take 1,000 Units by mouth once daily.      cyanocobalamin (VITAMIN B-12) 500 MCG tablet Take 500 mcg by mouth once daily.      fluticasone (FLONASE) 50 mcg/actuation nasal spray 1 spray (50 mcg total) by Each Nare route once daily. 1 Bottle 3    fluticasone-vilanterol (BREO ELLIPTA) 200-25 mcg/dose DsDv diskus inhaler Inhale 1 puff into the lungs once daily. Controller 60 each 11    guaifenesin (MUCINEX) 1,200 mg Ta12 Take 1 tablet by mouth 2 (two) times daily.      lancets Misc 1 lancet by Misc.(Non-Drug; Combo Route) route 2 (two) times daily. (Patient taking differently: 1 lancet by Misc.(Non-Drug; Combo Route) route once daily. ) 100 each 2    metFORMIN (GLUCOPHAGE) 1000 MG tablet TAKE ONE TABLET BY MOUTH TWICE DAILY WITH MEALS 180 tablet 2    methscopolamine (PAMINE) 2.5 mg Tab Take orally as directed:  1/2 pill or 1 pill every 12 hours, if needed. 10 tablet 3    NITROGLYCERIN (NITROSTAT SL) Place 1 tablet under the tongue as needed.      omeprazole (PRILOSEC) 40 MG capsule TAKE ONE CAPSULE BY MOUTH ONCE DAILY 30 capsule 4    PROAIR HFA 90 mcg/actuation inhaler INHALE TWO PUFFS BY MOUTH EVERY 6 HOURS AS NEEDED FOR WHEEZING OR SHORTNESS OF BREATH 9 each 3    rosuvastatin (CRESTOR) 10 MG tablet TAKE ONE TABLET BY MOUTH ONCE DAILY 90 tablet 1    sodium chloride (OCEAN NASAL) 0.65 % nasal spray 1 spray by Nasal route as needed for Congestion. 1 Bottle 12    SPIRIVA WITH HANDIHALER 18 mcg inhalation capsule INHALE ONE DOSE BY MOUTH ONCE DAILY 30 capsule 11    tamsulosin (FLOMAX) 0.4 mg Cp24 Take 1 capsule by mouth once daily.      TRULICITY 0.75 mg/0.5 mL PnIj INJECT 0.5ML(0.75MG TOTAL) INTO THE SKIN EVERY 7 DAYS 4 Syringe 3     No current facility-administered medications for this visit.        Past Medical History:   Diagnosis Date    COPD (chronic obstructive pulmonary disease)     Diabetes mellitus, type 2     Hyperlipidemia     Hypertension      Past Surgical History:   Procedure  "Laterality Date    ANGIOPLASTY      TOTAL KNEE ARTHROPLASTY       Family History   Problem Relation Age of Onset    Diabetes Mother     Cancer Father      bone     Social History   Substance Use Topics    Smoking status: Former Smoker     Types: Cigars     Quit date: 12/30/2015    Smokeless tobacco: Never Used    Alcohol use No        Review of Systems:  Review of Systems   Constitutional: Negative.    HENT: Positive for congestion.    Eyes: Negative.    Respiratory: Positive for apnea, cough, shortness of breath and wheezing.         [place] score 16  Comorbidity: HTN, DM, CAD, COPD, Hypoxemia   Cardiovascular: Negative.    Gastrointestinal: Negative.    Endocrine: Negative.    Genitourinary: Negative.    Musculoskeletal: Negative.    Skin: Negative.    Allergic/Immunologic: Negative.    Neurological: Negative.    Hematological: Negative.    Psychiatric/Behavioral: Positive for sleep disturbance.       OBJECTIVE:     Vital Signs (Most Recent)  Pulse: 68 (04/13/18 1407)  Resp: 18 (04/13/18 1407)  BP: 125/60 (04/13/18 1407)  SpO2: 98 % (04/13/18 1407)  5' 8" (1.727 m)  112.6 kg (248 lb 3.8 oz)     Physical Exam:  Physical Exam   Constitutional: He is oriented to person, place, and time. He appears well-developed and well-nourished.   HENT:   Head: Normocephalic and atraumatic.   Nose: Mucosal edema and rhinorrhea present.   Mouth/Throat: Oropharynx is clear and moist. No oropharyngeal exudate.   Eyes: Conjunctivae and EOM are normal. Pupils are equal, round, and reactive to light. Left eye exhibits no discharge. No scleral icterus.   Neck: Normal range of motion. Neck supple. No tracheal deviation present. No thyromegaly present.   Cardiovascular: Normal rate, regular rhythm, normal heart sounds and intact distal pulses.    No murmur heard.  Pulmonary/Chest: Effort normal. He has wheezes.   Abdominal: Soft. Bowel sounds are normal. He exhibits no distension.   Musculoskeletal: Normal range of motion. He " exhibits no tenderness or deformity.   Neurological: He is alert and oriented to person, place, and time. No cranial nerve deficit.   Skin: Skin is warm and dry. Capillary refill takes 2 to 3 seconds.   Psychiatric: He has a normal mood and affect.   Nursing note and vitals reviewed.      Laboratory  CBC: Reviewed  BMP: Reviewed    Diagnostic Results:  PSG  SUMMARY STATEMENTS:  1. Findings related to sleep diagnoses:  · The overall AHI was 7.6 (50 events; Note:  AASM Rule A was used to score hypopneas:  30% decrease in PTAF airflow, plus a 3% desaturation or an arousal).  · Oxygen saturations were below ? 88% for 35.5 minutes of the time spent asleep.  · No Supine sleep recorded. REM AHI was 22.5/hr.  2. EEG abnormalities:  · Moderate sleep efficiency. Reduced slow wave sleep.  · Overall severe periodic limb movements with only mild arousal.  · Overall sleep arousal was high: 31.1/hr.  3. ECG abnormalities:  · Heart rate variability  4. Behavioral observations:  a. Recording Tech Comments: 86.1% sleep in prone position.     INTERPRETATION:      1. Mild Obstructive sleep apnea overall, Moderate severity in REM sleep.  2. Moderate severe continuous hypoxemia during sleep.  3. Severity underestimated due to 86% sleep recorded in PRONE.  4. Obesity based on BMI              RECOMMENDATIONS:      1. Treatment intervention with CPAP indicated. Proceed with CPAP titration  2. Weight loss.      CPAP titration  SUMMARY STATEMENTS:  1. Findings related to sleep diagnoses:  · This was a full night CPAP titration study.  CPAP was initiated at 4 cm with a standard ResMed Mirage FX nasal mask.  C-flex (set to 3) and heated humidification were used throughout.  · CPAP 5 cm water pressure was adequately tolerated with REM sleep.  · Saturation rolo was 89.0%.  2. EEG abnormalities:  · Normal sleep latency normal REM latency.  Sleep efficiency was moderate.  Sleep architecture reduced slow-wave sleep.  · Arousal was moderate: 23.2  events per hour.  Mild periodic limb movements with arousal overall.  · No alpha intrusion.  3. ECG abnormalities:  · Bradycardia        INTERPRETATION:      1. Mild to moderate periodic limb movements with arousal  2. Adequate CPAP titration with a 5-cm water pressure  3. Reversal of hypoxemia related to sleep disorder breathing  4. Bradycardia noted     RECOMMENDATIONS:      1. Commenced therapy with CPAP 5 cm water pressure and mask of choice.  2. Close follow-up to ensure resolution of symptoms  3. Clinical correlation for restless leg symptoms.  Check serum ferritin.    ASSESSMENT/PLAN:     Problem List Items Addressed This Visit     Moderate COPD (chronic obstructive pulmonary disease)     CAT score 8  Stable on BREO , SPIRIVA and Albuterol HFA  FEV1 : 2.01L ( 60%)   GOLD 2: Group B            Pulmonary nodule     2 nodules RUl: 5.5mm  35-40 pack year smoker, quit 8 months ago  Imaging 07/2016  Need follow up 12 months  Oct 2018         Sleep apnea, obstructive - Primary     Goals of CPAP discussed         PLMD (periodic limb movement disorder)     Check ferritin               PLAN:Plan      Goals of CPAP  Check ferritin with Dr Mary  GROUP B mRC 2, CAT score > 10      Follow-up in about 2 months (around 6/13/2018) for Download, CPAP supplies, Sign up my [facility].    This note was prepared using voice recognition system and is likely to have sound alike errors that may have been overlooked even after proof reading.  Please call me with any questions    Discussed diagnosis, its evaluation, treatment and usual course. All questions answered.    Thank you for the courtesy of participating in the care of this patient    Mazin Wells MD

## 2018-04-13 NOTE — PATIENT INSTRUCTIONS
What Are CPAP and Other Air Pressure Treatments?   Continuous positive air pressure (CPAP) uses gentle air pressure to hold the airway open. CPAP is often the most effective treatment for sleep apnea and severe snoring. It works very well for many people. But keep in mind that it can take several adjustments before the setup is right for you.   How CPAP Works   A small portable pump beside the bed sends air through a hose, which is held over your nose by a mask. Air is gently pushed through your airway. The air pressure nudges sagging tissues aside. This widens the airway so you can breathe better. CPAP may be combined with other kinds of therapy for sleep apnea.      A mask over the nose gently directs air into the throat to keep the airway open.      Types of Air Pressure Treatments   There are different types of CPAP. Your doctor or CPAP technician will help you decide which type is best for you:   Basic CPAP keeps the pressure constant all night long.   A bilevel device gives out more pressure when you breathe in and less when you breathe out.   An autoCPAP device automatically adjusts pressure throughout the night and in response to changes such as body position, sleep stage, and snoring.      Please call office [phone] for any questions

## 2018-04-16 ENCOUNTER — OFFICE VISIT (OUTPATIENT)
Dept: INTERNAL MEDICINE | Facility: CLINIC | Age: 63
End: 2018-04-16
Payer: COMMERCIAL

## 2018-04-16 VITALS
SYSTOLIC BLOOD PRESSURE: 118 MMHG | HEART RATE: 58 BPM | OXYGEN SATURATION: 99 % | DIASTOLIC BLOOD PRESSURE: 74 MMHG | HEIGHT: 68 IN | BODY MASS INDEX: 37.36 KG/M2 | TEMPERATURE: 96 F | WEIGHT: 246.5 LBS

## 2018-04-16 DIAGNOSIS — M79.18 MUSCULOSKELETAL PAIN: Primary | ICD-10-CM

## 2018-04-16 PROCEDURE — 99214 OFFICE O/P EST MOD 30 MIN: CPT | Mod: S$GLB,,, | Performed by: INTERNAL MEDICINE

## 2018-04-16 PROCEDURE — 99999 PR PBB SHADOW E&M-EST. PATIENT-LVL III: CPT | Mod: PBBFAC,,, | Performed by: INTERNAL MEDICINE

## 2018-04-16 PROCEDURE — 3078F DIAST BP <80 MM HG: CPT | Mod: CPTII,S$GLB,, | Performed by: INTERNAL MEDICINE

## 2018-04-16 PROCEDURE — 3074F SYST BP LT 130 MM HG: CPT | Mod: CPTII,S$GLB,, | Performed by: INTERNAL MEDICINE

## 2018-04-16 RX ORDER — CYCLOBENZAPRINE HCL 10 MG
10 TABLET ORAL 3 TIMES DAILY
Qty: 30 TABLET | Refills: 0 | Status: SHIPPED | OUTPATIENT
Start: 2018-04-16 | End: 2018-05-08

## 2018-04-16 RX ORDER — OXYCODONE AND ACETAMINOPHEN 10; 325 MG/1; MG/1
0.5 TABLET ORAL ONCE
COMMUNITY
End: 2018-05-08

## 2018-04-16 NOTE — PROGRESS NOTES
Subjective:      Patient ID: Stefan Forbes Jr. is a 62 y.o. male.    Chief Complaint: Flank Pain (left side; radiates to back and abdomen, x 1 day)    63 yo with Patient Active Problem List:     Controlled type 2 diabetes mellitus without complication, without long-term current use of insulin     Hypertension associated with diabetes     Hyperlipidemia associated with type 2 diabetes mellitus     Moderate COPD (chronic obstructive pulmonary disease)     CAD (coronary artery disease)     Vitamin D deficiency     Osteoarthritis of knee     Pulmonary nodule     Nevus of choroid of right eye     Other pulmonary embolism without acute cor pulmonale     Hyponatremia     Iron deficiency anemia due to chronic blood loss     Sleep apnea, obstructive     Nocturnal oxygen desaturation     PLMD (periodic limb movement disorder)    Past Medical History:  No date: COPD (chronic obstructive pulmonary disease)  No date: Diabetes mellitus, type 2  No date: Hyperlipidemia  No date: Hypertension    Here today c/o back pain. Pt able to find comfortable position but triggered by movement.      Back Pain   This is a new problem. The current episode started yesterday. The problem occurs intermittently. The problem has been waxing and waning since onset. Pain location: left lower back. The quality of the pain is described as aching and cramping. Radiates to: left hip. Pain scale: 0 to 10. The pain is the same all the time. The symptoms are aggravated by bending and standing. Stiffness is present all day. Pertinent negatives include no abdominal pain, bladder incontinence, bowel incontinence, chest pain, dysuria, fever, leg pain, paresthesias, pelvic pain, perianal numbness, tingling or weakness. Risk factors include obesity, lack of exercise and sedentary lifestyle. He has tried heat (rest) for the symptoms. The treatment provided significant relief.     Review of Systems   Constitutional: Negative for chills and fever.   HENT: Negative  "for ear pain and sore throat.    Respiratory: Negative for cough.    Cardiovascular: Negative for chest pain.   Gastrointestinal: Negative for abdominal pain, blood in stool and bowel incontinence.   Genitourinary: Negative for bladder incontinence, dysuria, hematuria and pelvic pain.   Musculoskeletal: Positive for back pain.   Neurological: Negative for tingling, seizures, syncope, weakness and paresthesias.     Objective:   /74 (BP Location: Right arm, Patient Position: Sitting)   Pulse (!) 58   Temp 96.4 °F (35.8 °C) (Tympanic)   Ht 5' 8" (1.727 m)   Wt 111.8 kg (246 lb 7.6 oz)   SpO2 99%   BMI 37.48 kg/m²     Physical Exam   Constitutional: He is oriented to person, place, and time. He appears well-developed and well-nourished. No distress.   HENT:   Head: Normocephalic and atraumatic.   Eyes: EOM are normal. Pupils are equal, round, and reactive to light.   Cardiovascular: Normal rate and regular rhythm.    Pulmonary/Chest: Breath sounds normal. He has no wheezes. He has no rales.   Abdominal: Soft. Bowel sounds are normal. There is no tenderness.   Musculoskeletal: He exhibits no edema.        Lumbar back: He exhibits decreased range of motion (due to sig pain with bending, standing from seated position, twisting). He exhibits no tenderness, no bony tenderness and no swelling.   Neurological: He is alert and oriented to person, place, and time. He displays normal reflexes. He exhibits normal muscle tone.   Skin: Skin is warm and dry.   Psychiatric: He has a normal mood and affect. His behavior is normal.       Assessment:     1. Musculoskeletal pain      Plan:   Musculoskeletal pain  -     cyclobenzaprine (FLEXERIL) 10 MG tablet; Take 1 tablet (10 mg total) by mouth 3 (three) times daily. Prn muscle spasm  Dispense: 30 tablet; Refill: 0    Tylenol 500-1000 mg every 8 hours as needed for pain.      Lab Frequency Next Occurrence   Hemoglobin A1c Once 06/12/2018   Microalbumin/creatinine urine ratio " Once 08/10/2018   CBC auto differential Once 02/22/2018   Comprehensive metabolic panel Once 02/22/2018   Methylmalonic acid, serum Once 02/22/2018   D dimer, quantitative Once 02/22/2018   CBC auto differential Once 03/08/2018   Comprehensive metabolic panel Once 03/08/2018   Ferritin Once 03/08/2018   Iron and TIBC Once 03/08/2018   Lactate dehydrogenase Once 03/08/2018   Reticulocytes Once 03/08/2018   Vitamin B12 Once 03/08/2018       Problem List Items Addressed This Visit     None      Visit Diagnoses     Musculoskeletal pain    -  Primary    Relevant Medications    cyclobenzaprine (FLEXERIL) 10 MG tablet          Follow-up in about 4 weeks (around 5/14/2018), or if symptoms worsen or fail to improve.

## 2018-04-19 RX ORDER — AMLODIPINE BESYLATE 5 MG/1
TABLET ORAL
Qty: 30 TABLET | Refills: 1 | Status: SHIPPED | OUTPATIENT
Start: 2018-04-19 | End: 2018-06-20 | Stop reason: SDUPTHER

## 2018-04-19 RX ORDER — ALBUTEROL SULFATE 90 UG/1
AEROSOL, METERED RESPIRATORY (INHALATION)
Qty: 8.5 G | Refills: 5 | Status: SHIPPED | OUTPATIENT
Start: 2018-04-19 | End: 2018-10-01

## 2018-05-01 ENCOUNTER — PATIENT OUTREACH (OUTPATIENT)
Dept: ADMINISTRATIVE | Facility: HOSPITAL | Age: 63
End: 2018-05-01

## 2018-05-02 ENCOUNTER — TELEPHONE (OUTPATIENT)
Dept: PULMONOLOGY | Facility: CLINIC | Age: 63
End: 2018-05-02

## 2018-05-02 NOTE — TELEPHONE ENCOUNTER
----- Message from Salome Stovall sent at 5/2/2018  9:24 AM CDT -----  Contact: pt spouse-Lisa Gonzalez state she would like to speak to the nurse regarding the patient oxygen. State the patient is a little congested. Please adv/call [phone].[email or web address]

## 2018-05-02 NOTE — TELEPHONE ENCOUNTER
Pt wants to start using his O2 at with cpap machine. He states DME needs an order for adapter to connect O2 to the cpap. Can you write order.

## 2018-05-03 ENCOUNTER — LAB VISIT (OUTPATIENT)
Dept: LAB | Facility: HOSPITAL | Age: 63
End: 2018-05-03
Attending: INTERNAL MEDICINE
Payer: COMMERCIAL

## 2018-05-03 DIAGNOSIS — I26.99 OTHER ACUTE PULMONARY EMBOLISM WITHOUT ACUTE COR PULMONALE: ICD-10-CM

## 2018-05-03 DIAGNOSIS — D53.9 NUTRITIONAL ANEMIA: ICD-10-CM

## 2018-05-03 DIAGNOSIS — D50.0 IRON DEFICIENCY ANEMIA DUE TO CHRONIC BLOOD LOSS: ICD-10-CM

## 2018-05-03 DIAGNOSIS — G44.89 OTHER HEADACHE SYNDROME: ICD-10-CM

## 2018-05-03 DIAGNOSIS — D64.9 NORMOCYTIC ANEMIA: ICD-10-CM

## 2018-05-03 LAB
ALBUMIN SERPL BCP-MCNC: 4.2 G/DL
ALP SERPL-CCNC: 90 U/L
ALT SERPL W/O P-5'-P-CCNC: 18 U/L
ANION GAP SERPL CALC-SCNC: 9 MMOL/L
AST SERPL-CCNC: 14 U/L
BASOPHILS # BLD AUTO: 0.03 K/UL
BASOPHILS NFR BLD: 0.4 %
BILIRUB SERPL-MCNC: 0.2 MG/DL
BUN SERPL-MCNC: 14 MG/DL
CALCIUM SERPL-MCNC: 9.6 MG/DL
CHLORIDE SERPL-SCNC: 101 MMOL/L
CO2 SERPL-SCNC: 24 MMOL/L
CREAT SERPL-MCNC: 1 MG/DL
DIFFERENTIAL METHOD: ABNORMAL
EOSINOPHIL # BLD AUTO: 0.2 K/UL
EOSINOPHIL NFR BLD: 2.9 %
ERYTHROCYTE [DISTWIDTH] IN BLOOD BY AUTOMATED COUNT: 14.1 %
EST. GFR  (AFRICAN AMERICAN): >60 ML/MIN/1.73 M^2
EST. GFR  (NON AFRICAN AMERICAN): >60 ML/MIN/1.73 M^2
FERRITIN SERPL-MCNC: 173 NG/ML
GLUCOSE SERPL-MCNC: 106 MG/DL
HCT VFR BLD AUTO: 41 %
HGB BLD-MCNC: 13.6 G/DL
IRON SERPL-MCNC: 73 UG/DL
LDH SERPL L TO P-CCNC: 117 U/L
LYMPHOCYTES # BLD AUTO: 1.8 K/UL
LYMPHOCYTES NFR BLD: 25.3 %
MCH RBC QN AUTO: 30.7 PG
MCHC RBC AUTO-ENTMCNC: 33.2 G/DL
MCV RBC AUTO: 93 FL
MONOCYTES # BLD AUTO: 0.6 K/UL
MONOCYTES NFR BLD: 8 %
NEUTROPHILS # BLD AUTO: 4.5 K/UL
NEUTROPHILS NFR BLD: 63.4 %
PLATELET # BLD AUTO: 200 K/UL
PMV BLD AUTO: 9.6 FL
POTASSIUM SERPL-SCNC: 4.9 MMOL/L
PROT SERPL-MCNC: 7.3 G/DL
RBC # BLD AUTO: 4.43 M/UL
RETICS/RBC NFR AUTO: 1.4 %
SATURATED IRON: 21 %
SODIUM SERPL-SCNC: 134 MMOL/L
TOTAL IRON BINDING CAPACITY: 343 UG/DL
TRANSFERRIN SERPL-MCNC: 232 MG/DL
VIT B12 SERPL-MCNC: 416 PG/ML
WBC # BLD AUTO: 7.15 K/UL

## 2018-05-03 PROCEDURE — 82728 ASSAY OF FERRITIN: CPT

## 2018-05-03 PROCEDURE — 83540 ASSAY OF IRON: CPT

## 2018-05-03 PROCEDURE — 85025 COMPLETE CBC W/AUTO DIFF WBC: CPT | Mod: PO

## 2018-05-03 PROCEDURE — 85045 AUTOMATED RETICULOCYTE COUNT: CPT

## 2018-05-03 PROCEDURE — 82607 VITAMIN B-12: CPT

## 2018-05-03 PROCEDURE — 36415 COLL VENOUS BLD VENIPUNCTURE: CPT | Mod: PO

## 2018-05-03 PROCEDURE — 83615 LACTATE (LD) (LDH) ENZYME: CPT | Mod: PO

## 2018-05-03 PROCEDURE — 80053 COMPREHEN METABOLIC PANEL: CPT | Mod: PO

## 2018-05-08 ENCOUNTER — OFFICE VISIT (OUTPATIENT)
Dept: HEMATOLOGY/ONCOLOGY | Facility: CLINIC | Age: 63
End: 2018-05-08
Payer: COMMERCIAL

## 2018-05-08 VITALS
OXYGEN SATURATION: 95 % | DIASTOLIC BLOOD PRESSURE: 69 MMHG | RESPIRATION RATE: 20 BRPM | HEIGHT: 68 IN | WEIGHT: 242.75 LBS | HEART RATE: 62 BPM | SYSTOLIC BLOOD PRESSURE: 119 MMHG | BODY MASS INDEX: 36.79 KG/M2 | TEMPERATURE: 98 F

## 2018-05-08 DIAGNOSIS — G44.89 OTHER HEADACHE SYNDROME: Primary | ICD-10-CM

## 2018-05-08 DIAGNOSIS — I26.99 OTHER PULMONARY EMBOLISM WITHOUT ACUTE COR PULMONALE, UNSPECIFIED CHRONICITY: ICD-10-CM

## 2018-05-08 DIAGNOSIS — D53.9 NUTRITIONAL ANEMIA: ICD-10-CM

## 2018-05-08 DIAGNOSIS — D50.0 IRON DEFICIENCY ANEMIA DUE TO CHRONIC BLOOD LOSS: ICD-10-CM

## 2018-05-08 DIAGNOSIS — D64.9 NORMOCYTIC ANEMIA: ICD-10-CM

## 2018-05-08 DIAGNOSIS — I26.99 OTHER ACUTE PULMONARY EMBOLISM WITHOUT ACUTE COR PULMONALE: ICD-10-CM

## 2018-05-08 PROCEDURE — 3008F BODY MASS INDEX DOCD: CPT | Mod: CPTII,S$GLB,, | Performed by: INTERNAL MEDICINE

## 2018-05-08 PROCEDURE — 3078F DIAST BP <80 MM HG: CPT | Mod: CPTII,S$GLB,, | Performed by: INTERNAL MEDICINE

## 2018-05-08 PROCEDURE — 99999 PR PBB SHADOW E&M-EST. PATIENT-LVL III: CPT | Mod: PBBFAC,,, | Performed by: INTERNAL MEDICINE

## 2018-05-08 PROCEDURE — 99214 OFFICE O/P EST MOD 30 MIN: CPT | Mod: S$GLB,,, | Performed by: INTERNAL MEDICINE

## 2018-05-08 PROCEDURE — 3074F SYST BP LT 130 MM HG: CPT | Mod: CPTII,S$GLB,, | Performed by: INTERNAL MEDICINE

## 2018-05-08 RX ORDER — POLYETHYLENE GLYCOL 3350, SODIUM CHLORIDE, SODIUM BICARBONATE, POTASSIUM CHLORIDE 420; 11.2; 5.72; 1.48 G/4L; G/4L; G/4L; G/4L
POWDER, FOR SOLUTION ORAL
COMMUNITY
Start: 2018-04-20 | End: 2018-05-08

## 2018-05-08 RX ORDER — NITROGLYCERIN 0.4 MG/1
0.4 TABLET SUBLINGUAL EVERY 5 MIN PRN
COMMUNITY
Start: 2018-05-02 | End: 2020-04-07 | Stop reason: SDUPTHER

## 2018-05-08 NOTE — PROGRESS NOTES
Hematology/Oncology Office Note    Reason for referral:  PE      CC:  Bilateral pulmonary emboli    Referred by:  No ref. provider found    Diagnosis:  Provoked Bilateral pulmonary emboli (left knee replacement)  Stable pulmonary nodules  SIADH  Chronic/worsening headaches    History of present illness:  62-year-old gentleman with a history of COPD, coronary artery disease, diabetes mellitus type 2, dyslipidemia, hypertension, and pulmonary nodules which is been stable on serial CT scans.  He underwent left knee replacement on 10/17/2017 and developed shortness of breath approximately 4-5 days after the surgery.  He was evaluated with a CTA on 11/2/17 and noted to have bilateral pulmonary emboli.  He was placed on Eliquis and is currently taking Eliquis 5 mg by mouth twice a day.      I have reviewed and updated the HPI, ROS, PMHx, Social Hx, Family Hx and treatment history.    Today's visit:  Patient is without complaints today.  He reports feeling well without significant fever, chills, nausea/vomiting/diarrhea.    Past Medical History:   Diagnosis Date    COPD (chronic obstructive pulmonary disease)     Diabetes mellitus, type 2     Hyperlipidemia     Hypertension          Social History:  Former smoker quit 12/2015  No alcohol or illicit drugs      Family History: family history includes Cancer in his father; Diabetes in his mother.    HPI    Review of Systems   Constitutional: Negative for activity change, appetite change, chills, diaphoresis, fatigue, fever and unexpected weight change.   HENT: Negative for congestion, dental problem, drooling, ear discharge, ear pain, facial swelling, nosebleeds, rhinorrhea, sinus pressure, sneezing, sore throat, tinnitus and trouble swallowing.    Eyes: Negative.    Respiratory: Negative for apnea, cough, shortness of breath, wheezing and stridor.    Cardiovascular: Negative for chest pain, palpitations and leg swelling.   Gastrointestinal: Negative for abdominal  distention, abdominal pain, anal bleeding, constipation, diarrhea, nausea and vomiting.   Endocrine: Negative.    Genitourinary: Negative for decreased urine volume, difficulty urinating, frequency, genital sores, hematuria, testicular pain and urgency.   Musculoskeletal: Negative for arthralgias and joint swelling.   Skin: Negative for color change, rash and wound.   Allergic/Immunologic: Negative.    Neurological: Negative for dizziness, syncope, speech difficulty, weakness, light-headedness, numbness and headaches.   Hematological: Negative for adenopathy. Does not bruise/bleed easily.   Psychiatric/Behavioral: Negative for agitation, behavioral problems, confusion, decreased concentration and hallucinations. The patient is not nervous/anxious and is not hyperactive.        Objective:       Vitals:    05/08/18 1045   BP: 119/69   Pulse: 62   Resp: 20   Temp: 97.6 °F (36.4 °C)       Physical Exam   Constitutional: He is oriented to person, place, and time. He appears well-developed and well-nourished.  Non-toxic appearance. He does not appear ill. No distress.   HENT:   Head: Normocephalic and atraumatic.   Right Ear: Tympanic membrane and ear canal normal.   Left Ear: Tympanic membrane and ear canal normal.   Nose: Nose normal. Right sinus exhibits no maxillary sinus tenderness and no frontal sinus tenderness. Left sinus exhibits no maxillary sinus tenderness and no frontal sinus tenderness.   Mouth/Throat: Uvula is midline, oropharynx is clear and moist and mucous membranes are normal. Normal dentition. No oropharyngeal exudate.   Eyes: Conjunctivae, EOM and lids are normal. Pupils are equal, round, and reactive to light. Right eye exhibits no discharge. Left eye exhibits no discharge. Right conjunctiva is not injected. Right conjunctiva has no hemorrhage. Left conjunctiva is not injected. Left conjunctiva has no hemorrhage. No scleral icterus.   Neck: Normal range of motion. Neck supple. No spinous process  tenderness and no muscular tenderness present. No tracheal deviation present. No thyromegaly present.   Cardiovascular: Normal rate, regular rhythm, normal heart sounds and intact distal pulses.  Exam reveals no gallop and no friction rub.    No murmur heard.  Pulmonary/Chest: Effort normal and breath sounds normal. No accessory muscle usage or stridor. No respiratory distress. He has no decreased breath sounds. He has no wheezes. He has no rhonchi. He has no rales. He exhibits no tenderness.   Abdominal: Soft. Bowel sounds are normal. He exhibits no distension and no mass. There is no hepatosplenomegaly. There is no tenderness. There is no guarding.   Musculoskeletal: Normal range of motion. He exhibits no edema, tenderness or deformity.   Lymphadenopathy:     He has no axillary adenopathy.        Right: No supraclavicular adenopathy present.        Left: No supraclavicular adenopathy present.   Neurological: He is alert and oriented to person, place, and time. No cranial nerve deficit. He exhibits normal muscle tone. Coordination normal.   Skin: Skin is warm, dry and intact. Capillary refill takes less than 2 seconds. No abrasion, no bruising and no rash noted. Rash is not pustular and not urticarial. He is not diaphoretic. No cyanosis. No pallor. Nails show no clubbing.   Psychiatric: He has a normal mood and affect. His speech is normal and behavior is normal. Judgment and thought content normal.   Vitals reviewed.      Lab Results   Component Value Date    WBC 7.15 05/03/2018    HGB 13.6 (L) 05/03/2018    HCT 41.0 05/03/2018    MCV 93 05/03/2018     05/03/2018     Lab Results   Component Value Date    CREATININE 1.0 05/03/2018    BUN 14 05/03/2018     (L) 05/03/2018    K 4.9 05/03/2018     05/03/2018    CO2 24 05/03/2018     Lab Results   Component Value Date    ALT 18 05/03/2018    AST 14 05/03/2018    ALKPHOS 90 05/03/2018    BILITOT 0.2 05/03/2018         Assessment:           62-year-old  gentleman with a history of pulmonary nodules, SIADH, and diabetes mellitus who was been referred for provoked bilateral pulmonary emboli.  He is currently on Eliquis 5 mg twice a day and tolerating treatment well without bleeding complications.    He presented with persistent lower extremity edema and elevated d-dimer.  We repeated bilateral lower extremity ultrasound which was negative for thrombosis.  Lower extremity symptoms have improved over the previous 2 weeks.  Therefore, I have recommended that the patient discontinue Eliquis and follow-up with GI for endoscopies to evaluate etiology of iron deficiency.  He will receive second dose of Feraheme today and follow-up in 2-3 months with repeat CBC/iron studies      Bilateral lower extremity edema:  Spontaneously improving with negative bilateral lower extremity ultrasound      Iron deficiency anemia:  --He will follow[facility]up with [facility] to arrange endoscopies to evaluate etiology of iron deficiency  --I have again strongly encouraged patient to follow-up with [facility]    Provoked Pulmonary emboli (left knee replacement):  --Patient discontinued Eliquis in 3/2018  --Continue aspirin 81 mg daily    Bilateral pulmonary nodules:  Pulmonary nodules are too small to be avid on PET scan  --Plan to repeat CT scans in 10/2018    SIADH:  --Continue to monitor pulmonary nodules

## 2018-05-14 ENCOUNTER — OFFICE VISIT (OUTPATIENT)
Dept: INTERNAL MEDICINE | Facility: CLINIC | Age: 63
End: 2018-05-14
Payer: COMMERCIAL

## 2018-05-14 VITALS
HEIGHT: 68 IN | DIASTOLIC BLOOD PRESSURE: 72 MMHG | HEART RATE: 71 BPM | BODY MASS INDEX: 37.15 KG/M2 | WEIGHT: 245.13 LBS | TEMPERATURE: 96 F | SYSTOLIC BLOOD PRESSURE: 120 MMHG | OXYGEN SATURATION: 97 %

## 2018-05-14 DIAGNOSIS — R91.1 PULMONARY NODULE: ICD-10-CM

## 2018-05-14 DIAGNOSIS — E11.59 HYPERTENSION ASSOCIATED WITH DIABETES: ICD-10-CM

## 2018-05-14 DIAGNOSIS — E87.1 HYPONATREMIA: Primary | ICD-10-CM

## 2018-05-14 DIAGNOSIS — M54.50 ACUTE LOW BACK PAIN WITHOUT SCIATICA, UNSPECIFIED BACK PAIN LATERALITY: ICD-10-CM

## 2018-05-14 DIAGNOSIS — E11.9 CONTROLLED TYPE 2 DIABETES MELLITUS WITHOUT COMPLICATION, WITHOUT LONG-TERM CURRENT USE OF INSULIN: ICD-10-CM

## 2018-05-14 DIAGNOSIS — I15.2 HYPERTENSION ASSOCIATED WITH DIABETES: ICD-10-CM

## 2018-05-14 PROCEDURE — 3074F SYST BP LT 130 MM HG: CPT | Mod: CPTII,S$GLB,, | Performed by: INTERNAL MEDICINE

## 2018-05-14 PROCEDURE — 3078F DIAST BP <80 MM HG: CPT | Mod: CPTII,S$GLB,, | Performed by: INTERNAL MEDICINE

## 2018-05-14 PROCEDURE — 3008F BODY MASS INDEX DOCD: CPT | Mod: CPTII,S$GLB,, | Performed by: INTERNAL MEDICINE

## 2018-05-14 PROCEDURE — 3044F HG A1C LEVEL LT 7.0%: CPT | Mod: CPTII,S$GLB,, | Performed by: INTERNAL MEDICINE

## 2018-05-14 PROCEDURE — 99213 OFFICE O/P EST LOW 20 MIN: CPT | Mod: S$GLB,,, | Performed by: INTERNAL MEDICINE

## 2018-05-14 PROCEDURE — 99999 PR PBB SHADOW E&M-EST. PATIENT-LVL III: CPT | Mod: PBBFAC,,, | Performed by: INTERNAL MEDICINE

## 2018-05-14 NOTE — PROGRESS NOTES
"Subjective:      Patient ID: Stefan Forbes Jr. is a 63 y.o. male.    Chief Complaint: Follow-up    62 yo with Patient Active Problem List:     Controlled type 2 diabetes mellitus without complication, without long-term current use of insulin     Hypertension associated with diabetes     Hyperlipidemia associated with type 2 diabetes mellitus     Moderate COPD (chronic obstructive pulmonary disease)     CAD (coronary artery disease)     Vitamin D deficiency     Osteoarthritis of knee     Pulmonary nodule     Nevus of choroid of right eye     Other pulmonary embolism without acute cor pulmonale     Hyponatremia     Iron deficiency anemia due to chronic blood loss     Sleep apnea, obstructive     Nocturnal oxygen desaturation     PLMD (periodic limb movement disorder)    Past Medical History:  No date: COPD (chronic obstructive pulmonary disease)  No date: Diabetes mellitus, type 2  No date: Hyperlipidemia  No date: Hypertension    Here today for f/u for medical problems as outlined below.  Present for months to years.  hyponatremia has been present for several months.  Patient has seen nephrology and hematology without specific cause identified. Off torsamide for 6 weeks. No new c/o. Back pain is much better.       Review of Systems  Objective:   /72 (BP Location: Right arm, Patient Position: Sitting)   Pulse 71   Temp 96.2 °F (35.7 °C) (Tympanic)   Ht 5' 8" (1.727 m)   Wt 111.2 kg (245 lb 2.4 oz)   SpO2 97%   BMI 37.28 kg/m²     Physical Exam    Assessment:     1. Hyponatremia    2. Pulmonary nodule    3. Acute low back pain without sciatica, unspecified back pain laterality    4. Hypertension associated with diabetes    5. Controlled type 2 diabetes mellitus without complication, without long-term current use of insulin      Plan:   Hyponatremia  Comments:  stable  Orders:  -     Basic metabolic panel; Future; Expected date: 05/28/2018    Pulmonary nodule  Comments:  cont f/u as per heme/onc. up to " date with appt.    Acute low back pain without sciatica, unspecified back pain laterality  Comments:  essentially resolved.     Hypertension associated with diabetes    Controlled type 2 diabetes mellitus without complication, without long-term current use of insulin  Comments:  stable        Lab Frequency Next Occurrence   Hemoglobin A1c Once 06/12/2018   Microalbumin/creatinine urine ratio Once 08/10/2018   CBC auto differential Once 02/22/2018   Comprehensive metabolic panel Once 02/22/2018   Methylmalonic acid, serum Once 02/22/2018   D dimer, quantitative Once 02/22/2018   CBC auto differential Once 05/08/2018   Comprehensive metabolic panel Once 05/08/2018   Ferritin Once 05/08/2018   Iron and TIBC Once 05/08/2018   Lactate dehydrogenase Once 05/08/2018   Reticulocytes Once 05/08/2018   Vitamin B12 Once 05/08/2018   C-reactive protein Once 05/08/2018       Problem List Items Addressed This Visit        Pulmonary    Pulmonary nodule    Overview     stable              Cardiac/Vascular    Hypertension associated with diabetes       Renal/    Hyponatremia - Primary    Relevant Orders    Basic metabolic panel       Endocrine    Controlled type 2 diabetes mellitus without complication, without long-term current use of insulin      Other Visit Diagnoses     Acute low back pain without sciatica, unspecified back pain laterality        essentially resolved.           Follow-up in about 2 months (around 7/16/2018), or if symptoms worsen or fail to improve.

## 2018-05-17 ENCOUNTER — PATIENT MESSAGE (OUTPATIENT)
Dept: PULMONOLOGY | Facility: CLINIC | Age: 63
End: 2018-05-17

## 2018-05-22 ENCOUNTER — PATIENT OUTREACH (OUTPATIENT)
Dept: ADMINISTRATIVE | Facility: HOSPITAL | Age: 63
End: 2018-05-22

## 2018-05-29 DIAGNOSIS — J44.9 CHRONIC OBSTRUCTIVE PULMONARY DISEASE, UNSPECIFIED COPD TYPE: ICD-10-CM

## 2018-05-30 RX ORDER — ALBUTEROL SULFATE 0.83 MG/ML
SOLUTION RESPIRATORY (INHALATION)
Qty: 75 EACH | Refills: 2 | Status: SHIPPED | OUTPATIENT
Start: 2018-05-30 | End: 2018-09-01 | Stop reason: SDUPTHER

## 2018-06-01 ENCOUNTER — LAB VISIT (OUTPATIENT)
Dept: LAB | Facility: HOSPITAL | Age: 63
End: 2018-06-01
Attending: INTERNAL MEDICINE
Payer: COMMERCIAL

## 2018-06-01 DIAGNOSIS — E87.1 HYPONATREMIA: ICD-10-CM

## 2018-06-01 LAB
ANION GAP SERPL CALC-SCNC: 8 MMOL/L
BUN SERPL-MCNC: 15 MG/DL
CALCIUM SERPL-MCNC: 9.8 MG/DL
CHLORIDE SERPL-SCNC: 103 MMOL/L
CO2 SERPL-SCNC: 26 MMOL/L
CREAT SERPL-MCNC: 0.9 MG/DL
EST. GFR  (AFRICAN AMERICAN): >60 ML/MIN/1.73 M^2
EST. GFR  (NON AFRICAN AMERICAN): >60 ML/MIN/1.73 M^2
GLUCOSE SERPL-MCNC: 92 MG/DL
POTASSIUM SERPL-SCNC: 5.1 MMOL/L
SODIUM SERPL-SCNC: 137 MMOL/L

## 2018-06-01 PROCEDURE — 80048 BASIC METABOLIC PNL TOTAL CA: CPT

## 2018-06-01 PROCEDURE — 36415 COLL VENOUS BLD VENIPUNCTURE: CPT | Mod: PO

## 2018-06-07 ENCOUNTER — TELEPHONE (OUTPATIENT)
Dept: PULMONOLOGY | Facility: CLINIC | Age: 63
End: 2018-06-07

## 2018-06-13 RX ORDER — DULAGLUTIDE 0.75 MG/.5ML
INJECTION, SOLUTION SUBCUTANEOUS
Qty: 4 SYRINGE | Refills: 3 | Status: SHIPPED | OUTPATIENT
Start: 2018-06-13 | End: 2018-08-28

## 2018-06-21 RX ORDER — AMLODIPINE BESYLATE 5 MG/1
TABLET ORAL
Qty: 30 TABLET | Refills: 1 | Status: SHIPPED | OUTPATIENT
Start: 2018-06-21 | End: 2018-08-17 | Stop reason: SDUPTHER

## 2018-06-28 ENCOUNTER — OFFICE VISIT (OUTPATIENT)
Dept: INTERNAL MEDICINE | Facility: CLINIC | Age: 63
End: 2018-06-28
Payer: COMMERCIAL

## 2018-06-28 VITALS
HEART RATE: 60 BPM | DIASTOLIC BLOOD PRESSURE: 62 MMHG | OXYGEN SATURATION: 99 % | BODY MASS INDEX: 36.32 KG/M2 | HEIGHT: 68 IN | TEMPERATURE: 97 F | SYSTOLIC BLOOD PRESSURE: 110 MMHG | WEIGHT: 239.63 LBS

## 2018-06-28 DIAGNOSIS — I15.2 HYPERTENSION ASSOCIATED WITH DIABETES: ICD-10-CM

## 2018-06-28 DIAGNOSIS — E87.1 HYPONATREMIA: ICD-10-CM

## 2018-06-28 DIAGNOSIS — K21.9 GASTROESOPHAGEAL REFLUX DISEASE, ESOPHAGITIS PRESENCE NOT SPECIFIED: ICD-10-CM

## 2018-06-28 DIAGNOSIS — E11.69 HYPERLIPIDEMIA ASSOCIATED WITH TYPE 2 DIABETES MELLITUS: ICD-10-CM

## 2018-06-28 DIAGNOSIS — E78.5 HYPERLIPIDEMIA ASSOCIATED WITH TYPE 2 DIABETES MELLITUS: ICD-10-CM

## 2018-06-28 DIAGNOSIS — E11.9 CONTROLLED TYPE 2 DIABETES MELLITUS WITHOUT COMPLICATION, WITHOUT LONG-TERM CURRENT USE OF INSULIN: Primary | ICD-10-CM

## 2018-06-28 DIAGNOSIS — E11.59 HYPERTENSION ASSOCIATED WITH DIABETES: ICD-10-CM

## 2018-06-28 PROCEDURE — 3008F BODY MASS INDEX DOCD: CPT | Mod: CPTII,S$GLB,, | Performed by: INTERNAL MEDICINE

## 2018-06-28 PROCEDURE — 99999 PR PBB SHADOW E&M-EST. PATIENT-LVL III: CPT | Mod: PBBFAC,,, | Performed by: INTERNAL MEDICINE

## 2018-06-28 PROCEDURE — 3078F DIAST BP <80 MM HG: CPT | Mod: CPTII,S$GLB,, | Performed by: INTERNAL MEDICINE

## 2018-06-28 PROCEDURE — 3044F HG A1C LEVEL LT 7.0%: CPT | Mod: CPTII,S$GLB,, | Performed by: INTERNAL MEDICINE

## 2018-06-28 PROCEDURE — 3074F SYST BP LT 130 MM HG: CPT | Mod: CPTII,S$GLB,, | Performed by: INTERNAL MEDICINE

## 2018-06-28 PROCEDURE — 99213 OFFICE O/P EST LOW 20 MIN: CPT | Mod: S$GLB,,, | Performed by: INTERNAL MEDICINE

## 2018-06-29 RX ORDER — OMEPRAZOLE 40 MG/1
CAPSULE, DELAYED RELEASE ORAL
Qty: 30 CAPSULE | Refills: 4 | Status: SHIPPED | OUTPATIENT
Start: 2018-06-29 | End: 2018-11-27

## 2018-07-06 DIAGNOSIS — R09.81 NASAL CONGESTION: ICD-10-CM

## 2018-07-06 RX ORDER — FLUTICASONE PROPIONATE 50 MCG
1 SPRAY, SUSPENSION (ML) NASAL DAILY
Qty: 16 G | Refills: 3 | Status: SHIPPED | OUTPATIENT
Start: 2018-07-06 | End: 2019-01-02

## 2018-07-12 ENCOUNTER — OFFICE VISIT (OUTPATIENT)
Dept: SLEEP MEDICINE | Facility: CLINIC | Age: 63
End: 2018-07-12
Payer: COMMERCIAL

## 2018-07-12 VITALS
WEIGHT: 244.5 LBS | HEART RATE: 71 BPM | BODY MASS INDEX: 37.05 KG/M2 | DIASTOLIC BLOOD PRESSURE: 74 MMHG | RESPIRATION RATE: 18 BRPM | SYSTOLIC BLOOD PRESSURE: 116 MMHG | OXYGEN SATURATION: 96 % | HEIGHT: 68 IN

## 2018-07-12 DIAGNOSIS — G47.33 OSA ON CPAP: ICD-10-CM

## 2018-07-12 DIAGNOSIS — G47.34 NOCTURNAL OXYGEN DESATURATION: ICD-10-CM

## 2018-07-12 DIAGNOSIS — R91.1 PULMONARY NODULE: ICD-10-CM

## 2018-07-12 DIAGNOSIS — J44.9 MODERATE COPD (CHRONIC OBSTRUCTIVE PULMONARY DISEASE): Primary | ICD-10-CM

## 2018-07-12 DIAGNOSIS — Z86.711 HISTORY OF PULMONARY EMBOLISM: ICD-10-CM

## 2018-07-12 PROCEDURE — 99214 OFFICE O/P EST MOD 30 MIN: CPT | Mod: S$GLB,,, | Performed by: INTERNAL MEDICINE

## 2018-07-12 PROCEDURE — 99999 PR PBB SHADOW E&M-EST. PATIENT-LVL III: CPT | Mod: PBBFAC,,, | Performed by: INTERNAL MEDICINE

## 2018-07-12 PROCEDURE — 3078F DIAST BP <80 MM HG: CPT | Mod: CPTII,S$GLB,, | Performed by: INTERNAL MEDICINE

## 2018-07-12 PROCEDURE — 3008F BODY MASS INDEX DOCD: CPT | Mod: CPTII,S$GLB,, | Performed by: INTERNAL MEDICINE

## 2018-07-12 PROCEDURE — 3074F SYST BP LT 130 MM HG: CPT | Mod: CPTII,S$GLB,, | Performed by: INTERNAL MEDICINE

## 2018-07-12 NOTE — PROGRESS NOTES
SUBJECTIVE:     Patient is a 63 y.o. male presents with Moderate COPD diagnosis.  Has MACHO on APAP 4-10. Gautier score 5.  Using device and benefits  Residual AHI 3.2  COPD score 13. mRC score 1-2  COPD stable; no cough, No wheezing, NO sputum  All results reviewed: Meds BREO ellipta and Spiriva  Former smoker 35-40 pack year smoking history  Last chest CT 2017: GGO 5mm was noted  Given smoker: need f/u  He had bilateral total knee replacement last year and after that developed pulmonary embolus.  Venous thromboembolic disease and was provoked  He completed treatment with Eliquis for a six-month.  I have reviewed the patient's medical history in detail and updated the computerized patient record.            Chief Complaint   Patient presents with    COPD       Review of patient's allergies indicates:  No Known Allergies    Current Outpatient Prescriptions   Medication Sig Dispense Refill    acetaminophen (TYLENOL) 500 MG tablet Take 1,000 mg by mouth as needed.       albuterol (ACCUNEB) 0.63 mg/3 mL Nebu Take 0.63 mg by nebulization every 6 (six) hours as needed. Rescue      albuterol (PROVENTIL) 2.5 mg /3 mL (0.083 %) nebulizer solution USE 1 VIAL IN NEBULIZER EVERY 6 HOURS AS NEEDED FOR WHEEZING/RESCUE 75 each 2    amLODIPine (NORVASC) 5 MG tablet TAKE 1 TABLET BY MOUTH ONCE DAILY 30 tablet 1    aspirin 325 MG tablet Take 325 mg by mouth once daily.      blood sugar diagnostic (BLOOD GLUCOSE TEST) Strp 1 strip by Misc.(Non-Drug; Combo Route) route 3 (three) times daily. (Patient taking differently: 1 strip by Misc.(Non-Drug; Combo Route) route once daily. ) 100 each 2    BLOOD-GLUCOSE METER (CONTOUR NEXT EZ METER MISC) by Misc.(Non-Drug; Combo Route) route.      buPROPion (WELLBUTRIN SR) 150 MG TBSR 12 hr tablet TAKE ONE TABLET BY MOUTH ONCE DAILY (Patient taking differently: TAKE HALF TABLET BY MOUTH ONCE DAILY) 90 tablet 3    cetirizine (ZYRTEC) 10 MG tablet Take 10 mg by mouth every evening.       cholecalciferol, vitamin D3, (VITAMIN D3) 1,000 unit capsule Take 1,000 Units by mouth once daily.      cyanocobalamin (VITAMIN B-12) 500 MCG tablet Take 500 mcg by mouth once daily.      fluticasone (FLONASE) 50 mcg/actuation nasal spray Use 1 spray (50 mcg total) in each nostril once daily. 16 g 3    fluticasone-vilanterol (BREO ELLIPTA) 200-25 mcg/dose DsDv diskus inhaler Inhale 1 puff into the lungs once daily. 60 each 11    guaifenesin (MUCINEX) 1,200 mg Ta12 Take 1 tablet by mouth 2 (two) times daily.      lancets Misc 1 lancet by Misc.(Non-Drug; Combo Route) route 2 (two) times daily. (Patient taking differently: 1 lancet by Misc.(Non-Drug; Combo Route) route once daily. ) 100 each 2    metFORMIN (GLUCOPHAGE) 1000 MG tablet TAKE ONE TABLET BY MOUTH TWICE DAILY WITH MEALS 180 tablet 2    nitroGLYCERIN (NITROSTAT) 0.4 MG SL tablet Place 0.4 mg under the tongue every 5 (five) minutes as needed.       omeprazole (PRILOSEC) 40 MG capsule TAKE ONE CAPSULE BY MOUTH ONCE DAILY 30 capsule 4    PROAIR HFA 90 mcg/actuation inhaler INHALE 2 PUFFS INTO THE LUNGS EVERY 6 HOURS AS NEEDED FOR WHEEZING AND SHORTNESS OF BREATH 8.5 g 5    rosuvastatin (CRESTOR) 10 MG tablet TAKE ONE TABLET BY MOUTH ONCE DAILY 90 tablet 1    sodium chloride (OCEAN NASAL) 0.65 % nasal spray 1 spray by Nasal route as needed for Congestion. 1 Bottle 12    SPIRIVA WITH HANDIHALER 18 mcg inhalation capsule INHALE ONE DOSE BY MOUTH ONCE DAILY 30 capsule 11    tamsulosin (FLOMAX) 0.4 mg Cp24 Take 1 capsule by mouth once daily.      TRULICITY 0.75 mg/0.5 mL PnIj INJECT 0.5 ML (0.75 MG TOTAL) INTO THE SKIN EVERY 7 DAYS 4 Syringe 3     No current facility-administered medications for this visit.        Past Medical History:   Diagnosis Date    COPD (chronic obstructive pulmonary disease)     Diabetes mellitus, type 2     Hyperlipidemia     Hypertension      Past Surgical History:   Procedure Laterality Date    ANGIOPLASTY      TOTAL  "KNEE ARTHROPLASTY       Family History   Problem Relation Age of Onset    Diabetes Mother     Cancer Father         bone     Social History   Substance Use Topics    Smoking status: Former Smoker     Types: Cigars     Quit date: 12/30/2015    Smokeless tobacco: Never Used    Alcohol use No        Review of Systems:  Review of Systems   Constitutional: Negative.    HENT: Negative for congestion.    Eyes: Negative.    Respiratory: Negative for apnea, cough, shortness of breath and wheezing.         [place] score 16  Comorbidity: HTN, DM, CAD, COPD, Hypoxemia   Cardiovascular: Negative.    Gastrointestinal: Negative.    Endocrine: Negative.    Genitourinary: Negative.    Musculoskeletal: Negative.    Skin: Negative.    Allergic/Immunologic: Negative.    Neurological: Negative.    Hematological: Negative.    Psychiatric/Behavioral: Negative for sleep disturbance.       OBJECTIVE:     Vital Signs (Most Recent)  Pulse: 71 (07/12/18 1520)  Resp: 18 (07/12/18 1520)  BP: 116/74 (07/12/18 1520)  SpO2: 96 % (07/12/18 1520)  5' 8" (1.727 m)  110.9 kg (244 lb 7.8 oz)     Physical Exam:  Physical Exam   Constitutional: He is oriented to person, place, and time. He appears well-developed and well-nourished.   HENT:   Head: Normocephalic and atraumatic.   Nose: No mucosal edema or rhinorrhea.   Mouth/Throat: Oropharynx is clear and moist. No oropharyngeal exudate.   Eyes: Conjunctivae and EOM are normal. Pupils are equal, round, and reactive to light. Left eye exhibits no discharge. No scleral icterus.   Neck: Normal range of motion. Neck supple. No tracheal deviation present. No thyromegaly present.   Cardiovascular: Normal rate, regular rhythm, normal heart sounds and intact distal pulses.    No murmur heard.  Pulmonary/Chest: Effort normal and breath sounds normal. He has no wheezes. He has no rales. He exhibits no tenderness.   Abdominal: Soft. Bowel sounds are normal. He exhibits no distension.   Musculoskeletal: Normal " range of motion. He exhibits no tenderness or deformity.   Neurological: He is alert and oriented to person, place, and time. No cranial nerve deficit.   Skin: Skin is warm and dry. Capillary refill takes 2 to 3 seconds.   Psychiatric: He has a normal mood and affect.   Nursing note and vitals reviewed.      Laboratory  CBC: Reviewed  BMP: Reviewed    1. DOWNLOAD  6/12/2018 - 7/11/2018  YOB: 1955  Mask:  Compliance Summary  6/12/2018 - 7/11/2018 (30 days)  Days with Device Usage 30 days  Days without Device Usage 0 days  Percent Days with Device Usage 100.0%  Cumulative Usage 8 days 3 hrs. 29 mins. 17 secs.  Maximum Usage (1 Day) 8 hrs. 10 mins. 58 secs.  Average Usage (All Days) 6 hrs. 30 mins. 58 secs.  Average Usage (Days Used) 6 hrs. 30 mins. 58 secs.  Minimum Usage (1 Day) 4 hrs. 47 mins. 45 secs.  Percent of Days with Usage >= 4 Hours 100.0%  Percent of Days with Usage < 4 Hours 0.0%  Date Range  Total Blower Time 8 days 3 hrs. 29 mins. 27 secs.  Average AHI 3.2  Auto-CPAP Summary  Auto-CPAP Mean Pressure 4.5 cmH2O  Auto-CPAP Peak Average Pressure 5.6 cmH2O  Average Device Pressure <= 90% of Time 5.5 cmH2O  Average Time in Large Leak Per Day 0 secs.      CT Chest 11/2018  Lungs: Centrilobular and paraseptal emphysematous disease again noted with upper lung predominance.    There is a subtle groundglass nodular opacity in the left lower lobe measuring an average of 5.5 mm as seen on image 79, series 3 which is favored to be unchanged from prior.    Previously described 5 mm pleural-based pulmonary nodule in the left upper lobe is unchanged and most in keeping with a benign interfissural node.    Other scattered miniscule nodules are unchanged measuring 2-3 mm in the periphery of the right upper lobe.    Previously described right lower lobe pulmonary nodule is not definitively visualized.    No parenchymal consolidations or pleural effusions demonstrated.  ASSESSMENT/PLAN:     Problem List Items  Addressed This Visit     Moderate COPD (chronic obstructive pulmonary disease) - Primary     CAT score 13  Stable on BREO , SPIRIVA and Albuterol HFA  FEV1 : 2.01L ( 60%)   GOLD 2: Group B  Will inform me of formulary changes in Aug 2018           Pulmonary nodule     2 nodules RUl: 5.5mm  35-40 pack year smoker, quit 8 months ago  Imaging 07/2016  Need follow up 12 months  Oct 2018         Relevant Orders    CT Chest With Contrast    History of pulmonary embolism     Provoked VTE after Phong TKR  Off Elliquis         MACHO on CPAP     CPAP 4-10  Used 30 days'  Usage > 4 hrs was 100%  Average 3.2  No mask leak  Oxygen bled 2.0 LPM  Using device and benefits         Nocturnal oxygen desaturation     DME?  Oxygen Bled 2.0 LPM               PLAN:Plan      Adherent with PAP and benefits  GROUP B mRC 2, CAT score > 10  COPD stable    Follow-up in about 3 months (around 10/12/2018) for Chest CT.    This note was prepared using voice recognition system and is likely to have sound alike errors that may have been overlooked even after proof reading.  Please call me with any questions    Discussed diagnosis, its evaluation, treatment and usual course. All questions answered.    Thank you for the courtesy of participating in the care of this patient    Mazin Wells MD

## 2018-07-12 NOTE — ASSESSMENT & PLAN NOTE
CAT score 13  Stable on BREO , SPIRIVA and Albuterol HFA  FEV1 : 2.01L ( 60%)   GOLD 2: Group B  Will inform me of formulary changes in Aug 2018

## 2018-07-12 NOTE — ASSESSMENT & PLAN NOTE
CPAP 4-10  Used 30 days'  Usage > 4 hrs was 100%  Average 3.2  No mask leak  Oxygen bled 2.0 LPM  Using device and benefits

## 2018-07-13 ENCOUNTER — TELEPHONE (OUTPATIENT)
Dept: PULMONOLOGY | Facility: CLINIC | Age: 63
End: 2018-07-13

## 2018-07-13 NOTE — TELEPHONE ENCOUNTER
Informed pt of sim card for cpap left here.  Pt states he will  7/16/18.  Card left with nurse Triny.

## 2018-07-20 RX ORDER — METHSCOPOLAMINE BROMIDE 2.5 MG/1
TABLET ORAL
Qty: 180 TABLET | Refills: 1 | Status: SHIPPED | OUTPATIENT
Start: 2018-07-23 | End: 2018-12-05 | Stop reason: SDUPTHER

## 2018-07-25 ENCOUNTER — TELEPHONE (OUTPATIENT)
Dept: PULMONOLOGY | Facility: CLINIC | Age: 63
End: 2018-07-25

## 2018-07-25 NOTE — TELEPHONE ENCOUNTER
----- Message from Brianda Can sent at 7/25/2018  2:45 PM CDT -----  Contact: Lisa Forbes (Spouse)  Lisa Forbes (Spouse) is requesting a call from nurse to discuss supply replacement.          Please call Lisa Forbes (Spouse) back at [phone]

## 2018-07-30 RX ORDER — LANCETS
EACH MISCELLANEOUS
Qty: 100 EACH | Refills: 2 | Status: SHIPPED | OUTPATIENT
Start: 2018-07-30 | End: 2019-01-14

## 2018-08-13 ENCOUNTER — TELEPHONE (OUTPATIENT)
Dept: PULMONOLOGY | Facility: CLINIC | Age: 63
End: 2018-08-13

## 2018-08-13 RX ORDER — TIOTROPIUM BROMIDE 18 UG/1
CAPSULE ORAL; RESPIRATORY (INHALATION)
Qty: 30 CAPSULE | Refills: 11 | Status: SHIPPED | OUTPATIENT
Start: 2018-08-13 | End: 2018-10-19

## 2018-08-13 NOTE — TELEPHONE ENCOUNTER
----- Message from Mana Gerber sent at 8/13/2018  2:16 PM CDT -----  Contact: Wife  Has a question about the fasting lab on 10/19.

## 2018-08-13 NOTE — TELEPHONE ENCOUNTER
----- Message from Francisco Taylor, PharmD sent at 8/13/2018 12:04 PM CDT -----  Hello,    Pt is looking for a new rx for spiriva to be sent to [facility].    Thanks,    Francisco

## 2018-08-18 RX ORDER — AMLODIPINE BESYLATE 5 MG/1
TABLET ORAL
Qty: 30 TABLET | Refills: 1 | Status: SHIPPED | OUTPATIENT
Start: 2018-08-18 | End: 2018-10-04 | Stop reason: SDUPTHER

## 2018-08-28 ENCOUNTER — OFFICE VISIT (OUTPATIENT)
Dept: INTERNAL MEDICINE | Facility: CLINIC | Age: 63
End: 2018-08-28
Payer: MEDICARE

## 2018-08-28 ENCOUNTER — TELEPHONE (OUTPATIENT)
Dept: INTERNAL MEDICINE | Facility: CLINIC | Age: 63
End: 2018-08-28

## 2018-08-28 VITALS
SYSTOLIC BLOOD PRESSURE: 112 MMHG | HEART RATE: 72 BPM | WEIGHT: 237.63 LBS | BODY MASS INDEX: 36.02 KG/M2 | TEMPERATURE: 96 F | OXYGEN SATURATION: 96 % | DIASTOLIC BLOOD PRESSURE: 56 MMHG | HEIGHT: 68 IN

## 2018-08-28 DIAGNOSIS — J01.90 ACUTE BACTERIAL RHINOSINUSITIS: ICD-10-CM

## 2018-08-28 DIAGNOSIS — B96.89 ACUTE BACTERIAL RHINOSINUSITIS: ICD-10-CM

## 2018-08-28 DIAGNOSIS — J44.1 CHRONIC OBSTRUCTIVE PULMONARY DISEASE WITH ACUTE EXACERBATION: Primary | ICD-10-CM

## 2018-08-28 PROCEDURE — 3078F DIAST BP <80 MM HG: CPT | Mod: S$GLB,,, | Performed by: FAMILY MEDICINE

## 2018-08-28 PROCEDURE — 99999 PR PBB SHADOW E&M-EST. PATIENT-LVL III: CPT | Mod: PBBFAC,,, | Performed by: FAMILY MEDICINE

## 2018-08-28 PROCEDURE — 3074F SYST BP LT 130 MM HG: CPT | Mod: S$GLB,,, | Performed by: FAMILY MEDICINE

## 2018-08-28 PROCEDURE — 99214 OFFICE O/P EST MOD 30 MIN: CPT | Mod: S$GLB,,, | Performed by: FAMILY MEDICINE

## 2018-08-28 PROCEDURE — 3008F BODY MASS INDEX DOCD: CPT | Mod: S$GLB,,, | Performed by: FAMILY MEDICINE

## 2018-08-28 RX ORDER — METHYLPREDNISOLONE 4 MG/1
TABLET ORAL
Qty: 21 TABLET | Refills: 0 | Status: SHIPPED | OUTPATIENT
Start: 2018-08-28 | End: 2018-10-01

## 2018-08-28 RX ORDER — DOXYCYCLINE 100 MG/1
100 CAPSULE ORAL 2 TIMES DAILY
Qty: 20 CAPSULE | Refills: 0 | Status: SHIPPED | OUTPATIENT
Start: 2018-08-28 | End: 2018-09-07

## 2018-08-28 NOTE — TELEPHONE ENCOUNTER
Spoke with pt's wife, Lisa, she states that pt is having chest congestion with phlegm and would like an appointment with Dr. Fisher today. Advised her that Dr. Fisher didn't have any openings today but could get an appointment with another provider. Pt preferred seeing an MD and scheduled an appointment with Dr. Rodriguez today at 1 pm.

## 2018-08-28 NOTE — PROGRESS NOTES
Subjective:   Patient ID: Stefan Forbes Jr. is a 63 y.o. male.  Chief Complaint:  Chest Congestion and Nasal Congestion      PCP Dr. Fisher.    Past history significant for COPD/chronic bronchitis and allergic rhinitis  Patient reports compliance with Spiriva and Breo inhalers.  Zyrtec and Flonase.    Despite meds started with 5 day history of cough and sinus symptoms.  Initial improvement but this morning reports significantly worse with increased shortness of breath, wheezing and decreased response to albuterol rescue inhaler.  The   Denies fevers.  Denies sick contacts.  Her  No chest pain.  No swelling.  Last episode/treatment March 2018 with Z-Dani and Medrol Dosepak.      Cough   This is a recurrent problem. The current episode started in the past 7 days. The problem has been gradually worsening. The problem occurs constantly. The cough is productive of sputum. Associated symptoms include headaches, nasal congestion, postnasal drip, rhinorrhea, a sore throat, shortness of breath and wheezing. Pertinent negatives include no chest pain, chills, ear congestion, ear pain, eye redness, fever, heartburn, hemoptysis, myalgias, rash, sweats or weight loss. Risk factors for lung disease include smoking/tobacco exposure. He has tried a beta-agonist inhaler for the symptoms. The treatment provided mild relief. His past medical history is significant for bronchitis, COPD and environmental allergies.     Review of Systems   Constitutional: Negative for chills, fatigue, fever and weight loss.   HENT: Positive for postnasal drip, rhinorrhea, sinus pressure, sinus pain and sore throat. Negative for congestion, dental problem, drooling, ear discharge, ear pain, facial swelling, hearing loss, mouth sores, nosebleeds, sneezing, tinnitus, trouble swallowing and voice change.    Eyes: Negative for discharge, redness, itching and visual disturbance.   Respiratory: Positive for cough, shortness of breath and wheezing. Negative for  "hemoptysis, choking, chest tightness and stridor.    Cardiovascular: Negative for chest pain, palpitations and leg swelling.   Gastrointestinal: Negative for abdominal pain, diarrhea, heartburn, nausea and vomiting.   Musculoskeletal: Negative for myalgias.   Skin: Negative for rash.   Allergic/Immunologic: Positive for environmental allergies.   Neurological: Positive for headaches. Negative for dizziness, weakness and light-headedness.   Hematological: Negative for adenopathy.     Objective:   BP (!) 112/56   Pulse 72   Temp 96.3 °F (35.7 °C) (Tympanic)   Ht 5' 8" (1.727 m)   Wt 107.8 kg (237 lb 10.5 oz)   SpO2 96%   BMI 36.14 kg/m²     Physical Exam   Constitutional: Vital signs are normal. He appears well-developed and well-nourished.  Non-toxic appearance. He has a sickly appearance. He does not appear ill. No distress.   HENT:   Head: Normocephalic and atraumatic.   Right Ear: Hearing, tympanic membrane, external ear and ear canal normal.   Left Ear: Hearing, tympanic membrane, external ear and ear canal normal.   Nose: Mucosal edema and rhinorrhea present. Right sinus exhibits maxillary sinus tenderness. Right sinus exhibits no frontal sinus tenderness. Left sinus exhibits maxillary sinus tenderness. Left sinus exhibits no frontal sinus tenderness.   Mouth/Throat: Uvula is midline, oropharynx is clear and moist and mucous membranes are normal.   Eyes: Right conjunctiva is injected. Left conjunctiva is injected.   Cardiovascular: Normal rate, regular rhythm and normal heart sounds.   Pulmonary/Chest: Effort normal. No respiratory distress. He has wheezes. He has rhonchi. He has no rales.   Equal aeration bilaterally, but significant wheezing all lung fields.   Abdominal: Soft. He exhibits no distension. There is no tenderness.   Musculoskeletal: He exhibits no edema.   Lymphadenopathy:     He has no cervical adenopathy.   Skin: Skin is warm and dry. No rash noted.   Nursing note and vitals " reviewed.    Assessment:     1. Chronic obstructive pulmonary disease with acute exacerbation    2. Acute bacterial rhinosinusitis      Plan:   Chronic obstructive pulmonary disease with acute exacerbation  Acute bacterial rhinosinusitis  -     doxycycline (VIBRAMYCIN) 100 MG Cap; Take 1 capsule (100 mg total) by mouth 2 (two) times daily. for 10 days  Dispense: 20 capsule; Refill: 0  -     methylPREDNISolone (MEDROL DOSEPACK) 4 mg tablet; Take as directed on pack  Dispense: 21 tablet; Refill: 0    Based on double sick knee and decreased response to albuterol rescue inhaler, treat with doxycycline and Medrol Dosepak.    Continue albuterol as needed.    Continue Spiriva, Breo, Zyrtec, Flonase.    If any worsening despite above, go to the emergency room.    Otherwise follow-up with Dr. Fisher and pulmonology as planned.

## 2018-09-01 DIAGNOSIS — J44.9 CHRONIC OBSTRUCTIVE PULMONARY DISEASE, UNSPECIFIED COPD TYPE: ICD-10-CM

## 2018-09-04 RX ORDER — ALBUTEROL SULFATE 0.83 MG/ML
SOLUTION RESPIRATORY (INHALATION)
Qty: 75 EACH | Refills: 2 | Status: SHIPPED | OUTPATIENT
Start: 2018-09-04 | End: 2018-10-01

## 2018-10-01 ENCOUNTER — LAB VISIT (OUTPATIENT)
Dept: LAB | Facility: HOSPITAL | Age: 63
End: 2018-10-01
Attending: INTERNAL MEDICINE
Payer: MEDICARE

## 2018-10-01 ENCOUNTER — OFFICE VISIT (OUTPATIENT)
Dept: OPHTHALMOLOGY | Facility: CLINIC | Age: 63
End: 2018-10-01
Payer: MEDICARE

## 2018-10-01 DIAGNOSIS — E11.69 HYPERLIPIDEMIA ASSOCIATED WITH TYPE 2 DIABETES MELLITUS: ICD-10-CM

## 2018-10-01 DIAGNOSIS — D31.31 NEVUS OF CHOROID OF RIGHT EYE: ICD-10-CM

## 2018-10-01 DIAGNOSIS — E78.5 HYPERLIPIDEMIA ASSOCIATED WITH TYPE 2 DIABETES MELLITUS: ICD-10-CM

## 2018-10-01 DIAGNOSIS — E11.9 CONTROLLED TYPE 2 DIABETES MELLITUS WITHOUT COMPLICATION, WITHOUT LONG-TERM CURRENT USE OF INSULIN: ICD-10-CM

## 2018-10-01 DIAGNOSIS — E11.9 CONTROLLED TYPE 2 DIABETES MELLITUS WITHOUT COMPLICATION, WITHOUT LONG-TERM CURRENT USE OF INSULIN: Primary | ICD-10-CM

## 2018-10-01 LAB
CHOLEST SERPL-MCNC: 130 MG/DL
CHOLEST/HDLC SERPL: 2.9 {RATIO}
ESTIMATED AVG GLUCOSE: 148 MG/DL
HBA1C MFR BLD HPLC: 6.8 %
HDLC SERPL-MCNC: 45 MG/DL
HDLC SERPL: 34.6 %
LDLC SERPL CALC-MCNC: 66.2 MG/DL
NONHDLC SERPL-MCNC: 85 MG/DL
TRIGL SERPL-MCNC: 94 MG/DL

## 2018-10-01 PROCEDURE — 99999 PR PBB SHADOW E&M-EST. PATIENT-LVL II: CPT | Mod: PBBFAC,,, | Performed by: OPHTHALMOLOGY

## 2018-10-01 PROCEDURE — 92014 COMPRE OPH EXAM EST PT 1/>: CPT | Mod: S$PBB,,, | Performed by: OPHTHALMOLOGY

## 2018-10-01 PROCEDURE — 99212 OFFICE O/P EST SF 10 MIN: CPT | Mod: PBBFAC,PO | Performed by: OPHTHALMOLOGY

## 2018-10-01 PROCEDURE — 83036 HEMOGLOBIN GLYCOSYLATED A1C: CPT

## 2018-10-01 PROCEDURE — 80061 LIPID PANEL: CPT

## 2018-10-01 PROCEDURE — 36415 COLL VENOUS BLD VENIPUNCTURE: CPT | Mod: PO

## 2018-10-01 RX ORDER — ALBUTEROL SULFATE 0.83 MG/ML
2.5 SOLUTION RESPIRATORY (INHALATION)
COMMUNITY
End: 2018-10-19 | Stop reason: SDUPTHER

## 2018-10-01 NOTE — PROGRESS NOTES
===============================  10/01/2018   Stefan Forbes Jr.,   63 y.o. male   Last visit [facility]: :9/25/2017   Last visit eye dept. Visit date not found  [facility]:  Corrected distance visual acuity was 20/25 in the right eye and 20/60 in the left eye.  Tonometry     Tonometry (Applanation, 10:30 AM)       Right Left    Pressure 13 15              Wearing Rx     Wearing Rx       Sphere Cylinder Axis Add    Right -1.00 +1.25 180 +2.25    Left -1.00 +1.00 160 +2.25    Type:  Bifocal              Manifest Refraction     Manifest Refraction       Sphere Cylinder Axis Dist [facility]    Right -0.75 +1.25 180 20/20    Left -1.25 +2.00 160 20/40              Chief Complaint   Patient presents with    Diabetes     yearly exam    chororidal nevus        HPI     Diabetes      Additional comments: yearly exam              Comments     DM SINCE 2012  NO BDR  CHOROIDAL NEVUS OF OD          Last edited by Ayse Bear on 10/1/2018 10:12 AM. (History)          ________________  10/1/2018  Problem List Items Addressed This Visit     None        g max < 200   oct good   Notes [facility] os  Os cetral soft se scar w  fe pigment    no dr   min ns  Mr+pc   rtc 1 year  od nevus stable  .       ===========================

## 2018-10-04 ENCOUNTER — OFFICE VISIT (OUTPATIENT)
Dept: INTERNAL MEDICINE | Facility: CLINIC | Age: 63
End: 2018-10-04
Payer: MEDICARE

## 2018-10-04 VITALS
HEART RATE: 64 BPM | TEMPERATURE: 97 F | BODY MASS INDEX: 36.12 KG/M2 | WEIGHT: 238.31 LBS | HEIGHT: 68 IN | OXYGEN SATURATION: 96 % | SYSTOLIC BLOOD PRESSURE: 124 MMHG | DIASTOLIC BLOOD PRESSURE: 66 MMHG

## 2018-10-04 DIAGNOSIS — J44.9 MODERATE COPD (CHRONIC OBSTRUCTIVE PULMONARY DISEASE): ICD-10-CM

## 2018-10-04 DIAGNOSIS — E11.59 HYPERTENSION ASSOCIATED WITH DIABETES: ICD-10-CM

## 2018-10-04 DIAGNOSIS — E87.1 HYPONATREMIA: ICD-10-CM

## 2018-10-04 DIAGNOSIS — G47.33 OSA ON CPAP: ICD-10-CM

## 2018-10-04 DIAGNOSIS — I15.2 HYPERTENSION ASSOCIATED WITH DIABETES: ICD-10-CM

## 2018-10-04 DIAGNOSIS — E78.5 HYPERLIPIDEMIA ASSOCIATED WITH TYPE 2 DIABETES MELLITUS: ICD-10-CM

## 2018-10-04 DIAGNOSIS — Z00.00 ROUTINE GENERAL MEDICAL EXAMINATION AT A HEALTH CARE FACILITY: Primary | ICD-10-CM

## 2018-10-04 DIAGNOSIS — E11.9 CONTROLLED TYPE 2 DIABETES MELLITUS WITHOUT COMPLICATION, WITHOUT LONG-TERM CURRENT USE OF INSULIN: ICD-10-CM

## 2018-10-04 DIAGNOSIS — I25.10 CORONARY ARTERY DISEASE INVOLVING NATIVE CORONARY ARTERY OF NATIVE HEART WITHOUT ANGINA PECTORIS: ICD-10-CM

## 2018-10-04 DIAGNOSIS — E11.69 HYPERLIPIDEMIA ASSOCIATED WITH TYPE 2 DIABETES MELLITUS: ICD-10-CM

## 2018-10-04 DIAGNOSIS — Z23 NEED FOR INFLUENZA VACCINATION: ICD-10-CM

## 2018-10-04 PROCEDURE — 99396 PREV VISIT EST AGE 40-64: CPT | Mod: 25,S$PBB,, | Performed by: INTERNAL MEDICINE

## 2018-10-04 PROCEDURE — 90686 IIV4 VACC NO PRSV 0.5 ML IM: CPT | Mod: PBBFAC,PO

## 2018-10-04 PROCEDURE — 3044F HG A1C LEVEL LT 7.0%: CPT | Mod: ,,, | Performed by: INTERNAL MEDICINE

## 2018-10-04 PROCEDURE — 3078F DIAST BP <80 MM HG: CPT | Mod: ,,, | Performed by: INTERNAL MEDICINE

## 2018-10-04 PROCEDURE — 3074F SYST BP LT 130 MM HG: CPT | Mod: ,,, | Performed by: INTERNAL MEDICINE

## 2018-10-04 PROCEDURE — 99213 OFFICE O/P EST LOW 20 MIN: CPT | Mod: PBBFAC,PO,25 | Performed by: INTERNAL MEDICINE

## 2018-10-04 PROCEDURE — 99999 PR PBB SHADOW E&M-EST. PATIENT-LVL III: CPT | Mod: PBBFAC,,, | Performed by: INTERNAL MEDICINE

## 2018-10-04 RX ORDER — AMLODIPINE BESYLATE 5 MG/1
5 TABLET ORAL DAILY
Qty: 90 TABLET | Refills: 3 | Status: SHIPPED | OUTPATIENT
Start: 2018-10-04 | End: 2019-10-11 | Stop reason: SDUPTHER

## 2018-10-04 NOTE — PROGRESS NOTES
"Subjective:      Patient ID: Stefan Forbes Jr. is a 63 y.o. male.    Chief Complaint: Follow-up    64 yo with Patient Active Problem List:     Controlled type 2 diabetes mellitus without complication, without long-term current use of insulin     Hypertension associated with diabetes     Hyperlipidemia associated with type 2 diabetes mellitus     Moderate COPD (chronic obstructive pulmonary disease)     CAD (coronary artery disease)     Vitamin D deficiency     Osteoarthritis of knee     Pulmonary nodule     Nevus of choroid of right eye     History of pulmonary embolism     Hyponatremia     Iron deficiency anemia due to chronic blood loss     MACHO on CPAP     Nocturnal oxygen desaturation     PLMD (periodic limb movement disorder)    Past Medical History:  No date: COPD (chronic obstructive pulmonary disease)  No date: Diabetes mellitus, type 2  No date: Hyperlipidemia  No date: Hypertension    Here today for annual prev exam.  Compliant with meds without significant side effects. Energy and appetite are good.  Compliant with meds without significant side effects.  He has quit smoking.  He admits to diet  Noncompliance.  He has increased his exercise lately.      Review of Systems   Constitutional: Negative for chills and fever.   HENT: Negative for ear pain and sore throat.    Respiratory: Negative for cough.    Cardiovascular: Negative for chest pain.   Gastrointestinal: Negative for abdominal pain and blood in stool.   Genitourinary: Negative for dysuria and hematuria.   Neurological: Negative for seizures and syncope.     Objective:   /66 (BP Location: Right arm, Patient Position: Sitting)   Pulse 64   Temp 97.1 °F (36.2 °C) (Tympanic)   Ht 5' 8" (1.727 m)   Wt 108.1 kg (238 lb 5.1 oz)   SpO2 96%   BMI 36.24 kg/m²     Physical Exam   Constitutional: He is oriented to person, place, and time. He appears well-developed and well-nourished. No distress.   HENT:   Head: Normocephalic and atraumatic. "   Mouth/Throat: Oropharynx is clear and moist.   Eyes: EOM are normal. Pupils are equal, round, and reactive to light.   Neck: Neck supple. No thyromegaly present.   Cardiovascular: Normal rate and regular rhythm.   Pulses:       Dorsalis pedis pulses are 2+ on the right side, and 2+ on the left side.   Pulmonary/Chest: Breath sounds normal. He has no wheezes. He has no rales.   Abdominal: Soft. Bowel sounds are normal. There is no tenderness.   Musculoskeletal: He exhibits no edema.   Feet:   Right Foot:   Protective Sensation: 8 sites tested. 8 sites sensed.   Skin Integrity: Negative for ulcer or blister.   Left Foot:   Protective Sensation: 8 sites tested. 8 sites sensed.   Skin Integrity: Negative for ulcer or blister.   Lymphadenopathy:     He has no cervical adenopathy.   Neurological: He is alert and oriented to person, place, and time.   Skin: Skin is warm and dry.   Psychiatric: He has a normal mood and affect. His behavior is normal.       Assessment:     1. Routine general medical examination at a health care facility    2. Coronary artery disease involving native coronary artery of native heart without angina pectoris    3. Controlled type 2 diabetes mellitus without complication, without long-term current use of insulin    4. Hyperlipidemia associated with type 2 diabetes mellitus    5. Hypertension associated with diabetes    6. Hyponatremia    7. Moderate COPD (chronic obstructive pulmonary disease)    8. MACHO on CPAP    9. Need for influenza vaccination      Plan:   Routine general medical examination at a health care facility    Heart healthy diet and regular exercise   health maintenance reviewed with patient  Coronary artery disease involving native coronary artery of native heart without angina pectoris   stable.  Continue current medications and recommendations as per  cardiology  Controlled type 2 diabetes mellitus without complication, without long-term current use of insulin   stable.   Continue current medications  -     Hemoglobin A1c; Future; Expected date: 01/04/2019    Hyperlipidemia associated with type 2 diabetes mellitus   stable.  Continue current medications  Hypertension associated with diabetes   controlled.  Continue current medications  -     amLODIPine (NORVASC) 5 MG tablet; Take 1 tablet (5 mg total) by mouth once daily.  Dispense: 90 tablet; Refill: 3    Hyponatremia   stable.  Moderate COPD (chronic obstructive pulmonary disease)    Stable.  Continue current Medications  MACHO on CPAP    Continue CPAP  Need for influenza vaccination  -     Influenza - Quadrivalent (3 years & older) (PF)        Lab Frequency Next Occurrence   Microalbumin/creatinine urine ratio Once 08/10/2018   CBC auto differential Once 02/22/2018   Comprehensive metabolic panel Once 02/22/2018   Methylmalonic acid, serum Once 02/22/2018   D dimer, quantitative Once 02/22/2018   CBC auto differential Once 05/08/2018   Comprehensive metabolic panel Once 05/08/2018   Ferritin Once 05/08/2018   Iron and TIBC Once 05/08/2018   Lactate dehydrogenase Once 05/08/2018   Reticulocytes Once 05/08/2018   Vitamin B12 Once 05/08/2018   C-reactive protein Once 05/08/2018   CT Chest With Contrast Once 07/12/2018   Hemoglobin A1c Once 01/04/2019       Problem List Items Addressed This Visit        Pulmonary    Moderate COPD (chronic obstructive pulmonary disease)       Cardiac/Vascular    Hypertension associated with diabetes    Relevant Medications    amLODIPine (NORVASC) 5 MG tablet    Hyperlipidemia associated with type 2 diabetes mellitus    CAD (coronary artery disease)       Renal/    Hyponatremia       Endocrine    Controlled type 2 diabetes mellitus without complication, without long-term current use of insulin    Relevant Orders    Hemoglobin A1c       Other    MACHO on CPAP      Other Visit Diagnoses     Routine general medical examination at a health care facility    -  Primary    Need for influenza vaccination         Relevant Orders    Influenza - Quadrivalent (3 years & older) (PF) (Completed)          Follow-up in about 3 months (around 1/4/2019), or if symptoms worsen or fail to improve.

## 2018-10-08 DIAGNOSIS — E11.69 HYPERLIPIDEMIA ASSOCIATED WITH TYPE 2 DIABETES MELLITUS: ICD-10-CM

## 2018-10-08 DIAGNOSIS — E78.5 HYPERLIPIDEMIA ASSOCIATED WITH TYPE 2 DIABETES MELLITUS: ICD-10-CM

## 2018-10-09 RX ORDER — ROSUVASTATIN CALCIUM 10 MG/1
TABLET, COATED ORAL
Qty: 90 TABLET | Refills: 1 | Status: SHIPPED | OUTPATIENT
Start: 2018-10-09 | End: 2019-04-01 | Stop reason: SDUPTHER

## 2018-10-18 ENCOUNTER — LAB VISIT (OUTPATIENT)
Dept: LAB | Facility: HOSPITAL | Age: 63
End: 2018-10-18
Attending: INTERNAL MEDICINE
Payer: MEDICARE

## 2018-10-18 ENCOUNTER — TELEPHONE (OUTPATIENT)
Dept: RADIOLOGY | Facility: HOSPITAL | Age: 63
End: 2018-10-18

## 2018-10-18 DIAGNOSIS — I26.99 OTHER PULMONARY EMBOLISM WITHOUT ACUTE COR PULMONALE, UNSPECIFIED CHRONICITY: ICD-10-CM

## 2018-10-18 DIAGNOSIS — I26.99 OTHER ACUTE PULMONARY EMBOLISM WITHOUT ACUTE COR PULMONALE: ICD-10-CM

## 2018-10-18 DIAGNOSIS — D50.0 IRON DEFICIENCY ANEMIA DUE TO CHRONIC BLOOD LOSS: ICD-10-CM

## 2018-10-18 DIAGNOSIS — G44.89 OTHER HEADACHE SYNDROME: ICD-10-CM

## 2018-10-18 LAB
ALBUMIN SERPL BCP-MCNC: 4.2 G/DL
ALP SERPL-CCNC: 69 U/L
ALT SERPL W/O P-5'-P-CCNC: 21 U/L
ANION GAP SERPL CALC-SCNC: 9 MMOL/L
AST SERPL-CCNC: 16 U/L
BASOPHILS # BLD AUTO: 0.03 K/UL
BASOPHILS NFR BLD: 0.4 %
BILIRUB SERPL-MCNC: 0.3 MG/DL
BUN SERPL-MCNC: 9 MG/DL
CALCIUM SERPL-MCNC: 10.2 MG/DL
CHLORIDE SERPL-SCNC: 100 MMOL/L
CO2 SERPL-SCNC: 27 MMOL/L
CREAT SERPL-MCNC: 0.9 MG/DL
DIFFERENTIAL METHOD: ABNORMAL
EOSINOPHIL # BLD AUTO: 0.3 K/UL
EOSINOPHIL NFR BLD: 3.5 %
ERYTHROCYTE [DISTWIDTH] IN BLOOD BY AUTOMATED COUNT: 12.2 %
EST. GFR  (AFRICAN AMERICAN): >60 ML/MIN/1.73 M^2
EST. GFR  (NON AFRICAN AMERICAN): >60 ML/MIN/1.73 M^2
GLUCOSE SERPL-MCNC: 180 MG/DL
HCT VFR BLD AUTO: 43 %
HGB BLD-MCNC: 14.2 G/DL
LYMPHOCYTES # BLD AUTO: 1.9 K/UL
LYMPHOCYTES NFR BLD: 24.8 %
MCH RBC QN AUTO: 31.4 PG
MCHC RBC AUTO-ENTMCNC: 33 G/DL
MCV RBC AUTO: 95 FL
MONOCYTES # BLD AUTO: 0.5 K/UL
MONOCYTES NFR BLD: 6.3 %
NEUTROPHILS # BLD AUTO: 4.9 K/UL
NEUTROPHILS NFR BLD: 65 %
PLATELET # BLD AUTO: 193 K/UL
PMV BLD AUTO: 10.4 FL
POTASSIUM SERPL-SCNC: 4.5 MMOL/L
PROT SERPL-MCNC: 7.5 G/DL
RBC # BLD AUTO: 4.52 M/UL
SODIUM SERPL-SCNC: 136 MMOL/L
WBC # BLD AUTO: 7.47 K/UL

## 2018-10-18 PROCEDURE — 36415 COLL VENOUS BLD VENIPUNCTURE: CPT | Mod: PO

## 2018-10-18 PROCEDURE — 85025 COMPLETE CBC W/AUTO DIFF WBC: CPT | Mod: PO

## 2018-10-18 PROCEDURE — 80053 COMPREHEN METABOLIC PANEL: CPT | Mod: PO

## 2018-10-19 ENCOUNTER — OFFICE VISIT (OUTPATIENT)
Dept: PULMONOLOGY | Facility: CLINIC | Age: 63
End: 2018-10-19
Payer: MEDICARE

## 2018-10-19 ENCOUNTER — HOSPITAL ENCOUNTER (OUTPATIENT)
Dept: RADIOLOGY | Facility: HOSPITAL | Age: 63
Discharge: HOME | End: 2018-10-19
Attending: INTERNAL MEDICINE
Payer: MEDICARE

## 2018-10-19 VITALS
BODY MASS INDEX: 36.42 KG/M2 | DIASTOLIC BLOOD PRESSURE: 80 MMHG | OXYGEN SATURATION: 96 % | RESPIRATION RATE: 17 BRPM | WEIGHT: 240.31 LBS | HEIGHT: 68 IN | HEART RATE: 70 BPM | SYSTOLIC BLOOD PRESSURE: 124 MMHG

## 2018-10-19 DIAGNOSIS — G47.34 NOCTURNAL OXYGEN DESATURATION: ICD-10-CM

## 2018-10-19 DIAGNOSIS — J44.9 MODERATE COPD (CHRONIC OBSTRUCTIVE PULMONARY DISEASE): Primary | ICD-10-CM

## 2018-10-19 DIAGNOSIS — G47.33 OSA ON CPAP: ICD-10-CM

## 2018-10-19 DIAGNOSIS — R91.1 PULMONARY NODULE: ICD-10-CM

## 2018-10-19 DIAGNOSIS — R91.8 LUNG INFILTRATE ON CT: ICD-10-CM

## 2018-10-19 PROCEDURE — 3079F DIAST BP 80-89 MM HG: CPT | Mod: ,,, | Performed by: INTERNAL MEDICINE

## 2018-10-19 PROCEDURE — 99999 PR PBB SHADOW E&M-EST. PATIENT-LVL III: CPT | Mod: PBBFAC,,, | Performed by: INTERNAL MEDICINE

## 2018-10-19 PROCEDURE — 3074F SYST BP LT 130 MM HG: CPT | Mod: ,,, | Performed by: INTERNAL MEDICINE

## 2018-10-19 PROCEDURE — 99213 OFFICE O/P EST LOW 20 MIN: CPT | Mod: PBBFAC,25,PO | Performed by: INTERNAL MEDICINE

## 2018-10-19 PROCEDURE — 25500020 PHARM REV CODE 255: Mod: PO | Performed by: INTERNAL MEDICINE

## 2018-10-19 PROCEDURE — 3008F BODY MASS INDEX DOCD: CPT | Mod: ,,, | Performed by: INTERNAL MEDICINE

## 2018-10-19 PROCEDURE — 71260 CT THORAX DX C+: CPT | Mod: 26,,, | Performed by: RADIOLOGY

## 2018-10-19 PROCEDURE — 99214 OFFICE O/P EST MOD 30 MIN: CPT | Mod: S$PBB,,, | Performed by: INTERNAL MEDICINE

## 2018-10-19 PROCEDURE — 71260 CT THORAX DX C+: CPT | Mod: TC,PO

## 2018-10-19 RX ORDER — ALBUTEROL SULFATE 0.83 MG/ML
2.5 SOLUTION RESPIRATORY (INHALATION) 2 TIMES DAILY PRN
Qty: 180 ML | Refills: 11 | Status: SHIPPED | OUTPATIENT
Start: 2018-10-19 | End: 2019-12-02 | Stop reason: SDUPTHER

## 2018-10-19 RX ORDER — AMOXICILLIN 875 MG/1
875 TABLET, FILM COATED ORAL EVERY 12 HOURS
Qty: 20 TABLET | Refills: 0 | Status: SHIPPED | OUTPATIENT
Start: 2018-10-19 | End: 2018-10-29 | Stop reason: ALTCHOICE

## 2018-10-19 RX ADMIN — IOHEXOL 75 ML: 350 INJECTION, SOLUTION INTRAVENOUS at 02:10

## 2018-10-19 NOTE — ASSESSMENT & PLAN NOTE
CAT score 14  mRC score 0-1  Stable on BREO , SPIRIVA and Albuterol HFA: copays high\  Will consolidate to TRELEGY  FEV1 : 2.01L ( 60%)   GOLD 2: Group C

## 2018-10-19 NOTE — PROGRESS NOTES
SUBJECTIVE:     Patient is a 63 y.o. male presents with Moderate COPD , MACHO on CPAP and Oxygen at night with PAP.  Has MACHO on APAP 4-10. [place] score 5.  Using device and benefits  Residual AHI 3.2  COPD score 14. mRC score 0-1  COPD stable; no cough, No wheezing, NO sputum  All results reviewed: Meds BREO ellipta and Spiriva  Former smoker 35-40 pack year smoking history  Last chest CT 2017: GGO 5mm was noted  Todays imaging shows lung infiltrate RUL, recently seen given Doxy for chest cold cough bronchitis.  Additional Amoxicillin   Former smoker: need f/u  I have reviewed the patient's medical history in detail and updated the computerized patient record.            Chief Complaint   Patient presents with    COPD    Sleep Apnea       Review of patient's allergies indicates:  No Known Allergies    Current Outpatient Medications   Medication Sig Dispense Refill    acetaminophen (TYLENOL) 500 MG tablet Take 1,000 mg by mouth as needed.       albuterol (ACCUNEB) 0.63 mg/3 mL Nebu Take 0.63 mg by nebulization every 6 (six) hours as needed. Rescue      albuterol (PROVENTIL) 2.5 mg /3 mL (0.083 %) nebulizer solution Take 3 mLs (2.5 mg total) by nebulization 2 (two) times daily as needed for Wheezing. 180 mL 11    amLODIPine (NORVASC) 5 MG tablet Take 1 tablet (5 mg total) by mouth once daily. 90 tablet 3    aspirin 325 MG tablet Take 325 mg by mouth once daily.      blood sugar diagnostic (BLOOD GLUCOSE TEST) Strp 1 strip by Misc.(Non-Drug; Combo Route) route 3 (three) times daily. (Patient taking differently: 1 strip by Misc.(Non-Drug; Combo Route) route once daily. ) 100 each 2    BLOOD-GLUCOSE METER (CONTOUR NEXT EZ METER MISC) by Misc.(Non-Drug; Combo Route) route.      buPROPion (WELLBUTRIN SR) 150 MG TBSR 12 hr tablet TAKE ONE TABLET BY MOUTH ONCE DAILY (Patient taking differently: TAKE HALF TABLET BY MOUTH ONCE DAILY) 90 tablet 3    cetirizine (ZYRTEC) 10 MG tablet Take 10 mg by mouth every  evening.      cholecalciferol, vitamin D3, (VITAMIN D3) 1,000 unit capsule Take 1,000 Units by mouth once daily.      cyanocobalamin (VITAMIN B-12) 500 MCG tablet Take 500 mcg by mouth once daily.      fluticasone (FLONASE) 50 mcg/actuation nasal spray Use 1 spray (50 mcg total) in each nostril once daily. 16 g 3    guaifenesin (MUCINEX) 1,200 mg Ta12 Take 1 tablet by mouth 2 (two) times daily.      metFORMIN (GLUCOPHAGE) 1000 MG tablet TAKE ONE TABLET BY MOUTH TWICE DAILY WITH MEALS 180 tablet 2    methscopolamine (PAMINE) 2.5 mg Tab TAKE ONE TABLET BY MOUTH EVERY 12 HOURS AS NEEDED 180 tablet 1    MICROLET LANCET Misc USE ONE  TO CHECK GLUCOSE TWICE DAILY 100 each 2    nitroGLYCERIN (NITROSTAT) 0.4 MG SL tablet Place 0.4 mg under the tongue every 5 (five) minutes as needed.       omeprazole (PRILOSEC) 40 MG capsule TAKE ONE CAPSULE BY MOUTH ONCE DAILY 30 capsule 4    rosuvastatin (CRESTOR) 10 MG tablet TAKE ONE TABLET BY MOUTH ONCE DAILY 90 tablet 1    sodium chloride (OCEAN NASAL) 0.65 % nasal spray 1 spray by Nasal route as needed for Congestion. 1 Bottle 12    tamsulosin (FLOMAX) 0.4 mg Cp24 Take 1 capsule by mouth once daily.      amoxicillin (AMOXIL) 875 MG tablet Take 1 tablet (875 mg total) by mouth every 12 (twelve) hours. for 10 days 20 tablet 0    fluticasone-umeclidin-vilanter (TRELEGY ELLIPTA) 100-62.5-25 mcg DsDv Inhale 1 puff into the lungs once daily. 60 each 11     No current facility-administered medications for this visit.        Past Medical History:   Diagnosis Date    COPD (chronic obstructive pulmonary disease)     Diabetes mellitus, type 2     Hyperlipidemia     Hypertension      Past Surgical History:   Procedure Laterality Date    ANGIOPLASTY      TOTAL KNEE ARTHROPLASTY       Family History   Problem Relation Age of Onset    Diabetes Mother     Cancer Father         bone     Social History     Tobacco Use    Smoking status: Former Smoker     Types: Cigars     Last  "attempt to quit: 2015     Years since quittin.8    Smokeless tobacco: Never Used   Substance Use Topics    Alcohol use: No    Drug use: No        Review of Systems:  Review of Systems   Constitutional: Negative.    HENT: Negative for congestion.    Eyes: Negative.    Respiratory: Negative for apnea, cough, shortness of breath and wheezing.         [place] score  5  Comorbidity: HTN, DM, CAD, COPD, Hypoxemia   Cardiovascular: Negative.    Gastrointestinal: Negative.    Endocrine: Negative.    Genitourinary: Negative.    Musculoskeletal: Negative.    Skin: Negative.    Allergic/Immunologic: Negative.    Neurological: Negative.    Hematological: Negative.    Psychiatric/Behavioral: Negative for sleep disturbance.       OBJECTIVE:     Vital Signs (Most Recent)  Pulse: 70 (10/19/18 1508)  Resp: 17 (10/19/18 1508)  BP: 124/80 (10/19/18 1508)  SpO2: 96 % (10/19/18 1508)  5' 8" (1.727 m)  109 kg (240 lb 4.8 oz)     Physical Exam:  Physical Exam   Constitutional: He is oriented to person, place, and time. He appears well-developed and well-nourished.   HENT:   Head: Normocephalic and atraumatic.   Nose: No mucosal edema or rhinorrhea.   Mouth/Throat: Oropharynx is clear and moist. No oropharyngeal exudate.   Eyes: Conjunctivae and EOM are normal. Pupils are equal, round, and reactive to light. Left eye exhibits no discharge. No scleral icterus.   Neck: Normal range of motion. Neck supple. No tracheal deviation present. No thyromegaly present.   Cardiovascular: Normal rate, regular rhythm, normal heart sounds and intact distal pulses.   No murmur heard.  Pulmonary/Chest: Effort normal and breath sounds normal. He has no wheezes. He has no rales. He exhibits no tenderness.   Abdominal: Soft. Bowel sounds are normal. He exhibits no distension.   Musculoskeletal: Normal range of motion. He exhibits no tenderness or deformity.   Neurological: He is alert and oriented to person, place, and time. No cranial nerve " deficit.   Skin: Skin is warm and dry. Capillary refill takes 2 to 3 seconds.   Psychiatric: He has a normal mood and affect.   Nursing note and vitals reviewed.      Laboratory  CBC: Reviewed  BMP: Reviewed    1. DOWNLOAD      CT Chest   FINDINGS:  Thoracic aorta and great vessels are unremarkable.  Heart is normal without chamber enlargement.  No pericardial or pleural effusion.  No mediastinal, hilar or axillary adenopathy.  Previously noted pulmonary emboli are not definitely visualized although the study is not tailored for pulmonary embolus evaluation..    Lungs demonstrate no acute opacity.  Emphysematous findings again noted.  No significant change in the tiny 2-3 mm peripheral right upper lobe pulmonary nodules.  Scarring is noted within the medial right middle lobe as well as at the lung bases and within the lingular segment.  There is a stable left perifissural nodule within the left upper lobe common sequence 602 image 198.  Ground-glass nodule within the left lower lobe, sequence 3 image 193 is unchanged as well.    Visualized upper abdominal structures are unchanged.  Osseous structures are intact with degenerative findings of the spine.      Impression       No detrimental change at the chest.  Stable pulmonary nodules.  Per Fleischner criteria ground-glass nodules should be followed at 6-12 months and at 3 and 5 years from 1st visualization.       CPAP download data 06/12/2000  2018.  Use at greater than 4 hr was 100%.    A Pap 4-10   Residual AHI 3.2   Main pressure 4.5   Peak pressure 5.6       ASSESSMENT/PLAN:     Problem List Items Addressed This Visit     Moderate COPD (chronic obstructive pulmonary disease) - Primary     CAT score 14  mRC score 0-1  Stable on BREO , SPIRIVA and Albuterol HFA: copays high\  Will consolidate to TRELEGY  FEV1 : 2.01L ( 60%)   GOLD 2: Group C            Relevant Medications    fluticasone-umeclidin-vilanter (TRELEGY ELLIPTA) 100-62.5-25 mcg DsDv    albuterol  (PROVENTIL) 2.5 mg /3 mL (0.083 %) nebulizer solution    Other Relevant Orders    Spirometry with/without bronchodilator    Stress test, pulmonary    Pulmonary nodule     2 nodules RUl: 5.5mm  35-40 pack year smoker, quit 2017  Imaging 07/2016            Relevant Orders    CT Chest With Contrast    MACHO on CPAP     APAP 4-10 cm  Bleed oxygen 2.0 LPM  Using well and benefits         Nocturnal oxygen desaturation      Other Visit Diagnoses     Lung infiltrate on CT        Relevant Medications    amoxicillin (AMOXIL) 875 MG tablet    Other Relevant Orders    Procalcitonin          PLAN:Plan      Amoxil for lobar infiltrate likely pna  Adherent with PAP and benefits  GROUP C mRC 2, CAT score > 10  COPD stable    Follow-up in about 4 months (around 2/19/2019), or download, Trelegy coupon, [place], 6MWD, chest CT 12 months, for Labs today.    This note was prepared using voice recognition system and is likely to have sound alike errors that may have been overlooked even after proof reading.  Please call me with any questions    Discussed diagnosis, its evaluation, treatment and usual course. All questions answered.    Thank you for the courtesy of participating in the care of this patient    Mazin Wells MD

## 2018-10-23 RX ORDER — METFORMIN HYDROCHLORIDE 1000 MG/1
1000 TABLET ORAL 2 TIMES DAILY WITH MEALS
Qty: 180 TABLET | Refills: 2 | Status: SHIPPED | OUTPATIENT
Start: 2018-10-23 | End: 2019-07-12 | Stop reason: SDUPTHER

## 2018-10-29 ENCOUNTER — OFFICE VISIT (OUTPATIENT)
Dept: HEMATOLOGY/ONCOLOGY | Facility: CLINIC | Age: 63
End: 2018-10-29
Payer: MEDICARE

## 2018-10-29 ENCOUNTER — LAB VISIT (OUTPATIENT)
Dept: LAB | Facility: HOSPITAL | Age: 63
End: 2018-10-29
Attending: INTERNAL MEDICINE
Payer: MEDICARE

## 2018-10-29 VITALS
TEMPERATURE: 98 F | DIASTOLIC BLOOD PRESSURE: 60 MMHG | OXYGEN SATURATION: 97 % | SYSTOLIC BLOOD PRESSURE: 109 MMHG | BODY MASS INDEX: 36.57 KG/M2 | HEART RATE: 62 BPM | WEIGHT: 240.5 LBS

## 2018-10-29 DIAGNOSIS — D50.0 IRON DEFICIENCY ANEMIA DUE TO CHRONIC BLOOD LOSS: Primary | ICD-10-CM

## 2018-10-29 DIAGNOSIS — I15.2 HYPERTENSION ASSOCIATED WITH DIABETES: ICD-10-CM

## 2018-10-29 DIAGNOSIS — I26.99 OTHER ACUTE PULMONARY EMBOLISM WITHOUT ACUTE COR PULMONALE: ICD-10-CM

## 2018-10-29 DIAGNOSIS — D53.9 NUTRITIONAL ANEMIA: ICD-10-CM

## 2018-10-29 DIAGNOSIS — G44.89 OTHER HEADACHE SYNDROME: ICD-10-CM

## 2018-10-29 DIAGNOSIS — R91.1 PULMONARY NODULE: ICD-10-CM

## 2018-10-29 DIAGNOSIS — J44.9 MODERATE COPD (CHRONIC OBSTRUCTIVE PULMONARY DISEASE): ICD-10-CM

## 2018-10-29 DIAGNOSIS — D64.9 NORMOCYTIC ANEMIA: ICD-10-CM

## 2018-10-29 DIAGNOSIS — D50.0 IRON DEFICIENCY ANEMIA DUE TO CHRONIC BLOOD LOSS: ICD-10-CM

## 2018-10-29 DIAGNOSIS — E11.59 HYPERTENSION ASSOCIATED WITH DIABETES: ICD-10-CM

## 2018-10-29 DIAGNOSIS — I26.99 OTHER PULMONARY EMBOLISM WITHOUT ACUTE COR PULMONALE, UNSPECIFIED CHRONICITY: ICD-10-CM

## 2018-10-29 LAB
ALBUMIN SERPL BCP-MCNC: 4 G/DL
ALP SERPL-CCNC: 66 U/L
ALT SERPL W/O P-5'-P-CCNC: 19 U/L
ANION GAP SERPL CALC-SCNC: 6 MMOL/L
AST SERPL-CCNC: 12 U/L
BASOPHILS # BLD AUTO: 0.03 K/UL
BASOPHILS NFR BLD: 0.4 %
BILIRUB SERPL-MCNC: 0.2 MG/DL
BUN SERPL-MCNC: 10 MG/DL
CALCIUM SERPL-MCNC: 9.4 MG/DL
CHLORIDE SERPL-SCNC: 100 MMOL/L
CO2 SERPL-SCNC: 28 MMOL/L
CREAT SERPL-MCNC: 1 MG/DL
CRP SERPL-MCNC: 1.7 MG/L
DIFFERENTIAL METHOD: ABNORMAL
EOSINOPHIL # BLD AUTO: 0.2 K/UL
EOSINOPHIL NFR BLD: 2.7 %
ERYTHROCYTE [DISTWIDTH] IN BLOOD BY AUTOMATED COUNT: 12.3 %
EST. GFR  (AFRICAN AMERICAN): >60 ML/MIN/1.73 M^2
EST. GFR  (NON AFRICAN AMERICAN): >60 ML/MIN/1.73 M^2
FERRITIN SERPL-MCNC: 102 NG/ML
GLUCOSE SERPL-MCNC: 263 MG/DL
HCT VFR BLD AUTO: 40.5 %
HGB BLD-MCNC: 13.3 G/DL
IRON SERPL-MCNC: 76 UG/DL
LDH SERPL L TO P-CCNC: 114 U/L
LYMPHOCYTES # BLD AUTO: 1.7 K/UL
LYMPHOCYTES NFR BLD: 21.3 %
MCH RBC QN AUTO: 31.2 PG
MCHC RBC AUTO-ENTMCNC: 32.8 G/DL
MCV RBC AUTO: 95 FL
MONOCYTES # BLD AUTO: 0.5 K/UL
MONOCYTES NFR BLD: 6.4 %
NEUTROPHILS # BLD AUTO: 5.4 K/UL
NEUTROPHILS NFR BLD: 69.2 %
PLATELET # BLD AUTO: 191 K/UL
PMV BLD AUTO: 9.8 FL
POTASSIUM SERPL-SCNC: 4.5 MMOL/L
PROT SERPL-MCNC: 7.2 G/DL
RBC # BLD AUTO: 4.26 M/UL
RETICS/RBC NFR AUTO: 1.6 %
SATURATED IRON: 21 %
SODIUM SERPL-SCNC: 134 MMOL/L
TOTAL IRON BINDING CAPACITY: 366 UG/DL
TRANSFERRIN SERPL-MCNC: 247 MG/DL
VIT B12 SERPL-MCNC: 574 PG/ML
WBC # BLD AUTO: 7.79 K/UL

## 2018-10-29 PROCEDURE — 99999 PR PBB SHADOW E&M-EST. PATIENT-LVL III: CPT | Mod: PBBFAC,,, | Performed by: NURSE PRACTITIONER

## 2018-10-29 PROCEDURE — 36415 COLL VENOUS BLD VENIPUNCTURE: CPT | Mod: PO

## 2018-10-29 PROCEDURE — 3078F DIAST BP <80 MM HG: CPT | Mod: ,,, | Performed by: NURSE PRACTITIONER

## 2018-10-29 PROCEDURE — 80053 COMPREHEN METABOLIC PANEL: CPT | Mod: PO

## 2018-10-29 PROCEDURE — 3008F BODY MASS INDEX DOCD: CPT | Mod: ,,, | Performed by: NURSE PRACTITIONER

## 2018-10-29 PROCEDURE — 85025 COMPLETE CBC W/AUTO DIFF WBC: CPT | Mod: PO

## 2018-10-29 PROCEDURE — 99214 OFFICE O/P EST MOD 30 MIN: CPT | Mod: S$PBB,,, | Performed by: NURSE PRACTITIONER

## 2018-10-29 PROCEDURE — 86140 C-REACTIVE PROTEIN: CPT

## 2018-10-29 PROCEDURE — 3044F HG A1C LEVEL LT 7.0%: CPT | Mod: ,,, | Performed by: NURSE PRACTITIONER

## 2018-10-29 PROCEDURE — 82607 VITAMIN B-12: CPT

## 2018-10-29 PROCEDURE — 85045 AUTOMATED RETICULOCYTE COUNT: CPT

## 2018-10-29 PROCEDURE — 83540 ASSAY OF IRON: CPT

## 2018-10-29 PROCEDURE — 82728 ASSAY OF FERRITIN: CPT

## 2018-10-29 PROCEDURE — 3074F SYST BP LT 130 MM HG: CPT | Mod: ,,, | Performed by: NURSE PRACTITIONER

## 2018-10-29 PROCEDURE — 99213 OFFICE O/P EST LOW 20 MIN: CPT | Mod: PBBFAC,PO | Performed by: NURSE PRACTITIONER

## 2018-10-29 PROCEDURE — 83615 LACTATE (LD) (LDH) ENZYME: CPT | Mod: PO

## 2018-10-29 NOTE — ASSESSMENT & PLAN NOTE
Most recent CT of the chest 10/19/2018 revealed stable pulmonary nodules. Recommend 1 year follow-up CT scan of the chest   Follow-up by Dr. Wells, pulmonary disease

## 2018-10-29 NOTE — PROGRESS NOTES
Subjective:       Patient ID: Stefan Forbes Jr. is a 63 y.o. male.    Chief Complaint:  Lab results, follow-up [facility], follow-up stable pulmonary nodules      HPI: 63 y.o male PMHx: CAD, COPD, DM II, HTN, HLD, SIADH, pulmonary embolism who presents [facility] today for follow-up of his iron deficiency anemia, stable pulmonary nodules per serial CT scans.  The patient has been previously followed in the [facility] by Dr. Power.  Today is my 1st visit with the patient.  I have reviewed all relevant clinical data available in the medical record and have utilizes in my evaluation and management recommendations today.  The patient did complete 6 months anticoagulation therapy for provoked PEs following left knee replacement.  The patient does take aspirin 325 mg p.o. daily as recommended by his cardiologist for history of CAD with stent placement.  The patient does have a stress test planned per Cardiology for 03/2019.  The patient did have IV Feraheme x2 doses in 03/2018 for iron deficiency.  The patient has since had colonoscopy performed.  The patient states that HANNAH Foster performed this procedure.  The patient is not on oral iron therapy.  The patient denies alcohol use, but does report smoking 1-2 cigars a week.  Today the patient reports feeling well overall.  He denies SOB, chest pain, dizziness, syncope, hematuria, hematochezia, melena, or any other associated symptoms.    Last CT of the chest 10/19/2018 show pulmonary nodules remain stable.  The patient will have iron studies drawn today, will follow up with patient via patient portal.      Social History     Socioeconomic History    Marital status:      Spouse name: Not on file    Number of children: Not on file    Years of education: Not on file    Highest education level: Not on file   Social Needs    Financial resource strain: Not on file    Food insecurity - worry: Not on file    Food insecurity - inability: Not on file     Transportation needs - medical: Not on file    Transportation needs - non-medical: Not on file   Occupational History    Not on file   Tobacco Use    Smoking status: Former Smoker     Types: Cigars     Last attempt to quit: 2015     Years since quittin.8    Smokeless tobacco: Never Used   Substance and Sexual Activity    Alcohol use: No    Drug use: No    Sexual activity: Not on file   Other Topics Concern    Not on file   Social History Narrative    Not on file       Past Medical History:   Diagnosis Date    COPD (chronic obstructive pulmonary disease)     Diabetes mellitus, type 2     Hyperlipidemia     Hypertension        Family History   Problem Relation Age of Onset    Diabetes Mother     Cancer Father         bone       Past Surgical History:   Procedure Laterality Date    ANGIOPLASTY      TOTAL KNEE ARTHROPLASTY         Review of Systems   Constitutional: Negative for activity change, appetite change, chills, fatigue, fever and unexpected weight change.   HENT: Negative for congestion, mouth sores, nosebleeds, sore throat, trouble swallowing and voice change.    Eyes: Negative for pain and visual disturbance.   Respiratory: Negative for cough, chest tightness, shortness of breath and wheezing.    Cardiovascular: Negative for chest pain and leg swelling.   Gastrointestinal: Negative for abdominal distention, abdominal pain, blood in stool, constipation, diarrhea, nausea and vomiting.   Genitourinary: Negative for difficulty urinating, dysuria and hematuria.   Musculoskeletal: Negative for arthralgias, back pain and myalgias.   Skin: Negative for rash.   Neurological: Negative for dizziness, syncope, facial asymmetry, weakness, light-headedness, numbness and headaches.   Hematological: Negative for adenopathy. Does not bruise/bleed easily.   Psychiatric/Behavioral: The patient is nervous/anxious.             Medication List           Accurate as of 10/29/18 12:17 PM. If you have  any questions, ask your nurse or doctor.               CHANGE how you take these medications    blood sugar diagnostic Strp  Commonly known as:  BLOOD GLUCOSE TEST  1 strip by Misc.(Non-Drug; Combo Route) route 3 (three) times daily.  What changed:  when to take this     buPROPion 150 MG TBSR 12 hr tablet  Commonly known as:  WELLBUTRIN SR  TAKE ONE TABLET BY MOUTH ONCE DAILY  What changed:    · how much to take  · how to take this  · when to take this        CONTINUE taking these medications    acetaminophen 500 MG tablet  Commonly known as:  TYLENOL     * albuterol 0.63 mg/3 mL Nebu  Commonly known as:  ACCUNEB     * albuterol 2.5 mg /3 mL (0.083 %) nebulizer solution  Commonly known as:  PROVENTIL  Take 3 mLs (2.5 mg total) by nebulization 2 (two) times daily as needed for Wheezing.     amLODIPine 5 MG tablet  Commonly known as:  NORVASC  Take 1 tablet (5 mg total) by mouth once daily.     aspirin 325 MG tablet     cetirizine 10 MG tablet  Commonly known as:  ZYRTEC     CONTOUR NEXT EZ METER MISC     fluticasone 50 mcg/actuation nasal spray  Commonly known as:  FLONASE  Use 1 spray (50 mcg total) in each nostril once daily.     metFORMIN 1000 MG tablet  Commonly known as:  GLUCOPHAGE  TAKE ONE TABLET BY MOUTH TWICE DAILY WITH MEALS     methscopolamine 2.5 mg Tab  Commonly known as:  PAMINE  TAKE ONE TABLET BY MOUTH EVERY 12 HOURS AS NEEDED     MICROLET LANCET Misc  Generic drug:  lancets  USE ONE  TO CHECK GLUCOSE TWICE DAILY     MUCINEX 1,200 mg Ta12  Generic drug:  guaiFENesin     nitroGLYCERIN 0.4 MG SL tablet  Commonly known as:  NITROSTAT     omeprazole 40 MG capsule  Commonly known as:  PRILOSEC  TAKE ONE CAPSULE BY MOUTH ONCE DAILY     rosuvastatin 10 MG tablet  Commonly known as:  CRESTOR  TAKE ONE TABLET BY MOUTH ONCE DAILY     sodium chloride 0.65 % nasal spray  Commonly known as:  OCEAN NASAL  1 spray by Nasal route as needed for Congestion.     tamsulosin 0.4 mg Cap  Commonly known as:  FLOMAX      KAR ELLIPTA 100-62.5-25 mcg Dsdv  Generic drug:  fluticasone-umeclidin-vilanter  Inhale 1 puff into the lungs once daily.     VITAMIN B-12 500 MCG tablet  Generic drug:  cyanocobalamin     VITAMIN D3 1,000 unit capsule  Generic drug:  cholecalciferol (vitamin D3)         * This list has 2 medication(s) that are the same as other medications prescribed for you. Read the directions carefully, and ask your doctor or other care provider to review them with you.            STOP taking these medications    amoxicillin 875 MG tablet  Commonly known as:  AMOXIL  Stopped by:  Lu Limon NP          Objective:     Vitals:    10/29/18 1022   BP: 109/60   Pulse: 62   Temp: 98.1 °F (36.7 °C)       Physical Exam   Constitutional: He is oriented to person, place, and time. He appears well-developed and well-nourished. He is cooperative.   HENT:   Head: Normocephalic.   Right Ear: Hearing normal. No drainage.   Left Ear: Hearing normal. No drainage.   Nose: Nose normal.   Mouth/Throat: Oropharynx is clear and moist.   Eyes: Conjunctivae, EOM and lids are normal. Right eye exhibits no discharge. Left eye exhibits no discharge. No scleral icterus.   Neck: Normal range of motion. No thyroid mass present.   Cardiovascular: Normal rate, regular rhythm and normal heart sounds.   No murmur heard.  Pulmonary/Chest: Effort normal and breath sounds normal. No respiratory distress. He has no wheezes. He has no rales.   Abdominal: Soft. Bowel sounds are normal. He exhibits no distension. There is no tenderness.   Genitourinary:   Genitourinary Comments: deferred   Musculoskeletal: Normal range of motion. He exhibits no edema.   Lymphadenopathy:        Head (right side): No submandibular, no preauricular and no posterior auricular adenopathy present.        Head (left side): No submandibular, no preauricular and no posterior auricular adenopathy present.        Right cervical: No superficial cervical adenopathy present.       Left  cervical: No superficial cervical adenopathy present.   Neurological: He is alert and oriented to person, place, and time.   Skin: Skin is warm, dry and intact.   Psychiatric: He has a normal mood and affect. His speech is normal and behavior is normal. Thought content normal.   Vitals reviewed.       Assessment:     Problem List Items Addressed This Visit        Pulmonary    Moderate COPD (chronic obstructive pulmonary disease)     Follow-up by Dr. Wells, Pulmonologist         Pulmonary nodule     Most recent CT of the chest 10/19/2018 revealed stable pulmonary nodules. Recommend 1 year follow-up CT scan of the chest   Follow-up by Dr. Wells, pulmonary disease                Cardiac/Vascular    Hypertension associated with diabetes     Follow-up by PCP and Dr. Perry, Cardiologist            Oncology    Iron deficiency anemia due to chronic blood loss - Primary     Review of CBC from 10/18/20 18 revealed hemoglobin 14.2, MCV 95, WBC 7.47 (normal differential), platelets 191.    Iron studies will be drawn after today's visit, we will follow up with patient via portal    Will have nurse obtain consent to request colonoscopy records.  If patient has not had EGD and continues to lose iron will recommend upper endoscopy         Relevant Orders    CBC auto differential    Comprehensive metabolic panel    Ferritin    Iron and TIBC    C-reactive protein            Plan:     Iron deficiency anemia due to chronic blood loss  -     CBC auto differential; Future; Expected date: 10/29/2018  -     Comprehensive metabolic panel; Future; Expected date: 10/29/2018  -     Ferritin; Future; Expected date: 10/29/2018  -     Iron and TIBC; Future; Expected date: 10/29/2018  -     C-reactive protein; Future; Expected date: 10/29/2018    Pulmonary nodule    Hypertension associated with diabetes    Moderate COPD (chronic obstructive pulmonary disease)          I will review assessment/plan with collaborating physician     Lu  DAKSHA Limon

## 2018-10-29 NOTE — ASSESSMENT & PLAN NOTE
Review of CBC from 10/18/20 18 revealed hemoglobin 14.2, MCV 95, WBC 7.47 (normal differential), platelets 191.    Iron studies will be drawn after today's visit, we will follow up with patient via portal    Will have nurse obtain consent to request colonoscopy records.  If patient has not had EGD and continues to lose iron will recommend upper endoscopy

## 2018-11-26 ENCOUNTER — TELEPHONE (OUTPATIENT)
Dept: ALLERGY | Facility: CLINIC | Age: 63
End: 2018-11-26

## 2018-11-26 NOTE — TELEPHONE ENCOUNTER
----- Message from Brianda Can sent at 11/26/2018 10:16 AM CST -----  Lisa Forbes (Spouse) is requesting a call from nurse to discuss jordyn paper work for the pt.          Please call Lisa Forbes (Spouse) back at [phone]

## 2018-11-27 DIAGNOSIS — K21.9 GASTROESOPHAGEAL REFLUX DISEASE, ESOPHAGITIS PRESENCE NOT SPECIFIED: ICD-10-CM

## 2018-11-27 RX ORDER — OMEPRAZOLE 40 MG/1
CAPSULE, DELAYED RELEASE ORAL
Qty: 30 CAPSULE | Refills: 4 | Status: SHIPPED | OUTPATIENT
Start: 2018-11-27 | End: 2019-05-01 | Stop reason: SDUPTHER

## 2018-12-05 ENCOUNTER — OFFICE VISIT (OUTPATIENT)
Dept: ALLERGY | Facility: CLINIC | Age: 63
End: 2018-12-05
Payer: MEDICARE

## 2018-12-05 VITALS
HEART RATE: 76 BPM | WEIGHT: 240.75 LBS | BODY MASS INDEX: 34.47 KG/M2 | HEIGHT: 70 IN | SYSTOLIC BLOOD PRESSURE: 120 MMHG | RESPIRATION RATE: 15 BRPM | DIASTOLIC BLOOD PRESSURE: 70 MMHG | TEMPERATURE: 96 F

## 2018-12-05 DIAGNOSIS — J31.0 CHRONIC RHINITIS: Primary | ICD-10-CM

## 2018-12-05 DIAGNOSIS — J44.9 MODERATE COPD (CHRONIC OBSTRUCTIVE PULMONARY DISEASE): ICD-10-CM

## 2018-12-05 DIAGNOSIS — G47.33 OSA ON CPAP: ICD-10-CM

## 2018-12-05 DIAGNOSIS — Z86.711 HISTORY OF PULMONARY EMBOLISM: ICD-10-CM

## 2018-12-05 DIAGNOSIS — E11.69 HYPERLIPIDEMIA ASSOCIATED WITH TYPE 2 DIABETES MELLITUS: ICD-10-CM

## 2018-12-05 DIAGNOSIS — E78.5 HYPERLIPIDEMIA ASSOCIATED WITH TYPE 2 DIABETES MELLITUS: ICD-10-CM

## 2018-12-05 PROCEDURE — 99215 OFFICE O/P EST HI 40 MIN: CPT | Mod: S$GLB,,, | Performed by: ALLERGY & IMMUNOLOGY

## 2018-12-05 PROCEDURE — 3074F SYST BP LT 130 MM HG: CPT | Mod: S$GLB,,, | Performed by: ALLERGY & IMMUNOLOGY

## 2018-12-05 PROCEDURE — 99999 PR PBB SHADOW E&M-EST. PATIENT-LVL III: CPT | Mod: PBBFAC,,, | Performed by: ALLERGY & IMMUNOLOGY

## 2018-12-05 PROCEDURE — 3044F HG A1C LEVEL LT 7.0%: CPT | Mod: S$GLB,,, | Performed by: ALLERGY & IMMUNOLOGY

## 2018-12-05 PROCEDURE — 3078F DIAST BP <80 MM HG: CPT | Mod: S$GLB,,, | Performed by: ALLERGY & IMMUNOLOGY

## 2018-12-05 PROCEDURE — 3008F BODY MASS INDEX DOCD: CPT | Mod: S$GLB,,, | Performed by: ALLERGY & IMMUNOLOGY

## 2018-12-05 RX ORDER — METHSCOPOLAMINE BROMIDE 2.5 MG/1
TABLET ORAL
Qty: 180 TABLET | Refills: 4 | Status: SHIPPED | OUTPATIENT
Start: 2018-12-05 | End: 2020-01-03 | Stop reason: SDUPTHER

## 2018-12-05 NOTE — PROGRESS NOTES
Chief complaint: Troublesome nasal symptoms, re-evaluation, need for new prescription and prior authorization for prescription    This note was dictated using voice recognition software an d may contain errors.    History:     He had an 11:00 a.m. Appointment on Wednesday December 5, 2018.  Information in his medical record regarding his past medical history family history and social history was reviewed and updated today.  Significant addition see if any are as noted below.      I received from his health insurance company on October 24, 2018, a letter sent by fax Barbi pulido informing me that math scopolamine was not a drug covered on his formulary is related the pharmacy benefits.  His last appointment with me occurred November 20, 2017.  Information in my notes from his appointment on May 23, 2017 and November 20, 2017 were reviewed and updated today.  Significant addition see if any are as noted above or below.      He stated is result of beginning treatment with methscopolamine that he had a very significant decrease in upper respiratory secretions and that as a result the quality of his life improved greatly.  As result of receiving treatment with this medication he stated he was able to have general anesthesia and have knee replacement surgery performed. He stated that he tried taking 1/2 tablet of the medication but this was not adequate.  When he took 1 pill twice a day he noticed significantly greater improvement in his symptoms.    After having his surgery performed last year he did experience a pulmonary embolism.  He received treatment and is no longer needing the use of anticoagulant therapy.      He stated that he exhausted his supply of methscopolamine tablets 2 weeks ago.  Since stopping treatment he has been more symptomatic.  He has resumed using Atrovent nasal spray 0.03%.  This is of limited benefit.  When he was taking the tablets on a regular basis he found that he very rarely required  the use of the Atrovent nasal spray.      Review of  Systems:  At the time of his appointment this morning, he is feeling well in general in had no additional new symptoms dimension    Exam:     In general he is in no distress he is alert oriented well-developed in good mood and attentive  Gait steady  Skin no rash noted  Head no swelling noted  Nose patent no polyp seen   Mouth no swelling or inflammation of the lips tongue or in the throat noted  Ears not inflamed tympanic membranes not inflamed  Neck no masses or thyromegaly noted  Lymph nodes no significant cervical or epitrochlear lymphadenopathy noted  Heart regular rhythm no murmurs heard  Lungs clear to auscultation  Extremities no swelling or inflammation of the hands legs noted    Impression:     1. Chronic rhinitis  2. Obstructive sleep apnea being treated with CPAP therapy  3. Chronic obstructive lung disease occurring in a former smoker  4.  Hyperlipidemia associated with type 2 diabetes mellitus  5. Other health concerns as noted in his medical record     Assessment and plan:     I told him I would contact his health insurance company regarding obtaining prior authorization and approval for a new prescription for methscopolamine  2.5 mg.      I made 2 separate phone calls to his health insurance company.  The [employer] telephone number is [phone]. I spoke with staff by the name of Neelima.  I made a separate call to his pharmacy.  Approval was given for a new prescription for the medication.  The approval is valid from November 5, 2018 until December 5, 2019.  The approval authorization #[id number]. His pharmacy had a limited supply of the tablets.  I called him upon 2 occasions after he left his appointment.  He stated that he will return to the pharmacy to obtain the pills before  He returns to his home.      A new prescription was issue for a total of 180 tablets with an additional 4 refills.  He may take 1 pill every 12 hr if needed  as directed.      His appointment was 50 min in duration spent entirely in face-to-face contact.  More than 50% of the visit was spent in counseling and coordination of care.  An additional 50 min were spent between 12:00 p.m. And 12:50 p.m. Speaking on the telephone as referenced above.

## 2018-12-14 ENCOUNTER — PATIENT MESSAGE (OUTPATIENT)
Dept: PULMONOLOGY | Facility: CLINIC | Age: 63
End: 2018-12-14

## 2019-01-02 ENCOUNTER — HOSPITAL ENCOUNTER (OUTPATIENT)
Dept: RADIOLOGY | Facility: HOSPITAL | Age: 64
Discharge: HOME | End: 2019-01-02
Attending: NURSE PRACTITIONER
Payer: MEDICARE

## 2019-01-02 ENCOUNTER — TELEPHONE (OUTPATIENT)
Dept: ALLERGY | Facility: CLINIC | Age: 64
End: 2019-01-02

## 2019-01-02 ENCOUNTER — TELEPHONE (OUTPATIENT)
Dept: INTERNAL MEDICINE | Facility: CLINIC | Age: 64
End: 2019-01-02

## 2019-01-02 ENCOUNTER — OFFICE VISIT (OUTPATIENT)
Dept: INTERNAL MEDICINE | Facility: CLINIC | Age: 64
End: 2019-01-02
Payer: MEDICARE

## 2019-01-02 VITALS
OXYGEN SATURATION: 95 % | TEMPERATURE: 97 F | HEIGHT: 70 IN | HEART RATE: 62 BPM | BODY MASS INDEX: 35.73 KG/M2 | SYSTOLIC BLOOD PRESSURE: 124 MMHG | WEIGHT: 249.56 LBS | DIASTOLIC BLOOD PRESSURE: 66 MMHG

## 2019-01-02 DIAGNOSIS — R30.0 DYSURIA: ICD-10-CM

## 2019-01-02 DIAGNOSIS — J44.9 MODERATE COPD (CHRONIC OBSTRUCTIVE PULMONARY DISEASE): ICD-10-CM

## 2019-01-02 DIAGNOSIS — J01.90 ACUTE SINUSITIS, RECURRENCE NOT SPECIFIED, UNSPECIFIED LOCATION: Primary | ICD-10-CM

## 2019-01-02 DIAGNOSIS — J31.0 CHRONIC RHINITIS: Primary | ICD-10-CM

## 2019-01-02 DIAGNOSIS — E11.9 CONTROLLED TYPE 2 DIABETES MELLITUS WITHOUT COMPLICATION, WITHOUT LONG-TERM CURRENT USE OF INSULIN: ICD-10-CM

## 2019-01-02 DIAGNOSIS — R09.81 NASAL CONGESTION: ICD-10-CM

## 2019-01-02 PROCEDURE — 99214 PR OFFICE/OUTPT VISIT, EST, LEVL IV, 30-39 MIN: ICD-10-PCS | Mod: S$GLB,,, | Performed by: NURSE PRACTITIONER

## 2019-01-02 PROCEDURE — 3008F PR BODY MASS INDEX (BMI) DOCUMENTED: ICD-10-PCS | Mod: CPTII,S$GLB,, | Performed by: NURSE PRACTITIONER

## 2019-01-02 PROCEDURE — 3074F PR MOST RECENT SYSTOLIC BLOOD PRESSURE < 130 MM HG: ICD-10-PCS | Mod: CPTII,S$GLB,, | Performed by: NURSE PRACTITIONER

## 2019-01-02 PROCEDURE — 99999 PR PBB SHADOW E&M-EST. PATIENT-LVL V: CPT | Mod: PBBFAC,,, | Performed by: NURSE PRACTITIONER

## 2019-01-02 PROCEDURE — 3078F PR MOST RECENT DIASTOLIC BLOOD PRESSURE < 80 MM HG: ICD-10-PCS | Mod: CPTII,S$GLB,, | Performed by: NURSE PRACTITIONER

## 2019-01-02 PROCEDURE — 3008F BODY MASS INDEX DOCD: CPT | Mod: CPTII,S$GLB,, | Performed by: NURSE PRACTITIONER

## 2019-01-02 PROCEDURE — 99999 PR PBB SHADOW E&M-EST. PATIENT-LVL V: ICD-10-PCS | Mod: PBBFAC,,, | Performed by: NURSE PRACTITIONER

## 2019-01-02 PROCEDURE — 99214 OFFICE O/P EST MOD 30 MIN: CPT | Mod: S$GLB,,, | Performed by: NURSE PRACTITIONER

## 2019-01-02 PROCEDURE — 71046 X-RAY EXAM CHEST 2 VIEWS: CPT | Mod: 26,,, | Performed by: RADIOLOGY

## 2019-01-02 PROCEDURE — 3044F PR MOST RECENT HEMOGLOBIN A1C LEVEL <7.0%: ICD-10-PCS | Mod: CPTII,S$GLB,, | Performed by: NURSE PRACTITIONER

## 2019-01-02 PROCEDURE — 3078F DIAST BP <80 MM HG: CPT | Mod: CPTII,S$GLB,, | Performed by: NURSE PRACTITIONER

## 2019-01-02 PROCEDURE — 3074F SYST BP LT 130 MM HG: CPT | Mod: CPTII,S$GLB,, | Performed by: NURSE PRACTITIONER

## 2019-01-02 PROCEDURE — 71046 XR CHEST PA AND LATERAL: ICD-10-PCS | Mod: 26,,, | Performed by: RADIOLOGY

## 2019-01-02 PROCEDURE — 71046 X-RAY EXAM CHEST 2 VIEWS: CPT | Mod: TC,FY,PO

## 2019-01-02 PROCEDURE — 3044F HG A1C LEVEL LT 7.0%: CPT | Mod: CPTII,S$GLB,, | Performed by: NURSE PRACTITIONER

## 2019-01-02 RX ORDER — IPRATROPIUM BROMIDE 21 UG/1
SPRAY, METERED NASAL
Qty: 30 ML | Refills: 12 | Status: SHIPPED | OUTPATIENT
Start: 2019-01-02 | End: 2020-01-14 | Stop reason: SDUPTHER

## 2019-01-02 RX ORDER — PREDNISONE 20 MG/1
20 TABLET ORAL DAILY
Qty: 5 TABLET | Refills: 0 | Status: SHIPPED | OUTPATIENT
Start: 2019-01-02 | End: 2019-01-07

## 2019-01-02 RX ORDER — BENZONATATE 200 MG/1
200 CAPSULE ORAL 3 TIMES DAILY PRN
Qty: 30 CAPSULE | Refills: 0 | Status: SHIPPED | OUTPATIENT
Start: 2019-01-02 | End: 2019-01-12

## 2019-01-02 RX ORDER — DOXYCYCLINE 100 MG/1
100 CAPSULE ORAL EVERY 12 HOURS
Qty: 20 CAPSULE | Refills: 0 | Status: SHIPPED | OUTPATIENT
Start: 2019-01-02 | End: 2019-01-12

## 2019-01-02 RX ORDER — FLUTICASONE PROPIONATE 50 MCG
1 SPRAY, SUSPENSION (ML) NASAL DAILY
Qty: 16 G | Refills: 3 | Status: SHIPPED | OUTPATIENT
Start: 2019-01-02 | End: 2020-01-03 | Stop reason: SDUPTHER

## 2019-01-02 NOTE — TELEPHONE ENCOUNTER
At his request a prescription was issued for Atrovent nasal spray 0.03%.  This prescription was transmitted electronically to his pharmacy.

## 2019-01-02 NOTE — TELEPHONE ENCOUNTER
Informed pt wife that we do not have any openings for today, pt wife said she scheduled him an appt for today with Cheryl Veras.

## 2019-01-02 NOTE — TELEPHONE ENCOUNTER
----- Message from Mary Vasquez sent at 1/2/2019  9:23 AM CST -----  Contact: Lisa/pt wife   Caller needs to get pt fit in to see the Dr. Fisher today for a bad head cold. Caller prefer pt see his primary doctor cause he knows all about him.   [phone]

## 2019-01-02 NOTE — PROGRESS NOTES
CC:COUGH  HPI:This is a new problem.   Stefan Forbes Jr. is a 63 y.o. male with a history of cough.  The current episode started in the past 4 days.   The problem has been gradually worsening.   Associated symptoms included nasal congestion, rhinorrhea, cough, dyspnea, wheezing.   Pertinent negatives include fever, chills   Treatments tried: prescription COPD medications has been used and this has provided no relief.     Review of patient's allergies indicates:  No Known Allergies    Outpatient Medications Prior to Visit   Medication Sig Dispense Refill    acetaminophen (TYLENOL) 500 MG tablet Take 1,000 mg by mouth as needed.       albuterol (ACCUNEB) 0.63 mg/3 mL Nebu Take 0.63 mg by nebulization every 6 (six) hours as needed. Rescue      albuterol (PROVENTIL) 2.5 mg /3 mL (0.083 %) nebulizer solution Take 3 mLs (2.5 mg total) by nebulization 2 (two) times daily as needed for Wheezing. 180 mL 11    amLODIPine (NORVASC) 5 MG tablet Take 1 tablet (5 mg total) by mouth once daily. 90 tablet 3    aspirin 325 MG tablet Take 325 mg by mouth once daily.      blood sugar diagnostic (BLOOD GLUCOSE TEST) Strp 1 strip by Misc.(Non-Drug; Combo Route) route 3 (three) times daily. (Patient taking differently: 1 strip by Misc.(Non-Drug; Combo Route) route once daily. ) 100 each 2    BLOOD-GLUCOSE METER (CONTOUR NEXT EZ METER MISC) by Misc.(Non-Drug; Combo Route) route.      cetirizine (ZYRTEC) 10 MG tablet Take 10 mg by mouth every evening.      cholecalciferol, vitamin D3, (VITAMIN D3) 1,000 unit capsule Take 1,000 Units by mouth once daily.      cyanocobalamin (VITAMIN B-12) 500 MCG tablet Take 500 mcg by mouth once daily.      fluticasone (FLONASE) 50 mcg/actuation nasal spray Use 1 spray (50 mcg total) in each nostril once daily. 16 g 3    fluticasone-umeclidin-vilanter (TRELEGY ELLIPTA) 100-62.5-25 mcg DsDv Inhale 1 puff into the lungs once daily. 60 each 11    metFORMIN (GLUCOPHAGE) 1000 MG tablet TAKE ONE  "TABLET BY MOUTH TWICE DAILY WITH MEALS 180 tablet 2    methscopolamine (PAMINE) 2.5 mg Tab Take 1 tablet orally every 12 hours, as directed, if needed 180 tablet 4    MICROLET LANCET Misc USE ONE  TO CHECK GLUCOSE TWICE DAILY 100 each 2    nitroGLYCERIN (NITROSTAT) 0.4 MG SL tablet Place 0.4 mg under the tongue every 5 (five) minutes as needed.       omeprazole (PRILOSEC) 40 MG capsule TAKE ONE CAPSULE BY MOUTH ONCE DAILY 30 capsule 4    rosuvastatin (CRESTOR) 10 MG tablet TAKE ONE TABLET BY MOUTH ONCE DAILY 90 tablet 1    sodium chloride (OCEAN NASAL) 0.65 % nasal spray 1 spray by Nasal route as needed for Congestion. 1 Bottle 12    tamsulosin (FLOMAX) 0.4 mg Cp24 Take 1 capsule by mouth once daily.      buPROPion (WELLBUTRIN SR) 150 MG TBSR 12 hr tablet TAKE ONE TABLET BY MOUTH ONCE DAILY (Patient taking differently: TAKE HALF TABLET BY MOUTH ONCE DAILY) 90 tablet 3    guaifenesin (MUCINEX) 1,200 mg Ta12 Take 1 tablet by mouth 2 (two) times daily.      ipratropium (ATROVENT) 0.03 % nasal spray 1 or 2 sprays each nasal passage every 8 hours, if needed, as directed. 30 mL 12     No facility-administered medications prior to visit.         Physical Exam   /66 (BP Location: Left arm, Patient Position: Sitting, BP Method: Large (Manual))   Pulse 62   Temp 97.3 °F (36.3 °C) (Tympanic)   Ht 5' 10" (1.778 m)   Wt 113.2 kg (249 lb 9 oz)   SpO2 95%   BMI 35.81 kg/m²   Constitutional: The patient appears well-developed and well-nourished.   Head: Normocephalic and atraumatic.   Right Ear: Tympanic membrane and ear canal normal. No drainage, swelling or tenderness. Tympanic membrane is not injected, not erythematous and not bulging.   Left Ear: Ear canal normal. No drainage, swelling or tenderness. Tympanic membrane is not injected, not erythematous and not bulging.   Nose:  No mucosal edema or rhinitis is noted.      Cardiovascular: Normal rate, regular rhythm, S1 normal, S2 normal and normal heart " sounds.  Exam reveals no gallop, no S3, no S4 and no friction rub.    No murmur heard.  Pulmonary/Chest: Effort normal and breath sounds are coarse. No stridor. Not tachypneic. No respiratory distress. The patient has wheezes. The patient has no rhonchi. The patient has no rales.   Skin: The patient is not diaphoretic.     Encounter Diagnoses   Name Primary?    Acute sinusitis, recurrence not specified, unspecified location Yes    Moderate COPD (chronic obstructive pulmonary disease)     Dysuria     Controlled type 2 diabetes mellitus without complication, without long-term current use of insulin        PLAN:    Stefan was seen today for uri.    Diagnoses and all orders for this visit:    Acute sinusitis, recurrence not specified, unspecified location  -     predniSONE (DELTASONE) 20 MG tablet; Take 1 tablet (20 mg total) by mouth once daily. for 5 days  -     doxycycline (VIBRAMYCIN) 100 MG Cap; Take 1 capsule (100 mg total) by mouth every 12 (twelve) hours. for 10 days    Moderate COPD (chronic obstructive pulmonary disease)  -     X-Ray Chest PA And Lateral; Future  -     predniSONE (DELTASONE) 20 MG tablet; Take 1 tablet (20 mg total) by mouth once daily. for 5 days  -     benzonatate (TESSALON) 200 MG capsule; Take 1 capsule (200 mg total) by mouth 3 (three) times daily as needed.    Dysuria  -     Urinalysis; Future    Controlled type 2 diabetes mellitus without complication, without long-term current use of insulin  Comments:  Monitor blood glucose while on steroids.  Increase water intake.  Follow diabetic diet.       Medications Ordered This Encounter   Medications    benzonatate (TESSALON) 200 MG capsule     Sig: Take 1 capsule (200 mg total) by mouth 3 (three) times daily as needed.     Dispense:  30 capsule     Refill:  0    doxycycline (VIBRAMYCIN) 100 MG Cap     Sig: Take 1 capsule (100 mg total) by mouth every 12 (twelve) hours. for 10 days     Dispense:  20 capsule     Refill:  0    predniSONE  (DELTASONE) 20 MG tablet     Sig: Take 1 tablet (20 mg total) by mouth once daily. for 5 days     Dispense:  5 tablet     Refill:  0     Orders Placed This Encounter   Procedures    X-Ray Chest PA And Lateral     Standing Status:   Future     Number of Occurrences:   1     Standing Expiration Date:   1/2/2020     Order Specific Question:   May the Radiologist modify the order per protocol to meet the clinical needs of the patient?     Answer:   Yes    Urinalysis     Standing Status:   Future     Number of Occurrences:   1     Standing Expiration Date:   3/2/2020     RTC if symptoms are worsening or changing significantly or if not improved by the end of therapy.

## 2019-01-14 RX ORDER — DEXTROSE 4 G
TABLET,CHEWABLE ORAL
Qty: 1 EACH | Refills: 0 | Status: SHIPPED | OUTPATIENT
Start: 2019-01-14 | End: 2023-03-06

## 2019-01-14 RX ORDER — LANCETS
1 EACH MISCELLANEOUS 3 TIMES DAILY
Qty: 200 EACH | Refills: 0 | Status: SHIPPED | OUTPATIENT
Start: 2019-01-14 | End: 2021-10-12 | Stop reason: SDUPTHER

## 2019-01-14 NOTE — TELEPHONE ENCOUNTER
----- Message from Dejuan Souza sent at 1/14/2019  9:33 AM CST -----  Contact: Lisaabel's wife  She is calling in regards to having a referral sent over [facility] for his new diabetes glucose meter, Fax#[phone], please advise [phone]

## 2019-01-14 NOTE — TELEPHONE ENCOUNTER
Pt's wife stated pt's glucose meter broke and pt needs a new prescription for a meter, test strips, and lancets sent to [facility] mail order pharmacy.  Notified pt's wife that request will be sent to doctor for approval.

## 2019-01-23 ENCOUNTER — PATIENT MESSAGE (OUTPATIENT)
Dept: PULMONOLOGY | Facility: CLINIC | Age: 64
End: 2019-01-23

## 2019-02-05 ENCOUNTER — LAB VISIT (OUTPATIENT)
Dept: LAB | Facility: HOSPITAL | Age: 64
End: 2019-02-05
Attending: NURSE PRACTITIONER
Payer: MEDICARE

## 2019-02-05 DIAGNOSIS — I26.99 OTHER ACUTE PULMONARY EMBOLISM WITHOUT ACUTE COR PULMONALE: ICD-10-CM

## 2019-02-05 DIAGNOSIS — I26.99 OTHER PULMONARY EMBOLISM WITHOUT ACUTE COR PULMONALE, UNSPECIFIED CHRONICITY: ICD-10-CM

## 2019-02-05 DIAGNOSIS — D50.0 IRON DEFICIENCY ANEMIA DUE TO CHRONIC BLOOD LOSS: ICD-10-CM

## 2019-02-05 DIAGNOSIS — G44.89 OTHER HEADACHE SYNDROME: ICD-10-CM

## 2019-02-05 LAB
ALBUMIN SERPL BCP-MCNC: 4 G/DL
ALP SERPL-CCNC: 61 U/L
ALT SERPL W/O P-5'-P-CCNC: 24 U/L
ANION GAP SERPL CALC-SCNC: 10 MMOL/L
AST SERPL-CCNC: 16 U/L
BASOPHILS # BLD AUTO: 0.05 K/UL
BASOPHILS NFR BLD: 0.7 %
BILIRUB SERPL-MCNC: 0.2 MG/DL
BUN SERPL-MCNC: 12 MG/DL
CALCIUM SERPL-MCNC: 9.3 MG/DL
CHLORIDE SERPL-SCNC: 101 MMOL/L
CO2 SERPL-SCNC: 23 MMOL/L
CREAT SERPL-MCNC: 0.9 MG/DL
CRP SERPL-MCNC: 2.1 MG/L
D DIMER PPP IA.FEU-MCNC: 0.66 MG/L FEU
DIFFERENTIAL METHOD: ABNORMAL
EOSINOPHIL # BLD AUTO: 0.2 K/UL
EOSINOPHIL NFR BLD: 2.6 %
ERYTHROCYTE [DISTWIDTH] IN BLOOD BY AUTOMATED COUNT: 12.3 %
EST. GFR  (AFRICAN AMERICAN): >60 ML/MIN/1.73 M^2
EST. GFR  (NON AFRICAN AMERICAN): >60 ML/MIN/1.73 M^2
FERRITIN SERPL-MCNC: 122 NG/ML
GLUCOSE SERPL-MCNC: 118 MG/DL
HCT VFR BLD AUTO: 40.1 %
HGB BLD-MCNC: 13 G/DL
IMM GRANULOCYTES # BLD AUTO: 0.01 K/UL
IMM GRANULOCYTES NFR BLD AUTO: 0.1 %
IRON SERPL-MCNC: 79 UG/DL
LYMPHOCYTES # BLD AUTO: 1.5 K/UL
LYMPHOCYTES NFR BLD: 20.7 %
MCH RBC QN AUTO: 30.6 PG
MCHC RBC AUTO-ENTMCNC: 32.4 G/DL
MCV RBC AUTO: 94 FL
MONOCYTES # BLD AUTO: 0.6 K/UL
MONOCYTES NFR BLD: 8.7 %
NEUTROPHILS # BLD AUTO: 4.7 K/UL
NEUTROPHILS NFR BLD: 67.3 %
NRBC BLD-RTO: 0 /100 WBC
PLATELET # BLD AUTO: 232 K/UL
PMV BLD AUTO: 9.3 FL
POTASSIUM SERPL-SCNC: 4.6 MMOL/L
PROT SERPL-MCNC: 7.1 G/DL
RBC # BLD AUTO: 4.25 M/UL
SATURATED IRON: 23 %
SODIUM SERPL-SCNC: 134 MMOL/L
TOTAL IRON BINDING CAPACITY: 345 UG/DL
TRANSFERRIN SERPL-MCNC: 233 MG/DL
WBC # BLD AUTO: 7.01 K/UL

## 2019-02-05 PROCEDURE — 83540 ASSAY OF IRON: CPT

## 2019-02-05 PROCEDURE — 85379 FIBRIN DEGRADATION QUANT: CPT

## 2019-02-05 PROCEDURE — 85025 COMPLETE CBC W/AUTO DIFF WBC: CPT

## 2019-02-05 PROCEDURE — 36415 COLL VENOUS BLD VENIPUNCTURE: CPT

## 2019-02-05 PROCEDURE — 86140 C-REACTIVE PROTEIN: CPT

## 2019-02-05 PROCEDURE — 83921 ORGANIC ACID SINGLE QUANT: CPT

## 2019-02-05 PROCEDURE — 82728 ASSAY OF FERRITIN: CPT

## 2019-02-05 PROCEDURE — 80053 COMPREHEN METABOLIC PANEL: CPT

## 2019-02-07 ENCOUNTER — OFFICE VISIT (OUTPATIENT)
Dept: INTERNAL MEDICINE | Facility: CLINIC | Age: 64
End: 2019-02-07
Payer: MEDICARE

## 2019-02-07 VITALS
WEIGHT: 242.06 LBS | DIASTOLIC BLOOD PRESSURE: 62 MMHG | SYSTOLIC BLOOD PRESSURE: 118 MMHG | TEMPERATURE: 96 F | HEART RATE: 61 BPM | BODY MASS INDEX: 34.73 KG/M2 | OXYGEN SATURATION: 97 %

## 2019-02-07 DIAGNOSIS — I25.10 CORONARY ARTERY DISEASE INVOLVING NATIVE CORONARY ARTERY OF NATIVE HEART WITHOUT ANGINA PECTORIS: ICD-10-CM

## 2019-02-07 DIAGNOSIS — Z12.5 ENCOUNTER FOR SCREENING FOR MALIGNANT NEOPLASM OF PROSTATE: ICD-10-CM

## 2019-02-07 DIAGNOSIS — E11.59 HYPERTENSION ASSOCIATED WITH DIABETES: Primary | ICD-10-CM

## 2019-02-07 DIAGNOSIS — Z00.00 PREVENTATIVE HEALTH CARE: ICD-10-CM

## 2019-02-07 DIAGNOSIS — E66.09 CLASS 1 OBESITY DUE TO EXCESS CALORIES WITH SERIOUS COMORBIDITY AND BODY MASS INDEX (BMI) OF 34.0 TO 34.9 IN ADULT: ICD-10-CM

## 2019-02-07 DIAGNOSIS — I15.2 HYPERTENSION ASSOCIATED WITH DIABETES: Primary | ICD-10-CM

## 2019-02-07 DIAGNOSIS — E78.5 HYPERLIPIDEMIA ASSOCIATED WITH TYPE 2 DIABETES MELLITUS: ICD-10-CM

## 2019-02-07 DIAGNOSIS — E11.9 CONTROLLED TYPE 2 DIABETES MELLITUS WITHOUT COMPLICATION, WITHOUT LONG-TERM CURRENT USE OF INSULIN: ICD-10-CM

## 2019-02-07 DIAGNOSIS — E11.69 HYPERLIPIDEMIA ASSOCIATED WITH TYPE 2 DIABETES MELLITUS: ICD-10-CM

## 2019-02-07 PROBLEM — E66.811 CLASS 1 OBESITY DUE TO EXCESS CALORIES WITH SERIOUS COMORBIDITY AND BODY MASS INDEX (BMI) OF 34.0 TO 34.9 IN ADULT: Status: ACTIVE | Noted: 2019-02-07

## 2019-02-07 PROCEDURE — 3044F HG A1C LEVEL LT 7.0%: CPT | Mod: CPTII,S$GLB,, | Performed by: INTERNAL MEDICINE

## 2019-02-07 PROCEDURE — 3008F PR BODY MASS INDEX (BMI) DOCUMENTED: ICD-10-PCS | Mod: CPTII,S$GLB,, | Performed by: INTERNAL MEDICINE

## 2019-02-07 PROCEDURE — 3074F PR MOST RECENT SYSTOLIC BLOOD PRESSURE < 130 MM HG: ICD-10-PCS | Mod: CPTII,S$GLB,, | Performed by: INTERNAL MEDICINE

## 2019-02-07 PROCEDURE — 3008F BODY MASS INDEX DOCD: CPT | Mod: CPTII,S$GLB,, | Performed by: INTERNAL MEDICINE

## 2019-02-07 PROCEDURE — 3044F PR MOST RECENT HEMOGLOBIN A1C LEVEL <7.0%: ICD-10-PCS | Mod: CPTII,S$GLB,, | Performed by: INTERNAL MEDICINE

## 2019-02-07 PROCEDURE — 99214 OFFICE O/P EST MOD 30 MIN: CPT | Mod: S$GLB,,, | Performed by: INTERNAL MEDICINE

## 2019-02-07 PROCEDURE — 3078F PR MOST RECENT DIASTOLIC BLOOD PRESSURE < 80 MM HG: ICD-10-PCS | Mod: CPTII,S$GLB,, | Performed by: INTERNAL MEDICINE

## 2019-02-07 PROCEDURE — 99999 PR PBB SHADOW E&M-EST. PATIENT-LVL III: CPT | Mod: PBBFAC,,, | Performed by: INTERNAL MEDICINE

## 2019-02-07 PROCEDURE — 99999 PR PBB SHADOW E&M-EST. PATIENT-LVL III: ICD-10-PCS | Mod: PBBFAC,,, | Performed by: INTERNAL MEDICINE

## 2019-02-07 PROCEDURE — 3078F DIAST BP <80 MM HG: CPT | Mod: CPTII,S$GLB,, | Performed by: INTERNAL MEDICINE

## 2019-02-07 PROCEDURE — 99214 PR OFFICE/OUTPT VISIT, EST, LEVL IV, 30-39 MIN: ICD-10-PCS | Mod: S$GLB,,, | Performed by: INTERNAL MEDICINE

## 2019-02-07 PROCEDURE — 3074F SYST BP LT 130 MM HG: CPT | Mod: CPTII,S$GLB,, | Performed by: INTERNAL MEDICINE

## 2019-02-07 RX ORDER — FLUTICASONE FUROATE AND VILANTEROL 100; 25 UG/1; UG/1
1 POWDER RESPIRATORY (INHALATION)
COMMUNITY
End: 2019-02-07

## 2019-02-07 NOTE — PATIENT INSTRUCTIONS
Medications that can be alternatives to trulicity:    tanzeum  ozempic  bydureon    jardiance  daiana greenfieldnta  onglyza

## 2019-02-07 NOTE — PROGRESS NOTES
Subjective:      Patient ID: Stefan Forbes Jr. is a 63 y.o. male.    Chief Complaint: Follow-up (3 month)    HPI   62 yo with   Patient Active Problem List   Diagnosis    Controlled type 2 diabetes mellitus without complication, without long-term current use of insulin    Hypertension associated with diabetes    Hyperlipidemia associated with type 2 diabetes mellitus    Moderate COPD (chronic obstructive pulmonary disease)    CAD (coronary artery disease)    Vitamin D deficiency    Osteoarthritis of knee    Pulmonary nodule    Nevus of choroid of right eye    History of pulmonary embolism    Hyponatremia    Iron deficiency anemia due to chronic blood loss    MACHO on CPAP    Nocturnal oxygen desaturation    PLMD (periodic limb movement disorder)     Past Medical History:   Diagnosis Date    COPD (chronic obstructive pulmonary disease)     Diabetes mellitus, type 2     Hyperlipidemia     Hypertension      Here today for management of multiple medical problems as outlined below.  He is feeling well today in his usual state of health.  He reports compliance with medications without significant side effects.  Unfortunately Trilisate he has become expensive and is costing him approximately 100 dollars per month.  Home glucose 90s to 130s over past one month.   Review of Systems   Constitutional: Negative for chills and fever.   HENT: Negative for ear pain and sore throat.    Respiratory: Negative for cough.    Cardiovascular: Negative for chest pain.   Gastrointestinal: Negative for abdominal pain and blood in stool.   Genitourinary: Negative for dysuria and hematuria.   Neurological: Negative for seizures and syncope.     Objective:   /62 (BP Location: Left arm, Patient Position: Sitting, BP Method: Large (Manual))   Pulse 61   Temp 96.4 °F (35.8 °C) (Tympanic)   Wt 109.8 kg (242 lb 1 oz)   SpO2 97%   BMI 34.73 kg/m²     Physical Exam   Constitutional: He is oriented to person, place, and  time. He appears well-developed and well-nourished. No distress.   HENT:   Head: Normocephalic and atraumatic.   Mouth/Throat: Oropharynx is clear and moist.   Eyes: EOM are normal. Pupils are equal, round, and reactive to light.   Neck: Neck supple. No thyromegaly present.   Cardiovascular: Normal rate and regular rhythm.   Pulmonary/Chest: Breath sounds normal. He has no wheezes. He has no rales.   Abdominal: Soft. Bowel sounds are normal. There is no tenderness.   Lymphadenopathy:     He has no cervical adenopathy.   Neurological: He is alert and oriented to person, place, and time.   Skin: Skin is warm and dry.   Psychiatric: He has a normal mood and affect. His behavior is normal.     Lab Visit on 02/05/2019   Component Date Value Ref Range Status    D-Dimer 02/05/2019 0.66* <0.50 mg/L FEU Final    Comment: The quantitative D-dimer assay should be used as an aid in   the diagnosis of deep vein thrombosis and pulmonary embolism  in patients with the appropriate presentation and clinical  history. The upper limit of the reference interval and the clinical   cut off   point are identical. Causes of a positive (>0.50 mg/L FEU) D-Dimer   test  include, but are not limited to: DVT, PE, DIC, thrombolytic   therapy, anticoagulant therapy, recent surgery, trauma, or   pregnancy, disseminated malignancy, aortic aneurysm, cirrhosis,  and severe infection. False negative results may occur in   patients with distal DVT.      WBC 02/05/2019 7.01  3.90 - 12.70 K/uL Final    RBC 02/05/2019 4.25* 4.60 - 6.20 M/uL Final    Hemoglobin 02/05/2019 13.0* 14.0 - 18.0 g/dL Final    Hematocrit 02/05/2019 40.1  40.0 - 54.0 % Final    MCV 02/05/2019 94  82 - 98 fL Final    MCH 02/05/2019 30.6  27.0 - 31.0 pg Final    MCHC 02/05/2019 32.4  32.0 - 36.0 g/dL Final    RDW 02/05/2019 12.3  11.5 - 14.5 % Final    Platelets 02/05/2019 232  150 - 350 K/uL Final    MPV 02/05/2019 9.3  9.2 - 12.9 fL Final    Immature Granulocytes  02/05/2019 0.1  0.0 - 0.5 % Final    Gran # (ANC) 02/05/2019 4.7  1.8 - 7.7 K/uL Final    Immature Grans (Abs) 02/05/2019 0.01  0.00 - 0.04 K/uL Final    Comment: Mild elevation in immature granulocytes is non specific and   can be seen in a variety of conditions including stress response,   acute inflammation, trauma and pregnancy. Correlation with other   laboratory and clinical findings is essential.      Lymph # 02/05/2019 1.5  1.0 - 4.8 K/uL Final    Mono # 02/05/2019 0.6  0.3 - 1.0 K/uL Final    Eos # 02/05/2019 0.2  0.0 - 0.5 K/uL Final    Baso # 02/05/2019 0.05  0.00 - 0.20 K/uL Final    nRBC 02/05/2019 0  0 /100 WBC Final    Gran% 02/05/2019 67.3  38.0 - 73.0 % Final    Lymph% 02/05/2019 20.7  18.0 - 48.0 % Final    Mono% 02/05/2019 8.7  4.0 - 15.0 % Final    Eosinophil% 02/05/2019 2.6  0.0 - 8.0 % Final    Basophil% 02/05/2019 0.7  0.0 - 1.9 % Final    Differential Method 02/05/2019 Automated   Final    Sodium 02/05/2019 134* 136 - 145 mmol/L Final    Potassium 02/05/2019 4.6  3.5 - 5.1 mmol/L Final    Chloride 02/05/2019 101  95 - 110 mmol/L Final    CO2 02/05/2019 23  23 - 29 mmol/L Final    Glucose 02/05/2019 118* 70 - 110 mg/dL Final    BUN, Bld 02/05/2019 12  8 - 23 mg/dL Final    Creatinine 02/05/2019 0.9  0.5 - 1.4 mg/dL Final    Calcium 02/05/2019 9.3  8.7 - 10.5 mg/dL Final    Total Protein 02/05/2019 7.1  6.0 - 8.4 g/dL Final    Albumin 02/05/2019 4.0  3.5 - 5.2 g/dL Final    Total Bilirubin 02/05/2019 0.2  0.1 - 1.0 mg/dL Final    Comment: For infants and newborns, interpretation of results should be based  on gestational age, weight and in agreement with clinical  observations.  Premature Infant recommended reference ranges:  Up to 24 hours.............<8.0 mg/dL  Up to 48 hours............<12.0 mg/dL  3-5 days..................<15.0 mg/dL  6-29 days.................<15.0 mg/dL      Alkaline Phosphatase 02/05/2019 61  55 - 135 U/L Final    AST 02/05/2019 16  10 - 40  U/L Final    ALT 02/05/2019 24  10 - 44 U/L Final    Anion Gap 02/05/2019 10  8 - 16 mmol/L Final    eGFR if African American 02/05/2019 >60  >60 mL/min/1.73 m^2 Final    eGFR if non African American 02/05/2019 >60  >60 mL/min/1.73 m^2 Final    Comment: Calculation used to obtain the estimated glomerular filtration  rate (eGFR) is the CKD-EPI equation.       Ferritin 02/05/2019 122  20.0 - 300.0 ng/mL Final    Iron 02/05/2019 79  45 - 160 ug/dL Final    Transferrin 02/05/2019 233  200 - 375 mg/dL Final    TIBC 02/05/2019 345  250 - 450 ug/dL Final    Saturated Iron 02/05/2019 23  20 - 50 % Final    CRP 02/05/2019 2.1  0.0 - 8.2 mg/L Final   Lab Visit on 02/05/2019   Component Date Value Ref Range Status    Hemoglobin A1C 02/05/2019 6.6* 4.0 - 5.6 % Final    Comment: ADA Screening Guidelines:  5.7-6.4%  Consistent with prediabetes  >or=6.5%  Consistent with diabetes  High levels of fetal hemoglobin interfere with the HbA1C  assay. Heterozygous hemoglobin variants (HbS, HgC, etc)do  not significantly interfere with this assay.   However, presence of multiple variants may affect accuracy.      Estimated Avg Glucose 02/05/2019 143* 68 - 131 mg/dL Final       Assessment:     1. Hypertension associated with diabetes    2. Hyperlipidemia associated with type 2 diabetes mellitus    3. Controlled type 2 diabetes mellitus without complication, without long-term current use of insulin    4. Coronary artery disease involving native coronary artery of native heart without angina pectoris    5. Preventative health care    6. Encounter for screening for malignant neoplasm of prostate      Plan:   Hypertension associated with diabetes  Controlled.  -     CBC auto differential; Future; Expected date: 08/06/2019  -     Comprehensive metabolic panel; Future; Expected date: 08/06/2019    Hyperlipidemia associated with type 2 diabetes mellitus  Controlled  -     Hemoglobin A1c; Future; Expected date: 08/06/2019  -     Lipid  panel; Future; Expected date: 08/06/2019  -     TSH; Future; Expected date: 08/06/2019    Controlled type 2 diabetes mellitus without complication, without long-term current use of insulin  Controlled  Coronary artery disease involving native coronary artery of native heart without angina pectoris  Stable      Encounter for screening for malignant neoplasm of prostate  -     PSA, Screening; Future; Expected date: 08/06/2019    Medications that can be alternatives to trulicity:    tanzeum  ozempic  bydureon    jardiance  invokana   farxiga    januvia  tradjenta  onglyza      Lab Frequency Next Occurrence   Microalbumin/creatinine urine ratio Once 08/10/2018   CT Chest With Contrast Once 10/19/2018   Hemoglobin A1c Once 08/06/2019       Problem List Items Addressed This Visit        Cardiac/Vascular    Hypertension associated with diabetes - Primary    Relevant Orders    CBC auto differential    Comprehensive metabolic panel    Hyperlipidemia associated with type 2 diabetes mellitus    Relevant Orders    Hemoglobin A1c    Lipid panel    TSH    CAD (coronary artery disease)       Endocrine    Controlled type 2 diabetes mellitus without complication, without long-term current use of insulin      Other Visit Diagnoses     Preventative health care        Encounter for screening for malignant neoplasm of prostate        Relevant Orders    PSA, Screening          Follow-up in about 6 months (around 8/7/2019), or if symptoms worsen or fail to improve.

## 2019-02-11 LAB — METHYLMALONATE SERPL-SCNC: 0.16 UMOL/L

## 2019-02-12 DIAGNOSIS — J44.9 MODERATE COPD (CHRONIC OBSTRUCTIVE PULMONARY DISEASE): ICD-10-CM

## 2019-02-18 ENCOUNTER — PATIENT OUTREACH (OUTPATIENT)
Dept: ADMINISTRATIVE | Facility: HOSPITAL | Age: 64
End: 2019-02-18

## 2019-02-18 DIAGNOSIS — E11.9 CONTROLLED TYPE 2 DIABETES MELLITUS WITHOUT COMPLICATION, WITHOUT LONG-TERM CURRENT USE OF INSULIN: Primary | ICD-10-CM

## 2019-02-19 ENCOUNTER — OFFICE VISIT (OUTPATIENT)
Dept: HEMATOLOGY/ONCOLOGY | Facility: CLINIC | Age: 64
End: 2019-02-19
Payer: MEDICARE

## 2019-02-19 VITALS
WEIGHT: 240.75 LBS | BODY MASS INDEX: 34.54 KG/M2 | TEMPERATURE: 99 F | SYSTOLIC BLOOD PRESSURE: 124 MMHG | DIASTOLIC BLOOD PRESSURE: 69 MMHG | OXYGEN SATURATION: 96 % | HEART RATE: 67 BPM

## 2019-02-19 DIAGNOSIS — D64.9 ANEMIA, UNSPECIFIED TYPE: Primary | ICD-10-CM

## 2019-02-19 DIAGNOSIS — E66.09 CLASS 1 OBESITY DUE TO EXCESS CALORIES WITH SERIOUS COMORBIDITY AND BODY MASS INDEX (BMI) OF 34.0 TO 34.9 IN ADULT: ICD-10-CM

## 2019-02-19 DIAGNOSIS — I15.2 HYPERTENSION ASSOCIATED WITH DIABETES: ICD-10-CM

## 2019-02-19 DIAGNOSIS — J44.9 MODERATE COPD (CHRONIC OBSTRUCTIVE PULMONARY DISEASE): ICD-10-CM

## 2019-02-19 DIAGNOSIS — E78.5 HYPERLIPIDEMIA ASSOCIATED WITH TYPE 2 DIABETES MELLITUS: ICD-10-CM

## 2019-02-19 DIAGNOSIS — R76.8 ELEVATED SERUM IMMUNOGLOBULIN FREE LIGHT CHAINS: ICD-10-CM

## 2019-02-19 DIAGNOSIS — D50.0 IRON DEFICIENCY ANEMIA DUE TO CHRONIC BLOOD LOSS: ICD-10-CM

## 2019-02-19 DIAGNOSIS — Z86.711 HISTORY OF PULMONARY EMBOLISM: ICD-10-CM

## 2019-02-19 DIAGNOSIS — R91.1 PULMONARY NODULE: ICD-10-CM

## 2019-02-19 DIAGNOSIS — D51.3 OTHER DIETARY VITAMIN B12 DEFICIENCY ANEMIA: ICD-10-CM

## 2019-02-19 DIAGNOSIS — E11.69 HYPERLIPIDEMIA ASSOCIATED WITH TYPE 2 DIABETES MELLITUS: ICD-10-CM

## 2019-02-19 DIAGNOSIS — E11.59 HYPERTENSION ASSOCIATED WITH DIABETES: ICD-10-CM

## 2019-02-19 DIAGNOSIS — I25.10 CORONARY ARTERY DISEASE INVOLVING NATIVE CORONARY ARTERY OF NATIVE HEART WITHOUT ANGINA PECTORIS: ICD-10-CM

## 2019-02-19 PROCEDURE — 3044F PR MOST RECENT HEMOGLOBIN A1C LEVEL <7.0%: ICD-10-PCS | Mod: CPTII,S$GLB,, | Performed by: NURSE PRACTITIONER

## 2019-02-19 PROCEDURE — 3044F HG A1C LEVEL LT 7.0%: CPT | Mod: CPTII,S$GLB,, | Performed by: NURSE PRACTITIONER

## 2019-02-19 PROCEDURE — 3078F DIAST BP <80 MM HG: CPT | Mod: CPTII,S$GLB,, | Performed by: NURSE PRACTITIONER

## 2019-02-19 PROCEDURE — 3074F PR MOST RECENT SYSTOLIC BLOOD PRESSURE < 130 MM HG: ICD-10-PCS | Mod: CPTII,S$GLB,, | Performed by: NURSE PRACTITIONER

## 2019-02-19 PROCEDURE — 99214 OFFICE O/P EST MOD 30 MIN: CPT | Mod: S$GLB,,, | Performed by: NURSE PRACTITIONER

## 2019-02-19 PROCEDURE — 3008F PR BODY MASS INDEX (BMI) DOCUMENTED: ICD-10-PCS | Mod: CPTII,S$GLB,, | Performed by: NURSE PRACTITIONER

## 2019-02-19 PROCEDURE — 99999 PR PBB SHADOW E&M-EST. PATIENT-LVL III: ICD-10-PCS | Mod: PBBFAC,,, | Performed by: NURSE PRACTITIONER

## 2019-02-19 PROCEDURE — 99999 PR PBB SHADOW E&M-EST. PATIENT-LVL III: CPT | Mod: PBBFAC,,, | Performed by: NURSE PRACTITIONER

## 2019-02-19 PROCEDURE — 3078F PR MOST RECENT DIASTOLIC BLOOD PRESSURE < 80 MM HG: ICD-10-PCS | Mod: CPTII,S$GLB,, | Performed by: NURSE PRACTITIONER

## 2019-02-19 PROCEDURE — 3074F SYST BP LT 130 MM HG: CPT | Mod: CPTII,S$GLB,, | Performed by: NURSE PRACTITIONER

## 2019-02-19 PROCEDURE — 3008F BODY MASS INDEX DOCD: CPT | Mod: CPTII,S$GLB,, | Performed by: NURSE PRACTITIONER

## 2019-02-19 PROCEDURE — 99214 PR OFFICE/OUTPT VISIT, EST, LEVL IV, 30-39 MIN: ICD-10-PCS | Mod: S$GLB,,, | Performed by: NURSE PRACTITIONER

## 2019-02-19 NOTE — PROGRESS NOTES
Subjective:       Patient ID: Stefan Forbes Jr. is a 63 y.o. male.    Chief Complaint:  Lab results, follow-up [facility]      HPI: 63 y.o male PMHx: CAD, COPD, DM II, HTN, HLD, SIADH, pulmonary embolism who presents the [facility] today for follow-up of his iron deficiency anemia, stable pulmonary nodules per serial CT scans.  The patient has been previously followed in the [facility] by Dr. Power.  Today is my 1st visit with the patient.  I have reviewed all relevant clinical data available in the medical record and have utilizes in my evaluation and management recommendations today.      The patient did complete 6 months anticoagulation therapy for provoked PEs following left knee replacement.  The patient does take aspirin 325 mg p.o. daily as recommended by his cardiologist for history of CAD with stent placement.  The patient does have a stress test planned per Cardiology for 03/2019.  The patient did have IV Feraheme x2 doses in 03/2018 for iron deficiency.  The patient has since had colonoscopy performed.  The patient states that HANNAH Foster performed this procedure.  The patient is not on oral iron therapy.  The patient denies alcohol use, but does report smoking 1-2 cigars a week.      Most recent CT of the chest 10/19/2018 show pulmonary nodules remain stable.  The patient will have iron studies drawn today, will follow up with patient via patient portal.    Today the patient reports feeling well overall.  He denies SOB, chest pain, dizziness, syncope, hematuria, hematochezia, melena, or any other associated symptoms. Reports he has been active in gym 3-4 days out of the week for the past 2 months. Admits his diet could be better        Social History     Socioeconomic History    Marital status:      Spouse name: Not on file    Number of children: Not on file    Years of education: Not on file    Highest education level: Not on file   Social Needs    Financial resource strain: Not  on file    Food insecurity - worry: Not on file    Food insecurity - inability: Not on file    Transportation needs - medical: Not on file    Transportation needs - non-medical: Not on file   Occupational History    Occupation: retired   Tobacco Use    Smoking status: Former Smoker     Types: Cigars     Last attempt to quit: 12/30/2015     Years since quitting: 3.1    Smokeless tobacco: Never Used   Substance and Sexual Activity    Alcohol use: No    Drug use: No    Sexual activity: Not on file   Other Topics Concern    Not on file   Social History Narrative    Not on file       Past Medical History:   Diagnosis Date    COPD (chronic obstructive pulmonary disease)     Diabetes mellitus, type 2     Hyperlipidemia     Hypertension        Family History   Problem Relation Age of Onset    Diabetes Mother     Cancer Father         bone       Past Surgical History:   Procedure Laterality Date    ANGIOPLASTY      TOTAL KNEE ARTHROPLASTY         Review of Systems   Constitutional: Negative for activity change, appetite change, chills, diaphoresis, fatigue, fever and unexpected weight change.   HENT: Negative for congestion, mouth sores, nosebleeds, sore throat, trouble swallowing and voice change.    Eyes: Negative for pain and visual disturbance.   Respiratory: Negative for cough, chest tightness, shortness of breath and wheezing.    Cardiovascular: Negative for chest pain and leg swelling.   Gastrointestinal: Negative for abdominal distention, abdominal pain, anal bleeding, blood in stool, constipation, diarrhea, nausea and vomiting.   Genitourinary: Negative for difficulty urinating, dysuria and hematuria.   Musculoskeletal: Negative for arthralgias, back pain and myalgias.   Skin: Negative for rash.   Neurological: Negative for dizziness, syncope, facial asymmetry, weakness, light-headedness, numbness and headaches.   Hematological: Negative for adenopathy. Does not bruise/bleed easily.    Psychiatric/Behavioral: The patient is not nervous/anxious.          Medication List with Changes/Refills   Current Medications    ACETAMINOPHEN (TYLENOL) 500 MG TABLET    Take 1,000 mg by mouth as needed.     ALBUTEROL (ACCUNEB) 0.63 MG/3 ML NEBU    Take 0.63 mg by nebulization every 6 (six) hours as needed. Rescue    ALBUTEROL (PROVENTIL) 2.5 MG /3 ML (0.083 %) NEBULIZER SOLUTION    Take 3 mLs (2.5 mg total) by nebulization 2 (two) times daily as needed for Wheezing.    AMLODIPINE (NORVASC) 5 MG TABLET    Take 1 tablet (5 mg total) by mouth once daily.    ASPIRIN 325 MG TABLET    Take 325 mg by mouth once daily.    BLOOD SUGAR DIAGNOSTIC (BLOOD GLUCOSE TEST) STRP    1 strip by Misc.(Non-Drug; Combo Route) route 3 (three) times daily.    BLOOD-GLUCOSE METER MISC    Use as directed to check glucose levels.    BUPROPION (WELLBUTRIN SR) 150 MG TBSR 12 HR TABLET    TAKE ONE TABLET BY MOUTH ONCE DAILY    CETIRIZINE (ZYRTEC) 10 MG TABLET    Take 10 mg by mouth every evening.    CHOLECALCIFEROL, VITAMIN D3, (VITAMIN D3) 1,000 UNIT CAPSULE    Take 1,000 Units by mouth once daily.    CYANOCOBALAMIN (VITAMIN B-12) 500 MCG TABLET    Take 500 mcg by mouth once daily.    FLUTICASONE (FLONASE) 50 MCG/ACTUATION NASAL SPRAY    Use 1 spray (50 mcg total) in each nostril once daily.    FLUTICASONE-UMECLIDIN-VILANTER (TRELEGY ELLIPTA) 100-62.5-25 MCG DSDV    Inhale into the lungs.    FLUTICASONE-UMECLIDIN-VILANTER (TRELEGY ELLIPTA) 100-62.5-25 MCG DSDV    Inhale 1 puff into the lungs once daily.    GUAIFENESIN (MUCINEX) 1,200 MG TA12    Take 1 tablet by mouth 2 (two) times daily.    IPRATROPIUM (ATROVENT) 0.03 % NASAL SPRAY    1 or 2 sprays each nasal passage every 8 hours, if needed, as directed.    LANCETS MISC    1 lancet by Misc.(Non-Drug; Combo Route) route 3 (three) times daily.    METFORMIN (GLUCOPHAGE) 1000 MG TABLET    TAKE ONE TABLET BY MOUTH TWICE DAILY WITH MEALS    METHSCOPOLAMINE (PAMINE) 2.5 MG TAB    Take 1 tablet  orally every 12 hours, as directed, if needed    NITROGLYCERIN (NITROSTAT) 0.4 MG SL TABLET    Place 0.4 mg under the tongue every 5 (five) minutes as needed.     OMEPRAZOLE (PRILOSEC) 40 MG CAPSULE    TAKE ONE CAPSULE BY MOUTH ONCE DAILY    ROSUVASTATIN (CRESTOR) 10 MG TABLET    TAKE ONE TABLET BY MOUTH ONCE DAILY    SODIUM CHLORIDE (OCEAN NASAL) 0.65 % NASAL SPRAY    1 spray by Nasal route as needed for Congestion.    TAMSULOSIN (FLOMAX) 0.4 MG CAP    Take 1 capsule by mouth at bedtime     Objective:     Vitals:    02/19/19 0947   BP: 124/69   Pulse: 67   Temp: 98.5 °F (36.9 °C)     Lab Results   Component Value Date    WBC 7.01 02/05/2019    HGB 13.0 (L) 02/05/2019    HCT 40.1 02/05/2019    MCV 94 02/05/2019     02/05/2019     CMP  Sodium   Date Value Ref Range Status   02/05/2019 134 (L) 136 - 145 mmol/L Final     Potassium   Date Value Ref Range Status   02/05/2019 4.6 3.5 - 5.1 mmol/L Final     Chloride   Date Value Ref Range Status   02/05/2019 101 95 - 110 mmol/L Final     CO2   Date Value Ref Range Status   02/05/2019 23 23 - 29 mmol/L Final     Glucose   Date Value Ref Range Status   02/05/2019 118 (H) 70 - 110 mg/dL Final     BUN, Bld   Date Value Ref Range Status   02/05/2019 12 8 - 23 mg/dL Final     Creatinine   Date Value Ref Range Status   02/05/2019 0.9 0.5 - 1.4 mg/dL Final     Calcium   Date Value Ref Range Status   02/05/2019 9.3 8.7 - 10.5 mg/dL Final     Total Protein   Date Value Ref Range Status   02/05/2019 7.1 6.0 - 8.4 g/dL Final     Albumin   Date Value Ref Range Status   02/05/2019 4.0 3.5 - 5.2 g/dL Final     Total Bilirubin   Date Value Ref Range Status   02/05/2019 0.2 0.1 - 1.0 mg/dL Final     Comment:     For infants and newborns, interpretation of results should be based  on gestational age, weight and in agreement with clinical  observations.  Premature Infant recommended reference ranges:  Up to 24 hours.............<8.0 mg/dL  Up to 48 hours............<12.0 mg/dL  3-5  days..................<15.0 mg/dL  6-29 days.................<15.0 mg/dL       Alkaline Phosphatase   Date Value Ref Range Status   02/05/2019 61 55 - 135 U/L Final     AST   Date Value Ref Range Status   02/05/2019 16 10 - 40 U/L Final     ALT   Date Value Ref Range Status   02/05/2019 24 10 - 44 U/L Final     Anion Gap   Date Value Ref Range Status   02/05/2019 10 8 - 16 mmol/L Final     eGFR if    Date Value Ref Range Status   02/05/2019 >60 >60 mL/min/1.73 m^2 Final     eGFR if non    Date Value Ref Range Status   02/05/2019 >60 >60 mL/min/1.73 m^2 Final     Comment:     Calculation used to obtain the estimated glomerular filtration  rate (eGFR) is the CKD-EPI equation.        Lab Results   Component Value Date    IRON 79 02/05/2019    TIBC 345 02/05/2019    FERRITIN 122 02/05/2019         Physical Exam   Constitutional: He is oriented to person, place, and time. He appears well-developed and well-nourished. He is cooperative.   HENT:   Head: Normocephalic.   Right Ear: Hearing normal. No drainage.   Left Ear: Hearing normal. No drainage.   Nose: Nose normal.   Mouth/Throat: Oropharynx is clear and moist.   Eyes: Conjunctivae, EOM and lids are normal. Right eye exhibits no discharge. Left eye exhibits no discharge. No scleral icterus.   Neck: Normal range of motion. No thyroid mass present.   Cardiovascular: Normal rate, regular rhythm and normal heart sounds.   No murmur heard.  Pulmonary/Chest: Effort normal and breath sounds normal. No respiratory distress. He has no wheezes. He has no rales.   Abdominal: Soft. Bowel sounds are normal. He exhibits no distension. There is no tenderness.   Genitourinary:   Genitourinary Comments: deferred   Musculoskeletal: Normal range of motion. He exhibits no edema.   Lymphadenopathy:        Head (right side): No submandibular, no preauricular and no posterior auricular adenopathy present.        Head (left side): No submandibular, no  preauricular and no posterior auricular adenopathy present.        Right cervical: No superficial cervical adenopathy present.       Left cervical: No superficial cervical adenopathy present.   Neurological: He is alert and oriented to person, place, and time.   Skin: Skin is warm, dry and intact.   Psychiatric: He has a normal mood and affect. His speech is normal and behavior is normal. Thought content normal.   Vitals reviewed.       Assessment:     Problem List Items Addressed This Visit        Pulmonary    Moderate COPD (chronic obstructive pulmonary disease)     Followed by Pulmonology          Pulmonary nodule     Followed by pulmonology. Most recent CT scan 10/2018 stable. Repeat 10/2019            Cardiac/Vascular    Hypertension associated with diabetes     Follow-up by PCP and Dr. Perry, Cardiologist         Hyperlipidemia associated with type 2 diabetes mellitus     Followed by PCP and outside Cardiologist Dr. Perry          CAD (coronary artery disease)     Followed by outside cardiologist            Hematology    History of pulmonary embolism     S/p 6 months anticoagulation for provoked DVT            Oncology    Iron deficiency anemia due to chronic blood loss     Iron levels remain WNL. Hemoglobin 13.0. Patient has had hemoglobin near 13 range since 2016. Review of the medical records reveal elevated FLC ratio. I did discuss with patient that if anemia worsens would recommend BMBx.     Will repeat SPEP, FLC, CBC, CMP, iron studies, Vit b12 labs in 3 months at f/u visit.             Endocrine    Class 1 obesity due to excess calories with serious comorbidity and body mass index (BMI) of 34.0 to 34.9 in adult     Patient reports exercising in gym 3-4 days per week for the past 2 months. Encouraged healthy nutrition with portion control           Other Visit Diagnoses     Anemia, unspecified type    -  Primary    Relevant Orders    CBC auto differential    Protein electrophoresis, serum     Immunoglobulin free LT chains blood    Immunofixation electrophoresis    Comprehensive metabolic panel    Vitamin B12    Reticulocytes    Iron and TIBC    Ferritin    Elevated serum immunoglobulin free light chains        Relevant Orders    Protein electrophoresis, serum    Immunoglobulin free LT chains blood    Immunofixation electrophoresis    Other dietary vitamin B12 deficiency anemia         Relevant Orders    Vitamin B12            Plan:     Anemia, unspecified type  -     CBC auto differential; Future; Expected date: 02/19/2019  -     Protein electrophoresis, serum; Future; Expected date: 02/19/2019  -     Immunoglobulin free LT chains blood; Future; Expected date: 02/19/2019  -     Immunofixation electrophoresis; Future; Expected date: 02/19/2019  -     Comprehensive metabolic panel; Future; Expected date: 02/19/2019  -     Vitamin B12; Future; Expected date: 02/19/2019  -     Reticulocytes; Future; Expected date: 02/19/2019  -     Iron and TIBC; Future; Expected date: 02/19/2019  -     Ferritin; Future; Expected date: 02/19/2019    Elevated serum immunoglobulin free light chains  -     Protein electrophoresis, serum; Future; Expected date: 02/19/2019  -     Immunoglobulin free LT chains blood; Future; Expected date: 02/19/2019  -     Immunofixation electrophoresis; Future; Expected date: 02/19/2019    Other dietary vitamin B12 deficiency anemia   -     Vitamin B12; Future; Expected date: 02/19/2019    Iron deficiency anemia due to chronic blood loss    History of pulmonary embolism    Hypertension associated with diabetes    Hyperlipidemia associated with type 2 diabetes mellitus    Coronary artery disease involving native coronary artery of native heart without angina pectoris    Pulmonary nodule    Moderate COPD (chronic obstructive pulmonary disease)    Class 1 obesity due to excess calories with serious comorbidity and body mass index (BMI) of 34.0 to 34.9 in adult          I will review assessment/plan  with collaborating physician     DAKSHA Sanabria

## 2019-02-19 NOTE — ASSESSMENT & PLAN NOTE
Iron levels remain WNL. Hemoglobin 13.0. Patient has had hemoglobin near 13 range since 2016. Review of the medical records reveal elevated FLC ratio. I did discuss with patient that if anemia worsens would recommend BMBx.     Will repeat SPEP, FLC, CBC, CMP, iron studies, Vit b12 labs in 3 months at f/u visit.

## 2019-02-19 NOTE — ASSESSMENT & PLAN NOTE
Patient reports exercising in gym 3-4 days per week for the past 2 months. Encouraged healthy nutrition with portion control

## 2019-02-21 ENCOUNTER — OFFICE VISIT (OUTPATIENT)
Dept: SLEEP MEDICINE | Facility: CLINIC | Age: 64
End: 2019-02-21
Payer: MEDICARE

## 2019-02-21 VITALS
SYSTOLIC BLOOD PRESSURE: 126 MMHG | HEIGHT: 70 IN | BODY MASS INDEX: 34.56 KG/M2 | WEIGHT: 241.38 LBS | RESPIRATION RATE: 18 BRPM | DIASTOLIC BLOOD PRESSURE: 74 MMHG

## 2019-02-21 DIAGNOSIS — G47.33 OSA ON CPAP: ICD-10-CM

## 2019-02-21 DIAGNOSIS — G47.34 NOCTURNAL OXYGEN DESATURATION: ICD-10-CM

## 2019-02-21 DIAGNOSIS — J44.1 COPD EXACERBATION: Primary | ICD-10-CM

## 2019-02-21 DIAGNOSIS — E66.09 CLASS 1 OBESITY DUE TO EXCESS CALORIES WITH SERIOUS COMORBIDITY AND BODY MASS INDEX (BMI) OF 34.0 TO 34.9 IN ADULT: ICD-10-CM

## 2019-02-21 DIAGNOSIS — J44.9 MODERATE COPD (CHRONIC OBSTRUCTIVE PULMONARY DISEASE): ICD-10-CM

## 2019-02-21 PROCEDURE — 3078F PR MOST RECENT DIASTOLIC BLOOD PRESSURE < 80 MM HG: ICD-10-PCS | Mod: CPTII,S$GLB,, | Performed by: INTERNAL MEDICINE

## 2019-02-21 PROCEDURE — 99999 PR PBB SHADOW E&M-EST. PATIENT-LVL III: CPT | Mod: PBBFAC,,, | Performed by: INTERNAL MEDICINE

## 2019-02-21 PROCEDURE — 3078F DIAST BP <80 MM HG: CPT | Mod: CPTII,S$GLB,, | Performed by: INTERNAL MEDICINE

## 2019-02-21 PROCEDURE — 3074F SYST BP LT 130 MM HG: CPT | Mod: CPTII,S$GLB,, | Performed by: INTERNAL MEDICINE

## 2019-02-21 PROCEDURE — 99214 PR OFFICE/OUTPT VISIT, EST, LEVL IV, 30-39 MIN: ICD-10-PCS | Mod: S$GLB,,, | Performed by: INTERNAL MEDICINE

## 2019-02-21 PROCEDURE — 3074F PR MOST RECENT SYSTOLIC BLOOD PRESSURE < 130 MM HG: ICD-10-PCS | Mod: CPTII,S$GLB,, | Performed by: INTERNAL MEDICINE

## 2019-02-21 PROCEDURE — 99214 OFFICE O/P EST MOD 30 MIN: CPT | Mod: S$GLB,,, | Performed by: INTERNAL MEDICINE

## 2019-02-21 PROCEDURE — 3008F PR BODY MASS INDEX (BMI) DOCUMENTED: ICD-10-PCS | Mod: CPTII,S$GLB,, | Performed by: INTERNAL MEDICINE

## 2019-02-21 PROCEDURE — 3008F BODY MASS INDEX DOCD: CPT | Mod: CPTII,S$GLB,, | Performed by: INTERNAL MEDICINE

## 2019-02-21 PROCEDURE — 99999 PR PBB SHADOW E&M-EST. PATIENT-LVL III: ICD-10-PCS | Mod: PBBFAC,,, | Performed by: INTERNAL MEDICINE

## 2019-02-21 RX ORDER — DULAGLUTIDE 0.75 MG/.5ML
INJECTION, SOLUTION SUBCUTANEOUS
COMMUNITY
Start: 2019-02-13 | End: 2019-08-28 | Stop reason: SDUPTHER

## 2019-02-21 RX ORDER — AZITHROMYCIN 250 MG/1
TABLET, FILM COATED ORAL
Qty: 6 TABLET | Refills: 3 | Status: SHIPPED | OUTPATIENT
Start: 2019-02-21 | End: 2019-08-28 | Stop reason: ALTCHOICE

## 2019-02-21 RX ORDER — METHYLPREDNISOLONE 4 MG/1
TABLET ORAL
Qty: 1 PACKAGE | Refills: 3 | Status: SHIPPED | OUTPATIENT
Start: 2019-02-21 | End: 2019-08-28 | Stop reason: ALTCHOICE

## 2019-02-21 NOTE — PROGRESS NOTES
SUBJECTIVE:     Patient is a 63 y.o. male presents with Moderate COPD , MACHO on CPAP and Oxygen at night with PAP.  Has MACHO on APAP 4-10. Austin score 5. CAT score 14.mRC score 0-1  Has had bronchitis, cough, wheezing recently, had made appt with PCP to address this  Green sputum.  COPD sick plan  Using device and benefits  Residual AHI 2.3  All results reviewed:   Meds TRELEGY  Former smoker 35-40 pack year smoking history  Using PAP  I have reviewed the patient's medical history in detail and updated the computerized patient record.        Chief Complaint   Patient presents with    COPD    Sleep Apnea       Review of patient's allergies indicates:  No Known Allergies    Current Outpatient Medications   Medication Sig Dispense Refill    acetaminophen (TYLENOL) 500 MG tablet Take 1,000 mg by mouth as needed.       albuterol (ACCUNEB) 0.63 mg/3 mL Nebu Take 0.63 mg by nebulization every 6 (six) hours as needed. Rescue      albuterol (PROVENTIL) 2.5 mg /3 mL (0.083 %) nebulizer solution Take 3 mLs (2.5 mg total) by nebulization 2 (two) times daily as needed for Wheezing. 180 mL 11    amLODIPine (NORVASC) 5 MG tablet Take 1 tablet (5 mg total) by mouth once daily. 90 tablet 3    aspirin 325 MG tablet Take 325 mg by mouth once daily.      blood sugar diagnostic (BLOOD GLUCOSE TEST) Strp 1 strip by Misc.(Non-Drug; Combo Route) route 3 (three) times daily. 200 each 1    blood-glucose meter Misc Use as directed to check glucose levels. 1 each 0    buPROPion (WELLBUTRIN SR) 150 MG TBSR 12 hr tablet TAKE ONE TABLET BY MOUTH ONCE DAILY (Patient taking differently: TAKE HALF TABLET BY MOUTH ONCE DAILY) 90 tablet 3    cetirizine (ZYRTEC) 10 MG tablet Take 10 mg by mouth every evening.      cholecalciferol, vitamin D3, (VITAMIN D3) 1,000 unit capsule Take 1,000 Units by mouth once daily.      cyanocobalamin (VITAMIN B-12) 500 MCG tablet Take 500 mcg by mouth once daily.      fluticasone (FLONASE) 50  mcg/actuation nasal spray Use 1 spray (50 mcg total) in each nostril once daily. 16 g 3    fluticasone-umeclidin-vilanter (TRELEGY ELLIPTA) 100-62.5-25 mcg DsDv Inhale 1 puff into the lungs once daily. 60 each 11    guaifenesin (MUCINEX) 1,200 mg Ta12 Take 1 tablet by mouth 2 (two) times daily.      ipratropium (ATROVENT) 0.03 % nasal spray 1 or 2 sprays each nasal passage every 8 hours, if needed, as directed. 30 mL 12    lancets Misc 1 lancet by Misc.(Non-Drug; Combo Route) route 3 (three) times daily. 200 each 0    metFORMIN (GLUCOPHAGE) 1000 MG tablet TAKE ONE TABLET BY MOUTH TWICE DAILY WITH MEALS 180 tablet 2    methscopolamine (PAMINE) 2.5 mg Tab Take 1 tablet orally every 12 hours, as directed, if needed 180 tablet 4    nitroGLYCERIN (NITROSTAT) 0.4 MG SL tablet Place 0.4 mg under the tongue every 5 (five) minutes as needed.       omeprazole (PRILOSEC) 40 MG capsule TAKE ONE CAPSULE BY MOUTH ONCE DAILY 30 capsule 4    rosuvastatin (CRESTOR) 10 MG tablet TAKE ONE TABLET BY MOUTH ONCE DAILY 90 tablet 1    sodium chloride (OCEAN NASAL) 0.65 % nasal spray 1 spray by Nasal route as needed for Congestion. 1 Bottle 12    tamsulosin (FLOMAX) 0.4 mg Cap Take 1 capsule by mouth at bedtime 90 capsule 4    TRULICITY 0.75 mg/0.5 mL PnIj       azithromycin (Z-CHARLES) 250 MG tablet Take 2 tablets by mouth on day 1; Take 1 tablet by mouth on days 2-5 6 tablet 3    fluticasone-umeclidin-vilanter (TRELEGY ELLIPTA) 100-62.5-25 mcg DsDv Inhale into the lungs.      methylPREDNISolone (MEDROL DOSEPACK) 4 mg tablet use as directed 1 Package 3     No current facility-administered medications for this visit.        Past Medical History:   Diagnosis Date    COPD (chronic obstructive pulmonary disease)     Diabetes mellitus, type 2     Hyperlipidemia     Hypertension      Past Surgical History:   Procedure Laterality Date    ANGIOPLASTY      TOTAL KNEE ARTHROPLASTY       Family History   Problem Relation Age of Onset  "   Diabetes Mother     Cancer Father         bone     Social History     Tobacco Use    Smoking status: Former Smoker     Types: Cigars     Last attempt to quit: 12/30/2015     Years since quitting: 3.1    Smokeless tobacco: Never Used   Substance Use Topics    Alcohol use: No    Drug use: No        Review of Systems:  Review of Systems   Constitutional: Negative.    HENT: Negative for congestion.    Eyes: Negative.    Respiratory: Positive for shortness of breath. Negative for apnea, cough and wheezing.         Tilghman score  5  Comorbidity: HTN, DM, CAD, COPD, Hypoxemia   Cardiovascular: Negative.    Gastrointestinal: Negative.    Endocrine: Negative.    Genitourinary: Negative.    Musculoskeletal: Negative.    Skin: Negative.    Allergic/Immunologic: Negative.    Neurological: Negative.    Hematological: Negative.    Psychiatric/Behavioral: Negative for sleep disturbance.       OBJECTIVE:     Vital Signs (Most Recent)  Resp: 18 (02/21/19 0955)  BP: 126/74 (02/21/19 0955)  5' 10" (1.778 m)  109.5 kg (241 lb 6.5 oz)     Physical Exam:  Physical Exam   Constitutional: He is oriented to person, place, and time. He appears well-developed and well-nourished.   HENT:   Head: Normocephalic and atraumatic.   Nose: No mucosal edema or rhinorrhea.   Mouth/Throat: Oropharynx is clear and moist. No oropharyngeal exudate.   Eyes: Conjunctivae and EOM are normal. Pupils are equal, round, and reactive to light. Left eye exhibits no discharge. No scleral icterus.   Neck: Normal range of motion. Neck supple. No tracheal deviation present. No thyromegaly present.   Cardiovascular: Normal rate, regular rhythm, normal heart sounds and intact distal pulses.   No murmur heard.  Pulmonary/Chest: Effort normal. He has decreased breath sounds in the right lower field and the left lower field. He has no wheezes. He has no rhonchi. He has no rales. He exhibits no tenderness.   Abdominal: Soft. Bowel sounds are normal. He exhibits no " distension.   Musculoskeletal: Normal range of motion. He exhibits no tenderness or deformity.   Neurological: He is alert and oriented to person, place, and time. No cranial nerve deficit.   Skin: Skin is warm and dry. Capillary refill takes 2 to 3 seconds.   Psychiatric: He has a normal mood and affect.   Nursing note and vitals reviewed.      Laboratory  CBC: Reviewed  BMP: Reviewed      DOWNLOAD  1/22/2019 - 2/20/2019  YOB: 1955  Mask:  Compliance Summary  1/22/2019 - 2/20/2019 (30 days)  Days with Device Usage 30 days  Days without Device Usage 0 days  Percent Days with Device Usage 100.0%  Cumulative Usage 7 days 20 hrs. 41 mins. 13 secs.  Maximum Usage (1 Day) 7 hrs. 43 mins. 38 secs.  Average Usage (All Days) 6 hrs. 17 mins. 22 secs.  Average Usage (Days Used) 6 hrs. 17 mins. 22 secs.  Minimum Usage (1 Day) 50 mins. 14 secs.  Percent of Days with Usage >= 4 Hours 96.7%  Percent of Days with Usage < 4 Hours 3.3%  Date Range  Total Blower Time 7 days 20 hrs. 41 mins. 36 secs.  Average AHI 2.3  Auto-CPAP Summary  Auto-CPAP Mean Pressure 4.4 cmH2O  Auto-CPAP Peak Average Pressure 5.0 cmH2O  Average Device Pressure <= 90% of Time 5.4 cmH2O  Average Time in Large Leak Per Day 0 secs.          ASSESSMENT/PLAN:     Problem List Items Addressed This Visit     Moderate COPD (chronic obstructive pulmonary disease)     CAT score 14  mRC score 1  On TRELEGY, Oxygen, Nebulizer         Relevant Orders    X-Ray Chest PA And Lateral    Spirometry with/without bronchodilator    MACHO on CPAP     APAP 4-10 cm  Bleed oxygen 2.0 LPM  Using well and benefits  Usage > 4 hrs was 96.7%  AHI was 2.3         Nocturnal oxygen desaturation    Class 1 obesity due to excess calories with serious comorbidity and body mass index (BMI) of 34.0 to 34.9 in adult     General weight loss/lifestyle modification strategies discussed (elicit support from others; identify saboteurs; non-food rewards).  Diet interventions: low calorie  (1000 kCal/d) deficit diet           Other Visit Diagnoses     COPD exacerbation    -  Primary    Relevant Medications    azithromycin (Z-CHARLES) 250 MG tablet    methylPREDNISolone (MEDROL DOSEPACK) 4 mg tablet          PLAN:Plan      Z charles and Medrol  Weight loss  Adherent with PAP and benefits  GROUP C mRC 2, CAT score > 10  COPD stable except for exacebation    Follow-up in about 6 months (around 8/21/2019), or cxr, gonzalo, digital monitor PAP, weight loss, for Z charles and medrol.    This note was prepared using voice recognition system and is likely to have sound alike errors that may have been overlooked even after proof reading.  Please call me with any questions    Discussed diagnosis, its evaluation, treatment and usual course. All questions answered.    Thank you for the courtesy of participating in the care of this patient    Mazin Wells MD

## 2019-02-21 NOTE — ASSESSMENT & PLAN NOTE
General weight loss/lifestyle modification strategies discussed (elicit support from others; identify saboteurs; non-food rewards).  Diet interventions: low calorie (1000 kCal/d) deficit diet

## 2019-02-23 ENCOUNTER — PATIENT MESSAGE (OUTPATIENT)
Dept: PULMONOLOGY | Facility: CLINIC | Age: 64
End: 2019-02-23

## 2019-03-29 ENCOUNTER — PATIENT MESSAGE (OUTPATIENT)
Dept: PULMONOLOGY | Facility: HOSPITAL | Age: 64
End: 2019-03-29

## 2019-04-01 DIAGNOSIS — E11.69 HYPERLIPIDEMIA ASSOCIATED WITH TYPE 2 DIABETES MELLITUS: ICD-10-CM

## 2019-04-01 DIAGNOSIS — E78.5 HYPERLIPIDEMIA ASSOCIATED WITH TYPE 2 DIABETES MELLITUS: ICD-10-CM

## 2019-04-01 DIAGNOSIS — F41.9 ANXIETY: ICD-10-CM

## 2019-04-01 RX ORDER — BUPROPION HYDROCHLORIDE 150 MG/1
150 TABLET, EXTENDED RELEASE ORAL DAILY
Qty: 90 TABLET | Refills: 3 | Status: SHIPPED | OUTPATIENT
Start: 2019-04-01 | End: 2019-09-16 | Stop reason: DRUGHIGH

## 2019-04-01 RX ORDER — ROSUVASTATIN CALCIUM 10 MG/1
TABLET, COATED ORAL
Qty: 90 TABLET | Refills: 1 | Status: SHIPPED | OUTPATIENT
Start: 2019-04-01 | End: 2019-10-04 | Stop reason: SDUPTHER

## 2019-04-10 RX ORDER — DULAGLUTIDE 0.75 MG/.5ML
INJECTION, SOLUTION SUBCUTANEOUS
Qty: 4 SYRINGE | Refills: 3 | Status: SHIPPED | OUTPATIENT
Start: 2019-04-10 | End: 2019-11-15 | Stop reason: SDUPTHER

## 2019-04-22 ENCOUNTER — PATIENT MESSAGE (OUTPATIENT)
Dept: PULMONOLOGY | Facility: HOSPITAL | Age: 64
End: 2019-04-22

## 2019-05-01 DIAGNOSIS — K21.9 GASTROESOPHAGEAL REFLUX DISEASE, ESOPHAGITIS PRESENCE NOT SPECIFIED: ICD-10-CM

## 2019-05-01 RX ORDER — OMEPRAZOLE 40 MG/1
CAPSULE, DELAYED RELEASE ORAL
Qty: 90 CAPSULE | Refills: 1 | Status: SHIPPED | OUTPATIENT
Start: 2019-05-01 | End: 2019-10-11 | Stop reason: SDUPTHER

## 2019-05-20 ENCOUNTER — PATIENT MESSAGE (OUTPATIENT)
Dept: PULMONOLOGY | Facility: HOSPITAL | Age: 64
End: 2019-05-20

## 2019-06-07 ENCOUNTER — LAB VISIT (OUTPATIENT)
Dept: LAB | Facility: HOSPITAL | Age: 64
End: 2019-06-07
Attending: NURSE PRACTITIONER
Payer: MEDICARE

## 2019-06-07 DIAGNOSIS — D51.3 OTHER DIETARY VITAMIN B12 DEFICIENCY ANEMIA: ICD-10-CM

## 2019-06-07 DIAGNOSIS — D64.9 ANEMIA, UNSPECIFIED TYPE: ICD-10-CM

## 2019-06-07 DIAGNOSIS — R76.8 ELEVATED SERUM IMMUNOGLOBULIN FREE LIGHT CHAINS: ICD-10-CM

## 2019-06-07 LAB
ALBUMIN SERPL BCP-MCNC: 3.9 G/DL (ref 3.5–5.2)
ALP SERPL-CCNC: 68 U/L (ref 55–135)
ALT SERPL W/O P-5'-P-CCNC: 23 U/L (ref 10–44)
ANION GAP SERPL CALC-SCNC: 8 MMOL/L (ref 8–16)
AST SERPL-CCNC: 12 U/L (ref 10–40)
BASOPHILS # BLD AUTO: 0.02 K/UL (ref 0–0.2)
BASOPHILS NFR BLD: 0.2 % (ref 0–1.9)
BILIRUB SERPL-MCNC: 0.3 MG/DL (ref 0.1–1)
BUN SERPL-MCNC: 13 MG/DL (ref 8–23)
CALCIUM SERPL-MCNC: 9.5 MG/DL (ref 8.7–10.5)
CHLORIDE SERPL-SCNC: 97 MMOL/L (ref 95–110)
CO2 SERPL-SCNC: 27 MMOL/L (ref 23–29)
CREAT SERPL-MCNC: 1.1 MG/DL (ref 0.5–1.4)
DIFFERENTIAL METHOD: ABNORMAL
EOSINOPHIL # BLD AUTO: 0.2 K/UL (ref 0–0.5)
EOSINOPHIL NFR BLD: 1.8 % (ref 0–8)
ERYTHROCYTE [DISTWIDTH] IN BLOOD BY AUTOMATED COUNT: 12 % (ref 11.5–14.5)
EST. GFR  (AFRICAN AMERICAN): >60 ML/MIN/1.73 M^2
EST. GFR  (NON AFRICAN AMERICAN): >60 ML/MIN/1.73 M^2
FERRITIN SERPL-MCNC: 95 NG/ML (ref 20–300)
GLUCOSE SERPL-MCNC: 210 MG/DL (ref 70–110)
HCT VFR BLD AUTO: 43.5 % (ref 40–54)
HGB BLD-MCNC: 14.1 G/DL (ref 14–18)
IMM GRANULOCYTES # BLD AUTO: 0.02 K/UL (ref 0–0.04)
IMM GRANULOCYTES NFR BLD AUTO: 0.2 % (ref 0–0.5)
IRON SERPL-MCNC: 82 UG/DL (ref 45–160)
LYMPHOCYTES # BLD AUTO: 1.9 K/UL (ref 1–4.8)
LYMPHOCYTES NFR BLD: 22.5 % (ref 18–48)
MCH RBC QN AUTO: 30.7 PG (ref 27–31)
MCHC RBC AUTO-ENTMCNC: 32.4 G/DL (ref 32–36)
MCV RBC AUTO: 95 FL (ref 82–98)
MONOCYTES # BLD AUTO: 0.6 K/UL (ref 0.3–1)
MONOCYTES NFR BLD: 7.8 % (ref 4–15)
NEUTROPHILS # BLD AUTO: 5.6 K/UL (ref 1.8–7.7)
NEUTROPHILS NFR BLD: 67.7 % (ref 38–73)
NRBC BLD-RTO: 0 /100 WBC
PLATELET # BLD AUTO: 226 K/UL (ref 150–350)
PMV BLD AUTO: 10 FL (ref 9.2–12.9)
POTASSIUM SERPL-SCNC: 5 MMOL/L (ref 3.5–5.1)
PROT SERPL-MCNC: 7.1 G/DL (ref 6–8.4)
RBC # BLD AUTO: 4.59 M/UL (ref 4.6–6.2)
RETICS/RBC NFR AUTO: 1.2 % (ref 0.4–2)
SATURATED IRON: 21 % (ref 20–50)
SODIUM SERPL-SCNC: 132 MMOL/L (ref 136–145)
TOTAL IRON BINDING CAPACITY: 391 UG/DL (ref 250–450)
TRANSFERRIN SERPL-MCNC: 264 MG/DL (ref 200–375)
VIT B12 SERPL-MCNC: 750 PG/ML (ref 210–950)
WBC # BLD AUTO: 8.24 K/UL (ref 3.9–12.7)

## 2019-06-07 PROCEDURE — 83520 IMMUNOASSAY QUANT NOS NONAB: CPT

## 2019-06-07 PROCEDURE — 84165 PROTEIN E-PHORESIS SERUM: CPT | Mod: 26,,, | Performed by: PATHOLOGY

## 2019-06-07 PROCEDURE — 36415 COLL VENOUS BLD VENIPUNCTURE: CPT

## 2019-06-07 PROCEDURE — 83540 ASSAY OF IRON: CPT

## 2019-06-07 PROCEDURE — 84165 PATHOLOGIST INTERPRETATION SPE: ICD-10-PCS | Mod: 26,,, | Performed by: PATHOLOGY

## 2019-06-07 PROCEDURE — 85045 AUTOMATED RETICULOCYTE COUNT: CPT

## 2019-06-07 PROCEDURE — 84165 PROTEIN E-PHORESIS SERUM: CPT

## 2019-06-07 PROCEDURE — 86334 IMMUNOFIX E-PHORESIS SERUM: CPT | Mod: 26,,, | Performed by: PATHOLOGY

## 2019-06-07 PROCEDURE — 86334 IMMUNOFIX E-PHORESIS SERUM: CPT

## 2019-06-07 PROCEDURE — 80053 COMPREHEN METABOLIC PANEL: CPT

## 2019-06-07 PROCEDURE — 85025 COMPLETE CBC W/AUTO DIFF WBC: CPT

## 2019-06-07 PROCEDURE — 82607 VITAMIN B-12: CPT

## 2019-06-07 PROCEDURE — 86334 PATHOLOGIST INTERPRETATION IFE: ICD-10-PCS | Mod: 26,,, | Performed by: PATHOLOGY

## 2019-06-07 PROCEDURE — 82728 ASSAY OF FERRITIN: CPT

## 2019-06-10 LAB
ALBUMIN SERPL ELPH-MCNC: 4.05 G/DL (ref 3.35–5.55)
ALPHA1 GLOB SERPL ELPH-MCNC: 0.29 G/DL (ref 0.17–0.41)
ALPHA2 GLOB SERPL ELPH-MCNC: 0.67 G/DL (ref 0.43–0.99)
B-GLOBULIN SERPL ELPH-MCNC: 0.82 G/DL (ref 0.5–1.1)
GAMMA GLOB SERPL ELPH-MCNC: 0.88 G/DL (ref 0.67–1.58)
INTERPRETATION SERPL IFE-IMP: NORMAL
KAPPA LC SER QL IA: 2.65 MG/DL (ref 0.33–1.94)
KAPPA LC/LAMBDA SER IA: 2.05 (ref 0.26–1.65)
LAMBDA LC SER QL IA: 1.29 MG/DL (ref 0.57–2.63)
PATHOLOGIST INTERPRETATION IFE: NORMAL
PATHOLOGIST INTERPRETATION SPE: NORMAL
PROT SERPL-MCNC: 6.7 G/DL (ref 6–8.4)

## 2019-06-12 ENCOUNTER — OFFICE VISIT (OUTPATIENT)
Dept: HEMATOLOGY/ONCOLOGY | Facility: CLINIC | Age: 64
End: 2019-06-12
Payer: MEDICARE

## 2019-06-12 VITALS
BODY MASS INDEX: 33.74 KG/M2 | WEIGHT: 235.69 LBS | OXYGEN SATURATION: 96 % | SYSTOLIC BLOOD PRESSURE: 118 MMHG | DIASTOLIC BLOOD PRESSURE: 71 MMHG | RESPIRATION RATE: 18 BRPM | HEART RATE: 67 BPM | HEIGHT: 70 IN | TEMPERATURE: 98 F

## 2019-06-12 DIAGNOSIS — R76.8 ELEVATED SERUM IMMUNOGLOBULIN FREE LIGHT CHAINS: Primary | ICD-10-CM

## 2019-06-12 DIAGNOSIS — Z86.711 HISTORY OF PULMONARY EMBOLISM: ICD-10-CM

## 2019-06-12 DIAGNOSIS — E87.1 HYPONATREMIA: ICD-10-CM

## 2019-06-12 DIAGNOSIS — D50.0 IRON DEFICIENCY ANEMIA DUE TO CHRONIC BLOOD LOSS: ICD-10-CM

## 2019-06-12 DIAGNOSIS — I83.891 VARICOSE VEINS OF RIGHT LEG WITH EDEMA: ICD-10-CM

## 2019-06-12 PROCEDURE — 3008F BODY MASS INDEX DOCD: CPT | Mod: CPTII,S$GLB,, | Performed by: NURSE PRACTITIONER

## 2019-06-12 PROCEDURE — 3078F PR MOST RECENT DIASTOLIC BLOOD PRESSURE < 80 MM HG: ICD-10-PCS | Mod: CPTII,S$GLB,, | Performed by: NURSE PRACTITIONER

## 2019-06-12 PROCEDURE — 3074F PR MOST RECENT SYSTOLIC BLOOD PRESSURE < 130 MM HG: ICD-10-PCS | Mod: CPTII,S$GLB,, | Performed by: NURSE PRACTITIONER

## 2019-06-12 PROCEDURE — 3008F PR BODY MASS INDEX (BMI) DOCUMENTED: ICD-10-PCS | Mod: CPTII,S$GLB,, | Performed by: NURSE PRACTITIONER

## 2019-06-12 PROCEDURE — 99999 PR PBB SHADOW E&M-EST. PATIENT-LVL III: ICD-10-PCS | Mod: PBBFAC,,, | Performed by: NURSE PRACTITIONER

## 2019-06-12 PROCEDURE — 3074F SYST BP LT 130 MM HG: CPT | Mod: CPTII,S$GLB,, | Performed by: NURSE PRACTITIONER

## 2019-06-12 PROCEDURE — 99214 OFFICE O/P EST MOD 30 MIN: CPT | Mod: S$GLB,,, | Performed by: NURSE PRACTITIONER

## 2019-06-12 PROCEDURE — 99999 PR PBB SHADOW E&M-EST. PATIENT-LVL III: CPT | Mod: PBBFAC,,, | Performed by: NURSE PRACTITIONER

## 2019-06-12 PROCEDURE — 99214 PR OFFICE/OUTPT VISIT, EST, LEVL IV, 30-39 MIN: ICD-10-PCS | Mod: S$GLB,,, | Performed by: NURSE PRACTITIONER

## 2019-06-12 PROCEDURE — 3078F DIAST BP <80 MM HG: CPT | Mod: CPTII,S$GLB,, | Performed by: NURSE PRACTITIONER

## 2019-06-12 NOTE — ASSESSMENT & PLAN NOTE
Avoid excess water intake. Limit water intake to no more than 60oz daily. Increase sodium moderately in diet.     F/U 1 week with repeat BMP

## 2019-06-12 NOTE — ASSESSMENT & PLAN NOTE
Iron levels remain WNL. Hemoglobin normal.  SPEP normal. Lab review negative for paraproteinemia. FLC ratio remains slightly elevated but improved.      Will repeat SPEP, FLC, CBC, CMP, iron studies, in 6 months at f/u visit.

## 2019-06-12 NOTE — PROGRESS NOTES
Subjective:       Patient ID: Stefan Forbes Jr. is a 64 y.o. male.    Chief Complaint:  Lab results, follow-up [facility]      HPI: 63 y.o male PMHx: CAD, COPD, DM II, HTN, HLD, SIADH, pulmonary embolism who presents [facility] today for follow-up of his iron deficiency anemia, stable pulmonary nodules per serial CT scans.  The patient did complete 6 months anticoagulation therapy for provoked PEs following left knee replacement.  The patient takes aspirin 325 mg p.o. daily as recommended by his cardiologist for history of CAD with stent placement. The patient has received and tolerated IV Feraheme x2 doses in 03/2018 for iron deficiency. Colonoscopy performed 6/2018 did reveal polyps. No clear source of bleeding with recommended 2 year f/u. The patient states that Dr. Edouard, GI performed this procedure.  The patient is not on oral iron therapy.  The patient denies alcohol use, but does report smoking 1-2 cigars a week.      Most recent CT of the chest 10/19/2018 show pulmonary nodules remain stable. Dr Wells, Pulmonology follows pulmonary nodules.      Today's visit:  Patient presents to day for lab results and follow up of iron deficiency anemia. Iron levels remain adequate. Hemoglobin 14.1. Reports increased swelling and pain to right lower extremity. Large varicose veins noted to right lower extremity. I did discuss with patient the likelihood his swelling is likely due to venous insuffiencey but will do US to r/o DVT given his past medical history. He denies CP, SOB, dizziness, lightheadedness.       Social History     Socioeconomic History    Marital status:      Spouse name: Not on file    Number of children: Not on file    Years of education: Not on file    Highest education level: Not on file   Occupational History    Occupation: retired   Social Needs    Financial resource strain: Not on file    Food insecurity:     Worry: Not on file     Inability: Not on file    Transportation  needs:     Medical: Not on file     Non-medical: Not on file   Tobacco Use    Smoking status: Former Smoker     Types: Cigars     Last attempt to quit: 12/30/2015     Years since quitting: 3.4    Smokeless tobacco: Never Used   Substance and Sexual Activity    Alcohol use: No    Drug use: No    Sexual activity: Not on file   Lifestyle    Physical activity:     Days per week: Not on file     Minutes per session: Not on file    Stress: Not on file   Relationships    Social connections:     Talks on phone: Not on file     Gets together: Not on file     Attends Zoroastrianism service: Not on file     Active member of club or organization: Not on file     Attends meetings of clubs or organizations: Not on file     Relationship status: Not on file   Other Topics Concern    Not on file   Social History Narrative    Not on file       Past Medical History:   Diagnosis Date    COPD (chronic obstructive pulmonary disease)     Diabetes mellitus, type 2     Hyperlipidemia     Hypertension        Family History   Problem Relation Age of Onset    Diabetes Mother     Cancer Father         bone       Past Surgical History:   Procedure Laterality Date    ANGIOPLASTY      TOTAL KNEE ARTHROPLASTY         Review of Systems   Constitutional: Negative for activity change, appetite change, chills, diaphoresis, fatigue, fever and unexpected weight change.   HENT: Negative for congestion, mouth sores, nosebleeds, sore throat, trouble swallowing and voice change.    Eyes: Negative for pain and visual disturbance.   Respiratory: Negative for cough, chest tightness, shortness of breath and wheezing.    Cardiovascular: Positive for leg swelling. Negative for chest pain and palpitations.        RLE swelling/pain. Increased size of varicose veins   Gastrointestinal: Negative for abdominal distention, abdominal pain, anal bleeding, blood in stool, constipation, diarrhea, nausea and vomiting.   Genitourinary: Negative for difficulty  urinating, dysuria and hematuria.   Musculoskeletal: Negative for arthralgias, back pain and myalgias.   Skin: Negative for rash.   Neurological: Negative for dizziness, syncope, facial asymmetry, weakness, light-headedness, numbness and headaches.   Hematological: Negative for adenopathy. Does not bruise/bleed easily.   Psychiatric/Behavioral: The patient is not nervous/anxious.          Medication List with Changes/Refills   Current Medications    ACETAMINOPHEN (TYLENOL) 500 MG TABLET    Take 1,000 mg by mouth as needed.     ALBUTEROL (ACCUNEB) 0.63 MG/3 ML NEBU    Take 0.63 mg by nebulization every 6 (six) hours as needed. Rescue    ALBUTEROL (PROVENTIL) 2.5 MG /3 ML (0.083 %) NEBULIZER SOLUTION    Take 3 mLs (2.5 mg total) by nebulization 2 (two) times daily as needed for Wheezing.    AMLODIPINE (NORVASC) 5 MG TABLET    Take 1 tablet (5 mg total) by mouth once daily.    ASPIRIN 325 MG TABLET    Take 325 mg by mouth once daily.    AZITHROMYCIN (Z-CHARLES) 250 MG TABLET    Take 2 tablets by mouth on day 1; Then Take 1 tablet by mouth on days 2 through 5    BLOOD SUGAR DIAGNOSTIC (BLOOD GLUCOSE TEST) STRP    1 strip by Misc.(Non-Drug; Combo Route) route 3 (three) times daily.    BLOOD-GLUCOSE METER MISC    Use as directed to check glucose levels.    BUPROPION (WELLBUTRIN SR) 150 MG TBSR 12 HR TABLET    TAKE ONE TABLET BY MOUTH ONCE DAILY    CETIRIZINE (ZYRTEC) 10 MG TABLET    Take 10 mg by mouth every evening.    CHOLECALCIFEROL, VITAMIN D3, (VITAMIN D3) 1,000 UNIT CAPSULE    Take 1,000 Units by mouth once daily.    CYANOCOBALAMIN (VITAMIN B-12) 500 MCG TABLET    Take 500 mcg by mouth once daily.    FLUTICASONE (FLONASE) 50 MCG/ACTUATION NASAL SPRAY    Use 1 spray (50 mcg total) in each nostril once daily.    FLUTICASONE-UMECLIDIN-VILANTER (TRELEGY ELLIPTA) 100-62.5-25 MCG DSDV    Inhale into the lungs.    FLUTICASONE-UMECLIDIN-VILANTER (TRELEGY ELLIPTA) 100-62.5-25 MCG DSDV    Inhale 1 puff into the lungs once daily.     GUAIFENESIN (MUCINEX) 1,200 MG TA12    Take 1 tablet by mouth 2 (two) times daily.    IPRATROPIUM (ATROVENT) 0.03 % NASAL SPRAY    Spray 1 or 2 sprays each nasal passage every 8 hours, if needed, as directed.    LANCETS MISC    1 lancet by Misc.(Non-Drug; Combo Route) route 3 (three) times daily.    METFORMIN (GLUCOPHAGE) 1000 MG TABLET    TAKE ONE TABLET BY MOUTH TWICE DAILY WITH MEALS    METHSCOPOLAMINE (PAMINE) 2.5 MG TAB    Take 1 tablet by mouth every 12 hours, as directed, if needed    METHYLPREDNISOLONE (MEDROL DOSEPACK) 4 MG TABLET    use as directed    NITROGLYCERIN (NITROSTAT) 0.4 MG SL TABLET    Place 0.4 mg under the tongue every 5 (five) minutes as needed.     OMEPRAZOLE (PRILOSEC) 40 MG CAPSULE    TAKE ONE CAPSULE BY MOUTH ONCE DAILY    ROSUVASTATIN (CRESTOR) 10 MG TABLET    TAKE ONE TABLET BY MOUTH ONCE DAILY    SODIUM CHLORIDE (OCEAN NASAL) 0.65 % NASAL SPRAY    1 spray by Nasal route as needed for Congestion.    TAMSULOSIN (FLOMAX) 0.4 MG CAP    Take 1 capsule by mouth at bedtime    TRULICITY 0.75 MG/0.5 ML PNIJ        TRULICITY 0.75 MG/0.5 ML PNIJ    INJECT 0.5 ML (0.75 MG TOTAL) INTO THE SKIN EVERY 7 DAYS     Objective:     Vitals:    06/12/19 1126   BP: 118/71   Pulse: 67   Resp: 18   Temp: 97.7 °F (36.5 °C)     Lab Results   Component Value Date    WBC 8.24 06/07/2019    HGB 14.1 06/07/2019    HCT 43.5 06/07/2019    MCV 95 06/07/2019     06/07/2019     Lab Results   Component Value Date    IRON 82 06/07/2019    TIBC 391 06/07/2019    FERRITIN 95 06/07/2019     BMP  Lab Results   Component Value Date     (L) 06/07/2019    K 5.0 06/07/2019    CL 97 06/07/2019    CO2 27 06/07/2019    BUN 13 06/07/2019    CREATININE 1.1 06/07/2019    CALCIUM 9.5 06/07/2019    ANIONGAP 8 06/07/2019    ESTGFRAFRICA >60 06/07/2019    EGFRNONAA >60 06/07/2019     Lab Results   Component Value Date    ALT 23 06/07/2019    AST 12 06/07/2019    ALKPHOS 68 06/07/2019    BILITOT 0.3 06/07/2019          Physical Exam   Constitutional: He is oriented to person, place, and time. He appears well-developed and well-nourished. He is cooperative.   HENT:   Head: Normocephalic.   Right Ear: Hearing normal. No drainage.   Left Ear: Hearing normal. No drainage.   Nose: Nose normal.   Mouth/Throat: Oropharynx is clear and moist.   Eyes: Conjunctivae, EOM and lids are normal. Right eye exhibits no discharge. Left eye exhibits no discharge. No scleral icterus.   Neck: Normal range of motion. No thyroid mass present.   Cardiovascular: Normal rate, regular rhythm and normal heart sounds.   No murmur heard.  Pulmonary/Chest: Effort normal and breath sounds normal. No respiratory distress. He has no wheezes. He has no rales.   Abdominal: Soft. Bowel sounds are normal. He exhibits no distension. There is no tenderness.   Genitourinary:   Genitourinary Comments: deferred   Musculoskeletal: Normal range of motion. He exhibits no edema.   Lymphadenopathy:        Head (right side): No submandibular, no preauricular and no posterior auricular adenopathy present.        Head (left side): No submandibular, no preauricular and no posterior auricular adenopathy present.        Right cervical: No superficial cervical adenopathy present.       Left cervical: No superficial cervical adenopathy present.   Neurological: He is alert and oriented to person, place, and time.   Skin: Skin is warm, dry and intact.   Psychiatric: He has a normal mood and affect. His speech is normal and behavior is normal. Thought content normal.   Vitals reviewed.       Assessment:     Problem List Items Addressed This Visit        Renal/    Hyponatremia     Avoid excess water intake. Limit water intake to no more than 60oz daily. Increase sodium moderately in diet.     F/U 1 week with repeat BMP         Relevant Orders    Basic metabolic panel       Hematology    History of pulmonary embolism     Completed 6 months anticoagulation for provoked DVT. Patient  with c/o worsening right lower extremity pain/swelling. Increase in size of varicose veins    RLE US to evaluate for DVT. Consult placed to Vascular surgery for further evaluation and management of varicose veins    F/U 6 months with labs         Relevant Orders    CBC auto differential    Comprehensive metabolic panel    Ferritin    Iron and TIBC    Lactate dehydrogenase    Immunoglobulin free LT chains blood    Immunofixation electrophoresis    Protein electrophoresis, serum       Oncology    Iron deficiency anemia due to chronic blood loss     Iron levels remain WNL. Hemoglobin normal.  SPEP normal. Lab review negative for paraproteinemia. FLC ratio remains slightly elevated but improved.      Will repeat SPEP, FLC, CBC, CMP, iron studies, in 6 months at f/u visit.          Relevant Orders    CBC auto differential    Comprehensive metabolic panel    Ferritin    Iron and TIBC    Lactate dehydrogenase    Immunoglobulin free LT chains blood    Immunofixation electrophoresis    Protein electrophoresis, serum      Other Visit Diagnoses     Elevated serum immunoglobulin free light chains    -  Primary    Relevant Orders    CBC auto differential    Comprehensive metabolic panel    Ferritin    Iron and TIBC    Lactate dehydrogenase    Immunoglobulin free LT chains blood    Immunofixation electrophoresis    Protein electrophoresis, serum    Varicose veins of right leg with edema        Relevant Orders    Ambulatory consult to Vascular Surgery    US Lower Extremity Veins Right            Plan:     Elevated serum immunoglobulin free light chains  -     CBC auto differential; Future; Expected date: 06/12/2019  -     Comprehensive metabolic panel; Future; Expected date: 06/12/2019  -     Ferritin; Future; Expected date: 06/12/2019  -     Iron and TIBC; Future; Expected date: 06/12/2019  -     Lactate dehydrogenase; Future; Expected date: 06/12/2019  -     Immunoglobulin free LT chains blood; Future; Expected date:  06/12/2019  -     Immunofixation electrophoresis; Future; Expected date: 06/12/2019  -     Protein electrophoresis, serum; Future; Expected date: 06/12/2019    Iron deficiency anemia due to chronic blood loss  -     CBC auto differential; Future; Expected date: 06/12/2019  -     Comprehensive metabolic panel; Future; Expected date: 06/12/2019  -     Ferritin; Future; Expected date: 06/12/2019  -     Iron and TIBC; Future; Expected date: 06/12/2019  -     Lactate dehydrogenase; Future; Expected date: 06/12/2019  -     Immunoglobulin free LT chains blood; Future; Expected date: 06/12/2019  -     Immunofixation electrophoresis; Future; Expected date: 06/12/2019  -     Protein electrophoresis, serum; Future; Expected date: 06/12/2019    History of pulmonary embolism  -     CBC auto differential; Future; Expected date: 06/12/2019  -     Comprehensive metabolic panel; Future; Expected date: 06/12/2019  -     Ferritin; Future; Expected date: 06/12/2019  -     Iron and TIBC; Future; Expected date: 06/12/2019  -     Lactate dehydrogenase; Future; Expected date: 06/12/2019  -     Immunoglobulin free LT chains blood; Future; Expected date: 06/12/2019  -     Immunofixation electrophoresis; Future; Expected date: 06/12/2019  -     Protein electrophoresis, serum; Future; Expected date: 06/12/2019    Varicose veins of right leg with edema  -     Ambulatory consult to Vascular Surgery  -      Lower Extremity Veins Right; Future; Expected date: 06/12/2019    Hyponatremia  -     Basic metabolic panel; Future; Expected date: 06/12/2019          I will review assessment/plan with collaborating physician     DAKSHA Sanabria

## 2019-06-12 NOTE — ASSESSMENT & PLAN NOTE
Completed 6 months anticoagulation for provoked DVT. Patient with c/o worsening right lower extremity pain/swelling. Increase in size of varicose veins    RLE US to evaluate for DVT. Consult placed to Vascular surgery for further evaluation and management of varicose veins    F/U 6 months with labs

## 2019-06-13 ENCOUNTER — HOSPITAL ENCOUNTER (OUTPATIENT)
Dept: RADIOLOGY | Facility: HOSPITAL | Age: 64
Discharge: HOME | End: 2019-06-13
Attending: NURSE PRACTITIONER
Payer: MEDICARE

## 2019-06-13 DIAGNOSIS — I83.891 VARICOSE VEINS OF RIGHT LEG WITH EDEMA: ICD-10-CM

## 2019-06-13 PROCEDURE — 93971 EXTREMITY STUDY: CPT | Mod: TC,RT

## 2019-06-13 PROCEDURE — 93971 EXTREMITY STUDY: CPT | Mod: 26,RT,, | Performed by: RADIOLOGY

## 2019-06-13 PROCEDURE — 93971 US LOWER EXTREMITY VEINS RIGHT: ICD-10-PCS | Mod: 26,RT,, | Performed by: RADIOLOGY

## 2019-06-19 ENCOUNTER — PATIENT MESSAGE (OUTPATIENT)
Dept: PULMONOLOGY | Facility: HOSPITAL | Age: 64
End: 2019-06-19

## 2019-06-19 ENCOUNTER — LAB VISIT (OUTPATIENT)
Dept: LAB | Facility: HOSPITAL | Age: 64
End: 2019-06-19
Attending: NURSE PRACTITIONER
Payer: MEDICARE

## 2019-06-19 DIAGNOSIS — E87.1 HYPONATREMIA: ICD-10-CM

## 2019-06-19 LAB
ANION GAP SERPL CALC-SCNC: 8 MMOL/L (ref 8–16)
BUN SERPL-MCNC: 11 MG/DL (ref 8–23)
CALCIUM SERPL-MCNC: 9.5 MG/DL (ref 8.7–10.5)
CHLORIDE SERPL-SCNC: 98 MMOL/L (ref 95–110)
CO2 SERPL-SCNC: 27 MMOL/L (ref 23–29)
CREAT SERPL-MCNC: 1 MG/DL (ref 0.5–1.4)
EST. GFR  (AFRICAN AMERICAN): >60 ML/MIN/1.73 M^2
EST. GFR  (NON AFRICAN AMERICAN): >60 ML/MIN/1.73 M^2
GLUCOSE SERPL-MCNC: 203 MG/DL (ref 70–110)
POTASSIUM SERPL-SCNC: 4.8 MMOL/L (ref 3.5–5.1)
SODIUM SERPL-SCNC: 133 MMOL/L (ref 136–145)

## 2019-06-19 PROCEDURE — 80048 BASIC METABOLIC PNL TOTAL CA: CPT

## 2019-06-19 PROCEDURE — 36415 COLL VENOUS BLD VENIPUNCTURE: CPT

## 2019-07-12 RX ORDER — METFORMIN HYDROCHLORIDE 1000 MG/1
1000 TABLET ORAL 2 TIMES DAILY WITH MEALS
Qty: 180 TABLET | Refills: 2 | Status: SHIPPED | OUTPATIENT
Start: 2019-07-12 | End: 2020-03-20

## 2019-07-12 NOTE — PROGRESS NOTES
"Subjective:      Patient ID: Stefan Forbes Jr. is a 63 y.o. male.    Chief Complaint: Follow-up    62 yo with Patient Active Problem List:     Controlled type 2 diabetes mellitus without complication, without long-term current use of insulin     Hypertension associated with diabetes     Hyperlipidemia associated with type 2 diabetes mellitus     Moderate COPD (chronic obstructive pulmonary disease)     CAD (coronary artery disease)     Vitamin D deficiency     Osteoarthritis of knee     Pulmonary nodule     Nevus of choroid of right eye     Other pulmonary embolism without acute cor pulmonale     Hyponatremia     Iron deficiency anemia due to chronic blood loss     Sleep apnea, obstructive     Nocturnal oxygen desaturation     PLMD (periodic limb movement disorder)    Past Medical History:  No date: COPD (chronic obstructive pulmonary disease)  No date: Diabetes mellitus, type 2  No date: Hyperlipidemia  No date: Hypertension    Here today for management of multiple medical problems as outlined below. He is here today with his wife.  He is feeling well in his  usual state of health. He is compliant with his medications without significant side effects.  His home glucose is doing well.  He is remaining active.  He has started improving his diet compliance. He reports Trulicity will soon be too expensive for him and he would like to try a trial off of this medication.      Review of Systems   Constitutional: Negative for chills and fever.   HENT: Negative for ear pain and sore throat.    Respiratory: Negative for cough.    Cardiovascular: Negative for chest pain.   Gastrointestinal: Negative for abdominal pain and blood in stool.   Genitourinary: Negative for dysuria and hematuria.   Neurological: Negative for seizures and syncope.     Objective:   /62 (BP Location: Right arm, Patient Position: Sitting)   Pulse 60   Temp 96.9 °F (36.1 °C) (Tympanic)   Ht 5' 8" (1.727 m)   Wt 108.7 kg (239 lb 10.2 oz)   " SpO2 99%   BMI 36.44 kg/m²     Physical Exam   Constitutional: He is oriented to person, place, and time. He appears well-developed and well-nourished. No distress.   HENT:   Head: Normocephalic and atraumatic.   Mouth/Throat: Oropharynx is clear and moist.   Eyes: EOM are normal. Pupils are equal, round, and reactive to light.   Neck: Neck supple. No thyromegaly present.   Cardiovascular: Normal rate and regular rhythm.    Pulmonary/Chest: Breath sounds normal. He has no wheezes. He has no rales.   Abdominal: Soft. Bowel sounds are normal. There is no tenderness.   Lymphadenopathy:     He has no cervical adenopathy.   Neurological: He is alert and oriented to person, place, and time.   Skin: Skin is warm and dry.   Psychiatric: He has a normal mood and affect. His behavior is normal.       Assessment:     1. Controlled type 2 diabetes mellitus without complication, without long-term current use of insulin    2. Hypertension associated with diabetes    3. Hyponatremia    4. Hyperlipidemia associated with type 2 diabetes mellitus      Plan:   Controlled type 2 diabetes mellitus without complication, without long-term current use of insulin  Diet has improved.  A1c is 5.9.  Okay to try a trial off of Trulicity  Continue metformin  -     Hemoglobin A1c; Future; Expected date: 09/28/2018    Hypertension associated with diabetes  Controlled  -     Comprehensive metabolic panel; Future; Expected date: 06/28/2018    Hyponatremia  resolved  -     Comprehensive metabolic panel; Future; Expected date: 06/28/2018    Hyperlipidemia associated with type 2 diabetes mellitus  controlled  -     Comprehensive metabolic panel; Future; Expected date: 06/28/2018  -     Lipid panel; Future; Expected date: 09/26/2018        Lab Frequency Next Occurrence   Microalbumin/creatinine urine ratio Once 08/10/2018   CBC auto differential Once 02/22/2018   Comprehensive metabolic panel Once 02/22/2018   Methylmalonic acid, serum Once 02/22/2018    D dimer, quantitative Once 02/22/2018   CBC auto differential Once 05/08/2018   Comprehensive metabolic panel Once 05/08/2018   Ferritin Once 05/08/2018   Iron and TIBC Once 05/08/2018   Lactate dehydrogenase Once 05/08/2018   Reticulocytes Once 05/08/2018   Vitamin B12 Once 05/08/2018   C-reactive protein Once 05/08/2018   Hemoglobin A1c Once 09/28/2018   Lipid panel Once 09/26/2018       Problem List Items Addressed This Visit        Cardiac/Vascular    Hypertension associated with diabetes    Relevant Orders    Comprehensive metabolic panel    Hyperlipidemia associated with type 2 diabetes mellitus    Relevant Orders    Comprehensive metabolic panel    Lipid panel       Renal/    Hyponatremia    Relevant Orders    Comprehensive metabolic panel       Endocrine    Controlled type 2 diabetes mellitus without complication, without long-term current use of insulin - Primary    Relevant Orders    Hemoglobin A1c          Follow-up in about 3 months (around 9/28/2018), or if symptoms worsen or fail to improve.   not motivated to quit

## 2019-07-19 ENCOUNTER — PATIENT MESSAGE (OUTPATIENT)
Dept: PULMONOLOGY | Facility: HOSPITAL | Age: 64
End: 2019-07-19

## 2019-08-19 ENCOUNTER — PATIENT MESSAGE (OUTPATIENT)
Dept: PULMONOLOGY | Facility: CLINIC | Age: 64
End: 2019-08-19

## 2019-08-21 ENCOUNTER — PATIENT OUTREACH (OUTPATIENT)
Dept: ADMINISTRATIVE | Facility: HOSPITAL | Age: 64
End: 2019-08-21

## 2019-08-28 ENCOUNTER — LAB VISIT (OUTPATIENT)
Dept: LAB | Facility: HOSPITAL | Age: 64
End: 2019-08-28
Attending: INTERNAL MEDICINE
Payer: MEDICARE

## 2019-08-28 ENCOUNTER — OFFICE VISIT (OUTPATIENT)
Dept: INTERNAL MEDICINE | Facility: CLINIC | Age: 64
End: 2019-08-28
Payer: MEDICARE

## 2019-08-28 ENCOUNTER — TELEPHONE (OUTPATIENT)
Dept: ORTHOPEDICS | Facility: CLINIC | Age: 64
End: 2019-08-28

## 2019-08-28 VITALS
HEART RATE: 65 BPM | WEIGHT: 232.56 LBS | OXYGEN SATURATION: 97 % | DIASTOLIC BLOOD PRESSURE: 62 MMHG | TEMPERATURE: 97 F | SYSTOLIC BLOOD PRESSURE: 100 MMHG | BODY MASS INDEX: 33.37 KG/M2

## 2019-08-28 DIAGNOSIS — E11.59 HYPERTENSION ASSOCIATED WITH DIABETES: ICD-10-CM

## 2019-08-28 DIAGNOSIS — G47.33 OSA ON CPAP: ICD-10-CM

## 2019-08-28 DIAGNOSIS — I25.10 CORONARY ARTERY DISEASE INVOLVING NATIVE CORONARY ARTERY OF NATIVE HEART WITHOUT ANGINA PECTORIS: ICD-10-CM

## 2019-08-28 DIAGNOSIS — E11.9 CONTROLLED TYPE 2 DIABETES MELLITUS WITHOUT COMPLICATION, WITHOUT LONG-TERM CURRENT USE OF INSULIN: Primary | ICD-10-CM

## 2019-08-28 DIAGNOSIS — E87.1 HYPONATREMIA: ICD-10-CM

## 2019-08-28 DIAGNOSIS — M72.0 DUPUYTREN'S CONTRACTURE OF BOTH HANDS: ICD-10-CM

## 2019-08-28 DIAGNOSIS — E11.69 HYPERLIPIDEMIA ASSOCIATED WITH TYPE 2 DIABETES MELLITUS: ICD-10-CM

## 2019-08-28 DIAGNOSIS — E78.5 HYPERLIPIDEMIA ASSOCIATED WITH TYPE 2 DIABETES MELLITUS: ICD-10-CM

## 2019-08-28 DIAGNOSIS — I15.2 HYPERTENSION ASSOCIATED WITH DIABETES: ICD-10-CM

## 2019-08-28 DIAGNOSIS — Z12.5 ENCOUNTER FOR SCREENING FOR MALIGNANT NEOPLASM OF PROSTATE: ICD-10-CM

## 2019-08-28 DIAGNOSIS — R91.1 PULMONARY NODULE: ICD-10-CM

## 2019-08-28 LAB
ALBUMIN SERPL BCP-MCNC: 4.2 G/DL (ref 3.5–5.2)
ALP SERPL-CCNC: 63 U/L (ref 55–135)
ALT SERPL W/O P-5'-P-CCNC: 20 U/L (ref 10–44)
ANION GAP SERPL CALC-SCNC: 9 MMOL/L (ref 8–16)
AST SERPL-CCNC: 17 U/L (ref 10–40)
BASOPHILS # BLD AUTO: 0.05 K/UL (ref 0–0.2)
BASOPHILS NFR BLD: 0.6 % (ref 0–1.9)
BILIRUB SERPL-MCNC: 0.2 MG/DL (ref 0.1–1)
BUN SERPL-MCNC: 11 MG/DL (ref 8–23)
CALCIUM SERPL-MCNC: 9.8 MG/DL (ref 8.7–10.5)
CHLORIDE SERPL-SCNC: 99 MMOL/L (ref 95–110)
CHOLEST SERPL-MCNC: 127 MG/DL (ref 120–199)
CHOLEST/HDLC SERPL: 2.6 {RATIO} (ref 2–5)
CO2 SERPL-SCNC: 27 MMOL/L (ref 23–29)
COMPLEXED PSA SERPL-MCNC: 0.35 NG/ML (ref 0–4)
CREAT SERPL-MCNC: 0.9 MG/DL (ref 0.5–1.4)
DIFFERENTIAL METHOD: ABNORMAL
EOSINOPHIL # BLD AUTO: 0.2 K/UL (ref 0–0.5)
EOSINOPHIL NFR BLD: 2 % (ref 0–8)
ERYTHROCYTE [DISTWIDTH] IN BLOOD BY AUTOMATED COUNT: 12.4 % (ref 11.5–14.5)
EST. GFR  (AFRICAN AMERICAN): >60 ML/MIN/1.73 M^2
EST. GFR  (NON AFRICAN AMERICAN): >60 ML/MIN/1.73 M^2
ESTIMATED AVG GLUCOSE: 146 MG/DL (ref 68–131)
GLUCOSE SERPL-MCNC: 91 MG/DL (ref 70–110)
HBA1C MFR BLD HPLC: 6.7 % (ref 4–5.6)
HCT VFR BLD AUTO: 42.9 % (ref 40–54)
HDLC SERPL-MCNC: 48 MG/DL (ref 40–75)
HDLC SERPL: 37.8 % (ref 20–50)
HGB BLD-MCNC: 13.6 G/DL (ref 14–18)
IMM GRANULOCYTES # BLD AUTO: 0.04 K/UL (ref 0–0.04)
IMM GRANULOCYTES NFR BLD AUTO: 0.4 % (ref 0–0.5)
LDLC SERPL CALC-MCNC: 63.6 MG/DL (ref 63–159)
LYMPHOCYTES # BLD AUTO: 2.1 K/UL (ref 1–4.8)
LYMPHOCYTES NFR BLD: 23.1 % (ref 18–48)
MCH RBC QN AUTO: 31 PG (ref 27–31)
MCHC RBC AUTO-ENTMCNC: 31.7 G/DL (ref 32–36)
MCV RBC AUTO: 98 FL (ref 82–98)
MONOCYTES # BLD AUTO: 0.7 K/UL (ref 0.3–1)
MONOCYTES NFR BLD: 8 % (ref 4–15)
NEUTROPHILS # BLD AUTO: 5.9 K/UL (ref 1.8–7.7)
NEUTROPHILS NFR BLD: 65.9 % (ref 38–73)
NONHDLC SERPL-MCNC: 79 MG/DL
NRBC BLD-RTO: 0 /100 WBC
PLATELET # BLD AUTO: 231 K/UL (ref 150–350)
PMV BLD AUTO: 11 FL (ref 9.2–12.9)
POTASSIUM SERPL-SCNC: 5 MMOL/L (ref 3.5–5.1)
PROT SERPL-MCNC: 7.3 G/DL (ref 6–8.4)
RBC # BLD AUTO: 4.39 M/UL (ref 4.6–6.2)
SODIUM SERPL-SCNC: 135 MMOL/L (ref 136–145)
TRIGL SERPL-MCNC: 77 MG/DL (ref 30–150)
TSH SERPL DL<=0.005 MIU/L-ACNC: 1.02 UIU/ML (ref 0.4–4)
WBC # BLD AUTO: 8.95 K/UL (ref 3.9–12.7)

## 2019-08-28 PROCEDURE — 3044F HG A1C LEVEL LT 7.0%: CPT | Mod: CPTII,S$GLB,, | Performed by: INTERNAL MEDICINE

## 2019-08-28 PROCEDURE — 99214 OFFICE O/P EST MOD 30 MIN: CPT | Mod: S$GLB,,, | Performed by: INTERNAL MEDICINE

## 2019-08-28 PROCEDURE — 3074F SYST BP LT 130 MM HG: CPT | Mod: CPTII,S$GLB,, | Performed by: INTERNAL MEDICINE

## 2019-08-28 PROCEDURE — 99214 PR OFFICE/OUTPT VISIT, EST, LEVL IV, 30-39 MIN: ICD-10-PCS | Mod: S$GLB,,, | Performed by: INTERNAL MEDICINE

## 2019-08-28 PROCEDURE — 83036 HEMOGLOBIN GLYCOSYLATED A1C: CPT

## 2019-08-28 PROCEDURE — 85025 COMPLETE CBC W/AUTO DIFF WBC: CPT

## 2019-08-28 PROCEDURE — 3044F PR MOST RECENT HEMOGLOBIN A1C LEVEL <7.0%: ICD-10-PCS | Mod: CPTII,S$GLB,, | Performed by: INTERNAL MEDICINE

## 2019-08-28 PROCEDURE — 80053 COMPREHEN METABOLIC PANEL: CPT

## 2019-08-28 PROCEDURE — 84153 ASSAY OF PSA TOTAL: CPT

## 2019-08-28 PROCEDURE — 3078F DIAST BP <80 MM HG: CPT | Mod: CPTII,S$GLB,, | Performed by: INTERNAL MEDICINE

## 2019-08-28 PROCEDURE — 3078F PR MOST RECENT DIASTOLIC BLOOD PRESSURE < 80 MM HG: ICD-10-PCS | Mod: CPTII,S$GLB,, | Performed by: INTERNAL MEDICINE

## 2019-08-28 PROCEDURE — 3008F BODY MASS INDEX DOCD: CPT | Mod: CPTII,S$GLB,, | Performed by: INTERNAL MEDICINE

## 2019-08-28 PROCEDURE — 36415 COLL VENOUS BLD VENIPUNCTURE: CPT

## 2019-08-28 PROCEDURE — 80061 LIPID PANEL: CPT

## 2019-08-28 PROCEDURE — 3074F PR MOST RECENT SYSTOLIC BLOOD PRESSURE < 130 MM HG: ICD-10-PCS | Mod: CPTII,S$GLB,, | Performed by: INTERNAL MEDICINE

## 2019-08-28 PROCEDURE — 99999 PR PBB SHADOW E&M-EST. PATIENT-LVL IV: CPT | Mod: PBBFAC,,, | Performed by: INTERNAL MEDICINE

## 2019-08-28 PROCEDURE — 99999 PR PBB SHADOW E&M-EST. PATIENT-LVL IV: ICD-10-PCS | Mod: PBBFAC,,, | Performed by: INTERNAL MEDICINE

## 2019-08-28 PROCEDURE — 3008F PR BODY MASS INDEX (BMI) DOCUMENTED: ICD-10-PCS | Mod: CPTII,S$GLB,, | Performed by: INTERNAL MEDICINE

## 2019-08-28 PROCEDURE — 84443 ASSAY THYROID STIM HORMONE: CPT

## 2019-08-28 NOTE — PATIENT INSTRUCTIONS
Check with your pharmacy regarding new shingles vaccine.     Remember flu vaccine should be available in September or October.

## 2019-08-28 NOTE — PROGRESS NOTES
Subjective:      Patient ID: Stefan Forbes Jr. is a 64 y.o. male.    Chief Complaint: Follow-up    HPI     63 yo with   Patient Active Problem List   Diagnosis    Controlled type 2 diabetes mellitus without complication, without long-term current use of insulin    Hypertension associated with diabetes    Hyperlipidemia associated with type 2 diabetes mellitus    Moderate COPD (chronic obstructive pulmonary disease)    CAD (coronary artery disease)    Vitamin D deficiency    Osteoarthritis of knee    Pulmonary nodule    Nevus of choroid of right eye    History of pulmonary embolism    Hyponatremia    Iron deficiency anemia due to chronic blood loss    MACHO on CPAP    Nocturnal oxygen desaturation    PLMD (periodic limb movement disorder)    Class 1 obesity due to excess calories with serious comorbidity and body mass index (BMI) of 34.0 to 34.9 in adult     Past Medical History:   Diagnosis Date    COPD (chronic obstructive pulmonary disease)     Diabetes mellitus, type 2     Hyperlipidemia     Hypertension        Here today for management of multiple medical problems as outlined below.  He is feeling well in his usual state of health.  He reports compliance with his medications without significant side effects.  He has recently resumed history [facility] after a period of not having the medication due to poor insurance coverage of the medication.  Reports worsening firm thickened areas of bilateral palms per  Review of Systems   Constitutional: Negative for chills and fever.   HENT: Negative for ear pain and sore throat.    Respiratory: Negative for cough.    Cardiovascular: Negative for chest pain.   Gastrointestinal: Negative for abdominal pain and blood in stool.   Genitourinary: Negative for dysuria and hematuria.   Neurological: Negative for seizures and syncope.    Examination of palms is consistent with Dupuytren's contracture.  Worse on the right compared to left.  Objective:   /62  (BP Location: Right arm, Patient Position: Sitting, BP Method: Large (Manual))   Pulse 65   Temp 97.2 °F (36.2 °C) (Tympanic)   Wt 105.5 kg (232 lb 9.4 oz)   SpO2 97%   BMI 33.37 kg/m²     Physical Exam   Constitutional: He is oriented to person, place, and time. He appears well-developed and well-nourished. No distress.   HENT:   Head: Normocephalic and atraumatic.   Mouth/Throat: Oropharynx is clear and moist.   Eyes: Pupils are equal, round, and reactive to light. EOM are normal.   Neck: Neck supple. No thyromegaly present.   Cardiovascular: Normal rate and regular rhythm.   Pulmonary/Chest: Breath sounds normal. He has no wheezes. He has no rales.   Abdominal: Soft. Bowel sounds are normal. There is no tenderness.   Musculoskeletal: He exhibits no edema.   Lymphadenopathy:     He has no cervical adenopathy.   Neurological: He is alert and oriented to person, place, and time.   Skin: Skin is warm and dry.   Psychiatric: He has a normal mood and affect. His behavior is normal.     No visits with results within 2 Week(s) from this visit.   Latest known visit with results is:   Lab Visit on 06/19/2019   Component Date Value Ref Range Status    Sodium 06/19/2019 133* 136 - 145 mmol/L Final    Potassium 06/19/2019 4.8  3.5 - 5.1 mmol/L Final    Chloride 06/19/2019 98  95 - 110 mmol/L Final    CO2 06/19/2019 27  23 - 29 mmol/L Final    Glucose 06/19/2019 203* 70 - 110 mg/dL Final    BUN, Bld 06/19/2019 11  8 - 23 mg/dL Final    Creatinine 06/19/2019 1.0  0.5 - 1.4 mg/dL Final    Calcium 06/19/2019 9.5  8.7 - 10.5 mg/dL Final    Anion Gap 06/19/2019 8  8 - 16 mmol/L Final    eGFR if African American 06/19/2019 >60  >60 mL/min/1.73 m^2 Final    eGFR if non African American 06/19/2019 >60  >60 mL/min/1.73 m^2 Final    Comment: Calculation used to obtain the estimated glomerular filtration  rate (eGFR) is the CKD-EPI equation.            Assessment:     1. Controlled type 2 diabetes mellitus without  complication, without long-term current use of insulin    2. Hypertension associated with diabetes    3. Hyperlipidemia associated with type 2 diabetes mellitus    4. Coronary artery disease involving native coronary artery of native heart without angina pectoris    5. Pulmonary nodule    6. Hyponatremia    7. MACHO on CPAP    8. Dupuytren's contracture of both hands      Plan:   Controlled type 2 diabetes mellitus without complication, without long-term current use of insulin  Stable.  Continue current medications  -     Hemoglobin A1c; Future; Expected date: 11/26/2019  -     Hemoglobin A1c; Future; Expected date: 02/24/2020    Hypertension associated with diabetes  Controlled continue current medications  Hyperlipidemia associated with type 2 diabetes mellitus  Stable.  Continue current medication  Coronary artery disease involving native coronary artery of native heart without angina pectoris  Asymptomatic.  Continue current medications  Pulmonary nodule  Continue recommendations and follow-up as per pulmonology  Hyponatremia  Stable.  Continue to monitor.  MACHO on CPAP  Continue CPAP  Dupuytren's contracture of both hands  -     Ambulatory Referral to Orthopedics      On trulicity x 1 month due to cost.   Lab Frequency Next Occurrence   CT Chest With Contrast Once 10/19/2018   Hemoglobin A1c Once 08/06/2019   CBC auto differential Once 08/06/2019   Comprehensive metabolic panel Once 08/06/2019   Lipid panel Once 08/06/2019   TSH Once 08/06/2019   PSA, Screening Once 08/06/2019   Microalbumin/creatinine urine ratio Once 03/04/2019   X-Ray Chest PA And Lateral Once 02/21/2019   CBC auto differential Once 06/12/2019   Comprehensive metabolic panel Once 06/12/2019   Ferritin Once 06/12/2019   Iron and TIBC Once 06/12/2019   Lactate dehydrogenase Once 06/12/2019   Immunoglobulin free LT chains blood Once 06/12/2019   Immunofixation electrophoresis Once 06/12/2019   Protein electrophoresis, serum Once 06/12/2019        Problem List Items Addressed This Visit        Pulmonary    Pulmonary nodule    Overview     stable              Cardiac/Vascular    Hypertension associated with diabetes    Hyperlipidemia associated with type 2 diabetes mellitus    CAD (coronary artery disease)       Renal/    Hyponatremia       Endocrine    Controlled type 2 diabetes mellitus without complication, without long-term current use of insulin - Primary    Relevant Orders    Hemoglobin A1c    Hemoglobin A1c       Other    MACHO on CPAP      Other Visit Diagnoses     Dupuytren's contracture of both hands        Relevant Orders    Ambulatory Referral to Orthopedics          Follow up in about 6 months (around 2/28/2020), or if symptoms worsen or fail to improve.

## 2019-09-03 ENCOUNTER — TELEPHONE (OUTPATIENT)
Dept: INTERNAL MEDICINE | Facility: CLINIC | Age: 64
End: 2019-09-03

## 2019-09-03 NOTE — TELEPHONE ENCOUNTER
----- Message from Kb Nicholson sent at 9/3/2019  9:21 AM CDT -----  Contact: pt wife  Type:  Needs Medical Advice    Who Called: pt wife  Symptoms (please be specific):   How long has patient had these symptoms:    Pharmacy name and phone #:    Would the patient rather a call back or a response via [facility]? Call   Best Call Back Number:  [phone] (home)   Additional Information: caller is requesting a call back from the nurse in regards to the pt getting on a quit smoking aid

## 2019-09-04 ENCOUNTER — TELEPHONE (OUTPATIENT)
Dept: INTERNAL MEDICINE | Facility: CLINIC | Age: 64
End: 2019-09-04

## 2019-09-04 NOTE — TELEPHONE ENCOUNTER
Informed wife that her message was sent to Dr. Fisher and we are waiting for his response. She verbalized understanding.

## 2019-09-04 NOTE — TELEPHONE ENCOUNTER
----- Message from Luiza Ghosh sent at 9/4/2019 10:49 AM CDT -----  Contact: Lisa Forbes  States she left a message on yesterday regarding a medication for smoking. Please call Lisa Forbes at [phone]. Thank you

## 2019-09-09 ENCOUNTER — CLINICAL SUPPORT (OUTPATIENT)
Dept: SMOKING CESSATION | Facility: CLINIC | Age: 64
End: 2019-09-09
Payer: COMMERCIAL

## 2019-09-09 ENCOUNTER — IMMUNIZATION (OUTPATIENT)
Dept: INTERNAL MEDICINE | Facility: CLINIC | Age: 64
End: 2019-09-09
Payer: MEDICARE

## 2019-09-09 DIAGNOSIS — F17.200 NICOTINE DEPENDENCE: Primary | ICD-10-CM

## 2019-09-09 PROCEDURE — 90686 IIV4 VACC NO PRSV 0.5 ML IM: CPT | Mod: S$GLB,,, | Performed by: INTERNAL MEDICINE

## 2019-09-09 PROCEDURE — 90686 FLU VACCINE (QUAD) GREATER THAN OR EQUAL TO 3YO PRESERVATIVE FREE IM: ICD-10-PCS | Mod: S$GLB,,, | Performed by: INTERNAL MEDICINE

## 2019-09-09 PROCEDURE — 99999 PR PBB SHADOW E&M-EST. PATIENT-LVL III: ICD-10-PCS | Mod: PBBFAC,,,

## 2019-09-09 PROCEDURE — G0008 FLU VACCINE (QUAD) GREATER THAN OR EQUAL TO 3YO PRESERVATIVE FREE IM: ICD-10-PCS | Mod: S$GLB,,, | Performed by: INTERNAL MEDICINE

## 2019-09-09 PROCEDURE — 99404 PR PREVENT COUNSEL,INDIV,60 MIN: ICD-10-PCS | Mod: S$GLB,,, | Performed by: GENERAL PRACTICE

## 2019-09-09 PROCEDURE — 99999 PR PBB SHADOW E&M-EST. PATIENT-LVL I: CPT | Mod: PBBFAC,,,

## 2019-09-09 PROCEDURE — 99404 PREV MED CNSL INDIV APPRX 60: CPT | Mod: S$GLB,,, | Performed by: GENERAL PRACTICE

## 2019-09-09 PROCEDURE — G0008 ADMIN INFLUENZA VIRUS VAC: HCPCS | Mod: S$GLB,,, | Performed by: INTERNAL MEDICINE

## 2019-09-09 PROCEDURE — 99999 PR PBB SHADOW E&M-EST. PATIENT-LVL I: ICD-10-PCS | Mod: PBBFAC,,,

## 2019-09-09 PROCEDURE — 99999 PR PBB SHADOW E&M-EST. PATIENT-LVL III: CPT | Mod: PBBFAC,,,

## 2019-09-09 RX ORDER — DM/P-EPHED/ACETAMINOPH/DOXYLAM 30-7.5/3
2 LIQUID (ML) ORAL
Qty: 270 LOZENGE | Refills: 2 | Status: SHIPPED | OUTPATIENT
Start: 2019-09-09 | End: 2020-09-09

## 2019-09-16 ENCOUNTER — CLINICAL SUPPORT (OUTPATIENT)
Dept: SMOKING CESSATION | Facility: CLINIC | Age: 64
End: 2019-09-16
Payer: COMMERCIAL

## 2019-09-16 DIAGNOSIS — F17.200 NICOTINE DEPENDENCE: Primary | ICD-10-CM

## 2019-09-16 PROCEDURE — 99403 PR PREVENT COUNSEL,INDIV,45 MIN: ICD-10-PCS | Mod: S$GLB,,, | Performed by: GENERAL PRACTICE

## 2019-09-16 PROCEDURE — 99999 PR PBB SHADOW E&M-EST. PATIENT-LVL I: ICD-10-PCS | Mod: PBBFAC,,,

## 2019-09-16 PROCEDURE — 99999 PR PBB SHADOW E&M-EST. PATIENT-LVL I: CPT | Mod: PBBFAC,,,

## 2019-09-16 PROCEDURE — 99403 PREV MED CNSL INDIV APPRX 45: CPT | Mod: S$GLB,,, | Performed by: GENERAL PRACTICE

## 2019-09-16 RX ORDER — BUPROPION HYDROCHLORIDE 150 MG/1
150 TABLET, EXTENDED RELEASE ORAL 2 TIMES DAILY
Qty: 60 TABLET | Refills: 2 | Status: SHIPPED | OUTPATIENT
Start: 2019-09-16 | End: 2020-01-14 | Stop reason: SDUPTHER

## 2019-09-16 NOTE — PROGRESS NOTES
Individual Follow-Up Form    9/16/2019      Clinical Status of Patient: Outpatient    Length of Service: 45 minutes    Continuing Medication: yes  Wellbutrin    Other Medications: nicotine lozenges as needed     Target Symptoms: Withdrawal and medication side effects. The following were  rated moderate (3) to severe (4) on TCRS:  · Moderate (3): desire tobacco, anxious, restless  · Severe (4): none    Comments: Patient was seen today for a smoking cessation progress update. He states that he has been able to reduce his smoking to 2 cigars daily. He states that some days he is able to go without smoking as long as he stays busy and does not get frustrated. He stated that he has a lot of family drama and stress dealing with his step children. He states that his wife gets upset and causes him to get upset. He smokes in response to his stress. He states that he has been on the same dose of Wellbutrin XL daily for a while and inquired about increasing his dose to help with his smoking cessation and possible help with his depressions and anxiety. Discussed talking with his PCP about increasing his dose. He verbalized understanding. Discussed possible side affects. He verbalized understanding. Discussed stress management and relaxation techniques. Will continue to encourage and monitor progress.    Diagnosis: F17.200    Next Visit: 2 weeks

## 2019-09-23 ENCOUNTER — CLINICAL SUPPORT (OUTPATIENT)
Dept: SMOKING CESSATION | Facility: CLINIC | Age: 64
End: 2019-09-23
Payer: COMMERCIAL

## 2019-09-23 DIAGNOSIS — F17.200 NICOTINE DEPENDENCE: Primary | ICD-10-CM

## 2019-09-23 PROCEDURE — 99407 BEHAV CHNG SMOKING > 10 MIN: CPT | Mod: S$GLB,,, | Performed by: GENERAL PRACTICE

## 2019-09-23 PROCEDURE — 99407 PR TOBACCO USE CESSATION INTENSIVE >10 MINUTES: ICD-10-PCS | Mod: S$GLB,,, | Performed by: GENERAL PRACTICE

## 2019-09-27 ENCOUNTER — OFFICE VISIT (OUTPATIENT)
Dept: INTERNAL MEDICINE | Facility: CLINIC | Age: 64
End: 2019-09-27
Payer: MEDICARE

## 2019-09-27 VITALS
WEIGHT: 228.19 LBS | BODY MASS INDEX: 32.74 KG/M2 | DIASTOLIC BLOOD PRESSURE: 66 MMHG | OXYGEN SATURATION: 95 % | HEART RATE: 55 BPM | SYSTOLIC BLOOD PRESSURE: 116 MMHG | TEMPERATURE: 97 F

## 2019-09-27 DIAGNOSIS — M54.50 ACUTE BILATERAL LOW BACK PAIN WITHOUT SCIATICA: Primary | ICD-10-CM

## 2019-09-27 PROCEDURE — 99999 PR PBB SHADOW E&M-EST. PATIENT-LVL IV: ICD-10-PCS | Mod: PBBFAC,,, | Performed by: PHYSICIAN ASSISTANT

## 2019-09-27 PROCEDURE — 3078F PR MOST RECENT DIASTOLIC BLOOD PRESSURE < 80 MM HG: ICD-10-PCS | Mod: CPTII,S$GLB,, | Performed by: PHYSICIAN ASSISTANT

## 2019-09-27 PROCEDURE — 3008F PR BODY MASS INDEX (BMI) DOCUMENTED: ICD-10-PCS | Mod: CPTII,S$GLB,, | Performed by: PHYSICIAN ASSISTANT

## 2019-09-27 PROCEDURE — 3074F SYST BP LT 130 MM HG: CPT | Mod: CPTII,S$GLB,, | Performed by: PHYSICIAN ASSISTANT

## 2019-09-27 PROCEDURE — 3008F BODY MASS INDEX DOCD: CPT | Mod: CPTII,S$GLB,, | Performed by: PHYSICIAN ASSISTANT

## 2019-09-27 PROCEDURE — 99214 PR OFFICE/OUTPT VISIT, EST, LEVL IV, 30-39 MIN: ICD-10-PCS | Mod: S$GLB,,, | Performed by: PHYSICIAN ASSISTANT

## 2019-09-27 PROCEDURE — 99999 PR PBB SHADOW E&M-EST. PATIENT-LVL IV: CPT | Mod: PBBFAC,,, | Performed by: PHYSICIAN ASSISTANT

## 2019-09-27 PROCEDURE — 99214 OFFICE O/P EST MOD 30 MIN: CPT | Mod: S$GLB,,, | Performed by: PHYSICIAN ASSISTANT

## 2019-09-27 PROCEDURE — 3074F PR MOST RECENT SYSTOLIC BLOOD PRESSURE < 130 MM HG: ICD-10-PCS | Mod: CPTII,S$GLB,, | Performed by: PHYSICIAN ASSISTANT

## 2019-09-27 PROCEDURE — 3078F DIAST BP <80 MM HG: CPT | Mod: CPTII,S$GLB,, | Performed by: PHYSICIAN ASSISTANT

## 2019-09-27 NOTE — PROGRESS NOTES
Subjective:      Patient ID: Stefan Forbes Jr. is a 64 y.o. male.    Chief Complaint: Back Pain    Yesterday pain in low back was 10/10.     Low-back Pain   This is a new problem. The current episode started yesterday. The problem occurs constantly. The problem has been gradually improving. Associated symptoms include arthralgias. Pertinent negatives include no abdominal pain, anorexia, change in bowel habit, chest pain, chills, congestion, coughing, diaphoresis, fatigue, fever, headaches, joint swelling, myalgias, nausea, neck pain, numbness, rash, sore throat, swollen glands, urinary symptoms, vertigo, visual change, vomiting or weakness. The symptoms are aggravated by walking and coughing. He has tried rest, heat and acetaminophen for the symptoms. The treatment provided moderate relief.   Patient states that yesterday when he got out of bed he had 10/10 low back pain (midline) and lower extremity weakness. Pain lasted all day. Tried heat and tylenol. Suddenly that night his pain disappeared. Denies ever having any numbness/tingling. Today he has mild discomfort but no weakness.     Review of Systems   Constitutional: Negative for activity change, appetite change, chills, diaphoresis, fatigue, fever and unexpected weight change.   HENT: Negative.  Negative for congestion, hearing loss, postnasal drip, rhinorrhea, sore throat, trouble swallowing and voice change.    Eyes: Negative.  Negative for visual disturbance.   Respiratory: Negative.  Negative for cough, choking, chest tightness and shortness of breath.    Cardiovascular: Negative for chest pain, palpitations and leg swelling.   Gastrointestinal: Negative for abdominal distention, abdominal pain, anorexia, blood in stool, change in bowel habit, constipation, diarrhea, nausea and vomiting.   Endocrine: Negative for cold intolerance, heat intolerance, polydipsia and polyuria.   Genitourinary: Negative.  Negative for difficulty urinating and frequency.    Musculoskeletal: Positive for arthralgias and back pain. Negative for gait problem, joint swelling, myalgias, neck pain and neck stiffness.   Skin: Negative for color change, pallor, rash and wound.   Neurological: Negative for dizziness, vertigo, tremors, weakness, light-headedness, numbness and headaches.   Hematological: Negative for adenopathy.   Psychiatric/Behavioral: Negative for behavioral problems, confusion, self-injury, sleep disturbance and suicidal ideas. The patient is not nervous/anxious.      Objective:   /66 (BP Location: Left arm, Patient Position: Sitting, BP Method: Large (Manual))   Pulse (!) 55   Temp 97.1 °F (36.2 °C) (Tympanic)   Wt 103.5 kg (228 lb 2.8 oz)   SpO2 95%   BMI 32.74 kg/m²     Physical Exam   Constitutional: He is oriented to person, place, and time. He appears well-developed and well-nourished. No distress.   HENT:   Head: Normocephalic and atraumatic.   Cardiovascular: Normal rate, regular rhythm and normal heart sounds. Exam reveals no gallop and no friction rub.   No murmur heard.  Pulmonary/Chest: Effort normal and breath sounds normal. No stridor. No respiratory distress. He has no wheezes.   Musculoskeletal: Normal range of motion.        Right hip: Normal. He exhibits normal range of motion, normal strength, no tenderness, no bony tenderness, no swelling, no crepitus and no deformity.        Left hip: Normal. He exhibits normal range of motion, normal strength, no tenderness, no bony tenderness, no swelling, no crepitus, no deformity and no laceration.        Lumbar back: He exhibits normal range of motion, no tenderness, no bony tenderness, no swelling, no edema, no deformity, no laceration, no pain, no spasm and normal pulse.   Neurological: He is alert and oriented to person, place, and time.   Skin: Skin is warm. No rash noted. He is not diaphoretic.   Nursing note and vitals reviewed.      Assessment:     1. Acute bilateral low back pain without sciatica       Plan:   Acute bilateral low back pain without sciatica  -     Urinalysis; Future; Expected date: 09/27/2019    -urinalysis to rule out kidney stone  -Educational handout on over-the-counter medications and at-home conservative care, pertinent to the patients diagnosis today, was handed to the patient and discussed in detail.    Follow up if symptoms worsen or fail to improve.

## 2019-09-27 NOTE — PATIENT INSTRUCTIONS
Back Care Tips    Caring for your back  These are things you can do to prevent a recurrence of acute back pain and to reduce symptoms from chronic back pain:  · Maintain a healthy weight. If you are overweight, losing weight will help most types of back pain.  · Exercise is an important part of recovery from most types of back pain. The muscles behind and in front of the spine support the back. This means strengthening both the back muscles and the abdominal muscles will provide better support for your spine.   · Swimming and brisk walking are good overall exercises to improve your fitness level.  · Practice safe lifting methods (below).  · Practice good posture when sitting, standing and walking. Avoid prolonged sitting. This puts more stress on the lower back than standing or walking.  · Wear quality shoes with sufficient arch support. Foot and ankle alignment can affect back symptoms. Women should avoid wearing high heels.  · Therapeutic massage can help relax the back muscles without stretching them.  · During the first 24 to 72 hours after an acute injury or flare-up of chronic back pain, apply an ice pack to the painful area for 20 minutes and then remove it for 20 minutes, over a period of 60 to 90 minutes, or several times a day. As a safety precaution, do not use a heating pad at bedtime. Sleeping on a heating pad can lead to skin burns or tissue damage.  · You can alternate ice and heat therapies.  Medications  Talk to your healthcare provider before using medicines, especially if you have other medical problems or are taking other medicines.  · You may use acetaminophen or ibuprofen to control pain, unless your healthcare provider prescribed other pain medicine. If you have chronic conditions like diabetes, liver or kidney disease, stomach ulcers, or gastrointestinal bleeding, or are taking blood thinners, talk with your healthcare provider before taking any medicines.  · Be careful if you are given  prescription pain medicines, narcotics, or medicine for muscle spasm. They can cause drowsiness, affect your coordination, reflexes, and judgment. Do not drive or operate heavy machinery while taking these types of medicines. Take prescription pain medicine only as prescribed by your healthcare provider.  Lumbar stretch  Here is a simple stretching exercise that will help relax muscle spasm and keep your back more limber. If exercise makes your back pain worse, dont do it.  · Lie on your back with your knees bent and both feet on the ground.  · Slowly raise your left knee to your chest as you flatten your lower back against the floor. Hold for 5 seconds.  · Relax and repeat the exercise with your right knee.  · Do 10 of these exercises for each leg.  Safe lifting method  · Dont bend over at the waist to lift an object off the floor.  Instead, bend your knees and hips in a squat.   · Keep your back and head upright  · Hold the object close to your body, directly in front of you.  · Straighten your legs to lift the object.   · Lower the object to the floor in the reverse fashion.  · If you must slide something across the floor, push it.  Posture tips  Sitting  Sit in chairs with straight backs or low-back support. Keep your knees lower than your hips, with your feet flat on the floor.  When driving, sit up straight. Adjust the seat forward so you are not leaning toward the steering wheel.  A small pillow or rolled towel behind your lower back may help if you are driving long distances.   Standing  When standing for long periods, shift most of your weight to one leg at a time. Alternate legs every few minutes.   Sleeping  The best way to sleep is on your side with your knees bent. Put a low pillow under your head to support your neck in a neutral spine position. Avoid thick pillows that bend your neck to one side. Put a pillow between your legs to further relax your lower back. If you sleep on your back, put pillows  under your knees to support your legs in a slightly flexed position. Use a firm mattress. If your mattress sags, replace it, or use a 1/2-inch plywood board under the mattress to add support.  Follow-up care  Follow up with your healthcare provider, or as advised.  If X-rays, a CT scan or an MRI scan were taken, they will be reviewed by a radiologist. You will be notified of any new findings that may affect your care.  Call 911  Seek emergency medical care if any of the following occur:  · Trouble breathing  · Confusion  · Very drowsy  · Fainting or loss of consciousness  · Rapid or very slow heart rate  · Loss of  bowel or bladder control  When to seek medical care  Call your healthcare provider if any of the following occur:  · Pain becomes worse or spreads to your arms or legs  · Weakness or numbness in one or both arms or legs  · Numbness in the groin area  Date Last Reviewed: 6/1/2016  © 7773-6311 [employer]. [address] [place] [address]. All rights reserved. This information is not intended as a substitute for professional medical care. Always follow your healthcare professional's instructions.        Back Basics: A Healthy Spine  A healthy spine supports the body while letting it move freely. It does this with the help of three natural curves. Strong, flexible muscles help, too. They support the spine by keeping its curves properly aligned. The disks that cushion the bones of your spine also play a role in back fitness.    Three natural curves  The spine is made of bones (vertebrae) and pads of soft tissue (disks). These parts are arranged in three curves: cervical, thoracic, and lumbar. When properly aligned, these curves keep your body balanced. They also support your body when you move. By distributing your weight throughout your spine, the curves make back injuries less likely.  Strong, flexible muscles  Strong, flexible back muscles help support the three curves of the spine.  They do so by holding the vertebrae and disks in proper alignment. Strong, flexible abdominal, hip, and leg muscles also reduce strain on the back.  The lumbar curve  The lumbar curve is the hardest-working part of the spine. It carries more weight and moves the most. Aligning this curve helps prevent damage to vertebrae, disks, and other parts of the spine.  Cushioning disks  Disks are the soft pads of tissue between the vertebrae. The disks absorb shock caused by movement. Each disk has a spongy center (nucleus) and a tougher outer ring (annulus). Movement within the nucleus allows the vertebrae to rock back and forth on the disks. This provides the flexibility needed to bend and move.       Date Last Reviewed: 10/18/2015  © 8992-8295 [employer]. [address] [place] [address], [place] [address]. All rights reserved. This information is not intended as a substitute for professional medical care. Always follow your healthcare professional's instructions.        Self-Care for Low Back Pain    Most people have low back pain now and then. In many cases, it isnt serious and self-care can help. Sometimes low back pain can be a sign of a bigger problem. Call your healthcare provider if your pain returns often or gets worse over time. For the long-term care of your back, get regular exercise, lose any excess weight and learn good posture.  Take a short rest  Lying down during the day may be beneficial for short periods of time if severe pain increases with sitting or standing. Long-term bed rest could be detrimental.  Reduce pain and swelling  Cold reduces swelling. Both cold and heat can reduce pain. Protect your skin by placing a towel between your body and the ice or heat source.  · For the first few days, apply an ice pack for 15 to 20 minutes .  · After the first few days, try heat for 15 minutes at a time to ease pain. Never sleep on a heating pad.  · Over-the-counter medicine can help control pain and  swelling. Try aspirin or ibuprofen.  Exercise  Exercise can help your back heal. It also helps your back get stronger and more flexible, preventing any reinjury. Ask your healthcare provider about specific exercises for your back.  Use good posture to avoid reinjury  · When moving, bend at the hips and knees. Dont bend at the waist or twist around.  · When lifting, keep the object close to your body. Dont try to lift more than you can handle.  · When sitting, keep your lower back supported. Use a rolled-up towel as needed.  Seek immediate medical care if:  · Youre unable to stand or walk.  · You have a temperature over 100.4°F (38.0°C)  · You have frequent, painful, or bloody urination.  · You have severe abdominal pain.  · You have a sharp, stabbing pain.  · Your pain is constant.  · You have pain or numbness in your leg.  · You feel pain in a new area of your back.  · You notice that the pain isnt decreasing after more than a week.   Date Last Reviewed: 9/29/2015 © 2000-2017 [employer]. [address] [place] [address]. All rights reserved. This information is not intended as a substitute for professional medical care. Always follow your healthcare professional's instructions.        How Your Back Works  A healthy back allows you to bend and stretch without pain. The spine has three natural curves, which keep your body balanced. Strong, flexible muscles support your spine. Soft, cushioning disks separate the hard bones of your spine, allowing it to bend and move.    The parts of the spine  · The vertebrae are the 24 bones that make up the spine.  · The spinous process is the part of each vertebra you can feel through your skin.  · Each of these bones has a canal that runs top to bottom. Together these canals form a tunnel called the spinal canal.  · The lamina of each vertebra forms the back of the spinal canal.  · Running through the canal are nerves.  · A foramen is a small opening  where a nerve leaves the spinal canal.  · Disks serve as cushions between vertebrae. A disks soft center absorbs shock during movement.     Two vertebrae and a disk     The supporting muscles  Strong, flexible muscles help maintain your three natural curves. They hold your spine in proper alignment. This helps support your upper body. Strong core muscular including the stomach, buttock, and thigh muscles help take the strain off your back.  Date Last Reviewed: 8/31/2015  © 1862-9585 [employer]. [address] [place] [address], [place] [address]. All rights reserved. This information is not intended as a substitute for professional medical care. Always follow your healthcare professional's instructions.        Caring for Your Back Throughout the Day  Take care of your back throughout the day. You will likely have fewer back problems if you do. Try to warm up before you move. Shift positions often. Also do your best to form healthy habits.    Warm up for the day  Do a few slow, catlike stretches before starting your day. This simple warmup can soften your disks, stretch your back muscles, and help prevent injuries.  Shift positions often  At work and at home, change positions often. This helps keep your body from getting stiff. Stand up or lean back while you sit. If you can, get up and move every 1/2 hour.  Form healthy habits  Here are some suggestions:   · Keep a healthy weight. When you weigh too much, your back is under excess strain. But losing just a few extra pounds can help a lot.  · Try not to overeat. Learn about serving sizes. The size of a serving depends on the food and the food group. Many foods list serving sizes on the labels.  · Handle minor aches with cold and heat. Apply cold the first 24 to 48 hours. Use heat after that. Always place a thin cloth between your skin and the source of cold or heat.  · Take medicines as directed. This helps keep pain under control. Always read labels, and call  your healthcare provider or pharmacist if you have any questions.  Walk each day  A daily walk keeps your back and thigh muscles stretched and strong. This gives your back better support. Be sure to walk with your spines three curves aligned, by keeping your head, hips, and toes connected by a vertical line.   Date Last Reviewed: 10/18/2015  © 6048-2382 [employer]. [address] [place] [address], [place] [address]. All rights reserved. This information is not intended as a substitute for professional medical care. Always follow your healthcare professional's instructions.        Back Safety: Basics of Good Posture  Good posture helps protect you from injury. It also increases your comfort. Aim for good posture throughout the day.    Check your posture  The human body works best when it is properly aligned. To improve your standing posture, follow these steps:  · Take a moment to close your eyes and feel your body. Then breathe deeply and relax your shoulders, hips, and knees.  · Now, from the very top of your head, lift up just a bit. Think of a line linking your ears, shoulders, hips, and ankles. Adjust your body to follow the line. You may need to relax your hips and tuck your buttocks under a bit.  · Next, take a look at yourself in a mirror. Is one ear, shoulder, or hip higher than the other? They should be level.  Check how you sit  When you sit properly, pressure on your back is reduced. Try these steps:  · Sit so that the curve of your lower back fits easily against the chair. Keep your gaze level.  · Support your feet. They should be flat on the floor or on a footrest. Your knees should be level with your hips.  · Adjust the chair height as needed. Sit so your forearms are level with the work surface.  Proper posture helps  When your back is aligned, its more likely to stay safe throughout the day.  · Standing in place. Rest one foot on a stool or low box to ease pressure on your lower back. Switch  feet often. If you can, adjust the height of your work surface so your neck and shoulders arent under strain.  · Driving. Sit close enough to the steering wheel to keep your knees slightly bent. For comfort, your knees should be level with your hips or just a bit lower. Sit as straight as you can. The curve of your lower back should be fully supported.  · Walking. Stand tall and walk with your head up. Let your arms swing while you walk. This helps relax muscles. Wear shoes that fit and support your feet. If you will be standing or walking for a long time, dont wear high heels.  · Sitting and sleeping. Choose your furniture with care. Make sure its not causing or increasing your back pain. Chairs should allow for comfortable, correct sitting posture. Use pillows for added support if needed. Your bed should support your backs natural curves without being too hard or too soft.  Date Last Reviewed: 10/18/2015  © 4205-3094 [employer]. [address] [place] [address], [place] [address]. All rights reserved. This information is not intended as a substitute for professional medical care. Always follow your healthcare professional's instructions.

## 2019-09-30 ENCOUNTER — CLINICAL SUPPORT (OUTPATIENT)
Dept: SMOKING CESSATION | Facility: CLINIC | Age: 64
End: 2019-09-30
Payer: COMMERCIAL

## 2019-09-30 DIAGNOSIS — F17.200 NICOTINE DEPENDENCE: Primary | ICD-10-CM

## 2019-09-30 PROCEDURE — 99404 PR PREVENT COUNSEL,INDIV,60 MIN: ICD-10-PCS | Mod: S$GLB,,, | Performed by: GENERAL PRACTICE

## 2019-09-30 PROCEDURE — 99404 PREV MED CNSL INDIV APPRX 60: CPT | Mod: S$GLB,,, | Performed by: GENERAL PRACTICE

## 2019-09-30 NOTE — PROGRESS NOTES
Individual Follow-Up Form    9/30/2019    Quit Date: 9-    Clinical Status of Patient: Outpatient    Length of Service: 60 minutes    Continuing Medication: yes  Wellbutrin    Other Medications: nicotine lozenges as needed     Target Symptoms: Withdrawal and medication side effects. The following were  rated moderate (3) to severe (4) on TCRS:  · Moderate (3): anxious,. Restless, desire tobacco, sleep disturbances  · Severe (4): none    Comments: Patient was seen today for a smoking cessation progress update. He has successfully reached his target quit date. He states that he is having intense cravings and desires to smoke when he is driving or at home. He states that he has increased stress at home. He is able to refrain from smoking while at work. He states that he has sleep disturbances due to his wife needing help getting back and forth to the bathroom with foot issues. He states that otherwise he sleeps sound but feels tired during the day because of restless sleep. Discussed coping strategies and stress management. The patient remains on the prescribed tobacco cessation medication regimen of 150 mg Wellbutrin BID without any negative side effects at this time. The patient denies any abnormal behavioral or mental changes at this time. Will continue to encourage and monitor progress.    Diagnosis: F17.200    Next Visit: 1 week

## 2019-10-03 DIAGNOSIS — J44.1 COPD EXACERBATION: ICD-10-CM

## 2019-10-03 DIAGNOSIS — J44.0 BRONCHITIS, CHRONIC OBSTRUCTIVE W ACUTE BRONCHITIS: ICD-10-CM

## 2019-10-03 DIAGNOSIS — J20.9 BRONCHITIS, CHRONIC OBSTRUCTIVE W ACUTE BRONCHITIS: ICD-10-CM

## 2019-10-03 RX ORDER — AZITHROMYCIN 250 MG/1
TABLET, FILM COATED ORAL
Qty: 6 TABLET | Refills: 3 | Status: SHIPPED | OUTPATIENT
Start: 2019-10-03 | End: 2020-01-30

## 2019-10-03 RX ORDER — AZITHROMYCIN 250 MG/1
TABLET, FILM COATED ORAL
Qty: 6 TABLET | Refills: 3 | Status: CANCELLED | OUTPATIENT
Start: 2019-10-03

## 2019-10-03 RX ORDER — METHYLPREDNISOLONE 4 MG/1
TABLET ORAL
Qty: 1 PACKAGE | Refills: 3 | Status: CANCELLED | OUTPATIENT
Start: 2019-10-03

## 2019-10-03 RX ORDER — METHYLPREDNISOLONE 4 MG/1
TABLET ORAL
Qty: 1 PACKAGE | Refills: 3 | Status: SHIPPED | OUTPATIENT
Start: 2019-10-03 | End: 2020-01-30

## 2019-10-03 NOTE — TELEPHONE ENCOUNTER
----- Message from Sahree Franz sent at 10/3/2019  1:52 PM CDT -----  Contact: Lisa-Patient Wife  .Type:  RX Refill Request    Who Called:   Refill or New Rx:refill  RX Name and Strength:Azithromycin 250 MG , Methylprednisolone 4 MG    How is the patient currently taking it? (ex. 1XDay):  Is this a 30 day or 90 day RX:  Preferred Pharmacy with phone number:[facility] [address]  Local or Mail Order:local  Ordering Provider:ean  Would the patient rather a call back or a response via [facility]? Call back  Best Call Back Number:[phone]  Additional Information: n/a

## 2019-10-03 NOTE — TELEPHONE ENCOUNTER
Returned call to patient's wife. She states pt had rx for azithromycin and methlyprednisone x 3 refills and patient wants to refill now but states the pharmacy cannot find the order. Can you please send in refill to [facility] in [place] for the meds. Please advise.

## 2019-10-03 NOTE — TELEPHONE ENCOUNTER
----- Message from Sharee Franz sent at 10/3/2019  1:52 PM CDT -----  Contact: Lisa-Patient Wife  .Type:  RX Refill Request    Who Called:   Refill or New Rx:refill  RX Name and Strength:Azithromycin 250 MG , Methylprednisolone 4 MG    How is the patient currently taking it? (ex. 1XDay):  Is this a 30 day or 90 day RX:  Preferred Pharmacy with phone number:[facility] [address]  Local or Mail Order:local  Ordering Provider:ean  Would the patient rather a call back or a response via [facility]? Call back  Best Call Back Number:[phone]  Additional Information: n/a

## 2019-10-03 NOTE — TELEPHONE ENCOUNTER
----- Message from Sharee Franz sent at 10/3/2019  1:52 PM CDT -----  Contact: Lisa-Patient Wife  .Type:  RX Refill Request    Who Called:   Refill or New Rx:refill  RX Name and Strength:Azithromycin 250 MG , Methylprednisolone 4 MG    How is the patient currently taking it? (ex. 1XDay):  Is this a 30 day or 90 day RX:  Preferred Pharmacy with phone number:[facility] [address]  Local or Mail Order:local  Ordering Provider:ean  Would the patient rather a call back or a response via [facility]? Call back  Best Call Back Number:[phone]  Additional Information: n/a

## 2019-10-04 DIAGNOSIS — E11.69 HYPERLIPIDEMIA ASSOCIATED WITH TYPE 2 DIABETES MELLITUS: ICD-10-CM

## 2019-10-04 DIAGNOSIS — E78.5 HYPERLIPIDEMIA ASSOCIATED WITH TYPE 2 DIABETES MELLITUS: ICD-10-CM

## 2019-10-07 ENCOUNTER — TELEPHONE (OUTPATIENT)
Dept: INTERNAL MEDICINE | Facility: CLINIC | Age: 64
End: 2019-10-07

## 2019-10-07 ENCOUNTER — OFFICE VISIT (OUTPATIENT)
Dept: OPHTHALMOLOGY | Facility: CLINIC | Age: 64
End: 2019-10-07
Payer: MEDICARE

## 2019-10-07 DIAGNOSIS — H52.13 MYOPIA WITH ASTIGMATISM AND PRESBYOPIA, BILATERAL: ICD-10-CM

## 2019-10-07 DIAGNOSIS — E11.9 TYPE 2 DIABETES MELLITUS WITHOUT RETINOPATHY: Primary | ICD-10-CM

## 2019-10-07 DIAGNOSIS — H52.203 MYOPIA WITH ASTIGMATISM AND PRESBYOPIA, BILATERAL: ICD-10-CM

## 2019-10-07 DIAGNOSIS — H52.4 MYOPIA WITH ASTIGMATISM AND PRESBYOPIA, BILATERAL: ICD-10-CM

## 2019-10-07 DIAGNOSIS — D31.31 NEVUS OF CHOROID OF RIGHT EYE: ICD-10-CM

## 2019-10-07 DIAGNOSIS — H25.13 NUCLEAR SCLEROSIS, BILATERAL: ICD-10-CM

## 2019-10-07 PROCEDURE — 92015 PR REFRACTION: ICD-10-PCS | Mod: S$GLB,,, | Performed by: OPTOMETRIST

## 2019-10-07 PROCEDURE — 99999 PR PBB SHADOW E&M-EST. PATIENT-LVL II: CPT | Mod: PBBFAC,,, | Performed by: OPTOMETRIST

## 2019-10-07 PROCEDURE — 99999 PR PBB SHADOW E&M-EST. PATIENT-LVL II: ICD-10-PCS | Mod: PBBFAC,,, | Performed by: OPTOMETRIST

## 2019-10-07 PROCEDURE — 92014 COMPRE OPH EXAM EST PT 1/>: CPT | Mod: S$GLB,,, | Performed by: OPTOMETRIST

## 2019-10-07 PROCEDURE — 92250 FUNDUS PHOTOGRAPHY W/I&R: CPT | Mod: S$GLB,,, | Performed by: OPTOMETRIST

## 2019-10-07 PROCEDURE — 92015 DETERMINE REFRACTIVE STATE: CPT | Mod: S$GLB,,, | Performed by: OPTOMETRIST

## 2019-10-07 PROCEDURE — 92250 COLOR FUNDUS PHOTOGRAPHY - OU - BOTH EYES: ICD-10-PCS | Mod: S$GLB,,, | Performed by: OPTOMETRIST

## 2019-10-07 PROCEDURE — 92014 PR EYE EXAM, EST PATIENT,COMPREHESV: ICD-10-PCS | Mod: S$GLB,,, | Performed by: OPTOMETRIST

## 2019-10-07 RX ORDER — ROSUVASTATIN CALCIUM 10 MG/1
TABLET, COATED ORAL
Qty: 90 TABLET | Refills: 1 | Status: SHIPPED | OUTPATIENT
Start: 2019-10-07 | End: 2020-03-20

## 2019-10-07 NOTE — PROGRESS NOTES
HPI     Diabetic Eye Exam      Additional comments: Yearly              Comments     New Patient  Last visit with C on 10/01/2018.  CHOROIDAL NEVUS OF OD  Diabetic eye exam  Diagnosed with diabetes in 2012  Recent vision fluctuations No  Lab Results       Component                Value               Date                       HGBA1C                   6.7 (H)             08/28/2019           HPI    Any vision changes since last exam: Yes, trouble with distance & near   vision.  Eye pain: No  Other ocular symptoms: Watery and red eyes from sinus infection.    Do you wear currently wear glasses or contacts? Glasses    Interested in contacts today? No    Do you plan on getting new glasses today? Yes                        Last edited by Julisa Kee on 10/7/2019 10:26 AM. (History)            Assessment /Plan     For exam results, see Encounter Report.    Type 2 diabetes mellitus without retinopathy  No diabetic retinopathy OD, OS  Continue close care with PCP  Monitor 12 months    Nuclear sclerosis, bilateral  Surgery is not indicated at this time. Monitor 12 months.    Nevus of choroid of right eye  -     Color Fundus Photography - OU - Both Eyes  Stable compared to previous photos  Monitor 12 months    Myopia with astigmatism and presbyopia, bilateral  Eyeglass Final Rx     Eyeglass Final Rx       Sphere Cylinder Axis Add    Right -1.25 +1.50 175 +2.50    Left -1.25 +2.25 150 +2.50    Expiration Date:  10/7/2020              RTC 1 yr for dilated eye exam with Optos or PRN if any problems.   Discussed above and answered questions.

## 2019-10-07 NOTE — TELEPHONE ENCOUNTER
----- Message from Conchis Can sent at 10/7/2019 12:11 PM CDT -----  Contact: Jocelyn Gonzalez- [phone]  Would like to change the location for crestor medications. Would like for it to be sent to .    [facility] [address]  [address] [facility] [address]  Phone: [phone] Fax: [phone]    Any concerns please call back at [phone].       Thank You,   Conchis Can

## 2019-10-10 DIAGNOSIS — R91.1 PULMONARY NODULE: Primary | ICD-10-CM

## 2019-10-11 ENCOUNTER — TELEPHONE (OUTPATIENT)
Dept: RADIOLOGY | Facility: HOSPITAL | Age: 64
End: 2019-10-11

## 2019-10-11 ENCOUNTER — CLINICAL SUPPORT (OUTPATIENT)
Dept: SMOKING CESSATION | Facility: CLINIC | Age: 64
End: 2019-10-11
Payer: COMMERCIAL

## 2019-10-11 ENCOUNTER — TELEPHONE (OUTPATIENT)
Dept: SMOKING CESSATION | Facility: CLINIC | Age: 64
End: 2019-10-11

## 2019-10-11 DIAGNOSIS — F17.200 NICOTINE DEPENDENCE: Primary | ICD-10-CM

## 2019-10-11 DIAGNOSIS — K21.9 GASTROESOPHAGEAL REFLUX DISEASE, ESOPHAGITIS PRESENCE NOT SPECIFIED: ICD-10-CM

## 2019-10-11 DIAGNOSIS — E11.59 HYPERTENSION ASSOCIATED WITH DIABETES: ICD-10-CM

## 2019-10-11 DIAGNOSIS — I15.2 HYPERTENSION ASSOCIATED WITH DIABETES: ICD-10-CM

## 2019-10-11 PROCEDURE — 99407 BEHAV CHNG SMOKING > 10 MIN: CPT | Mod: S$GLB,,, | Performed by: GENERAL PRACTICE

## 2019-10-11 PROCEDURE — 99407 PR TOBACCO USE CESSATION INTENSIVE >10 MINUTES: ICD-10-PCS | Mod: S$GLB,,, | Performed by: GENERAL PRACTICE

## 2019-10-11 RX ORDER — OMEPRAZOLE 40 MG/1
CAPSULE, DELAYED RELEASE ORAL
Qty: 90 CAPSULE | Refills: 1 | Status: SHIPPED | OUTPATIENT
Start: 2019-10-11 | End: 2020-04-10 | Stop reason: SDUPTHER

## 2019-10-11 RX ORDER — AMLODIPINE BESYLATE 5 MG/1
5 TABLET ORAL DAILY
Qty: 30 TABLET | Refills: 11 | Status: SHIPPED | OUTPATIENT
Start: 2019-10-11 | End: 2020-03-05

## 2019-10-14 ENCOUNTER — HOSPITAL ENCOUNTER (OUTPATIENT)
Dept: RADIOLOGY | Facility: HOSPITAL | Age: 64
Discharge: HOME | End: 2019-10-14
Attending: INTERNAL MEDICINE
Payer: MEDICARE

## 2019-10-14 ENCOUNTER — CLINICAL SUPPORT (OUTPATIENT)
Dept: PULMONOLOGY | Facility: CLINIC | Age: 64
End: 2019-10-14
Payer: MEDICARE

## 2019-10-14 ENCOUNTER — OFFICE VISIT (OUTPATIENT)
Dept: PULMONOLOGY | Facility: CLINIC | Age: 64
End: 2019-10-14
Payer: MEDICARE

## 2019-10-14 VITALS
OXYGEN SATURATION: 100 % | BODY MASS INDEX: 35.25 KG/M2 | WEIGHT: 232.56 LBS | SYSTOLIC BLOOD PRESSURE: 143 MMHG | DIASTOLIC BLOOD PRESSURE: 72 MMHG | HEART RATE: 60 BPM | HEIGHT: 68 IN | HEIGHT: 68 IN | WEIGHT: 232.56 LBS | RESPIRATION RATE: 18 BRPM | BODY MASS INDEX: 35.25 KG/M2

## 2019-10-14 DIAGNOSIS — J44.9 MODERATE COPD (CHRONIC OBSTRUCTIVE PULMONARY DISEASE): ICD-10-CM

## 2019-10-14 DIAGNOSIS — E11.59 HYPERTENSION ASSOCIATED WITH DIABETES: ICD-10-CM

## 2019-10-14 DIAGNOSIS — E11.9 CONTROLLED TYPE 2 DIABETES MELLITUS WITHOUT COMPLICATION, WITHOUT LONG-TERM CURRENT USE OF INSULIN: ICD-10-CM

## 2019-10-14 DIAGNOSIS — E78.5 HYPERLIPIDEMIA ASSOCIATED WITH TYPE 2 DIABETES MELLITUS: ICD-10-CM

## 2019-10-14 DIAGNOSIS — I83.892 VARICOSE VEINS OF LEFT LEG WITH EDEMA: ICD-10-CM

## 2019-10-14 DIAGNOSIS — R91.1 PULMONARY NODULE: ICD-10-CM

## 2019-10-14 DIAGNOSIS — G47.33 OSA ON CPAP: Primary | ICD-10-CM

## 2019-10-14 DIAGNOSIS — I15.2 HYPERTENSION ASSOCIATED WITH DIABETES: ICD-10-CM

## 2019-10-14 DIAGNOSIS — F17.201 TOBACCO ABUSE, IN REMISSION: ICD-10-CM

## 2019-10-14 DIAGNOSIS — G47.61 PLMD (PERIODIC LIMB MOVEMENT DISORDER): ICD-10-CM

## 2019-10-14 DIAGNOSIS — G47.34 NOCTURNAL OXYGEN DESATURATION: ICD-10-CM

## 2019-10-14 DIAGNOSIS — E11.69 HYPERLIPIDEMIA ASSOCIATED WITH TYPE 2 DIABETES MELLITUS: ICD-10-CM

## 2019-10-14 PROBLEM — I83.90 VARICOSE VEIN OF LEG: Status: ACTIVE | Noted: 2019-10-14

## 2019-10-14 LAB
BRPFT: ABNORMAL
FEF 25 75 LLN: 1.3
FEF 25 75 PRE REF: 35.1 %
FEF 25 75 REF: 2.72
FEV1 FVC LLN: 65
FEV1 FVC PRE REF: 73.8 %
FEV1 FVC REF: 77
FEV1 LLN: 2.46
FEV1 PRE REF: 72.1 %
FEV1 REF: 3.29
FVC LLN: 3.23
FVC PRE REF: 97.5 %
FVC REF: 4.26
PEF LLN: 6.46
PEF PRE REF: 74.6 %
PEF REF: 8.65
PRE FEF 25 75: 0.95 L/S (ref 1.3–4.14)
PRE FET 100: 10.78 SEC
PRE FEV1 FVC: 57.18 % (ref 65.07–89.82)
PRE FEV1: 2.37 L (ref 2.46–4.12)
PRE FVC: 4.15 L (ref 3.23–5.29)
PRE PEF: 6.45 L/S (ref 6.46–10.84)

## 2019-10-14 PROCEDURE — 94618 PULMONARY STRESS TESTING: ICD-10-PCS | Mod: S$GLB,,, | Performed by: INTERNAL MEDICINE

## 2019-10-14 PROCEDURE — 99214 PR OFFICE/OUTPT VISIT, EST, LEVL IV, 30-39 MIN: ICD-10-PCS | Mod: 25,S$GLB,, | Performed by: INTERNAL MEDICINE

## 2019-10-14 PROCEDURE — 3044F HG A1C LEVEL LT 7.0%: CPT | Mod: CPTII,S$GLB,, | Performed by: INTERNAL MEDICINE

## 2019-10-14 PROCEDURE — 99999 PR PBB SHADOW E&M-EST. PATIENT-LVL I: CPT | Mod: PBBFAC,,,

## 2019-10-14 PROCEDURE — 3078F DIAST BP <80 MM HG: CPT | Mod: CPTII,S$GLB,, | Performed by: INTERNAL MEDICINE

## 2019-10-14 PROCEDURE — 99999 PR PBB SHADOW E&M-EST. PATIENT-LVL I: ICD-10-PCS | Mod: PBBFAC,,,

## 2019-10-14 PROCEDURE — 71260 CT THORAX DX C+: CPT | Mod: TC

## 2019-10-14 PROCEDURE — 99999 PR PBB SHADOW E&M-EST. PATIENT-LVL V: CPT | Mod: PBBFAC,,, | Performed by: INTERNAL MEDICINE

## 2019-10-14 PROCEDURE — 94762 N-INVAS EAR/PLS OXIMTRY CONT: CPT | Mod: 59,S$GLB,, | Performed by: INTERNAL MEDICINE

## 2019-10-14 PROCEDURE — 25500020 PHARM REV CODE 255: Performed by: INTERNAL MEDICINE

## 2019-10-14 PROCEDURE — 3008F BODY MASS INDEX DOCD: CPT | Mod: CPTII,S$GLB,, | Performed by: INTERNAL MEDICINE

## 2019-10-14 PROCEDURE — 3008F PR BODY MASS INDEX (BMI) DOCUMENTED: ICD-10-PCS | Mod: CPTII,S$GLB,, | Performed by: INTERNAL MEDICINE

## 2019-10-14 PROCEDURE — 3078F PR MOST RECENT DIASTOLIC BLOOD PRESSURE < 80 MM HG: ICD-10-PCS | Mod: CPTII,S$GLB,, | Performed by: INTERNAL MEDICINE

## 2019-10-14 PROCEDURE — 94010 BREATHING CAPACITY TEST: ICD-10-PCS | Mod: 59,S$GLB,, | Performed by: INTERNAL MEDICINE

## 2019-10-14 PROCEDURE — 3077F PR MOST RECENT SYSTOLIC BLOOD PRESSURE >= 140 MM HG: ICD-10-PCS | Mod: CPTII,S$GLB,, | Performed by: INTERNAL MEDICINE

## 2019-10-14 PROCEDURE — 99499 RISK ADDL DX/OHS AUDIT: ICD-10-PCS | Mod: S$GLB,,, | Performed by: INTERNAL MEDICINE

## 2019-10-14 PROCEDURE — 71260 CT CHEST WITH CONTRAST: ICD-10-PCS | Mod: 26,,, | Performed by: RADIOLOGY

## 2019-10-14 PROCEDURE — 3044F PR MOST RECENT HEMOGLOBIN A1C LEVEL <7.0%: ICD-10-PCS | Mod: CPTII,S$GLB,, | Performed by: INTERNAL MEDICINE

## 2019-10-14 PROCEDURE — 99999 PR PBB SHADOW E&M-EST. PATIENT-LVL V: ICD-10-PCS | Mod: PBBFAC,,, | Performed by: INTERNAL MEDICINE

## 2019-10-14 PROCEDURE — 3077F SYST BP >= 140 MM HG: CPT | Mod: CPTII,S$GLB,, | Performed by: INTERNAL MEDICINE

## 2019-10-14 PROCEDURE — 99214 OFFICE O/P EST MOD 30 MIN: CPT | Mod: 25,S$GLB,, | Performed by: INTERNAL MEDICINE

## 2019-10-14 PROCEDURE — 94010 BREATHING CAPACITY TEST: CPT | Mod: 59,S$GLB,, | Performed by: INTERNAL MEDICINE

## 2019-10-14 PROCEDURE — 99499 UNLISTED E&M SERVICE: CPT | Mod: S$GLB,,, | Performed by: INTERNAL MEDICINE

## 2019-10-14 PROCEDURE — 94618 PULMONARY STRESS TESTING: CPT | Mod: S$GLB,,, | Performed by: INTERNAL MEDICINE

## 2019-10-14 PROCEDURE — 94762 PR NONINVASV OXYGEN SATUR, CONT: ICD-10-PCS | Mod: 59,S$GLB,, | Performed by: INTERNAL MEDICINE

## 2019-10-14 PROCEDURE — 71260 CT THORAX DX C+: CPT | Mod: 26,,, | Performed by: RADIOLOGY

## 2019-10-14 RX ORDER — ALBUTEROL SULFATE 90 UG/1
2 AEROSOL, METERED RESPIRATORY (INHALATION) EVERY 6 HOURS PRN
Qty: 18 G | Refills: 3 | Status: SHIPPED | OUTPATIENT
Start: 2019-10-14 | End: 2020-09-09 | Stop reason: SDUPTHER

## 2019-10-14 RX ADMIN — IOHEXOL 75 ML: 350 INJECTION, SOLUTION INTRAVENOUS at 10:10

## 2019-10-14 NOTE — ASSESSMENT & PLAN NOTE
Stable nodules followed up since July 2016.  No further follow-up  Ground-glass opacities improved

## 2019-10-14 NOTE — PROCEDURES
"[facility]  Six Minute Walk     SUMMARY     Ordering Provider: Dr. Wells   Interpreting Provider: Dr. Wells  Performing nurse/tech/RT: Ariana RRT  Diagnosis: COPD  Height: 5' 8" (172.7 cm)  Weight: 105.5 kg (232 lb 9.4 oz)  BMI (Calculated): 35.4   Patient Race:             Phase Oxygen Assessment Supplemental O2 Heart   Rate Blood Pressure Huong Dyspnea Scale Rating   Resting 99 % Room Air 57 bpm 142/68  2   Exercise        Minute        1 98 % Room Air 73 bpm     2 97 % Room Air 96 bpm     3 97 % Room Air 101 bpm     4 97 % Room Air 106 bpm     5 97 % Room Air 90 bpm     6  97 % Room Air 86 bpm 150/77 3   Recovery        Minute        1 99 % Room Air 74 bpm     2 100 % Room Air 58 bpm     3 99 % Room Air 58 bpm     4 100 % Room Air 60 bpm 143/72 1     Six Minute Walk Summary  6MWT Status: completed without stopping  Patient Reported: No complaints     Interpretation:  Did the patient stop or pause?: No            Total Time Walked (Calculated): 360 seconds  Final Partial Lap Distance (feet): 0 feet  Total Distance Meters (Calculated): 426.72 meters   LLN was 348.42 m  Predicted Distance Meters (Calculated): 501.42 meters  Percentage of Predicted (Calculated): 85.1  Peak VO2 (Calculated): 16.78  Mets: 4.79  Has The Patient Had a Previous Six Minute Walk Test?: Yes       Previous 6MWT Results  Has The Patient Had a Previous Six Minute Walk Test?: Yes  Date of Previous Test: 02/28/18  Total Time Walked: 360 seconds  Total Distance (meters): 365.94  Predicted Distance (meters): 507.94 meters  Percentage of Predicted: 72.01  Percent Change (Calculated): -0.17         CLINICAL INTERPRETATION:  Six minute walk distance is 426.72m (85.1 % predicted) with light dyspnea.  During exercise, there was no significant desaturation while breathing room air.  Blood pressure remained stable and Heart rate remained stable with walking.  The patient did not report non-pulmonary symptoms " during exercise.  NO exercise impairment    The patient did complete the study, walking 360 seconds of the 360 second test.  Since the previous study in 02/28/2018, exercise capacity may be somewhat improved.  Based upon age and body mass index, exercise capacity is normal.

## 2019-10-14 NOTE — ASSESSMENT & PLAN NOTE
Repeat overnight saturation testing with CPAP without oxygen.  Need to validate whether needs nocturnal oxygen.

## 2019-10-14 NOTE — PROGRESS NOTES
Patient Active Problem List   Diagnosis    Controlled type 2 diabetes mellitus without complication, without long-term current use of insulin    Hypertension associated with diabetes    Hyperlipidemia associated with type 2 diabetes mellitus    Moderate COPD (chronic obstructive pulmonary disease)    CAD (coronary artery disease)    Vitamin D deficiency    Osteoarthritis of knee    Pulmonary nodule    Nevus of choroid of right eye    History of pulmonary embolism    Hyponatremia    Iron deficiency anemia due to chronic blood loss    MACHO on CPAP    Nocturnal oxygen desaturation    PLMD (periodic limb movement disorder)    BMI 35.0-35.9,adult    Varicose vein of leg    Tobacco abuse, in remission     Social History     Tobacco Use   Smoking Status Former Smoker    Types: Cigars    Last attempt to quit: 2019    Years since quittin.0   Smokeless Tobacco Never Used   Tobacco Comment    3-5 cigars each day     Immunization History   Administered Date(s) Administered    Influenza - Quadrivalent 2016    Influenza - Quadrivalent - PF (6 months and older) 2014, 10/25/2015, 10/03/2017, 10/04/2018, 2019    Pneumococcal Polysaccharide - 23 Valent 2014, 2016    Tdap 2015    Zoster 2017     EPWORTH SLEEPINESS SCALE 10/14/2019   Sitting and reading 0   Watching TV 0   Sitting, inactive in a public place (e.g. a theatre or a meeting) 0   As a passenger in a car for an hour without a break 2   Lying down to rest in the afternoon when circumstances permit 3   Sitting and talking to someone 0   Sitting quietly after a lunch without alcohol 0   In a car, while stopped for a few minutes in traffic 0   Total score 5     COPD Questionnaire  How often do you cough?: (P) Some of the time  How often do you have phlegm (mucus) in your chest?: (P) Some of the time  How often does your chest feel tight?: (P) Almost never  When you walk up a hill or one flight of stairs,  how often are you breathless?: (P) Some of the time  How often are you limited doing any activities at home?: (P) Never  How often are you confident leaving the house despite your lung condition?: (P) All of the time  How often do you sleep soundly?: (P) Most of the time  How often do you have energy?: (P) Most of the time  Total score: (P) 12       SUBJECTIVE:     Patient is a 64 y.o. male presents with Moderate COPD , MACHO on CPAP and Oxygen at night with PAP.  Here to review: 6MWD, CT cehst for areas of GGGO and nodule followed since 2016 and COPD  Medications: Trelegy and rescue inhaler  Has MACHO on APAP 4-10. [place] score 5. CAT score 1.mRC score 0  History of varicose veins.  No recent COPD exacerbations.  No cough no wheezing no shortness of breath or sputum.  All results reviewed:  Chest CT scan showed resolution of ground-glass opacities.  Spirometry is stable:  FEV1 72% predicted, FEV1/FVC 57% predicted  Meds TRELEGY  Former smoker 35-40 pack year smoking history :  Currently enrolled in smoking cessation    I have reviewed the patient's medical history in detail and updated the computerized patient record.        Chief Complaint   Patient presents with    COPD    Sleep Apnea       Review of patient's allergies indicates:  No Known Allergies    Current Outpatient Medications   Medication Sig Dispense Refill    acetaminophen (TYLENOL) 500 MG tablet Take 1,000 mg by mouth as needed.       albuterol (ACCUNEB) 0.63 mg/3 mL Nebu Take 0.63 mg by nebulization every 6 (six) hours as needed. Rescue      amLODIPine (NORVASC) 5 MG tablet Take 1 tablet (5 mg total) by mouth once daily. 30 tablet 11    aspirin 325 MG tablet Take 325 mg by mouth once daily.      blood sugar diagnostic (BLOOD GLUCOSE TEST) Strp 1 strip by Misc.(Non-Drug; Combo Route) route 3 (three) times daily. 200 each 1    blood-glucose meter Misc Use as directed to check glucose levels. 1 each 0    buPROPion (WELLBUTRIN SR) 150 MG TBSR 12 hr  tablet Take 1 tablet (150 mg total) by mouth 2 (two) times daily. 60 tablet 2    cetirizine (ZYRTEC) 10 MG tablet Take 10 mg by mouth every evening.      cholecalciferol, vitamin D3, (VITAMIN D3) 1,000 unit capsule Take 1,000 Units by mouth once daily.      cyanocobalamin (VITAMIN B-12) 500 MCG tablet Take 500 mcg by mouth once daily.      fluticasone propionate (FLONASE) 50 mcg/actuation nasal spray Use 1 spray (50 mcg total) in each nostril once daily. 16 g 3    fluticasone-umeclidin-vilanter (TRELEGY ELLIPTA) 100-62.5-25 mcg DsDv Inhale 1 puff into the lungs once daily. 60 each 11    guaifenesin (MUCINEX) 1,200 mg Ta12 Take 1 tablet by mouth 2 (two) times daily.      ipratropium (ATROVENT) 0.03 % nasal spray Spray 1 or 2 sprays each nasal passage every 8 hours, if needed, as directed. 30 mL 12    lancets Misc 1 lancet by Misc.(Non-Drug; Combo Route) route 3 (three) times daily. 200 each 0    metFORMIN (GLUCOPHAGE) 1000 MG tablet TAKE ONE TABLET BY MOUTH TWICE DAILY WITH MEALS 180 tablet 2    methscopolamine (PAMINE) 2.5 mg Tab Take 1 tablet by mouth every 12 hours, as directed, if needed 180 tablet 4    nicotine polacrilex 2 MG Lozg Take 1 lozenge (2 mg total) by mouth as needed (use no more than 10 lozenges in 24 hours). 270 lozenge 2    nitroGLYCERIN (NITROSTAT) 0.4 MG SL tablet Place 0.4 mg under the tongue every 5 (five) minutes as needed.       omeprazole (PRILOSEC) 40 MG capsule TAKE ONE CAPSULE BY MOUTH ONCE DAILY 90 capsule 1    rosuvastatin (CRESTOR) 10 MG tablet TAKE ONE TABLET BY MOUTH ONCE DAILY 90 tablet 1    sodium chloride (OCEAN NASAL) 0.65 % nasal spray 1 spray by Nasal route as needed for Congestion. 1 Bottle 12    tamsulosin (FLOMAX) 0.4 mg Cap Take 1 capsule by mouth at bedtime 90 capsule 4    TRULICITY 0.75 mg/0.5 mL PnIj INJECT 0.5 ML (0.75 MG TOTAL) INTO THE SKIN EVERY 7 DAYS 4 Syringe 3    albuterol (PROAIR HFA) 90 mcg/actuation inhaler Inhale 2 puffs into the lungs every  "6 (six) hours as needed for Wheezing or Shortness of Breath. Rescue 18 g 3    azithromycin (Z-CHARLES) 250 MG tablet Take 2 tablets by mouth on day 1; Then Take 1 tablet by mouth on days 2 through 5 (Patient not taking: Reported on 10/14/2019) 6 tablet 3    methylPREDNISolone (MEDROL DOSEPACK) 4 mg tablet use as directed (Patient not taking: Reported on 10/14/2019) 1 Package 3     No current facility-administered medications for this visit.        Past Medical History:   Diagnosis Date    COPD (chronic obstructive pulmonary disease)     Diabetes mellitus, type 2     Hyperlipidemia     Hypertension      Past Surgical History:   Procedure Laterality Date    ANGIOPLASTY      TOTAL KNEE ARTHROPLASTY       Family History   Problem Relation Age of Onset    Diabetes Mother     Cancer Father         bone     Social History     Tobacco Use    Smoking status: Former Smoker     Types: Cigars     Last attempt to quit: 2019     Years since quittin.0    Smokeless tobacco: Never Used    Tobacco comment: 3-5 cigars each day   Substance Use Topics    Alcohol use: No    Drug use: No        Review of Systems:  Review of Systems   Constitutional: Negative.    HENT: Negative for congestion.    Eyes: Negative.    Respiratory: Negative for apnea, cough, shortness of breath and wheezing.    Cardiovascular: Negative.         Varicose veins bilaterally   Gastrointestinal: Negative.    Endocrine: Negative.    Genitourinary: Negative.    Musculoskeletal: Negative.    Skin: Negative.    Allergic/Immunologic: Negative.    Neurological: Negative.    Hematological: Negative.    Psychiatric/Behavioral: Negative for sleep disturbance.       OBJECTIVE:     Vital Signs (Most Recent)  Pulse: 60 (10/14/19 1118)  Resp: 18 (10/14/19 1118)  BP: (!) 143/72 (10/14/19 1118)  SpO2: 100 % (10/14/19 1118)  5' 8" (1.727 m)  105.5 kg (232 lb 9.4 oz)     Physical Exam:  Physical Exam   Constitutional: He is oriented to person, place, and time. " "Vital signs are normal. He appears well-developed and well-nourished.       HENT:   Head: Normocephalic and atraumatic.   Nose: No mucosal edema or rhinorrhea.   Mouth/Throat: Oropharynx is clear and moist. No oropharyngeal exudate.   Eyes: Pupils are equal, round, and reactive to light. Conjunctivae and EOM are normal. Left eye exhibits no discharge. No scleral icterus.   Neck: Normal range of motion. Neck supple. No tracheal deviation present. No thyromegaly present.   Cardiovascular: Normal rate, regular rhythm, normal heart sounds and intact distal pulses.   No murmur heard.  Pulmonary/Chest: Effort normal. He has decreased breath sounds in the right lower field and the left lower field. He has no wheezes. He has no rhonchi. He has no rales. He exhibits no tenderness.   Abdominal: Soft. Bowel sounds are normal. He exhibits no distension.   Musculoskeletal: Normal range of motion. He exhibits no tenderness or deformity.   Neurological: He is alert and oriented to person, place, and time. No cranial nerve deficit.   Skin: Skin is warm and dry. Capillary refill takes 2 to 3 seconds.   Psychiatric: He has a normal mood and affect.   Nursing note and vitals reviewed.      Laboratory  CBC: Reviewed  BMP: Reviewed      DOWNLOAD    6MW Test  Height: 5' 8" (172.7 cm)  Weight: 105.5 kg (232 lb 9.4 oz)  BMI (Calculated): 35.4  Predicted Distance: 358.13  Interpretation  Predicted Distance Meters (Calculated): 501.42 meters  SpO2 rolo 97%  Distance 426.72m ( 85.1%)  Improved from 365.94m to 426.72m      Spirometry;  FEV1: 2.37L ( 72.1%), FVC 4.15L( 97.5%)  FEV1/FVC 57.18  Mild obstructive defect      Chest CT scan reviewed  COMPARISON:  10/19/2018    FINDINGS:  Heart and Great vessels: Scattered atherosclerotic plaque noted involving the thoracic aorta and aortic branch vessels, including the coronary arteries.  No pericardial effusion.    Thoracic Adenopathy: None demonstrated    Lungs: Small intra fissural nodule in the " left upper lobe remains unchanged.  Previously described ground-glass nodule in the left lower lobe does not definitely persist.  Other scattered micro nodules appear unchanged.  No new pulmonary nodules visualized.  No parenchymal consolidations or pleural effusions demonstrated.  Centrilobular and paraseptal emphysematous changes are again noted in the bilateral lung apices.    Upper Abdomen: Large partially visualized left renal cyst appears grossly similar to prior.    Bones: No acute findings    Miscellaneous: None      Impression       1. Nonvisualization of previously described small ground-glass nodule in the left lower lobe.  Other small bilateral pulmonary nodules remain stable.  2. Other stable findings as above.       ASSESSMENT/PLAN:     Problem List Items Addressed This Visit     Controlled type 2 diabetes mellitus without complication, without long-term current use of insulin     Stable:  Metformin, TRULICITY              Hypertension associated with diabetes     Hypertension stable on amlodipine,         Hyperlipidemia associated with type 2 diabetes mellitus     Stable on Crestor.         Moderate COPD (chronic obstructive pulmonary disease)     CAT score 1  mRC score 0  On TRELEGY, Oxygen, Nebulizer         Relevant Medications    albuterol (PROAIR HFA) 90 mcg/actuation inhaler    Other Relevant Orders    PULSE OXIMETRY OVERNIGHT    Spirometry with/without bronchodilator    X-Ray Chest PA And Lateral    Pulmonary nodule     Stable nodules followed up since July 2016.  No further follow-up  Ground-glass opacities improved         MACHO on CPAP - Primary     Currently stable on CPAP.         Nocturnal oxygen desaturation     Repeat overnight saturation testing with CPAP without oxygen.  Need to validate whether needs nocturnal oxygen.         Relevant Orders    PULSE OXIMETRY OVERNIGHT    PLMD (periodic limb movement disorder)     Last ferritin level was 95.         BMI 35.0-35.9,adult     Weight loss  exercise         Tobacco abuse, in remission     Assistance with smoking cessation was offered, including:  []  Medications  [x]  Counseling  []  Printed Information on Smoking Cessation  []  Referral to a Smoking Cessation Program    Patient was counseled regarding smoking for 3-10 minutes.               Other Visit Diagnoses     Varicose veins of left leg with edema        Relevant Orders    COMPRESSION STOCKINGS          PLAN:Plan    Ground-glass opacities seen in the previous CT scan have resolved.  Bilateral small nodules remain same.  This have been monitored since 07/18/2016  Overnight saturation study to validate need for oxygen at night  Carlos stockings prescribed  Adherent with PAP and benefits  GROUP A mRC0, CAT score < 10  Adherence with weight loss exercise  Adherence to smoking cessation    Requested Prescriptions     Signed Prescriptions Disp Refills    albuterol (PROAIR HFA) 90 mcg/actuation inhaler 18 g 3     Sig: Inhale 2 puffs into the lungs every 6 (six) hours as needed for Wheezing or Shortness of Breath. Rescue         Follow up in about 1 year (around 10/14/2020), or Download PAP, for Spirometry and CXR next visit, Weight loss and exercise.    This note was prepared using voice recognition system and is likely to have sound alike errors that may have been overlooked even after proof reading.  Please call me with any questions    Discussed diagnosis, its evaluation, treatment and usual course. All questions answered.    Thank you for the courtesy of participating in the care of this patient    Mazin Wells MD    Orders Placed This Encounter   Procedures    COMPRESSION STOCKINGS     Order Specific Question:   Pressure amount:     Answer:   15-20 mmHg    X-Ray Chest PA And Lateral     Standing Status:   Future     Standing Expiration Date:   10/13/2020    Spirometry with/without bronchodilator     Standing Status:   Future     Standing Expiration Date:   10/13/2020    PULSE OXIMETRY  OVERNIGHT     Standing Status:   Future     Number of Occurrences:   1     Standing Expiration Date:   10/14/2020     Order Specific Question:   Reason for Test?     Answer:   O2 Re-Qualification     Order Specific Question:   Symptoms:     Answer:   Other       AISHA Index for COPD Survival Prediction   Select Criteria:   FEV1 % Predicted After Bronchodialator     [x] >= 65% (0 points)    [] 50-64% (1 point)    [] 36-49% (2 points)    [] <= 35% (3 points)   6 Minute Walk Distance     [x] >= 350 Meters (0 points)    [] 250-349 Meters (1 point)    [] 150-249 Meters (2 points)    [] <= 149 Meters (3 points)   MMRC Dyspnea Scale (4 is worst)     [x] MMRC 0: Dyspneic on strenuous excercise (0 points)    [] MMRC 1: Dyspneic on walking a slight hill (0 points)    [] MMRC 2: Dyspneic on walking level ground; must stop occasionally due to breathlessness (1 point)    [] MMRC 3: Must stop for breathlessness after walking 100 yards or after a few minutes (2 points)    [] MMRC 4: Cannot leave house; breathless on dressing/undressing (3 points)   Body Mass Index     [x] > 21 (0 points)    [] <= 21 (1 point)   Results: Total Criteria Point Count:     Approximate 4 Year Survival Interpretation   0-2 Points:  [x] 80%   3-4 Points:  [] 67%   5-6 Points:  [] 57%   7-10 Points:[] 18%         References  1. Paui BR, Timothy CG, Hiram JM, et. al. The body-mass index, airflow obstruction, dyspnea and exercise capacity index in chronic obstructive pulmonary disease. N Engl J Med. 2004 Mar 4;350(10):1005-12.  2. Cam DA, Wells CK. Evaluation of clinical methods for rating dyspnea. Chest. 1988 Mar;93(3):580-6

## 2019-10-14 NOTE — PROGRESS NOTES
Patient, Stefan Forbes Jr. (MRN #[id number]), presented with a recorded BMI of 35.36 kg/m^2 and a documented comorbidity(s):  - Diabetes Mellitus Type 2  - Obstructive Sleep Apnea  - Hypertension  - Hyperlipidemia  to which the severe obesity is a contributing factor. This is consistent with the definition of severe obesity (BMI 35.0-39.9) with comorbidity (ICD-10 E66.01, Z68.35). The patient's severe obesity was monitored, evaluated, addressed and/or treated. This addendum to the medical record is made on 10/14/2019.

## 2019-10-15 ENCOUNTER — TELEPHONE (OUTPATIENT)
Dept: PULMONOLOGY | Facility: CLINIC | Age: 64
End: 2019-10-15

## 2019-10-15 ENCOUNTER — TELEPHONE (OUTPATIENT)
Dept: SMOKING CESSATION | Facility: CLINIC | Age: 64
End: 2019-10-15

## 2019-10-15 NOTE — TELEPHONE ENCOUNTER
----- Message from Mazin Wells MD sent at 10/15/2019  3:09 PM CDT -----   Please inform patient overnight saturation study did not show on the need for nighttime oxygen    OVERNIGHT OXIMETRY REPORT:    Dictated by: Mazin Wells MD  Test date: 10/14/2019  Dictated on: 10/15/2019      Comment: This test was performed on CPAP and Room Air     A desaturation event was defined as a decrease of saturation by 4 or more.    REPORT SUMMARY  Total valid samplin:56:44   High SpO2: 98%    Low SpO2: 94.4%    Mean SpO2  81 %  Cumulative time with oxygen saturation less than 88% (TC88): 0:02:16    CLINICAL INTERPRETATION  Results are normal,  There is no significant nocturnal oxygen desaturation and  Clinical correlation is advised    Medicare Criteria Comments:   Oximetry test results suggest the patient does not fall under Medicare Group 1 Criteria. ( Arterial oxygen saturation at or below 88% for at least 5 minutes taken during sleep)  Mazin Wells MD    Details about Medicare Group Criteria coverage can be found at http://www.cms.hhs.gov/manuals/downloads/

## 2019-10-15 NOTE — PROGRESS NOTES
PCP:  Ashlee Andersen Phone: [phone]  Clinician:  Maryann Aguilar CRT  [address]  [address]  [place] [address]  9/15/2019 - 10/14/2019  YOB: 1955  Mask:  Compliance Summary  9/15/2019 - 10/14/2019 (30 days)  Days with Device Usage 29 days  Days without Device Usage 1 day  Percent Days with Device Usage 96.7%  Cumulative Usage 6 days 9 hrs. 14 mins. 3 secs.  Maximum Usage (1 Day) 6 hrs. 39 mins. 14 secs.  Average Usage (All Days) 5 hrs. 6 mins. 28 secs.  Average Usage (Days Used) 5 hrs. 17 mins. 2 secs.  Minimum Usage (1 Day) 3 hrs. 6 mins. 9 secs.  Percent of Days with Usage >= 4 Hours 90.0%  Percent of Days with Usage < 4 Hours 10.0%  Date Range  Total Blower Time 6 days 9 hrs. 16 mins. 27 secs.  Average AHI 2.0  Auto-CPAP Summary  Auto-CPAP Mean Pressure 4.4 cmH2O  Auto-CPAP Peak Average Pressure 5.6 cmH2O  Average Device Pressure <= 90% of Time 5.2 cmH2O  Average Time in Large Leak Per Day 2 secs.

## 2019-10-15 NOTE — PROCEDURES
[facility]  carson Vogt  Test date: 10/14/19 Start: 10/14/19 23:14:42 Stefan Forbes Jr  Doctor: Dr. Wells End: 10/15/19 06:21:54 [id number]  Oximetry: Summary Report  Comments: Room Air on CPAP  Recording time: 07:07:12 Highest pulse: 78 Highest SpO2: 98%  Excluded samplin:10:28 Lowest pulse: 45 Lowest SpO2: 81%  Total valid samplin:56:44 Mean pulse: 51 Mean SpO2: 94.4%  1 S.D.: 3.4 1 S.D.: 1.5  Time with SpO2<90: 0:02:28, 0.6%  Time with SpO2<80: 0:00:00, 0.0%  Time with SpO2<70: 0:00:00, 0.0%  Time with SpO2<60: 0:00:00, 0.0%  Time with SpO2<89: 0:02:16, 0.5%  Time with SpO2 =>90: 6:54:16, 99.4%  Time with SpO2=>80 & <90: 0:02:28, 0.6%  Time with SpO2=>70 & <80: 0:00:00, 0.0%  Time with SpO2=>60 & <70: 0:00:00, 0.0%  The longest continuous time with saturation <=88 was 00:01:20, which started at  10/15/19 03:17:02.  A desaturation event was defined as a decrease of saturation by 4 or more.  No events were excluded due to artifact.  There were 8 desaturation events over 3 minutes duration.  There were 6 desaturation events of less than 3 minutes duration during which:  The mean high was 95.5%. The mean low was 88.7%.  The number of these events that were:  > 0 & <10 seconds: 0 > 0 seconds: 6  =>10 & <20 seconds: 0 =>10 seconds: 6  =>20 & <30 seconds: 2 =>20 seconds: 6  =>30 & <40 seconds: 0 =>30 seconds: 4  =>40 & <50 seconds: 0 =>40 seconds: 4  =>50 & <60 seconds: 0 =>50 seconds: 4  =>60 seconds: 4 =>60 seconds: 4  The mean length of desaturation events that were >=10 sec & <=3 mins was: 81.3 sec.  Desaturation event index (events >=10 sec per sampled hour): 0.9  Desaturation event index (events >= 0 sec per sampled hour): 0.9    OVERNIGHT OXIMETRY REPORT:    Dictated by: Mazin Wells MD  Test date: 10/14/2019  Dictated on: 10/15/2019      Comment: This test was performed on CPAP and Room Air     A desaturation event was defined as a decrease of saturation by 4 or  more.    REPORT SUMMARY  Total valid samplin:56:44   High SpO2: 98%    Low SpO2: 94.4%    Mean SpO2  81 %  Cumulative time with oxygen saturation less than 88% (TC88): 0:02:16    CLINICAL INTERPRETATION  Results are normal,  There is no significant nocturnal oxygen desaturation and  Clinical correlation is advised    Medicare Criteria Comments:   Oximetry test results suggest the patient does not fall under Medicare Group 1 Criteria. ( Arterial oxygen saturation at or below 88% for at least 5 minutes taken during sleep)  Mazin Wells MD    Details about Medicare Group Criteria coverage can be found at http://www.cms.hhs.gov/manuals/downloads/

## 2019-10-15 NOTE — TELEPHONE ENCOUNTER
Spoke briefly with patient in regards to scheduling a follow up appointment. He stated that he had a slip last Thursday following stress at his house. He has not smoked since. Appointment scheduled.

## 2019-10-16 ENCOUNTER — TELEPHONE (OUTPATIENT)
Dept: PULMONOLOGY | Facility: CLINIC | Age: 64
End: 2019-10-16

## 2019-10-16 DIAGNOSIS — J44.9 MODERATE COPD (CHRONIC OBSTRUCTIVE PULMONARY DISEASE): Primary | ICD-10-CM

## 2019-10-16 DIAGNOSIS — G47.33 OSA ON CPAP: Primary | ICD-10-CM

## 2019-10-16 NOTE — TELEPHONE ENCOUNTER
Pulse oximetry results on room air are normal,  There is no significant nocturnal oxygen desaturation . [place] sign order for oxygen to be picked up.

## 2019-10-16 NOTE — TELEPHONE ENCOUNTER
Informed pt order was sent to [facility] and to look for [facility]  to contact them to schedule time to  oxygen.

## 2019-10-16 NOTE — TELEPHONE ENCOUNTER
----- Message from Judi Serna sent at 10/16/2019  8:49 AM CDT -----  Contact: Patient's wife, Lisa  Ms Lisa needs to speak to nurse about getting an order for  on patients oxygen, please call her back at [phone]. Thank you

## 2019-10-16 NOTE — PROGRESS NOTES
Orders Placed This Encounter   Procedures    HME - OTHER     Discontinue home oxygen     Order Specific Question:   Type of Equipment:     Answer:   Discontinue home oxygen     Order Specific Question:   Height:     Answer:   5'8''     Order Specific Question:   Weight:     Answer:   232lb

## 2019-10-23 ENCOUNTER — CLINICAL SUPPORT (OUTPATIENT)
Dept: SMOKING CESSATION | Facility: CLINIC | Age: 64
End: 2019-10-23
Payer: COMMERCIAL

## 2019-10-23 DIAGNOSIS — F17.200 NICOTINE DEPENDENCE: Primary | ICD-10-CM

## 2019-10-23 PROCEDURE — 99404 PREV MED CNSL INDIV APPRX 60: CPT | Mod: S$GLB,,, | Performed by: GENERAL PRACTICE

## 2019-10-23 PROCEDURE — 99404 PR PREVENT COUNSEL,INDIV,60 MIN: ICD-10-PCS | Mod: S$GLB,,, | Performed by: GENERAL PRACTICE

## 2019-10-23 NOTE — PROGRESS NOTES
Individual Follow-Up Form    10/23/2019    Quit Date: 9-    Clinical Status of Patient: Outpatient    Length of Service: 60 minutes    Continuing Medication: yes  Wellbutrin    Other Medications: nicotine lozenges   Target Symptoms: Withdrawal and medication side effects. The following were  rated moderate (3) to severe (4) on TCRS:  · Moderate (3): increased appetite, irritable  · Severe (4): none    Comments: Patient was seen today for a smoking cessation progress update. He remains tobacco free at this time. The patient remains on the prescribed tobacco cessation medication regimen of 150 mg Wellbutrin BID without any negative side effects at this time.  He states that he has been having family stress which has caused him to feel more irritable. Discussed causes of his irritability and relaxation techniques. He stated that he is eating more often. Discussed healthy eating habits. A handout on nutrients to reduce stress given. He verbalized understanding. The patient denies any abnormal behavioral or mental changes at this time. Will continue to encourage and monitor progress.     Diagnosis: F17.200    Next Visit: 1 week

## 2019-11-14 ENCOUNTER — CLINICAL SUPPORT (OUTPATIENT)
Dept: SMOKING CESSATION | Facility: CLINIC | Age: 64
End: 2019-11-14
Payer: COMMERCIAL

## 2019-11-14 DIAGNOSIS — F17.200 NICOTINE DEPENDENCE: Primary | ICD-10-CM

## 2019-11-14 PROCEDURE — 99407 BEHAV CHNG SMOKING > 10 MIN: CPT | Mod: S$GLB,,, | Performed by: GENERAL PRACTICE

## 2019-11-14 PROCEDURE — 99407 PR TOBACCO USE CESSATION INTENSIVE >10 MINUTES: ICD-10-PCS | Mod: S$GLB,,, | Performed by: GENERAL PRACTICE

## 2019-11-20 ENCOUNTER — CLINICAL SUPPORT (OUTPATIENT)
Dept: SMOKING CESSATION | Facility: CLINIC | Age: 64
End: 2019-11-20
Payer: COMMERCIAL

## 2019-11-20 DIAGNOSIS — F17.200 NICOTINE DEPENDENCE: Primary | ICD-10-CM

## 2019-11-20 PROCEDURE — 99404 PR PREVENT COUNSEL,INDIV,60 MIN: ICD-10-PCS | Mod: S$GLB,,, | Performed by: GENERAL PRACTICE

## 2019-11-20 PROCEDURE — 99404 PREV MED CNSL INDIV APPRX 60: CPT | Mod: S$GLB,,, | Performed by: GENERAL PRACTICE

## 2019-11-20 NOTE — PROGRESS NOTES
"Individual Follow-Up Form    11/20/2019    Clinical Status of Patient: Outpatient    Length of Service: 60 minutes    Continuing Medication: yes  Wellbutrin    Other Medications: nicotine lozenges as needed     Target Symptoms: Withdrawal and medication side effects. The following were  rated moderate (3) to severe (4) on TCRS:  · Moderate (3): increased appetite  · Severe (4): none    Comments: Patient was seen today for a smoking cessation progress update. He remains tobacco free at this time. He stated that he bought a cigar since his last appointment and has taken a "few puffs" but has not used in over a week. He stated that he made a decision to purchase the cigar because he wanted to smoke but stated that there was not good reason for his desire. He states that he is proud of his progress and does not want to go backwards so he threw away the remaining cigar. Discussed mental and physical preparation for future cravings. He verbalized understanding. Reviewed healthy eating habits and physical activity. The patient remains on the prescribed tobacco cessation medication regimen of 150 mg Wellbutrin BID and 2 mg nicotine lozenges as needed without any negative side effects at this time. The patient denies any abnormal behavioral or mental changes at this time. Will continue to encourage and monitor progress.  Diagnosis: F17.200    Next Visit: 2 weeks  "

## 2019-11-26 ENCOUNTER — LAB VISIT (OUTPATIENT)
Dept: LAB | Facility: HOSPITAL | Age: 64
End: 2019-11-26
Attending: INTERNAL MEDICINE
Payer: MEDICARE

## 2019-11-26 DIAGNOSIS — E11.9 CONTROLLED TYPE 2 DIABETES MELLITUS WITHOUT COMPLICATION, WITHOUT LONG-TERM CURRENT USE OF INSULIN: ICD-10-CM

## 2019-11-26 LAB
ESTIMATED AVG GLUCOSE: 134 MG/DL (ref 68–131)
HBA1C MFR BLD HPLC: 6.3 % (ref 4–5.6)

## 2019-11-26 PROCEDURE — 36415 COLL VENOUS BLD VENIPUNCTURE: CPT

## 2019-11-26 PROCEDURE — 83036 HEMOGLOBIN GLYCOSYLATED A1C: CPT

## 2019-12-02 DIAGNOSIS — J44.9 MODERATE COPD (CHRONIC OBSTRUCTIVE PULMONARY DISEASE): ICD-10-CM

## 2019-12-02 RX ORDER — ALBUTEROL SULFATE 0.83 MG/ML
2.5 SOLUTION RESPIRATORY (INHALATION) 2 TIMES DAILY PRN
Qty: 180 ML | Refills: 11 | Status: SHIPPED | OUTPATIENT
Start: 2019-12-02 | End: 2020-04-04

## 2019-12-11 ENCOUNTER — CLINICAL SUPPORT (OUTPATIENT)
Dept: SMOKING CESSATION | Facility: CLINIC | Age: 64
End: 2019-12-11
Payer: COMMERCIAL

## 2019-12-11 DIAGNOSIS — F17.200 NICOTINE DEPENDENCE: Primary | ICD-10-CM

## 2019-12-11 PROCEDURE — 99404 PREV MED CNSL INDIV APPRX 60: CPT | Mod: S$GLB,,, | Performed by: GENERAL PRACTICE

## 2019-12-11 PROCEDURE — 99404 PR PREVENT COUNSEL,INDIV,60 MIN: ICD-10-PCS | Mod: S$GLB,,, | Performed by: GENERAL PRACTICE

## 2019-12-11 NOTE — PROGRESS NOTES
Individual Follow-Up Form    12/11/2019    Clinical Status of Patient: Outpatient    Length of Service: 60 minutes    Continuing Medication: yes  Wellbutrin     Target Symptoms: Withdrawal and medication side effects. The following were  rated moderate (3) to severe (4) on TCRS:  · Moderate (3): irritable  · Severe (4): none    Comments: Patient was seen today for a smoking cessation progress update. He state that he took a few puffs off of a coworkers cigarette last week but did not smoke the entire cigarette. He stated that he has been feeling more irritable lately and forgot to take his Wellbutrin twice last week. He stated that he has high stress at home and gets worked up and irritable. Discussed relaxation techniques and stress management. Discussed nutrients that can reduce stress. He verbalized understanding.The patient denies any abnormal behavioral or mental changes at this time. Will continue to encourage and monitor progress.    Diagnosis: F17.200    Next Visit: 1 week

## 2019-12-15 DIAGNOSIS — J44.9 MODERATE COPD (CHRONIC OBSTRUCTIVE PULMONARY DISEASE): ICD-10-CM

## 2019-12-16 ENCOUNTER — LAB VISIT (OUTPATIENT)
Dept: LAB | Facility: HOSPITAL | Age: 64
End: 2019-12-16
Payer: MEDICARE

## 2019-12-16 DIAGNOSIS — Z86.711 HISTORY OF PULMONARY EMBOLISM: ICD-10-CM

## 2019-12-16 DIAGNOSIS — R76.8 ELEVATED SERUM IMMUNOGLOBULIN FREE LIGHT CHAINS: ICD-10-CM

## 2019-12-16 DIAGNOSIS — D50.0 IRON DEFICIENCY ANEMIA DUE TO CHRONIC BLOOD LOSS: ICD-10-CM

## 2019-12-16 LAB
ALBUMIN SERPL BCP-MCNC: 4 G/DL (ref 3.5–5.2)
ALP SERPL-CCNC: 59 U/L (ref 55–135)
ALT SERPL W/O P-5'-P-CCNC: 22 U/L (ref 10–44)
ANION GAP SERPL CALC-SCNC: 8 MMOL/L (ref 8–16)
AST SERPL-CCNC: 14 U/L (ref 10–40)
BASOPHILS # BLD AUTO: 0.05 K/UL (ref 0–0.2)
BASOPHILS NFR BLD: 0.6 % (ref 0–1.9)
BILIRUB SERPL-MCNC: 0.2 MG/DL (ref 0.1–1)
BUN SERPL-MCNC: 11 MG/DL (ref 8–23)
CALCIUM SERPL-MCNC: 9.2 MG/DL (ref 8.7–10.5)
CHLORIDE SERPL-SCNC: 99 MMOL/L (ref 95–110)
CO2 SERPL-SCNC: 28 MMOL/L (ref 23–29)
CREAT SERPL-MCNC: 1 MG/DL (ref 0.5–1.4)
DIFFERENTIAL METHOD: ABNORMAL
EOSINOPHIL # BLD AUTO: 0.2 K/UL (ref 0–0.5)
EOSINOPHIL NFR BLD: 2.4 % (ref 0–8)
ERYTHROCYTE [DISTWIDTH] IN BLOOD BY AUTOMATED COUNT: 12.4 % (ref 11.5–14.5)
EST. GFR  (AFRICAN AMERICAN): >60 ML/MIN/1.73 M^2
EST. GFR  (NON AFRICAN AMERICAN): >60 ML/MIN/1.73 M^2
GLUCOSE SERPL-MCNC: 140 MG/DL (ref 70–110)
HCT VFR BLD AUTO: 40.3 % (ref 40–54)
HGB BLD-MCNC: 13.2 G/DL (ref 14–18)
IMM GRANULOCYTES # BLD AUTO: 0.03 K/UL (ref 0–0.04)
IMM GRANULOCYTES NFR BLD AUTO: 0.4 % (ref 0–0.5)
LDH SERPL L TO P-CCNC: 116 U/L (ref 110–260)
LYMPHOCYTES # BLD AUTO: 1.7 K/UL (ref 1–4.8)
LYMPHOCYTES NFR BLD: 20.6 % (ref 18–48)
MCH RBC QN AUTO: 31.1 PG (ref 27–31)
MCHC RBC AUTO-ENTMCNC: 32.8 G/DL (ref 32–36)
MCV RBC AUTO: 95 FL (ref 82–98)
MONOCYTES # BLD AUTO: 0.8 K/UL (ref 0.3–1)
MONOCYTES NFR BLD: 10 % (ref 4–15)
NEUTROPHILS # BLD AUTO: 5.6 K/UL (ref 1.8–7.7)
NEUTROPHILS NFR BLD: 66.4 % (ref 38–73)
NRBC BLD-RTO: 0 /100 WBC
PLATELET # BLD AUTO: 216 K/UL (ref 150–350)
PMV BLD AUTO: 9.6 FL (ref 9.2–12.9)
POTASSIUM SERPL-SCNC: 4.8 MMOL/L (ref 3.5–5.1)
PROT SERPL-MCNC: 7.1 G/DL (ref 6–8.4)
RBC # BLD AUTO: 4.25 M/UL (ref 4.6–6.2)
SODIUM SERPL-SCNC: 135 MMOL/L (ref 136–145)
WBC # BLD AUTO: 8.39 K/UL (ref 3.9–12.7)

## 2019-12-16 PROCEDURE — 80053 COMPREHEN METABOLIC PANEL: CPT

## 2019-12-16 PROCEDURE — 84165 PROTEIN E-PHORESIS SERUM: CPT | Mod: 26,,, | Performed by: PATHOLOGY

## 2019-12-16 PROCEDURE — 86334 IMMUNOFIX E-PHORESIS SERUM: CPT

## 2019-12-16 PROCEDURE — 84165 PROTEIN E-PHORESIS SERUM: CPT

## 2019-12-16 PROCEDURE — 86334 PATHOLOGIST INTERPRETATION IFE: ICD-10-PCS | Mod: 26,,, | Performed by: PATHOLOGY

## 2019-12-16 PROCEDURE — 83615 LACTATE (LD) (LDH) ENZYME: CPT

## 2019-12-16 PROCEDURE — 36415 COLL VENOUS BLD VENIPUNCTURE: CPT

## 2019-12-16 PROCEDURE — 86334 IMMUNOFIX E-PHORESIS SERUM: CPT | Mod: 26,,, | Performed by: PATHOLOGY

## 2019-12-16 PROCEDURE — 84165 PATHOLOGIST INTERPRETATION SPE: ICD-10-PCS | Mod: 26,,, | Performed by: PATHOLOGY

## 2019-12-16 PROCEDURE — 85025 COMPLETE CBC W/AUTO DIFF WBC: CPT

## 2019-12-16 PROCEDURE — 82728 ASSAY OF FERRITIN: CPT

## 2019-12-16 PROCEDURE — 83540 ASSAY OF IRON: CPT

## 2019-12-16 PROCEDURE — 83520 IMMUNOASSAY QUANT NOS NONAB: CPT

## 2019-12-17 LAB
ALBUMIN SERPL ELPH-MCNC: 4.18 G/DL (ref 3.35–5.55)
ALPHA1 GLOB SERPL ELPH-MCNC: 0.27 G/DL (ref 0.17–0.41)
ALPHA2 GLOB SERPL ELPH-MCNC: 0.6 G/DL (ref 0.43–0.99)
B-GLOBULIN SERPL ELPH-MCNC: 0.77 G/DL (ref 0.5–1.1)
FERRITIN SERPL-MCNC: 63 NG/ML (ref 20–300)
GAMMA GLOB SERPL ELPH-MCNC: 0.88 G/DL (ref 0.67–1.58)
INTERPRETATION SERPL IFE-IMP: NORMAL
IRON SERPL-MCNC: 79 UG/DL (ref 45–160)
KAPPA LC SER QL IA: 2.54 MG/DL (ref 0.33–1.94)
KAPPA LC/LAMBDA SER IA: 1.44 (ref 0.26–1.65)
LAMBDA LC SER QL IA: 1.76 MG/DL (ref 0.57–2.63)
PROT SERPL-MCNC: 6.7 G/DL (ref 6–8.4)
SATURATED IRON: 19 % (ref 20–50)
TOTAL IRON BINDING CAPACITY: 414 UG/DL (ref 250–450)
TRANSFERRIN SERPL-MCNC: 280 MG/DL (ref 200–375)

## 2019-12-18 LAB
PATHOLOGIST INTERPRETATION IFE: NORMAL
PATHOLOGIST INTERPRETATION SPE: NORMAL

## 2019-12-27 DIAGNOSIS — J31.0 CHRONIC RHINITIS: ICD-10-CM

## 2019-12-27 RX ORDER — METHSCOPOLAMINE BROMIDE 2.5 MG/1
TABLET ORAL
Qty: 180 TABLET | Refills: 4 | Status: CANCELLED | OUTPATIENT
Start: 2019-12-27

## 2020-01-03 ENCOUNTER — OFFICE VISIT (OUTPATIENT)
Dept: ALLERGY | Facility: CLINIC | Age: 65
End: 2020-01-03
Payer: MEDICARE

## 2020-01-03 ENCOUNTER — CLINICAL SUPPORT (OUTPATIENT)
Dept: SMOKING CESSATION | Facility: CLINIC | Age: 65
End: 2020-01-03
Payer: COMMERCIAL

## 2020-01-03 VITALS
SYSTOLIC BLOOD PRESSURE: 123 MMHG | HEIGHT: 68 IN | DIASTOLIC BLOOD PRESSURE: 77 MMHG | WEIGHT: 244.69 LBS | BODY MASS INDEX: 37.08 KG/M2 | HEART RATE: 61 BPM | TEMPERATURE: 96 F

## 2020-01-03 DIAGNOSIS — F17.200 NICOTINE DEPENDENCE: Primary | ICD-10-CM

## 2020-01-03 DIAGNOSIS — R09.81 NASAL CONGESTION: ICD-10-CM

## 2020-01-03 DIAGNOSIS — J31.0 CHRONIC RHINITIS: Primary | ICD-10-CM

## 2020-01-03 PROCEDURE — 99999 PR PBB SHADOW E&M-EST. PATIENT-LVL III: CPT | Mod: PBBFAC,,, | Performed by: ALLERGY & IMMUNOLOGY

## 2020-01-03 PROCEDURE — 3074F SYST BP LT 130 MM HG: CPT | Mod: CPTII,S$GLB,, | Performed by: ALLERGY & IMMUNOLOGY

## 2020-01-03 PROCEDURE — 99404 PR PREVENT COUNSEL,INDIV,60 MIN: ICD-10-PCS | Mod: S$GLB,,, | Performed by: GENERAL PRACTICE

## 2020-01-03 PROCEDURE — 99999 PR PBB SHADOW E&M-EST. PATIENT-LVL I: ICD-10-PCS | Mod: PBBFAC,,,

## 2020-01-03 PROCEDURE — 99999 PR PBB SHADOW E&M-EST. PATIENT-LVL I: CPT | Mod: PBBFAC,,,

## 2020-01-03 PROCEDURE — 3074F PR MOST RECENT SYSTOLIC BLOOD PRESSURE < 130 MM HG: ICD-10-PCS | Mod: CPTII,S$GLB,, | Performed by: ALLERGY & IMMUNOLOGY

## 2020-01-03 PROCEDURE — 99214 PR OFFICE/OUTPT VISIT, EST, LEVL IV, 30-39 MIN: ICD-10-PCS | Mod: S$GLB,,, | Performed by: ALLERGY & IMMUNOLOGY

## 2020-01-03 PROCEDURE — 99404 PREV MED CNSL INDIV APPRX 60: CPT | Mod: S$GLB,,, | Performed by: GENERAL PRACTICE

## 2020-01-03 PROCEDURE — 99214 OFFICE O/P EST MOD 30 MIN: CPT | Mod: S$GLB,,, | Performed by: ALLERGY & IMMUNOLOGY

## 2020-01-03 PROCEDURE — 3078F DIAST BP <80 MM HG: CPT | Mod: CPTII,S$GLB,, | Performed by: ALLERGY & IMMUNOLOGY

## 2020-01-03 PROCEDURE — 3008F PR BODY MASS INDEX (BMI) DOCUMENTED: ICD-10-PCS | Mod: CPTII,S$GLB,, | Performed by: ALLERGY & IMMUNOLOGY

## 2020-01-03 PROCEDURE — 3078F PR MOST RECENT DIASTOLIC BLOOD PRESSURE < 80 MM HG: ICD-10-PCS | Mod: CPTII,S$GLB,, | Performed by: ALLERGY & IMMUNOLOGY

## 2020-01-03 PROCEDURE — 3008F BODY MASS INDEX DOCD: CPT | Mod: CPTII,S$GLB,, | Performed by: ALLERGY & IMMUNOLOGY

## 2020-01-03 PROCEDURE — 99999 PR PBB SHADOW E&M-EST. PATIENT-LVL III: ICD-10-PCS | Mod: PBBFAC,,, | Performed by: ALLERGY & IMMUNOLOGY

## 2020-01-03 RX ORDER — FLUTICASONE PROPIONATE 50 MCG
1 SPRAY, SUSPENSION (ML) NASAL DAILY
Qty: 16 G | Refills: 3 | Status: SHIPPED | OUTPATIENT
Start: 2020-01-03 | End: 2021-05-13 | Stop reason: SDUPTHER

## 2020-01-03 RX ORDER — METHSCOPOLAMINE BROMIDE 2.5 MG/1
TABLET ORAL
Qty: 180 TABLET | Refills: 4 | Status: SHIPPED | OUTPATIENT
Start: 2020-01-03 | End: 2021-07-13 | Stop reason: SDUPTHER

## 2020-01-03 NOTE — PROGRESS NOTES
Subjective:       Patient ID: Stefan Forbes Jr. is a 64 y.o. male.    Chief Complaint:  Sinus Problem      HPI:  64 year old male- previous patient of Dr. Wong. He is here for follow up. He would like to continue current medications. Per Mr. Forbes, allergy testing was negative. He reports that methylscopamine and flonase have decreased his nasal congestion and mucus production tremendously. He has stopped smoking. He denies itchy or watery eyes.          Past Medical History:   Diagnosis Date    COPD (chronic obstructive pulmonary disease)     Diabetes mellitus, type 2     Hyperlipidemia     Hypertension      Family History   Problem Relation Age of Onset    Diabetes Mother     Cancer Father         bone     Current Outpatient Medications on File Prior to Visit   Medication Sig Dispense Refill    acetaminophen (TYLENOL) 500 MG tablet Take 1,000 mg by mouth as needed.       albuterol (ACCUNEB) 0.63 mg/3 mL Nebu Take 0.63 mg by nebulization every 6 (six) hours as needed. Rescue      albuterol (PROAIR HFA) 90 mcg/actuation inhaler Inhale 2 puffs into the lungs every 6 (six) hours as needed for Wheezing or Shortness of Breath. Rescue 18 g 3    amLODIPine (NORVASC) 5 MG tablet Take 1 tablet (5 mg total) by mouth once daily. 30 tablet 11    aspirin 325 MG tablet Take 325 mg by mouth once daily.      buPROPion (WELLBUTRIN SR) 150 MG TBSR 12 hr tablet Take 1 tablet (150 mg total) by mouth 2 (two) times daily. 60 tablet 2    cetirizine (ZYRTEC) 10 MG tablet Take 10 mg by mouth every evening.      cholecalciferol, vitamin D3, (VITAMIN D3) 1,000 unit capsule Take 1,000 Units by mouth once daily.      cyanocobalamin (VITAMIN B-12) 500 MCG tablet Take 500 mcg by mouth once daily.      fluticasone-umeclidin-vilanter (TRELEGY ELLIPTA) 100-62.5-25 mcg DsDv Inhale 1 puff into the lungs once daily. 60 each 11    guaifenesin (MUCINEX) 1,200 mg Ta12 Take 1 tablet by mouth 2 (two) times daily.       ipratropium (ATROVENT) 0.03 % nasal spray Spray 1 or 2 sprays each nasal passage every 8 hours, if needed, as directed. 30 mL 12    metFORMIN (GLUCOPHAGE) 1000 MG tablet TAKE ONE TABLET BY MOUTH TWICE DAILY WITH MEALS 180 tablet 2    nicotine polacrilex 2 MG Lozg Take 1 lozenge (2 mg total) by mouth as needed (use no more than 10 lozenges in 24 hours). 270 lozenge 2    nitroGLYCERIN (NITROSTAT) 0.4 MG SL tablet Place 0.4 mg under the tongue every 5 (five) minutes as needed.       omeprazole (PRILOSEC) 40 MG capsule TAKE ONE CAPSULE BY MOUTH ONCE DAILY 90 capsule 1    rosuvastatin (CRESTOR) 10 MG tablet TAKE ONE TABLET BY MOUTH ONCE DAILY 90 tablet 1    tamsulosin (FLOMAX) 0.4 mg Cap Take 1 capsule by mouth at bedtime 90 capsule 4    TRULICITY 0.75 mg/0.5 mL PnIj INJECT CONTENTS OF 1 SYRINGE (0.75 MG TOTAL)  SUBCUTANEOUSLY ONCE EVERY 7 DAYS 12 Syringe 3    [DISCONTINUED] methscopolamine (PAMINE) 2.5 mg Tab Take 1 tablet by mouth every 12 hours, as directed, if needed 180 tablet 4    azithromycin (Z-CHARLES) 250 MG tablet Take 2 tablets by mouth on day 1; Then Take 1 tablet by mouth on days 2 through 5 (Patient not taking: Reported on 1/3/2020) 6 tablet 3    blood sugar diagnostic (BLOOD GLUCOSE TEST) Strp 1 strip by Misc.(Non-Drug; Combo Route) route 3 (three) times daily. 200 each 1    blood-glucose meter Misc Use as directed to check glucose levels. 1 each 0    lancets Misc 1 lancet by Misc.(Non-Drug; Combo Route) route 3 (three) times daily. 200 each 0    methylPREDNISolone (MEDROL DOSEPACK) 4 mg tablet use as directed (Patient not taking: Reported on 1/3/2020) 1 Package 3    sodium chloride (OCEAN NASAL) 0.65 % nasal spray 1 spray by Nasal route as needed for Congestion. (Patient not taking: Reported on 1/3/2020) 1 Bottle 12    [DISCONTINUED] fluticasone propionate (FLONASE) 50 mcg/actuation nasal spray Use 1 spray (50 mcg total) in each nostril once daily. (Patient not taking: Reported on 1/3/2020) 16  g 3     No current facility-administered medications on file prior to visit.      Environmental History: Dogs and one cat  Review of Systems   Constitutional: Negative for chills and fever.   HENT: Negative for congestion and rhinorrhea.    Eyes: Negative for discharge and itching.   Respiratory: Negative for shortness of breath and wheezing.    Cardiovascular: Negative for chest pain and leg swelling.   Gastrointestinal: Negative for nausea and vomiting.   Endocrine: Negative for cold intolerance and heat intolerance.   Musculoskeletal: Negative for gait problem and joint swelling.   Skin: Negative for rash and wound.   Allergic/Immunologic: Negative for environmental allergies and food allergies.   Neurological: Negative for dizziness and light-headedness.   Hematological: Negative for adenopathy. Bruises/bleeds easily.   Psychiatric/Behavioral: Negative for confusion. The patient is not nervous/anxious.         Objective:    Physical Exam   Constitutional: He is oriented to person, place, and time. He appears well-developed and well-nourished. No distress.   HENT:   Head: Normocephalic and atraumatic.   Right Ear: External ear normal.   Left Ear: External ear normal.   Nose: Nose normal.   Mouth/Throat: Oropharynx is clear and moist.   Eyes: Pupils are equal, round, and reactive to light. EOM are normal. Right eye exhibits no discharge. Left eye exhibits no discharge. No scleral icterus.   Neck: Normal range of motion. Neck supple. No tracheal deviation present. No thyromegaly present.   Cardiovascular: Normal rate, regular rhythm, normal heart sounds and intact distal pulses. Exam reveals no gallop and no friction rub.   No murmur heard.  Pulmonary/Chest: Effort normal and breath sounds normal. No stridor. No respiratory distress. He has no wheezes. He has no rales.   Abdominal: Soft. Bowel sounds are normal. He exhibits no distension and no mass. There is no tenderness. There is no guarding.   Musculoskeletal:  Normal range of motion.   Lymphadenopathy:     He has no cervical adenopathy.   Neurological: He is alert and oriented to person, place, and time.   Skin: Skin is warm and dry. He is not diaphoretic.   Psychiatric: He has a normal mood and affect. His behavior is normal. Judgment and thought content normal.   Vitals reviewed.        Assessment:       1. Chronic rhinitis    2. Nasal congestion         Plan:     Chronic rhinitis  -     methscopolamine (PAMINE) 2.5 mg Tab; Take 1 tablet by mouth every 12 hours, as directed, if needed  Dispense: 180 tablet; Refill: 4    Nasal congestion  -     fluticasone propionate (FLONASE) 50 mcg/actuation nasal spray; Use 1 spray (50 mcg total) in each nostril once daily.  Dispense: 16 g; Refill: 3    RTC 1 year or sooner, if needed

## 2020-01-03 NOTE — PROGRESS NOTES
Individual Follow-Up Form    1/3/2020    Clinical Status of Patient: Outpatient    Length of Service: 60 minutes    Continuing Medication: yes  Wellbutrin    Other Medications: nicotine lozenges as needed     Comments:Patient was seen today for a smoking cessation progress update. He stated that he smoked a cigar on New Years [place] at a party. Her felt guilty the next day and states that he is not sure why he gave into temptation. The patient remains on the prescribed tobacco cessation medication regimen of 150 mg Wellbutrin BID without any negative side effects at this time. He is using the nicotine lozenges sparingly when he has intense urges at home. The patient denies any abnormal behavioral or mental changes at this time. Discussed coping strategies for intense urges. He verbalized understanding. Will continue to encourage and monitor progress. Discussed expiration of benefits. He is interested in renewing benefits when available.     Diagnosis: F17.200    Next Visit: 2 weeks

## 2020-01-14 DIAGNOSIS — F17.200 NICOTINE DEPENDENCE: ICD-10-CM

## 2020-01-14 DIAGNOSIS — J31.0 CHRONIC RHINITIS: ICD-10-CM

## 2020-01-14 RX ORDER — BUPROPION HYDROCHLORIDE 150 MG/1
150 TABLET, EXTENDED RELEASE ORAL 2 TIMES DAILY
Qty: 60 TABLET | Refills: 2 | Status: SHIPPED | OUTPATIENT
Start: 2020-01-14 | End: 2020-04-13

## 2020-01-14 RX ORDER — IPRATROPIUM BROMIDE 21 UG/1
SPRAY, METERED NASAL
Qty: 30 ML | Refills: 12 | Status: SHIPPED | OUTPATIENT
Start: 2020-01-14 | End: 2021-07-13 | Stop reason: SDUPTHER

## 2020-01-16 ENCOUNTER — CLINICAL SUPPORT (OUTPATIENT)
Dept: SMOKING CESSATION | Facility: CLINIC | Age: 65
End: 2020-01-16
Payer: COMMERCIAL

## 2020-01-16 ENCOUNTER — TELEPHONE (OUTPATIENT)
Dept: SMOKING CESSATION | Facility: CLINIC | Age: 65
End: 2020-01-16

## 2020-01-16 DIAGNOSIS — F17.200 NICOTINE DEPENDENCE: Primary | ICD-10-CM

## 2020-01-16 PROCEDURE — 99407 PR TOBACCO USE CESSATION INTENSIVE >10 MINUTES: ICD-10-PCS | Mod: S$GLB,,, | Performed by: GENERAL PRACTICE

## 2020-01-16 PROCEDURE — 99407 BEHAV CHNG SMOKING > 10 MIN: CPT | Mod: S$GLB,,, | Performed by: GENERAL PRACTICE

## 2020-01-16 RX ORDER — VARENICLINE TARTRATE 0.5 (11)-1
KIT ORAL
Qty: 53 TABLET | Refills: 0 | Status: SHIPPED | OUTPATIENT
Start: 2020-01-16 | End: 2020-02-11 | Stop reason: ALTCHOICE

## 2020-01-28 ENCOUNTER — CLINICAL SUPPORT (OUTPATIENT)
Dept: SMOKING CESSATION | Facility: CLINIC | Age: 65
End: 2020-01-28
Payer: COMMERCIAL

## 2020-01-28 DIAGNOSIS — F17.200 NICOTINE DEPENDENCE: Primary | ICD-10-CM

## 2020-01-28 PROCEDURE — 99404 PREV MED CNSL INDIV APPRX 60: CPT | Mod: S$GLB,,, | Performed by: GENERAL PRACTICE

## 2020-01-28 PROCEDURE — 99404 PR PREVENT COUNSEL,INDIV,60 MIN: ICD-10-PCS | Mod: S$GLB,,, | Performed by: GENERAL PRACTICE

## 2020-01-28 NOTE — PROGRESS NOTES
"Individual Follow-Up Form    1/28/2020    Clinical Status of Patient: Outpatient    Length of Service: 60 minutes    Continuing Medication: yes  Chantix    Other Medications: Wellbutrin     Comments: Patient was seen today for a smoking cessation progress update. The patient remains on the prescribed tobacco cessation medication regimen of 1 mg Chantix BID without any negative side effects at this time. The patient denies any abnormal behavioral or mental changes at this time. He states that he has been told by his wife that he seems more "quiet" than usual. He says that he feels more calm. He is having an occasional cigarette and has not used the nicotine lozenges since starting Chantix. Discussed a no smoking challenges and coping strategies. He verbalized understanding. Will continue to encourage and monitor progress.     Diagnosis: F17.200    Next Visit: 2 weeks  "

## 2020-01-30 ENCOUNTER — OFFICE VISIT (OUTPATIENT)
Dept: INTERNAL MEDICINE | Facility: CLINIC | Age: 65
End: 2020-01-30
Payer: MEDICARE

## 2020-01-30 VITALS
OXYGEN SATURATION: 97 % | HEART RATE: 68 BPM | DIASTOLIC BLOOD PRESSURE: 72 MMHG | BODY MASS INDEX: 37.46 KG/M2 | SYSTOLIC BLOOD PRESSURE: 132 MMHG | HEIGHT: 68 IN | TEMPERATURE: 98 F | WEIGHT: 247.13 LBS

## 2020-01-30 DIAGNOSIS — J44.1 COPD EXACERBATION: Primary | ICD-10-CM

## 2020-01-30 DIAGNOSIS — J32.9 SINUSITIS, UNSPECIFIED CHRONICITY, UNSPECIFIED LOCATION: ICD-10-CM

## 2020-01-30 DIAGNOSIS — R05.9 COUGH: ICD-10-CM

## 2020-01-30 PROCEDURE — 3008F PR BODY MASS INDEX (BMI) DOCUMENTED: ICD-10-PCS | Mod: CPTII,S$GLB,, | Performed by: INTERNAL MEDICINE

## 2020-01-30 PROCEDURE — 3008F BODY MASS INDEX DOCD: CPT | Mod: CPTII,S$GLB,, | Performed by: INTERNAL MEDICINE

## 2020-01-30 PROCEDURE — 99214 PR OFFICE/OUTPT VISIT, EST, LEVL IV, 30-39 MIN: ICD-10-PCS | Mod: S$GLB,,, | Performed by: INTERNAL MEDICINE

## 2020-01-30 PROCEDURE — 99999 PR PBB SHADOW E&M-EST. PATIENT-LVL III: CPT | Mod: PBBFAC,,, | Performed by: INTERNAL MEDICINE

## 2020-01-30 PROCEDURE — 99214 OFFICE O/P EST MOD 30 MIN: CPT | Mod: S$GLB,,, | Performed by: INTERNAL MEDICINE

## 2020-01-30 PROCEDURE — 3078F PR MOST RECENT DIASTOLIC BLOOD PRESSURE < 80 MM HG: ICD-10-PCS | Mod: CPTII,S$GLB,, | Performed by: INTERNAL MEDICINE

## 2020-01-30 PROCEDURE — 3075F PR MOST RECENT SYSTOLIC BLOOD PRESS GE 130-139MM HG: ICD-10-PCS | Mod: CPTII,S$GLB,, | Performed by: INTERNAL MEDICINE

## 2020-01-30 PROCEDURE — 3075F SYST BP GE 130 - 139MM HG: CPT | Mod: CPTII,S$GLB,, | Performed by: INTERNAL MEDICINE

## 2020-01-30 PROCEDURE — 3078F DIAST BP <80 MM HG: CPT | Mod: CPTII,S$GLB,, | Performed by: INTERNAL MEDICINE

## 2020-01-30 PROCEDURE — 99999 PR PBB SHADOW E&M-EST. PATIENT-LVL III: ICD-10-PCS | Mod: PBBFAC,,, | Performed by: INTERNAL MEDICINE

## 2020-01-30 RX ORDER — AZITHROMYCIN 250 MG/1
TABLET, FILM COATED ORAL
Qty: 6 TABLET | Refills: 0 | Status: SHIPPED | OUTPATIENT
Start: 2020-01-30 | End: 2020-02-01

## 2020-01-30 RX ORDER — PREDNISONE 50 MG/1
50 TABLET ORAL DAILY
Qty: 5 TABLET | Refills: 0 | Status: SHIPPED | OUTPATIENT
Start: 2020-01-30 | End: 2020-09-09 | Stop reason: ALTCHOICE

## 2020-01-30 RX ORDER — BENZONATATE 200 MG/1
200 CAPSULE ORAL 3 TIMES DAILY PRN
Qty: 30 CAPSULE | Refills: 0 | Status: SHIPPED | OUTPATIENT
Start: 2020-01-30 | End: 2020-02-09

## 2020-01-30 RX ORDER — DOXYCYCLINE HYCLATE 100 MG
100 TABLET ORAL EVERY 12 HOURS
Qty: 14 TABLET | Refills: 0 | Status: SHIPPED | OUTPATIENT
Start: 2020-01-30 | End: 2020-02-06

## 2020-02-01 NOTE — PROGRESS NOTES
Subjective:      Patient ID: Stefan Forbes Jr. is a 64 y.o. male.    Chief Complaint: conjestion (tighness in chest, x 3 day, sore throat off and on )    Cough   This is a new problem. The current episode started in the past 7 days. The problem has been gradually worsening. The problem occurs hourly. The cough is non-productive. Associated symptoms include nasal congestion, postnasal drip, rhinorrhea, a sore throat and wheezing. Pertinent negatives include no chest pain, chills, ear congestion, ear pain, fever, hemoptysis, rash, shortness of breath or sweats. Nothing aggravates the symptoms. Treatments tried: mucinex. The treatment provided mild relief. His past medical history is significant for COPD.      65 yo with   Patient Active Problem List   Diagnosis    Controlled type 2 diabetes mellitus without complication, without long-term current use of insulin    Hypertension associated with diabetes    Hyperlipidemia associated with type 2 diabetes mellitus    Moderate COPD (chronic obstructive pulmonary disease)    CAD (coronary artery disease)    Vitamin D deficiency    Osteoarthritis of knee    Pulmonary nodule    Nevus of choroid of right eye    History of pulmonary embolism    Hyponatremia    Iron deficiency anemia due to chronic blood loss    MACHO on CPAP    Nocturnal oxygen desaturation    PLMD (periodic limb movement disorder)    BMI 35.0-35.9,adult    Varicose vein of leg    Tobacco abuse, in remission     Past Medical History:   Diagnosis Date    COPD (chronic obstructive pulmonary disease)     Diabetes mellitus, type 2     Hyperlipidemia     Hypertension      Here today c/o cough    Review of Systems   Constitutional: Negative for chills and fever.   HENT: Positive for congestion, postnasal drip, rhinorrhea, sinus pressure and sore throat. Negative for ear pain, sinus pain, trouble swallowing and voice change.    Respiratory: Positive for cough, chest tightness and wheezing.  "Negative for hemoptysis and shortness of breath.    Cardiovascular: Negative for chest pain, palpitations and leg swelling.   Skin: Negative for rash and wound.     Objective:   /72 (BP Location: Left arm, Patient Position: Sitting, BP Method: Large (Manual))   Pulse 68   Temp 98.2 °F (36.8 °C) (Tympanic)   Ht 5' 8" (1.727 m)   Wt 112.1 kg (247 lb 2.2 oz)   SpO2 97%   BMI 37.58 kg/m²     Physical Exam   Constitutional: He is oriented to person, place, and time. He appears well-developed and well-nourished. No distress.   HENT:   Head: Normocephalic and atraumatic.   Right Ear: Tympanic membrane normal.   Left Ear: Tympanic membrane normal.   Nose: Mucosal edema and rhinorrhea present. Right sinus exhibits no maxillary sinus tenderness and no frontal sinus tenderness. Left sinus exhibits no maxillary sinus tenderness and no frontal sinus tenderness.   Mouth/Throat: Oropharynx is clear and moist. No oropharyngeal exudate or posterior oropharyngeal edema.   Eyes: Pupils are equal, round, and reactive to light. Conjunctivae and EOM are normal.   Neck: Neck supple. No thyromegaly present.   Cardiovascular: Normal rate and regular rhythm.   Pulmonary/Chest: Effort normal and breath sounds normal. He has no wheezes. He has no rales.   Abdominal: Soft. Bowel sounds are normal. There is no tenderness.   Musculoskeletal: He exhibits no edema.   Lymphadenopathy:     He has no cervical adenopathy.   Neurological: He is alert and oriented to person, place, and time.   Skin: Skin is warm and dry. No rash noted.   Psychiatric: He has a normal mood and affect. His behavior is normal.       Assessment:     1. COPD exacerbation    2. Sinusitis, unspecified chronicity, unspecified location    3. Cough      Plan:   COPD exacerbation    Sinusitis, unspecified chronicity, unspecified location    Cough  -     benzonatate (TESSALON) 200 MG capsule; Take 1 capsule (200 mg total) by mouth 3 (three) times daily as needed for " Cough.  Dispense: 30 capsule; Refill: 0    Other orders  -     Discontinue: azithromycin (Z-CHARLES) 250 MG tablet; Take 2 tablets by mouth on day 1; Take 1 tablet by mouth on days 2-5  Dispense: 6 tablet; Refill: 0  -     predniSONE (DELTASONE) 50 MG Tab; Take 1 tablet (50 mg total) by mouth once daily.  Dispense: 5 tablet; Refill: 0  -     doxycycline (VIBRA-TABS) 100 MG tablet; Take 1 tablet (100 mg total) by mouth every 12 (twelve) hours. for 7 days  Dispense: 14 tablet; Refill: 0        Lab Frequency Next Occurrence   Microalbumin/creatinine urine ratio Once 03/04/2019   X-Ray Chest PA And Lateral Once 02/21/2019   Hemoglobin A1c Once 02/24/2020   X-Ray Chest PA And Lateral Once 10/14/2019       Problem List Items Addressed This Visit     None      Visit Diagnoses     COPD exacerbation    -  Primary    Sinusitis, unspecified chronicity, unspecified location        Cough        Relevant Medications    benzonatate (TESSALON) 200 MG capsule          Follow up if symptoms worsen or fail to improve.

## 2020-02-11 ENCOUNTER — TELEPHONE (OUTPATIENT)
Dept: INTERNAL MEDICINE | Facility: CLINIC | Age: 65
End: 2020-02-11

## 2020-02-11 DIAGNOSIS — F17.200 NICOTINE DEPENDENCE: Primary | ICD-10-CM

## 2020-02-11 RX ORDER — VARENICLINE TARTRATE 1 MG/1
1 TABLET, FILM COATED ORAL 2 TIMES DAILY
Qty: 60 TABLET | Refills: 2 | Status: SHIPPED | OUTPATIENT
Start: 2020-02-11 | End: 2020-09-09 | Stop reason: ALTCHOICE

## 2020-02-11 NOTE — TELEPHONE ENCOUNTER
----- Message from Brianda Can sent at 2/11/2020  8:23 AM CST -----  ..Type:  Patient Returning Call    Who Called:Janis ( pt wife )   Who Left Message for Patient:  Does the patient know what this is regarding?:  Would the patient rather a call back or a response via [facility]? Call back   Best Call Back Number: [phone]  Additional Information: Janis ( pt wife ) is requesting a call from nurse to discuss prescription to be filed for najma.        .. [facility]  [id number] [place] [id number]  Phone: [phone] Fax: [phone]

## 2020-02-11 NOTE — TELEPHONE ENCOUNTER
Spoke with pts wife and she said that pt is almost out of starter box of Chantix and the lady over the smoking cessation is going to be out a week and he needs the next box of Chantix filled that is required.

## 2020-02-18 ENCOUNTER — CLINICAL SUPPORT (OUTPATIENT)
Dept: SMOKING CESSATION | Facility: CLINIC | Age: 65
End: 2020-02-18
Payer: COMMERCIAL

## 2020-02-18 DIAGNOSIS — F17.200 NICOTINE DEPENDENCE: Primary | ICD-10-CM

## 2020-02-18 PROCEDURE — 99404 PREV MED CNSL INDIV APPRX 60: CPT | Mod: S$GLB,,, | Performed by: GENERAL PRACTICE

## 2020-02-18 PROCEDURE — 99404 PR PREVENT COUNSEL,INDIV,60 MIN: ICD-10-PCS | Mod: S$GLB,,, | Performed by: GENERAL PRACTICE

## 2020-02-18 NOTE — PROGRESS NOTES
Individual Follow-Up Form    2/18/2020    Quit Date: 2-4-2020    Clinical Status of Patient: Outpatient    Length of Service: 60 minutes    Continuing Medication: yes  Chantix    Comments: Patient was seen today for a smoking cessation progress update. He has successfully reached his target quit date on 2-4-2020. The patient remains on the prescribed tobacco cessation medication regimen of 1 mg Chantix BID without any negative side effects at this time. He states that he has no desire to smoke but has occasional thoughts about smoking. He states that he is sleeping well with an increased appetite. He states that he is trying to eat healthy. The patient denies any abnormal behavioral or mental changes at this time. Discussed medication timeframe. He states that he is not ready to wean off of Chantix. Will continue to encourage and monitor progress.    Diagnosis: F17.200    Next Visit: 2 weeks

## 2020-02-27 ENCOUNTER — PATIENT OUTREACH (OUTPATIENT)
Dept: ADMINISTRATIVE | Facility: HOSPITAL | Age: 65
End: 2020-02-27

## 2020-02-28 ENCOUNTER — LAB VISIT (OUTPATIENT)
Dept: LAB | Facility: HOSPITAL | Age: 65
End: 2020-02-28
Attending: INTERNAL MEDICINE
Payer: MEDICARE

## 2020-02-28 DIAGNOSIS — E11.9 CONTROLLED TYPE 2 DIABETES MELLITUS WITHOUT COMPLICATION, WITHOUT LONG-TERM CURRENT USE OF INSULIN: ICD-10-CM

## 2020-02-28 LAB
ALBUMIN/CREAT UR: 53.1 UG/MG (ref 0–30)
CREAT UR-MCNC: 64 MG/DL (ref 23–375)
ESTIMATED AVG GLUCOSE: 143 MG/DL (ref 68–131)
HBA1C MFR BLD HPLC: 6.6 % (ref 4–5.6)
MICROALBUMIN UR DL<=1MG/L-MCNC: 34 UG/ML

## 2020-02-28 PROCEDURE — 36415 COLL VENOUS BLD VENIPUNCTURE: CPT

## 2020-02-28 PROCEDURE — 82043 UR ALBUMIN QUANTITATIVE: CPT

## 2020-02-28 PROCEDURE — 83036 HEMOGLOBIN GLYCOSYLATED A1C: CPT

## 2020-03-05 ENCOUNTER — OFFICE VISIT (OUTPATIENT)
Dept: INTERNAL MEDICINE | Facility: CLINIC | Age: 65
End: 2020-03-05
Payer: MEDICARE

## 2020-03-05 ENCOUNTER — LAB VISIT (OUTPATIENT)
Dept: LAB | Facility: HOSPITAL | Age: 65
End: 2020-03-05
Attending: INTERNAL MEDICINE
Payer: MEDICARE

## 2020-03-05 ENCOUNTER — CLINICAL SUPPORT (OUTPATIENT)
Dept: SMOKING CESSATION | Facility: CLINIC | Age: 65
End: 2020-03-05
Payer: COMMERCIAL

## 2020-03-05 VITALS
OXYGEN SATURATION: 98 % | DIASTOLIC BLOOD PRESSURE: 85 MMHG | SYSTOLIC BLOOD PRESSURE: 134 MMHG | BODY MASS INDEX: 36.82 KG/M2 | HEIGHT: 68 IN | WEIGHT: 242.94 LBS | TEMPERATURE: 98 F | HEART RATE: 68 BPM

## 2020-03-05 DIAGNOSIS — F17.200 NICOTINE DEPENDENCE: Primary | ICD-10-CM

## 2020-03-05 DIAGNOSIS — R42 ORTHOSTATIC DIZZINESS: ICD-10-CM

## 2020-03-05 DIAGNOSIS — Z00.00 ROUTINE GENERAL MEDICAL EXAMINATION AT A HEALTH CARE FACILITY: Primary | ICD-10-CM

## 2020-03-05 DIAGNOSIS — I25.10 CORONARY ARTERY DISEASE INVOLVING NATIVE CORONARY ARTERY OF NATIVE HEART WITHOUT ANGINA PECTORIS: ICD-10-CM

## 2020-03-05 DIAGNOSIS — E11.59 HYPERTENSION ASSOCIATED WITH DIABETES: ICD-10-CM

## 2020-03-05 DIAGNOSIS — R91.1 PULMONARY NODULE: ICD-10-CM

## 2020-03-05 DIAGNOSIS — E11.69 HYPERLIPIDEMIA ASSOCIATED WITH TYPE 2 DIABETES MELLITUS: ICD-10-CM

## 2020-03-05 DIAGNOSIS — I15.2 HYPERTENSION ASSOCIATED WITH DIABETES: ICD-10-CM

## 2020-03-05 DIAGNOSIS — E87.1 HYPONATREMIA: ICD-10-CM

## 2020-03-05 DIAGNOSIS — F17.201 TOBACCO ABUSE, IN REMISSION: ICD-10-CM

## 2020-03-05 DIAGNOSIS — E78.5 HYPERLIPIDEMIA ASSOCIATED WITH TYPE 2 DIABETES MELLITUS: ICD-10-CM

## 2020-03-05 DIAGNOSIS — J44.9 MODERATE COPD (CHRONIC OBSTRUCTIVE PULMONARY DISEASE): ICD-10-CM

## 2020-03-05 DIAGNOSIS — Z12.5 ENCOUNTER FOR SCREENING FOR MALIGNANT NEOPLASM OF PROSTATE: ICD-10-CM

## 2020-03-05 DIAGNOSIS — E11.9 CONTROLLED TYPE 2 DIABETES MELLITUS WITHOUT COMPLICATION, WITHOUT LONG-TERM CURRENT USE OF INSULIN: ICD-10-CM

## 2020-03-05 LAB
ALBUMIN SERPL BCP-MCNC: 4.4 G/DL (ref 3.5–5.2)
ALP SERPL-CCNC: 60 U/L (ref 55–135)
ALT SERPL W/O P-5'-P-CCNC: 25 U/L (ref 10–44)
ANION GAP SERPL CALC-SCNC: 9 MMOL/L (ref 8–16)
AST SERPL-CCNC: 16 U/L (ref 10–40)
BASOPHILS # BLD AUTO: 0.04 K/UL (ref 0–0.2)
BASOPHILS NFR BLD: 0.5 % (ref 0–1.9)
BILIRUB SERPL-MCNC: 0.3 MG/DL (ref 0.1–1)
BUN SERPL-MCNC: 11 MG/DL (ref 8–23)
CALCIUM SERPL-MCNC: 9.6 MG/DL (ref 8.7–10.5)
CHLORIDE SERPL-SCNC: 97 MMOL/L (ref 95–110)
CO2 SERPL-SCNC: 26 MMOL/L (ref 23–29)
CREAT SERPL-MCNC: 1 MG/DL (ref 0.5–1.4)
DIFFERENTIAL METHOD: ABNORMAL
EOSINOPHIL # BLD AUTO: 0.1 K/UL (ref 0–0.5)
EOSINOPHIL NFR BLD: 1.6 % (ref 0–8)
ERYTHROCYTE [DISTWIDTH] IN BLOOD BY AUTOMATED COUNT: 12 % (ref 11.5–14.5)
EST. GFR  (AFRICAN AMERICAN): >60 ML/MIN/1.73 M^2
EST. GFR  (NON AFRICAN AMERICAN): >60 ML/MIN/1.73 M^2
GLUCOSE SERPL-MCNC: 115 MG/DL (ref 70–110)
HCT VFR BLD AUTO: 44.5 % (ref 40–54)
HGB BLD-MCNC: 14.2 G/DL (ref 14–18)
IMM GRANULOCYTES # BLD AUTO: 0.03 K/UL (ref 0–0.04)
IMM GRANULOCYTES NFR BLD AUTO: 0.4 % (ref 0–0.5)
LYMPHOCYTES # BLD AUTO: 1.8 K/UL (ref 1–4.8)
LYMPHOCYTES NFR BLD: 21.5 % (ref 18–48)
MCH RBC QN AUTO: 30.6 PG (ref 27–31)
MCHC RBC AUTO-ENTMCNC: 31.9 G/DL (ref 32–36)
MCV RBC AUTO: 96 FL (ref 82–98)
MONOCYTES # BLD AUTO: 0.9 K/UL (ref 0.3–1)
MONOCYTES NFR BLD: 10.6 % (ref 4–15)
NEUTROPHILS # BLD AUTO: 5.6 K/UL (ref 1.8–7.7)
NEUTROPHILS NFR BLD: 65.4 % (ref 38–73)
NRBC BLD-RTO: 0 /100 WBC
PLATELET # BLD AUTO: 284 K/UL (ref 150–350)
PMV BLD AUTO: 10.3 FL (ref 9.2–12.9)
POTASSIUM SERPL-SCNC: 5 MMOL/L (ref 3.5–5.1)
PROT SERPL-MCNC: 7.6 G/DL (ref 6–8.4)
RBC # BLD AUTO: 4.64 M/UL (ref 4.6–6.2)
SODIUM SERPL-SCNC: 132 MMOL/L (ref 136–145)
TSH SERPL DL<=0.005 MIU/L-ACNC: 0.94 UIU/ML (ref 0.4–4)
WBC # BLD AUTO: 8.56 K/UL (ref 3.9–12.7)

## 2020-03-05 PROCEDURE — 3075F SYST BP GE 130 - 139MM HG: CPT | Mod: CPTII,S$GLB,, | Performed by: INTERNAL MEDICINE

## 2020-03-05 PROCEDURE — 3079F DIAST BP 80-89 MM HG: CPT | Mod: CPTII,S$GLB,, | Performed by: INTERNAL MEDICINE

## 2020-03-05 PROCEDURE — 84443 ASSAY THYROID STIM HORMONE: CPT

## 2020-03-05 PROCEDURE — 3079F PR MOST RECENT DIASTOLIC BLOOD PRESSURE 80-89 MM HG: ICD-10-PCS | Mod: CPTII,S$GLB,, | Performed by: INTERNAL MEDICINE

## 2020-03-05 PROCEDURE — 99499 RISK ADDL DX/OHS AUDIT: ICD-10-PCS | Mod: S$GLB,,, | Performed by: INTERNAL MEDICINE

## 2020-03-05 PROCEDURE — 99396 PR PREVENTIVE VISIT,EST,40-64: ICD-10-PCS | Mod: S$GLB,,, | Performed by: INTERNAL MEDICINE

## 2020-03-05 PROCEDURE — 80053 COMPREHEN METABOLIC PANEL: CPT

## 2020-03-05 PROCEDURE — 99999 PR PBB SHADOW E&M-EST. PATIENT-LVL IV: ICD-10-PCS | Mod: PBBFAC,,, | Performed by: INTERNAL MEDICINE

## 2020-03-05 PROCEDURE — 3044F PR MOST RECENT HEMOGLOBIN A1C LEVEL <7.0%: ICD-10-PCS | Mod: CPTII,S$GLB,, | Performed by: INTERNAL MEDICINE

## 2020-03-05 PROCEDURE — 36415 COLL VENOUS BLD VENIPUNCTURE: CPT

## 2020-03-05 PROCEDURE — 99402 PR PREVENT COUNSEL,INDIV,30 MIN: ICD-10-PCS | Mod: S$GLB,,, | Performed by: GENERAL PRACTICE

## 2020-03-05 PROCEDURE — 99999 PR PBB SHADOW E&M-EST. PATIENT-LVL I: ICD-10-PCS | Mod: PBBFAC,,,

## 2020-03-05 PROCEDURE — 85025 COMPLETE CBC W/AUTO DIFF WBC: CPT

## 2020-03-05 PROCEDURE — 99999 PR PBB SHADOW E&M-EST. PATIENT-LVL IV: CPT | Mod: PBBFAC,,, | Performed by: INTERNAL MEDICINE

## 2020-03-05 PROCEDURE — 99499 UNLISTED E&M SERVICE: CPT | Mod: S$GLB,,, | Performed by: INTERNAL MEDICINE

## 2020-03-05 PROCEDURE — 99999 PR PBB SHADOW E&M-EST. PATIENT-LVL I: CPT | Mod: PBBFAC,,,

## 2020-03-05 PROCEDURE — 99402 PREV MED CNSL INDIV APPRX 30: CPT | Mod: S$GLB,,, | Performed by: GENERAL PRACTICE

## 2020-03-05 PROCEDURE — 3044F HG A1C LEVEL LT 7.0%: CPT | Mod: CPTII,S$GLB,, | Performed by: INTERNAL MEDICINE

## 2020-03-05 PROCEDURE — 3075F PR MOST RECENT SYSTOLIC BLOOD PRESS GE 130-139MM HG: ICD-10-PCS | Mod: CPTII,S$GLB,, | Performed by: INTERNAL MEDICINE

## 2020-03-05 PROCEDURE — 99396 PREV VISIT EST AGE 40-64: CPT | Mod: S$GLB,,, | Performed by: INTERNAL MEDICINE

## 2020-03-05 RX ORDER — AMLODIPINE BESYLATE 2.5 MG/1
2.5 TABLET ORAL DAILY
Qty: 90 TABLET | Refills: 0 | Status: SHIPPED | OUTPATIENT
Start: 2020-03-05 | End: 2020-09-09

## 2020-03-05 NOTE — PROGRESS NOTES
"Individual Follow-Up Form    3/5/2020    Quit Date: 2-4-2020    Clinical Status of Patient: Outpatient    Length of Service: 30 minutes    Continuing Medication: yes  Chantix    Comments: Patient was seen today for a smoking cessation progress update. He remains tobacco free at this time. The patient remains on the prescribed tobacco cessation medication regimen of 1 mg Chantix BID without any negative side effects at this time. He states that he has been feeling "lightheaded" at times when he attempts to stand up. He stated that he told his PCP. Patient being followed closely for blood pressure monitoring. The patient denies any abnormal behavioral or mental changes at this time. He states that he has been having the normal stress at home which can trigger an urge to smoke. He has not had any slips nor relapses. He feels confident that he will remain tobacco free. Discussed lowering his Chantix dose at this time. Will follow up in 1 week to assess. Will continue to encourage and monitor progress.    Diagnosis: F17.200    Next Visit: 1 week  "

## 2020-03-05 NOTE — LETTER
March 5, 2020      [facility]  [address]  [place] [address]  Phone: [phone]  Fax: [phone]       Patient: Stefan Forbes   YOB: 1955  Date of Visit: 03/05/2020    To Whom It May Concern:    Cruzito Forbes  was at [facility] on 03/05/2020. He may return to work/school on 3/5/20 with no restrictions. I am not aware of any condition the would interfere with his ability to drive.  If you have any questions or concerns, or if I can be of further assistance, please do not hesitate to contact me.    Sincerely,          Wallace Fisher MD

## 2020-03-06 ENCOUNTER — TELEPHONE (OUTPATIENT)
Dept: INTERNAL MEDICINE | Facility: CLINIC | Age: 65
End: 2020-03-06

## 2020-03-06 NOTE — TELEPHONE ENCOUNTER
Informed pt that his BP medication was decreased because of the orthostatic symptoms he was experiencing. He verbalized understanding.

## 2020-03-06 NOTE — TELEPHONE ENCOUNTER
----- Message from Ramonita Sepulveda sent at 3/5/2020  4:49 PM CST -----  Contact: Mrs. Forbes   .Type:  Test Results    Who Called:  Mrs. Forbes   Name of Test (Lab/Mammo/Etc):  Labs   Date of Test:    Ordering Provider:    Where the test was performed:    Would the patient rather a call back or a response via [facility]? Call back   Best Call Back Number:  [phone] ([place])   Additional Information:

## 2020-03-20 DIAGNOSIS — K21.9 GASTROESOPHAGEAL REFLUX DISEASE, ESOPHAGITIS PRESENCE NOT SPECIFIED: ICD-10-CM

## 2020-03-20 DIAGNOSIS — E11.69 HYPERLIPIDEMIA ASSOCIATED WITH TYPE 2 DIABETES MELLITUS: ICD-10-CM

## 2020-03-20 DIAGNOSIS — E78.5 HYPERLIPIDEMIA ASSOCIATED WITH TYPE 2 DIABETES MELLITUS: ICD-10-CM

## 2020-03-20 RX ORDER — ROSUVASTATIN CALCIUM 10 MG/1
TABLET, COATED ORAL
Qty: 90 TABLET | Refills: 1 | Status: SHIPPED | OUTPATIENT
Start: 2020-03-20 | End: 2020-09-28 | Stop reason: SDUPTHER

## 2020-03-20 RX ORDER — AMLODIPINE BESYLATE 2.5 MG/1
2.5 TABLET ORAL DAILY
Qty: 90 TABLET | Refills: 0 | Status: CANCELLED | OUTPATIENT
Start: 2020-03-20 | End: 2021-03-20

## 2020-03-20 RX ORDER — METFORMIN HYDROCHLORIDE 1000 MG/1
1000 TABLET ORAL 2 TIMES DAILY WITH MEALS
Qty: 180 TABLET | Refills: 2 | Status: SHIPPED | OUTPATIENT
Start: 2020-03-20 | End: 2020-09-09 | Stop reason: SDUPTHER

## 2020-03-20 RX ORDER — OMEPRAZOLE 40 MG/1
CAPSULE, DELAYED RELEASE ORAL
Qty: 90 CAPSULE | Refills: 1 | Status: CANCELLED | OUTPATIENT
Start: 2020-03-20

## 2020-04-07 ENCOUNTER — OFFICE VISIT (OUTPATIENT)
Dept: INTERNAL MEDICINE | Facility: CLINIC | Age: 65
End: 2020-04-07
Payer: MEDICARE

## 2020-04-07 DIAGNOSIS — I15.2 HYPERTENSION ASSOCIATED WITH DIABETES: Primary | ICD-10-CM

## 2020-04-07 DIAGNOSIS — E11.59 HYPERTENSION ASSOCIATED WITH DIABETES: Primary | ICD-10-CM

## 2020-04-07 PROCEDURE — 99499 UNLISTED E&M SERVICE: CPT | Mod: HCNC,95,, | Performed by: INTERNAL MEDICINE

## 2020-04-07 PROCEDURE — 3044F HG A1C LEVEL LT 7.0%: CPT | Mod: CPTII,95,, | Performed by: INTERNAL MEDICINE

## 2020-04-07 PROCEDURE — 99213 OFFICE O/P EST LOW 20 MIN: CPT | Mod: 95,,, | Performed by: INTERNAL MEDICINE

## 2020-04-07 PROCEDURE — 99213 PR OFFICE/OUTPT VISIT, EST, LEVL III, 20-29 MIN: ICD-10-PCS | Mod: 95,,, | Performed by: INTERNAL MEDICINE

## 2020-04-07 PROCEDURE — 3044F PR MOST RECENT HEMOGLOBIN A1C LEVEL <7.0%: ICD-10-PCS | Mod: CPTII,95,, | Performed by: INTERNAL MEDICINE

## 2020-04-07 PROCEDURE — 99499 RISK ADDL DX/OHS AUDIT: ICD-10-PCS | Mod: HCNC,95,, | Performed by: INTERNAL MEDICINE

## 2020-04-07 RX ORDER — METOPROLOL SUCCINATE 25 MG/1
25 TABLET, EXTENDED RELEASE ORAL DAILY
Qty: 90 TABLET | Refills: 0 | Status: SHIPPED | OUTPATIENT
Start: 2020-04-07 | End: 2020-09-09

## 2020-04-07 NOTE — PROGRESS NOTES
Subjective:      Patient ID: Stefan Forbes Jr. is a 64 y.o. male.    Chief Complaint: Follow-up    HPI   The patient location is: home  The chief complaint leading to consultation is: htn  Visit type: Virtual visit with synchronous audio and video  Total time spent with patient: 15  Each patient to whom he or she provides medical services by telemedicine is:  (1) informed of the relationship between the physician and patient and the respective role of any other health care provider with respect to management of the patient; and (2) notified that he or she may decline to receive medical services by telemedicine and may withdraw from such care at any time.    Notes:     64 you with   Patient Active Problem List   Diagnosis    Controlled type 2 diabetes mellitus without complication, without long-term current use of insulin    Hypertension associated with diabetes    Hyperlipidemia associated with type 2 diabetes mellitus    Moderate COPD (chronic obstructive pulmonary disease)    CAD (coronary artery disease)    Vitamin D deficiency    Osteoarthritis of knee    Pulmonary nodule    Nevus of choroid of right eye    History of pulmonary embolism    Hyponatremia    Iron deficiency anemia due to chronic blood loss    MACHO on CPAP    Nocturnal oxygen desaturation    PLMD (periodic limb movement disorder)    BMI 35.0-35.9,adult    Varicose vein of leg    Tobacco abuse, in remission       No recurrent orthostatic symptoms since last visit. Has decreased bending and rapid movements.     Home bp 141/80. 140/78 124/67. 139/75. 129/71, 138/71,     132/75 today pulse 71    Chart review indicates nephrologist advised d/c lisinopril. Over 2 years ago surrounding episode of hyponatremia.     Review of Systems   Constitutional: Negative for activity change and unexpected weight change.   HENT: Negative for hearing loss, rhinorrhea and trouble swallowing.    Eyes: Negative for discharge and visual disturbance.    Respiratory: Negative for chest tightness, shortness of breath and wheezing.    Cardiovascular: Negative for chest pain and palpitations.   Gastrointestinal: Negative for blood in stool, constipation, diarrhea and vomiting.   Endocrine: Negative for polydipsia and polyuria.   Genitourinary: Negative for difficulty urinating, hematuria and urgency.   Musculoskeletal: Negative for arthralgias, joint swelling and neck pain.   Neurological: Negative for weakness and headaches.   Psychiatric/Behavioral: Negative for confusion and dysphoric mood.     Objective:   There were no vitals taken for this visit.    Physical Exam   Constitutional: He is oriented to person, place, and time. He appears well-developed and well-nourished. No distress.   Neurological: He is alert and oriented to person, place, and time.   Psychiatric: He has a normal mood and affect. His behavior is normal.       Assessment:     1. Hypertension associated with diabetes      Plan:   Hypertension associated with diabetes  -     metoprolol succinate (TOPROL-XL) 25 MG 24 hr tablet; Take 1 tablet (25 mg total) by mouth once daily.  Dispense: 90 tablet; Refill: 0    not at goal. Add metoprolol  Cont to monitor    Lab Frequency Next Occurrence   X-Ray Chest PA And Lateral Once 10/14/2019   Lipid panel Once 09/05/2020   PSA, Screening Once 09/05/2020   Hemoglobin A1c Once 09/05/2020       Problem List Items Addressed This Visit        Cardiac/Vascular    Hypertension associated with diabetes - Primary    Relevant Medications    metoprolol succinate (TOPROL-XL) 25 MG 24 hr tablet          Follow up in about 4 weeks (around 5/5/2020).

## 2020-04-10 DIAGNOSIS — K21.9 GASTROESOPHAGEAL REFLUX DISEASE, ESOPHAGITIS PRESENCE NOT SPECIFIED: ICD-10-CM

## 2020-04-13 DIAGNOSIS — F17.200 NICOTINE DEPENDENCE: ICD-10-CM

## 2020-04-13 RX ORDER — BUPROPION HYDROCHLORIDE 150 MG/1
150 TABLET, EXTENDED RELEASE ORAL 2 TIMES DAILY
Qty: 60 TABLET | Refills: 2 | Status: SHIPPED | OUTPATIENT
Start: 2020-04-13 | End: 2020-09-09 | Stop reason: SDUPTHER

## 2020-04-13 RX ORDER — OMEPRAZOLE 40 MG/1
CAPSULE, DELAYED RELEASE ORAL
Qty: 90 CAPSULE | Refills: 1 | Status: SHIPPED | OUTPATIENT
Start: 2020-04-13 | End: 2020-10-06 | Stop reason: SDUPTHER

## 2020-04-28 ENCOUNTER — TELEPHONE (OUTPATIENT)
Dept: SMOKING CESSATION | Facility: CLINIC | Age: 65
End: 2020-04-28

## 2020-06-11 ENCOUNTER — PATIENT OUTREACH (OUTPATIENT)
Dept: ADMINISTRATIVE | Facility: HOSPITAL | Age: 65
End: 2020-06-11

## 2020-06-11 NOTE — PROGRESS NOTES
Working [employer] colon report Patient completed colonoscopy    Colon report requested from [facility]

## 2020-06-17 ENCOUNTER — TELEPHONE (OUTPATIENT)
Dept: SMOKING CESSATION | Facility: CLINIC | Age: 65
End: 2020-06-17

## 2020-06-17 NOTE — TELEPHONE ENCOUNTER
I returned the patient's call. The mobile number is not working. I called the home number and left a message to call us back.

## 2020-07-15 PROBLEM — D12.6 TUBULAR ADENOMA OF COLON: Status: ACTIVE | Noted: 2018-06-04

## 2020-07-22 ENCOUNTER — TELEPHONE (OUTPATIENT)
Dept: SMOKING CESSATION | Facility: CLINIC | Age: 65
End: 2020-07-22

## 2020-07-22 NOTE — TELEPHONE ENCOUNTER
Spoke with patient in regards to his smoking cessation progress. He remains tobacco free at this time. He states that he has 4 days left of Chantix and inquired about getting another prescription. Discussed discontinuing Chantix after completing the remaining amount. Discussed coping strategies. He states that he only has urges when he gets upset but has not had a slip or relapse in months. He feels confident that he will remain tobacco free. Discussed a telephone follow up in 2 weeks to assess progress.

## 2020-09-02 ENCOUNTER — LAB VISIT (OUTPATIENT)
Dept: LAB | Facility: HOSPITAL | Age: 65
End: 2020-09-02
Attending: INTERNAL MEDICINE
Payer: MEDICARE

## 2020-09-02 DIAGNOSIS — E78.5 HYPERLIPIDEMIA ASSOCIATED WITH TYPE 2 DIABETES MELLITUS: ICD-10-CM

## 2020-09-02 DIAGNOSIS — E11.9 CONTROLLED TYPE 2 DIABETES MELLITUS WITHOUT COMPLICATION, WITHOUT LONG-TERM CURRENT USE OF INSULIN: ICD-10-CM

## 2020-09-02 DIAGNOSIS — E11.69 HYPERLIPIDEMIA ASSOCIATED WITH TYPE 2 DIABETES MELLITUS: ICD-10-CM

## 2020-09-02 DIAGNOSIS — Z12.5 ENCOUNTER FOR SCREENING FOR MALIGNANT NEOPLASM OF PROSTATE: ICD-10-CM

## 2020-09-02 LAB
CHOLEST SERPL-MCNC: 140 MG/DL (ref 120–199)
CHOLEST/HDLC SERPL: 2.6 {RATIO} (ref 2–5)
COMPLEXED PSA SERPL-MCNC: 0.17 NG/ML (ref 0–4)
HDLC SERPL-MCNC: 54 MG/DL (ref 40–75)
HDLC SERPL: 38.6 % (ref 20–50)
LDLC SERPL CALC-MCNC: 65.6 MG/DL (ref 63–159)
NONHDLC SERPL-MCNC: 86 MG/DL
TRIGL SERPL-MCNC: 102 MG/DL (ref 30–150)

## 2020-09-02 PROCEDURE — 36415 COLL VENOUS BLD VENIPUNCTURE: CPT | Mod: HCNC

## 2020-09-02 PROCEDURE — 84153 ASSAY OF PSA TOTAL: CPT | Mod: HCNC

## 2020-09-02 PROCEDURE — 83036 HEMOGLOBIN GLYCOSYLATED A1C: CPT | Mod: HCNC

## 2020-09-02 PROCEDURE — 80061 LIPID PANEL: CPT | Mod: HCNC

## 2020-09-03 LAB
ESTIMATED AVG GLUCOSE: 151 MG/DL (ref 68–131)
HBA1C MFR BLD HPLC: 6.9 % (ref 4–5.6)

## 2020-09-08 ENCOUNTER — CLINICAL SUPPORT (OUTPATIENT)
Dept: SMOKING CESSATION | Facility: CLINIC | Age: 65
End: 2020-09-08
Payer: COMMERCIAL

## 2020-09-08 DIAGNOSIS — F17.200 NICOTINE DEPENDENCE: Primary | ICD-10-CM

## 2020-09-08 PROCEDURE — 99407 BEHAV CHNG SMOKING > 10 MIN: CPT | Mod: S$GLB,,, | Performed by: INTERNAL MEDICINE

## 2020-09-08 PROCEDURE — 99407 PR TOBACCO USE CESSATION INTENSIVE >10 MINUTES: ICD-10-PCS | Mod: S$GLB,,, | Performed by: INTERNAL MEDICINE

## 2020-09-08 NOTE — PROGRESS NOTES
Spoke with patient today in regard to smoking cessation progress for 12 month telephone follow up, he states tobacco free. Patient states using the prescribed tobacco cessation medication of Chantix to help aid in his quit. Commended patient on the accomplishment. Informed patient of benefit period and contact information if any further help or support is needed. Will resolve episode and complete smart form for Quit attempt #1.

## 2020-09-09 ENCOUNTER — LAB VISIT (OUTPATIENT)
Dept: LAB | Facility: HOSPITAL | Age: 65
End: 2020-09-09
Attending: INTERNAL MEDICINE
Payer: MEDICARE

## 2020-09-09 ENCOUNTER — OFFICE VISIT (OUTPATIENT)
Dept: INTERNAL MEDICINE | Facility: CLINIC | Age: 65
End: 2020-09-09
Payer: MEDICARE

## 2020-09-09 VITALS
DIASTOLIC BLOOD PRESSURE: 90 MMHG | SYSTOLIC BLOOD PRESSURE: 150 MMHG | BODY MASS INDEX: 39.15 KG/M2 | HEART RATE: 74 BPM | WEIGHT: 257.5 LBS | OXYGEN SATURATION: 98 % | TEMPERATURE: 97 F

## 2020-09-09 DIAGNOSIS — E11.9 CONTROLLED TYPE 2 DIABETES MELLITUS WITHOUT COMPLICATION, WITHOUT LONG-TERM CURRENT USE OF INSULIN: Primary | ICD-10-CM

## 2020-09-09 DIAGNOSIS — Z23 NEED FOR INFLUENZA VACCINATION: ICD-10-CM

## 2020-09-09 DIAGNOSIS — E78.5 HYPERLIPIDEMIA ASSOCIATED WITH TYPE 2 DIABETES MELLITUS: ICD-10-CM

## 2020-09-09 DIAGNOSIS — I15.2 HYPERTENSION ASSOCIATED WITH DIABETES: ICD-10-CM

## 2020-09-09 DIAGNOSIS — I25.10 CORONARY ARTERY DISEASE INVOLVING NATIVE CORONARY ARTERY OF NATIVE HEART WITHOUT ANGINA PECTORIS: ICD-10-CM

## 2020-09-09 DIAGNOSIS — Z00.00 PREVENTATIVE HEALTH CARE: ICD-10-CM

## 2020-09-09 DIAGNOSIS — E11.59 HYPERTENSION ASSOCIATED WITH DIABETES: ICD-10-CM

## 2020-09-09 DIAGNOSIS — G47.33 OSA ON CPAP: ICD-10-CM

## 2020-09-09 DIAGNOSIS — F17.200 NICOTINE DEPENDENCE: ICD-10-CM

## 2020-09-09 DIAGNOSIS — E11.69 HYPERLIPIDEMIA ASSOCIATED WITH TYPE 2 DIABETES MELLITUS: ICD-10-CM

## 2020-09-09 DIAGNOSIS — J44.9 MODERATE COPD (CHRONIC OBSTRUCTIVE PULMONARY DISEASE): ICD-10-CM

## 2020-09-09 PROCEDURE — 3077F PR MOST RECENT SYSTOLIC BLOOD PRESSURE >= 140 MM HG: ICD-10-PCS | Mod: HCNC,CPTII,S$GLB, | Performed by: INTERNAL MEDICINE

## 2020-09-09 PROCEDURE — 99999 PR PBB SHADOW E&M-EST. PATIENT-LVL V: CPT | Mod: PBBFAC,HCNC,, | Performed by: INTERNAL MEDICINE

## 2020-09-09 PROCEDURE — 3044F PR MOST RECENT HEMOGLOBIN A1C LEVEL <7.0%: ICD-10-PCS | Mod: HCNC,CPTII,S$GLB, | Performed by: INTERNAL MEDICINE

## 2020-09-09 PROCEDURE — 90686 FLU VACCINE (QUAD) GREATER THAN OR EQUAL TO 3YO PRESERVATIVE FREE IM: ICD-10-PCS | Mod: HCNC,S$GLB,, | Performed by: INTERNAL MEDICINE

## 2020-09-09 PROCEDURE — 3008F BODY MASS INDEX DOCD: CPT | Mod: HCNC,CPTII,S$GLB, | Performed by: INTERNAL MEDICINE

## 2020-09-09 PROCEDURE — 99499 RISK ADDL DX/OHS AUDIT: ICD-10-PCS | Mod: HCNC,S$GLB,, | Performed by: INTERNAL MEDICINE

## 2020-09-09 PROCEDURE — 99214 PR OFFICE/OUTPT VISIT, EST, LEVL IV, 30-39 MIN: ICD-10-PCS | Mod: 25,HCNC,S$GLB, | Performed by: INTERNAL MEDICINE

## 2020-09-09 PROCEDURE — 99214 OFFICE O/P EST MOD 30 MIN: CPT | Mod: 25,HCNC,S$GLB, | Performed by: INTERNAL MEDICINE

## 2020-09-09 PROCEDURE — 3077F SYST BP >= 140 MM HG: CPT | Mod: HCNC,CPTII,S$GLB, | Performed by: INTERNAL MEDICINE

## 2020-09-09 PROCEDURE — 86703 HIV-1/HIV-2 1 RESULT ANTBDY: CPT | Mod: HCNC

## 2020-09-09 PROCEDURE — 99499 UNLISTED E&M SERVICE: CPT | Mod: HCNC,S$GLB,, | Performed by: INTERNAL MEDICINE

## 2020-09-09 PROCEDURE — 99999 PR PBB SHADOW E&M-EST. PATIENT-LVL V: ICD-10-PCS | Mod: PBBFAC,HCNC,, | Performed by: INTERNAL MEDICINE

## 2020-09-09 PROCEDURE — G0008 FLU VACCINE (QUAD) GREATER THAN OR EQUAL TO 3YO PRESERVATIVE FREE IM: ICD-10-PCS | Mod: HCNC,S$GLB,, | Performed by: INTERNAL MEDICINE

## 2020-09-09 PROCEDURE — G0008 ADMIN INFLUENZA VIRUS VAC: HCPCS | Mod: HCNC,S$GLB,, | Performed by: INTERNAL MEDICINE

## 2020-09-09 PROCEDURE — 3080F DIAST BP >= 90 MM HG: CPT | Mod: HCNC,CPTII,S$GLB, | Performed by: INTERNAL MEDICINE

## 2020-09-09 PROCEDURE — 3080F PR MOST RECENT DIASTOLIC BLOOD PRESSURE >= 90 MM HG: ICD-10-PCS | Mod: HCNC,CPTII,S$GLB, | Performed by: INTERNAL MEDICINE

## 2020-09-09 PROCEDURE — 90686 IIV4 VACC NO PRSV 0.5 ML IM: CPT | Mod: HCNC,S$GLB,, | Performed by: INTERNAL MEDICINE

## 2020-09-09 PROCEDURE — 36415 COLL VENOUS BLD VENIPUNCTURE: CPT | Mod: HCNC

## 2020-09-09 PROCEDURE — 3008F PR BODY MASS INDEX (BMI) DOCUMENTED: ICD-10-PCS | Mod: HCNC,CPTII,S$GLB, | Performed by: INTERNAL MEDICINE

## 2020-09-09 PROCEDURE — 3044F HG A1C LEVEL LT 7.0%: CPT | Mod: HCNC,CPTII,S$GLB, | Performed by: INTERNAL MEDICINE

## 2020-09-09 RX ORDER — BUPROPION HYDROCHLORIDE 150 MG/1
150 TABLET, EXTENDED RELEASE ORAL 2 TIMES DAILY
Qty: 60 TABLET | Refills: 2 | Status: SHIPPED | OUTPATIENT
Start: 2020-09-09 | End: 2021-04-12 | Stop reason: SDUPTHER

## 2020-09-09 RX ORDER — POLYETHYLENE GLYCOL 3350, SODIUM CHLORIDE, SODIUM BICARBONATE, POTASSIUM CHLORIDE 420; 11.2; 5.72; 1.48 G/4L; G/4L; G/4L; G/4L
POWDER, FOR SOLUTION ORAL
COMMUNITY
Start: 2020-07-08 | End: 2020-09-09

## 2020-09-09 RX ORDER — METFORMIN HYDROCHLORIDE 1000 MG/1
1000 TABLET ORAL 2 TIMES DAILY WITH MEALS
Qty: 180 TABLET | Refills: 2 | Status: SHIPPED | OUTPATIENT
Start: 2020-09-09 | End: 2021-01-20 | Stop reason: SDUPTHER

## 2020-09-09 RX ORDER — ALBUTEROL SULFATE 90 UG/1
2 AEROSOL, METERED RESPIRATORY (INHALATION) EVERY 6 HOURS PRN
Qty: 36 G | Refills: 3 | Status: SHIPPED | OUTPATIENT
Start: 2020-09-09 | End: 2021-12-09 | Stop reason: SDUPTHER

## 2020-09-09 RX ORDER — TRAMADOL HYDROCHLORIDE 50 MG/1
50 TABLET ORAL
COMMUNITY
Start: 2020-06-07 | End: 2020-09-09

## 2020-09-09 NOTE — PROGRESS NOTES
Subjective:      Patient ID: Stefan Forbes Jr. is a 65 y.o. male.    Chief Complaint: Follow-up    HPI   64 yo with   Patient Active Problem List   Diagnosis    Controlled type 2 diabetes mellitus without complication, without long-term current use of insulin    Hypertension associated with diabetes    Hyperlipidemia associated with type 2 diabetes mellitus    Moderate COPD (chronic obstructive pulmonary disease)    CAD (coronary artery disease)    Vitamin D deficiency    Osteoarthritis of knee    Pulmonary nodule    Nevus of choroid of right eye    History of pulmonary embolism    Hyponatremia    Iron deficiency anemia due to chronic blood loss    MACHO on CPAP    Nocturnal oxygen desaturation    PLMD (periodic limb movement disorder)    BMI 35.0-35.9,adult    Varicose vein of leg    Tobacco abuse, in remission    Tubular adenoma of colon     Past Medical History:   Diagnosis Date    COPD (chronic obstructive pulmonary disease)     Diabetes mellitus, type 2     Hyperlipidemia     Hypertension      Here today for management of multiple medical problems as outlined below.  He reports compliance with his medications without significant side effects.  He reports that he has resumed his amlodipine at 5 mg without any symptoms.  He reports his orthostatics symptoms have resolved.  He reports that he saw his cardiologist, Dr. Gutierrez, after metoprolol was added to his regimen.  His blood pressure was doing well at that time and Dr. Perry endorsed okay to try trial off of the metoprolol.  Patient reports home blood pressure 1 20s to 130s over 70 on current regimen  Review of Systems   Constitutional: Negative for activity change, chills, fever and unexpected weight change.   HENT: Positive for hearing loss. Negative for ear pain, rhinorrhea, sore throat and trouble swallowing.    Eyes: Negative for discharge and visual disturbance.   Respiratory: Negative for cough, chest tightness and  wheezing.    Cardiovascular: Negative for chest pain and palpitations.   Gastrointestinal: Negative for abdominal pain, blood in stool, constipation, diarrhea and vomiting.   Endocrine: Negative for polydipsia and polyuria.   Genitourinary: Negative for difficulty urinating, dysuria, hematuria and urgency.   Musculoskeletal: Negative for arthralgias, joint swelling and neck pain.   Neurological: Negative for seizures, syncope, weakness and headaches.   Psychiatric/Behavioral: Negative for confusion and dysphoric mood.     Objective:   BP (!) 150/90 (BP Location: Right arm, Patient Position: Sitting, BP Method: Large (Manual))   Pulse 74   Temp 97.1 °F (36.2 °C) (Tympanic)   Wt 116.8 kg (257 lb 8 oz)   SpO2 98%   BMI 39.15 kg/m²     Physical Exam  Constitutional:       General: He is not in acute distress.     Appearance: He is well-developed.   HENT:      Head: Normocephalic and atraumatic.   Eyes:      Pupils: Pupils are equal, round, and reactive to light.   Neck:      Musculoskeletal: Neck supple.      Thyroid: No thyromegaly.      Vascular: No carotid bruit.   Cardiovascular:      Rate and Rhythm: Normal rate and regular rhythm.   Pulmonary:      Breath sounds: Normal breath sounds. No wheezing or rales.   Abdominal:      General: Bowel sounds are normal.      Palpations: Abdomen is soft.      Tenderness: There is no abdominal tenderness.   Feet:      Right foot:      Protective Sensation: 8 sites tested. 8 sites sensed.      Skin integrity: No ulcer or blister.      Left foot:      Protective Sensation: 8 sites tested. 8 sites sensed.      Skin integrity: No ulcer or blister.   Lymphadenopathy:      Cervical: No cervical adenopathy.   Skin:     General: Skin is warm and dry.   Neurological:      Mental Status: He is alert and oriented to person, place, and time.   Psychiatric:         Behavior: Behavior normal.       Lab Visit on 09/02/2020   Component Date Value Ref Range Status    Cholesterol 09/02/2020  140  120 - 199 mg/dL Final    Comment: The National Cholesterol Education Program (NCEP) has set the  following guidelines (reference ranges) for Cholesterol:  Optimal.....................<200 mg/dL  Borderline High.............200-239 mg/dL  High........................> or = 240 mg/dL      Triglycerides 09/02/2020 102  30 - 150 mg/dL Final    Comment: The National Cholesterol Education Program (NCEP) has set the  following guidelines (reference values) for triglycerides:  Normal......................<150 mg/dL  Borderline High.............150-199 mg/dL  High........................200-499 mg/dL      HDL 09/02/2020 54  40 - 75 mg/dL Final    Comment: The National Cholesterol Education Program (NCEP) has set the  following guidelines (reference values) for HDL Cholesterol:  Low...............<40 mg/dL  Optimal...........>60 mg/dL      LDL Cholesterol 09/02/2020 65.6  63.0 - 159.0 mg/dL Final    Comment: The National Cholesterol Education Program (NCEP) has set the  following guidelines (reference values) for LDL Cholesterol:  Optimal.......................<130 mg/dL  Borderline High...............130-159 mg/dL  High..........................160-189 mg/dL  Very High.....................>190 mg/dL      Hdl/Cholesterol Ratio 09/02/2020 38.6  20.0 - 50.0 % Final    Total Cholesterol/HDL Ratio 09/02/2020 2.6  2.0 - 5.0 Final    Non-HDL Cholesterol 09/02/2020 86  mg/dL Final    Comment: Risk category and Non-HDL cholesterol goals:  Coronary heart disease (CHD)or equivalent (10-year risk of CHD >20%):  Non-HDL cholesterol goal     <130 mg/dL  Two or more CHD risk factors and 10-year risk of CHD <= 20%:  Non-HDL cholesterol goal     <160 mg/dL  0 to 1 CHD risk factor:  Non-HDL cholesterol goal     <190 mg/dL      PSA, SCREEN 09/02/2020 0.17  0.00 - 4.00 ng/mL Final    Comment: PSA Expected levels:  Hormonal Therapy: <0.05 ng/ml  Prostatectomy: <0.01 ng/ml  Radiation Therapy: <1.00 ng/ml      Hemoglobin A1C  09/02/2020 6.9* 4.0 - 5.6 % Final    Comment: ADA Screening Guidelines:  5.7-6.4%  Consistent with prediabetes  >or=6.5%  Consistent with diabetes  High levels of fetal hemoglobin interfere with the HbA1C  assay. Heterozygous hemoglobin variants (HbS, HgC, etc)do  not significantly interfere with this assay.   However, presence of multiple variants may affect accuracy.      Estimated Avg Glucose 09/02/2020 151* 68 - 131 mg/dL Final       Assessment:     1. Controlled type 2 diabetes mellitus without complication, without long-term current use of insulin    2. Need for influenza vaccination    3. Preventative health care    4. Hypertension associated with diabetes    5. Hyperlipidemia associated with type 2 diabetes mellitus    6. Moderate COPD (chronic obstructive pulmonary disease)    7. Coronary artery disease involving native coronary artery of native heart without angina pectoris    8. MACHO on CPAP    9. Nicotine dependence    10. Uncontrolled type 2 diabetes mellitus without complication, with long-term current use of insulin      Plan:   Controlled type 2 diabetes mellitus without complication, without long-term current use of insulin  A1c rising.  Continue current medication.  Patient states were proved diet and exercise  -     dulaglutide (TRULICITY) 0.75 mg/0.5 mL pen injector; INJECT CONTENTS OF 1 SYRINGE (0.75 MG TOTAL)  SUBCUTANEOUSLY ONCE EVERY 7 DAYS  Dispense: 12 mL; Refill: 1  -     metFORMIN (GLUCOPHAGE) 1000 MG tablet; TAKE ONE TABLET BY MOUTH TWICE DAILY WITH MEALS  Dispense: 180 tablet; Refill: 2  -     Hemoglobin A1C; Future; Expected date: 01/09/2021    Need for influenza vaccination  -     Influenza - Quadrivalent *Preferred* (6 months+) (PF)    Preventative health care  -     HIV 1/2 Ag/Ab (4th Gen); Future; Expected date: 09/09/2020    Hypertension associated with diabetes  As above.  Try home monitoring with digital hypertension  -     Hypertension Digital Medicine (HDMP) Enrollment  Order    Hyperlipidemia associated with type 2 diabetes mellitus  Controlled  Moderate COPD (chronic obstructive pulmonary disease)  No recent flare  Coronary artery disease involving native coronary artery of native heart without angina pectoris  Denies angina.  Up-to-date with Cardiology  MACHO on CPAP  Stable on CPAP  Nicotine dependence  -     buPROPion (WELLBUTRIN SR) 150 MG TBSR 12 hr tablet; Take 1 tablet (150 mg total) by mouth 2 (two) times daily.  Dispense: 60 tablet; Refill: 2    -     metFORMIN (GLUCOPHAGE) 1000 MG tablet; TAKE ONE TABLET BY MOUTH TWICE DAILY WITH MEALS  Dispense: 180 tablet; Refill: 2        Lab Frequency Next Occurrence   X-Ray Chest PA And Lateral Once 10/14/2019       Problem List Items Addressed This Visit        Pulmonary    Moderate COPD (chronic obstructive pulmonary disease)       Cardiac/Vascular    Hypertension associated with diabetes    Relevant Medications    dulaglutide (TRULICITY) 0.75 mg/0.5 mL pen injector    metFORMIN (GLUCOPHAGE) 1000 MG tablet    Other Relevant Orders    Hypertension Digital Medicine (HDMP) Enrollment Order (Completed)    Hyperlipidemia associated with type 2 diabetes mellitus    Relevant Medications    dulaglutide (TRULICITY) 0.75 mg/0.5 mL pen injector    metFORMIN (GLUCOPHAGE) 1000 MG tablet    CAD (coronary artery disease)       Endocrine    Controlled type 2 diabetes mellitus without complication, without long-term current use of insulin - Primary    Relevant Medications    dulaglutide (TRULICITY) 0.75 mg/0.5 mL pen injector    metFORMIN (GLUCOPHAGE) 1000 MG tablet    Other Relevant Orders    Hemoglobin A1C       Other    MACHO on CPAP      Other Visit Diagnoses     Need for influenza vaccination        Relevant Orders    Influenza - Quadrivalent *Preferred* (6 months+) (PF)    Preventative health care        Relevant Orders    HIV 1/2 Ag/Ab (4th Gen)    Nicotine dependence        Relevant Medications    buPROPion (WELLBUTRIN SR) 150 MG TBSR 12 hr  tablet    Uncontrolled type 2 diabetes mellitus without complication, with long-term current use of insulin        Relevant Medications    dulaglutide (TRULICITY) 0.75 mg/0.5 mL pen injector    metFORMIN (GLUCOPHAGE) 1000 MG tablet          Follow up in about 4 months (around 1/9/2021), or if symptoms worsen or fail to improve.

## 2020-09-10 DIAGNOSIS — E11.9 TYPE 2 DIABETES MELLITUS: ICD-10-CM

## 2020-09-10 LAB — HIV 1+2 AB+HIV1 P24 AG SERPL QL IA: NEGATIVE

## 2020-09-14 ENCOUNTER — PATIENT OUTREACH (OUTPATIENT)
Dept: OTHER | Facility: OTHER | Age: 65
End: 2020-09-14

## 2020-09-14 PROCEDURE — 99453 PR REMOTE MONITR, PHYSIOL PARAM, INITIAL: ICD-10-PCS | Mod: S$GLB,,, | Performed by: INTERNAL MEDICINE

## 2020-09-14 PROCEDURE — 99453 REM MNTR PHYSIOL PARAM SETUP: CPT | Mod: S$GLB,,, | Performed by: INTERNAL MEDICINE

## 2020-09-14 NOTE — LETTER
September 14, 2020     Stefan Forbes Jr.  [address]  [place] [address]       Dear Stefan,    [facility]! Our goal is to make care effective, proactive and convenient by using data you send us from home to better treat your chronic conditions.              My name is Praveena Alas, and I am your dedicated Digital Medicine clinician. As an expert in medication management, I will help ensure that the medications you are taking continue to provide the intended benefits and help you reach your goals. You can reach me directly at [phone] or by sending me a message directly through your [facility] account.      I am Konrad Leos and I will be your health . My job is to help you identify lifestyle changes to improve your disease control. We will talk about nutrition, exercise, and other ways you may be able to adjust your current habits to better your health. Additionally, we will help ensure you are completing the tests and screenings that are necessary to help manage your conditions. You can reach me directly at [phone] or by sending me a message directly through your [facility] account.    Most importantly, YOU are at the center of this team. Together, we will work to improve your overall health and encourage you to meet your goals for a healthier lifestyle.     What we expect from YOU:  · Please take frequent home blood pressure measurements. We ask that you take at least 1 blood pressure reading per week, but more information will better help us get you know you. Be sure you rest for a few minutes before taking the reading in a quiet, comfortable place.    · Please take frequent home blood sugar measurements according to the frequency your physician and Digital Medicine care team specify. It is important that your team see both fasting and after meal readings.      Be available to receive phone calls or [employer] messages, when appropriate, from your care team.  Please let us know if there are any specific days or times that work best for us to reach you via phone.     Complete routine tests and screenings. Dont worry, we will help keep you on track!           What you should expect from your Digital Medicine Care Team:   We will work with you to create a personalized plan of care and provide you with encouragement and education, including regarding lifestyle changes, that could help you manage your disease states.     We will adjust your current medications, if needed, and continue to monitor your long-term progress.     We will provide you and your physician with monthly progress reports after you have been in the program for more than 30 days.     We will send you reminders through [employer] and text messages to help ensure you do not miss any testing deadlines to help manage your disease states.    You will be able to reach us by phone or through your [facility] account by clicking our names under Care Team on the right side of the home screen.    I look forward to working with you to achieve your blood pressure goals!    We look forward to working with you to help manage your health,    Sincerely,    Your Digital Medicine Team    Please visit our websites to learn more:   · Hypertension: [email or web address]  · Diabetes: [email or web address]      Remember, we are not available for emergencies. If you have an emergency, please contact your doctors office directly or call [facility] on-call ([phone] or [phone]) or 911.    Diabetes: We want help you get important tests and screenings done regularly to assure that your health needs are met. We have put a new system in place, called CareTouch that will help us improve how we monitor and reach out to you about the following lab tests that you will need to help manage your diabetes.  · Hemoglobin A1c testing (Frequency: Every 3 to 6 months, dependent on A1c  goal)  · Nephropathy Assessment, generally urine micro albumin testing (Frequency: Yearly)  · Eye exam through a quick 30-minute Eye Photo Exam (Frequency: 1-2 Years, depending on result)    When necessary you can come in to one of the lab locations between 10:30 am and 4:00 pm to have your tests done prior to their due date. Tell the  you received a CareTouch letter, or just look for the CareTouch sign.    For CareTouch lab locations, click here.

## 2020-09-14 NOTE — PROGRESS NOTES
Digital Medicine: Health  Introduction    Introduced Stefan Forbes to Digital Medicine. Discussed health  role and recommended lifestyle modifications.    The history is provided by the patient.           Additional Enrollment Details: Patient reported that we are allowed to speak with his wife about his medical records.    HYPERTENSION  Explained hypertension digital medicine goals including BP goal less than or equal to 130/80mmHg, improved convenience of BP management and reduced risk of heart attack, kidney failure, stroke, eye disease, dementia, and death.      Explained non-pharmacologic therapies like low salt diet and physical activity can reduce blood pressure. .      Explained that we expect patient to submit several blood pressure readings per week at random times of the day, but at least 30 minutes after taking blood pressure medications. Instructed patient not to allow anyone else to use their blood pressure monitor and phone as data submitted is directly entered into medical record. Reviewed and confirmed appropriate blood pressure monitoring technique.             DIABETES  Explained the goal of the diabetes digital medicine program is to decrease A1c within patient-specific target levels. Reviewed benefits of A1c reduction including reducing risk for kidney, eye, and nerve disease.      Explained that we expect patient to submit blood sugar readings as prescribed. Instructed patient not to allow anyone else to use their glucometer and phone as data submitted is directly entered into their medical record. Reviewed and confirmed appropriate blood sugar testing technique.       Reviewed general Self-Monitoring of Blood Glucose (SMBG) goals:  · FP-130 mg/dL  · 2h PPG: < 180 mg/dL  · Bedtime: < 150 mg/dL    Reviewed signs and symptoms of hypoglycemia (weakness, dizziness, hunger, shakiness, nausea, headache, heart palpitations, sweating, fatigue, anxiety, etc.).  Reviewed treatment of  hypoglycemia (15/15 rule).        Lifestyle  Plan      Topic    Eye Exam        Last 5 Patient Entered Readings                                      Current 30 Day Average: 147/80     Recent Readings 9/13/2020 9/12/2020 9/11/2020 9/10/2020 9/9/2020    SBP (mmHg) 136 152 141 146 158    DBP (mmHg) 73 80 80 80 87    Pulse 68 58 61 69 63        Last 6 Patient Entered Readings                                          Most Recent A1c:      Recent Readings 9/13/2020 9/12/2020 9/11/2020 9/10/2020    Blood Glucose (mg/dL) 137 166 192 222

## 2020-09-15 ENCOUNTER — PATIENT OUTREACH (OUTPATIENT)
Dept: OTHER | Facility: OTHER | Age: 65
End: 2020-09-15

## 2020-09-15 DIAGNOSIS — E11.9 CONTROLLED TYPE 2 DIABETES MELLITUS WITHOUT COMPLICATION, WITHOUT LONG-TERM CURRENT USE OF INSULIN: Primary | ICD-10-CM

## 2020-09-15 NOTE — PROGRESS NOTES
Digital Medicine: Clinician Introduction    Stefan Forbes Jr. is a 65 y.o. male who is newly enrolled in the Digital Medicine Clinic.    Reports drinking coffee with creamer prior to taking fasting BG readings. Indicates he has a follow-up appt with Dr. Fisher (PCP) after 4 weeks of being in program to review readings and adjust medications as appropriate. Reports if HTN medication changes, Cardiologist, may wish to know changes.    The history is provided by the patient and the spouse/significant other. The patient has granted authorization to speak to this person.      Review of patient's allergies indicates:  No Known Allergies  Completed Medication Reconciliation  Verified pharmacy information.  Patient is on statin       Last 5 Patient Entered Readings                                      Current 30 Day Average: 146/80     Recent Readings 9/14/2020 9/13/2020 9/12/2020 9/11/2020 9/10/2020    SBP (mmHg) 142 136 152 141 146    DBP (mmHg) 77 73 80 80 80    Pulse 62 68 58 61 69        Last 6 Patient Entered Readings                                          Most Recent A1c: 6.9% on 9/2/2020  (Goal: 7%)     Recent Readings 9/15/2020 9/14/2020 9/13/2020 9/12/2020 9/11/2020    Blood Glucose (mg/dL) 223 177 137 166 192              Depression Screening  Stefan Forbes Jr. screened negative on the depression screening.   In past 30 days patient's mood has remained stable.     Sleep Apnea Screening  Patient previously diagnosed with MACHO     He reports he is currently using CPAP for 5 hour(s) per night. MACHO is managed effectively with CPAP use.      Medication Affordability Screening  Patient screened positive on the medication affordability questionnaires. Patient is currently having problems affording medications  When patient is in insurance gap in ~March he may need financial assistance.      Medication Adherence-Medication adherence was assessed.  Patient continue taking medication as  prescribed.            ASSESSMENT(S)  Patient's A1C goal is less than or equal to 7 per 2020 ADA guidelines. Patient's most recent A1C result is at goal. Lab Results    Component                Value               Date                     HGBA1C                   6.9 (H)             09/02/2020          .     Patients BP average is 146/80 mmHg, which is above goal. Patient's BP goal is less than or equal to 130/80 per 2017 ACC/AHA Hypertension Guidelines.     As patient newly enrolled with small quantity of BP values and BG values falsely elevated as patient was consuming coffee with creamer prior to taking fasting SMBG, recommend follow-up and reassessment in 1 month.       Hypertension Plan  Continue current therapy.  Provided patient education. Informed patient that per records, there is no appt scheduled with Dr. Fisher in 1 month. Next appt is currently 01/2021. Informed patient if he needs assistance to please call me back and I can assist.    Diabetes Plan  Continue current therapy.  Provided patient education. Rule of 15 and s/sx of hypoglycemia.       Addressed patient questions and patient has my contact information if needed prior to next outreach. Patient verbalizes understanding.      Explained the importance of self-monitoring and medication adherence. Encouraged the patient to communicate with their health  for lifestyle modifications to help improve or maintain a healthy lifestyle.        Sent link to [facility] webpages and my contact information via [employer]Link for future questions.        Explained to the patient that the Digital Medicine team is not available for emergencies. Advised patient call [facility] On Call ([phone] or [phone]) or 911 if needed.               Topic    Eye Exam         Current Medication Regimen:  Hypertension Medications             amLODIPine (NORVASC) 5 MG tablet Take 1 tablet by mouth daily for 90 days.    nitroGLYCERIN (NITROSTAT) 0.4 MG SL  tablet Take 1 tablet under the tongue every 5 minutes as needed for chest pain. Do not exceed a total of 3 doses in 15 minutes        Diabetes Medications             dulaglutide (TRULICITY) 0.75 mg/0.5 mL pen injector INJECT CONTENTS OF 1 SYRINGE (0.75 MG TOTAL)  SUBCUTANEOUSLY ONCE EVERY 7 DAYS    metFORMIN (GLUCOPHAGE) 1000 MG tablet TAKE ONE TABLET BY MOUTH TWICE DAILY WITH MEALS

## 2020-09-17 ENCOUNTER — PATIENT OUTREACH (OUTPATIENT)
Dept: OTHER | Facility: OTHER | Age: 65
End: 2020-09-17

## 2020-09-17 RX ORDER — IBUPROFEN 100 MG/5ML
1000 SUSPENSION, ORAL (FINAL DOSE FORM) ORAL DAILY
COMMUNITY

## 2020-09-17 NOTE — PROGRESS NOTES
Digital Medicine: Clinician Follow-Up    Patient calling to ask if it is okay to initiate vitamin C 1000 mg tablet once daily. Patient's wife curious if patient can get sodium rechecked.    The history is provided by the patient and the spouse/significant other. The patient has granted authorization to speak to this person.   Follow-up reason(s): addressing patient questions/concerns.     Hypertension    Readings are trending down       Last 5 Patient Entered Readings                                      Current 30 Day Average: 142/78     Recent Readings 9/17/2020 9/16/2020 9/15/2020 9/14/2020 9/13/2020    SBP (mmHg) 144 140 121 142 136    DBP (mmHg) 80 74 71 77 73    Pulse 58 58 61 62 68        Last 6 Patient Entered Readings                                          Most Recent A1c: 6.9% on 9/2/2020  (Goal: 7%)     Recent Readings 9/17/2020 9/16/2020 9/15/2020 9/14/2020 9/13/2020    Blood Glucose (mg/dL) 205 179 223 177 137             Screenings    ASSESSMENT(S)  Patient's A1C goal is less than or equal to 7 per 2020 ADA guidelines. Patient's most recent A1C result is at goal. Lab Results    Component                Value               Date                     HGBA1C                   6.9 (H)             09/02/2020          .     Patients BP average is 142/78 mmHg, which is above goal. Patient's BP goal is less than or equal to 130/80 per 2017 ACC/AHA Hypertension Guidelines.     Hypertension Plan  Medication change. Added Vitamin C 1000 mg tab daily to medication list.  Continue current therapy.  Provided patient education. Informed patient and his wife if he would like to receive an updated sodium level to contact PCP as labs not due until 3/2020.    Diabetes Plan  Continue current therapy.       Addressed patient questions and patient has my contact information if needed prior to next outreach. Patient verbalizes understanding.                Topic    Eye Exam      There are no preventive care reminders to  display for this patient.    Hypertension Medications             amLODIPine (NORVASC) 5 MG tablet Take 1 tablet by mouth daily for 90 days.    amLODIPine (NORVASC) 5 MG tablet Take 1 tablet (5 mg total) by mouth once daily.    nitroGLYCERIN (NITROSTAT) 0.4 MG SL tablet Take 1 tablet under the tongue every 5 minutes as needed for chest pain. Do not exceed a total of 3 doses in 15 minutes        Diabetes Medications             dulaglutide (TRULICITY) 0.75 mg/0.5 mL pen injector INJECT CONTENTS OF 1 SYRINGE (0.75 MG TOTAL)  SUBCUTANEOUSLY ONCE EVERY 7 DAYS    metFORMIN (GLUCOPHAGE) 1000 MG tablet TAKE ONE TABLET BY MOUTH TWICE DAILY WITH MEALS

## 2020-09-18 DIAGNOSIS — J44.9 MODERATE COPD (CHRONIC OBSTRUCTIVE PULMONARY DISEASE): Primary | ICD-10-CM

## 2020-09-28 ENCOUNTER — PATIENT OUTREACH (OUTPATIENT)
Dept: OTHER | Facility: OTHER | Age: 65
End: 2020-09-28

## 2020-09-28 DIAGNOSIS — E11.69 HYPERLIPIDEMIA ASSOCIATED WITH TYPE 2 DIABETES MELLITUS: ICD-10-CM

## 2020-09-28 DIAGNOSIS — E78.5 HYPERLIPIDEMIA ASSOCIATED WITH TYPE 2 DIABETES MELLITUS: ICD-10-CM

## 2020-09-28 RX ORDER — ROSUVASTATIN CALCIUM 10 MG/1
TABLET, COATED ORAL
Qty: 90 TABLET | Refills: 1 | Status: SHIPPED | OUTPATIENT
Start: 2020-09-28 | End: 2021-01-20 | Stop reason: SDUPTHER

## 2020-09-30 PROCEDURE — 99457 PR MONITORING, PHYSIOL PARAM, REMOTE, 1ST 20 MINS, PER MONTH: ICD-10-PCS | Mod: S$GLB,,, | Performed by: INTERNAL MEDICINE

## 2020-09-30 PROCEDURE — 99457 RPM TX MGMT 1ST 20 MIN: CPT | Mod: S$GLB,,, | Performed by: INTERNAL MEDICINE

## 2020-09-30 PROCEDURE — 99458 RPM TX MGMT EA ADDL 20 MIN: CPT | Mod: S$GLB,,, | Performed by: INTERNAL MEDICINE

## 2020-09-30 PROCEDURE — 99458 PR REMOTE PHYSIOL MONIT, EA ADDTL 20 MINS: ICD-10-PCS | Mod: S$GLB,,, | Performed by: INTERNAL MEDICINE

## 2020-10-06 DIAGNOSIS — K21.9 GASTROESOPHAGEAL REFLUX DISEASE: ICD-10-CM

## 2020-10-06 RX ORDER — OMEPRAZOLE 40 MG/1
CAPSULE, DELAYED RELEASE ORAL
Qty: 90 CAPSULE | Refills: 1 | Status: SHIPPED | OUTPATIENT
Start: 2020-10-06 | End: 2021-04-07 | Stop reason: SDUPTHER

## 2020-10-08 ENCOUNTER — PATIENT OUTREACH (OUTPATIENT)
Dept: OTHER | Facility: OTHER | Age: 65
End: 2020-10-08

## 2020-10-08 NOTE — PROGRESS NOTES
HTN Enrollment  - LVM to call back.   - BP av/76 mmHg  - Last office visit BP: 150/90 mmHg  - May consider increase in amlodipine to 10 mg tab or initiation of olmesartan 20 mg tab daily as pt with T2DM.      Hypertension Medications             amLODIPine (NORVASC) 5 MG tablet Take 1 tablet by mouth daily for 90 days.    amLODIPine (NORVASC) 5 MG tablet Take 1 tablet (5 mg total) by mouth once daily.    nitroGLYCERIN (NITROSTAT) 0.4 MG SL tablet Take 1 tablet under the tongue every 5 minutes as needed for chest pain. Do not exceed a total of 3 doses in 15 minutes

## 2020-10-08 NOTE — PROGRESS NOTES
Digital Medicine: Clinician Follow-Up    Patient reports being happy with results and feeling great.    The history is provided by the patient.   Follow-up reason(s): routine follow up.     Hypertension    Readings are trending down   Diabetes    Readings are trending down         Last 5 Patient Entered Readings                                      Current 30 Day Average: 137/76     Recent Readings 10/7/2020 10/5/2020 10/2/2020 10/1/2020 9/30/2020    SBP (mmHg) 136 126 143 132 154    DBP (mmHg) 76 71 73 74 84    Pulse 63 62 60 60 59        Last 6 Patient Entered Readings                                          Most Recent A1c: 6.9% on 9/2/2020  (Goal: 7%)     Recent Readings 10/7/2020 10/5/2020 10/2/2020 10/1/2020 9/30/2020    Blood Glucose (mg/dL) 199 158 164 147 213                  ASSESSMENT(S)  Patient's A1C goal is less than or equal to 7. Patient's most recent A1C result is at goal. Lab Results    Component                Value               Date                     HGBA1C                   6.9 (H)             09/02/2020          .     Patients BP average is 137/76 mmHg, which is above goal. Patient's BP goal is less than or equal to 130/80.     As BP readings trending down, recommend continuation of current therapy of if necessary may increase amlodipine in future to 10 mg.    Recommend patient receive fasting SMBG.      Hypertension Plan  Continue current therapy.    Diabetes Plan  Continue current therapy.  Provided patient education. Counseled patient to receive fasting SMBG.       Addressed patient questions and patient has my contact information if needed prior to next outreach. Patient verbalizes understanding.                 Topic    Eye Exam      There are no preventive care reminders to display for this patient.      Hypertension Medications             amLODIPine (NORVASC) 5 MG tablet Take 1 tablet by mouth daily for 90 days.    amLODIPine (NORVASC) 5 MG tablet Take 1 tablet (5 mg total) by  mouth once daily.    nitroGLYCERIN (NITROSTAT) 0.4 MG SL tablet Take 1 tablet under the tongue every 5 minutes as needed for chest pain. Do not exceed a total of 3 doses in 15 minutes        Diabetes Medications             dulaglutide (TRULICITY) 0.75 mg/0.5 mL pen injector INJECT CONTENTS OF 1 SYRINGE (0.75 MG TOTAL)  SUBCUTANEOUSLY ONCE EVERY 7 DAYS    metFORMIN (GLUCOPHAGE) 1000 MG tablet TAKE ONE TABLET BY MOUTH TWICE DAILY WITH MEALS

## 2020-10-09 ENCOUNTER — LAB VISIT (OUTPATIENT)
Dept: OTOLARYNGOLOGY | Facility: CLINIC | Age: 65
End: 2020-10-09
Payer: MEDICARE

## 2020-10-09 DIAGNOSIS — J44.9 MODERATE COPD (CHRONIC OBSTRUCTIVE PULMONARY DISEASE): ICD-10-CM

## 2020-10-09 PROCEDURE — U0003 INFECTIOUS AGENT DETECTION BY NUCLEIC ACID (DNA OR RNA); SEVERE ACUTE RESPIRATORY SYNDROME CORONAVIRUS 2 (SARS-COV-2) (CORONAVIRUS DISEASE [COVID-19]), AMPLIFIED PROBE TECHNIQUE, MAKING USE OF HIGH THROUGHPUT TECHNOLOGIES AS DESCRIBED BY CMS-2020-01-R: HCPCS | Mod: HCNC

## 2020-10-10 LAB — SARS-COV-2 RNA RESP QL NAA+PROBE: NOT DETECTED
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
## 2020-10-12 ENCOUNTER — HOSPITAL ENCOUNTER (OUTPATIENT)
Dept: RADIOLOGY | Facility: HOSPITAL | Age: 65
Discharge: HOME | End: 2020-10-12
Attending: INTERNAL MEDICINE
Payer: MEDICARE

## 2020-10-12 ENCOUNTER — PATIENT OUTREACH (OUTPATIENT)
Dept: ADMINISTRATIVE | Facility: OTHER | Age: 65
End: 2020-10-12

## 2020-10-12 ENCOUNTER — CLINICAL SUPPORT (OUTPATIENT)
Dept: PULMONOLOGY | Facility: CLINIC | Age: 65
End: 2020-10-12
Payer: MEDICARE

## 2020-10-12 ENCOUNTER — OFFICE VISIT (OUTPATIENT)
Dept: PULMONOLOGY | Facility: CLINIC | Age: 65
End: 2020-10-12
Payer: MEDICARE

## 2020-10-12 VITALS
SYSTOLIC BLOOD PRESSURE: 140 MMHG | HEIGHT: 68 IN | BODY MASS INDEX: 38.42 KG/M2 | HEART RATE: 69 BPM | WEIGHT: 253.5 LBS | DIASTOLIC BLOOD PRESSURE: 80 MMHG | OXYGEN SATURATION: 98 % | RESPIRATION RATE: 17 BRPM

## 2020-10-12 DIAGNOSIS — E78.5 HYPERLIPIDEMIA ASSOCIATED WITH TYPE 2 DIABETES MELLITUS: ICD-10-CM

## 2020-10-12 DIAGNOSIS — J44.9 MODERATE COPD (CHRONIC OBSTRUCTIVE PULMONARY DISEASE): Primary | ICD-10-CM

## 2020-10-12 DIAGNOSIS — G47.33 OSA ON CPAP: ICD-10-CM

## 2020-10-12 DIAGNOSIS — I15.2 HYPERTENSION ASSOCIATED WITH DIABETES: ICD-10-CM

## 2020-10-12 DIAGNOSIS — R91.1 PULMONARY NODULE: ICD-10-CM

## 2020-10-12 DIAGNOSIS — J44.9 MODERATE COPD (CHRONIC OBSTRUCTIVE PULMONARY DISEASE): ICD-10-CM

## 2020-10-12 DIAGNOSIS — E11.59 HYPERTENSION ASSOCIATED WITH DIABETES: ICD-10-CM

## 2020-10-12 DIAGNOSIS — E11.69 HYPERLIPIDEMIA ASSOCIATED WITH TYPE 2 DIABETES MELLITUS: ICD-10-CM

## 2020-10-12 DIAGNOSIS — Z86.711 HISTORY OF PULMONARY EMBOLISM: ICD-10-CM

## 2020-10-12 DIAGNOSIS — E11.9 CONTROLLED TYPE 2 DIABETES MELLITUS WITHOUT COMPLICATION, WITHOUT LONG-TERM CURRENT USE OF INSULIN: ICD-10-CM

## 2020-10-12 LAB
BRPFT: ABNORMAL
FEF 25 75 LLN: 1.26
FEF 25 75 PRE REF: 39.1 %
FEF 25 75 REF: 2.66
FEV1 FVC LLN: 65
FEV1 FVC PRE REF: 79.3 %
FEV1 FVC REF: 77
FEV1 LLN: 2.42
FEV1 PRE REF: 67.7 %
FEV1 REF: 3.26
FVC LLN: 3.19
FVC PRE REF: 85.2 %
FVC REF: 4.22
PEF LLN: 6.38
PEF PRE REF: 73.5 %
PEF REF: 8.57
PRE FEF 25 75: 1.04 L/S (ref 1.26–4.07)
PRE FET 100: 7.28 SEC
PRE FEV1 FVC: 61.32 % (ref 64.78–89.81)
PRE FEV1: 2.21 L (ref 2.42–4.09)
PRE FVC: 3.6 L (ref 3.19–5.25)
PRE PEF: 6.3 L/S (ref 6.38–10.76)

## 2020-10-12 PROCEDURE — 71046 XR CHEST PA AND LATERAL: ICD-10-PCS | Mod: 26,HCNC,, | Performed by: RADIOLOGY

## 2020-10-12 PROCEDURE — 3044F PR MOST RECENT HEMOGLOBIN A1C LEVEL <7.0%: ICD-10-PCS | Mod: CPTII,S$GLB,, | Performed by: INTERNAL MEDICINE

## 2020-10-12 PROCEDURE — 3077F PR MOST RECENT SYSTOLIC BLOOD PRESSURE >= 140 MM HG: ICD-10-PCS | Mod: CPTII,S$GLB,, | Performed by: INTERNAL MEDICINE

## 2020-10-12 PROCEDURE — 3008F PR BODY MASS INDEX (BMI) DOCUMENTED: ICD-10-PCS | Mod: CPTII,S$GLB,, | Performed by: INTERNAL MEDICINE

## 2020-10-12 PROCEDURE — 99214 PR OFFICE/OUTPT VISIT, EST, LEVL IV, 30-39 MIN: ICD-10-PCS | Mod: 25,S$GLB,, | Performed by: INTERNAL MEDICINE

## 2020-10-12 PROCEDURE — 99999 PR PBB SHADOW E&M-EST. PATIENT-LVL V: CPT | Mod: PBBFAC,HCNC,, | Performed by: INTERNAL MEDICINE

## 2020-10-12 PROCEDURE — 3079F DIAST BP 80-89 MM HG: CPT | Mod: CPTII,S$GLB,, | Performed by: INTERNAL MEDICINE

## 2020-10-12 PROCEDURE — 94010 BREATHING CAPACITY TEST: CPT | Mod: S$GLB,,, | Performed by: INTERNAL MEDICINE

## 2020-10-12 PROCEDURE — 3008F BODY MASS INDEX DOCD: CPT | Mod: CPTII,S$GLB,, | Performed by: INTERNAL MEDICINE

## 2020-10-12 PROCEDURE — 3077F SYST BP >= 140 MM HG: CPT | Mod: CPTII,S$GLB,, | Performed by: INTERNAL MEDICINE

## 2020-10-12 PROCEDURE — 3079F PR MOST RECENT DIASTOLIC BLOOD PRESSURE 80-89 MM HG: ICD-10-PCS | Mod: CPTII,S$GLB,, | Performed by: INTERNAL MEDICINE

## 2020-10-12 PROCEDURE — 94010 BREATHING CAPACITY TEST: ICD-10-PCS | Mod: S$GLB,,, | Performed by: INTERNAL MEDICINE

## 2020-10-12 PROCEDURE — 99999 PR PBB SHADOW E&M-EST. PATIENT-LVL V: ICD-10-PCS | Mod: PBBFAC,HCNC,, | Performed by: INTERNAL MEDICINE

## 2020-10-12 PROCEDURE — 71046 X-RAY EXAM CHEST 2 VIEWS: CPT | Mod: 26,HCNC,, | Performed by: RADIOLOGY

## 2020-10-12 PROCEDURE — 71046 X-RAY EXAM CHEST 2 VIEWS: CPT | Mod: TC,HCNC

## 2020-10-12 PROCEDURE — 99214 OFFICE O/P EST MOD 30 MIN: CPT | Mod: 25,S$GLB,, | Performed by: INTERNAL MEDICINE

## 2020-10-12 PROCEDURE — 3044F HG A1C LEVEL LT 7.0%: CPT | Mod: CPTII,S$GLB,, | Performed by: INTERNAL MEDICINE

## 2020-10-12 NOTE — PROGRESS NOTES
Health Maintenance Due   Topic Date Due    Shingles Vaccine (2 of 3) 11/07/2017    Foot Exam  10/04/2019    Colorectal Cancer Screening  06/04/2020     Updates were requested from care everywhere.  Chart was reviewed for overdue Proactive [facility] Encounters (KANE) topics (CRS, Breast Cancer Screening, Eye exam)  Health Maintenance has been updated.  LINKS immunization registry triggered.  Immunizations were reconciled.

## 2020-10-12 NOTE — ASSESSMENT & PLAN NOTE
COPD ROS: taking medications as instructed, no medication side effects noted, no significant ongoing wheezing or shortness of breath, using bronchodilator MDI less than twice a week.     New concerns: None.     Reviewed Spirometry and CXR  Moderate obstruction: on TRELEGY  Immunisations are current'    Exam: appears well, vitals normal, no respiratory distress, acyanotic, normal RR, chest clear, no wheezing, crepitations, rhonchi, normal symmetric air entry.     Assessment: COPD reasonably well controlled.     Plan: current treatment plan is effective, no change in therapy.
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
## 2020-11-03 ENCOUNTER — OFFICE VISIT (OUTPATIENT)
Dept: OPHTHALMOLOGY | Facility: CLINIC | Age: 65
End: 2020-11-03
Payer: MEDICARE

## 2020-11-03 DIAGNOSIS — E11.59 HYPERTENSION ASSOCIATED WITH DIABETES: ICD-10-CM

## 2020-11-03 DIAGNOSIS — E11.36 CONTROLLED TYPE 2 DIABETES MELLITUS WITH DIABETIC CATARACT, WITHOUT LONG-TERM CURRENT USE OF INSULIN: Primary | ICD-10-CM

## 2020-11-03 DIAGNOSIS — D31.31 NEVUS OF CHOROID OF RIGHT EYE: ICD-10-CM

## 2020-11-03 DIAGNOSIS — I15.2 HYPERTENSION ASSOCIATED WITH DIABETES: ICD-10-CM

## 2020-11-03 PROCEDURE — 92014 PR EYE EXAM, EST PATIENT,COMPREHESV: ICD-10-PCS | Mod: HCNC,S$GLB,, | Performed by: OPHTHALMOLOGY

## 2020-11-03 PROCEDURE — 92250 COLOR FUNDUS PHOTOGRAPHY - OU - BOTH EYES: ICD-10-PCS | Mod: HCNC,S$GLB,, | Performed by: OPHTHALMOLOGY

## 2020-11-03 PROCEDURE — 99999 PR PBB SHADOW E&M-EST. PATIENT-LVL III: ICD-10-PCS | Mod: PBBFAC,HCNC,, | Performed by: OPHTHALMOLOGY

## 2020-11-03 PROCEDURE — 92014 COMPRE OPH EXAM EST PT 1/>: CPT | Mod: HCNC,S$GLB,, | Performed by: OPHTHALMOLOGY

## 2020-11-03 PROCEDURE — 92250 FUNDUS PHOTOGRAPHY W/I&R: CPT | Mod: HCNC,S$GLB,, | Performed by: OPHTHALMOLOGY

## 2020-11-03 PROCEDURE — 99999 PR PBB SHADOW E&M-EST. PATIENT-LVL III: CPT | Mod: PBBFAC,HCNC,, | Performed by: OPHTHALMOLOGY

## 2020-11-03 NOTE — PROGRESS NOTES
===============================  Stefan Forbes JrSb,  11/3/2020 today   65 y.o. male   Last visit [facility]: :10/1/2018   Last visit eye dept. Visit date not found  [facility]:  Corrected distance visual acuity was 20/50 in the right eye and 20/50 in the left eye.  Tonometry     Tonometry (Applanation, 10:52 AM)       Right Left    Pressure 20 19               Not recorded         Not recorded        CYCLOPLEGIC     Cycloplegic Refraction       Sphere Cylinder Axis    Right -1.00 +1.50 175    Left -1.75 +2.25 150              Chief Complaint   Patient presents with    Diabetic Eye Exam     1 yr check DM. blurry va ou near and distant. last glucose test 264 x1mo    Concerns About Ocular Health     pt got sawdust in od and is crusty in a.m. x2days       ________________  11/3/2020 today  HPI     Diabetic Eye Exam      Additional comments: 1 yr check DM. blurry va ou near and distant. last   glucose test 264 x1mo              Concerns About Ocular Health      Additional comments: pt got sawdust in od and is crusty in a.m. x2days              Comments     DM/no retinopathy   Choroidal nevus OD  NS ou          Last edited by LEIDY Coleman on 11/3/2020 10:43 AM. (History)      Problem List Items Addressed This Visit        Eye/Vision problems    Hypertension associated with diabetes    Nevus of choroid of right eye      Other Visit Diagnoses     Controlled type 2 diabetes mellitus with diabetic cataract, without long-term current use of insulin    -  Primary          .Stable OD Nevus, no elevation  DM, no BDR  1+ nsc ou, follow for now  rx glasses  rtc 1 year    ===========================

## 2020-11-05 ENCOUNTER — PATIENT OUTREACH (OUTPATIENT)
Dept: OTHER | Facility: OTHER | Age: 65
End: 2020-11-05

## 2020-11-05 DIAGNOSIS — E11.59 HYPERTENSION ASSOCIATED WITH DIABETES: Primary | ICD-10-CM

## 2020-11-05 DIAGNOSIS — E11.9 CONTROLLED TYPE 2 DIABETES MELLITUS WITHOUT COMPLICATION, WITHOUT LONG-TERM CURRENT USE OF INSULIN: ICD-10-CM

## 2020-11-05 DIAGNOSIS — I15.2 HYPERTENSION ASSOCIATED WITH DIABETES: Primary | ICD-10-CM

## 2020-11-05 RX ORDER — AMLODIPINE BESYLATE 10 MG/1
10 TABLET ORAL DAILY
Qty: 90 TABLET | Refills: 1 | Status: SHIPPED | OUTPATIENT
Start: 2020-11-05 | End: 2021-01-20 | Stop reason: SDUPTHER

## 2020-11-05 NOTE — PROGRESS NOTES
Follow-up Call  - [facility] to call back.  - Avg BP: 139/76 mmHg  - May consider initiation of ARB as pt with T2DM and HTN. May also increase dulaglutide to 1.5 mg SQ once weekly.    Hypertension Medications             amLODIPine (NORVASC) 5 MG tablet Take 1 tablet by mouth daily for 90 days.    amLODIPine (NORVASC) 5 MG tablet Take 1 tablet (5 mg total) by mouth once daily.    nitroGLYCERIN (NITROSTAT) 0.4 MG SL tablet Take 1 tablet under the tongue every 5 minutes as needed for chest pain. Do not exceed a total of 3 doses in 15 minutes        Diabetes Medications             dulaglutide (TRULICITY) 0.75 mg/0.5 mL pen injector INJECT CONTENTS OF 1 SYRINGE (0.75 MG TOTAL)  SUBCUTANEOUSLY ONCE EVERY 7 DAYS    metFORMIN (GLUCOPHAGE) 1000 MG tablet TAKE ONE TABLET BY MOUTH TWICE DAILY WITH MEALS

## 2020-11-05 NOTE — PROGRESS NOTES
"Digital Medicine: Clinician Follow-Up    Reports gaining the most weight he has ever gained in last year. Wishing to speak with a nutritionist. Indicates diet is "not good." Questioning daily sodium consumption recommendation as has HTN but also has hyponatremia.  Reports taking amlodipine at night. Not wishing to start a new BP medication. Willing to increase amlodipine.    Reports recently picking up a three month supply of dulaglutide.     The history is provided by the patient and the spouse/significant other. The patient has granted authorization to speak to this person.      Review of patient's allergies indicates:  No Known Allergies  Follow-up reason(s): routine follow up.     Hypertension    Readings are trending up   Diabetes    Readings are trending up   Patient is not experiencing signs/symptoms of hypotension.  Patient is not experiencing signs/symptoms of hypertension.  Patient is not experiencing signs/symptoms of hypoglycemia.  Patient is not experiencing signs/symptoms of hyperglycemia.          Last 5 Patient Entered Readings                                      Current 30 Day Average: 139/76     Recent Readings 11/4/2020 11/3/2020 11/2/2020 11/2/2020 11/2/2020    SBP (mmHg) 143 148 150 162 149    DBP (mmHg) 85 80 82 80 86    Pulse 63 62 59 57 63        Last 6 Patient Entered Readings                                          Most Recent A1c: 6.9% on 9/2/2020  (Goal: 7%)     Recent Readings 11/4/2020 11/3/2020 11/2/2020 11/1/2020 10/31/2020    Blood Glucose (mg/dL) 173 123 214 154 172               Depression Screening  Did not address depression screening.    Sleep Apnea Screening    Did not address sleep apnea screening.     Medication Affordability Screening  Did not address medication affordability screening.     Medication Adherence-Medication adherence was assessed.          ASSESSMENT(S)  Patient's A1C goal is less than or equal to 7. Patient's most recent A1C result is at goal. Lab Results   "  Component                Value               Date                     HGBA1C                   6.9 (H)             09/02/2020          .     Patients BP average is 139/76 mmHg, which is above goal. Patient's BP goal is less than or equal to 130/80.     As patient with increasing BP averaging at 139/76 mmHg recommend increasing amlodipine to 10 mg tab daily as patient wishing to not start an ACEi/ARB. In future, may recommend olmesartan 20 mg tab daily. Recommend consult to Nutritionist for sodium daily intake recommendation as pt with HTN, CAD and hyponatremia.     SMBG rising with fasting averaging at 179 mg/dL over last thirty days. Recommend increasing dulaglutide to 1.5 mg SQ once weekly.      Hypertension Plan  Hypertension Medication Change. Increase amlodipine to 10 mg tab daily.   Await MD intervention. Patient wishing for a Nutritionist consultation to review diet and daily sodium intake recommendation.    Diabetes Plan  Medication change. Increase dulaglutide to 1.5 mg SQ once weekly.   Provided patient education. Counseled patient that he may inject 2 0.75 mg injections to equal the 1.5 dose.       Addressed patient questions and patient has my contact information if needed prior to next outreach. Patient verbalizes understanding.             There are no preventive care reminders to display for this patient.  There are no preventive care reminders to display for this patient.      Hypertension Medications             amLODIPine (NORVASC) 10 MG tablet Take 1 tablet (10 mg total) by mouth once daily.    nitroGLYCERIN (NITROSTAT) 0.4 MG SL tablet Take 1 tablet under the tongue every 5 minutes as needed for chest pain. Do not exceed a total of 3 doses in 15 minutes        Diabetes Medications             dulaglutide (TRULICITY) 1.5 mg/0.5 mL pen injector Inject 1.5 mg into the skin every 7 days.    metFORMIN (GLUCOPHAGE) 1000 MG tablet TAKE ONE TABLET BY MOUTH TWICE DAILY WITH MEALS

## 2020-11-10 ENCOUNTER — TELEPHONE (OUTPATIENT)
Dept: INTERNAL MEDICINE | Facility: CLINIC | Age: 65
End: 2020-11-10

## 2020-11-10 ENCOUNTER — PATIENT OUTREACH (OUTPATIENT)
Dept: ADMINISTRATIVE | Facility: HOSPITAL | Age: 65
End: 2020-11-10

## 2020-11-10 NOTE — TELEPHONE ENCOUNTER
Working HTN report.      Called for updated bp reading. Today was 135/75. Remote entry made  States amlodipine recently increased to 10 mg daily.    11/09/20 /80

## 2020-11-10 NOTE — PROGRESS NOTES
Working HTN report.     Called for updated bp reading. Today was 135/75. Remote entry made  States amlodipine recently increased to 10 mg daily.    11/05/20 /86  11/06/20 /80  11/09/20 /80

## 2020-11-19 ENCOUNTER — PATIENT OUTREACH (OUTPATIENT)
Dept: OTHER | Facility: OTHER | Age: 65
End: 2020-11-19

## 2020-11-19 NOTE — PROGRESS NOTES
Digital Medicine: Clinician Follow-Up    Patient tolerating both inc amlodipine 10 mg and increase in dulaglutide to 1.5 mg sq weekly.     The history is provided by the patient.   Follow-up reason(s): medication change follow-up.     Hypertension    Readings are trending down   Diabetes    Readings are trending down   Patient is not experiencing signs/symptoms of hypotension.  Patient is not experiencing signs/symptoms of hypertension.  Patient is not experiencing signs/symptoms of hypoglycemia.  Patient is not experiencing signs/symptoms of hyperglycemia.    Patient did make medication change.    Is patient tolerating med change? yes            Last 5 Patient Entered Readings                                      Current 30 Day Average: 138/78     Recent Readings 11/18/2020 11/16/2020 11/15/2020 11/14/2020 11/13/2020    SBP (mmHg) 149 125 124 124 136    DBP (mmHg) 78 83 75 73 75    Pulse 59 60 62 61 65        Last 6 Patient Entered Readings                                          Most Recent A1c: 6.9% on 9/2/2020  (Goal: 7%)     Recent Readings 11/18/2020 11/16/2020 11/15/2020 11/14/2020 11/13/2020    Blood Glucose (mg/dL) 206 196 162 204 213               Depression Screening  Did not address depression screening.    Sleep Apnea Screening    Did not address sleep apnea screening.     Medication Affordability Screening  Did not address medication affordability screening.     Medication Adherence-Medication adherence was assessed.          ASSESSMENT(S)  Patient's A1C goal is less than or equal to 7. Patient's most recent A1C result is at goal. Lab Results    Component                Value               Date                     HGBA1C                   6.9 (H)             09/02/2020          .     Patients BP average is 138/78 mmHg, which is above goal. Patient's BP goal is less than or equal to 130/80.     Recommend continued monitoring and in future increasing dulaglutide to 3 mg sq once weekly if clinically  appropriate.       Hypertension Plan  Continue current therapy.    Diabetes Plan  Continue current therapy.       Addressed patient questions and patient has my contact information if needed prior to next outreach. Patient verbalizes understanding.             There are no preventive care reminders to display for this patient.  There are no preventive care reminders to display for this patient.      Hypertension Medications             amLODIPine (NORVASC) 10 MG tablet Take 1 tablet (10 mg total) by mouth once daily.    nitroGLYCERIN (NITROSTAT) 0.4 MG SL tablet Take 1 tablet under the tongue every 5 minutes as needed for chest pain. Do not exceed a total of 3 doses in 15 minutes        Diabetes Medications             dulaglutide (TRULICITY) 1.5 mg/0.5 mL pen injector Inject 1.5 mg into the skin every 7 days.    metFORMIN (GLUCOPHAGE) 1000 MG tablet TAKE ONE TABLET BY MOUTH TWICE DAILY WITH MEALS

## 2020-11-20 PROCEDURE — 99454 PR REMOTE MNTR, PHYS PARAM, INITIAL, EA 30 DAYS: ICD-10-PCS | Mod: S$GLB,,, | Performed by: INTERNAL MEDICINE

## 2020-11-20 PROCEDURE — 99454 REM MNTR PHYSIOL PARAM 16-30: CPT | Mod: S$GLB,,, | Performed by: INTERNAL MEDICINE

## 2020-11-23 NOTE — PROGRESS NOTES
Subjective:      Patient ID: Stefan Forbes Jr. is a 64 y.o. male.    Chief Complaint: COPD (6 mon f/u)    HPI   65 yo with   Patient Active Problem List   Diagnosis    Controlled type 2 diabetes mellitus without complication, without long-term current use of insulin    Hypertension associated with diabetes    Hyperlipidemia associated with type 2 diabetes mellitus    Moderate COPD (chronic obstructive pulmonary disease)    CAD (coronary artery disease)    Vitamin D deficiency    Osteoarthritis of knee    Pulmonary nodule    Nevus of choroid of right eye    History of pulmonary embolism    Hyponatremia    Iron deficiency anemia due to chronic blood loss    MACHO on CPAP    Nocturnal oxygen desaturation    PLMD (periodic limb movement disorder)    BMI 35.0-35.9,adult    Varicose vein of leg    Tobacco abuse, in remission     Past Medical History:   Diagnosis Date    COPD (chronic obstructive pulmonary disease)     Diabetes mellitus, type 2     Hyperlipidemia     Hypertension      Here today for annual prev exam.  Compliant with meds without significant side effects. Energy and appetite are good. Pt reports his job is requesting a letter regarding driving ability.  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  He reports that episode at work of feeling off balance and stumbling a bit catching himself on a shelf unit at work.  Patient reports that he had been bending over a lot prior to this incident.  He reports feeling off balance upon stay standing up after being in a bent over position for quite some time.  Episode lasted several seconds.  He did not fall.  He did not lose consciousness.  This episode is what sparked his job to request a letter stating that this incident is not a reason to disqualify him from driving.  He reports that over the last 1-2 years he has had similar much milder episodes.  They occur approximately 1-2 times monthly.  Typically last a few  "seconds.  Always occur after bending or squatting for a longer period of time.     Review of Systems   Constitutional: Negative for chills and fever.   HENT: Negative for ear pain and sore throat.    Respiratory: Negative for cough.    Cardiovascular: Negative for chest pain.   Gastrointestinal: Negative for abdominal pain and blood in stool.   Genitourinary: Negative for dysuria and hematuria.   Neurological: Negative for dizziness, tremors, seizures, syncope, facial asymmetry, speech difficulty, weakness, numbness and headaches.     Objective:   /85 (Patient Position: Standing)   Pulse 68   Temp 97.8 °F (36.6 °C) (Oral)   Ht 5' 8" (1.727 m)   Wt 110.2 kg (242 lb 15.2 oz)   SpO2 98%   BMI 36.94 kg/m²     Orthostatic bp today normal    Physical Exam   Constitutional: He is oriented to person, place, and time. He appears well-developed and well-nourished. No distress.   HENT:   Head: Normocephalic and atraumatic.   Mouth/Throat: Oropharynx is clear and moist.   Eyes: Pupils are equal, round, and reactive to light. EOM are normal.   Neck: Neck supple. No thyromegaly present.   Cardiovascular: Normal rate and regular rhythm.   Pulmonary/Chest: Breath sounds normal. He has no wheezes. He has no rales.   Abdominal: Soft. Bowel sounds are normal. There is no tenderness.   Musculoskeletal: He exhibits no edema.   Lymphadenopathy:     He has no cervical adenopathy.   Neurological: He is alert and oriented to person, place, and time. He displays normal reflexes. No cranial nerve deficit or sensory deficit. He exhibits normal muscle tone. Coordination normal.   Skin: Skin is warm and dry.   Psychiatric: He has a normal mood and affect. His behavior is normal.       Assessment:     1. Routine general medical examination at a health care facility    2. Moderate COPD (chronic obstructive pulmonary disease)    3. Pulmonary nodule    4. Hypertension associated with diabetes    5. Hyperlipidemia associated with type 2 " Detail Level: Zone diabetes mellitus    6. Coronary artery disease involving native coronary artery of native heart without angina pectoris    7. Controlled type 2 diabetes mellitus without complication, without long-term current use of insulin    8. Tobacco abuse, in remission    9. Hyponatremia    10. Encounter for screening for malignant neoplasm of prostate    11. Orthostatic dizziness      Plan:   Routine general medical examination at a health care facility  Heart healthy diet and regular exercise.  Health maintenance reviewed  Moderate COPD (chronic obstructive pulmonary disease)  Stable.  No recent flares  Pulmonary nodule    Hypertension associated with diabetes  Having some occasional orthostatic symptoms.  Will monitor with a decrease in amlodipine  -     Comprehensive metabolic panel; Future; Expected date: 03/05/2020  -     CBC auto differential; Future; Expected date: 03/05/2020  -     TSH; Future; Expected date: 03/05/2020    Hyperlipidemia associated with type 2 diabetes mellitus  -     Lipid panel; Future; Expected date: 09/05/2020    Coronary artery disease involving native coronary artery of native heart without angina pectoris  Stable.  Continue current medication  Controlled type 2 diabetes mellitus without complication, without long-term current use of insulin  -     Hemoglobin A1c; Future; Expected date: 09/05/2020    Tobacco abuse, in remission    Hyponatremia    Encounter for screening for malignant neoplasm of prostate  -     PSA, Screening; Future; Expected date: 09/05/2020    Orthostatic dizziness  Monitor with a decrease  Other orders  -     amLODIPine (NORVASC) 2.5 MG tablet; Take 1 tablet (2.5 mg total) by mouth once daily.  Dispense: 90 tablet; Refill: 0        Lab Frequency Next Occurrence   X-Ray Chest PA And Lateral Once 10/14/2019   Lipid panel Once 09/05/2020   PSA, Screening Once 09/05/2020   Hemoglobin A1c Once 09/05/2020       Problem List Items Addressed This Visit        Pulmonary    Moderate  COPD (chronic obstructive pulmonary disease)    Pulmonary nodule    Overview     stable              Cardiac/Vascular    Hypertension associated with diabetes    Relevant Orders    Comprehensive metabolic panel (Completed)    CBC auto differential (Completed)    TSH (Completed)    Hyperlipidemia associated with type 2 diabetes mellitus    Relevant Orders    Lipid panel    CAD (coronary artery disease)       Renal/    Hyponatremia       Endocrine    Controlled type 2 diabetes mellitus without complication, without long-term current use of insulin    Relevant Orders    Hemoglobin A1c       Other    Tobacco abuse, in remission      Other Visit Diagnoses     Routine general medical examination at a health care facility    -  Primary    Encounter for screening for malignant neoplasm of prostate        Relevant Orders    PSA, Screening    Orthostatic dizziness              Follow up in about 4 weeks (around 4/2/2020), or if symptoms worsen or fail to improve.   Detail Level: Simple

## 2020-11-25 ENCOUNTER — PATIENT OUTREACH (OUTPATIENT)
Dept: OTHER | Facility: OTHER | Age: 65
End: 2020-11-25

## 2020-11-25 NOTE — PROGRESS NOTES
Digital Medicine: Health  Follow-Up    The history is provided by the patient.             Reason for review: Blood glucose at goal and Blood pressure at goal        Topics Covered on Call: physical activity and Diet            Diet-no change to diet    No change to diet.        Physical Activity-no change to routine  No change to exercise routine.     Medication Adherence-Medication adherence was asssessed.  Patient continue taking medication as prescribed.          Patient reports that he recently just got a medication adjustment for both his BP and his BG.He reports that he got this medication change roughly two weeks ago.      Continue current diet/physical activity routine.       Addressed patient questions and patient has my contact information if needed prior to next outreach.   Explained the importance of self-monitoring and medication adherence. Encouraged the patient to communicate with their health  for lifestyle modifications to help improve or maintain a healthy lifestyle.               There are no preventive care reminders to display for this patient.      Last 5 Patient Entered Readings                                      Current 30 Day Average: 136/77     Recent Readings 11/25/2020 11/24/2020 11/23/2020 11/22/2020 11/21/2020    SBP (mmHg) 120 118 153 138 124    DBP (mmHg) 70 71 74 78 73    Pulse 61 62 63 59 64        Last 6 Patient Entered Readings                                          Most Recent A1c: 6.9% on 9/2/2020  (Goal: 7%)     Recent Readings 11/25/2020 11/24/2020 11/23/2020 11/22/2020 11/21/2020    Blood Glucose (mg/dL) 192 149 180 191 154

## 2020-11-30 PROCEDURE — 99457 PR MONITORING, PHYSIOL PARAM, REMOTE, 1ST 20 MINS, PER MONTH: ICD-10-PCS | Mod: S$GLB,,, | Performed by: INTERNAL MEDICINE

## 2020-11-30 PROCEDURE — 99457 RPM TX MGMT 1ST 20 MIN: CPT | Mod: S$GLB,,, | Performed by: INTERNAL MEDICINE

## 2020-12-10 ENCOUNTER — PATIENT OUTREACH (OUTPATIENT)
Dept: OTHER | Facility: OTHER | Age: 65
End: 2020-12-10

## 2020-12-10 NOTE — PROGRESS NOTES
Digital Medicine: Clinician Follow-Up    Patient indicates wishing to work on lifestyle habits starting at the beginning of the year. Understands projected A1c if taken today would be ~7.9%. Patient trying to eat more salads that his wife fixes for him. His main struggle is after he eats the salad he will go into the pantry and eat something unhealthy, which causes sugars to increase. Denies medication increase in dose today.     During COVID only leaves house to get medications and groceries. Both of these activities he waits in car and people bring out the items to him.     The history is provided by the patient.   Follow-up reason(s): routine follow up.     Hypertension    Readings are trending down   Diabetes    Readings are trending down   Patient is not experiencing signs/symptoms of hypotension.  Patient is not experiencing signs/symptoms of hypertension.  Patient is not experiencing signs/symptoms of hypoglycemia.  Patient is not experiencing signs/symptoms of hyperglycemia.          Last 5 Patient Entered Readings                                      Current 30 Day Average: 131/75     Recent Readings 12/2/2020 11/30/2020 11/30/2020 11/29/2020 11/28/2020    SBP (mmHg) 122 135 136 120 130    DBP (mmHg) 71 78 88 70 80    Pulse 67 61 72 64 72        Last 6 Patient Entered Readings                                          Most Recent A1c: 6.9% on 9/2/2020  (Goal: 7%)     Recent Readings 12/9/2020 12/9/2020 12/8/2020 12/6/2020 12/5/2020    Blood Glucose (mg/dL) 155 (No Data)  213 201 157               Depression Screening  Did not address depression screening.    Sleep Apnea Screening    Did not address sleep apnea screening.     Medication Affordability Screening  Did not address medication affordability screening.     Medication Adherence-Medication adherence was assessed.          ASSESSMENT(S)  Patients BP average is 131/75 mmHg, which is at goal. Patient's BP goal is less than or equal to 130/80.  Patient's  A1C goal is less than or equal to 7. Patient's most recent A1C result is at goal. Lab Results    Component                Value               Date                     HGBA1C                   6.9 (H)             09/02/2020          .       Hypertension Plan  Continue current therapy.    Diabetes Plan  Continue current therapy.  Continue current diet/physical activity routine.  Patient declined medication changes.       Addressed patient questions and patient has my contact information if needed prior to next outreach. Patient verbalizes understanding.             There are no preventive care reminders to display for this patient.  There are no preventive care reminders to display for this patient.      Hypertension Medications             amLODIPine (NORVASC) 10 MG tablet Take 1 tablet (10 mg total) by mouth once daily.    nitroGLYCERIN (NITROSTAT) 0.4 MG SL tablet Take 1 tablet under the tongue every 5 minutes as needed for chest pain. Do not exceed a total of 3 doses in 15 minutes        Diabetes Medications             dulaglutide (TRULICITY) 1.5 mg/0.5 mL pen injector Inject 1.5 mg into the skin every 7 days.    metFORMIN (GLUCOPHAGE) 1000 MG tablet TAKE ONE TABLET BY MOUTH TWICE DAILY WITH MEALS

## 2020-12-10 NOTE — PROGRESS NOTES
Digital Medicine: Health  Follow-Up    The history is provided by the patient.             Reason for review: Blood glucose at goal and Blood pressure at goal  Care Team received low BG alert.        Additional Follow-up details: Called the patient about the low blood sugar reading that he submitted on 12/9/20. The patient reports that the reading was false. Being that the reading was false, I deleted the erroneous reading from the patients chart.            Diet-Not assessed          Physical Activity-Not assessed    Medication Adherence-Medication Adherence not addressed.      Substance, Sleep, Stress-Not assessed      Continue current diet/physical activity routine.       Addressed patient questions and patient has my contact information if needed prior to next outreach.   Explained the importance of self-monitoring and medication adherence. Encouraged the patient to communicate with their health  for lifestyle modifications to help improve or maintain a healthy lifestyle.               There are no preventive care reminders to display for this patient.      Last 5 Patient Entered Readings                                      Current 30 Day Average: 131/75     Recent Readings 12/2/2020 11/30/2020 11/30/2020 11/29/2020 11/28/2020    SBP (mmHg) 122 135 136 120 130    DBP (mmHg) 71 78 88 70 80    Pulse 67 61 72 64 72        Last 6 Patient Entered Readings                                          Most Recent A1c: 6.9% on 9/2/2020  (Goal: 8%)     Recent Readings 12/9/2020 12/9/2020 12/8/2020 12/6/2020 12/5/2020    Blood Glucose (mg/dL) 155 34 213 201 157

## 2020-12-14 DIAGNOSIS — J44.9 MODERATE COPD (CHRONIC OBSTRUCTIVE PULMONARY DISEASE): ICD-10-CM

## 2020-12-18 PROCEDURE — 99454 REM MNTR PHYSIOL PARAM 16-30: CPT | Mod: S$GLB,,, | Performed by: INTERNAL MEDICINE

## 2020-12-18 PROCEDURE — 99454 PR REMOTE MNTR, PHYS PARAM, INITIAL, EA 30 DAYS: ICD-10-PCS | Mod: S$GLB,,, | Performed by: INTERNAL MEDICINE

## 2020-12-23 NOTE — TELEPHONE ENCOUNTER
----- Message from Trini Castle sent at 8/28/2018  8:14 AM CDT -----  Please work in patient today, has a very bad cold and only want to see Dr. Fisher [phone]   CATARACTS, OS: VISUALLY SIGNIFICANT. OPTION OF SURGERY VERSUS FOLLOWING VERSUS UPDATING GLASSES DISCUSSED. RBA'S DISCUSSED, PATIENT UNDERSTANDS AND DESIRES SURGERY TO INCREASE VISION FOR DRIVING AND WATCHING TV.  SCHEDULE CATARACT SURGERY/PRE-OP OS SAME DAY AS YAG PO OD.

## 2021-01-04 ENCOUNTER — TELEPHONE (OUTPATIENT)
Dept: UROLOGY | Facility: CLINIC | Age: 66
End: 2021-01-04

## 2021-01-06 ENCOUNTER — OFFICE VISIT (OUTPATIENT)
Dept: UROLOGY | Facility: CLINIC | Age: 66
End: 2021-01-06
Payer: MEDICARE

## 2021-01-06 VITALS
WEIGHT: 256.63 LBS | DIASTOLIC BLOOD PRESSURE: 70 MMHG | HEIGHT: 68 IN | SYSTOLIC BLOOD PRESSURE: 132 MMHG | HEART RATE: 78 BPM | BODY MASS INDEX: 38.9 KG/M2

## 2021-01-06 DIAGNOSIS — N40.1 BENIGN PROSTATIC HYPERPLASIA WITH WEAK URINARY STREAM: Primary | ICD-10-CM

## 2021-01-06 DIAGNOSIS — R39.12 BENIGN PROSTATIC HYPERPLASIA WITH WEAK URINARY STREAM: Primary | ICD-10-CM

## 2021-01-06 DIAGNOSIS — R39.15 URGENCY OF URINATION: ICD-10-CM

## 2021-01-06 PROCEDURE — 3075F PR MOST RECENT SYSTOLIC BLOOD PRESS GE 130-139MM HG: ICD-10-PCS | Mod: HCNC,CPTII,S$GLB, | Performed by: UROLOGY

## 2021-01-06 PROCEDURE — 3078F PR MOST RECENT DIASTOLIC BLOOD PRESSURE < 80 MM HG: ICD-10-PCS | Mod: HCNC,CPTII,S$GLB, | Performed by: UROLOGY

## 2021-01-06 PROCEDURE — 3072F PR LOW RISK FOR RETINOPATHY: ICD-10-PCS | Mod: HCNC,S$GLB,, | Performed by: UROLOGY

## 2021-01-06 PROCEDURE — 99204 PR OFFICE/OUTPT VISIT, NEW, LEVL IV, 45-59 MIN: ICD-10-PCS | Mod: HCNC,S$GLB,, | Performed by: UROLOGY

## 2021-01-06 PROCEDURE — 3072F LOW RISK FOR RETINOPATHY: CPT | Mod: HCNC,S$GLB,, | Performed by: UROLOGY

## 2021-01-06 PROCEDURE — 99999 PR PBB SHADOW E&M-EST. PATIENT-LVL III: ICD-10-PCS | Mod: PBBFAC,HCNC,, | Performed by: UROLOGY

## 2021-01-06 PROCEDURE — 1126F AMNT PAIN NOTED NONE PRSNT: CPT | Mod: HCNC,S$GLB,, | Performed by: UROLOGY

## 2021-01-06 PROCEDURE — 3008F BODY MASS INDEX DOCD: CPT | Mod: HCNC,CPTII,S$GLB, | Performed by: UROLOGY

## 2021-01-06 PROCEDURE — 1126F PR PAIN SEVERITY QUANTIFIED, NO PAIN PRESENT: ICD-10-PCS | Mod: HCNC,S$GLB,, | Performed by: UROLOGY

## 2021-01-06 PROCEDURE — 3078F DIAST BP <80 MM HG: CPT | Mod: HCNC,CPTII,S$GLB, | Performed by: UROLOGY

## 2021-01-06 PROCEDURE — 99204 OFFICE O/P NEW MOD 45 MIN: CPT | Mod: HCNC,S$GLB,, | Performed by: UROLOGY

## 2021-01-06 PROCEDURE — 3008F PR BODY MASS INDEX (BMI) DOCUMENTED: ICD-10-PCS | Mod: HCNC,CPTII,S$GLB, | Performed by: UROLOGY

## 2021-01-06 PROCEDURE — 99999 PR PBB SHADOW E&M-EST. PATIENT-LVL III: CPT | Mod: PBBFAC,HCNC,, | Performed by: UROLOGY

## 2021-01-06 PROCEDURE — 3075F SYST BP GE 130 - 139MM HG: CPT | Mod: HCNC,CPTII,S$GLB, | Performed by: UROLOGY

## 2021-01-06 RX ORDER — TAMSULOSIN HYDROCHLORIDE 0.4 MG/1
0.4 CAPSULE ORAL DAILY
Qty: 90 CAPSULE | Refills: 3 | Status: SHIPPED | OUTPATIENT
Start: 2021-01-06 | End: 2022-01-03 | Stop reason: SDUPTHER

## 2021-01-06 RX ORDER — MIRABEGRON 50 MG/1
50 TABLET, FILM COATED, EXTENDED RELEASE ORAL DAILY
Qty: 30 TABLET | Refills: 11 | Status: SHIPPED | OUTPATIENT
Start: 2021-01-06 | End: 2022-07-18

## 2021-01-07 PROBLEM — N40.1 BENIGN PROSTATIC HYPERPLASIA WITH WEAK URINARY STREAM: Status: ACTIVE | Noted: 2021-01-07

## 2021-01-07 PROBLEM — R39.12 BENIGN PROSTATIC HYPERPLASIA WITH WEAK URINARY STREAM: Status: ACTIVE | Noted: 2021-01-07

## 2021-01-07 PROBLEM — R39.15 URGENCY OF URINATION: Status: ACTIVE | Noted: 2021-01-07

## 2021-01-13 ENCOUNTER — LAB VISIT (OUTPATIENT)
Dept: LAB | Facility: HOSPITAL | Age: 66
End: 2021-01-13
Attending: INTERNAL MEDICINE
Payer: MEDICARE

## 2021-01-13 DIAGNOSIS — E11.9 CONTROLLED TYPE 2 DIABETES MELLITUS WITHOUT COMPLICATION, WITHOUT LONG-TERM CURRENT USE OF INSULIN: ICD-10-CM

## 2021-01-13 PROCEDURE — 83036 HEMOGLOBIN GLYCOSYLATED A1C: CPT | Mod: HCNC

## 2021-01-13 PROCEDURE — 36415 COLL VENOUS BLD VENIPUNCTURE: CPT | Mod: HCNC

## 2021-01-14 LAB
ESTIMATED AVG GLUCOSE: 151 MG/DL (ref 68–131)
HBA1C MFR BLD HPLC: 6.9 % (ref 4–5.6)

## 2021-01-20 ENCOUNTER — LAB VISIT (OUTPATIENT)
Dept: LAB | Facility: HOSPITAL | Age: 66
End: 2021-01-20
Attending: INTERNAL MEDICINE
Payer: MEDICARE

## 2021-01-20 ENCOUNTER — OFFICE VISIT (OUTPATIENT)
Dept: INTERNAL MEDICINE | Facility: CLINIC | Age: 66
End: 2021-01-20
Payer: MEDICARE

## 2021-01-20 ENCOUNTER — TELEPHONE (OUTPATIENT)
Dept: INTERNAL MEDICINE | Facility: CLINIC | Age: 66
End: 2021-01-20

## 2021-01-20 VITALS
DIASTOLIC BLOOD PRESSURE: 70 MMHG | OXYGEN SATURATION: 98 % | HEART RATE: 83 BPM | HEIGHT: 68 IN | BODY MASS INDEX: 39.13 KG/M2 | TEMPERATURE: 97 F | SYSTOLIC BLOOD PRESSURE: 130 MMHG | WEIGHT: 258.19 LBS

## 2021-01-20 DIAGNOSIS — F17.201 TOBACCO ABUSE, IN REMISSION: ICD-10-CM

## 2021-01-20 DIAGNOSIS — I25.10 CORONARY ARTERY DISEASE INVOLVING NATIVE CORONARY ARTERY OF NATIVE HEART WITHOUT ANGINA PECTORIS: Primary | ICD-10-CM

## 2021-01-20 DIAGNOSIS — E66.01 MORBID (SEVERE) OBESITY DUE TO EXCESS CALORIES: ICD-10-CM

## 2021-01-20 DIAGNOSIS — R60.0 EDEMA OF RIGHT LOWER EXTREMITY: ICD-10-CM

## 2021-01-20 DIAGNOSIS — J44.9 MODERATE COPD (CHRONIC OBSTRUCTIVE PULMONARY DISEASE): ICD-10-CM

## 2021-01-20 DIAGNOSIS — E11.9 CONTROLLED TYPE 2 DIABETES MELLITUS WITHOUT COMPLICATION, WITHOUT LONG-TERM CURRENT USE OF INSULIN: ICD-10-CM

## 2021-01-20 DIAGNOSIS — E11.69 HYPERLIPIDEMIA ASSOCIATED WITH TYPE 2 DIABETES MELLITUS: ICD-10-CM

## 2021-01-20 DIAGNOSIS — E11.59 HYPERTENSION ASSOCIATED WITH DIABETES: ICD-10-CM

## 2021-01-20 DIAGNOSIS — I15.2 HYPERTENSION ASSOCIATED WITH DIABETES: ICD-10-CM

## 2021-01-20 DIAGNOSIS — E78.5 HYPERLIPIDEMIA ASSOCIATED WITH TYPE 2 DIABETES MELLITUS: ICD-10-CM

## 2021-01-20 LAB — D DIMER PPP IA.FEU-MCNC: 0.41 MG/L FEU

## 2021-01-20 PROCEDURE — 3078F PR MOST RECENT DIASTOLIC BLOOD PRESSURE < 80 MM HG: ICD-10-PCS | Mod: CPTII,S$GLB,, | Performed by: INTERNAL MEDICINE

## 2021-01-20 PROCEDURE — 3078F DIAST BP <80 MM HG: CPT | Mod: CPTII,S$GLB,, | Performed by: INTERNAL MEDICINE

## 2021-01-20 PROCEDURE — 99999 PR PBB SHADOW E&M-EST. PATIENT-LVL III: ICD-10-PCS | Mod: PBBFAC,,, | Performed by: INTERNAL MEDICINE

## 2021-01-20 PROCEDURE — 3075F SYST BP GE 130 - 139MM HG: CPT | Mod: CPTII,S$GLB,, | Performed by: INTERNAL MEDICINE

## 2021-01-20 PROCEDURE — 36415 COLL VENOUS BLD VENIPUNCTURE: CPT

## 2021-01-20 PROCEDURE — 85379 FIBRIN DEGRADATION QUANT: CPT

## 2021-01-20 PROCEDURE — 3044F PR MOST RECENT HEMOGLOBIN A1C LEVEL <7.0%: ICD-10-PCS | Mod: CPTII,S$GLB,, | Performed by: INTERNAL MEDICINE

## 2021-01-20 PROCEDURE — 3044F HG A1C LEVEL LT 7.0%: CPT | Mod: CPTII,S$GLB,, | Performed by: INTERNAL MEDICINE

## 2021-01-20 PROCEDURE — 1126F PR PAIN SEVERITY QUANTIFIED, NO PAIN PRESENT: ICD-10-PCS | Mod: S$GLB,,, | Performed by: INTERNAL MEDICINE

## 2021-01-20 PROCEDURE — 99499 UNLISTED E&M SERVICE: CPT | Mod: S$GLB,,, | Performed by: INTERNAL MEDICINE

## 2021-01-20 PROCEDURE — 99499 RISK ADDL DX/OHS AUDIT: ICD-10-PCS | Mod: S$GLB,,, | Performed by: INTERNAL MEDICINE

## 2021-01-20 PROCEDURE — 3008F BODY MASS INDEX DOCD: CPT | Mod: CPTII,S$GLB,, | Performed by: INTERNAL MEDICINE

## 2021-01-20 PROCEDURE — 99999 PR PBB SHADOW E&M-EST. PATIENT-LVL III: CPT | Mod: PBBFAC,,, | Performed by: INTERNAL MEDICINE

## 2021-01-20 PROCEDURE — 1126F AMNT PAIN NOTED NONE PRSNT: CPT | Mod: S$GLB,,, | Performed by: INTERNAL MEDICINE

## 2021-01-20 PROCEDURE — 3072F PR LOW RISK FOR RETINOPATHY: ICD-10-PCS | Mod: S$GLB,,, | Performed by: INTERNAL MEDICINE

## 2021-01-20 PROCEDURE — 3072F LOW RISK FOR RETINOPATHY: CPT | Mod: S$GLB,,, | Performed by: INTERNAL MEDICINE

## 2021-01-20 PROCEDURE — 99214 PR OFFICE/OUTPT VISIT, EST, LEVL IV, 30-39 MIN: ICD-10-PCS | Mod: S$GLB,,, | Performed by: INTERNAL MEDICINE

## 2021-01-20 PROCEDURE — 3008F PR BODY MASS INDEX (BMI) DOCUMENTED: ICD-10-PCS | Mod: CPTII,S$GLB,, | Performed by: INTERNAL MEDICINE

## 2021-01-20 PROCEDURE — 99214 OFFICE O/P EST MOD 30 MIN: CPT | Mod: S$GLB,,, | Performed by: INTERNAL MEDICINE

## 2021-01-20 PROCEDURE — 3075F PR MOST RECENT SYSTOLIC BLOOD PRESS GE 130-139MM HG: ICD-10-PCS | Mod: CPTII,S$GLB,, | Performed by: INTERNAL MEDICINE

## 2021-01-20 RX ORDER — AMLODIPINE BESYLATE 10 MG/1
10 TABLET ORAL DAILY
Qty: 90 TABLET | Refills: 1 | Status: SHIPPED | OUTPATIENT
Start: 2021-01-20 | End: 2021-08-18 | Stop reason: SDUPTHER

## 2021-01-20 RX ORDER — ROSUVASTATIN CALCIUM 10 MG/1
TABLET, COATED ORAL
Qty: 90 TABLET | Refills: 1 | Status: SHIPPED | OUTPATIENT
Start: 2021-01-20 | End: 2021-09-21 | Stop reason: SDUPTHER

## 2021-01-20 RX ORDER — METFORMIN HYDROCHLORIDE 1000 MG/1
1000 TABLET ORAL 2 TIMES DAILY WITH MEALS
Qty: 180 TABLET | Refills: 2 | Status: SHIPPED | OUTPATIENT
Start: 2021-01-20 | End: 2022-01-03 | Stop reason: SDUPTHER

## 2021-01-21 ENCOUNTER — PES CALL (OUTPATIENT)
Dept: ADMINISTRATIVE | Facility: CLINIC | Age: 66
End: 2021-01-21

## 2021-01-26 PROCEDURE — 99454 REM MNTR PHYSIOL PARAM 16-30: CPT | Mod: S$GLB,,, | Performed by: INTERNAL MEDICINE

## 2021-01-26 PROCEDURE — 99454 PR REMOTE MNTR, PHYS PARAM, INITIAL, EA 30 DAYS: ICD-10-PCS | Mod: S$GLB,,, | Performed by: INTERNAL MEDICINE

## 2021-01-27 ENCOUNTER — OFFICE VISIT (OUTPATIENT)
Dept: INTERNAL MEDICINE | Facility: CLINIC | Age: 66
End: 2021-01-27
Payer: MEDICARE

## 2021-01-27 VITALS
WEIGHT: 254 LBS | SYSTOLIC BLOOD PRESSURE: 137 MMHG | BODY MASS INDEX: 38.49 KG/M2 | DIASTOLIC BLOOD PRESSURE: 78 MMHG | HEIGHT: 68 IN

## 2021-01-27 DIAGNOSIS — E11.59 HYPERTENSION ASSOCIATED WITH DIABETES: ICD-10-CM

## 2021-01-27 DIAGNOSIS — J44.9 MODERATE COPD (CHRONIC OBSTRUCTIVE PULMONARY DISEASE): ICD-10-CM

## 2021-01-27 DIAGNOSIS — Z86.711 HISTORY OF PULMONARY EMBOLISM: ICD-10-CM

## 2021-01-27 DIAGNOSIS — Z00.00 ENCOUNTER FOR PREVENTIVE HEALTH EXAMINATION: Primary | ICD-10-CM

## 2021-01-27 DIAGNOSIS — I15.2 HYPERTENSION ASSOCIATED WITH DIABETES: ICD-10-CM

## 2021-01-27 DIAGNOSIS — G47.33 OSA ON CPAP: ICD-10-CM

## 2021-01-27 DIAGNOSIS — E55.9 VITAMIN D DEFICIENCY: ICD-10-CM

## 2021-01-27 DIAGNOSIS — R91.1 PULMONARY NODULE: ICD-10-CM

## 2021-01-27 DIAGNOSIS — D31.31 NEVUS OF CHOROID OF RIGHT EYE: ICD-10-CM

## 2021-01-27 DIAGNOSIS — N40.1 BENIGN PROSTATIC HYPERPLASIA WITH WEAK URINARY STREAM: ICD-10-CM

## 2021-01-27 DIAGNOSIS — I70.0 AORTIC ATHEROSCLEROSIS: ICD-10-CM

## 2021-01-27 DIAGNOSIS — E78.5 HYPERLIPIDEMIA ASSOCIATED WITH TYPE 2 DIABETES MELLITUS: ICD-10-CM

## 2021-01-27 DIAGNOSIS — I25.10 CORONARY ARTERY DISEASE INVOLVING NATIVE CORONARY ARTERY OF NATIVE HEART WITHOUT ANGINA PECTORIS: ICD-10-CM

## 2021-01-27 DIAGNOSIS — R39.12 BENIGN PROSTATIC HYPERPLASIA WITH WEAK URINARY STREAM: ICD-10-CM

## 2021-01-27 DIAGNOSIS — E11.9 CONTROLLED TYPE 2 DIABETES MELLITUS WITHOUT COMPLICATION, WITHOUT LONG-TERM CURRENT USE OF INSULIN: ICD-10-CM

## 2021-01-27 DIAGNOSIS — D50.0 IRON DEFICIENCY ANEMIA DUE TO CHRONIC BLOOD LOSS: ICD-10-CM

## 2021-01-27 DIAGNOSIS — E11.69 HYPERLIPIDEMIA ASSOCIATED WITH TYPE 2 DIABETES MELLITUS: ICD-10-CM

## 2021-01-27 DIAGNOSIS — M17.9 OSTEOARTHRITIS OF KNEE, UNSPECIFIED LATERALITY, UNSPECIFIED OSTEOARTHRITIS TYPE: ICD-10-CM

## 2021-01-27 PROBLEM — I82.433 ACUTE DEEP VEIN THROMBOSIS (DVT) OF POPLITEAL VEIN OF BOTH LOWER EXTREMITIES: Status: ACTIVE | Noted: 2021-01-27

## 2021-01-27 PROBLEM — I82.433 ACUTE DEEP VEIN THROMBOSIS (DVT) OF POPLITEAL VEIN OF BOTH LOWER EXTREMITIES: Status: RESOLVED | Noted: 2021-01-27 | Resolved: 2021-01-27

## 2021-01-27 PROCEDURE — 3072F LOW RISK FOR RETINOPATHY: CPT | Mod: S$GLB,,, | Performed by: NURSE PRACTITIONER

## 2021-01-27 PROCEDURE — 1126F AMNT PAIN NOTED NONE PRSNT: CPT | Mod: S$GLB,,, | Performed by: NURSE PRACTITIONER

## 2021-01-27 PROCEDURE — 3072F PR LOW RISK FOR RETINOPATHY: ICD-10-PCS | Mod: S$GLB,,, | Performed by: NURSE PRACTITIONER

## 2021-01-27 PROCEDURE — 3008F BODY MASS INDEX DOCD: CPT | Mod: CPTII,S$GLB,, | Performed by: NURSE PRACTITIONER

## 2021-01-27 PROCEDURE — 3078F DIAST BP <80 MM HG: CPT | Mod: CPTII,S$GLB,, | Performed by: NURSE PRACTITIONER

## 2021-01-27 PROCEDURE — G0439 PPPS, SUBSEQ VISIT: HCPCS | Mod: 95,,, | Performed by: NURSE PRACTITIONER

## 2021-01-27 PROCEDURE — 3044F PR MOST RECENT HEMOGLOBIN A1C LEVEL <7.0%: ICD-10-PCS | Mod: CPTII,S$GLB,, | Performed by: NURSE PRACTITIONER

## 2021-01-27 PROCEDURE — 3075F PR MOST RECENT SYSTOLIC BLOOD PRESS GE 130-139MM HG: ICD-10-PCS | Mod: CPTII,S$GLB,, | Performed by: NURSE PRACTITIONER

## 2021-01-27 PROCEDURE — 99499 RISK ADDL DX/OHS AUDIT: ICD-10-PCS | Mod: S$GLB,,, | Performed by: NURSE PRACTITIONER

## 2021-01-27 PROCEDURE — 1126F PR PAIN SEVERITY QUANTIFIED, NO PAIN PRESENT: ICD-10-PCS | Mod: S$GLB,,, | Performed by: NURSE PRACTITIONER

## 2021-01-27 PROCEDURE — 3008F PR BODY MASS INDEX (BMI) DOCUMENTED: ICD-10-PCS | Mod: CPTII,S$GLB,, | Performed by: NURSE PRACTITIONER

## 2021-01-27 PROCEDURE — 3044F HG A1C LEVEL LT 7.0%: CPT | Mod: CPTII,S$GLB,, | Performed by: NURSE PRACTITIONER

## 2021-01-27 PROCEDURE — 99499 UNLISTED E&M SERVICE: CPT | Mod: S$GLB,,, | Performed by: NURSE PRACTITIONER

## 2021-01-27 PROCEDURE — 3078F PR MOST RECENT DIASTOLIC BLOOD PRESSURE < 80 MM HG: ICD-10-PCS | Mod: CPTII,S$GLB,, | Performed by: NURSE PRACTITIONER

## 2021-01-27 PROCEDURE — G0439 PR MEDICARE ANNUAL WELLNESS SUBSEQUENT VISIT: ICD-10-PCS | Mod: 95,,, | Performed by: NURSE PRACTITIONER

## 2021-01-27 PROCEDURE — 3075F SYST BP GE 130 - 139MM HG: CPT | Mod: CPTII,S$GLB,, | Performed by: NURSE PRACTITIONER

## 2021-02-20 PROCEDURE — 99454 PR REMOTE MNTR, PHYS PARAM, INITIAL, EA 30 DAYS: ICD-10-PCS | Mod: S$GLB,,, | Performed by: INTERNAL MEDICINE

## 2021-02-20 PROCEDURE — 99454 REM MNTR PHYSIOL PARAM 16-30: CPT | Mod: S$GLB,,, | Performed by: INTERNAL MEDICINE

## 2021-02-23 ENCOUNTER — OFFICE VISIT (OUTPATIENT)
Dept: UROLOGY | Facility: CLINIC | Age: 66
End: 2021-02-23
Payer: MEDICARE

## 2021-02-23 VITALS
DIASTOLIC BLOOD PRESSURE: 60 MMHG | BODY MASS INDEX: 38.69 KG/M2 | HEART RATE: 71 BPM | WEIGHT: 255.31 LBS | TEMPERATURE: 98 F | HEIGHT: 68 IN | OXYGEN SATURATION: 97 % | SYSTOLIC BLOOD PRESSURE: 120 MMHG

## 2021-02-23 DIAGNOSIS — R39.15 URGENCY OF URINATION: ICD-10-CM

## 2021-02-23 DIAGNOSIS — R39.12 BENIGN PROSTATIC HYPERPLASIA WITH WEAK URINARY STREAM: Primary | ICD-10-CM

## 2021-02-23 DIAGNOSIS — N40.1 BENIGN PROSTATIC HYPERPLASIA WITH WEAK URINARY STREAM: Primary | ICD-10-CM

## 2021-02-23 PROCEDURE — 3078F PR MOST RECENT DIASTOLIC BLOOD PRESSURE < 80 MM HG: ICD-10-PCS | Mod: CPTII,S$GLB,, | Performed by: UROLOGY

## 2021-02-23 PROCEDURE — 99214 PR OFFICE/OUTPT VISIT, EST, LEVL IV, 30-39 MIN: ICD-10-PCS | Mod: S$GLB,,, | Performed by: UROLOGY

## 2021-02-23 PROCEDURE — 3008F BODY MASS INDEX DOCD: CPT | Mod: CPTII,S$GLB,, | Performed by: UROLOGY

## 2021-02-23 PROCEDURE — 1126F AMNT PAIN NOTED NONE PRSNT: CPT | Mod: S$GLB,,, | Performed by: UROLOGY

## 2021-02-23 PROCEDURE — 3074F SYST BP LT 130 MM HG: CPT | Mod: CPTII,S$GLB,, | Performed by: UROLOGY

## 2021-02-23 PROCEDURE — 3072F PR LOW RISK FOR RETINOPATHY: ICD-10-PCS | Mod: S$GLB,,, | Performed by: UROLOGY

## 2021-02-23 PROCEDURE — 99999 PR PBB SHADOW E&M-EST. PATIENT-LVL V: CPT | Mod: PBBFAC,,, | Performed by: UROLOGY

## 2021-02-23 PROCEDURE — 99999 PR PBB SHADOW E&M-EST. PATIENT-LVL V: ICD-10-PCS | Mod: PBBFAC,,, | Performed by: UROLOGY

## 2021-02-23 PROCEDURE — 1126F PR PAIN SEVERITY QUANTIFIED, NO PAIN PRESENT: ICD-10-PCS | Mod: S$GLB,,, | Performed by: UROLOGY

## 2021-02-23 PROCEDURE — 3072F LOW RISK FOR RETINOPATHY: CPT | Mod: S$GLB,,, | Performed by: UROLOGY

## 2021-02-23 PROCEDURE — 99499 RISK ADDL DX/OHS AUDIT: ICD-10-PCS | Mod: HCNC,S$GLB,, | Performed by: UROLOGY

## 2021-02-23 PROCEDURE — 3074F PR MOST RECENT SYSTOLIC BLOOD PRESSURE < 130 MM HG: ICD-10-PCS | Mod: CPTII,S$GLB,, | Performed by: UROLOGY

## 2021-02-23 PROCEDURE — 99499 UNLISTED E&M SERVICE: CPT | Mod: HCNC,S$GLB,, | Performed by: UROLOGY

## 2021-02-23 PROCEDURE — 3008F PR BODY MASS INDEX (BMI) DOCUMENTED: ICD-10-PCS | Mod: CPTII,S$GLB,, | Performed by: UROLOGY

## 2021-02-23 PROCEDURE — 99214 OFFICE O/P EST MOD 30 MIN: CPT | Mod: S$GLB,,, | Performed by: UROLOGY

## 2021-02-23 PROCEDURE — 3078F DIAST BP <80 MM HG: CPT | Mod: CPTII,S$GLB,, | Performed by: UROLOGY

## 2021-03-05 ENCOUNTER — IMMUNIZATION (OUTPATIENT)
Dept: INTERNAL MEDICINE | Facility: CLINIC | Age: 66
End: 2021-03-05
Payer: MEDICARE

## 2021-03-05 DIAGNOSIS — Z23 NEED FOR VACCINATION: Primary | ICD-10-CM

## 2021-03-05 PROCEDURE — 91300 COVID-19, MRNA, LNP-S, PF, 30 MCG/0.3 ML DOSE VACCINE: CPT | Mod: PBBFAC | Performed by: FAMILY MEDICINE

## 2021-03-09 ENCOUNTER — TELEPHONE (OUTPATIENT)
Dept: INTERNAL MEDICINE | Facility: CLINIC | Age: 66
End: 2021-03-09

## 2021-03-09 DIAGNOSIS — E11.9 CONTROLLED TYPE 2 DIABETES MELLITUS WITHOUT COMPLICATION, WITHOUT LONG-TERM CURRENT USE OF INSULIN: Primary | ICD-10-CM

## 2021-03-22 DIAGNOSIS — J31.0 CHRONIC RHINITIS: ICD-10-CM

## 2021-03-22 PROCEDURE — 99454 REM MNTR PHYSIOL PARAM 16-30: CPT | Mod: S$GLB,,, | Performed by: INTERNAL MEDICINE

## 2021-03-22 PROCEDURE — 99454 PR REMOTE MNTR, PHYS PARAM, INITIAL, EA 30 DAYS: ICD-10-PCS | Mod: S$GLB,,, | Performed by: INTERNAL MEDICINE

## 2021-03-22 RX ORDER — METHSCOPOLAMINE BROMIDE 2.5 MG/1
TABLET ORAL
Qty: 180 TABLET | Refills: 4 | OUTPATIENT
Start: 2021-03-22

## 2021-03-26 ENCOUNTER — IMMUNIZATION (OUTPATIENT)
Dept: INTERNAL MEDICINE | Facility: CLINIC | Age: 66
End: 2021-03-26
Payer: MEDICARE

## 2021-03-26 DIAGNOSIS — Z23 NEED FOR VACCINATION: Primary | ICD-10-CM

## 2021-03-26 PROCEDURE — 0002A COVID-19, MRNA, LNP-S, PF, 30 MCG/0.3 ML DOSE VACCINE: CPT | Mod: PBBFAC | Performed by: FAMILY MEDICINE

## 2021-03-26 PROCEDURE — 91300 COVID-19, MRNA, LNP-S, PF, 30 MCG/0.3 ML DOSE VACCINE: CPT | Mod: PBBFAC | Performed by: FAMILY MEDICINE

## 2021-04-01 ENCOUNTER — CLINICAL SUPPORT (OUTPATIENT)
Dept: INTERNAL MEDICINE | Facility: CLINIC | Age: 66
End: 2021-04-01
Payer: MEDICARE

## 2021-04-01 DIAGNOSIS — E11.9 CONTROLLED TYPE 2 DIABETES MELLITUS WITHOUT COMPLICATION, WITHOUT LONG-TERM CURRENT USE OF INSULIN: ICD-10-CM

## 2021-04-01 PROCEDURE — 97802 MEDICAL NUTRITION INDIV IN: CPT | Mod: 95,,, | Performed by: DIETITIAN, REGISTERED

## 2021-04-01 PROCEDURE — 97802 PR MED NUTR THER, 1ST, INDIV, EA 15 MIN: ICD-10-PCS | Mod: 95,,, | Performed by: DIETITIAN, REGISTERED

## 2021-04-07 DIAGNOSIS — K21.9 GASTROESOPHAGEAL REFLUX DISEASE: ICD-10-CM

## 2021-04-07 RX ORDER — OMEPRAZOLE 40 MG/1
CAPSULE, DELAYED RELEASE ORAL
Qty: 90 CAPSULE | Refills: 1 | Status: SHIPPED | OUTPATIENT
Start: 2021-04-07 | End: 2021-10-04 | Stop reason: SDUPTHER

## 2021-04-12 DIAGNOSIS — F17.200 NICOTINE DEPENDENCE: ICD-10-CM

## 2021-04-13 RX ORDER — BUPROPION HYDROCHLORIDE 150 MG/1
150 TABLET, EXTENDED RELEASE ORAL 2 TIMES DAILY
Qty: 60 TABLET | Refills: 2 | Status: SHIPPED | OUTPATIENT
Start: 2021-04-13 | End: 2021-11-22 | Stop reason: SDUPTHER

## 2021-04-15 ENCOUNTER — LAB VISIT (OUTPATIENT)
Dept: LAB | Facility: HOSPITAL | Age: 66
End: 2021-04-15
Attending: INTERNAL MEDICINE
Payer: MEDICARE

## 2021-04-15 DIAGNOSIS — E11.9 CONTROLLED TYPE 2 DIABETES MELLITUS WITHOUT COMPLICATION, WITHOUT LONG-TERM CURRENT USE OF INSULIN: ICD-10-CM

## 2021-04-15 LAB
ESTIMATED AVG GLUCOSE: 146 MG/DL (ref 68–131)
HBA1C MFR BLD: 6.7 % (ref 4–5.6)

## 2021-04-15 PROCEDURE — 83036 HEMOGLOBIN GLYCOSYLATED A1C: CPT | Performed by: INTERNAL MEDICINE

## 2021-04-15 PROCEDURE — 36415 COLL VENOUS BLD VENIPUNCTURE: CPT | Performed by: INTERNAL MEDICINE

## 2021-04-22 ENCOUNTER — OFFICE VISIT (OUTPATIENT)
Dept: INTERNAL MEDICINE | Facility: CLINIC | Age: 66
End: 2021-04-22
Payer: MEDICARE

## 2021-04-22 VITALS
TEMPERATURE: 97 F | HEART RATE: 77 BPM | WEIGHT: 254.44 LBS | HEIGHT: 68 IN | BODY MASS INDEX: 38.56 KG/M2 | SYSTOLIC BLOOD PRESSURE: 130 MMHG | DIASTOLIC BLOOD PRESSURE: 68 MMHG | RESPIRATION RATE: 18 BRPM | OXYGEN SATURATION: 96 %

## 2021-04-22 DIAGNOSIS — Z79.899 H/O LONG-TERM TREATMENT WITH HIGH-RISK MEDICATION: ICD-10-CM

## 2021-04-22 DIAGNOSIS — E11.59 HYPERTENSION ASSOCIATED WITH DIABETES: ICD-10-CM

## 2021-04-22 DIAGNOSIS — R39.12 BENIGN PROSTATIC HYPERPLASIA WITH WEAK URINARY STREAM: ICD-10-CM

## 2021-04-22 DIAGNOSIS — N40.1 BENIGN PROSTATIC HYPERPLASIA WITH WEAK URINARY STREAM: ICD-10-CM

## 2021-04-22 DIAGNOSIS — E11.69 HYPERLIPIDEMIA ASSOCIATED WITH TYPE 2 DIABETES MELLITUS: ICD-10-CM

## 2021-04-22 DIAGNOSIS — I25.10 CORONARY ARTERY DISEASE INVOLVING NATIVE CORONARY ARTERY OF NATIVE HEART WITHOUT ANGINA PECTORIS: ICD-10-CM

## 2021-04-22 DIAGNOSIS — Z00.00 ROUTINE GENERAL MEDICAL EXAMINATION AT A HEALTH CARE FACILITY: Primary | ICD-10-CM

## 2021-04-22 DIAGNOSIS — E78.5 HYPERLIPIDEMIA ASSOCIATED WITH TYPE 2 DIABETES MELLITUS: ICD-10-CM

## 2021-04-22 DIAGNOSIS — I15.2 HYPERTENSION ASSOCIATED WITH DIABETES: ICD-10-CM

## 2021-04-22 DIAGNOSIS — E11.9 CONTROLLED TYPE 2 DIABETES MELLITUS WITHOUT COMPLICATION, WITHOUT LONG-TERM CURRENT USE OF INSULIN: ICD-10-CM

## 2021-04-22 DIAGNOSIS — I70.0 AORTIC ATHEROSCLEROSIS: ICD-10-CM

## 2021-04-22 DIAGNOSIS — J44.9 MODERATE COPD (CHRONIC OBSTRUCTIVE PULMONARY DISEASE): ICD-10-CM

## 2021-04-22 PROCEDURE — 3072F PR LOW RISK FOR RETINOPATHY: ICD-10-PCS | Mod: S$GLB,,, | Performed by: INTERNAL MEDICINE

## 2021-04-22 PROCEDURE — 3008F BODY MASS INDEX DOCD: CPT | Mod: CPTII,S$GLB,, | Performed by: INTERNAL MEDICINE

## 2021-04-22 PROCEDURE — 99999 PR PBB SHADOW E&M-EST. PATIENT-LVL V: CPT | Mod: PBBFAC,,, | Performed by: INTERNAL MEDICINE

## 2021-04-22 PROCEDURE — 99396 PREV VISIT EST AGE 40-64: CPT | Mod: S$GLB,,, | Performed by: INTERNAL MEDICINE

## 2021-04-22 PROCEDURE — 1126F AMNT PAIN NOTED NONE PRSNT: CPT | Mod: S$GLB,,, | Performed by: INTERNAL MEDICINE

## 2021-04-22 PROCEDURE — 3072F LOW RISK FOR RETINOPATHY: CPT | Mod: S$GLB,,, | Performed by: INTERNAL MEDICINE

## 2021-04-22 PROCEDURE — 99999 PR PBB SHADOW E&M-EST. PATIENT-LVL V: ICD-10-PCS | Mod: PBBFAC,,, | Performed by: INTERNAL MEDICINE

## 2021-04-22 PROCEDURE — 3008F PR BODY MASS INDEX (BMI) DOCUMENTED: ICD-10-PCS | Mod: CPTII,S$GLB,, | Performed by: INTERNAL MEDICINE

## 2021-04-22 PROCEDURE — 1126F PR PAIN SEVERITY QUANTIFIED, NO PAIN PRESENT: ICD-10-PCS | Mod: S$GLB,,, | Performed by: INTERNAL MEDICINE

## 2021-04-22 PROCEDURE — 99396 PR PREVENTIVE VISIT,EST,40-64: ICD-10-PCS | Mod: S$GLB,,, | Performed by: INTERNAL MEDICINE

## 2021-04-24 PROCEDURE — 99454 REM MNTR PHYSIOL PARAM 16-30: CPT | Mod: S$GLB,,, | Performed by: INTERNAL MEDICINE

## 2021-04-24 PROCEDURE — 99454 PR REMOTE MNTR, PHYS PARAM, INITIAL, EA 30 DAYS: ICD-10-PCS | Mod: S$GLB,,, | Performed by: INTERNAL MEDICINE

## 2021-05-13 DIAGNOSIS — R09.81 NASAL CONGESTION: ICD-10-CM

## 2021-05-13 RX ORDER — FLUTICASONE PROPIONATE 50 MCG
1 SPRAY, SUSPENSION (ML) NASAL DAILY
Qty: 16 G | Refills: 3 | Status: SHIPPED | OUTPATIENT
Start: 2021-05-13

## 2021-05-24 PROCEDURE — 99454 PR REMOTE MNTR, PHYS PARAM, INITIAL, EA 30 DAYS: ICD-10-PCS | Mod: S$GLB,,, | Performed by: INTERNAL MEDICINE

## 2021-05-24 PROCEDURE — 99454 REM MNTR PHYSIOL PARAM 16-30: CPT | Mod: S$GLB,,, | Performed by: INTERNAL MEDICINE

## 2021-05-25 ENCOUNTER — TELEPHONE (OUTPATIENT)
Dept: PULMONOLOGY | Facility: CLINIC | Age: 66
End: 2021-05-25

## 2021-06-18 ENCOUNTER — PATIENT MESSAGE (OUTPATIENT)
Dept: INTERNAL MEDICINE | Facility: CLINIC | Age: 66
End: 2021-06-18

## 2021-06-18 ENCOUNTER — TELEPHONE (OUTPATIENT)
Dept: PHARMACY | Facility: CLINIC | Age: 66
End: 2021-06-18

## 2021-06-18 DIAGNOSIS — E87.1 HYPONATREMIA: Primary | ICD-10-CM

## 2021-06-23 ENCOUNTER — TELEPHONE (OUTPATIENT)
Dept: INTERNAL MEDICINE | Facility: CLINIC | Age: 66
End: 2021-06-23

## 2021-06-24 ENCOUNTER — LAB VISIT (OUTPATIENT)
Dept: LAB | Facility: HOSPITAL | Age: 66
End: 2021-06-24
Attending: INTERNAL MEDICINE
Payer: MEDICARE

## 2021-06-24 DIAGNOSIS — E87.1 HYPONATREMIA: ICD-10-CM

## 2021-06-24 LAB
ANION GAP SERPL CALC-SCNC: 10 MMOL/L (ref 8–16)
BUN SERPL-MCNC: 17 MG/DL (ref 8–23)
CALCIUM SERPL-MCNC: 9.6 MG/DL (ref 8.7–10.5)
CHLORIDE SERPL-SCNC: 102 MMOL/L (ref 95–110)
CO2 SERPL-SCNC: 24 MMOL/L (ref 23–29)
CREAT SERPL-MCNC: 1.2 MG/DL (ref 0.5–1.4)
EST. GFR  (AFRICAN AMERICAN): >60 ML/MIN/1.73 M^2
EST. GFR  (NON AFRICAN AMERICAN): >60 ML/MIN/1.73 M^2
GLUCOSE SERPL-MCNC: 131 MG/DL (ref 70–110)
POTASSIUM SERPL-SCNC: 4.8 MMOL/L (ref 3.5–5.1)
SODIUM SERPL-SCNC: 136 MMOL/L (ref 136–145)

## 2021-06-24 PROCEDURE — 36415 COLL VENOUS BLD VENIPUNCTURE: CPT | Performed by: INTERNAL MEDICINE

## 2021-06-24 PROCEDURE — 80048 BASIC METABOLIC PNL TOTAL CA: CPT | Performed by: INTERNAL MEDICINE

## 2021-06-25 PROCEDURE — 99454 PR REMOTE MNTR, PHYS PARAM, INITIAL, EA 30 DAYS: ICD-10-PCS | Mod: S$GLB,,, | Performed by: INTERNAL MEDICINE

## 2021-06-25 PROCEDURE — 99454 REM MNTR PHYSIOL PARAM 16-30: CPT | Mod: S$GLB,,, | Performed by: INTERNAL MEDICINE

## 2021-07-12 ENCOUNTER — TELEPHONE (OUTPATIENT)
Dept: PULMONOLOGY | Facility: CLINIC | Age: 66
End: 2021-07-12

## 2021-07-12 ENCOUNTER — PATIENT OUTREACH (OUTPATIENT)
Dept: ADMINISTRATIVE | Facility: OTHER | Age: 66
End: 2021-07-12

## 2021-07-13 ENCOUNTER — OFFICE VISIT (OUTPATIENT)
Dept: ALLERGY | Facility: CLINIC | Age: 66
End: 2021-07-13
Payer: MEDICARE

## 2021-07-13 VITALS
HEIGHT: 68 IN | HEART RATE: 63 BPM | TEMPERATURE: 98 F | SYSTOLIC BLOOD PRESSURE: 121 MMHG | DIASTOLIC BLOOD PRESSURE: 70 MMHG | WEIGHT: 248.44 LBS | BODY MASS INDEX: 37.65 KG/M2

## 2021-07-13 DIAGNOSIS — J31.0 CHRONIC RHINITIS: Primary | ICD-10-CM

## 2021-07-13 PROCEDURE — 99214 PR OFFICE/OUTPT VISIT, EST, LEVL IV, 30-39 MIN: ICD-10-PCS | Mod: S$GLB,,, | Performed by: ALLERGY & IMMUNOLOGY

## 2021-07-13 PROCEDURE — 99999 PR PBB SHADOW E&M-EST. PATIENT-LVL V: CPT | Mod: PBBFAC,,, | Performed by: ALLERGY & IMMUNOLOGY

## 2021-07-13 PROCEDURE — 3072F PR LOW RISK FOR RETINOPATHY: ICD-10-PCS | Mod: S$GLB,,, | Performed by: ALLERGY & IMMUNOLOGY

## 2021-07-13 PROCEDURE — 99999 PR PBB SHADOW E&M-EST. PATIENT-LVL V: ICD-10-PCS | Mod: PBBFAC,,, | Performed by: ALLERGY & IMMUNOLOGY

## 2021-07-13 PROCEDURE — 1126F PR PAIN SEVERITY QUANTIFIED, NO PAIN PRESENT: ICD-10-PCS | Mod: S$GLB,,, | Performed by: ALLERGY & IMMUNOLOGY

## 2021-07-13 PROCEDURE — 3072F LOW RISK FOR RETINOPATHY: CPT | Mod: S$GLB,,, | Performed by: ALLERGY & IMMUNOLOGY

## 2021-07-13 PROCEDURE — 1126F AMNT PAIN NOTED NONE PRSNT: CPT | Mod: S$GLB,,, | Performed by: ALLERGY & IMMUNOLOGY

## 2021-07-13 PROCEDURE — 3008F PR BODY MASS INDEX (BMI) DOCUMENTED: ICD-10-PCS | Mod: CPTII,S$GLB,, | Performed by: ALLERGY & IMMUNOLOGY

## 2021-07-13 PROCEDURE — 99214 OFFICE O/P EST MOD 30 MIN: CPT | Mod: S$GLB,,, | Performed by: ALLERGY & IMMUNOLOGY

## 2021-07-13 PROCEDURE — 3008F BODY MASS INDEX DOCD: CPT | Mod: CPTII,S$GLB,, | Performed by: ALLERGY & IMMUNOLOGY

## 2021-07-13 RX ORDER — IPRATROPIUM BROMIDE 21 UG/1
SPRAY, METERED NASAL
Qty: 30 ML | Refills: 12 | Status: SHIPPED | OUTPATIENT
Start: 2021-07-13 | End: 2022-08-01 | Stop reason: SDUPTHER

## 2021-07-13 RX ORDER — METHSCOPOLAMINE BROMIDE 2.5 MG/1
TABLET ORAL
Qty: 180 TABLET | Refills: 4 | Status: SHIPPED | OUTPATIENT
Start: 2021-07-13 | End: 2022-08-04 | Stop reason: SDUPTHER

## 2021-07-15 ENCOUNTER — TELEPHONE (OUTPATIENT)
Dept: PULMONOLOGY | Facility: CLINIC | Age: 66
End: 2021-07-15

## 2021-07-27 PROCEDURE — 99454 REM MNTR PHYSIOL PARAM 16-30: CPT | Mod: S$GLB,,, | Performed by: INTERNAL MEDICINE

## 2021-07-27 PROCEDURE — 99454 PR REMOTE MNTR, PHYS PARAM, INITIAL, EA 30 DAYS: ICD-10-PCS | Mod: S$GLB,,, | Performed by: INTERNAL MEDICINE

## 2021-08-18 DIAGNOSIS — E11.59 HYPERTENSION ASSOCIATED WITH DIABETES: ICD-10-CM

## 2021-08-18 DIAGNOSIS — I15.2 HYPERTENSION ASSOCIATED WITH DIABETES: ICD-10-CM

## 2021-08-18 RX ORDER — AMLODIPINE BESYLATE 10 MG/1
10 TABLET ORAL DAILY
Qty: 90 TABLET | Refills: 1 | Status: SHIPPED | OUTPATIENT
Start: 2021-08-18 | End: 2022-01-21 | Stop reason: SDUPTHER

## 2021-08-24 ENCOUNTER — TELEPHONE (OUTPATIENT)
Dept: PULMONOLOGY | Facility: CLINIC | Age: 66
End: 2021-08-24

## 2021-08-24 ENCOUNTER — LAB VISIT (OUTPATIENT)
Dept: LAB | Facility: HOSPITAL | Age: 66
End: 2021-08-24
Payer: MEDICARE

## 2021-08-24 DIAGNOSIS — E11.9 CONTROLLED TYPE 2 DIABETES MELLITUS WITHOUT COMPLICATION, WITHOUT LONG-TERM CURRENT USE OF INSULIN: ICD-10-CM

## 2021-08-24 DIAGNOSIS — G47.33 OSA ON CPAP: Primary | ICD-10-CM

## 2021-08-24 LAB
ALBUMIN/CREAT UR: 22.4 UG/MG (ref 0–30)
CREAT UR-MCNC: 67 MG/DL (ref 23–375)
MICROALBUMIN UR DL<=1MG/L-MCNC: 15 UG/ML

## 2021-08-24 PROCEDURE — 82570 ASSAY OF URINE CREATININE: CPT | Performed by: INTERNAL MEDICINE

## 2021-09-09 ENCOUNTER — PATIENT MESSAGE (OUTPATIENT)
Dept: INTERNAL MEDICINE | Facility: CLINIC | Age: 66
End: 2021-09-09

## 2021-09-09 ENCOUNTER — TELEPHONE (OUTPATIENT)
Dept: INTERNAL MEDICINE | Facility: CLINIC | Age: 66
End: 2021-09-09

## 2021-09-09 ENCOUNTER — INFUSION (OUTPATIENT)
Dept: INFECTIOUS DISEASES | Facility: HOSPITAL | Age: 66
End: 2021-09-09
Attending: EMERGENCY MEDICINE
Payer: MEDICARE

## 2021-09-09 VITALS
RESPIRATION RATE: 18 BRPM | OXYGEN SATURATION: 97 % | SYSTOLIC BLOOD PRESSURE: 135 MMHG | DIASTOLIC BLOOD PRESSURE: 72 MMHG | HEART RATE: 63 BPM | TEMPERATURE: 98 F

## 2021-09-09 DIAGNOSIS — U07.1 COVID-19: Primary | ICD-10-CM

## 2021-09-09 DIAGNOSIS — E11.9 CONTROLLED TYPE 2 DIABETES MELLITUS WITHOUT COMPLICATION, WITHOUT LONG-TERM CURRENT USE OF INSULIN: ICD-10-CM

## 2021-09-09 PROCEDURE — M0243 CASIRIVI AND IMDEVI INFUSION: HCPCS | Performed by: INTERNAL MEDICINE

## 2021-09-09 PROCEDURE — 63600175 PHARM REV CODE 636 W HCPCS: Performed by: INTERNAL MEDICINE

## 2021-09-09 PROCEDURE — 25000003 PHARM REV CODE 250: Performed by: INTERNAL MEDICINE

## 2021-09-09 RX ORDER — DIPHENHYDRAMINE HYDROCHLORIDE 50 MG/ML
25 INJECTION INTRAMUSCULAR; INTRAVENOUS ONCE AS NEEDED
Status: DISCONTINUED | OUTPATIENT
Start: 2021-09-09 | End: 2022-08-01

## 2021-09-09 RX ORDER — ACETAMINOPHEN 325 MG/1
650 TABLET ORAL ONCE AS NEEDED
Status: DISCONTINUED | OUTPATIENT
Start: 2021-09-09 | End: 2022-08-01

## 2021-09-09 RX ORDER — ALBUTEROL SULFATE 90 UG/1
2 AEROSOL, METERED RESPIRATORY (INHALATION)
Status: DISCONTINUED | OUTPATIENT
Start: 2021-09-09 | End: 2022-08-01

## 2021-09-09 RX ORDER — SODIUM CHLORIDE 0.9 % (FLUSH) 0.9 %
10 SYRINGE (ML) INJECTION
Status: DISCONTINUED | OUTPATIENT
Start: 2021-09-09 | End: 2022-08-01

## 2021-09-09 RX ORDER — EPINEPHRINE 0.3 MG/.3ML
0.3 INJECTION SUBCUTANEOUS
Status: DISCONTINUED | OUTPATIENT
Start: 2021-09-09 | End: 2022-08-01

## 2021-09-09 RX ORDER — ONDANSETRON 4 MG/1
4 TABLET, ORALLY DISINTEGRATING ORAL ONCE AS NEEDED
Status: DISCONTINUED | OUTPATIENT
Start: 2021-09-09 | End: 2022-08-01

## 2021-09-09 RX ADMIN — CASIRIVIMAB AND IMDEVIMAB 600 MG: 600; 600 INJECTION, SOLUTION, CONCENTRATE INTRAVENOUS at 11:09

## 2021-09-10 RX ORDER — DULAGLUTIDE 3 MG/.5ML
3 INJECTION, SOLUTION SUBCUTANEOUS
Qty: 12 PEN | Refills: 1 | Status: SHIPPED | OUTPATIENT
Start: 2021-09-10 | End: 2022-01-21 | Stop reason: SDUPTHER

## 2021-09-12 ENCOUNTER — NURSE TRIAGE (OUTPATIENT)
Dept: ADMINISTRATIVE | Facility: CLINIC | Age: 66
End: 2021-09-12

## 2021-09-14 ENCOUNTER — PATIENT MESSAGE (OUTPATIENT)
Dept: ADMINISTRATIVE | Facility: OTHER | Age: 66
End: 2021-09-14

## 2021-09-21 DIAGNOSIS — E78.5 HYPERLIPIDEMIA ASSOCIATED WITH TYPE 2 DIABETES MELLITUS: ICD-10-CM

## 2021-09-21 DIAGNOSIS — E11.69 HYPERLIPIDEMIA ASSOCIATED WITH TYPE 2 DIABETES MELLITUS: ICD-10-CM

## 2021-09-22 RX ORDER — ROSUVASTATIN CALCIUM 10 MG/1
TABLET, COATED ORAL
Qty: 90 TABLET | Refills: 1 | Status: SHIPPED | OUTPATIENT
Start: 2021-09-22 | End: 2022-01-21 | Stop reason: SDUPTHER

## 2021-09-25 PROCEDURE — 99454 REM MNTR PHYSIOL PARAM 16-30: CPT | Mod: S$GLB,,, | Performed by: INTERNAL MEDICINE

## 2021-09-25 PROCEDURE — 99454 PR REMOTE MNTR, PHYS PARAM, INITIAL, EA 30 DAYS: ICD-10-PCS | Mod: S$GLB,,, | Performed by: INTERNAL MEDICINE

## 2021-09-29 DIAGNOSIS — Z01.818 PRE-OP TESTING: ICD-10-CM

## 2021-10-04 DIAGNOSIS — K21.9 GASTROESOPHAGEAL REFLUX DISEASE: ICD-10-CM

## 2021-10-04 RX ORDER — OMEPRAZOLE 40 MG/1
CAPSULE, DELAYED RELEASE ORAL
Qty: 90 CAPSULE | Refills: 1 | Status: SHIPPED | OUTPATIENT
Start: 2021-10-04 | End: 2022-03-30 | Stop reason: SDUPTHER

## 2021-10-05 ENCOUNTER — LAB VISIT (OUTPATIENT)
Dept: PRIMARY CARE CLINIC | Facility: CLINIC | Age: 66
End: 2021-10-05
Payer: MEDICARE

## 2021-10-05 DIAGNOSIS — Z01.818 PRE-OP TESTING: ICD-10-CM

## 2021-10-05 PROCEDURE — U0003 INFECTIOUS AGENT DETECTION BY NUCLEIC ACID (DNA OR RNA); SEVERE ACUTE RESPIRATORY SYNDROME CORONAVIRUS 2 (SARS-COV-2) (CORONAVIRUS DISEASE [COVID-19]), AMPLIFIED PROBE TECHNIQUE, MAKING USE OF HIGH THROUGHPUT TECHNOLOGIES AS DESCRIBED BY CMS-2020-01-R: HCPCS | Performed by: NURSE PRACTITIONER

## 2021-10-05 PROCEDURE — U0005 INFEC AGEN DETEC AMPLI PROBE: HCPCS | Performed by: NURSE PRACTITIONER

## 2021-10-06 ENCOUNTER — PATIENT MESSAGE (OUTPATIENT)
Dept: INTERNAL MEDICINE | Facility: CLINIC | Age: 66
End: 2021-10-06

## 2021-10-06 LAB
SARS-COV-2 RNA RESP QL NAA+PROBE: DETECTED
SARS-COV-2- CYCLE NUMBER: 40

## 2021-10-07 ENCOUNTER — PATIENT OUTREACH (OUTPATIENT)
Dept: ADMINISTRATIVE | Facility: OTHER | Age: 66
End: 2021-10-07

## 2021-10-08 ENCOUNTER — OFFICE VISIT (OUTPATIENT)
Dept: PULMONOLOGY | Facility: CLINIC | Age: 66
End: 2021-10-08
Payer: MEDICARE

## 2021-10-08 ENCOUNTER — CLINICAL SUPPORT (OUTPATIENT)
Dept: PULMONOLOGY | Facility: CLINIC | Age: 66
End: 2021-10-08
Payer: MEDICARE

## 2021-10-08 ENCOUNTER — HOSPITAL ENCOUNTER (OUTPATIENT)
Dept: RADIOLOGY | Facility: HOSPITAL | Age: 66
Discharge: HOME | End: 2021-10-08
Attending: INTERNAL MEDICINE
Payer: MEDICARE

## 2021-10-08 VITALS
SYSTOLIC BLOOD PRESSURE: 128 MMHG | HEART RATE: 72 BPM | BODY MASS INDEX: 37.43 KG/M2 | RESPIRATION RATE: 14 BRPM | WEIGHT: 246.94 LBS | DIASTOLIC BLOOD PRESSURE: 62 MMHG | HEIGHT: 68 IN | OXYGEN SATURATION: 98 %

## 2021-10-08 DIAGNOSIS — J44.9 MODERATE COPD (CHRONIC OBSTRUCTIVE PULMONARY DISEASE): ICD-10-CM

## 2021-10-08 DIAGNOSIS — Z86.16 HISTORY OF COVID-19: ICD-10-CM

## 2021-10-08 DIAGNOSIS — G47.33 OSA ON CPAP: ICD-10-CM

## 2021-10-08 DIAGNOSIS — R91.1 PULMONARY NODULE: Primary | ICD-10-CM

## 2021-10-08 LAB
BRPFT: ABNORMAL
FEF 25 75 LLN: 1.22
FEF 25 75 PRE REF: 27 %
FEF 25 75 REF: 2.61
FEV1 FVC LLN: 64
FEV1 FVC PRE REF: 67.6 %
FEV1 FVC REF: 77
FEV1 LLN: 2.38
FEV1 PRE REF: 73.5 %
FEV1 REF: 3.22
FVC LLN: 3.15
FVC PRE REF: 108.6 %
FVC REF: 4.18
PEF LLN: 6.3
PEF PRE REF: 69.5 %
PEF REF: 8.49
PRE FEF 25 75: 0.7 L/S (ref 1.22–4)
PRE FET 100: 15.55 SEC
PRE FEV1 FVC: 52.11 % (ref 64.49–89.8)
PRE FEV1: 2.37 L (ref 2.38–4.06)
PRE FVC: 4.54 L (ref 3.15–5.21)
PRE PEF: 5.9 L/S (ref 6.3–10.68)

## 2021-10-08 PROCEDURE — 71046 XR CHEST PA AND LATERAL: ICD-10-PCS | Mod: 26,,, | Performed by: RADIOLOGY

## 2021-10-08 PROCEDURE — 3066F NEPHROPATHY DOC TX: CPT | Mod: CPTII,S$GLB,, | Performed by: NURSE PRACTITIONER

## 2021-10-08 PROCEDURE — 99999 PR PBB SHADOW E&M-EST. PATIENT-LVL V: CPT | Mod: PBBFAC,,, | Performed by: NURSE PRACTITIONER

## 2021-10-08 PROCEDURE — 3072F LOW RISK FOR RETINOPATHY: CPT | Mod: CPTII,S$GLB,, | Performed by: NURSE PRACTITIONER

## 2021-10-08 PROCEDURE — 4010F ACE/ARB THERAPY RXD/TAKEN: CPT | Mod: CPTII,S$GLB,, | Performed by: NURSE PRACTITIONER

## 2021-10-08 PROCEDURE — 1160F PR REVIEW ALL MEDS BY PRESCRIBER/CLIN PHARMACIST DOCUMENTED: ICD-10-PCS | Mod: CPTII,S$GLB,, | Performed by: NURSE PRACTITIONER

## 2021-10-08 PROCEDURE — 3008F BODY MASS INDEX DOCD: CPT | Mod: CPTII,S$GLB,, | Performed by: NURSE PRACTITIONER

## 2021-10-08 PROCEDURE — 3066F PR DOCUMENTATION OF TREATMENT FOR NEPHROPATHY: ICD-10-PCS | Mod: CPTII,S$GLB,, | Performed by: NURSE PRACTITIONER

## 2021-10-08 PROCEDURE — 71046 X-RAY EXAM CHEST 2 VIEWS: CPT | Mod: TC

## 2021-10-08 PROCEDURE — 99499 RISK ADDL DX/OHS AUDIT: ICD-10-PCS | Mod: HCNC,S$GLB,, | Performed by: NURSE PRACTITIONER

## 2021-10-08 PROCEDURE — 71046 X-RAY EXAM CHEST 2 VIEWS: CPT | Mod: 26,,, | Performed by: RADIOLOGY

## 2021-10-08 PROCEDURE — 3008F PR BODY MASS INDEX (BMI) DOCUMENTED: ICD-10-PCS | Mod: CPTII,S$GLB,, | Performed by: NURSE PRACTITIONER

## 2021-10-08 PROCEDURE — 1160F RVW MEDS BY RX/DR IN RCRD: CPT | Mod: CPTII,S$GLB,, | Performed by: NURSE PRACTITIONER

## 2021-10-08 PROCEDURE — 3044F HG A1C LEVEL LT 7.0%: CPT | Mod: CPTII,S$GLB,, | Performed by: NURSE PRACTITIONER

## 2021-10-08 PROCEDURE — 3044F PR MOST RECENT HEMOGLOBIN A1C LEVEL <7.0%: ICD-10-PCS | Mod: CPTII,S$GLB,, | Performed by: NURSE PRACTITIONER

## 2021-10-08 PROCEDURE — 3078F PR MOST RECENT DIASTOLIC BLOOD PRESSURE < 80 MM HG: ICD-10-PCS | Mod: CPTII,S$GLB,, | Performed by: NURSE PRACTITIONER

## 2021-10-08 PROCEDURE — G0008 ADMIN INFLUENZA VIRUS VAC: HCPCS | Mod: S$GLB,,, | Performed by: NURSE PRACTITIONER

## 2021-10-08 PROCEDURE — 90686 FLU VACCINE (QUAD) GREATER THAN OR EQUAL TO 3YO PRESERVATIVE FREE IM: ICD-10-PCS | Mod: S$GLB,,, | Performed by: NURSE PRACTITIONER

## 2021-10-08 PROCEDURE — 1159F MED LIST DOCD IN RCRD: CPT | Mod: CPTII,S$GLB,, | Performed by: NURSE PRACTITIONER

## 2021-10-08 PROCEDURE — 3061F PR NEG MICROALBUMINURIA RESULT DOCUMENTED/REVIEW: ICD-10-PCS | Mod: CPTII,S$GLB,, | Performed by: NURSE PRACTITIONER

## 2021-10-08 PROCEDURE — 99214 PR OFFICE/OUTPT VISIT, EST, LEVL IV, 30-39 MIN: ICD-10-PCS | Mod: 25,S$GLB,, | Performed by: NURSE PRACTITIONER

## 2021-10-08 PROCEDURE — G0008 FLU VACCINE (QUAD) GREATER THAN OR EQUAL TO 3YO PRESERVATIVE FREE IM: ICD-10-PCS | Mod: S$GLB,,, | Performed by: NURSE PRACTITIONER

## 2021-10-08 PROCEDURE — 3074F PR MOST RECENT SYSTOLIC BLOOD PRESSURE < 130 MM HG: ICD-10-PCS | Mod: CPTII,S$GLB,, | Performed by: NURSE PRACTITIONER

## 2021-10-08 PROCEDURE — 3074F SYST BP LT 130 MM HG: CPT | Mod: CPTII,S$GLB,, | Performed by: NURSE PRACTITIONER

## 2021-10-08 PROCEDURE — 94010 BREATHING CAPACITY TEST: CPT | Mod: S$GLB,,, | Performed by: INTERNAL MEDICINE

## 2021-10-08 PROCEDURE — 90686 IIV4 VACC NO PRSV 0.5 ML IM: CPT | Mod: S$GLB,,, | Performed by: NURSE PRACTITIONER

## 2021-10-08 PROCEDURE — 3072F PR LOW RISK FOR RETINOPATHY: ICD-10-PCS | Mod: CPTII,S$GLB,, | Performed by: NURSE PRACTITIONER

## 2021-10-08 PROCEDURE — 3061F NEG MICROALBUMINURIA REV: CPT | Mod: CPTII,S$GLB,, | Performed by: NURSE PRACTITIONER

## 2021-10-08 PROCEDURE — 94010 BREATHING CAPACITY TEST: ICD-10-PCS | Mod: S$GLB,,, | Performed by: INTERNAL MEDICINE

## 2021-10-08 PROCEDURE — 1159F PR MEDICATION LIST DOCUMENTED IN MEDICAL RECORD: ICD-10-PCS | Mod: CPTII,S$GLB,, | Performed by: NURSE PRACTITIONER

## 2021-10-08 PROCEDURE — 4010F PR ACE/ARB THEARPY RXD/TAKEN: ICD-10-PCS | Mod: CPTII,S$GLB,, | Performed by: NURSE PRACTITIONER

## 2021-10-08 PROCEDURE — 99214 OFFICE O/P EST MOD 30 MIN: CPT | Mod: 25,S$GLB,, | Performed by: NURSE PRACTITIONER

## 2021-10-08 PROCEDURE — 99999 PR PBB SHADOW E&M-EST. PATIENT-LVL V: ICD-10-PCS | Mod: PBBFAC,,, | Performed by: NURSE PRACTITIONER

## 2021-10-08 PROCEDURE — 99499 UNLISTED E&M SERVICE: CPT | Mod: HCNC,S$GLB,, | Performed by: NURSE PRACTITIONER

## 2021-10-08 PROCEDURE — 3078F DIAST BP <80 MM HG: CPT | Mod: CPTII,S$GLB,, | Performed by: NURSE PRACTITIONER

## 2021-10-08 RX ORDER — SIMVASTATIN 10 MG/1
TABLET, FILM COATED ORAL
COMMUNITY
End: 2022-01-21

## 2021-10-10 NOTE — TELEPHONE ENCOUNTER
[employer] prior authorization approved from 7/14/17 until the lifetime of your [employer] benefit.  Pt's wife notified.   Admission Reconciliation is Completed  Discharge Reconciliation is Not Complete Admission Reconciliation is Completed  Discharge Reconciliation is Completed

## 2021-10-12 RX ORDER — BLOOD-GLUCOSE CONTROL, NORMAL
1 EACH MISCELLANEOUS 3 TIMES DAILY
Qty: 200 EACH | Refills: 0 | Status: SHIPPED | OUTPATIENT
Start: 2021-10-12

## 2021-10-14 ENCOUNTER — LAB VISIT (OUTPATIENT)
Dept: LAB | Facility: HOSPITAL | Age: 66
End: 2021-10-14
Attending: INTERNAL MEDICINE
Payer: MEDICARE

## 2021-10-14 DIAGNOSIS — E11.69 HYPERLIPIDEMIA ASSOCIATED WITH TYPE 2 DIABETES MELLITUS: ICD-10-CM

## 2021-10-14 DIAGNOSIS — E11.59 HYPERTENSION ASSOCIATED WITH DIABETES: ICD-10-CM

## 2021-10-14 DIAGNOSIS — E78.5 HYPERLIPIDEMIA ASSOCIATED WITH TYPE 2 DIABETES MELLITUS: ICD-10-CM

## 2021-10-14 DIAGNOSIS — Z79.899 H/O LONG-TERM TREATMENT WITH HIGH-RISK MEDICATION: ICD-10-CM

## 2021-10-14 DIAGNOSIS — I15.2 HYPERTENSION ASSOCIATED WITH DIABETES: ICD-10-CM

## 2021-10-14 DIAGNOSIS — E11.9 CONTROLLED TYPE 2 DIABETES MELLITUS WITHOUT COMPLICATION, WITHOUT LONG-TERM CURRENT USE OF INSULIN: ICD-10-CM

## 2021-10-14 LAB
ALBUMIN SERPL BCP-MCNC: 4.1 G/DL (ref 3.5–5.2)
ALP SERPL-CCNC: 61 U/L (ref 55–135)
ALT SERPL W/O P-5'-P-CCNC: 22 U/L (ref 10–44)
ANION GAP SERPL CALC-SCNC: 14 MMOL/L (ref 8–16)
AST SERPL-CCNC: 14 U/L (ref 10–40)
BASOPHILS # BLD AUTO: 0.05 K/UL (ref 0–0.2)
BASOPHILS NFR BLD: 0.7 % (ref 0–1.9)
BILIRUB SERPL-MCNC: 0.3 MG/DL (ref 0.1–1)
BUN SERPL-MCNC: 13 MG/DL (ref 8–23)
CALCIUM SERPL-MCNC: 9.6 MG/DL (ref 8.7–10.5)
CHLORIDE SERPL-SCNC: 103 MMOL/L (ref 95–110)
CHOLEST SERPL-MCNC: 123 MG/DL (ref 120–199)
CHOLEST/HDLC SERPL: 2.9 {RATIO} (ref 2–5)
CO2 SERPL-SCNC: 22 MMOL/L (ref 23–29)
CREAT SERPL-MCNC: 0.9 MG/DL (ref 0.5–1.4)
DIFFERENTIAL METHOD: ABNORMAL
EOSINOPHIL # BLD AUTO: 0.2 K/UL (ref 0–0.5)
EOSINOPHIL NFR BLD: 2.5 % (ref 0–8)
ERYTHROCYTE [DISTWIDTH] IN BLOOD BY AUTOMATED COUNT: 12.6 % (ref 11.5–14.5)
EST. GFR  (AFRICAN AMERICAN): >60 ML/MIN/1.73 M^2
EST. GFR  (NON AFRICAN AMERICAN): >60 ML/MIN/1.73 M^2
ESTIMATED AVG GLUCOSE: 157 MG/DL (ref 68–131)
GLUCOSE SERPL-MCNC: 140 MG/DL (ref 70–110)
HBA1C MFR BLD: 7.1 % (ref 4–5.6)
HCT VFR BLD AUTO: 37.2 % (ref 40–54)
HDLC SERPL-MCNC: 42 MG/DL (ref 40–75)
HDLC SERPL: 34.1 % (ref 20–50)
HGB BLD-MCNC: 12.4 G/DL (ref 14–18)
IMM GRANULOCYTES # BLD AUTO: 0.02 K/UL (ref 0–0.04)
IMM GRANULOCYTES NFR BLD AUTO: 0.3 % (ref 0–0.5)
LDLC SERPL CALC-MCNC: 55.6 MG/DL (ref 63–159)
LYMPHOCYTES # BLD AUTO: 1.9 K/UL (ref 1–4.8)
LYMPHOCYTES NFR BLD: 27.8 % (ref 18–48)
MCH RBC QN AUTO: 30.9 PG (ref 27–31)
MCHC RBC AUTO-ENTMCNC: 33.3 G/DL (ref 32–36)
MCV RBC AUTO: 93 FL (ref 82–98)
MONOCYTES # BLD AUTO: 0.7 K/UL (ref 0.3–1)
MONOCYTES NFR BLD: 10.6 % (ref 4–15)
NEUTROPHILS # BLD AUTO: 4 K/UL (ref 1.8–7.7)
NEUTROPHILS NFR BLD: 58.1 % (ref 38–73)
NONHDLC SERPL-MCNC: 81 MG/DL
NRBC BLD-RTO: 0 /100 WBC
PLATELET # BLD AUTO: 238 K/UL (ref 150–450)
PMV BLD AUTO: 10.7 FL (ref 9.2–12.9)
POTASSIUM SERPL-SCNC: 5.1 MMOL/L (ref 3.5–5.1)
PROT SERPL-MCNC: 7.2 G/DL (ref 6–8.4)
RBC # BLD AUTO: 4.01 M/UL (ref 4.6–6.2)
SODIUM SERPL-SCNC: 139 MMOL/L (ref 136–145)
TRIGL SERPL-MCNC: 127 MG/DL (ref 30–150)
TSH SERPL DL<=0.005 MIU/L-ACNC: 1.14 UIU/ML (ref 0.4–4)
VIT B12 SERPL-MCNC: 830 PG/ML (ref 210–950)
WBC # BLD AUTO: 6.88 K/UL (ref 3.9–12.7)

## 2021-10-14 PROCEDURE — 84443 ASSAY THYROID STIM HORMONE: CPT | Mod: HCNC | Performed by: INTERNAL MEDICINE

## 2021-10-14 PROCEDURE — 83036 HEMOGLOBIN GLYCOSYLATED A1C: CPT | Mod: HCNC | Performed by: INTERNAL MEDICINE

## 2021-10-14 PROCEDURE — 85025 COMPLETE CBC W/AUTO DIFF WBC: CPT | Mod: HCNC | Performed by: INTERNAL MEDICINE

## 2021-10-14 PROCEDURE — 36415 COLL VENOUS BLD VENIPUNCTURE: CPT | Mod: HCNC | Performed by: INTERNAL MEDICINE

## 2021-10-14 PROCEDURE — 80053 COMPREHEN METABOLIC PANEL: CPT | Mod: HCNC | Performed by: INTERNAL MEDICINE

## 2021-10-14 PROCEDURE — 82607 VITAMIN B-12: CPT | Mod: HCNC | Performed by: INTERNAL MEDICINE

## 2021-10-14 PROCEDURE — 80061 LIPID PANEL: CPT | Mod: HCNC | Performed by: INTERNAL MEDICINE

## 2021-10-20 ENCOUNTER — OFFICE VISIT (OUTPATIENT)
Dept: INTERNAL MEDICINE | Facility: CLINIC | Age: 66
End: 2021-10-20
Payer: MEDICARE

## 2021-10-20 VITALS
WEIGHT: 247.81 LBS | HEIGHT: 68 IN | TEMPERATURE: 98 F | OXYGEN SATURATION: 98 % | HEART RATE: 66 BPM | SYSTOLIC BLOOD PRESSURE: 120 MMHG | DIASTOLIC BLOOD PRESSURE: 76 MMHG | BODY MASS INDEX: 37.56 KG/M2

## 2021-10-20 DIAGNOSIS — D64.9 NORMOCHROMIC ANEMIA: ICD-10-CM

## 2021-10-20 DIAGNOSIS — I15.2 HYPERTENSION ASSOCIATED WITH DIABETES: ICD-10-CM

## 2021-10-20 DIAGNOSIS — Z12.5 ENCOUNTER FOR SCREENING FOR MALIGNANT NEOPLASM OF PROSTATE: ICD-10-CM

## 2021-10-20 DIAGNOSIS — J44.9 MODERATE COPD (CHRONIC OBSTRUCTIVE PULMONARY DISEASE): ICD-10-CM

## 2021-10-20 DIAGNOSIS — J32.9 SINUSITIS, UNSPECIFIED CHRONICITY, UNSPECIFIED LOCATION: ICD-10-CM

## 2021-10-20 DIAGNOSIS — I25.10 CORONARY ARTERY DISEASE INVOLVING NATIVE CORONARY ARTERY OF NATIVE HEART WITHOUT ANGINA PECTORIS: ICD-10-CM

## 2021-10-20 DIAGNOSIS — E11.9 CONTROLLED TYPE 2 DIABETES MELLITUS WITHOUT COMPLICATION, WITHOUT LONG-TERM CURRENT USE OF INSULIN: Primary | ICD-10-CM

## 2021-10-20 DIAGNOSIS — E11.59 HYPERTENSION ASSOCIATED WITH DIABETES: ICD-10-CM

## 2021-10-20 DIAGNOSIS — E78.5 HYPERLIPIDEMIA ASSOCIATED WITH TYPE 2 DIABETES MELLITUS: ICD-10-CM

## 2021-10-20 DIAGNOSIS — E11.69 HYPERLIPIDEMIA ASSOCIATED WITH TYPE 2 DIABETES MELLITUS: ICD-10-CM

## 2021-10-20 PROCEDURE — 3078F PR MOST RECENT DIASTOLIC BLOOD PRESSURE < 80 MM HG: ICD-10-PCS | Mod: HCNC,CPTII,S$GLB, | Performed by: INTERNAL MEDICINE

## 2021-10-20 PROCEDURE — 4010F ACE/ARB THERAPY RXD/TAKEN: CPT | Mod: HCNC,CPTII,S$GLB, | Performed by: INTERNAL MEDICINE

## 2021-10-20 PROCEDURE — 3074F PR MOST RECENT SYSTOLIC BLOOD PRESSURE < 130 MM HG: ICD-10-PCS | Mod: HCNC,CPTII,S$GLB, | Performed by: INTERNAL MEDICINE

## 2021-10-20 PROCEDURE — 3051F PR MOST RECENT HEMOGLOBIN A1C LEVEL 7.0 - < 8.0%: ICD-10-PCS | Mod: HCNC,CPTII,S$GLB, | Performed by: INTERNAL MEDICINE

## 2021-10-20 PROCEDURE — 99999 PR PBB SHADOW E&M-EST. PATIENT-LVL V: CPT | Mod: PBBFAC,HCNC,, | Performed by: INTERNAL MEDICINE

## 2021-10-20 PROCEDURE — 1159F MED LIST DOCD IN RCRD: CPT | Mod: HCNC,CPTII,S$GLB, | Performed by: INTERNAL MEDICINE

## 2021-10-20 PROCEDURE — 3072F LOW RISK FOR RETINOPATHY: CPT | Mod: HCNC,CPTII,S$GLB, | Performed by: INTERNAL MEDICINE

## 2021-10-20 PROCEDURE — 3074F SYST BP LT 130 MM HG: CPT | Mod: HCNC,CPTII,S$GLB, | Performed by: INTERNAL MEDICINE

## 2021-10-20 PROCEDURE — 3051F HG A1C>EQUAL 7.0%<8.0%: CPT | Mod: HCNC,CPTII,S$GLB, | Performed by: INTERNAL MEDICINE

## 2021-10-20 PROCEDURE — 99214 OFFICE O/P EST MOD 30 MIN: CPT | Mod: HCNC,S$GLB,, | Performed by: INTERNAL MEDICINE

## 2021-10-20 PROCEDURE — 3072F PR LOW RISK FOR RETINOPATHY: ICD-10-PCS | Mod: HCNC,CPTII,S$GLB, | Performed by: INTERNAL MEDICINE

## 2021-10-20 PROCEDURE — 3066F NEPHROPATHY DOC TX: CPT | Mod: HCNC,CPTII,S$GLB, | Performed by: INTERNAL MEDICINE

## 2021-10-20 PROCEDURE — 3061F NEG MICROALBUMINURIA REV: CPT | Mod: HCNC,CPTII,S$GLB, | Performed by: INTERNAL MEDICINE

## 2021-10-20 PROCEDURE — 3008F BODY MASS INDEX DOCD: CPT | Mod: HCNC,CPTII,S$GLB, | Performed by: INTERNAL MEDICINE

## 2021-10-20 PROCEDURE — 99999 PR PBB SHADOW E&M-EST. PATIENT-LVL V: ICD-10-PCS | Mod: PBBFAC,HCNC,, | Performed by: INTERNAL MEDICINE

## 2021-10-20 PROCEDURE — 99214 PR OFFICE/OUTPT VISIT, EST, LEVL IV, 30-39 MIN: ICD-10-PCS | Mod: HCNC,S$GLB,, | Performed by: INTERNAL MEDICINE

## 2021-10-20 PROCEDURE — 3066F PR DOCUMENTATION OF TREATMENT FOR NEPHROPATHY: ICD-10-PCS | Mod: HCNC,CPTII,S$GLB, | Performed by: INTERNAL MEDICINE

## 2021-10-20 PROCEDURE — 3008F PR BODY MASS INDEX (BMI) DOCUMENTED: ICD-10-PCS | Mod: HCNC,CPTII,S$GLB, | Performed by: INTERNAL MEDICINE

## 2021-10-20 PROCEDURE — 1159F PR MEDICATION LIST DOCUMENTED IN MEDICAL RECORD: ICD-10-PCS | Mod: HCNC,CPTII,S$GLB, | Performed by: INTERNAL MEDICINE

## 2021-10-20 PROCEDURE — 3061F PR NEG MICROALBUMINURIA RESULT DOCUMENTED/REVIEW: ICD-10-PCS | Mod: HCNC,CPTII,S$GLB, | Performed by: INTERNAL MEDICINE

## 2021-10-20 PROCEDURE — 4010F PR ACE/ARB THEARPY RXD/TAKEN: ICD-10-PCS | Mod: HCNC,CPTII,S$GLB, | Performed by: INTERNAL MEDICINE

## 2021-10-20 PROCEDURE — 3078F DIAST BP <80 MM HG: CPT | Mod: HCNC,CPTII,S$GLB, | Performed by: INTERNAL MEDICINE

## 2021-10-20 RX ORDER — METHYLPREDNISOLONE 4 MG/1
TABLET ORAL
Qty: 21 TABLET | Refills: 0 | Status: SHIPPED | OUTPATIENT
Start: 2021-10-20 | End: 2022-01-21

## 2021-10-20 RX ORDER — FLUTICASONE PROPIONATE 50 MCG
2 SPRAY, SUSPENSION (ML) NASAL DAILY
Qty: 16 G | Refills: 0 | Status: SHIPPED | OUTPATIENT
Start: 2021-10-20 | End: 2022-07-18 | Stop reason: SDUPTHER

## 2021-10-20 RX ORDER — DAPAGLIFLOZIN 5 MG/1
5 TABLET, FILM COATED ORAL DAILY
Qty: 90 TABLET | Refills: 0 | Status: SHIPPED | OUTPATIENT
Start: 2021-10-20 | End: 2022-01-14 | Stop reason: SDUPTHER

## 2021-10-20 RX ORDER — AMOXICILLIN 875 MG/1
875 TABLET, FILM COATED ORAL EVERY 12 HOURS
Qty: 14 TABLET | Refills: 0 | Status: SHIPPED | OUTPATIENT
Start: 2021-10-20 | End: 2022-01-21

## 2021-10-26 RX ORDER — OLMESARTAN MEDOXOMIL 20 MG/1
TABLET ORAL
Qty: 30 TABLET | Refills: 5 | Status: CANCELLED | OUTPATIENT
Start: 2021-10-25

## 2021-10-30 PROCEDURE — 99454 REM MNTR PHYSIOL PARAM 16-30: CPT | Mod: S$GLB,,, | Performed by: INTERNAL MEDICINE

## 2021-10-30 PROCEDURE — 99454 PR REMOTE MNTR, PHYS PARAM, INITIAL, EA 30 DAYS: ICD-10-PCS | Mod: S$GLB,,, | Performed by: INTERNAL MEDICINE

## 2021-11-08 ENCOUNTER — PATIENT OUTREACH (OUTPATIENT)
Dept: ADMINISTRATIVE | Facility: OTHER | Age: 66
End: 2021-11-08
Payer: MEDICARE

## 2021-11-09 ENCOUNTER — OFFICE VISIT (OUTPATIENT)
Dept: OPHTHALMOLOGY | Facility: CLINIC | Age: 66
End: 2021-11-09
Payer: MEDICARE

## 2021-11-09 DIAGNOSIS — E11.36 DIABETIC CATARACT: ICD-10-CM

## 2021-11-09 DIAGNOSIS — I15.2 HYPERTENSION ASSOCIATED WITH DIABETES: ICD-10-CM

## 2021-11-09 DIAGNOSIS — E11.59 HYPERTENSION ASSOCIATED WITH DIABETES: ICD-10-CM

## 2021-11-09 DIAGNOSIS — D31.31 NEVUS OF CHOROID OF RIGHT EYE: ICD-10-CM

## 2021-11-09 DIAGNOSIS — E11.36 CONTROLLED TYPE 2 DIABETES MELLITUS WITH DIABETIC CATARACT, WITHOUT LONG-TERM CURRENT USE OF INSULIN: Primary | ICD-10-CM

## 2021-11-09 LAB
LEFT EYE DM RETINOPATHY: NEGATIVE
RIGHT EYE DM RETINOPATHY: NEGATIVE

## 2021-11-09 PROCEDURE — 3066F PR DOCUMENTATION OF TREATMENT FOR NEPHROPATHY: ICD-10-PCS | Mod: HCNC,CPTII,S$GLB, | Performed by: OPHTHALMOLOGY

## 2021-11-09 PROCEDURE — 99999 PR PBB SHADOW E&M-EST. PATIENT-LVL IV: ICD-10-PCS | Mod: PBBFAC,HCNC,, | Performed by: OPHTHALMOLOGY

## 2021-11-09 PROCEDURE — 99999 PR PBB SHADOW E&M-EST. PATIENT-LVL IV: CPT | Mod: PBBFAC,HCNC,, | Performed by: OPHTHALMOLOGY

## 2021-11-09 PROCEDURE — 1159F MED LIST DOCD IN RCRD: CPT | Mod: HCNC,CPTII,S$GLB, | Performed by: OPHTHALMOLOGY

## 2021-11-09 PROCEDURE — 3061F PR NEG MICROALBUMINURIA RESULT DOCUMENTED/REVIEW: ICD-10-PCS | Mod: HCNC,CPTII,S$GLB, | Performed by: OPHTHALMOLOGY

## 2021-11-09 PROCEDURE — 3051F PR MOST RECENT HEMOGLOBIN A1C LEVEL 7.0 - < 8.0%: ICD-10-PCS | Mod: HCNC,CPTII,S$GLB, | Performed by: OPHTHALMOLOGY

## 2021-11-09 PROCEDURE — 1160F PR REVIEW ALL MEDS BY PRESCRIBER/CLIN PHARMACIST DOCUMENTED: ICD-10-PCS | Mod: HCNC,CPTII,S$GLB, | Performed by: OPHTHALMOLOGY

## 2021-11-09 PROCEDURE — 1159F PR MEDICATION LIST DOCUMENTED IN MEDICAL RECORD: ICD-10-PCS | Mod: HCNC,CPTII,S$GLB, | Performed by: OPHTHALMOLOGY

## 2021-11-09 PROCEDURE — 3066F NEPHROPATHY DOC TX: CPT | Mod: HCNC,CPTII,S$GLB, | Performed by: OPHTHALMOLOGY

## 2021-11-09 PROCEDURE — 3061F NEG MICROALBUMINURIA REV: CPT | Mod: HCNC,CPTII,S$GLB, | Performed by: OPHTHALMOLOGY

## 2021-11-09 PROCEDURE — 4010F ACE/ARB THERAPY RXD/TAKEN: CPT | Mod: HCNC,CPTII,S$GLB, | Performed by: OPHTHALMOLOGY

## 2021-11-09 PROCEDURE — 4010F PR ACE/ARB THEARPY RXD/TAKEN: ICD-10-PCS | Mod: HCNC,CPTII,S$GLB, | Performed by: OPHTHALMOLOGY

## 2021-11-09 PROCEDURE — 99213 OFFICE O/P EST LOW 20 MIN: CPT | Mod: HCNC,S$GLB,, | Performed by: OPHTHALMOLOGY

## 2021-11-09 PROCEDURE — 92134 POSTERIOR SEGMENT OCT RETINA (OCULAR COHERENCE TOMOGRAPHY)-BOTH EYES: ICD-10-PCS | Mod: HCNC,S$GLB,, | Performed by: OPHTHALMOLOGY

## 2021-11-09 PROCEDURE — 92134 CPTRZ OPH DX IMG PST SGM RTA: CPT | Mod: HCNC,S$GLB,, | Performed by: OPHTHALMOLOGY

## 2021-11-09 PROCEDURE — 99213 PR OFFICE/OUTPT VISIT, EST, LEVL III, 20-29 MIN: ICD-10-PCS | Mod: HCNC,S$GLB,, | Performed by: OPHTHALMOLOGY

## 2021-11-09 PROCEDURE — 3051F HG A1C>EQUAL 7.0%<8.0%: CPT | Mod: HCNC,CPTII,S$GLB, | Performed by: OPHTHALMOLOGY

## 2021-11-09 PROCEDURE — 1160F RVW MEDS BY RX/DR IN RCRD: CPT | Mod: HCNC,CPTII,S$GLB, | Performed by: OPHTHALMOLOGY

## 2021-11-20 PROCEDURE — 99454 PR REMOTE MNTR, PHYS PARAM, INITIAL, EA 30 DAYS: ICD-10-PCS | Mod: S$GLB,,, | Performed by: INTERNAL MEDICINE

## 2021-11-20 PROCEDURE — 99454 REM MNTR PHYSIOL PARAM 16-30: CPT | Mod: S$GLB,,, | Performed by: INTERNAL MEDICINE

## 2021-11-22 ENCOUNTER — PATIENT MESSAGE (OUTPATIENT)
Dept: INTERNAL MEDICINE | Facility: CLINIC | Age: 66
End: 2021-11-22
Payer: MEDICARE

## 2021-11-22 DIAGNOSIS — F17.200 NICOTINE DEPENDENCE: ICD-10-CM

## 2021-11-22 RX ORDER — BUPROPION HYDROCHLORIDE 150 MG/1
150 TABLET, EXTENDED RELEASE ORAL 2 TIMES DAILY
Qty: 60 TABLET | Refills: 2 | Status: SHIPPED | OUTPATIENT
Start: 2021-11-22 | End: 2022-01-21 | Stop reason: SDUPTHER

## 2021-11-23 ENCOUNTER — PATIENT MESSAGE (OUTPATIENT)
Dept: INTERNAL MEDICINE | Facility: CLINIC | Age: 66
End: 2021-11-23
Payer: MEDICARE

## 2021-12-09 DIAGNOSIS — J44.9 MODERATE COPD (CHRONIC OBSTRUCTIVE PULMONARY DISEASE): ICD-10-CM

## 2021-12-09 RX ORDER — ALBUTEROL SULFATE 90 UG/1
2 AEROSOL, METERED RESPIRATORY (INHALATION) EVERY 6 HOURS PRN
Qty: 36 G | Refills: 3 | Status: SHIPPED | OUTPATIENT
Start: 2021-12-09 | End: 2022-10-06 | Stop reason: SDUPTHER

## 2021-12-10 ENCOUNTER — IMMUNIZATION (OUTPATIENT)
Dept: PHARMACY | Facility: CLINIC | Age: 66
End: 2021-12-10
Payer: MEDICARE

## 2021-12-10 DIAGNOSIS — Z23 NEED FOR VACCINATION: Primary | ICD-10-CM

## 2021-12-22 PROCEDURE — 99454 PR REMOTE MNTR, PHYS PARAM, INITIAL, EA 30 DAYS: ICD-10-PCS | Mod: S$GLB,,, | Performed by: INTERNAL MEDICINE

## 2021-12-22 PROCEDURE — 99454 REM MNTR PHYSIOL PARAM 16-30: CPT | Mod: S$GLB,,, | Performed by: INTERNAL MEDICINE

## 2022-01-03 DIAGNOSIS — R39.12 BENIGN PROSTATIC HYPERPLASIA WITH WEAK URINARY STREAM: ICD-10-CM

## 2022-01-03 DIAGNOSIS — N40.1 BENIGN PROSTATIC HYPERPLASIA WITH WEAK URINARY STREAM: ICD-10-CM

## 2022-01-03 DIAGNOSIS — E11.9 CONTROLLED TYPE 2 DIABETES MELLITUS WITHOUT COMPLICATION, WITHOUT LONG-TERM CURRENT USE OF INSULIN: ICD-10-CM

## 2022-01-04 RX ORDER — METFORMIN HYDROCHLORIDE 1000 MG/1
1000 TABLET ORAL 2 TIMES DAILY WITH MEALS
Qty: 180 TABLET | Refills: 2 | Status: SHIPPED | OUTPATIENT
Start: 2022-01-04 | End: 2022-09-29 | Stop reason: SDUPTHER

## 2022-01-04 RX ORDER — TAMSULOSIN HYDROCHLORIDE 0.4 MG/1
0.4 CAPSULE ORAL DAILY
Qty: 90 CAPSULE | Refills: 3 | Status: SHIPPED | OUTPATIENT
Start: 2022-01-04 | End: 2022-01-25 | Stop reason: SDUPTHER

## 2022-01-04 NOTE — TELEPHONE ENCOUNTER
No new care gaps identified.  Powered by [employer]. Reference number: [id number].   1/03/2022 6:52:40 PM CST

## 2022-01-13 ENCOUNTER — PATIENT OUTREACH (OUTPATIENT)
Dept: ADMINISTRATIVE | Facility: HOSPITAL | Age: 67
End: 2022-01-13
Payer: MEDICARE

## 2022-01-13 NOTE — LETTER
AUTHORIZATION FOR RELEASE OF   CONFIDENTIAL INFORMATION      We are seeing Stefan Forbes Jr., date of birth 1955, in the clinic at [facility]. Wallace Fisher MD is the patient's PCP. Stefan Forbes Jr. has an outstanding lab/procedure at the time we reviewed his chart. In order to help keep his health information updated, he has authorized us to request the following medical record(s):        (  )  MAMMOGRAM                                      ( x )  COLONOSCOPY      (  )  PAP SMEAR                                          (  )  OUTSIDE LAB RESULTS     (  )  DEXA SCAN                                          (  )  EYE EXAM            (  )  FOOT EXAM                                          (  )  ENTIRE RECORD     (  )  OUTSIDE IMMUNIZATIONS                 (  )  _______________         Please fax records to [facility], Jeryl P Breaux, MD, [phone]   If you have any questions, please contact MAL Hernandez at [phone]        Patient Name: Stefan Forbes Jr.  : 1955  Patient Phone #: [phone]

## 2022-01-13 NOTE — PROGRESS NOTES
Manually hyper-linked DM EYE EXAM 11/9/2021    AVERY faxed to Dr. Edouard to request colonoscopy records. Remind me set 1 week.

## 2022-01-14 RX ORDER — DAPAGLIFLOZIN 5 MG/1
5 TABLET, FILM COATED ORAL DAILY
Qty: 90 TABLET | Refills: 0 | Status: SHIPPED | OUTPATIENT
Start: 2022-01-14 | End: 2022-10-20

## 2022-01-14 NOTE — TELEPHONE ENCOUNTER
Care Due:                  Date            Visit Type   Department     Provider  --------------------------------------------------------------------------------                                             [facility]  Last Visit: 10-      None         CELSA Fisher                                           [facility]  Next Visit: 01-      Deandre Fisher                                                            Last  Test          Frequency    Reason                     Performed    Due Date  --------------------------------------------------------------------------------    HBA1C.......  6 months...  dapagliflozin,             10-   04-                             dulaglutide, metFORMIN...    Powered by [employer]. Reference number: [id number].   1/14/2022 10:19:26 AM CST

## 2022-01-18 ENCOUNTER — LAB VISIT (OUTPATIENT)
Dept: LAB | Facility: HOSPITAL | Age: 67
End: 2022-01-18
Attending: INTERNAL MEDICINE
Payer: MEDICARE

## 2022-01-18 DIAGNOSIS — Z12.5 ENCOUNTER FOR SCREENING FOR MALIGNANT NEOPLASM OF PROSTATE: ICD-10-CM

## 2022-01-18 DIAGNOSIS — D64.9 NORMOCHROMIC ANEMIA: ICD-10-CM

## 2022-01-18 DIAGNOSIS — E11.9 CONTROLLED TYPE 2 DIABETES MELLITUS WITHOUT COMPLICATION, WITHOUT LONG-TERM CURRENT USE OF INSULIN: ICD-10-CM

## 2022-01-18 LAB
ESTIMATED AVG GLUCOSE: 154 MG/DL (ref 68–131)
HBA1C MFR BLD: 7 % (ref 4–5.6)

## 2022-01-18 PROCEDURE — 36415 COLL VENOUS BLD VENIPUNCTURE: CPT | Mod: HCNC | Performed by: INTERNAL MEDICINE

## 2022-01-18 PROCEDURE — 83036 HEMOGLOBIN GLYCOSYLATED A1C: CPT | Mod: HCNC | Performed by: INTERNAL MEDICINE

## 2022-01-18 PROCEDURE — 84153 ASSAY OF PSA TOTAL: CPT | Mod: HCNC | Performed by: INTERNAL MEDICINE

## 2022-01-18 PROCEDURE — 85025 COMPLETE CBC W/AUTO DIFF WBC: CPT | Mod: HCNC | Performed by: INTERNAL MEDICINE

## 2022-01-19 LAB
BASOPHILS # BLD AUTO: 0.04 K/UL (ref 0–0.2)
BASOPHILS NFR BLD: 0.5 % (ref 0–1.9)
COMPLEXED PSA SERPL-MCNC: 0.38 NG/ML (ref 0–4)
DIFFERENTIAL METHOD: ABNORMAL
EOSINOPHIL # BLD AUTO: 0.2 K/UL (ref 0–0.5)
EOSINOPHIL NFR BLD: 1.9 % (ref 0–8)
ERYTHROCYTE [DISTWIDTH] IN BLOOD BY AUTOMATED COUNT: 12.5 % (ref 11.5–14.5)
HCT VFR BLD AUTO: 41.1 % (ref 40–54)
HGB BLD-MCNC: 13 G/DL (ref 14–18)
IMM GRANULOCYTES # BLD AUTO: 0.01 K/UL (ref 0–0.04)
IMM GRANULOCYTES NFR BLD AUTO: 0.1 % (ref 0–0.5)
LYMPHOCYTES # BLD AUTO: 1.8 K/UL (ref 1–4.8)
LYMPHOCYTES NFR BLD: 21.8 % (ref 18–48)
MCH RBC QN AUTO: 29.6 PG (ref 27–31)
MCHC RBC AUTO-ENTMCNC: 31.6 G/DL (ref 32–36)
MCV RBC AUTO: 94 FL (ref 82–98)
MONOCYTES # BLD AUTO: 0.8 K/UL (ref 0.3–1)
MONOCYTES NFR BLD: 10 % (ref 4–15)
NEUTROPHILS # BLD AUTO: 5.3 K/UL (ref 1.8–7.7)
NEUTROPHILS NFR BLD: 65.7 % (ref 38–73)
NRBC BLD-RTO: 0 /100 WBC
PLATELET # BLD AUTO: 250 K/UL (ref 150–450)
PMV BLD AUTO: 11.1 FL (ref 9.2–12.9)
RBC # BLD AUTO: 4.39 M/UL (ref 4.6–6.2)
WBC # BLD AUTO: 8.02 K/UL (ref 3.9–12.7)

## 2022-01-21 ENCOUNTER — OFFICE VISIT (OUTPATIENT)
Dept: INTERNAL MEDICINE | Facility: CLINIC | Age: 67
End: 2022-01-21
Payer: MEDICARE

## 2022-01-21 VITALS
TEMPERATURE: 97 F | BODY MASS INDEX: 37.08 KG/M2 | HEART RATE: 67 BPM | HEIGHT: 68 IN | SYSTOLIC BLOOD PRESSURE: 118 MMHG | OXYGEN SATURATION: 96 % | DIASTOLIC BLOOD PRESSURE: 62 MMHG | WEIGHT: 244.69 LBS

## 2022-01-21 DIAGNOSIS — E11.9 CONTROLLED TYPE 2 DIABETES MELLITUS WITHOUT COMPLICATION, WITHOUT LONG-TERM CURRENT USE OF INSULIN: Primary | ICD-10-CM

## 2022-01-21 DIAGNOSIS — I25.10 CORONARY ARTERY DISEASE INVOLVING NATIVE CORONARY ARTERY OF NATIVE HEART WITHOUT ANGINA PECTORIS: ICD-10-CM

## 2022-01-21 DIAGNOSIS — I15.2 HYPERTENSION ASSOCIATED WITH DIABETES: ICD-10-CM

## 2022-01-21 DIAGNOSIS — E78.5 HYPERLIPIDEMIA ASSOCIATED WITH TYPE 2 DIABETES MELLITUS: ICD-10-CM

## 2022-01-21 DIAGNOSIS — F17.200 NICOTINE DEPENDENCE: ICD-10-CM

## 2022-01-21 DIAGNOSIS — E11.69 HYPERLIPIDEMIA ASSOCIATED WITH TYPE 2 DIABETES MELLITUS: ICD-10-CM

## 2022-01-21 DIAGNOSIS — E11.59 HYPERTENSION ASSOCIATED WITH DIABETES: ICD-10-CM

## 2022-01-21 PROCEDURE — 3078F DIAST BP <80 MM HG: CPT | Mod: HCNC,CPTII,S$GLB, | Performed by: INTERNAL MEDICINE

## 2022-01-21 PROCEDURE — 1159F MED LIST DOCD IN RCRD: CPT | Mod: HCNC,CPTII,S$GLB, | Performed by: INTERNAL MEDICINE

## 2022-01-21 PROCEDURE — 3008F PR BODY MASS INDEX (BMI) DOCUMENTED: ICD-10-PCS | Mod: HCNC,CPTII,S$GLB, | Performed by: INTERNAL MEDICINE

## 2022-01-21 PROCEDURE — 99214 PR OFFICE/OUTPT VISIT, EST, LEVL IV, 30-39 MIN: ICD-10-PCS | Mod: HCNC,S$GLB,, | Performed by: INTERNAL MEDICINE

## 2022-01-21 PROCEDURE — 3072F LOW RISK FOR RETINOPATHY: CPT | Mod: HCNC,CPTII,S$GLB, | Performed by: INTERNAL MEDICINE

## 2022-01-21 PROCEDURE — 3078F PR MOST RECENT DIASTOLIC BLOOD PRESSURE < 80 MM HG: ICD-10-PCS | Mod: HCNC,CPTII,S$GLB, | Performed by: INTERNAL MEDICINE

## 2022-01-21 PROCEDURE — 99999 PR PBB SHADOW E&M-EST. PATIENT-LVL V: ICD-10-PCS | Mod: PBBFAC,HCNC,, | Performed by: INTERNAL MEDICINE

## 2022-01-21 PROCEDURE — 99499 RISK ADDL DX/OHS AUDIT: ICD-10-PCS | Mod: S$GLB,,, | Performed by: INTERNAL MEDICINE

## 2022-01-21 PROCEDURE — 99214 OFFICE O/P EST MOD 30 MIN: CPT | Mod: HCNC,S$GLB,, | Performed by: INTERNAL MEDICINE

## 2022-01-21 PROCEDURE — 3008F BODY MASS INDEX DOCD: CPT | Mod: HCNC,CPTII,S$GLB, | Performed by: INTERNAL MEDICINE

## 2022-01-21 PROCEDURE — 3072F PR LOW RISK FOR RETINOPATHY: ICD-10-PCS | Mod: HCNC,CPTII,S$GLB, | Performed by: INTERNAL MEDICINE

## 2022-01-21 PROCEDURE — 99499 UNLISTED E&M SERVICE: CPT | Mod: S$GLB,,, | Performed by: INTERNAL MEDICINE

## 2022-01-21 PROCEDURE — 99999 PR PBB SHADOW E&M-EST. PATIENT-LVL V: CPT | Mod: PBBFAC,HCNC,, | Performed by: INTERNAL MEDICINE

## 2022-01-21 PROCEDURE — 1159F PR MEDICATION LIST DOCUMENTED IN MEDICAL RECORD: ICD-10-PCS | Mod: HCNC,CPTII,S$GLB, | Performed by: INTERNAL MEDICINE

## 2022-01-21 PROCEDURE — 3074F PR MOST RECENT SYSTOLIC BLOOD PRESSURE < 130 MM HG: ICD-10-PCS | Mod: HCNC,CPTII,S$GLB, | Performed by: INTERNAL MEDICINE

## 2022-01-21 PROCEDURE — 3051F PR MOST RECENT HEMOGLOBIN A1C LEVEL 7.0 - < 8.0%: ICD-10-PCS | Mod: HCNC,CPTII,S$GLB, | Performed by: INTERNAL MEDICINE

## 2022-01-21 PROCEDURE — 3074F SYST BP LT 130 MM HG: CPT | Mod: HCNC,CPTII,S$GLB, | Performed by: INTERNAL MEDICINE

## 2022-01-21 PROCEDURE — 3051F HG A1C>EQUAL 7.0%<8.0%: CPT | Mod: HCNC,CPTII,S$GLB, | Performed by: INTERNAL MEDICINE

## 2022-01-21 RX ORDER — DULAGLUTIDE 3 MG/.5ML
3 INJECTION, SOLUTION SUBCUTANEOUS
Qty: 12 PEN | Refills: 1 | Status: SHIPPED | OUTPATIENT
Start: 2022-01-21 | End: 2022-10-20 | Stop reason: SDUPTHER

## 2022-01-21 RX ORDER — INFLUENZA VIRUS VACCINE 15; 15; 15; 15 UG/.5ML; UG/.5ML; UG/.5ML; UG/.5ML
SUSPENSION INTRAMUSCULAR
COMMUNITY
Start: 2021-10-08 | End: 2022-08-01

## 2022-01-21 RX ORDER — ROSUVASTATIN CALCIUM 10 MG/1
TABLET, COATED ORAL
Qty: 90 TABLET | Refills: 1 | Status: SHIPPED | OUTPATIENT
Start: 2022-01-21 | End: 2022-09-15 | Stop reason: SDUPTHER

## 2022-01-21 RX ORDER — AMLODIPINE BESYLATE 10 MG/1
10 TABLET ORAL DAILY
Qty: 90 TABLET | Refills: 1 | Status: SHIPPED | OUTPATIENT
Start: 2022-01-21 | End: 2022-08-18 | Stop reason: SDUPTHER

## 2022-01-21 RX ORDER — BUPROPION HYDROCHLORIDE 150 MG/1
150 TABLET, EXTENDED RELEASE ORAL 2 TIMES DAILY
Qty: 60 TABLET | Refills: 2 | Status: SHIPPED | OUTPATIENT
Start: 2022-01-21 | End: 2022-07-27 | Stop reason: SDUPTHER

## 2022-01-21 NOTE — PROGRESS NOTES
"Subjective:      Patient ID: Stefan Forbes Jr. is a 66 y.o. male.    Chief Complaint: 3 month follow up    HPI     65 yo with   Patient Active Problem List   Diagnosis    Controlled type 2 diabetes mellitus without complication, without long-term current use of insulin    Hypertension associated with diabetes    Hyperlipidemia associated with type 2 diabetes mellitus    Moderate COPD (chronic obstructive pulmonary disease)    CAD (coronary artery disease)    Vitamin D deficiency    Osteoarthritis of knee    Pulmonary nodule    Nevus of choroid of right eye    History of pulmonary embolism    Hyponatremia    Iron deficiency anemia due to chronic blood loss    MACHO on CPAP    PLMD (periodic limb movement disorder)    BMI 35.0-35.9,adult    Varicose vein of leg    Tobacco abuse, in remission    Tubular adenoma of colon    Benign prostatic hyperplasia with weak urinary stream    Urgency of urination    Morbid (severe) obesity due to excess calories    Aortic atherosclerosis     Past Medical History:   Diagnosis Date    Arthritis     Cataract     COPD (chronic obstructive pulmonary disease)     Diabetes mellitus, type 2     Hyperlipidemia     Hypertension     Personal history of colonic polyps 2018     Here today for management of mult med problems as outlined below.  Compliant with meds without significant side effects. Energy and appetite good.     Review of Systems   Constitutional: Negative for chills and fever.   HENT: Negative for ear pain and sore throat.    Respiratory: Negative for cough.    Cardiovascular: Negative for chest pain.   Gastrointestinal: Negative for abdominal pain and blood in stool.   Genitourinary: Negative for dysuria and hematuria.   Neurological: Negative for seizures and syncope.     Objective:   /62 (BP Location: Left arm, Patient Position: Sitting, BP Method: Large (Manual))   Pulse 67   Temp 97.4 °F (36.3 °C) (Oral)   Ht 5' 8" (1.727 m)   Wt 111 kg " (244 lb 11.4 oz)   SpO2 96%   BMI 37.21 kg/m²     Physical Exam  Constitutional:       General: He is not in acute distress.     Appearance: He is well-developed and well-nourished.   HENT:      Head: Normocephalic and atraumatic.      Mouth/Throat:      Mouth: Oropharynx is clear and moist.   Eyes:      Extraocular Movements: EOM normal.      Pupils: Pupils are equal, round, and reactive to light.   Neck:      Thyroid: No thyromegaly.   Cardiovascular:      Rate and Rhythm: Normal rate and regular rhythm.   Pulmonary:      Breath sounds: Normal breath sounds. No wheezing or rales.   Abdominal:      General: Bowel sounds are normal.      Palpations: Abdomen is soft.      Tenderness: There is no abdominal tenderness.   Musculoskeletal:      Cervical back: Neck supple.   Lymphadenopathy:      Cervical: No cervical adenopathy.   Skin:     General: Skin is warm and dry.   Neurological:      Mental Status: He is alert and oriented to person, place, and time.   Psychiatric:         Mood and Affect: Mood and affect normal.         Behavior: Behavior normal.       Lab Visit on 01/18/2022   Component Date Value Ref Range Status    Hemoglobin A1C 01/18/2022 7.0* 4.0 - 5.6 % Final    Comment: ADA Screening Guidelines:  5.7-6.4%  Consistent with prediabetes  >or=6.5%  Consistent with diabetes    High levels of fetal hemoglobin interfere with the HbA1C  assay. Heterozygous hemoglobin variants (HbS, HgC, etc)do  not significantly interfere with this assay.   However, presence of multiple variants may affect accuracy.      Estimated Avg Glucose 01/18/2022 154* 68 - 131 mg/dL Final    WBC 01/18/2022 8.02  3.90 - 12.70 K/uL Final    RBC 01/18/2022 4.39* 4.60 - 6.20 M/uL Final    Hemoglobin 01/18/2022 13.0* 14.0 - 18.0 g/dL Final    Hematocrit 01/18/2022 41.1  40.0 - 54.0 % Final    MCV 01/18/2022 94  82 - 98 fL Final    MCH 01/18/2022 29.6  27.0 - 31.0 pg Final    MCHC 01/18/2022 31.6* 32.0 - 36.0 g/dL Final    RDW  01/18/2022 12.5  11.5 - 14.5 % Final    Platelets 01/18/2022 250  150 - 450 K/uL Final    MPV 01/18/2022 11.1  9.2 - 12.9 fL Final    Immature Granulocytes 01/18/2022 0.1  0.0 - 0.5 % Final    Gran # (ANC) 01/18/2022 5.3  1.8 - 7.7 K/uL Final    Immature Grans (Abs) 01/18/2022 0.01  0.00 - 0.04 K/uL Final    Comment: Mild elevation in immature granulocytes is non specific and   can be seen in a variety of conditions including stress response,   acute inflammation, trauma and pregnancy. Correlation with other   laboratory and clinical findings is essential.      Lymph # 01/18/2022 1.8  1.0 - 4.8 K/uL Final    Mono # 01/18/2022 0.8  0.3 - 1.0 K/uL Final    Eos # 01/18/2022 0.2  0.0 - 0.5 K/uL Final    Baso # 01/18/2022 0.04  0.00 - 0.20 K/uL Final    nRBC 01/18/2022 0  0 /100 WBC Final    Gran % 01/18/2022 65.7  38.0 - 73.0 % Final    Lymph % 01/18/2022 21.8  18.0 - 48.0 % Final    Mono % 01/18/2022 10.0  4.0 - 15.0 % Final    Eosinophil % 01/18/2022 1.9  0.0 - 8.0 % Final    Basophil % 01/18/2022 0.5  0.0 - 1.9 % Final    Differential Method 01/18/2022 Automated   Final    PSA, Screen 01/18/2022 0.38  0.00 - 4.00 ng/mL Final    Comment: PSA Expected levels:  Hormonal Therapy: <0.05 ng/ml  Prostatectomy: <0.01 ng/ml  Radiation Therapy: <1.00 ng/ml     Patient Outreach on 01/13/2022   Component Date Value Ref Range Status    Left Eye DM Retinopathy 11/09/2021 Negative   Final    Right Eye DM Retinopathy 11/09/2021 Negative   Final         Assessment:     1. Controlled type 2 diabetes mellitus without complication, without long-term current use of insulin    2. Nicotine dependence    3. Hypertension associated with diabetes    4. Coronary artery disease involving native coronary artery of native heart without angina pectoris    5. Hyperlipidemia associated with type 2 diabetes mellitus      Plan:   Controlled type 2 diabetes mellitus without complication, without long-term current use of  insulin  stable  -     Hemoglobin A1C; Future; Expected date: 04/21/2022  -     dulaglutide (TRULICITY) 3 mg/0.5 mL pen injector; Inject 3 mg into the skin every 7 days.  Dispense: 12 pen; Refill: 1    Nicotine dependence  Cont cessation  -     buPROPion (WELLBUTRIN SR) 150 MG TBSR 12 hr tablet; Take 1 tablet (150 mg total) by mouth 2 (two) times daily.  Dispense: 60 tablet; Refill: 2    Hypertension associated with diabetes  controlled  -     amLODIPine (NORVASC) 10 MG tablet; Take 1 tablet (10 mg total) by mouth once daily.  Dispense: 90 tablet; Refill: 1    Coronary artery disease involving native coronary artery of native heart without angina pectoris  Stable. Up to date with cards.   Hyperlipidemia associated with type 2 diabetes mellitus  stable  -     rosuvastatin (CRESTOR) 10 MG tablet; TAKE ONE TABLET BY MOUTH ONCE DAILY  Dispense: 90 tablet; Refill: 1        Lab Frequency Next Occurrence   X-Ray Chest PA And Lateral Once 10/08/2021   Microalbumin/creatinine urine ratio         Problem List Items Addressed This Visit     Controlled type 2 diabetes mellitus without complication, without long-term current use of insulin - Primary    Relevant Medications    dulaglutide (TRULICITY) 3 mg/0.5 mL pen injector    Other Relevant Orders    Hemoglobin A1C    Hypertension associated with diabetes    Relevant Medications    dulaglutide (TRULICITY) 3 mg/0.5 mL pen injector    amLODIPine (NORVASC) 10 MG tablet    Hyperlipidemia associated with type 2 diabetes mellitus    Relevant Medications    rosuvastatin (CRESTOR) 10 MG tablet    dulaglutide (TRULICITY) 3 mg/0.5 mL pen injector    CAD (coronary artery disease)    Overview     Sees Dr. Perry           Other Visit Diagnoses     Nicotine dependence        Relevant Medications    buPROPion (WELLBUTRIN SR) 150 MG TBSR 12 hr tablet          Follow up in about 3 months (around 4/21/2022), or if symptoms worsen or fail to improve.

## 2022-01-25 ENCOUNTER — OFFICE VISIT (OUTPATIENT)
Dept: UROLOGY | Facility: CLINIC | Age: 67
End: 2022-01-25
Payer: MEDICARE

## 2022-01-25 ENCOUNTER — PATIENT OUTREACH (OUTPATIENT)
Dept: ADMINISTRATIVE | Facility: OTHER | Age: 67
End: 2022-01-25
Payer: MEDICARE

## 2022-01-25 VITALS
HEART RATE: 68 BPM | WEIGHT: 245.13 LBS | HEIGHT: 68 IN | DIASTOLIC BLOOD PRESSURE: 74 MMHG | SYSTOLIC BLOOD PRESSURE: 130 MMHG | BODY MASS INDEX: 37.15 KG/M2 | TEMPERATURE: 98 F

## 2022-01-25 DIAGNOSIS — Z12.5 PROSTATE CANCER SCREENING: Primary | ICD-10-CM

## 2022-01-25 DIAGNOSIS — R39.12 BENIGN PROSTATIC HYPERPLASIA WITH WEAK URINARY STREAM: ICD-10-CM

## 2022-01-25 DIAGNOSIS — N40.1 BENIGN PROSTATIC HYPERPLASIA WITH WEAK URINARY STREAM: ICD-10-CM

## 2022-01-25 PROCEDURE — 1160F RVW MEDS BY RX/DR IN RCRD: CPT | Mod: HCNC,CPTII,S$GLB, | Performed by: UROLOGY

## 2022-01-25 PROCEDURE — 99214 OFFICE O/P EST MOD 30 MIN: CPT | Mod: HCNC,S$GLB,, | Performed by: UROLOGY

## 2022-01-25 PROCEDURE — 3075F SYST BP GE 130 - 139MM HG: CPT | Mod: HCNC,CPTII,S$GLB, | Performed by: UROLOGY

## 2022-01-25 PROCEDURE — 1159F MED LIST DOCD IN RCRD: CPT | Mod: HCNC,CPTII,S$GLB, | Performed by: UROLOGY

## 2022-01-25 PROCEDURE — 99214 PR OFFICE/OUTPT VISIT, EST, LEVL IV, 30-39 MIN: ICD-10-PCS | Mod: HCNC,S$GLB,, | Performed by: UROLOGY

## 2022-01-25 PROCEDURE — 99999 PR PBB SHADOW E&M-EST. PATIENT-LVL V: ICD-10-PCS | Mod: PBBFAC,HCNC,, | Performed by: UROLOGY

## 2022-01-25 PROCEDURE — 3078F DIAST BP <80 MM HG: CPT | Mod: HCNC,CPTII,S$GLB, | Performed by: UROLOGY

## 2022-01-25 PROCEDURE — 3072F LOW RISK FOR RETINOPATHY: CPT | Mod: HCNC,CPTII,S$GLB, | Performed by: UROLOGY

## 2022-01-25 PROCEDURE — 3008F PR BODY MASS INDEX (BMI) DOCUMENTED: ICD-10-PCS | Mod: HCNC,CPTII,S$GLB, | Performed by: UROLOGY

## 2022-01-25 PROCEDURE — 1159F PR MEDICATION LIST DOCUMENTED IN MEDICAL RECORD: ICD-10-PCS | Mod: HCNC,CPTII,S$GLB, | Performed by: UROLOGY

## 2022-01-25 PROCEDURE — 3075F PR MOST RECENT SYSTOLIC BLOOD PRESS GE 130-139MM HG: ICD-10-PCS | Mod: HCNC,CPTII,S$GLB, | Performed by: UROLOGY

## 2022-01-25 PROCEDURE — 3078F PR MOST RECENT DIASTOLIC BLOOD PRESSURE < 80 MM HG: ICD-10-PCS | Mod: HCNC,CPTII,S$GLB, | Performed by: UROLOGY

## 2022-01-25 PROCEDURE — 3072F PR LOW RISK FOR RETINOPATHY: ICD-10-PCS | Mod: HCNC,CPTII,S$GLB, | Performed by: UROLOGY

## 2022-01-25 PROCEDURE — 3051F HG A1C>EQUAL 7.0%<8.0%: CPT | Mod: HCNC,CPTII,S$GLB, | Performed by: UROLOGY

## 2022-01-25 PROCEDURE — 3051F PR MOST RECENT HEMOGLOBIN A1C LEVEL 7.0 - < 8.0%: ICD-10-PCS | Mod: HCNC,CPTII,S$GLB, | Performed by: UROLOGY

## 2022-01-25 PROCEDURE — 99999 PR PBB SHADOW E&M-EST. PATIENT-LVL V: CPT | Mod: PBBFAC,HCNC,, | Performed by: UROLOGY

## 2022-01-25 PROCEDURE — 3008F BODY MASS INDEX DOCD: CPT | Mod: HCNC,CPTII,S$GLB, | Performed by: UROLOGY

## 2022-01-25 PROCEDURE — 1160F PR REVIEW ALL MEDS BY PRESCRIBER/CLIN PHARMACIST DOCUMENTED: ICD-10-PCS | Mod: HCNC,CPTII,S$GLB, | Performed by: UROLOGY

## 2022-01-25 RX ORDER — TAMSULOSIN HYDROCHLORIDE 0.4 MG/1
0.8 CAPSULE ORAL DAILY
Qty: 180 CAPSULE | Refills: 3 | Status: SHIPPED | OUTPATIENT
Start: 2022-01-25 | End: 2023-02-28 | Stop reason: SDUPTHER

## 2022-01-26 NOTE — PROGRESS NOTES
Chief Complaint:  F/u urgency and BPH    HPI:   01/25/2022 - patient returns today for follow-up, notes that after a while the medications seemed less effective and feels like his urination is to his baseline, denies any gross hematuria or incomplete emptying, denies UTIs, PSA 0.38 last week    02/23/2021 - patient presents today for follow-up, notes that his symptoms are greatly improved with the addition of the myrbetriq, has decreased his urgency and frequency and now he had plenty of time to make it to the restroom, denies GH, dysuria, incomplete emptying     01/06/2021 - 66 y.o. male that presents for evaluation of BPH.  Patient has had LUTS for about 2 years for which has been managed with Flomax.  His symptoms include incomplete emptying and weak stream as well as a urgency.  He denies incontinence.  He has never been on anticholinergics for his bladder.  Denies a history of urinary tract infections or kidney stones.  Denies gross hematuria.  Denies dysuria.  His most bothersome symptom is his urgency.  Confirms long history of ED but is not interested in medications at this time. Patient also notes that his wife detected a ball on my left ball and wanted him to get checked out.  It is not bothersome and is not painful.  It is not growing in size.  His wife noticed it about 2 weeks ago.  IPSS - 4/4/4/3/3/3/2 = 23 QOL - 3(mixed mostly)      PMH:  Past Medical History:   Diagnosis Date    Arthritis     Cataract     COPD (chronic obstructive pulmonary disease)     Diabetes mellitus, type 2     Hyperlipidemia     Hypertension     Personal history of colonic polyps 2018       PSH:  Past Surgical History:   Procedure Laterality Date    ANGIOPLASTY      TOTAL KNEE ARTHROPLASTY         Family History:  Family History   Problem Relation Age of Onset    Diabetes Mother     Cancer Father         bone       Social History:  Social History     Tobacco Use    Smoking status: Former Smoker     Types:  Cigars     Quit date: 2020     Years since quittin.9    Smokeless tobacco: Never Used    Tobacco comment: 3-5 cigars each day   Substance Use Topics    Alcohol use: No    Drug use: No        Review of Systems:  General: No fever, chills  Skin: No rashes  Chest:  Denies cough and sputum production  Heart: Denies chest pain  Resp: Denies dyspnea  Abdomen: Denies diarrhea, abdominal pain, hematemesis, or blood in stool.  Musculoskeletal: No joint stiffness or swelling. Denies back pain.  : see HPI  Neuro: no dizziness or weakness    Allergies:  Patient has no known allergies.    Medications:    Current Outpatient Medications:     acetaminophen (TYLENOL) 500 MG tablet, Take 1,000 mg by mouth as needed. , Disp: , Rfl:     albuterol (ACCUNEB) 0.63 mg/3 mL Nebu, Take 0.63 mg by nebulization every 6 (six) hours as needed. Rescue, Disp: , Rfl:     albuterol (VENTOLIN HFA) 90 mcg/actuation inhaler, Inhale 2 puffs into the lungs every 6 (six) hours as needed for Wheezing or Shortness of Breath. Rescue, Disp: 36 g, Rfl: 3    amLODIPine (NORVASC) 10 MG tablet, Take 1 tablet (10 mg total) by mouth once daily., Disp: 90 tablet, Rfl: 1    ascorbic acid, vitamin C, (VITAMIN C) 1000 MG tablet, Take 1,000 mg by mouth once daily., Disp: , Rfl:     aspirin 325 MG tablet, Take 325 mg by mouth once daily., Disp: , Rfl:     blood sugar diagnostic (BLOOD GLUCOSE TEST) Strp, 1 strip by Misc.(Non-Drug; Combo Route) route 3 (three) times daily., Disp: 200 each, Rfl: 1    blood-glucose meter (TRUE METRIX GLUCOSE METER) Misc, Use as directed (Patient taking differently: Use as directed), Disp: 1 each, Rfl: 0    blood-glucose meter Misc, Use as directed to check glucose levels., Disp: 1 each, Rfl: 0    buPROPion (WELLBUTRIN SR) 150 MG TBSR 12 hr tablet, Take 1 tablet (150 mg total) by mouth 2 (two) times daily., Disp: 60 tablet, Rfl: 2    cetirizine (ZYRTEC) 10 MG tablet, Take 10 mg by mouth every evening., Disp: , Rfl:      cholecalciferol, vitamin D3, (VITAMIN D3) 25 mcg (1,000 unit) capsule, Take 1,000 Units by mouth once daily., Disp: , Rfl:     cyanocobalamin (VITAMIN B-12) 500 MCG tablet, Take 500 mcg by mouth once daily. , Disp: , Rfl:     dapagliflozin (FARXIGA) 5 mg Tab tablet, Take 1 tablet (5 mg total) by mouth once daily., Disp: 90 tablet, Rfl: 0    dulaglutide (TRULICITY) 3 mg/0.5 mL pen injector, Inject 3 mg into the skin every 7 days., Disp: 12 pen, Rfl: 1    FLUARIX QUAD 0950-4257, PF, 60 mcg (15 mcg x 4)/0.5 mL Syrg, , Disp: , Rfl:     fluticasone propionate (FLONASE) 50 mcg/actuation nasal spray, Use 1 spray (50 mcg total) in each nostril once daily., Disp: 16 g, Rfl: 3    fluticasone propionate (FLONASE) 50 mcg/actuation nasal spray, 2 sprays (100 mcg total) by Each Nostril route once daily., Disp: 16 g, Rfl: 0    fluticasone-umeclidin-vilanter (TRELEGY ELLIPTA) 100-62.5-25 mcg DsDv, Inhale 1 puff into the lungs once daily., Disp: 60 each, Rfl: 11    ipratropium (ATROVENT) 21 mcg (0.03 %) nasal spray, Spray 1 or 2 sprays each nasal passage every 8 hours, if needed, as directed. (Patient taking differently: Spray 1 or 2 sprays each nasal passage every 8 hours, if needed, as directed.), Disp: 30 mL, Rfl: 12    lancets 30 gauge Misc, 1 lancet by Misc.(Non-Drug; Combo Route) route 3 (three) times daily., Disp: 200 each, Rfl: 0    metFORMIN (GLUCOPHAGE) 1000 MG tablet, TAKE ONE TABLET BY MOUTH TWICE DAILY WITH MEALS, Disp: 180 tablet, Rfl: 2    methscopolamine (PAMINE) 2.5 mg Tab, Take 1 tablet by mouth every 12 hours, as directed, if needed (Patient taking differently: Take 1 tablet by mouth every 12 hours, as directed, if needed), Disp: 180 tablet, Rfl: 4    nitroGLYCERIN (NITROSTAT) 0.4 MG SL tablet, Take 1 tablet under the tongue every 5 minutes as needed for chest pain. Do not exceed a total of 3 doses in 15 minutes, Disp: 25 tablet, Rfl: 0    olmesartan (BENICAR) 20 MG tablet, Take 1 tablet by mouth  daily for 360 days., Disp: 90 tablet, Rfl: 3    omeprazole (PRILOSEC) 40 MG capsule, TAKE ONE CAPSULE BY MOUTH ONCE DAILY, Disp: 90 capsule, Rfl: 1    rosuvastatin (CRESTOR) 10 MG tablet, TAKE ONE TABLET BY MOUTH ONCE DAILY, Disp: 90 tablet, Rfl: 1    sars-cov-2, covid-19, (PFIZER COVID-19) 30 mcg/0.3 ml injection, Inject into the muscle., Disp: 0.3 mL, Rfl: 0    guaiFENesin 1,200 mg Ta12, Take 1 tablet by mouth 2 (two) times daily., Disp: , Rfl:     mirabegron (MYRBETRIQ) 50 mg Tb24, Take 1 tablet (50 mg total) by mouth once daily., Disp: 30 tablet, Rfl: 11    tamsulosin (FLOMAX) 0.4 mg Cap, Take 2 capsules (0.8 mg total) by mouth once daily., Disp: 180 capsule, Rfl: 3    Current Facility-Administered Medications:     acetaminophen tablet 650 mg, 650 mg, Oral, Once PRN, Juan José Parker MD    albuterol inhaler 2 puff, 2 puff, Inhalation, Q20 Min PRN, Juan José Parker MD    diphenhydrAMINE injection 25 mg, 25 mg, Intravenous, Once PRN, Juan José Parker MD    EPINEPHrine (EPIPEN) 0.3 mg/0.3 mL pen injection 0.3 mg, 0.3 mg, Intramuscular, PRN, Juan José Parker MD    methylPREDNISolone sodium succinate injection 40 mg, 40 mg, Intravenous, Once PRN, Juan José Parker MD    ondansetron disintegrating tablet 4 mg, 4 mg, Oral, Once PRN, Juan José Parker MD    sodium chloride 0.9% 500 mL flush bag, , Intravenous, PRN, Juan José Parker MD    sodium chloride 0.9% flush 10 mL, 10 mL, Intravenous, PRN, Juan José Parker MD    Physical Exam:  Vitals:    01/25/22 1536   BP: 130/74   Pulse: 68   Temp: 98.1 °F (36.7 °C)     General: awake, alert, cooperative  Head: NC/AT  Ears: external ears normal  Eyes: sclera normal  Lungs: normal inspiration, NAD  Heart: well-perfused  Abdomen: Soft, NT, ND  : Normal circ'd phallus, meatus normal in size and position, BL testicles palpable, no masses, left testicular cyst noted, nontender, no abnormalities of epididymi  YESICA: Normal rectal tone, no hemorrhoids. Prostate  smooth and normal, no nodules 50 gm SV not palpable. Perineum and anus normal.  Lymphatic: groin nodes negative  Skin: The skin is warm and dry  Ext: No c/c/e.  Neuro: grossly intact, AOx3    RADIOLOGY:  No recent relevant imaging available for review.    LABS:  I personally reviewed the following lab values:  Lab Results   Component Value Date    WBC 8.02 01/18/2022    HGB 13.0 (L) 01/18/2022    HCT 41.1 01/18/2022     01/18/2022     10/14/2021    K 5.1 10/14/2021     10/14/2021    CREATININE 0.9 10/14/2021    BUN 13 10/14/2021    CO2 22 (L) 10/14/2021    TSH 1.143 10/14/2021    PSA 0.38 01/18/2022    INR 0.9 10/04/2017    HGBA1C 7.0 (H) 01/18/2022    CHOL 123 10/14/2021    TRIG 127 10/14/2021    HDL 42 10/14/2021    ALT 22 10/14/2021    AST 14 10/14/2021       Assessment/Plan:   Stefan Forbes Jr. is a 66 y.o. male with:    BPH - discussed options, increase flomax to BID    Urgency - stop myrbetriq, if patient's symptoms return and are bothersome, can restart at 25mg    Prostate Cancer Screening - PSA and YESICA normal, follow-up 1 year with PSA    Thank you for allowing me the opportunity to participate in this patient's care.     Stefan Eugene MD  Urology

## 2022-01-27 PROCEDURE — 99454 REM MNTR PHYSIOL PARAM 16-30: CPT | Mod: S$GLB,,, | Performed by: INTERNAL MEDICINE

## 2022-01-27 PROCEDURE — 99454 PR REMOTE MNTR, PHYS PARAM, INITIAL, EA 30 DAYS: ICD-10-PCS | Mod: S$GLB,,, | Performed by: INTERNAL MEDICINE

## 2022-02-07 ENCOUNTER — TELEPHONE (OUTPATIENT)
Dept: INTERNAL MEDICINE | Facility: CLINIC | Age: 67
End: 2022-02-07
Payer: MEDICARE

## 2022-02-07 NOTE — TELEPHONE ENCOUNTER
----- Message from Raina Green sent at 2/7/2022  2:20 PM CST -----  Contact: apurva oconnor / wife  Called in regards to some paper work [employer] sent . Please give a call back [phone]

## 2022-02-07 NOTE — TELEPHONE ENCOUNTER
Pt wife states that [employer] faxed us a form for the pt medication farxiga. I informed her that I havent received anything and gave her our fax number for her to be able to verifty with them that they are sending it to the correct place.

## 2022-02-14 NOTE — PROGRESS NOTES
===============================  Date today is 2/15/2022  Stefan Forbes Jr. is a 66 y.o. male  Last visit [facility]: :11/9/2021   Last visit eye dept. 11/9/2021    Corrected distance visual acuity was 20/25 in the right eye and 20/30 -2 in the left eye.  Not recorded       Not recorded       Manifest Refraction     Manifest Refraction       Sphere Cylinder Axis Dist VA    Right -2.75 +1.00 170 20/25    Left -2.50 +1.50 150 20/30              Not recorded       Chief Complaint   Patient presents with    Blurred Vision       Problem List Items Addressed This Visit        Eye/Vision problems    Hypertension associated with diabetes    Nevus of choroid of right eye    Relevant Orders    Posterior Segment OCT Retina-Both eyes (Completed)      Other Visit Diagnoses     Controlled type 2 diabetes mellitus with diabetic cataract, without long-term current use of insulin    -  Primary    Relevant Orders    Posterior Segment OCT Retina-Both eyes (Completed)    Diabetic cataract              ________________  2/15/2022 today    DM no retinopathy  MOCT stable  MR improves vision- will dispense new rx today  Lamellar cataracts OU-not quite symptomatic  Nevus OD- optos next visit    RTC 1 year  Instructed to call 24/7 for any worsening of vision or symptoms. Check OU daily.   Gave my office and cell phone number.    .      ===============================

## 2022-02-15 ENCOUNTER — OFFICE VISIT (OUTPATIENT)
Dept: OPHTHALMOLOGY | Facility: CLINIC | Age: 67
End: 2022-02-15
Payer: MEDICARE

## 2022-02-15 DIAGNOSIS — E11.36 CONTROLLED TYPE 2 DIABETES MELLITUS WITH DIABETIC CATARACT, WITHOUT LONG-TERM CURRENT USE OF INSULIN: Primary | ICD-10-CM

## 2022-02-15 DIAGNOSIS — E11.59 HYPERTENSION ASSOCIATED WITH DIABETES: ICD-10-CM

## 2022-02-15 DIAGNOSIS — E11.36 DIABETIC CATARACT: ICD-10-CM

## 2022-02-15 DIAGNOSIS — I15.2 HYPERTENSION ASSOCIATED WITH DIABETES: ICD-10-CM

## 2022-02-15 DIAGNOSIS — D31.31 NEVUS OF CHOROID OF RIGHT EYE: ICD-10-CM

## 2022-02-15 LAB
LEFT EYE DM RETINOPATHY: NEGATIVE
RIGHT EYE DM RETINOPATHY: NEGATIVE

## 2022-02-15 PROCEDURE — 1160F RVW MEDS BY RX/DR IN RCRD: CPT | Mod: HCNC,CPTII,S$GLB, | Performed by: OPHTHALMOLOGY

## 2022-02-15 PROCEDURE — 99213 PR OFFICE/OUTPT VISIT, EST, LEVL III, 20-29 MIN: ICD-10-PCS | Mod: HCNC,S$GLB,, | Performed by: OPHTHALMOLOGY

## 2022-02-15 PROCEDURE — 1159F PR MEDICATION LIST DOCUMENTED IN MEDICAL RECORD: ICD-10-PCS | Mod: HCNC,CPTII,S$GLB, | Performed by: OPHTHALMOLOGY

## 2022-02-15 PROCEDURE — 4010F ACE/ARB THERAPY RXD/TAKEN: CPT | Mod: HCNC,CPTII,S$GLB, | Performed by: OPHTHALMOLOGY

## 2022-02-15 PROCEDURE — 4010F PR ACE/ARB THEARPY RXD/TAKEN: ICD-10-PCS | Mod: HCNC,CPTII,S$GLB, | Performed by: OPHTHALMOLOGY

## 2022-02-15 PROCEDURE — 3051F PR MOST RECENT HEMOGLOBIN A1C LEVEL 7.0 - < 8.0%: ICD-10-PCS | Mod: HCNC,CPTII,S$GLB, | Performed by: OPHTHALMOLOGY

## 2022-02-15 PROCEDURE — 99213 OFFICE O/P EST LOW 20 MIN: CPT | Mod: HCNC,S$GLB,, | Performed by: OPHTHALMOLOGY

## 2022-02-15 PROCEDURE — 92134 CPTRZ OPH DX IMG PST SGM RTA: CPT | Mod: HCNC,S$GLB,, | Performed by: OPHTHALMOLOGY

## 2022-02-15 PROCEDURE — 99999 PR PBB SHADOW E&M-EST. PATIENT-LVL III: ICD-10-PCS | Mod: PBBFAC,HCNC,, | Performed by: OPHTHALMOLOGY

## 2022-02-15 PROCEDURE — 99999 PR PBB SHADOW E&M-EST. PATIENT-LVL III: CPT | Mod: PBBFAC,HCNC,, | Performed by: OPHTHALMOLOGY

## 2022-02-15 PROCEDURE — 3051F HG A1C>EQUAL 7.0%<8.0%: CPT | Mod: HCNC,CPTII,S$GLB, | Performed by: OPHTHALMOLOGY

## 2022-02-15 PROCEDURE — 92134 POSTERIOR SEGMENT OCT RETINA (OCULAR COHERENCE TOMOGRAPHY)-BOTH EYES: ICD-10-PCS | Mod: HCNC,S$GLB,, | Performed by: OPHTHALMOLOGY

## 2022-02-15 PROCEDURE — 1159F MED LIST DOCD IN RCRD: CPT | Mod: HCNC,CPTII,S$GLB, | Performed by: OPHTHALMOLOGY

## 2022-02-15 PROCEDURE — 1160F PR REVIEW ALL MEDS BY PRESCRIBER/CLIN PHARMACIST DOCUMENTED: ICD-10-PCS | Mod: HCNC,CPTII,S$GLB, | Performed by: OPHTHALMOLOGY

## 2022-02-26 PROCEDURE — 99454 REM MNTR PHYSIOL PARAM 16-30: CPT | Mod: S$GLB,,, | Performed by: INTERNAL MEDICINE

## 2022-02-26 PROCEDURE — 99454 PR REMOTE MNTR, PHYS PARAM, INITIAL, EA 30 DAYS: ICD-10-PCS | Mod: S$GLB,,, | Performed by: INTERNAL MEDICINE

## 2022-03-07 ENCOUNTER — PATIENT MESSAGE (OUTPATIENT)
Dept: ADMINISTRATIVE | Facility: OTHER | Age: 67
End: 2022-03-07
Payer: MEDICARE

## 2022-03-24 ENCOUNTER — PATIENT MESSAGE (OUTPATIENT)
Dept: INTERNAL MEDICINE | Facility: CLINIC | Age: 67
End: 2022-03-24
Payer: MEDICARE

## 2022-03-25 ENCOUNTER — APPOINTMENT (OUTPATIENT)
Dept: RADIOLOGY | Facility: HOSPITAL | Age: 67
End: 2022-03-25
Attending: NURSE PRACTITIONER
Payer: MEDICARE

## 2022-03-25 ENCOUNTER — OFFICE VISIT (OUTPATIENT)
Dept: INTERNAL MEDICINE | Facility: CLINIC | Age: 67
End: 2022-03-25
Payer: MEDICARE

## 2022-03-25 VITALS
SYSTOLIC BLOOD PRESSURE: 126 MMHG | TEMPERATURE: 98 F | DIASTOLIC BLOOD PRESSURE: 72 MMHG | WEIGHT: 240.94 LBS | BODY MASS INDEX: 36.52 KG/M2 | RESPIRATION RATE: 18 BRPM | HEIGHT: 68 IN | OXYGEN SATURATION: 98 % | HEART RATE: 67 BPM

## 2022-03-25 DIAGNOSIS — R10.9 LEFT FLANK PAIN: ICD-10-CM

## 2022-03-25 DIAGNOSIS — R10.9 LEFT SIDED ABDOMINAL PAIN: Primary | ICD-10-CM

## 2022-03-25 DIAGNOSIS — R10.9 ABDOMINAL PAIN, UNSPECIFIED ABDOMINAL LOCATION: ICD-10-CM

## 2022-03-25 DIAGNOSIS — R10.9 LEFT SIDED ABDOMINAL PAIN: ICD-10-CM

## 2022-03-25 LAB
BILIRUB SERPL-MCNC: ABNORMAL MG/DL
BLOOD URINE, POC: ABNORMAL
COLOR, POC UA: ABNORMAL
GLUCOSE UR QL STRIP: ABNORMAL
KETONES UR QL STRIP: ABNORMAL
LEUKOCYTE ESTERASE URINE, POC: ABNORMAL
NITRITE, POC UA: ABNORMAL
PH, POC UA: 6
PROTEIN, POC: ABNORMAL
SPECIFIC GRAVITY, POC UA: 1
UROBILINOGEN, POC UA: ABNORMAL

## 2022-03-25 PROCEDURE — 3074F PR MOST RECENT SYSTOLIC BLOOD PRESSURE < 130 MM HG: ICD-10-PCS | Mod: CPTII,S$GLB,, | Performed by: NURSE PRACTITIONER

## 2022-03-25 PROCEDURE — 3072F PR LOW RISK FOR RETINOPATHY: ICD-10-PCS | Mod: CPTII,S$GLB,, | Performed by: NURSE PRACTITIONER

## 2022-03-25 PROCEDURE — 1159F PR MEDICATION LIST DOCUMENTED IN MEDICAL RECORD: ICD-10-PCS | Mod: CPTII,S$GLB,, | Performed by: NURSE PRACTITIONER

## 2022-03-25 PROCEDURE — 74019 RADEX ABDOMEN 2 VIEWS: CPT | Mod: TC,PO

## 2022-03-25 PROCEDURE — 87086 URINE CULTURE/COLONY COUNT: CPT | Performed by: NURSE PRACTITIONER

## 2022-03-25 PROCEDURE — 99999 PR PBB SHADOW E&M-EST. PATIENT-LVL V: ICD-10-PCS | Mod: PBBFAC,,, | Performed by: NURSE PRACTITIONER

## 2022-03-25 PROCEDURE — 3008F PR BODY MASS INDEX (BMI) DOCUMENTED: ICD-10-PCS | Mod: CPTII,S$GLB,, | Performed by: NURSE PRACTITIONER

## 2022-03-25 PROCEDURE — 74019 XR ABDOMEN FLAT AND ERECT: ICD-10-PCS | Mod: 26,,, | Performed by: RADIOLOGY

## 2022-03-25 PROCEDURE — 4010F PR ACE/ARB THEARPY RXD/TAKEN: ICD-10-PCS | Mod: CPTII,S$GLB,, | Performed by: NURSE PRACTITIONER

## 2022-03-25 PROCEDURE — 3008F BODY MASS INDEX DOCD: CPT | Mod: CPTII,S$GLB,, | Performed by: NURSE PRACTITIONER

## 2022-03-25 PROCEDURE — 1160F RVW MEDS BY RX/DR IN RCRD: CPT | Mod: CPTII,S$GLB,, | Performed by: NURSE PRACTITIONER

## 2022-03-25 PROCEDURE — 1160F PR REVIEW ALL MEDS BY PRESCRIBER/CLIN PHARMACIST DOCUMENTED: ICD-10-PCS | Mod: CPTII,S$GLB,, | Performed by: NURSE PRACTITIONER

## 2022-03-25 PROCEDURE — 81001 URINALYSIS AUTO W/SCOPE: CPT | Mod: S$GLB,,, | Performed by: NURSE PRACTITIONER

## 2022-03-25 PROCEDURE — 1159F MED LIST DOCD IN RCRD: CPT | Mod: CPTII,S$GLB,, | Performed by: NURSE PRACTITIONER

## 2022-03-25 PROCEDURE — 3051F PR MOST RECENT HEMOGLOBIN A1C LEVEL 7.0 - < 8.0%: ICD-10-PCS | Mod: CPTII,S$GLB,, | Performed by: NURSE PRACTITIONER

## 2022-03-25 PROCEDURE — 3074F SYST BP LT 130 MM HG: CPT | Mod: CPTII,S$GLB,, | Performed by: NURSE PRACTITIONER

## 2022-03-25 PROCEDURE — 99999 PR PBB SHADOW E&M-EST. PATIENT-LVL V: CPT | Mod: PBBFAC,,, | Performed by: NURSE PRACTITIONER

## 2022-03-25 PROCEDURE — 3078F PR MOST RECENT DIASTOLIC BLOOD PRESSURE < 80 MM HG: ICD-10-PCS | Mod: CPTII,S$GLB,, | Performed by: NURSE PRACTITIONER

## 2022-03-25 PROCEDURE — 99214 PR OFFICE/OUTPT VISIT, EST, LEVL IV, 30-39 MIN: ICD-10-PCS | Mod: S$GLB,,, | Performed by: NURSE PRACTITIONER

## 2022-03-25 PROCEDURE — 81001 POCT URINALYSIS, DIPSTICK OR TABLET REAGENT, AUTOMATED, WITH MICROSCOP: ICD-10-PCS | Mod: S$GLB,,, | Performed by: NURSE PRACTITIONER

## 2022-03-25 PROCEDURE — 3078F DIAST BP <80 MM HG: CPT | Mod: CPTII,S$GLB,, | Performed by: NURSE PRACTITIONER

## 2022-03-25 PROCEDURE — 74019 RADEX ABDOMEN 2 VIEWS: CPT | Mod: 26,,, | Performed by: RADIOLOGY

## 2022-03-25 PROCEDURE — 99214 OFFICE O/P EST MOD 30 MIN: CPT | Mod: S$GLB,,, | Performed by: NURSE PRACTITIONER

## 2022-03-25 PROCEDURE — 4010F ACE/ARB THERAPY RXD/TAKEN: CPT | Mod: CPTII,S$GLB,, | Performed by: NURSE PRACTITIONER

## 2022-03-25 PROCEDURE — 3072F LOW RISK FOR RETINOPATHY: CPT | Mod: CPTII,S$GLB,, | Performed by: NURSE PRACTITIONER

## 2022-03-25 PROCEDURE — 3051F HG A1C>EQUAL 7.0%<8.0%: CPT | Mod: CPTII,S$GLB,, | Performed by: NURSE PRACTITIONER

## 2022-03-25 RX ORDER — TRAMADOL HYDROCHLORIDE 50 MG/1
50 TABLET ORAL EVERY 12 HOURS PRN
Qty: 14 TABLET | Refills: 0 | Status: SHIPPED | OUTPATIENT
Start: 2022-03-25 | End: 2022-09-14

## 2022-03-25 NOTE — PROGRESS NOTES
Subjective:       Patient ID: Stefan Forbes Jr. is a 66 y.o. male.    Chief Complaint: No chief complaint on file.    Patient presents with left side/flank pain.  Started on yesterday.  No changes in urination.  Some flatulence/belching.  Improved after bowel movement.     No change in activity.     Tried old prescription of Tramadol.  No improvement.     Review of Systems   Constitutional: Negative for activity change, appetite change, fatigue and fever.   HENT: Negative for nasal congestion, ear discharge, ear pain, mouth sores, nosebleeds, sore throat and tinnitus.    Eyes: Negative for discharge, redness and itching.   Respiratory: Negative for apnea, cough and shortness of breath.    Cardiovascular: Negative for chest pain and leg swelling.   Gastrointestinal: Positive for abdominal pain. Negative for abdominal distention, blood in stool and constipation.   Endocrine: Negative for polydipsia, polyphagia and polyuria.   Genitourinary: Positive for flank pain. Negative for difficulty urinating, frequency and hematuria.   Musculoskeletal: Negative for back pain, gait problem, neck pain and neck stiffness.   Integumentary:  Negative for rash and wound.   Allergic/Immunologic: Negative for environmental allergies and food allergies.   Neurological: Negative for dizziness, seizures, syncope, numbness and headaches.   Hematological: Negative for adenopathy. Does not bruise/bleed easily.   Psychiatric/Behavioral: Negative for agitation, confusion, hallucinations, self-injury and suicidal ideas.         Objective:      Physical Exam  Vitals reviewed.   Constitutional:       Appearance: Normal appearance.   Cardiovascular:      Rate and Rhythm: Normal rate and regular rhythm.   Pulmonary:      Effort: Pulmonary effort is normal.      Breath sounds: Normal breath sounds.   Abdominal:      General: Bowel sounds are normal.      Tenderness: There is abdominal tenderness.       Skin:     General: Skin is warm.    Neurological:      General: No focal deficit present.      Mental Status: He is alert.   Psychiatric:         Mood and Affect: Mood normal.         Behavior: Behavior normal. Behavior is cooperative.         Assessment:       Problem List Items Addressed This Visit    None     Visit Diagnoses     Left sided abdominal pain    -  Primary    Relevant Medications    traMADoL (ULTRAM) 50 mg tablet    Other Relevant Orders    POCT URINE DIPSTICK WITH MICROSCOPE, AUTOMATED (Completed)    X-Ray Abdomen Flat And Erect (Completed)    Urine culture (Completed)    CT Renal Stone Study ABD Pelvis WO    Left flank pain        Relevant Orders    POCT URINE DIPSTICK WITH MICROSCOPE, AUTOMATED (Completed)    X-Ray Abdomen Flat And Erect (Completed)    Urine culture (Completed)    CT Renal Stone Study ABD Pelvis WO    Abdominal pain, unspecified abdominal location        Relevant Orders    CT Renal Stone Study ABD Pelvis WO          Plan:         Left sided abdominal pain  -     POCT URINE DIPSTICK WITH MICROSCOPE, AUTOMATED  -     X-Ray Abdomen Flat And Erect; Future; Expected date: 03/25/2022  -     Urine culture  -     traMADoL (ULTRAM) 50 mg tablet; Take 1 tablet (50 mg total) by mouth every 12 (twelve) hours as needed for Pain.  Dispense: 14 tablet; Refill: 0  -     CT Renal Stone Study ABD Pelvis WO; Future; Expected date: 03/25/2022    Left flank pain  -     POCT URINE DIPSTICK WITH MICROSCOPE, AUTOMATED  -     X-Ray Abdomen Flat And Erect; Future; Expected date: 03/25/2022  -     Urine culture  -     CT Renal Stone Study ABD Pelvis WO; Future; Expected date: 03/25/2022    Abdominal pain, unspecified abdominal location  -     CT Renal Stone Study ABD Pelvis WO; Future; Expected date: 03/25/2022       Urine and x-ray today     Schedule f.u with Dr. Fisher

## 2022-03-26 LAB — BACTERIA UR CULT: NO GROWTH

## 2022-03-28 ENCOUNTER — OFFICE VISIT (OUTPATIENT)
Dept: INTERNAL MEDICINE | Facility: CLINIC | Age: 67
End: 2022-03-28
Payer: MEDICARE

## 2022-03-28 VITALS
TEMPERATURE: 97 F | DIASTOLIC BLOOD PRESSURE: 62 MMHG | OXYGEN SATURATION: 97 % | SYSTOLIC BLOOD PRESSURE: 118 MMHG | BODY MASS INDEX: 36.38 KG/M2 | RESPIRATION RATE: 18 BRPM | WEIGHT: 240.06 LBS | HEIGHT: 68 IN

## 2022-03-28 DIAGNOSIS — J44.9 MODERATE COPD (CHRONIC OBSTRUCTIVE PULMONARY DISEASE): ICD-10-CM

## 2022-03-28 DIAGNOSIS — E66.01 MORBID (SEVERE) OBESITY DUE TO EXCESS CALORIES: ICD-10-CM

## 2022-03-28 DIAGNOSIS — M79.18 MUSCULOSKELETAL PAIN: Primary | ICD-10-CM

## 2022-03-28 PROCEDURE — 1159F PR MEDICATION LIST DOCUMENTED IN MEDICAL RECORD: ICD-10-PCS | Mod: CPTII,S$GLB,, | Performed by: INTERNAL MEDICINE

## 2022-03-28 PROCEDURE — 3051F HG A1C>EQUAL 7.0%<8.0%: CPT | Mod: CPTII,S$GLB,, | Performed by: INTERNAL MEDICINE

## 2022-03-28 PROCEDURE — 99999 PR PBB SHADOW E&M-EST. PATIENT-LVL V: ICD-10-PCS | Mod: PBBFAC,,, | Performed by: INTERNAL MEDICINE

## 2022-03-28 PROCEDURE — 3072F LOW RISK FOR RETINOPATHY: CPT | Mod: CPTII,S$GLB,, | Performed by: INTERNAL MEDICINE

## 2022-03-28 PROCEDURE — 3008F PR BODY MASS INDEX (BMI) DOCUMENTED: ICD-10-PCS | Mod: CPTII,S$GLB,, | Performed by: INTERNAL MEDICINE

## 2022-03-28 PROCEDURE — 4010F ACE/ARB THERAPY RXD/TAKEN: CPT | Mod: CPTII,S$GLB,, | Performed by: INTERNAL MEDICINE

## 2022-03-28 PROCEDURE — 3051F PR MOST RECENT HEMOGLOBIN A1C LEVEL 7.0 - < 8.0%: ICD-10-PCS | Mod: CPTII,S$GLB,, | Performed by: INTERNAL MEDICINE

## 2022-03-28 PROCEDURE — 4010F PR ACE/ARB THEARPY RXD/TAKEN: ICD-10-PCS | Mod: CPTII,S$GLB,, | Performed by: INTERNAL MEDICINE

## 2022-03-28 PROCEDURE — 99214 PR OFFICE/OUTPT VISIT, EST, LEVL IV, 30-39 MIN: ICD-10-PCS | Mod: S$GLB,,, | Performed by: INTERNAL MEDICINE

## 2022-03-28 PROCEDURE — 3074F SYST BP LT 130 MM HG: CPT | Mod: CPTII,S$GLB,, | Performed by: INTERNAL MEDICINE

## 2022-03-28 PROCEDURE — 3008F BODY MASS INDEX DOCD: CPT | Mod: CPTII,S$GLB,, | Performed by: INTERNAL MEDICINE

## 2022-03-28 PROCEDURE — 3078F PR MOST RECENT DIASTOLIC BLOOD PRESSURE < 80 MM HG: ICD-10-PCS | Mod: CPTII,S$GLB,, | Performed by: INTERNAL MEDICINE

## 2022-03-28 PROCEDURE — 99214 OFFICE O/P EST MOD 30 MIN: CPT | Mod: S$GLB,,, | Performed by: INTERNAL MEDICINE

## 2022-03-28 PROCEDURE — 1159F MED LIST DOCD IN RCRD: CPT | Mod: CPTII,S$GLB,, | Performed by: INTERNAL MEDICINE

## 2022-03-28 PROCEDURE — 99999 PR PBB SHADOW E&M-EST. PATIENT-LVL V: CPT | Mod: PBBFAC,,, | Performed by: INTERNAL MEDICINE

## 2022-03-28 PROCEDURE — 3074F PR MOST RECENT SYSTOLIC BLOOD PRESSURE < 130 MM HG: ICD-10-PCS | Mod: CPTII,S$GLB,, | Performed by: INTERNAL MEDICINE

## 2022-03-28 PROCEDURE — 3072F PR LOW RISK FOR RETINOPATHY: ICD-10-PCS | Mod: CPTII,S$GLB,, | Performed by: INTERNAL MEDICINE

## 2022-03-28 PROCEDURE — 3078F DIAST BP <80 MM HG: CPT | Mod: CPTII,S$GLB,, | Performed by: INTERNAL MEDICINE

## 2022-03-28 RX ORDER — NAPROXEN 500 MG/1
500 TABLET ORAL 2 TIMES DAILY WITH MEALS
Qty: 15 TABLET | Refills: 0 | Status: SHIPPED | OUTPATIENT
Start: 2022-03-28 | End: 2022-07-18

## 2022-03-28 RX ORDER — CYCLOBENZAPRINE HCL 10 MG
TABLET ORAL
Qty: 30 TABLET | Refills: 0 | Status: SHIPPED | OUTPATIENT
Start: 2022-03-28 | End: 2022-07-18

## 2022-03-28 NOTE — PATIENT INSTRUCTIONS
Ok to take tramadol 1 to 2 tabs every 8 hours as needed for pain.     Tylenol 500 to 1000 mg every 8 hours as needed for pain.

## 2022-03-28 NOTE — PROGRESS NOTES
Subjective:      Patient ID: Stefan Forbes Jr. is a 66 y.o. male.    Chief Complaint: No chief complaint on file.    HPI     65 yo with   Patient Active Problem List   Diagnosis    Controlled type 2 diabetes mellitus without complication, without long-term current use of insulin    Hypertension associated with diabetes    Hyperlipidemia associated with type 2 diabetes mellitus    Moderate COPD (chronic obstructive pulmonary disease)    CAD (coronary artery disease)    Vitamin D deficiency    Osteoarthritis of knee    Pulmonary nodule    Nevus of choroid of right eye    History of pulmonary embolism    Hyponatremia    Iron deficiency anemia due to chronic blood loss    MACHO on CPAP    PLMD (periodic limb movement disorder)    BMI 35.0-35.9,adult    Varicose vein of leg    Tobacco abuse, in remission    Tubular adenoma of colon    Benign prostatic hyperplasia with weak urinary stream    Urgency of urination    Morbid (severe) obesity due to excess calories    Aortic atherosclerosis     Past Medical History:   Diagnosis Date    Arthritis     Cataract     COPD (chronic obstructive pulmonary disease)     Diabetes mellitus, type 2     Hyperlipidemia     Hypertension     Personal history of colonic polyps 2018     C/o left flank pain.  Pain began Thursday and was triggered by lifting a board in his yard.  Since then he has had pain to the area with certain movements.  Especially when standing from a seated position or twisting his torso.  Pain can also interrupt sleep.  He was seen by nurse practitioner last week.  Urinalysis was normal.  X-ray of abdomen revealed possible constipation with possible kidney stone.  Patient has been on MiraLax daily since Friday.  He does not feel constipated but he does not feel that the MiraLax is helping his symptoms.  He did try Aleve over-the-counter which mildly improved symptoms.  He is also taking tramadol 50 mg which help some but does not allow him to  "sleep throughout the night.    Review of Systems   Constitutional: Negative for chills, fatigue and fever.   HENT: Negative for ear pain and sore throat.    Eyes: Negative for visual disturbance.   Respiratory: Negative for cough.    Cardiovascular: Negative for chest pain.   Gastrointestinal: Negative for abdominal pain and blood in stool.   Genitourinary: Negative for dysuria and hematuria.   Skin: Negative for rash and wound.   Neurological: Negative for tremors, seizures, syncope, weakness and numbness.     Objective:   /62 (BP Location: Left arm, Patient Position: Sitting, BP Method: Medium (Manual))   Temp 96.8 °F (36 °C) (Tympanic)   Resp 18   Ht 5' 8" (1.727 m)   Wt 108.9 kg (240 lb 1.3 oz)   SpO2 97%   BMI 36.50 kg/m²     Physical Exam  Musculoskeletal:      Cervical back: No rigidity.        Legs:    Neurological:      Mental Status: He is alert.     Pain reproduced with hip flexion especially against resistance.  No pain with passive internal or external rotation of the hip.  Pain also reproduced to the area with standing from a seated position.  Good range of motion at lumbar spine with some reproduction of symptoms.  No tenderness over greater trochanter.    Assessment:     1. Musculoskeletal pain      Plan:   Musculoskeletal pain  -     naproxen (NAPROSYN) 500 MG tablet; Take 1 tablet (500 mg total) by mouth 2 (two) times daily with meals. For pain and inflammation  Dispense: 15 tablet; Refill: 0  -     cyclobenzaprine (FLEXERIL) 10 MG tablet; Take 1/2 to one tablet by mouth at bedtime as needed for muscle spasm  Dispense: 30 tablet; Refill: 0        Lab Frequency Next Occurrence   X-Ray Chest PA And Lateral Once 10/08/2021   Hemoglobin A1C Once 04/21/2022   PSA, Screening Once 01/25/2023   CT Renal Stone Study ABD Pelvis WO Once 03/25/2022   Microalbumin/creatinine urine ratio         Problem List Items Addressed This Visit    None     Visit Diagnoses     Musculoskeletal pain    -  Primary "    Relevant Medications    naproxen (NAPROSYN) 500 MG tablet    cyclobenzaprine (FLEXERIL) 10 MG tablet          Follow up if symptoms worsen or fail to improve.  Needs CT scan as previously ordered by Mana Veras

## 2022-03-30 DIAGNOSIS — K21.9 GASTROESOPHAGEAL REFLUX DISEASE: ICD-10-CM

## 2022-03-30 NOTE — TELEPHONE ENCOUNTER
No new care gaps identified.  Powered by [employer]. Reference number: [id number].   3/30/2022 5:33:22 PM CDT

## 2022-03-31 RX ORDER — OMEPRAZOLE 40 MG/1
CAPSULE, DELAYED RELEASE ORAL
Qty: 90 CAPSULE | Refills: 1 | Status: SHIPPED | OUTPATIENT
Start: 2022-03-31 | End: 2022-07-18

## 2022-03-31 NOTE — TELEPHONE ENCOUNTER
This Rx Request does not qualify for assessment with the OR   Please review protocol details and the Care Due Message for extra clinical information    Reasons Rx Request may be deferred:  Required indication for medication is not active on problem list    Note composed:1:15 PM 03/31/2022

## 2022-04-06 ENCOUNTER — HOSPITAL ENCOUNTER (OUTPATIENT)
Dept: RADIOLOGY | Facility: HOSPITAL | Age: 67
Discharge: HOME | End: 2022-04-06
Attending: NURSE PRACTITIONER
Payer: MEDICARE

## 2022-04-06 ENCOUNTER — TELEPHONE (OUTPATIENT)
Dept: INTERNAL MEDICINE | Facility: CLINIC | Age: 67
End: 2022-04-06
Payer: MEDICARE

## 2022-04-06 DIAGNOSIS — R10.9 ABDOMINAL PAIN, UNSPECIFIED ABDOMINAL LOCATION: ICD-10-CM

## 2022-04-06 DIAGNOSIS — R10.9 ABDOMINAL PAIN, UNSPECIFIED ABDOMINAL LOCATION: Primary | ICD-10-CM

## 2022-04-06 DIAGNOSIS — R10.9 LEFT SIDED ABDOMINAL PAIN: ICD-10-CM

## 2022-04-06 DIAGNOSIS — R10.9 LEFT FLANK PAIN: ICD-10-CM

## 2022-04-06 PROCEDURE — 74176 CT ABD & PELVIS W/O CONTRAST: CPT | Mod: 26,,, | Performed by: RADIOLOGY

## 2022-04-06 PROCEDURE — 74176 CT RENAL STONE STUDY ABD PELVIS WO: ICD-10-PCS | Mod: 26,,, | Performed by: RADIOLOGY

## 2022-04-06 PROCEDURE — 74176 CT ABD & PELVIS W/O CONTRAST: CPT | Mod: TC

## 2022-04-06 NOTE — TELEPHONE ENCOUNTER
I called the pt and informed him that his CT results are back however, the provider has not yet placed her documentation and I will send it to her and contact him once she documents. He verbalized understanding. Please review at your earliest convenience and document. Thanks  //kah

## 2022-04-06 NOTE — TELEPHONE ENCOUNTER
Pt was spoken to and informed that he will be contacted when the provider documents on his test. He verbalized understanding. //kah

## 2022-04-06 NOTE — TELEPHONE ENCOUNTER
----- Message from Jolly Rodarte sent at 4/6/2022  2:06 PM CDT -----  Contact: Stefan @ [phone]  Received his results from this morning and they have some questions

## 2022-04-06 NOTE — TELEPHONE ENCOUNTER
----- Message from Mishel Penny sent at 4/6/2022  4:05 PM CDT -----  Contact: [phone]  Name of test: test results     Date of test:04/06/22    Ordering provider: EUSEBIO Veras    Where was the test performed:[facility], Radiology    Comments:

## 2022-04-07 ENCOUNTER — TELEPHONE (OUTPATIENT)
Dept: INTERNAL MEDICINE | Facility: CLINIC | Age: 67
End: 2022-04-07
Payer: MEDICARE

## 2022-04-07 NOTE — TELEPHONE ENCOUNTER
I called the pt he verbalized understanding of results and he replied he already has a GI provider . Thanks //kah

## 2022-04-18 ENCOUNTER — LAB VISIT (OUTPATIENT)
Dept: LAB | Facility: HOSPITAL | Age: 67
End: 2022-04-18
Attending: INTERNAL MEDICINE
Payer: MEDICARE

## 2022-04-18 DIAGNOSIS — E11.9 CONTROLLED TYPE 2 DIABETES MELLITUS WITHOUT COMPLICATION, WITHOUT LONG-TERM CURRENT USE OF INSULIN: ICD-10-CM

## 2022-04-18 LAB
ESTIMATED AVG GLUCOSE: 151 MG/DL (ref 68–131)
HBA1C MFR BLD: 6.9 % (ref 4–5.6)

## 2022-04-18 PROCEDURE — 83036 HEMOGLOBIN GLYCOSYLATED A1C: CPT | Performed by: INTERNAL MEDICINE

## 2022-04-18 PROCEDURE — 36415 COLL VENOUS BLD VENIPUNCTURE: CPT | Performed by: INTERNAL MEDICINE

## 2022-04-21 ENCOUNTER — OFFICE VISIT (OUTPATIENT)
Dept: INTERNAL MEDICINE | Facility: CLINIC | Age: 67
End: 2022-04-21
Payer: MEDICARE

## 2022-04-21 VITALS
WEIGHT: 241.38 LBS | DIASTOLIC BLOOD PRESSURE: 62 MMHG | OXYGEN SATURATION: 95 % | SYSTOLIC BLOOD PRESSURE: 108 MMHG | BODY MASS INDEX: 36.71 KG/M2 | TEMPERATURE: 96 F | HEART RATE: 79 BPM

## 2022-04-21 DIAGNOSIS — R39.12 BENIGN PROSTATIC HYPERPLASIA WITH WEAK URINARY STREAM: ICD-10-CM

## 2022-04-21 DIAGNOSIS — E87.1 HYPONATREMIA: ICD-10-CM

## 2022-04-21 DIAGNOSIS — J44.9 MODERATE COPD (CHRONIC OBSTRUCTIVE PULMONARY DISEASE): ICD-10-CM

## 2022-04-21 DIAGNOSIS — E11.9 CONTROLLED TYPE 2 DIABETES MELLITUS WITHOUT COMPLICATION, WITHOUT LONG-TERM CURRENT USE OF INSULIN: ICD-10-CM

## 2022-04-21 DIAGNOSIS — I15.2 HYPERTENSION ASSOCIATED WITH DIABETES: ICD-10-CM

## 2022-04-21 DIAGNOSIS — E11.59 HYPERTENSION ASSOCIATED WITH DIABETES: ICD-10-CM

## 2022-04-21 DIAGNOSIS — E11.69 HYPERLIPIDEMIA ASSOCIATED WITH TYPE 2 DIABETES MELLITUS: ICD-10-CM

## 2022-04-21 DIAGNOSIS — I25.10 CORONARY ARTERY DISEASE INVOLVING NATIVE CORONARY ARTERY OF NATIVE HEART WITHOUT ANGINA PECTORIS: ICD-10-CM

## 2022-04-21 DIAGNOSIS — R91.1 PULMONARY NODULE: ICD-10-CM

## 2022-04-21 DIAGNOSIS — E78.5 HYPERLIPIDEMIA ASSOCIATED WITH TYPE 2 DIABETES MELLITUS: ICD-10-CM

## 2022-04-21 DIAGNOSIS — N40.1 BENIGN PROSTATIC HYPERPLASIA WITH WEAK URINARY STREAM: ICD-10-CM

## 2022-04-21 DIAGNOSIS — Z00.00 ROUTINE GENERAL MEDICAL EXAMINATION AT A HEALTH CARE FACILITY: Primary | ICD-10-CM

## 2022-04-21 PROCEDURE — 3008F PR BODY MASS INDEX (BMI) DOCUMENTED: ICD-10-PCS | Mod: CPTII,S$GLB,, | Performed by: INTERNAL MEDICINE

## 2022-04-21 PROCEDURE — 3044F PR MOST RECENT HEMOGLOBIN A1C LEVEL <7.0%: ICD-10-PCS | Mod: CPTII,S$GLB,, | Performed by: INTERNAL MEDICINE

## 2022-04-21 PROCEDURE — 99396 PREV VISIT EST AGE 40-64: CPT | Mod: S$GLB,,, | Performed by: INTERNAL MEDICINE

## 2022-04-21 PROCEDURE — 1159F PR MEDICATION LIST DOCUMENTED IN MEDICAL RECORD: ICD-10-PCS | Mod: CPTII,S$GLB,, | Performed by: INTERNAL MEDICINE

## 2022-04-21 PROCEDURE — 3044F HG A1C LEVEL LT 7.0%: CPT | Mod: CPTII,S$GLB,, | Performed by: INTERNAL MEDICINE

## 2022-04-21 PROCEDURE — 99499 RISK ADDL DX/OHS AUDIT: ICD-10-PCS | Mod: S$GLB,,, | Performed by: INTERNAL MEDICINE

## 2022-04-21 PROCEDURE — 99396 PR PREVENTIVE VISIT,EST,40-64: ICD-10-PCS | Mod: S$GLB,,, | Performed by: INTERNAL MEDICINE

## 2022-04-21 PROCEDURE — 3008F BODY MASS INDEX DOCD: CPT | Mod: CPTII,S$GLB,, | Performed by: INTERNAL MEDICINE

## 2022-04-21 PROCEDURE — 99999 PR PBB SHADOW E&M-EST. PATIENT-LVL V: CPT | Mod: PBBFAC,,, | Performed by: INTERNAL MEDICINE

## 2022-04-21 PROCEDURE — 99499 UNLISTED E&M SERVICE: CPT | Mod: S$GLB,,, | Performed by: INTERNAL MEDICINE

## 2022-04-21 PROCEDURE — 3074F SYST BP LT 130 MM HG: CPT | Mod: CPTII,S$GLB,, | Performed by: INTERNAL MEDICINE

## 2022-04-21 PROCEDURE — 3072F PR LOW RISK FOR RETINOPATHY: ICD-10-PCS | Mod: CPTII,S$GLB,, | Performed by: INTERNAL MEDICINE

## 2022-04-21 PROCEDURE — 3078F PR MOST RECENT DIASTOLIC BLOOD PRESSURE < 80 MM HG: ICD-10-PCS | Mod: CPTII,S$GLB,, | Performed by: INTERNAL MEDICINE

## 2022-04-21 PROCEDURE — 1159F MED LIST DOCD IN RCRD: CPT | Mod: CPTII,S$GLB,, | Performed by: INTERNAL MEDICINE

## 2022-04-21 PROCEDURE — 3072F LOW RISK FOR RETINOPATHY: CPT | Mod: CPTII,S$GLB,, | Performed by: INTERNAL MEDICINE

## 2022-04-21 PROCEDURE — 3074F PR MOST RECENT SYSTOLIC BLOOD PRESSURE < 130 MM HG: ICD-10-PCS | Mod: CPTII,S$GLB,, | Performed by: INTERNAL MEDICINE

## 2022-04-21 PROCEDURE — 3078F DIAST BP <80 MM HG: CPT | Mod: CPTII,S$GLB,, | Performed by: INTERNAL MEDICINE

## 2022-04-21 PROCEDURE — 4010F ACE/ARB THERAPY RXD/TAKEN: CPT | Mod: CPTII,S$GLB,, | Performed by: INTERNAL MEDICINE

## 2022-04-21 PROCEDURE — 4010F PR ACE/ARB THEARPY RXD/TAKEN: ICD-10-PCS | Mod: CPTII,S$GLB,, | Performed by: INTERNAL MEDICINE

## 2022-04-21 PROCEDURE — 99999 PR PBB SHADOW E&M-EST. PATIENT-LVL V: ICD-10-PCS | Mod: PBBFAC,,, | Performed by: INTERNAL MEDICINE

## 2022-04-21 NOTE — PROGRESS NOTES
Subjective:      Patient ID: Stefan Forbes Jr. is a 66 y.o. male.    Chief Complaint: Follow-up    HPI     65 yo with   Patient Active Problem List   Diagnosis    Controlled type 2 diabetes mellitus without complication, without long-term current use of insulin    Hypertension associated with diabetes    Hyperlipidemia associated with type 2 diabetes mellitus    Moderate COPD (chronic obstructive pulmonary disease)    CAD (coronary artery disease)    Vitamin D deficiency    Osteoarthritis of knee    Pulmonary nodule    Nevus of choroid of right eye    History of pulmonary embolism    Hyponatremia    Iron deficiency anemia due to chronic blood loss    MACHO on CPAP    PLMD (periodic limb movement disorder)    BMI 35.0-35.9,adult    Varicose vein of leg    Tobacco abuse, in remission    Tubular adenoma of colon    Benign prostatic hyperplasia with weak urinary stream    Urgency of urination    Morbid (severe) obesity due to excess calories    Aortic atherosclerosis     Past Medical History:   Diagnosis Date    Arthritis     Cataract     COPD (chronic obstructive pulmonary disease)     Diabetes mellitus, type 2     Hyperlipidemia     Hypertension     Personal history of colonic polyps 2018     Here today for annual prev exam.  Compliant with meds without significant side effects. Energy and appetite are good.     Saw gi this am. rx reflux medication.     Hip and leg pain much better.     Past Surgical History:   Procedure Laterality Date    ANGIOPLASTY      JOINT REPLACEMENT  2017    TOTAL KNEE ARTHROPLASTY       Social History     Socioeconomic History    Marital status:    Occupational History    Occupation: retired   Tobacco Use    Smoking status: Former Smoker     Types: Cigars     Quit date: 2020     Years since quittin.2    Smokeless tobacco: Never Used    Tobacco comment: 3-5 cigars each day   Substance and Sexual Activity    Alcohol use: No    Drug use: No     Sexual activity: Not Currently     Partners: Female     Social Determinants of Health     Financial Resource Strain: Medium Risk    Difficulty of Paying Living Expenses: Somewhat hard   Food Insecurity: No Food Insecurity    Worried About Running Out of Food in the Last Year: Never true    Ran Out of Food in the Last Year: Never true   Transportation Needs: No Transportation Needs    Lack of Transportation (Medical): No    Lack of Transportation (Non-Medical): No   Physical Activity: Insufficiently Active    Days of Exercise per Week: 1 day    Minutes of Exercise per Session: 10 min   Stress: No Stress Concern Present    Feeling of Stress : Only a little   Social Connections: Unknown    Frequency of Communication with Friends and Family: Three times a week    Frequency of Social Gatherings with Friends and Family: Patient refused    Active Member of Clubs or Organizations: No    Attends Club or Organization Meetings: Never    Marital Status:    Housing Stability: Low Risk     Unable to Pay for Housing in the Last Year: No    Number of Places Lived in the Last Year: 1    Unstable Housing in the Last Year: No     family history includes Arthritis in his mother; Cancer in his father; Diabetes in his mother.    Review of Systems   Constitutional: Negative for chills and fever.   HENT: Negative for ear pain and sore throat.    Respiratory: Negative for cough.    Cardiovascular: Negative for chest pain.   Gastrointestinal: Negative for abdominal pain and blood in stool.   Genitourinary: Negative for dysuria and hematuria.   Neurological: Negative for seizures and syncope.     Objective:   /62 (BP Location: Left arm, Patient Position: Sitting, BP Method: Large (Manual))   Pulse 79   Temp 96.3 °F (35.7 °C) (Tympanic)   Wt 109.5 kg (241 lb 6.5 oz)   SpO2 95%   BMI 36.71 kg/m²     Physical Exam  Constitutional:       General: He is not in acute distress.     Appearance: He is  well-developed.   HENT:      Head: Normocephalic and atraumatic.   Eyes:      Pupils: Pupils are equal, round, and reactive to light.   Neck:      Thyroid: No thyromegaly.   Cardiovascular:      Rate and Rhythm: Normal rate and regular rhythm.   Pulmonary:      Breath sounds: Normal breath sounds. No wheezing or rales.   Abdominal:      General: Bowel sounds are normal.      Palpations: Abdomen is soft.      Tenderness: There is no abdominal tenderness.   Musculoskeletal:      Cervical back: Neck supple.   Lymphadenopathy:      Cervical: No cervical adenopathy.   Skin:     General: Skin is warm and dry.   Neurological:      Mental Status: He is alert and oriented to person, place, and time.   Psychiatric:         Behavior: Behavior normal.         Assessment:     1. Routine general medical examination at a health care facility    2. Benign prostatic hyperplasia with weak urinary stream    3. Coronary artery disease involving native coronary artery of native heart without angina pectoris    4. Controlled type 2 diabetes mellitus without complication, without long-term current use of insulin    5. Hyperlipidemia associated with type 2 diabetes mellitus    6. Hypertension associated with diabetes    7. Hyponatremia    8. Moderate COPD (chronic obstructive pulmonary disease)    9. Pulmonary nodule      Plan:   Routine general medical examination at a health care facility  Heart healthy diet and reg exercise   reviewed  Benign prostatic hyperplasia with weak urinary stream  stable  Coronary artery disease involving native coronary artery of native heart without angina pectoris  stable  -     Lipid Panel; Future; Expected date: 10/18/2022  -     CBC Auto Differential; Future; Expected date: 10/18/2022  -     Comprehensive Metabolic Panel; Future; Expected date: 10/18/2022    Controlled type 2 diabetes mellitus without complication, without long-term current use of insulin  stable  -     Hemoglobin A1C; Future; Expected  date: 10/18/2022    Hyperlipidemia associated with type 2 diabetes mellitus  -     TSH; Future; Expected date: 10/18/2022    Hypertension associated with diabetes  controlled  Hyponatremia  resolved  Moderate COPD (chronic obstructive pulmonary disease)  stable  Pulmonary nodule        Lab Frequency Next Occurrence   X-Ray Chest PA And Lateral Once 10/08/2021   PSA, Screening Once 01/25/2023   Ambulatory referral/consult to Gastroenterology Once 04/14/2022   Microalbumin/creatinine urine ratio         Problem List Items Addressed This Visit     Controlled type 2 diabetes mellitus without complication, without long-term current use of insulin    Relevant Orders    Hemoglobin A1C    Hypertension associated with diabetes    Hyperlipidemia associated with type 2 diabetes mellitus    Relevant Orders    TSH    Moderate COPD (chronic obstructive pulmonary disease)    Overview     FEV1 73% of predicted.   Trelegy           CAD (coronary artery disease)    Overview     Sees Dr. Perry           Relevant Orders    Lipid Panel    CBC Auto Differential    Comprehensive Metabolic Panel    Pulmonary nodule    Overview     stable             Hyponatremia    Benign prostatic hyperplasia with weak urinary stream      Other Visit Diagnoses     Routine general medical examination at a health care facility    -  Primary          No follow-ups on file.

## 2022-05-23 ENCOUNTER — PATIENT MESSAGE (OUTPATIENT)
Dept: OTHER | Facility: OTHER | Age: 67
End: 2022-05-23
Payer: MEDICARE

## 2022-05-26 LAB — CRC RECOMMENDATION EXT: NORMAL

## 2022-05-27 ENCOUNTER — TELEPHONE (OUTPATIENT)
Dept: PHARMACY | Facility: CLINIC | Age: 67
End: 2022-05-27
Payer: MEDICARE

## 2022-06-07 ENCOUNTER — TELEPHONE (OUTPATIENT)
Dept: ADMINISTRATIVE | Facility: HOSPITAL | Age: 67
End: 2022-06-07
Payer: MEDICARE

## 2022-06-07 ENCOUNTER — PATIENT OUTREACH (OUTPATIENT)
Dept: ADMINISTRATIVE | Facility: HOSPITAL | Age: 67
End: 2022-06-07
Payer: MEDICARE

## 2022-06-07 NOTE — LETTER
AUTHORIZATION FOR RELEASE OF   CONFIDENTIAL INFORMATION      We are seeing Stefan Forbes Jr., date of birth 1955, in the clinic at [facility]. Wallace Fisher MD is the patient's PCP. Stefan Forbes Jr. has an outstanding lab/procedure at the time we reviewed his chart. In order to help keep his health information updated, he has authorized us to request the following medical record(s):        (  )  MAMMOGRAM                                      ( X )  COLONOSCOPY      (  )  PAP SMEAR                                          (  )  OUTSIDE LAB RESULTS     (  )  DEXA SCAN                                          (  )  EYE EXAM            (  )  FOOT EXAM                                          (  )  ENTIRE RECORD     (  )  OUTSIDE IMMUNIZATIONS                 (  )  _______________         Please fax records to [facility], Jeryl P Breaux, MD  [phone]     If you have any questions, please contact *** at [phone]           Patient Name: Stefan Forbes Jr.  : 1955  Patient Phone #: [phone]

## 2022-06-07 NOTE — LETTER
June 7, 2022      We are seeing EdiKUSUM Forbes Jr., 1955, at [facility]. Wallace Fisher MD is their primary care physician. To help with our traci maintenance records could you please send the following:     Colonoscopy    Please fax to [facility] at [phone], [facility] Care Coordination Department.     Thank-you in advance for your assistance. If you have any questions or concerns, please contact me at  [phone].       Kerline Handy MA  [facility]

## 2022-06-07 NOTE — LETTER
June 7, 2022      We are seeing EdiKUSUM Forbes Jr., 1955, at [facility]. Wallace Fisher MD is their primary care physician. To help with our traci maintenance records could you please send the following:     Colonoscopy    Please fax to [facility] at [phone], [facility] Care Coordination Department.     Thank-you in advance for your assistance. If you have any questions or concerns, please contact me at  [phone].       Kerline Handy MACC [facility]

## 2022-06-09 NOTE — PROGRESS NOTES
Manually Hyperlinked EGD/ COLON PATHOLOGY REPORT from outside Facility.  AVERY faxed to Gilles Edouard MD. for colonoscopy   
Manually uploaded and hyper-linked COLONOSCOPY 5/26/2022    
[id number]

## 2022-06-15 ENCOUNTER — PATIENT MESSAGE (OUTPATIENT)
Dept: OTHER | Facility: OTHER | Age: 67
End: 2022-06-15
Payer: MEDICARE

## 2022-07-18 ENCOUNTER — OFFICE VISIT (OUTPATIENT)
Dept: INTERNAL MEDICINE | Facility: CLINIC | Age: 67
End: 2022-07-18
Payer: MEDICARE

## 2022-07-18 ENCOUNTER — APPOINTMENT (OUTPATIENT)
Dept: RADIOLOGY | Facility: HOSPITAL | Age: 67
End: 2022-07-18
Attending: NURSE PRACTITIONER
Payer: MEDICARE

## 2022-07-18 VITALS
DIASTOLIC BLOOD PRESSURE: 66 MMHG | TEMPERATURE: 98 F | SYSTOLIC BLOOD PRESSURE: 106 MMHG | WEIGHT: 237 LBS | HEART RATE: 72 BPM | BODY MASS INDEX: 35.92 KG/M2 | RESPIRATION RATE: 18 BRPM | OXYGEN SATURATION: 97 % | HEIGHT: 68 IN

## 2022-07-18 DIAGNOSIS — M79.621 PAIN OF RIGHT UPPER ARM: ICD-10-CM

## 2022-07-18 DIAGNOSIS — M79.621 PAIN OF RIGHT UPPER ARM: Primary | ICD-10-CM

## 2022-07-18 PROCEDURE — 4010F PR ACE/ARB THEARPY RXD/TAKEN: ICD-10-PCS | Mod: CPTII,S$GLB,, | Performed by: NURSE PRACTITIONER

## 2022-07-18 PROCEDURE — 3072F PR LOW RISK FOR RETINOPATHY: ICD-10-PCS | Mod: CPTII,S$GLB,, | Performed by: NURSE PRACTITIONER

## 2022-07-18 PROCEDURE — 1160F PR REVIEW ALL MEDS BY PRESCRIBER/CLIN PHARMACIST DOCUMENTED: ICD-10-PCS | Mod: CPTII,S$GLB,, | Performed by: NURSE PRACTITIONER

## 2022-07-18 PROCEDURE — 99999 PR PBB SHADOW E&M-EST. PATIENT-LVL V: CPT | Mod: PBBFAC,,, | Performed by: NURSE PRACTITIONER

## 2022-07-18 PROCEDURE — 3008F PR BODY MASS INDEX (BMI) DOCUMENTED: ICD-10-PCS | Mod: CPTII,S$GLB,, | Performed by: NURSE PRACTITIONER

## 2022-07-18 PROCEDURE — 99213 OFFICE O/P EST LOW 20 MIN: CPT | Mod: S$GLB,,, | Performed by: NURSE PRACTITIONER

## 2022-07-18 PROCEDURE — 3078F PR MOST RECENT DIASTOLIC BLOOD PRESSURE < 80 MM HG: ICD-10-PCS | Mod: CPTII,S$GLB,, | Performed by: NURSE PRACTITIONER

## 2022-07-18 PROCEDURE — 3288F PR FALLS RISK ASSESSMENT DOCUMENTED: ICD-10-PCS | Mod: CPTII,S$GLB,, | Performed by: NURSE PRACTITIONER

## 2022-07-18 PROCEDURE — 3072F LOW RISK FOR RETINOPATHY: CPT | Mod: CPTII,S$GLB,, | Performed by: NURSE PRACTITIONER

## 2022-07-18 PROCEDURE — 1101F PR PT FALLS ASSESS DOC 0-1 FALLS W/OUT INJ PAST YR: ICD-10-PCS | Mod: CPTII,S$GLB,, | Performed by: NURSE PRACTITIONER

## 2022-07-18 PROCEDURE — 73060 XR HUMERUS 2 VIEW RIGHT: ICD-10-PCS | Mod: 26,RT,, | Performed by: RADIOLOGY

## 2022-07-18 PROCEDURE — 4010F ACE/ARB THERAPY RXD/TAKEN: CPT | Mod: CPTII,S$GLB,, | Performed by: NURSE PRACTITIONER

## 2022-07-18 PROCEDURE — 3078F DIAST BP <80 MM HG: CPT | Mod: CPTII,S$GLB,, | Performed by: NURSE PRACTITIONER

## 2022-07-18 PROCEDURE — 3074F SYST BP LT 130 MM HG: CPT | Mod: CPTII,S$GLB,, | Performed by: NURSE PRACTITIONER

## 2022-07-18 PROCEDURE — 3288F FALL RISK ASSESSMENT DOCD: CPT | Mod: CPTII,S$GLB,, | Performed by: NURSE PRACTITIONER

## 2022-07-18 PROCEDURE — 3044F PR MOST RECENT HEMOGLOBIN A1C LEVEL <7.0%: ICD-10-PCS | Mod: CPTII,S$GLB,, | Performed by: NURSE PRACTITIONER

## 2022-07-18 PROCEDURE — 73060 X-RAY EXAM OF HUMERUS: CPT | Mod: TC,PO,RT

## 2022-07-18 PROCEDURE — 3074F PR MOST RECENT SYSTOLIC BLOOD PRESSURE < 130 MM HG: ICD-10-PCS | Mod: CPTII,S$GLB,, | Performed by: NURSE PRACTITIONER

## 2022-07-18 PROCEDURE — 1159F PR MEDICATION LIST DOCUMENTED IN MEDICAL RECORD: ICD-10-PCS | Mod: CPTII,S$GLB,, | Performed by: NURSE PRACTITIONER

## 2022-07-18 PROCEDURE — 73060 X-RAY EXAM OF HUMERUS: CPT | Mod: 26,RT,, | Performed by: RADIOLOGY

## 2022-07-18 PROCEDURE — 99213 PR OFFICE/OUTPT VISIT, EST, LEVL III, 20-29 MIN: ICD-10-PCS | Mod: S$GLB,,, | Performed by: NURSE PRACTITIONER

## 2022-07-18 PROCEDURE — 3044F HG A1C LEVEL LT 7.0%: CPT | Mod: CPTII,S$GLB,, | Performed by: NURSE PRACTITIONER

## 2022-07-18 PROCEDURE — 1159F MED LIST DOCD IN RCRD: CPT | Mod: CPTII,S$GLB,, | Performed by: NURSE PRACTITIONER

## 2022-07-18 PROCEDURE — 1101F PT FALLS ASSESS-DOCD LE1/YR: CPT | Mod: CPTII,S$GLB,, | Performed by: NURSE PRACTITIONER

## 2022-07-18 PROCEDURE — 1160F RVW MEDS BY RX/DR IN RCRD: CPT | Mod: CPTII,S$GLB,, | Performed by: NURSE PRACTITIONER

## 2022-07-18 PROCEDURE — 99999 PR PBB SHADOW E&M-EST. PATIENT-LVL V: ICD-10-PCS | Mod: PBBFAC,,, | Performed by: NURSE PRACTITIONER

## 2022-07-18 PROCEDURE — 3008F BODY MASS INDEX DOCD: CPT | Mod: CPTII,S$GLB,, | Performed by: NURSE PRACTITIONER

## 2022-07-18 RX ORDER — NAPROXEN 500 MG/1
500 TABLET ORAL 2 TIMES DAILY WITH MEALS
Qty: 20 TABLET | Refills: 0 | Status: SHIPPED | OUTPATIENT
Start: 2022-07-18 | End: 2024-04-03

## 2022-07-18 RX ORDER — CYCLOBENZAPRINE HCL 10 MG
5 TABLET ORAL 3 TIMES DAILY PRN
Qty: 30 TABLET | Refills: 0 | Status: SHIPPED | OUTPATIENT
Start: 2022-07-18 | End: 2022-09-14

## 2022-07-18 NOTE — PROGRESS NOTES
Subjective:       Patient ID: Stefan Forbes Jr. is a 67 y.o. male.    Chief Complaint: No chief complaint on file.    Patients with right upper arm/neck pain.  Started over one month ago.  Denies any change in activity.  No injury or trauma.   Tried Tylenol.      Review of Systems   Constitutional: Negative for activity change and unexpected weight change.   HENT: Negative for hearing loss, rhinorrhea and trouble swallowing.    Eyes: Negative for discharge and visual disturbance.   Respiratory: Negative for chest tightness and wheezing.    Cardiovascular: Negative for chest pain and palpitations.   Gastrointestinal: Negative for blood in stool, constipation, diarrhea and vomiting.   Endocrine: Negative for polydipsia and polyuria.   Genitourinary: Negative for difficulty urinating, hematuria and urgency.   Musculoskeletal: Negative for arthralgias, joint swelling and neck pain.   Neurological: Negative for weakness and headaches.   Psychiatric/Behavioral: Negative for confusion and dysphoric mood.         Objective:      Physical Exam  Vitals reviewed.   Constitutional:       Appearance: Normal appearance.   Cardiovascular:      Rate and Rhythm: Normal rate and regular rhythm.   Pulmonary:      Effort: Pulmonary effort is normal.      Breath sounds: Normal breath sounds.   Musculoskeletal:         General: Tenderness present.      Right upper arm: Tenderness present.   Skin:     General: Skin is warm.   Neurological:      General: No focal deficit present.      Mental Status: He is alert and oriented to person, place, and time.   Psychiatric:         Mood and Affect: Mood normal.         Behavior: Behavior normal. Behavior is cooperative.         Assessment:       Problem List Items Addressed This Visit    None     Visit Diagnoses     Pain of right upper arm    -  Primary    Relevant Medications    cyclobenzaprine (FLEXERIL) 10 MG tablet    naproxen (NAPROSYN) 500 MG tablet    Other Relevant Orders    X-Ray  Humerus 2 View Right (Completed)          Plan:         Pain of right upper arm  -     X-Ray Humerus 2 View Right; Future; Expected date: 07/18/2022  -     cyclobenzaprine (FLEXERIL) 10 MG tablet; Take 0.5 tablets (5 mg total) by mouth 3 (three) times daily as needed for Muscle spasms.  Dispense: 30 tablet; Refill: 0  -     naproxen (NAPROSYN) 500 MG tablet; Take 1 tablet (500 mg total) by mouth 2 (two) times daily with meals. For arm pain  Dispense: 20 tablet; Refill: 0        X-day today    Start medications.

## 2022-07-27 DIAGNOSIS — F17.200 NICOTINE DEPENDENCE: ICD-10-CM

## 2022-07-27 RX ORDER — BUPROPION HYDROCHLORIDE 150 MG/1
150 TABLET, EXTENDED RELEASE ORAL 2 TIMES DAILY
Qty: 60 TABLET | Refills: 2 | Status: SHIPPED | OUTPATIENT
Start: 2022-07-27 | End: 2023-01-29 | Stop reason: SDUPTHER

## 2022-07-27 NOTE — TELEPHONE ENCOUNTER
Care Due:                  Date            Visit Type   Department     Provider  --------------------------------------------------------------------------------                                EP -                              PRIMARY      [facility] INTER[facility]  Last Visit: 04-      CARE ([facility])   CELSA Fisher                              EP -                              PRIMARY      [facility] INTER[facility]  Next Visit: 10-      CARE ([facility])   CELSA Fisher                                                            Last  Test          Frequency    Reason                     Performed    Due Date  --------------------------------------------------------------------------------    CMP.........  12 months..  dapagliflozin, metFORMIN,   10-   10-                             rosuvastatin.............    HBA1C.......  6 months...  dapagliflozin,             04-   10-                             dulaglutide, metFORMIN...    Lipid Panel.  12 months..  rosuvastatin.............  10-   10-    [facility] Embedded Care Gaps. Reference number: [id number]. 7/27/2022   11:41:46 AM CDT   Progress Note, Physician


Chief Complaint: 





Events noted


Not in distress


Coverage for Dr. Sunny Chopra


History of Present Illness: 





Patient was seen and examined. Awake and alert. Chart was reviewed


Denies chest pain, SOB or palpitations


Refuses LifeVest





- Current Medication List


Current Medications: 


Active Medications





Aspirin (Asa -)  81 mg PO DAILY [facility]


   Last Admin: 02/22/20 09:37 Dose:  81 mg


Carvedilol (Coreg -)  3.125 mg PO BID [facility]


   Last Admin: 02/22/20 22:06 Dose:  3.125 mg


Furosemide (Lasix -)  40 mg PO DAILY [facility]


   Last Admin: 02/22/20 09:37 Dose:  40 mg


Heparin Sodium (Porcine) (Heparin -)  5,000 unit SQ TID [facility]


   Last Admin: 02/23/20 06:42 Dose:  5,000 unit


Lisinopril (Prinivil)  2.5 mg PO DAILY [facility]


   Last Admin: 02/22/20 09:38 Dose:  2.5 mg


Potassium Phos/Sodium Phos (Phos-Nak Packet -)  1 packet PO DAILY [facility]


   Last Admin: 02/22/20 09:37 Dose:  1 packet


Spironolactone (Aldactone -)  25 mg PO DAILY [facility]


   Last Admin: 02/22/20 09:37 Dose:  25 mg











- Objective


Vital Signs: 


 Vital Signs











Temperature  98.1 F   02/23/20 06:00


 


Pulse Rate  74   02/23/20 06:00


 


Respiratory Rate  20   02/23/20 06:00


 


Blood Pressure  103/59 L  02/23/20 06:00


 


O2 Sat by Pulse Oximetry (%)  97   02/22/20 21:00











Neck: Yes: Supple


Cardiovascular: Yes: Regular Rate and Rhythm, Murmur (SM), S1, S2


Respiratory: Yes: CTA Bilaterally


Gastrointestinal: Yes: Normal Bowel Sounds, Soft.  No: Tenderness


Edema: No


Additional Findings/Remarks: 








- Review of Systems


Constitutional: denies: Chills, Fever


Cardiovascular: denies Palpitations, (+) Shortness of Breath.  denies: Chest 

Pain


Respiratory: denies Cough, (+) SOB.  denies: Hemoptysis, Orthopnea, PND


Gastrointestinal: denies: Abdominal Pain, Constipation, Diarrhea, Melena, Nausea

, Rectal Bleeding, Vomiting


Genitourinary: denies: Dysuria, Hematuria


Musculoskeletal: denies Joint Pain.  denies: Back Pain


Neurological: denies: Dizziness, Headache, Seizure, Syncope








Labs: 


 CBC, BMP





 02/21/20 06:02 





 02/23/20 05:28 











Problem List





- Problems


(1) Hx of CABG


Code(s): Z95.1 - PRESENCE OF AORTOCORONARY BYPASS GRAFT   





(2) Acute on chronic systolic CHF (congestive heart failure)


Code(s): I50.23 - ACUTE ON CHRONIC SYSTOLIC (CONGESTIVE) HEART FAILURE   





(3) Elevated troponin I level


Code(s): R79.89 - OTHER SPECIFIED ABNORMAL FINDINGS OF BLOOD CHEMISTRY   





(4) H/O heart artery stent


Code(s): Z95.5 - PRESENCE OF CORONARY ANGIOPLASTY IMPLANT AND GRAFT   





(5) HTN (hypertension)


Code(s): I10 - ESSENTIAL (PRIMARY) HYPERTENSION   


Qualifiers: 


   Hypertension type: essential hypertension   Qualified Code(s): I10 - 

Essential (primary) hypertension   





(6) Renal insufficiency


Code(s): N28.9 - DISORDER OF KIDNEY AND URETER, UNSPECIFIED   





Assessment/Plan








1. Acute on chronic LV systolic failure with severely reduced LVEF


2. CAD s/p CABG, s/p PCI/stent, angina


3. HTN


4. CKD


5. Demand ischemia





PLAN:


1. Continue ASA 81 mg QD


2. Continue Carvedilol 3.125 mg BID


3. Continue Lisinopril for now but may consider Entresto therapy starting at 24/

26 mg BID if renal function remains stable


4. Continue Spironolactone 25 mg QD


5. Diuretics (Lasix 40 mg PO QD) and monitor renal function and electrolytes


6. Refuses LifeVest. Dr. Chopra to discuss this with patient further





Further plans are to follow


Dr. Chopra to resume care on Monday


Cristian Rosas MD

## 2022-07-27 NOTE — TELEPHONE ENCOUNTER
Refill Routing Note   Medication(s) are not appropriate for processing by [facility] for the following reason(s):      - Indication is outside of scope for [facility]    [facility] action(s):  Route          Medication reconciliation completed: No     Appointments  past 12m or future 3m with PCP    Date Provider   Last Visit   4/21/2022 Wallace Fisher MD   Next Visit   10/19/2022 Wallace Fisher MD   ED visits in past 90 days: 0        Note composed:11:51 AM 07/27/2022

## 2022-07-31 ENCOUNTER — PATIENT MESSAGE (OUTPATIENT)
Dept: INTERNAL MEDICINE | Facility: CLINIC | Age: 67
End: 2022-07-31
Payer: MEDICARE

## 2022-08-01 ENCOUNTER — PATIENT MESSAGE (OUTPATIENT)
Dept: ADMINISTRATIVE | Facility: CLINIC | Age: 67
End: 2022-08-01
Payer: MEDICARE

## 2022-08-01 ENCOUNTER — PATIENT MESSAGE (OUTPATIENT)
Dept: INTERNAL MEDICINE | Facility: CLINIC | Age: 67
End: 2022-08-01

## 2022-08-01 ENCOUNTER — OFFICE VISIT (OUTPATIENT)
Dept: INTERNAL MEDICINE | Facility: CLINIC | Age: 67
End: 2022-08-01
Payer: MEDICARE

## 2022-08-01 ENCOUNTER — TELEPHONE (OUTPATIENT)
Dept: INFECTIOUS DISEASES | Facility: HOSPITAL | Age: 67
End: 2022-08-01
Payer: MEDICARE

## 2022-08-01 DIAGNOSIS — U07.1 COVID: Primary | ICD-10-CM

## 2022-08-01 DIAGNOSIS — J31.0 CHRONIC RHINITIS: ICD-10-CM

## 2022-08-01 PROCEDURE — 4010F PR ACE/ARB THEARPY RXD/TAKEN: ICD-10-PCS | Mod: CPTII,95,, | Performed by: INTERNAL MEDICINE

## 2022-08-01 PROCEDURE — 3044F HG A1C LEVEL LT 7.0%: CPT | Mod: CPTII,95,, | Performed by: INTERNAL MEDICINE

## 2022-08-01 PROCEDURE — 4010F ACE/ARB THERAPY RXD/TAKEN: CPT | Mod: CPTII,95,, | Performed by: INTERNAL MEDICINE

## 2022-08-01 PROCEDURE — 99214 OFFICE O/P EST MOD 30 MIN: CPT | Mod: 95,,, | Performed by: INTERNAL MEDICINE

## 2022-08-01 PROCEDURE — 3044F PR MOST RECENT HEMOGLOBIN A1C LEVEL <7.0%: ICD-10-PCS | Mod: CPTII,95,, | Performed by: INTERNAL MEDICINE

## 2022-08-01 PROCEDURE — 1159F MED LIST DOCD IN RCRD: CPT | Mod: CPTII,95,, | Performed by: INTERNAL MEDICINE

## 2022-08-01 PROCEDURE — 1159F PR MEDICATION LIST DOCUMENTED IN MEDICAL RECORD: ICD-10-PCS | Mod: CPTII,95,, | Performed by: INTERNAL MEDICINE

## 2022-08-01 PROCEDURE — 3072F PR LOW RISK FOR RETINOPATHY: ICD-10-PCS | Mod: CPTII,95,, | Performed by: INTERNAL MEDICINE

## 2022-08-01 PROCEDURE — 99214 PR OFFICE/OUTPT VISIT, EST, LEVL IV, 30-39 MIN: ICD-10-PCS | Mod: 95,,, | Performed by: INTERNAL MEDICINE

## 2022-08-01 PROCEDURE — 3072F LOW RISK FOR RETINOPATHY: CPT | Mod: CPTII,95,, | Performed by: INTERNAL MEDICINE

## 2022-08-01 RX ORDER — PROMETHAZINE HYDROCHLORIDE AND DEXTROMETHORPHAN HYDROBROMIDE 6.25; 15 MG/5ML; MG/5ML
5 SYRUP ORAL NIGHTLY PRN
Qty: 180 ML | Refills: 0 | Status: SHIPPED | OUTPATIENT
Start: 2022-08-01 | End: 2022-10-20 | Stop reason: ALTCHOICE

## 2022-08-01 RX ORDER — ALBUTEROL SULFATE 0.83 MG/ML
2.5 SOLUTION RESPIRATORY (INHALATION) EVERY 4 HOURS PRN
Qty: 180 ML | Refills: 1 | Status: SHIPPED | OUTPATIENT
Start: 2022-08-01 | End: 2023-08-01

## 2022-08-01 NOTE — TELEPHONE ENCOUNTER
Spoke to the patient regarding COVID referral.Patient stated that symptoms began 7/30 and patient tested positive same day via home test.  As stated by patient Paxlovid was discouraged by PCP due to multiple drug interactions and medications that patient could not pause. Patient stated that he was taking breathing treatment that he could not pause that would be a contraindication for paxlovid. Medications currently in chart with drug interactions include tramadol, tamsulosin, rosuvastatin, amlodipine. Due to possibility of medication list being incomplete patient has been referred to the [facility] center for treatment and given the phone number for scheduling. Patient states no issues with kidney or liver and no new medication allergies.

## 2022-08-01 NOTE — PROGRESS NOTES
Subjective:      Patient ID: Stefan Forbes Jr. is a 67 y.o. male.    Chief Complaint: COVID-19 Concerns    HPI   The patient location is: [place]  The chief complaint leading to consultation is:  COVID    Visit type: audiovisual    Face to Face time with patient: 30   minutes of total time spent on the encounter, which includes face to face time and non-face to face time preparing to see the patient (eg, review of tests), Obtaining and/or reviewing separately obtained history, Documenting clinical information in the electronic or other health record, Independently interpreting results (not separately reported) and communicating results to the patient/family/caregiver, or Care coordination (not separately reported).         Each patient to whom he or she provides medical services by telemedicine is:  (1) informed of the relationship between the physician and patient and the respective role of any other health care provider with respect to management of the patient; and (2) notified that he or she may decline to receive medical services by telemedicine and may withdraw from such care at any time.    Notes:     68 yo with   Patient Active Problem List   Diagnosis    Controlled type 2 diabetes mellitus without complication, without long-term current use of insulin    Hypertension associated with diabetes    Hyperlipidemia associated with type 2 diabetes mellitus    Moderate COPD (chronic obstructive pulmonary disease)    CAD (coronary artery disease)    Vitamin D deficiency    Osteoarthritis of knee    Pulmonary nodule    Nevus of choroid of right eye    History of pulmonary embolism    Hyponatremia    Iron deficiency anemia due to chronic blood loss    MACHO on CPAP    PLMD (periodic limb movement disorder)    BMI 35.0-35.9,adult    Varicose vein of leg    Tobacco abuse, in remission    Tubular adenoma of colon    Benign prostatic hyperplasia with weak urinary stream    Urgency of urination     Morbid (severe) obesity due to excess calories    Aortic atherosclerosis    COVID     Past Medical History:   Diagnosis Date    Arthritis     Cataract     COPD (chronic obstructive pulmonary disease)     Diabetes mellitus, type 2     Hyperlipidemia     Hypertension     Personal history of colonic polyps 2018     Reports symptoms of HA and stuffy nose starting 2 days ago. HA worse. Has some fatigue.  Reports ingram starting yesterday. INGRAM same today. No wheezing.     Tylenol helps some with HA. Also taking mucinex chronically.  Nebulizer helped with dyspnea and cough yesterday. Did not take today.     Pt prefers covid infusion. On     Review of Systems   Constitutional: Negative for activity change, chills, fever and unexpected weight change.   HENT: Positive for rhinorrhea. Negative for hearing loss and trouble swallowing.    Eyes: Negative for discharge and visual disturbance.   Respiratory: Positive for cough, chest tightness and shortness of breath.    Cardiovascular: Negative for chest pain and palpitations.   Gastrointestinal: Negative for blood in stool, constipation, diarrhea and vomiting.   Endocrine: Negative for polydipsia and polyuria.   Genitourinary: Negative for difficulty urinating, hematuria and urgency.   Musculoskeletal: Negative for arthralgias, joint swelling and neck pain.   Neurological: Positive for weakness and headaches.   Psychiatric/Behavioral: Negative for confusion and dysphoric mood.     Objective:   There were no vitals taken for this visit.    Physical Exam  Constitutional:       General: He is awake.      Appearance: Normal appearance.   HENT:      Head: Normocephalic and atraumatic.   Eyes:      Conjunctiva/sclera: Conjunctivae normal.   Pulmonary:      Effort: Pulmonary effort is normal.   Musculoskeletal:      Cervical back: No rigidity.   Neurological:      Mental Status: He is alert. Mental status is at baseline.   Psychiatric:         Mood and Affect: Mood normal.          Behavior: Behavior normal. Behavior is cooperative.         Thought Content: Thought content normal.       covid risk score.   7    Assessment:     1. COVID      Plan:   COVID  -     COVID-19 Surveillance Program  -     pulse oximeter (PULSE OXIMETER) device; by Apply Externally route 2 (two) times a day. Use twice daily at 8 AM and 3 PM and record the value in MyChart as directed.  Dispense: 1 each; Refill: 0  -     Ambulatory referral/consult to [facility] Infusion; Future; Expected date: 08/02/2022  -     promethazine-dextromethorphan (PROMETHAZINE-DM) 6.25-15 mg/5 mL Syrp; Take 5 mLs by mouth nightly as needed (cough).  Dispense: 180 mL; Refill: 0    Other orders  -     albuterol (PROVENTIL) 2.5 mg /3 mL (0.083 %) nebulizer solution; Take 3 mLs (2.5 mg total) by nebulization every 4 (four) hours as needed for Wheezing or Shortness of Breath. Rescue  Dispense: 180 mL; Refill: 1    Patient has reasonable request of remaining on his Trelegy.  Fluticasone is contraindicated with paxlovid.  He would like [facility] infusion.    Lab Frequency Next Occurrence   X-Ray Chest PA And Lateral Once 10/08/2021   PSA, Screening Once 01/25/2023   Ambulatory referral/consult to Gastroenterology Once 04/14/2022   Lipid Panel Once 10/18/2022   Hemoglobin A1C Once 10/18/2022   CBC Auto Differential Once 10/18/2022   Comprehensive Metabolic Panel Once 10/18/2022   TSH Once 10/18/2022   Microalbumin/creatinine urine ratio         Problem List Items Addressed This Visit     COVID - Primary    Relevant Medications    pulse oximeter (PULSE OXIMETER) device    promethazine-dextromethorphan (PROMETHAZINE-DM) 6.25-15 mg/5 mL Syrp    Other Relevant Orders    COVID-19 Surveillance Program (Completed)    Ambulatory referral/consult to [facility] Infusion          Follow up in about 6 months (around 2/1/2023), or if symptoms worsen or fail to improve.

## 2022-08-01 NOTE — PATIENT INSTRUCTIONS
Isolate for 5 days then wear mask for 5 more days when not isolated.     Go to ER if oxygen remains below 92%

## 2022-08-02 ENCOUNTER — NURSE TRIAGE (OUTPATIENT)
Dept: ADMINISTRATIVE | Facility: CLINIC | Age: 67
End: 2022-08-02
Payer: MEDICARE

## 2022-08-02 RX ORDER — IPRATROPIUM BROMIDE 21 UG/1
SPRAY, METERED NASAL
Qty: 30 ML | Refills: 12 | Status: SHIPPED | OUTPATIENT
Start: 2022-08-02 | End: 2023-08-15 | Stop reason: SDUPTHER

## 2022-08-02 NOTE — TELEPHONE ENCOUNTER
1st attempt to contact pt for enrollment in Covid Surveillance program. Spoke to wife who states she and pt are headed into town for a few dr's appts right now. She will talk to pt and see if he is interested in participating. Advised sent message through portal explaining program and self-enrollment. Wife will attempt self-enrollment this evening. Advised if any problems to contact [facility]. If she is unable to self-enroll by tomorrow we will give them a call back.    Reason for Disposition   Information only question and nurse able to answer    Protocols used: INFORMATION ONLY CALL - NO TRIAGE-A-OH

## 2022-08-03 ENCOUNTER — INFUSION (OUTPATIENT)
Dept: INFECTIOUS DISEASES | Facility: HOSPITAL | Age: 67
End: 2022-08-03
Attending: INTERNAL MEDICINE
Payer: MEDICARE

## 2022-08-03 ENCOUNTER — NURSE TRIAGE (OUTPATIENT)
Dept: ADMINISTRATIVE | Facility: CLINIC | Age: 67
End: 2022-08-03
Payer: MEDICARE

## 2022-08-03 VITALS
TEMPERATURE: 98 F | DIASTOLIC BLOOD PRESSURE: 76 MMHG | HEART RATE: 68 BPM | RESPIRATION RATE: 17 BRPM | SYSTOLIC BLOOD PRESSURE: 123 MMHG | OXYGEN SATURATION: 99 %

## 2022-08-03 DIAGNOSIS — U07.1 COVID: ICD-10-CM

## 2022-08-03 DIAGNOSIS — U07.1 COVID-19: Primary | ICD-10-CM

## 2022-08-03 PROCEDURE — 63600175 PHARM REV CODE 636 W HCPCS: Performed by: INTERNAL MEDICINE

## 2022-08-03 PROCEDURE — M0222 HC IV INJECTION, BEBTELOVIMAB, INCL POST ADMIN MONIT: HCPCS | Performed by: INTERNAL MEDICINE

## 2022-08-03 RX ORDER — EPINEPHRINE 0.3 MG/.3ML
0.3 INJECTION SUBCUTANEOUS
Status: ACTIVE | OUTPATIENT
Start: 2022-08-03 | End: 2022-08-06

## 2022-08-03 RX ORDER — DIPHENHYDRAMINE HYDROCHLORIDE 50 MG/ML
25 INJECTION INTRAMUSCULAR; INTRAVENOUS
Status: ACTIVE | OUTPATIENT
Start: 2022-08-03 | End: 2022-08-04

## 2022-08-03 RX ORDER — ONDANSETRON 4 MG/1
4 TABLET, ORALLY DISINTEGRATING ORAL
Status: ACTIVE | OUTPATIENT
Start: 2022-08-03 | End: 2022-08-04

## 2022-08-03 RX ORDER — ACETAMINOPHEN 325 MG/1
650 TABLET ORAL
Status: ACTIVE | OUTPATIENT
Start: 2022-08-03 | End: 2022-08-04

## 2022-08-03 RX ORDER — ALBUTEROL SULFATE 90 UG/1
2 AEROSOL, METERED RESPIRATORY (INHALATION)
Status: ACTIVE | OUTPATIENT
Start: 2022-08-03 | End: 2022-08-06

## 2022-08-03 RX ORDER — BEBTELOVIMAB 87.5 MG/ML
175 INJECTION, SOLUTION INTRAVENOUS
Status: COMPLETED | OUTPATIENT
Start: 2022-08-03 | End: 2022-08-03

## 2022-08-03 RX ADMIN — BEBTELOVIMAB 175 MG: 87.5 INJECTION, SOLUTION INTRAVENOUS at 11:08

## 2022-08-03 NOTE — TELEPHONE ENCOUNTER
[place]    PCP:  Dr. Wallace Fisher    2nd attempt to contact pt regarding enrollment in the Covid Surveillance Program.  Called patient to review COVID-19 Surveillance Program enrollment process.  Covid Surveillance Program explained to pt.  Pt declines participation in the Covid Surveillance Program but accepts participation in the [facility] Program.  [id number] ordered.  Tasks removed.  Pt reports that he is en route to receive the [facility] infusion.    C/O cough, mild SOB with coughing, and fatigue.  Denies CP, and fever.  SpO2=96% WA=68.  Per protocol, care advised is home care.  Instructed to call for worsening/questions/concerns.  VU.    Patient will call [phone] 24/7 to speak with an OnCall nurse, if needed. Pt aware that if Sp02 less than or equal to 93% or if they have any worsening symptoms, they need to go to the emergency department. If they are having a medical emergency, they will call 911. Reviewed importance of wearing mask if self or family members leave the house.     Patient had no further questions.    Reason for Disposition   [1] COVID-19 diagnosed by positive lab test (e.g., PCR, rapid self-test kit) AND [2] mild symptoms (e.g., cough, fever, others) AND [3] no complications or SOB    Additional Information   Negative: SEVERE difficulty breathing (e.g., struggling for each breath, speaks in single words)   Negative: Difficult to awaken or acting confused (e.g., disoriented, slurred speech)   Negative: Bluish (or gray) lips or face now   Negative: Shock suspected (e.g., cold/pale/clammy skin, too weak to stand, low BP, rapid pulse)   Negative: Sounds like a life-threatening emergency to the triager   Negative: SEVERE or constant chest pain or pressure  (Exception: Mild central chest pain, present only when coughing.)   Negative: MODERATE difficulty breathing (e.g., speaks in phrases, SOB even at rest, pulse 100-120)   Negative: Headache and stiff neck (can't touch chin to chest)   Negative: Oxygen  level (e.g., pulse oximetry) 90 percent or lower   Negative: Chest pain or pressure   Negative: Patient sounds very sick or weak to the triager   Negative: MILD difficulty breathing (e.g., minimal/no SOB at rest, SOB with walking, pulse <100)   Negative: Fever > 103 F (39.4 C)   Negative: [1] Fever > 101 F (38.3 C) AND [2] over 60 years of age   Negative: [1] Fever > 100.0 F (37.8 C) AND [2] bedridden (e.g., nursing home patient, CVA, chronic illness, recovering from surgery)   Negative: HIGH RISK for severe COVID complications (e.g., weak immune system, age > 64 years, obesity with BMI > 25, pregnant, chronic lung disease or other chronic medical condition) (Exception: Already seen by PCP and no new or worsening symptoms.)   Negative: [1] HIGH RISK patient AND [2] influenza is widespread in the community AND [3] ONE OR MORE respiratory symptoms: cough, sore throat, runny or stuffy nose   Negative: [1] HIGH RISK patient AND [2] influenza exposure within the last 7 days AND [3] ONE OR MORE respiratory symptoms: cough, sore throat, runny or stuffy nose   Negative: Oxygen level (e.g., pulse oximetry) 91 to 94 percent   Negative: [1] COVID-19 infection suspected by caller or triager AND [2] mild symptoms (cough, fever, or others) AND [3] negative COVID-19 rapid test   Negative: Fever present > 3 days (72 hours)   Negative: [1] Fever returns after gone for over 24 hours AND [2] symptoms worse or not improved   Negative: [1] Continuous (nonstop) coughing interferes with work or school AND [2] no improvement using cough treatment per Care Advice   Negative: Cough present > 3 weeks   Negative: [1] COVID-19 diagnosed by positive lab test (e.g., PCR, rapid self-test kit) AND [2] NO symptoms (e.g., cough, fever, others)    Protocols used: CORONAVIRUS (COVID-19) DIAGNOSED OR [id number]

## 2022-08-04 DIAGNOSIS — J31.0 CHRONIC RHINITIS: ICD-10-CM

## 2022-08-08 ENCOUNTER — PATIENT MESSAGE (OUTPATIENT)
Dept: ALLERGY | Facility: CLINIC | Age: 67
End: 2022-08-08
Payer: MEDICARE

## 2022-08-08 RX ORDER — METHSCOPOLAMINE BROMIDE 2.5 MG/1
TABLET ORAL
Qty: 180 TABLET | Refills: 4 | Status: SHIPPED | OUTPATIENT
Start: 2022-08-08 | End: 2023-08-10 | Stop reason: SDUPTHER

## 2022-08-18 DIAGNOSIS — E11.59 HYPERTENSION ASSOCIATED WITH DIABETES: ICD-10-CM

## 2022-08-18 DIAGNOSIS — I15.2 HYPERTENSION ASSOCIATED WITH DIABETES: ICD-10-CM

## 2022-08-18 RX ORDER — AMLODIPINE BESYLATE 10 MG/1
10 TABLET ORAL DAILY
Qty: 90 TABLET | Refills: 3 | Status: SHIPPED | OUTPATIENT
Start: 2022-08-18 | End: 2022-09-15

## 2022-08-18 NOTE — TELEPHONE ENCOUNTER
No new care gaps identified.  [facility] Embedded Care Gaps. Reference number: [id number]. 8/18/2022   5:37:06 PM CDT

## 2022-08-18 NOTE — TELEPHONE ENCOUNTER
Refill Decision Note   Stefan Forbes  is requesting a refill authorization.  Brief Assessment and Rationale for Refill:  Approve     Medication Therapy Plan:       Medication Reconciliation Completed: No   Comments:     No Care Gaps recommended.     Note composed:5:50 PM 08/18/2022

## 2022-09-14 ENCOUNTER — OFFICE VISIT (OUTPATIENT)
Dept: ALLERGY | Facility: CLINIC | Age: 67
End: 2022-09-14
Payer: MEDICARE

## 2022-09-14 VITALS
SYSTOLIC BLOOD PRESSURE: 142 MMHG | WEIGHT: 236 LBS | BODY MASS INDEX: 35.77 KG/M2 | HEIGHT: 68 IN | HEART RATE: 50 BPM | DIASTOLIC BLOOD PRESSURE: 72 MMHG

## 2022-09-14 DIAGNOSIS — J31.0 CHRONIC RHINITIS: Primary | ICD-10-CM

## 2022-09-14 PROCEDURE — 1101F PR PT FALLS ASSESS DOC 0-1 FALLS W/OUT INJ PAST YR: ICD-10-PCS | Mod: CPTII,S$GLB,, | Performed by: ALLERGY & IMMUNOLOGY

## 2022-09-14 PROCEDURE — 3077F PR MOST RECENT SYSTOLIC BLOOD PRESSURE >= 140 MM HG: ICD-10-PCS | Mod: CPTII,S$GLB,, | Performed by: ALLERGY & IMMUNOLOGY

## 2022-09-14 PROCEDURE — 4010F PR ACE/ARB THEARPY RXD/TAKEN: ICD-10-PCS | Mod: CPTII,S$GLB,, | Performed by: ALLERGY & IMMUNOLOGY

## 2022-09-14 PROCEDURE — 1159F PR MEDICATION LIST DOCUMENTED IN MEDICAL RECORD: ICD-10-PCS | Mod: CPTII,S$GLB,, | Performed by: ALLERGY & IMMUNOLOGY

## 2022-09-14 PROCEDURE — 1159F MED LIST DOCD IN RCRD: CPT | Mod: CPTII,S$GLB,, | Performed by: ALLERGY & IMMUNOLOGY

## 2022-09-14 PROCEDURE — 3078F DIAST BP <80 MM HG: CPT | Mod: CPTII,S$GLB,, | Performed by: ALLERGY & IMMUNOLOGY

## 2022-09-14 PROCEDURE — 4010F ACE/ARB THERAPY RXD/TAKEN: CPT | Mod: CPTII,S$GLB,, | Performed by: ALLERGY & IMMUNOLOGY

## 2022-09-14 PROCEDURE — 3008F BODY MASS INDEX DOCD: CPT | Mod: CPTII,S$GLB,, | Performed by: ALLERGY & IMMUNOLOGY

## 2022-09-14 PROCEDURE — 99214 OFFICE O/P EST MOD 30 MIN: CPT | Mod: S$GLB,,, | Performed by: ALLERGY & IMMUNOLOGY

## 2022-09-14 PROCEDURE — 3072F PR LOW RISK FOR RETINOPATHY: ICD-10-PCS | Mod: CPTII,S$GLB,, | Performed by: ALLERGY & IMMUNOLOGY

## 2022-09-14 PROCEDURE — 3077F SYST BP >= 140 MM HG: CPT | Mod: CPTII,S$GLB,, | Performed by: ALLERGY & IMMUNOLOGY

## 2022-09-14 PROCEDURE — 3044F HG A1C LEVEL LT 7.0%: CPT | Mod: CPTII,S$GLB,, | Performed by: ALLERGY & IMMUNOLOGY

## 2022-09-14 PROCEDURE — 1101F PT FALLS ASSESS-DOCD LE1/YR: CPT | Mod: CPTII,S$GLB,, | Performed by: ALLERGY & IMMUNOLOGY

## 2022-09-14 PROCEDURE — 3072F LOW RISK FOR RETINOPATHY: CPT | Mod: CPTII,S$GLB,, | Performed by: ALLERGY & IMMUNOLOGY

## 2022-09-14 PROCEDURE — 99214 PR OFFICE/OUTPT VISIT, EST, LEVL IV, 30-39 MIN: ICD-10-PCS | Mod: S$GLB,,, | Performed by: ALLERGY & IMMUNOLOGY

## 2022-09-14 PROCEDURE — 3078F PR MOST RECENT DIASTOLIC BLOOD PRESSURE < 80 MM HG: ICD-10-PCS | Mod: CPTII,S$GLB,, | Performed by: ALLERGY & IMMUNOLOGY

## 2022-09-14 PROCEDURE — 3008F PR BODY MASS INDEX (BMI) DOCUMENTED: ICD-10-PCS | Mod: CPTII,S$GLB,, | Performed by: ALLERGY & IMMUNOLOGY

## 2022-09-14 PROCEDURE — 3044F PR MOST RECENT HEMOGLOBIN A1C LEVEL <7.0%: ICD-10-PCS | Mod: CPTII,S$GLB,, | Performed by: ALLERGY & IMMUNOLOGY

## 2022-09-14 PROCEDURE — 99999 PR PBB SHADOW E&M-EST. PATIENT-LVL V: CPT | Mod: PBBFAC,,, | Performed by: ALLERGY & IMMUNOLOGY

## 2022-09-14 PROCEDURE — 99999 PR PBB SHADOW E&M-EST. PATIENT-LVL V: ICD-10-PCS | Mod: PBBFAC,,, | Performed by: ALLERGY & IMMUNOLOGY

## 2022-09-14 PROCEDURE — 3288F PR FALLS RISK ASSESSMENT DOCUMENTED: ICD-10-PCS | Mod: CPTII,S$GLB,, | Performed by: ALLERGY & IMMUNOLOGY

## 2022-09-14 PROCEDURE — 3288F FALL RISK ASSESSMENT DOCD: CPT | Mod: CPTII,S$GLB,, | Performed by: ALLERGY & IMMUNOLOGY

## 2022-09-14 NOTE — PROGRESS NOTES
"Subjective:       Patient ID: Stefan Forbes Jr. is a 67 y.o. male.    Chief Complaint:  Follow-up      HPI 1/3/2020:  64 year old male- previous patient of Dr. Wong. He is here for follow up. He would like to continue current medications. Per Mr. Forbes, allergy testing was negative. He reports that methylscopamine and flonase have decreased his nasal congestion and mucus production tremendously. He has stopped smoking. He denies itchy or watery eyes.    HPI 7/13/2021:  He reports not having his methylscopamine and wanting a refill  He reports nasal congestion is controlled by methylscopamine.  He reports nasal congestion, since he is "out of" his methylscopamine.  He does not smoked. Stopped two years ago.    HPI today:  64 year old male here for follow up.  Covid 19- 2 months ago. Doing well. Reports mild increase in secretions/mucus. Feels methylscopamine alleviates allergy symptoms and request to continue.      Past Medical History:   Diagnosis Date    Arthritis     Cataract     COPD (chronic obstructive pulmonary disease)     Diabetes mellitus, type 2     Hyperlipidemia     Hypertension     Personal history of colonic polyps 2018     Family History   Problem Relation Age of Onset    Diabetes Mother     Arthritis Mother     Cancer Father      Current Outpatient Medications on File Prior to Visit   Medication Sig Dispense Refill    acetaminophen (TYLENOL) 500 MG tablet Take 1,000 mg by mouth as needed.       albuterol (PROVENTIL) 2.5 mg /3 mL (0.083 %) nebulizer solution Take 3 mLs (2.5 mg total) by nebulization every 4 (four) hours as needed for Wheezing or Shortness of Breath. Rescue 180 mL 1    albuterol (VENTOLIN HFA) 90 mcg/actuation inhaler Inhale 2 puffs into the lungs every 6 (six) hours as needed for Wheezing or Shortness of Breath. Rescue 36 g 3    ascorbic acid, vitamin C, (VITAMIN C) 1000 MG tablet Take 1,000 mg by mouth once daily.      aspirin 325 MG tablet Take 325 mg by mouth once daily. "      blood sugar diagnostic (BLOOD GLUCOSE TEST) Strp 1 strip by Misc.(Non-Drug; Combo Route) route 3 (three) times daily. 200 each 1    blood-glucose meter (TRUE METRIX GLUCOSE METER) Misc Use as directed (Patient taking differently: Use as directed) 1 each 0    blood-glucose meter Misc Use as directed to check glucose levels. 1 each 0    buPROPion (WELLBUTRIN SR) 150 MG TBSR 12 hr tablet Take 1 tablet (150 mg total) by mouth 2 (two) times daily. 60 tablet 2    cetirizine (ZYRTEC) 10 MG tablet Take 10 mg by mouth every evening.      cholecalciferol, vitamin D3, (VITAMIN D3) 25 mcg (1,000 unit) capsule Take 1,000 Units by mouth once daily.      cyanocobalamin (VITAMIN B-12) 500 MCG tablet Take 500 mcg by mouth once daily.       dapagliflozin (FARXIGA) 5 mg Tab tablet Take 1 tablet (5 mg total) by mouth once daily. 90 tablet 0    dulaglutide (TRULICITY) 3 mg/0.5 mL pen injector Inject 3 mg into the skin every 7 days. 12 pen 1    famotidine (PEPCID) 40 MG tablet Take 1 tablet by mouth once a day 30 tablet 11    fluticasone propionate (FLONASE) 50 mcg/actuation nasal spray Use 1 spray (50 mcg total) in each nostril once daily. 16 g 3    fluticasone-umeclidin-vilanter (TRELEGY ELLIPTA) 100-62.5-25 mcg DsDv Inhale 1 puff into the lungs once daily. 60 each 11    guaiFENesin 1,200 mg Ta12 Take 1 tablet by mouth 2 (two) times daily.      ipratropium (ATROVENT) 21 mcg (0.03 %) nasal spray Spray 1 or 2 sprays each nasal passage every 8 hours, if needed, as directed. 30 mL 12    lancets 30 gauge Misc 1 lancet by Misc.(Non-Drug; Combo Route) route 3 (three) times daily. 200 each 0    linaCLOtide (LINZESS) 145 mcg Cap capsule Take 1 capsule by mouth 30 minutes before breakfast 30 capsule 11    metFORMIN (GLUCOPHAGE) 1000 MG tablet TAKE ONE TABLET BY MOUTH TWICE DAILY WITH MEALS 180 tablet 2    methscopolamine (PAMINE) 2.5 mg Tab Take 1 tablet by mouth every 12 hours, as directed, if needed 180 tablet 4    naproxen (NAPROSYN) 500  MG tablet Take 1 tablet (500 mg total) by mouth 2 (two) times daily with meals. For arm pain 20 tablet 0    nitroGLYCERIN (NITROSTAT) 0.4 MG SL tablet Place 1 tablet under the tongue every 5 (five) minutes as needed for Chest pain. 25 tablet 0    olmesartan (BENICAR) 20 MG tablet Take 1 tablet by mouth daily 90 tablet 3    pantoprazole (PROTONIX) 40 MG tablet Take 1 tablet by mouth twice a day 60 tablet 11    promethazine-dextromethorphan (PROMETHAZINE-DM) 6.25-15 mg/5 mL Syrp Take 5 mLs by mouth nightly as needed (cough). 180 mL 0    pulse oximeter (PULSE OXIMETER) device Use twice daily at 8 AM and 3 PM and [facility]. 1 each 0    rosuvastatin (CRESTOR) 10 MG tablet TAKE ONE TABLET BY MOUTH ONCE DAILY 90 tablet 1    tamsulosin (FLOMAX) 0.4 mg Cap Take 2 capsules (0.8 mg total) by mouth once daily. 180 capsule 3    amLODIPine (NORVASC) 10 MG tablet Take 1 tablet (10 mg total) by mouth once daily. (Patient not taking: Reported on 9/14/2022) 90 tablet 3    [DISCONTINUED] cyclobenzaprine (FLEXERIL) 10 MG tablet Take 0.5 tablets (5 mg total) by mouth 3 (three) times daily as needed for Muscle spasms. 30 tablet 0    [DISCONTINUED] nitroGLYCERIN (NITROSTAT) 0.4 MG SL tablet Take 1 tablet under the tongue every 5 minutes as needed for chest pain. Do not exceed a total of 3 doses in 15 minutes 25 tablet 0    [DISCONTINUED] traMADoL (ULTRAM) 50 mg tablet Take 1 tablet (50 mg total) by mouth every 12 (twelve) hours as needed for Pain. 14 tablet 0     No current facility-administered medications on file prior to visit.     Environmental History:  Dogs and one cat  Review of Systems   Constitutional:  Negative for chills and fever.   HENT:  Positive for congestion. Negative for rhinorrhea.    Eyes:  Negative for discharge and itching.   Respiratory:  Negative for shortness of breath and wheezing.    Cardiovascular:  Negative for chest pain and leg swelling.   Gastrointestinal:  Negative for nausea  and vomiting.   Endocrine: Negative for cold intolerance and heat intolerance.   Musculoskeletal:  Negative for gait problem and joint swelling.   Skin:  Negative for rash and wound.   Allergic/Immunologic: Negative for environmental allergies and food allergies.   Neurological:  Negative for dizziness and light-headedness.   Hematological:  Negative for adenopathy. Bruises/bleeds easily.   Psychiatric/Behavioral:  Negative for confusion. The patient is not nervous/anxious.       Objective:    Physical Exam  Vitals reviewed.   Constitutional:       General: He is not in acute distress.     Appearance: He is well-developed. He is not diaphoretic.   HENT:      Head: Normocephalic and atraumatic.      Right Ear: External ear normal.      Left Ear: External ear normal.      Nose: Nose normal.   Eyes:      General: No scleral icterus.        Right eye: No discharge.         Left eye: No discharge.      Pupils: Pupils are equal, round, and reactive to light.   Neck:      Thyroid: No thyromegaly.      Trachea: No tracheal deviation.   Cardiovascular:      Rate and Rhythm: Normal rate and regular rhythm.      Heart sounds: Normal heart sounds. No murmur heard.    No friction rub. No gallop.   Pulmonary:      Effort: Pulmonary effort is normal. No respiratory distress.      Breath sounds: Normal breath sounds. No stridor. No wheezing or rales.   Abdominal:      General: Bowel sounds are normal. There is no distension.      Palpations: Abdomen is soft. There is no mass.      Tenderness: There is no abdominal tenderness. There is no guarding.   Musculoskeletal:         General: Normal range of motion.      Cervical back: Normal range of motion and neck supple.   Lymphadenopathy:      Cervical: No cervical adenopathy.   Skin:     General: Skin is warm and dry.   Neurological:      Mental Status: He is alert and oriented to person, place, and time.   Psychiatric:         Behavior: Behavior normal.         Thought Content:  Thought content normal.         Judgment: Judgment normal.         Assessment:       1. Chronic rhinitis           Plan:     Chronic rhinitis  Continue current medications  RTC 1 year or sooner, if needed        TALITA MELTON spent a total of 30 minutes on the day of the visit.  This includes face to face time and non-face to face time preparing to see the patient (eg, review of tests), obtaining and/or reviewing separately obtained history, documenting clinical information in the electronic or other health record, independently interpreting results and communicating results to the patient/family/caregiver, or care coordinator.

## 2022-09-15 DIAGNOSIS — E11.69 HYPERLIPIDEMIA ASSOCIATED WITH TYPE 2 DIABETES MELLITUS: ICD-10-CM

## 2022-09-15 DIAGNOSIS — E78.5 HYPERLIPIDEMIA ASSOCIATED WITH TYPE 2 DIABETES MELLITUS: ICD-10-CM

## 2022-09-15 RX ORDER — ROSUVASTATIN CALCIUM 10 MG/1
TABLET, COATED ORAL
Qty: 90 TABLET | Refills: 0 | Status: SHIPPED | OUTPATIENT
Start: 2022-09-15 | End: 2022-10-20 | Stop reason: SDUPTHER

## 2022-09-16 NOTE — TELEPHONE ENCOUNTER
No new care gaps identified.  [facility] Embedded Care Gaps. Reference number: [id number]. 9/15/2022   7:25:06 PM CDT

## 2022-09-16 NOTE — TELEPHONE ENCOUNTER
Refill Decision Note   Stefan Forbes  is requesting a refill authorization.  Brief Assessment and Rationale for Refill:  Approve     Medication Therapy Plan:       Medication Reconciliation Completed: No   Comments:     No Care Gaps recommended.     Note composed:8:20 PM 09/15/2022

## 2022-09-29 DIAGNOSIS — E11.9 CONTROLLED TYPE 2 DIABETES MELLITUS WITHOUT COMPLICATION, WITHOUT LONG-TERM CURRENT USE OF INSULIN: ICD-10-CM

## 2022-09-29 RX ORDER — METFORMIN HYDROCHLORIDE 1000 MG/1
1000 TABLET ORAL 2 TIMES DAILY WITH MEALS
Qty: 180 TABLET | Refills: 0 | Status: SHIPPED | OUTPATIENT
Start: 2022-09-29 | End: 2022-10-20 | Stop reason: SDUPTHER

## 2022-09-29 NOTE — TELEPHONE ENCOUNTER
Refill Decision Note   Stefan Forbes  is requesting a refill authorization.  Brief Assessment and Rationale for Refill:  Approve     Medication Therapy Plan:       Medication Reconciliation Completed: No   Comments:     No Care Gaps recommended.     Note composed:1:09 PM 09/29/2022

## 2022-09-29 NOTE — TELEPHONE ENCOUNTER
Care Due:                  Date            Visit Type   Department     Provider  --------------------------------------------------------------------------------                                ESTABLISHED                              PATIENT -    [facility]  Last Visit: 08-      [facility]      MEDICINE       Wallace Fisher                              EP -                              PRIMARY      [facility]  Next Visit: 10-      CARE ([facility])   MEDICINE       Wallace Fisher                                                            Last  Test          Frequency    Reason                     Performed    Due Date  --------------------------------------------------------------------------------    CMP.........  12 months..  dapagliflozin, metFORMIN,   10-   10-                             olmesartan, rosuvastatin.    HBA1C.......  6 months...  dapagliflozin,             04-   10-                             dulaglutide, metFORMIN...    Lipid Panel.  12 months..  rosuvastatin.............  10-   10-    [facility] Embedded Care Gaps. Reference number: [id number]. 9/29/2022   11:03:32 AM CDT

## 2022-10-06 ENCOUNTER — OFFICE VISIT (OUTPATIENT)
Dept: SLEEP MEDICINE | Facility: CLINIC | Age: 67
End: 2022-10-06
Payer: MEDICARE

## 2022-10-06 ENCOUNTER — CLINICAL SUPPORT (OUTPATIENT)
Dept: PULMONOLOGY | Facility: CLINIC | Age: 67
End: 2022-10-06
Payer: MEDICARE

## 2022-10-06 ENCOUNTER — HOSPITAL ENCOUNTER (OUTPATIENT)
Dept: RADIOLOGY | Facility: HOSPITAL | Age: 67
Discharge: HOME | End: 2022-10-06
Attending: NURSE PRACTITIONER
Payer: MEDICARE

## 2022-10-06 VITALS
HEART RATE: 54 BPM | BODY MASS INDEX: 35.92 KG/M2 | RESPIRATION RATE: 17 BRPM | WEIGHT: 237 LBS | OXYGEN SATURATION: 97 % | HEIGHT: 68 IN

## 2022-10-06 DIAGNOSIS — J44.9 MODERATE COPD (CHRONIC OBSTRUCTIVE PULMONARY DISEASE): ICD-10-CM

## 2022-10-06 DIAGNOSIS — R91.1 PULMONARY NODULE: ICD-10-CM

## 2022-10-06 DIAGNOSIS — J44.9 MODERATE COPD (CHRONIC OBSTRUCTIVE PULMONARY DISEASE): Primary | ICD-10-CM

## 2022-10-06 DIAGNOSIS — E11.69 HYPERLIPIDEMIA ASSOCIATED WITH TYPE 2 DIABETES MELLITUS: ICD-10-CM

## 2022-10-06 DIAGNOSIS — Z86.16 HISTORY OF COVID-19: ICD-10-CM

## 2022-10-06 DIAGNOSIS — E78.5 HYPERLIPIDEMIA ASSOCIATED WITH TYPE 2 DIABETES MELLITUS: ICD-10-CM

## 2022-10-06 DIAGNOSIS — E11.59 HYPERTENSION ASSOCIATED WITH DIABETES: ICD-10-CM

## 2022-10-06 DIAGNOSIS — Z86.711 HISTORY OF PULMONARY EMBOLISM: ICD-10-CM

## 2022-10-06 DIAGNOSIS — I70.0 AORTIC ATHEROSCLEROSIS: ICD-10-CM

## 2022-10-06 DIAGNOSIS — E11.9 CONTROLLED TYPE 2 DIABETES MELLITUS WITHOUT COMPLICATION, WITHOUT LONG-TERM CURRENT USE OF INSULIN: ICD-10-CM

## 2022-10-06 DIAGNOSIS — I15.2 HYPERTENSION ASSOCIATED WITH DIABETES: ICD-10-CM

## 2022-10-06 DIAGNOSIS — G47.33 OSA ON CPAP: ICD-10-CM

## 2022-10-06 PROCEDURE — 4010F ACE/ARB THERAPY RXD/TAKEN: CPT | Mod: CPTII,S$GLB,, | Performed by: INTERNAL MEDICINE

## 2022-10-06 PROCEDURE — 3072F LOW RISK FOR RETINOPATHY: CPT | Mod: CPTII,S$GLB,, | Performed by: INTERNAL MEDICINE

## 2022-10-06 PROCEDURE — 94010 BREATHING CAPACITY TEST: CPT | Mod: S$GLB,,, | Performed by: INTERNAL MEDICINE

## 2022-10-06 PROCEDURE — 71046 XR CHEST PA AND LATERAL: ICD-10-PCS | Mod: 26,,, | Performed by: RADIOLOGY

## 2022-10-06 PROCEDURE — 3288F FALL RISK ASSESSMENT DOCD: CPT | Mod: CPTII,S$GLB,, | Performed by: INTERNAL MEDICINE

## 2022-10-06 PROCEDURE — 71046 X-RAY EXAM CHEST 2 VIEWS: CPT | Mod: 26,,, | Performed by: RADIOLOGY

## 2022-10-06 PROCEDURE — 3008F BODY MASS INDEX DOCD: CPT | Mod: CPTII,S$GLB,, | Performed by: INTERNAL MEDICINE

## 2022-10-06 PROCEDURE — 99499 RISK ADDL DX/OHS AUDIT: ICD-10-PCS | Mod: S$GLB,,, | Performed by: INTERNAL MEDICINE

## 2022-10-06 PROCEDURE — 1159F PR MEDICATION LIST DOCUMENTED IN MEDICAL RECORD: ICD-10-PCS | Mod: CPTII,S$GLB,, | Performed by: INTERNAL MEDICINE

## 2022-10-06 PROCEDURE — 1101F PR PT FALLS ASSESS DOC 0-1 FALLS W/OUT INJ PAST YR: ICD-10-PCS | Mod: CPTII,S$GLB,, | Performed by: INTERNAL MEDICINE

## 2022-10-06 PROCEDURE — 99214 PR OFFICE/OUTPT VISIT, EST, LEVL IV, 30-39 MIN: ICD-10-PCS | Mod: 25,S$GLB,, | Performed by: INTERNAL MEDICINE

## 2022-10-06 PROCEDURE — 4010F PR ACE/ARB THEARPY RXD/TAKEN: ICD-10-PCS | Mod: CPTII,S$GLB,, | Performed by: INTERNAL MEDICINE

## 2022-10-06 PROCEDURE — 3044F HG A1C LEVEL LT 7.0%: CPT | Mod: CPTII,S$GLB,, | Performed by: INTERNAL MEDICINE

## 2022-10-06 PROCEDURE — 3072F PR LOW RISK FOR RETINOPATHY: ICD-10-PCS | Mod: CPTII,S$GLB,, | Performed by: INTERNAL MEDICINE

## 2022-10-06 PROCEDURE — 71046 X-RAY EXAM CHEST 2 VIEWS: CPT | Mod: TC

## 2022-10-06 PROCEDURE — 1159F MED LIST DOCD IN RCRD: CPT | Mod: CPTII,S$GLB,, | Performed by: INTERNAL MEDICINE

## 2022-10-06 PROCEDURE — 3044F PR MOST RECENT HEMOGLOBIN A1C LEVEL <7.0%: ICD-10-PCS | Mod: CPTII,S$GLB,, | Performed by: INTERNAL MEDICINE

## 2022-10-06 PROCEDURE — 99999 PR PBB SHADOW E&M-EST. PATIENT-LVL V: CPT | Mod: PBBFAC,,, | Performed by: INTERNAL MEDICINE

## 2022-10-06 PROCEDURE — 1101F PT FALLS ASSESS-DOCD LE1/YR: CPT | Mod: CPTII,S$GLB,, | Performed by: INTERNAL MEDICINE

## 2022-10-06 PROCEDURE — 94010 BREATHING CAPACITY TEST: ICD-10-PCS | Mod: S$GLB,,, | Performed by: INTERNAL MEDICINE

## 2022-10-06 PROCEDURE — 3008F PR BODY MASS INDEX (BMI) DOCUMENTED: ICD-10-PCS | Mod: CPTII,S$GLB,, | Performed by: INTERNAL MEDICINE

## 2022-10-06 PROCEDURE — 3288F PR FALLS RISK ASSESSMENT DOCUMENTED: ICD-10-PCS | Mod: CPTII,S$GLB,, | Performed by: INTERNAL MEDICINE

## 2022-10-06 PROCEDURE — 99214 OFFICE O/P EST MOD 30 MIN: CPT | Mod: 25,S$GLB,, | Performed by: INTERNAL MEDICINE

## 2022-10-06 PROCEDURE — 99499 UNLISTED E&M SERVICE: CPT | Mod: S$GLB,,, | Performed by: INTERNAL MEDICINE

## 2022-10-06 PROCEDURE — 99999 PR PBB SHADOW E&M-EST. PATIENT-LVL V: ICD-10-PCS | Mod: PBBFAC,,, | Performed by: INTERNAL MEDICINE

## 2022-10-06 RX ORDER — ALBUTEROL SULFATE 90 UG/1
2 AEROSOL, METERED RESPIRATORY (INHALATION) EVERY 6 HOURS PRN
Qty: 36 G | Refills: 3 | Status: SHIPPED | OUTPATIENT
Start: 2022-10-06 | End: 2023-10-05 | Stop reason: SDUPTHER

## 2022-10-06 NOTE — PROGRESS NOTES
Patient Active Problem List   Diagnosis    Controlled type 2 diabetes mellitus without complication, without long-term current use of insulin    Hypertension associated with diabetes    Hyperlipidemia associated with type 2 diabetes mellitus    Moderate COPD (chronic obstructive pulmonary disease)    CAD (coronary artery disease)    Vitamin D deficiency    Osteoarthritis of knee    Pulmonary nodule    Nevus of choroid of right eye    History of pulmonary embolism    Hyponatremia    Iron deficiency anemia due to chronic blood loss    MACHO on CPAP    PLMD (periodic limb movement disorder)    BMI 35.0-35.9,adult    Varicose vein of leg    Tobacco abuse, in remission    Tubular adenoma of colon    Benign prostatic hyperplasia with weak urinary stream    Urgency of urination    Morbid (severe) obesity due to excess calories    Aortic atherosclerosis    COVID     Social History     Tobacco Use   Smoking Status Former    Types: Cigars    Quit date: 2020    Years since quittin.6   Smokeless Tobacco Never   Tobacco Comments    3-5 cigars each day     Immunization History   Administered Date(s) Administered    COVID-19, MRNA, LN-S, PF (Pfizer) (Purple Cap) 2021, 2021, 12/10/2021    Influenza - Quadrivalent 2016    Influenza - Quadrivalent - PF *Preferred* (6 months and older) 2014, 10/25/2015, 10/03/2017, 10/04/2018, 2019, 2020, 10/08/2021    Pneumococcal Polysaccharide - 23 Valent 2014, 2016    Tdap 2015    Zoster 2017      [facility] PEQ COPD ASSESSMENT 10/6/2022 10/8/2021 10/11/2020 10/14/2019 2019 10/19/2018 2018   How often do you cough? 2 4 3 3 3 3 3   How often do you have phlegm (mucus) in your chest? 2 4 3 3 4 3 2   How often does your chest feel tight? 0 2 3 1 2 2 2   When you walk up a hill or one flight of stairs, how often are you breathless? 3 2 2 3 2 3 3   How often are you limited doing any activities at home? 1 0 1 0 1 1 1   How often are  you confident leaving the house despite your lung condition? 0 0 0 0 0 0 0   How often do you sleep soundly? 0 0 1 1 1 1 1   How often do you have energy? 1 1 1 1 1 1 1   Total score 9 13 14 12 14 14 13     EPWORTH SLEEPINESS SCALE 10/6/2022   Sitting and reading 0   Watching TV 0   Sitting, inactive in a public place (e.g. a theatre or a meeting) 0   As a passenger in a car for an hour without a break 0   Lying down to rest in the afternoon when circumstances permit 0   Sitting and talking to someone 0   Sitting quietly after a lunch without alcohol 0   In a car, while stopped for a few minutes in traffic 0   Total score 0            SUBJECTIVE:     Patient is a 67 y.o. male presents with Moderate COPD , MACHO on CPAP .  Here to review: CXR, Daren, download  Last visit 10/08/2021  Has new CPAp since 09/15  Had covid X 2  Medications: TRELEGY and rescue inhaler  Has MACHO on APAP 4-10. [place] score 6.   Usgae > 4 hrs was 46.7%, AHI 1.6  No sleepiness  CAT score 9 .mRC score 0  FEV1: 2.47L( 76.6%)  Adherent with TRELEGY    Will need flu shot    No recent COPD exacerbations.  No cough no wheezing no shortness of breath or sputum.    Former smoker 35-40 pack year smoking history :  Currently enrolled in smoking cessation    I have reviewed the patient's medical history in detail and updated the computerized patient record.    History of varicose veins.    Chief Complaint   Patient presents with    COPD    Sleep Apnea         Review of patient's allergies indicates:  No Known Allergies    Current Outpatient Medications   Medication Sig Dispense Refill    acetaminophen (TYLENOL) 500 MG tablet Take 1,000 mg by mouth as needed.       albuterol (PROVENTIL) 2.5 mg /3 mL (0.083 %) nebulizer solution Take 3 mLs (2.5 mg total) by nebulization every 4 (four) hours as needed for Wheezing or Shortness of Breath. Rescue 180 mL 1    amLODIPine (NORVASC) 2.5 MG tablet Take 1 tablet (2.5 mg total) by mouth once daily. 30 tablet 5     ascorbic acid, vitamin C, (VITAMIN C) 1000 MG tablet Take 1,000 mg by mouth once daily.      aspirin 325 MG tablet Take 325 mg by mouth once daily.      blood sugar diagnostic (BLOOD GLUCOSE TEST) Strp 1 strip by Misc.(Non-Drug; Combo Route) route 3 (three) times daily. 200 each 1    blood-glucose meter (TRUE METRIX GLUCOSE METER) Misc Use as directed (Patient taking differently: Use as directed) 1 each 0    blood-glucose meter Misc Use as directed to check glucose levels. 1 each 0    buPROPion (WELLBUTRIN SR) 150 MG TBSR 12 hr tablet Take 1 tablet (150 mg total) by mouth 2 (two) times daily. 60 tablet 2    cetirizine (ZYRTEC) 10 MG tablet Take 10 mg by mouth every evening.      cholecalciferol, vitamin D3, (VITAMIN D3) 25 mcg (1,000 unit) capsule Take 1,000 Units by mouth once daily.      cyanocobalamin (VITAMIN B-12) 500 MCG tablet Take 500 mcg by mouth once daily.       dapagliflozin (FARXIGA) 5 mg Tab tablet Take 1 tablet (5 mg total) by mouth once daily. 90 tablet 0    dulaglutide (TRULICITY) 3 mg/0.5 mL pen injector Inject 3 mg into the skin every 7 days. 12 pen 1    famotidine (PEPCID) 40 MG tablet Take 1 tablet by mouth once a day 30 tablet 11    fluticasone propionate (FLONASE) 50 mcg/actuation nasal spray Use 1 spray (50 mcg total) in each nostril once daily. 16 g 3    guaiFENesin 1,200 mg Ta12 Take 1 tablet by mouth 2 (two) times daily.      ipratropium (ATROVENT) 21 mcg (0.03 %) nasal spray Spray 1 or 2 sprays each nasal passage every 8 hours, if needed, as directed. 30 mL 12    lancets 30 gauge Misc 1 lancet by Misc.(Non-Drug; Combo Route) route 3 (three) times daily. 200 each 0    linaCLOtide (LINZESS) 145 mcg Cap capsule Take 1 capsule by mouth 30 minutes before breakfast 30 capsule 11    metFORMIN (GLUCOPHAGE) 1000 MG tablet TAKE ONE TABLET BY MOUTH TWICE DAILY WITH MEALS 180 tablet 0    methscopolamine (PAMINE) 2.5 mg Tab Take 1 tablet by mouth every 12 hours, as directed, if needed 180 tablet 4     naproxen (NAPROSYN) 500 MG tablet Take 1 tablet (500 mg total) by mouth 2 (two) times daily with meals. For arm pain 20 tablet 0    nitroGLYCERIN (NITROSTAT) 0.4 MG SL tablet Place 1 tablet under the tongue every 5 (five) minutes as needed for Chest pain. 25 tablet 0    olmesartan (BENICAR) 40 MG tablet Take 1 tablet (40 mg total) by mouth once daily. 30 tablet 5    pantoprazole (PROTONIX) 40 MG tablet Take 1 tablet by mouth twice a day 60 tablet 11    promethazine-dextromethorphan (PROMETHAZINE-DM) 6.25-15 mg/5 mL Syrp Take 5 mLs by mouth nightly as needed (cough). 180 mL 0    pulse oximeter (PULSE OXIMETER) device Use twice daily at 8 AM and 3 PM and record the value in MyChart as directed. 1 each 0    rosuvastatin (CRESTOR) 10 MG tablet TAKE ONE TABLET BY MOUTH ONCE DAILY 90 tablet 0    tamsulosin (FLOMAX) 0.4 mg Cap Take 2 capsules (0.8 mg total) by mouth once daily. 180 capsule 3    albuterol (VENTOLIN HFA) 90 mcg/actuation inhaler Inhale 2 puffs into the lungs every 6 (six) hours as needed for Wheezing or Shortness of Breath. Rescue 36 g 3    fluticasone-umeclidin-vilanter (TRELEGY ELLIPTA) 100-62.5-25 mcg DsDv Inhale 1 puff into the lungs once daily. 60 each 11     No current facility-administered medications for this visit.       Past Medical History:   Diagnosis Date    Arthritis     Cataract     COPD (chronic obstructive pulmonary disease)     Diabetes mellitus, type 2     Hyperlipidemia     Hypertension     Personal history of colonic polyps      Past Surgical History:   Procedure Laterality Date    ANGIOPLASTY      JOINT REPLACEMENT  2017    TOTAL KNEE ARTHROPLASTY       Family History   Problem Relation Age of Onset    Diabetes Mother     Arthritis Mother     Cancer Father      Social History     Tobacco Use    Smoking status: Former     Types: Cigars     Quit date: 2020     Years since quittin.6    Smokeless tobacco: Never    Tobacco comments:     3-5 cigars each day   Substance Use Topics  "   Alcohol use: No    Drug use: No        Review of Systems:  Review of Systems   Constitutional: Negative.    HENT:  Negative for congestion.    Eyes: Negative.    Respiratory:  Negative for apnea, cough, shortness of breath and wheezing.    Cardiovascular: Negative.         Varicose veins bilaterally   Gastrointestinal: Negative.    Endocrine: Negative.    Genitourinary: Negative.    Musculoskeletal: Negative.    Skin: Negative.    Allergic/Immunologic: Negative.    Neurological: Negative.    Hematological: Negative.    Psychiatric/Behavioral:  Negative for sleep disturbance.      OBJECTIVE:     Vital Signs (Most Recent)  Pulse: (!) 54 (10/06/22 1011)  Resp: 17 (10/06/22 1011)  SpO2: 97 % (10/06/22 1011)  5' 8" (1.727 m)  107.5 kg (236 lb 15.9 oz)     Physical Exam:  Physical Exam  Vitals and nursing note reviewed.   Constitutional:       Appearance: He is well-developed.       HENT:      Head: Normocephalic and atraumatic.      Nose: No mucosal edema or rhinorrhea.      Mouth/Throat:      Pharynx: No oropharyngeal exudate.   Eyes:      General: No scleral icterus.        Left eye: No discharge.      Conjunctiva/sclera: Conjunctivae normal.      Pupils: Pupils are equal, round, and reactive to light.   Neck:      Thyroid: No thyromegaly.      Trachea: No tracheal deviation.   Cardiovascular:      Rate and Rhythm: Normal rate and regular rhythm.      Heart sounds: Normal heart sounds. No murmur heard.  Pulmonary:      Effort: Pulmonary effort is normal.      Breath sounds: Examination of the right-lower field reveals decreased breath sounds. Examination of the left-lower field reveals decreased breath sounds. Decreased breath sounds present. No wheezing, rhonchi or rales.   Chest:      Chest wall: No tenderness.   Abdominal:      General: Bowel sounds are normal. There is no distension.      Palpations: Abdomen is soft.   Musculoskeletal:         General: No tenderness or deformity. Normal range of motion.      " Cervical back: Normal range of motion and neck supple.   Skin:     General: Skin is warm and dry.      Capillary Refill: Capillary refill takes 2 to 3 seconds.   Neurological:      Mental Status: He is alert and oriented to person, place, and time.      Cranial Nerves: No cranial nerve deficit.       Laboratory  CBC: Reviewed  BMP: Reviewed      DOWNLOAD    DOWNLOAD       Spirometry;  FEV1: 2.47L ( 76.6%), FVC 4.32L( 102.8 %)  FEV1/FVC  57  MODERATE Obstruction      Chest  Xray   X-Ray Chest PA And Lateral  Narrative: EXAMINATION:  XR CHEST PA AND LATERAL    CLINICAL HISTORY:  Chronic obstructive pulmonary disease, unspecified    TECHNIQUE:  PA and lateral views of the chest were performed.    COMPARISON:  Chest x-ray dated October 8, 2021    FINDINGS:  The trachea and cardiomediastinal silhouette are within normal limits.  There is no evidence of pleural effusions, pneumothoraces or consolidations.  Chronic appearing interstitial markings are identified bilaterally.  Osseous structures demonstrate no evidence for acute fractures or dislocations.  Impression: No acute abnormality.    Electronically signed by: Marcus Kim MD  Date:    10/06/2022  Time:    09:44       ASSESSMENT/PLAN:     Problem List Items Addressed This Visit       History of pulmonary embolism    Aortic atherosclerosis    Controlled type 2 diabetes mellitus without complication, without long-term current use of insulin     Stable:  Metformin, TRULICITY              Hypertension associated with diabetes     Hypertension stable on amlodipine, BENICAR         Hyperlipidemia associated with type 2 diabetes mellitus     Stable on Crestor.         Moderate COPD (chronic obstructive pulmonary disease) - Primary     COPD ROS: taking medications as instructed, no medication side effects noted, no significant ongoing wheezing or shortness of breath, using bronchodilator MDI less than twice a week.     New concerns: None.     Reviewed Spirometry and  CXR  Moderate obstruction: on TRELEGY  Immunisations are current'    Exam: appears well, vitals normal, no respiratory distress, acyanotic, normal RR, chest clear, no wheezing, crepitations, rhonchi, normal symmetric air entry.     Assessment: COPD reasonably well controlled.     Plan: current treatment plan is effective, no change in therapy.             Relevant Medications    albuterol (VENTOLIN HFA) 90 mcg/actuation inhaler    fluticasone-umeclidin-vilanter (TRELEGY ELLIPTA) 100-62.5-25 mcg DsDv    Other Relevant Orders    X-Ray Chest PA And Lateral    Spirometry with/without bronchodilator    Stress test, pulmonary    Pulmonary nodule     Stable Annual surveilance         MACHO on CPAP     Currently on APAP 4-10  Has new device  No Day time sleepiness or Snoring  Usage > 4 hrs was 46.7%  AHI 1.6  Using and benefit             Relevant Orders    CPAP/BIPAP SUPPLIES    BMI 35.0-35.9,adult     General weight loss/lifestyle modification strategies discussed (elicit support from others; identify saboteurs; non-food rewards).  Diet interventions: low calorie (1000 kCal/d) deficit diet             PLAN:Plan     Carlos stockings as prescribed  Adherent with PAP and benefits  GROUP A mRC0, CAT score < 10  Adherence with weight loss exercise  Adherence to smoking cessation    Requested Prescriptions     Signed Prescriptions Disp Refills    albuterol (VENTOLIN HFA) 90 mcg/actuation inhaler 36 g 3     Sig: Inhale 2 puffs into the lungs every 6 (six) hours as needed for Wheezing or Shortness of Breath. Rescue    fluticasone-umeclidin-vilanter (TRELEGY ELLIPTA) 100-62.5-25 mcg DsDv 60 each 11     Sig: Inhale 1 puff into the lungs once daily.         Follow up in about 1 year (around 10/6/2023), or CXR, [place], 6MWD, refills.    This note was prepared using voice recognition system and is likely to have sound alike errors that may have been overlooked even after proof reading.  Please call me with any questions    Discussed  diagnosis, its evaluation, treatment and usual course. All questions answered.    Thank you for the courtesy of participating in the care of this patient    Mazin Wells MD    Orders Placed This Encounter   Procedures    CPAP/BIPAP SUPPLIES     Order Specific Question:   Length of need (1-99 months):     Answer:   99     Order Specific Question:   Choose ONE mask type and its corresponding cushions and/or pillows:     Answer:    Full Face Mask, 1 per 90 days:  Full Face Cushion, (3 per 90 days)     Order Specific Question:   Choose EITHER Heated or Non-Heated Tubjing     Answer:    Non-Heated Tubing, 1 per 90 days     Order Specific Question:   All other supplies as needed as listed below:     Answer:    Headgear, 1 per 180 days     Order Specific Question:   All other supplies as needed as listed below:     Answer:    Disposable Filter, 6 per 90 days     Order Specific Question:   All other supplies as needed as listed below:     Answer:    Non-Disposable Filter, 1 per 180 days     Order Specific Question:   All other supplies as needed as listed below:     Answer:    Exhalation Port, contact payer for quantity/frequency     Order Specific Question:   All other supplies as needed as listed below:     Answer:    Humidifier Chamber, 1 per 180 days    X-Ray Chest PA And Lateral     Standing Status:   Future     Standing Expiration Date:   10/6/2023    Spirometry with/without bronchodilator     Standing Status:   Future     Standing Expiration Date:   10/6/2023     Order Specific Question:   Release to patient     Answer:   Immediate    Stress test, pulmonary     Standing Status:   Future     Standing Expiration Date:   10/6/2023     Order Specific Question:   Reason for study     Answer:   Functional status     Order Specific Question:   Release to patient     Answer:   Immediate

## 2022-10-06 NOTE — ASSESSMENT & PLAN NOTE
Currently on APAP 4-10  Has new device  No Day time sleepiness or Snoring  Usage > 4 hrs was 46.7%  AHI 1.6  Using and benefit

## 2022-10-07 LAB
BRPFT: NORMAL
FEF 25 75 LLN: 1.19
FEF 25 75 PRE REF: 36.5 %
FEF 25 75 REF: 2.58
FEV1 FVC LLN: 64
FEV1 FVC PRE REF: 74.4 %
FEV1 FVC REF: 77
FEV1 LLN: 2.38
FEV1 PRE REF: 76.6 %
FEV1 REF: 3.23
FVC LLN: 3.16
FVC PRE REF: 102.8 %
FVC REF: 4.2
PEF LLN: 6.27
PEF PRE REF: 61.3 %
PEF REF: 8.49
PRE FEF 25 75: 0.94 L/S
PRE FET 100: 10.76 SEC
PRE FEV1 FVC: 57.21 %
PRE FEV1: 2.47 L
PRE FVC: 4.32 L
PRE PEF: 5.21 L/S

## 2022-10-12 ENCOUNTER — LAB VISIT (OUTPATIENT)
Dept: LAB | Facility: HOSPITAL | Age: 67
End: 2022-10-12
Attending: INTERNAL MEDICINE
Payer: MEDICARE

## 2022-10-12 DIAGNOSIS — I25.10 CORONARY ARTERY DISEASE INVOLVING NATIVE CORONARY ARTERY OF NATIVE HEART WITHOUT ANGINA PECTORIS: ICD-10-CM

## 2022-10-12 DIAGNOSIS — E11.9 CONTROLLED TYPE 2 DIABETES MELLITUS WITHOUT COMPLICATION, WITHOUT LONG-TERM CURRENT USE OF INSULIN: ICD-10-CM

## 2022-10-12 DIAGNOSIS — E78.5 HYPERLIPIDEMIA ASSOCIATED WITH TYPE 2 DIABETES MELLITUS: ICD-10-CM

## 2022-10-12 DIAGNOSIS — E11.69 HYPERLIPIDEMIA ASSOCIATED WITH TYPE 2 DIABETES MELLITUS: ICD-10-CM

## 2022-10-12 LAB
ALBUMIN SERPL BCP-MCNC: 4 G/DL (ref 3.5–5.2)
ALP SERPL-CCNC: 61 U/L (ref 55–135)
ALT SERPL W/O P-5'-P-CCNC: 20 U/L (ref 10–44)
ANION GAP SERPL CALC-SCNC: 9 MMOL/L (ref 8–16)
AST SERPL-CCNC: 15 U/L (ref 10–40)
BASOPHILS # BLD AUTO: 0.05 K/UL (ref 0–0.2)
BASOPHILS NFR BLD: 0.7 % (ref 0–1.9)
BILIRUB SERPL-MCNC: 0.3 MG/DL (ref 0.1–1)
BUN SERPL-MCNC: 11 MG/DL (ref 8–23)
CALCIUM SERPL-MCNC: 9.4 MG/DL (ref 8.7–10.5)
CHLORIDE SERPL-SCNC: 102 MMOL/L (ref 95–110)
CHOLEST SERPL-MCNC: 120 MG/DL (ref 120–199)
CHOLEST/HDLC SERPL: 2.6 {RATIO} (ref 2–5)
CO2 SERPL-SCNC: 25 MMOL/L (ref 23–29)
CREAT SERPL-MCNC: 0.9 MG/DL (ref 0.5–1.4)
DIFFERENTIAL METHOD: ABNORMAL
EOSINOPHIL # BLD AUTO: 0.2 K/UL (ref 0–0.5)
EOSINOPHIL NFR BLD: 2.4 % (ref 0–8)
ERYTHROCYTE [DISTWIDTH] IN BLOOD BY AUTOMATED COUNT: 12.5 % (ref 11.5–14.5)
EST. GFR  (NO RACE VARIABLE): >60 ML/MIN/1.73 M^2
GLUCOSE SERPL-MCNC: 104 MG/DL (ref 70–110)
HCT VFR BLD AUTO: 41.6 % (ref 40–54)
HDLC SERPL-MCNC: 46 MG/DL (ref 40–75)
HDLC SERPL: 38.3 % (ref 20–50)
HGB BLD-MCNC: 12.5 G/DL (ref 14–18)
IMM GRANULOCYTES # BLD AUTO: 0.02 K/UL (ref 0–0.04)
IMM GRANULOCYTES NFR BLD AUTO: 0.3 % (ref 0–0.5)
LDLC SERPL CALC-MCNC: 56.8 MG/DL (ref 63–159)
LYMPHOCYTES # BLD AUTO: 2.1 K/UL (ref 1–4.8)
LYMPHOCYTES NFR BLD: 28.9 % (ref 18–48)
MCH RBC QN AUTO: 28.8 PG (ref 27–31)
MCHC RBC AUTO-ENTMCNC: 30 G/DL (ref 32–36)
MCV RBC AUTO: 96 FL (ref 82–98)
MONOCYTES # BLD AUTO: 0.7 K/UL (ref 0.3–1)
MONOCYTES NFR BLD: 9.7 % (ref 4–15)
NEUTROPHILS # BLD AUTO: 4.3 K/UL (ref 1.8–7.7)
NEUTROPHILS NFR BLD: 58 % (ref 38–73)
NONHDLC SERPL-MCNC: 74 MG/DL
NRBC BLD-RTO: 0 /100 WBC
PLATELET # BLD AUTO: 259 K/UL (ref 150–450)
PMV BLD AUTO: 11 FL (ref 9.2–12.9)
POTASSIUM SERPL-SCNC: 4.8 MMOL/L (ref 3.5–5.1)
PROT SERPL-MCNC: 6.8 G/DL (ref 6–8.4)
RBC # BLD AUTO: 4.34 M/UL (ref 4.6–6.2)
SODIUM SERPL-SCNC: 136 MMOL/L (ref 136–145)
TRIGL SERPL-MCNC: 86 MG/DL (ref 30–150)
TSH SERPL DL<=0.005 MIU/L-ACNC: 1.2 UIU/ML (ref 0.4–4)
WBC # BLD AUTO: 7.41 K/UL (ref 3.9–12.7)

## 2022-10-12 PROCEDURE — 84443 ASSAY THYROID STIM HORMONE: CPT | Performed by: INTERNAL MEDICINE

## 2022-10-12 PROCEDURE — 80061 LIPID PANEL: CPT | Performed by: INTERNAL MEDICINE

## 2022-10-12 PROCEDURE — 85025 COMPLETE CBC W/AUTO DIFF WBC: CPT | Performed by: INTERNAL MEDICINE

## 2022-10-12 PROCEDURE — 80053 COMPREHEN METABOLIC PANEL: CPT | Performed by: INTERNAL MEDICINE

## 2022-10-12 PROCEDURE — 36415 COLL VENOUS BLD VENIPUNCTURE: CPT | Performed by: INTERNAL MEDICINE

## 2022-10-12 PROCEDURE — 83036 HEMOGLOBIN GLYCOSYLATED A1C: CPT | Performed by: INTERNAL MEDICINE

## 2022-10-13 LAB
ESTIMATED AVG GLUCOSE: 157 MG/DL (ref 68–131)
HBA1C MFR BLD: 7.1 % (ref 4–5.6)

## 2022-10-14 ENCOUNTER — PATIENT OUTREACH (OUTPATIENT)
Dept: ADMINISTRATIVE | Facility: HOSPITAL | Age: 67
End: 2022-10-14
Payer: MEDICARE

## 2022-10-20 ENCOUNTER — OFFICE VISIT (OUTPATIENT)
Dept: INTERNAL MEDICINE | Facility: CLINIC | Age: 67
End: 2022-10-20
Payer: MEDICARE

## 2022-10-20 VITALS
DIASTOLIC BLOOD PRESSURE: 70 MMHG | TEMPERATURE: 98 F | OXYGEN SATURATION: 95 % | HEIGHT: 68 IN | BODY MASS INDEX: 35.25 KG/M2 | SYSTOLIC BLOOD PRESSURE: 118 MMHG | HEART RATE: 82 BPM | WEIGHT: 232.56 LBS

## 2022-10-20 DIAGNOSIS — Z23 NEED FOR VACCINATION: ICD-10-CM

## 2022-10-20 DIAGNOSIS — E78.5 HYPERLIPIDEMIA ASSOCIATED WITH TYPE 2 DIABETES MELLITUS: ICD-10-CM

## 2022-10-20 DIAGNOSIS — E11.69 HYPERLIPIDEMIA ASSOCIATED WITH TYPE 2 DIABETES MELLITUS: ICD-10-CM

## 2022-10-20 DIAGNOSIS — E11.9 CONTROLLED TYPE 2 DIABETES MELLITUS WITHOUT COMPLICATION, WITHOUT LONG-TERM CURRENT USE OF INSULIN: Primary | ICD-10-CM

## 2022-10-20 DIAGNOSIS — Z87.891 HISTORY OF CIGARETTE SMOKING: ICD-10-CM

## 2022-10-20 PROCEDURE — 3288F FALL RISK ASSESSMENT DOCD: CPT | Mod: CPTII,S$GLB,, | Performed by: INTERNAL MEDICINE

## 2022-10-20 PROCEDURE — 99999 PR PBB SHADOW E&M-EST. PATIENT-LVL V: ICD-10-PCS | Mod: PBBFAC,,, | Performed by: INTERNAL MEDICINE

## 2022-10-20 PROCEDURE — 3066F PR DOCUMENTATION OF TREATMENT FOR NEPHROPATHY: ICD-10-PCS | Mod: CPTII,S$GLB,, | Performed by: INTERNAL MEDICINE

## 2022-10-20 PROCEDURE — 3066F NEPHROPATHY DOC TX: CPT | Mod: CPTII,S$GLB,, | Performed by: INTERNAL MEDICINE

## 2022-10-20 PROCEDURE — 3078F PR MOST RECENT DIASTOLIC BLOOD PRESSURE < 80 MM HG: ICD-10-PCS | Mod: CPTII,S$GLB,, | Performed by: INTERNAL MEDICINE

## 2022-10-20 PROCEDURE — 3072F PR LOW RISK FOR RETINOPATHY: ICD-10-PCS | Mod: CPTII,S$GLB,, | Performed by: INTERNAL MEDICINE

## 2022-10-20 PROCEDURE — 99214 OFFICE O/P EST MOD 30 MIN: CPT | Mod: S$GLB,,, | Performed by: INTERNAL MEDICINE

## 2022-10-20 PROCEDURE — 99214 PR OFFICE/OUTPT VISIT, EST, LEVL IV, 30-39 MIN: ICD-10-PCS | Mod: S$GLB,,, | Performed by: INTERNAL MEDICINE

## 2022-10-20 PROCEDURE — 3288F PR FALLS RISK ASSESSMENT DOCUMENTED: ICD-10-PCS | Mod: CPTII,S$GLB,, | Performed by: INTERNAL MEDICINE

## 2022-10-20 PROCEDURE — 4010F PR ACE/ARB THEARPY RXD/TAKEN: ICD-10-PCS | Mod: CPTII,S$GLB,, | Performed by: INTERNAL MEDICINE

## 2022-10-20 PROCEDURE — 1101F PR PT FALLS ASSESS DOC 0-1 FALLS W/OUT INJ PAST YR: ICD-10-PCS | Mod: CPTII,S$GLB,, | Performed by: INTERNAL MEDICINE

## 2022-10-20 PROCEDURE — 3072F LOW RISK FOR RETINOPATHY: CPT | Mod: CPTII,S$GLB,, | Performed by: INTERNAL MEDICINE

## 2022-10-20 PROCEDURE — 3074F PR MOST RECENT SYSTOLIC BLOOD PRESSURE < 130 MM HG: ICD-10-PCS | Mod: CPTII,S$GLB,, | Performed by: INTERNAL MEDICINE

## 2022-10-20 PROCEDURE — 3078F DIAST BP <80 MM HG: CPT | Mod: CPTII,S$GLB,, | Performed by: INTERNAL MEDICINE

## 2022-10-20 PROCEDURE — 1159F MED LIST DOCD IN RCRD: CPT | Mod: CPTII,S$GLB,, | Performed by: INTERNAL MEDICINE

## 2022-10-20 PROCEDURE — 99999 PR PBB SHADOW E&M-EST. PATIENT-LVL V: CPT | Mod: PBBFAC,,, | Performed by: INTERNAL MEDICINE

## 2022-10-20 PROCEDURE — 1101F PT FALLS ASSESS-DOCD LE1/YR: CPT | Mod: CPTII,S$GLB,, | Performed by: INTERNAL MEDICINE

## 2022-10-20 PROCEDURE — 3074F SYST BP LT 130 MM HG: CPT | Mod: CPTII,S$GLB,, | Performed by: INTERNAL MEDICINE

## 2022-10-20 PROCEDURE — 1159F PR MEDICATION LIST DOCUMENTED IN MEDICAL RECORD: ICD-10-PCS | Mod: CPTII,S$GLB,, | Performed by: INTERNAL MEDICINE

## 2022-10-20 PROCEDURE — 3051F PR MOST RECENT HEMOGLOBIN A1C LEVEL 7.0 - < 8.0%: ICD-10-PCS | Mod: CPTII,S$GLB,, | Performed by: INTERNAL MEDICINE

## 2022-10-20 PROCEDURE — 90694 FLU VACCINE - QUADRIVALENT - ADJUVANTED: ICD-10-PCS | Mod: S$GLB,,, | Performed by: INTERNAL MEDICINE

## 2022-10-20 PROCEDURE — 3051F HG A1C>EQUAL 7.0%<8.0%: CPT | Mod: CPTII,S$GLB,, | Performed by: INTERNAL MEDICINE

## 2022-10-20 PROCEDURE — G0008 ADMIN INFLUENZA VIRUS VAC: HCPCS | Mod: S$GLB,,, | Performed by: INTERNAL MEDICINE

## 2022-10-20 PROCEDURE — 3060F PR POS MICROALBUMINURIA RESULT DOCUMENTED/REVIEW: ICD-10-PCS | Mod: CPTII,S$GLB,, | Performed by: INTERNAL MEDICINE

## 2022-10-20 PROCEDURE — G0008 FLU VACCINE - QUADRIVALENT - ADJUVANTED: ICD-10-PCS | Mod: S$GLB,,, | Performed by: INTERNAL MEDICINE

## 2022-10-20 PROCEDURE — 4010F ACE/ARB THERAPY RXD/TAKEN: CPT | Mod: CPTII,S$GLB,, | Performed by: INTERNAL MEDICINE

## 2022-10-20 PROCEDURE — 3060F POS MICROALBUMINURIA REV: CPT | Mod: CPTII,S$GLB,, | Performed by: INTERNAL MEDICINE

## 2022-10-20 PROCEDURE — 90694 VACC AIIV4 NO PRSRV 0.5ML IM: CPT | Mod: S$GLB,,, | Performed by: INTERNAL MEDICINE

## 2022-10-20 RX ORDER — METFORMIN HYDROCHLORIDE 1000 MG/1
1000 TABLET ORAL 2 TIMES DAILY WITH MEALS
Qty: 180 TABLET | Refills: 0 | Status: SHIPPED | OUTPATIENT
Start: 2022-10-20 | End: 2023-03-29 | Stop reason: SDUPTHER

## 2022-10-20 RX ORDER — ROSUVASTATIN CALCIUM 10 MG/1
TABLET, COATED ORAL
Qty: 90 TABLET | Refills: 0 | Status: SHIPPED | OUTPATIENT
Start: 2022-10-20 | End: 2023-03-15 | Stop reason: SDUPTHER

## 2022-10-20 RX ORDER — DAPAGLIFLOZIN 10 MG/1
10 TABLET, FILM COATED ORAL DAILY
Qty: 90 TABLET | Refills: 2 | Status: SHIPPED | OUTPATIENT
Start: 2022-10-20 | End: 2023-05-03 | Stop reason: SDUPTHER

## 2022-10-20 RX ORDER — DULAGLUTIDE 3 MG/.5ML
3 INJECTION, SOLUTION SUBCUTANEOUS
Qty: 12 PEN | Refills: 1 | Status: SHIPPED | OUTPATIENT
Start: 2022-10-20 | End: 2023-05-03 | Stop reason: SDUPTHER

## 2022-10-20 NOTE — PROGRESS NOTES
"Subjective:      Patient ID: Stefan Forbes Jr. is a 67 y.o. male.    Chief Complaint: Follow-up    HPI    68 yo with   Patient Active Problem List   Diagnosis    Controlled type 2 diabetes mellitus without complication, without long-term current use of insulin    Hypertension associated with diabetes    Hyperlipidemia associated with type 2 diabetes mellitus    Moderate COPD (chronic obstructive pulmonary disease)    CAD (coronary artery disease)    Vitamin D deficiency    Osteoarthritis of knee    Pulmonary nodule    Nevus of choroid of right eye    History of pulmonary embolism    Hyponatremia    Iron deficiency anemia due to chronic blood loss    MACHO on CPAP    PLMD (periodic limb movement disorder)    BMI 35.0-35.9,adult    Varicose vein of leg    Tobacco abuse, in remission    Tubular adenoma of colon    Benign prostatic hyperplasia with weak urinary stream    Urgency of urination    Morbid (severe) obesity due to excess calories    Aortic atherosclerosis    COVID     Past Medical History:   Diagnosis Date    Arthritis     Cataract     COPD (chronic obstructive pulmonary disease)     Diabetes mellitus, type 2     Hyperlipidemia     Hypertension     Personal history of colonic polyps 2018     Here today for management of mult med problems as outlined below.  Compliant with meds without significant side effects. Energy and appetite good.       Review of Systems   Constitutional:  Negative for chills and fever.   HENT:  Negative for ear pain and sore throat.    Respiratory:  Negative for cough.    Cardiovascular:  Negative for chest pain.   Gastrointestinal:  Negative for abdominal pain and blood in stool.   Genitourinary:  Negative for dysuria and hematuria.   Neurological:  Negative for seizures and syncope.   Objective:   /70 (BP Location: Left arm, Patient Position: Sitting, BP Method: Large (Manual))   Pulse 82   Temp 97.7 °F (36.5 °C) (Tympanic)   Ht 5' 8" (1.727 m)   Wt 105.5 kg (232 lb 9.4 oz) "   SpO2 95%   BMI 35.36 kg/m²     Physical Exam  Constitutional:       General: He is not in acute distress.     Appearance: He is well-developed.   HENT:      Head: Normocephalic and atraumatic.   Eyes:      Extraocular Movements: Extraocular movements intact.   Neck:      Thyroid: No thyromegaly.   Cardiovascular:      Rate and Rhythm: Normal rate and regular rhythm.   Pulmonary:      Breath sounds: Normal breath sounds. No wheezing or rales.   Abdominal:      General: Bowel sounds are normal.      Palpations: Abdomen is soft.      Tenderness: There is no abdominal tenderness.   Musculoskeletal:         General: No swelling.      Cervical back: Neck supple. No rigidity.   Lymphadenopathy:      Cervical: No cervical adenopathy.   Skin:     General: Skin is warm and dry.   Neurological:      Mental Status: He is alert and oriented to person, place, and time.   Psychiatric:         Mood and Affect: Mood normal.         Behavior: Behavior normal.     Patient Outreach on 10/14/2022   Component Date Value Ref Range Status    Left Eye DM Retinopathy 02/15/2022 Negative   Final    Right Eye DM Retinopathy 02/15/2022 Negative   Final   Lab Visit on 10/12/2022   Component Date Value Ref Range Status    Cholesterol 10/12/2022 120  120 - 199 mg/dL Final    Comment: The National Cholesterol Education Program (NCEP) has set the  following guidelines (reference ranges) for Cholesterol:  Optimal.....................<200 mg/dL  Borderline High.............200-239 mg/dL  High........................> or = 240 mg/dL      Triglycerides 10/12/2022 86  30 - 150 mg/dL Final    Comment: The National Cholesterol Education Program (NCEP) has set the  following guidelines (reference values) for triglycerides:  Normal......................<150 mg/dL  Borderline High.............150-199 mg/dL  High........................200-499 mg/dL      HDL 10/12/2022 46  40 - 75 mg/dL Final    Comment: The National Cholesterol Education Program (NCEP)  has set the  following guidelines (reference values) for HDL Cholesterol:  Low...............<40 mg/dL  Optimal...........>60 mg/dL      LDL Cholesterol 10/12/2022 56.8 (L)  63.0 - 159.0 mg/dL Final    Comment: The National Cholesterol Education Program (NCEP) has set the  following guidelines (reference values) for LDL Cholesterol:  Optimal.......................<130 mg/dL  Borderline High...............130-159 mg/dL  High..........................160-189 mg/dL  Very High.....................>190 mg/dL      HDL/Cholesterol Ratio 10/12/2022 38.3  20.0 - 50.0 % Final    Total Cholesterol/HDL Ratio 10/12/2022 2.6  2.0 - 5.0 Final    Non-HDL Cholesterol 10/12/2022 74  mg/dL Final    Comment: Risk category and Non-HDL cholesterol goals:  Coronary heart disease (CHD)or equivalent (10-year risk of CHD >20%):  Non-HDL cholesterol goal     <130 mg/dL  Two or more CHD risk factors and 10-year risk of CHD <= 20%:  Non-HDL cholesterol goal     <160 mg/dL  0 to 1 CHD risk factor:  Non-HDL cholesterol goal     <190 mg/dL      Hemoglobin A1C 10/12/2022 7.1 (H)  4.0 - 5.6 % Final    Comment: ADA Screening Guidelines:  5.7-6.4%  Consistent with prediabetes  >or=6.5%  Consistent with diabetes    High levels of fetal hemoglobin interfere with the HbA1C  assay. Heterozygous hemoglobin variants (HbS, HgC, etc)do  not significantly interfere with this assay.   However, presence of multiple variants may affect accuracy.      Estimated Avg Glucose 10/12/2022 157 (H)  68 - 131 mg/dL Final    WBC 10/12/2022 7.41  3.90 - 12.70 K/uL Final    RBC 10/12/2022 4.34 (L)  4.60 - 6.20 M/uL Final    Hemoglobin 10/12/2022 12.5 (L)  14.0 - 18.0 g/dL Final    Hematocrit 10/12/2022 41.6  40.0 - 54.0 % Final    MCV 10/12/2022 96  82 - 98 fL Final    MCH 10/12/2022 28.8  27.0 - 31.0 pg Final    MCHC 10/12/2022 30.0 (L)  32.0 - 36.0 g/dL Final    RDW 10/12/2022 12.5  11.5 - 14.5 % Final    Platelets 10/12/2022 259  150 - 450 K/uL Final    MPV 10/12/2022  11.0  9.2 - 12.9 fL Final    Immature Granulocytes 10/12/2022 0.3  0.0 - 0.5 % Final    Gran # (ANC) 10/12/2022 4.3  1.8 - 7.7 K/uL Final    Immature Grans (Abs) 10/12/2022 0.02  0.00 - 0.04 K/uL Final    Comment: Mild elevation in immature granulocytes is non specific and   can be seen in a variety of conditions including stress response,   acute inflammation, trauma and pregnancy. Correlation with other   laboratory and clinical findings is essential.      Lymph # 10/12/2022 2.1  1.0 - 4.8 K/uL Final    Mono # 10/12/2022 0.7  0.3 - 1.0 K/uL Final    Eos # 10/12/2022 0.2  0.0 - 0.5 K/uL Final    Baso # 10/12/2022 0.05  0.00 - 0.20 K/uL Final    nRBC 10/12/2022 0  0 /100 WBC Final    Gran % 10/12/2022 58.0  38.0 - 73.0 % Final    Lymph % 10/12/2022 28.9  18.0 - 48.0 % Final    Mono % 10/12/2022 9.7  4.0 - 15.0 % Final    Eosinophil % 10/12/2022 2.4  0.0 - 8.0 % Final    Basophil % 10/12/2022 0.7  0.0 - 1.9 % Final    Differential Method 10/12/2022 Automated   Final    Sodium 10/12/2022 136  136 - 145 mmol/L Final    Potassium 10/12/2022 4.8  3.5 - 5.1 mmol/L Final    Chloride 10/12/2022 102  95 - 110 mmol/L Final    CO2 10/12/2022 25  23 - 29 mmol/L Final    Glucose 10/12/2022 104  70 - 110 mg/dL Final    BUN 10/12/2022 11  8 - 23 mg/dL Final    Creatinine 10/12/2022 0.9  0.5 - 1.4 mg/dL Final    Calcium 10/12/2022 9.4  8.7 - 10.5 mg/dL Final    Total Protein 10/12/2022 6.8  6.0 - 8.4 g/dL Final    Albumin 10/12/2022 4.0  3.5 - 5.2 g/dL Final    Total Bilirubin 10/12/2022 0.3  0.1 - 1.0 mg/dL Final    Comment: For infants and newborns, interpretation of results should be based  on gestational age, weight and in agreement with clinical  observations.    Premature Infant recommended reference ranges:  Up to 24 hours.............<8.0 mg/dL  Up to 48 hours............<12.0 mg/dL  3-5 days..................<15.0 mg/dL  6-29 days.................<15.0 mg/dL      Alkaline Phosphatase 10/12/2022 61  55 - 135 U/L Final     AST 10/12/2022 15  10 - 40 U/L Final    ALT 10/12/2022 20  10 - 44 U/L Final    Anion Gap 10/12/2022 9  8 - 16 mmol/L Final    eGFR 10/12/2022 >60.0  >60 mL/min/1.73 m^2 Final    TSH 10/12/2022 1.195  0.400 - 4.000 uIU/mL Final   Lab Visit on 10/12/2022   Component Date Value Ref Range Status    Microalbumin, Urine 10/12/2022 22.0  ug/mL Final    Creatinine, Urine 10/12/2022 41.0  23.0 - 375.0 mg/dL Final    Microalb/Creat Ratio 10/12/2022 53.7 (H)  0.0 - 30.0 ug/mg Final         Assessment:     1. Controlled type 2 diabetes mellitus without complication, without long-term current use of insulin    2. Need for vaccination    3. Hyperlipidemia associated with type 2 diabetes mellitus    4. History of cigarette smoking      Plan:   Controlled type 2 diabetes mellitus without complication, without long-term current use of insulin  controlled  -     metFORMIN (GLUCOPHAGE) 1000 MG tablet; TAKE ONE TABLET BY MOUTH TWICE DAILY WITH MEALS  Dispense: 180 tablet; Refill: 0  -     dulaglutide (TRULICITY) 3 mg/0.5 mL pen injector; Inject 3 mg into the skin every 7 days.  Dispense: 12 pen; Refill: 1  -     dapagliflozin (FARXIGA) 10 mg tablet; Take 1 tablet (10 mg total) by mouth once daily.  Dispense: 90 tablet; Refill: 2  -     Hemoglobin A1C; Future; Expected date: 04/18/2023  -     Hemoglobin A1C; Future; Expected date: 04/18/2023    Need for vaccination  -     Influenza (FLUAD) - Quadrivalent (Adjuvanted) *Preferred* (65+) (PF)    Hyperlipidemia associated with type 2 diabetes mellitus  -     rosuvastatin (CRESTOR) 10 MG tablet; TAKE ONE TABLET BY MOUTH ONCE DAILY  Dispense: 90 tablet; Refill: 0    History of cigarette smoking  -     US Abdominal Aorta; Future; Expected date: 10/20/2022      Lab Frequency Next Occurrence   PSA, Screening Once 01/25/2023   Ambulatory referral/consult to Gastroenterology Once 04/14/2022   X-Ray Chest PA And Lateral Once 10/06/2022   Microalbumin/creatinine urine ratio         Problem List  Items Addressed This Visit       Controlled type 2 diabetes mellitus without complication, without long-term current use of insulin - Primary    Relevant Medications    metFORMIN (GLUCOPHAGE) 1000 MG tablet    dulaglutide (TRULICITY) 3 mg/0.5 mL pen injector    dapagliflozin (FARXIGA) 10 mg tablet    Other Relevant Orders    Hemoglobin A1C    Hemoglobin A1C    Hyperlipidemia associated with type 2 diabetes mellitus    Relevant Medications    rosuvastatin (CRESTOR) 10 MG tablet    metFORMIN (GLUCOPHAGE) 1000 MG tablet    dulaglutide (TRULICITY) 3 mg/0.5 mL pen injector     Other Visit Diagnoses       Need for vaccination        Relevant Orders    Influenza (FLUAD) - Quadrivalent (Adjuvanted) *Preferred* (65+) (PF) (Completed)    History of cigarette smoking        Relevant Orders    US Abdominal Aorta            Follow up if symptoms worsen or fail to improve.

## 2022-10-20 NOTE — PATIENT INSTRUCTIONS
Check with your pharmacy regarding new shingles vaccine.    Covid vaccine phone number is [phone].

## 2022-10-21 ENCOUNTER — TELEPHONE (OUTPATIENT)
Dept: PHARMACY | Facility: CLINIC | Age: 67
End: 2022-10-21
Payer: MEDICARE

## 2022-10-21 ENCOUNTER — PATIENT MESSAGE (OUTPATIENT)
Dept: PHARMACY | Facility: CLINIC | Age: 67
End: 2022-10-21
Payer: MEDICARE

## 2022-10-21 NOTE — TELEPHONE ENCOUNTER
We have contacted Stefan Forbes Jr. to informed him of the re-enrollment process for the  [facility] Patient Assistance Program and what's he needs to provide to continue receiving Trulicity through [facility] Patient Assistance Program.      The following documents may be required to re enroll for the 2023 calendar year: Proof of household Income( such as social security statement, 1099 form, pension statement or 3 consecutive pay stubs

## 2022-10-27 ENCOUNTER — HOSPITAL ENCOUNTER (OUTPATIENT)
Dept: RADIOLOGY | Facility: HOSPITAL | Age: 67
Discharge: HOME | End: 2022-10-27
Attending: INTERNAL MEDICINE
Payer: MEDICARE

## 2022-10-27 DIAGNOSIS — Z87.891 HISTORY OF CIGARETTE SMOKING: ICD-10-CM

## 2022-10-27 PROCEDURE — 76775 US EXAM ABDO BACK WALL LIM: CPT | Mod: 26,,, | Performed by: RADIOLOGY

## 2022-10-27 PROCEDURE — 76775 US EXAM ABDO BACK WALL LIM: CPT | Mod: TC

## 2022-10-27 PROCEDURE — 76775 US ABDOMINAL AORTA: ICD-10-PCS | Mod: 26,,, | Performed by: RADIOLOGY

## 2022-12-19 ENCOUNTER — PATIENT MESSAGE (OUTPATIENT)
Dept: OTHER | Facility: OTHER | Age: 67
End: 2022-12-19
Payer: MEDICARE

## 2022-12-22 ENCOUNTER — PATIENT MESSAGE (OUTPATIENT)
Dept: OTHER | Facility: OTHER | Age: 67
End: 2022-12-22
Payer: MEDICARE

## 2023-01-29 DIAGNOSIS — F17.200 NICOTINE DEPENDENCE: ICD-10-CM

## 2023-01-29 NOTE — TELEPHONE ENCOUNTER
Care Due:                  Date            Visit Type   Department     Provider  --------------------------------------------------------------------------------                                EP -                              PRIMARY      [facility] INTERNAL  Last Visit: 10-      CARE ([facility])   CELSA Fisher                              EP -                              PRIMARY      [facility] INTER[facility]  Next Visit: 04-      CARE ([facility])   CELSA Fisher                                                            Last  Test          Frequency    Reason                     Performed    Due Date  --------------------------------------------------------------------------------    HBA1C.......  6 months...  dapagliflozin,             10-   04-                             dulaglutide, metFORMIN...    [employer] Embedded Care Gaps. Reference number: [id number]. 1/29/2023   5:28:15 PM CST

## 2023-01-30 RX ORDER — BUPROPION HYDROCHLORIDE 150 MG/1
150 TABLET, EXTENDED RELEASE ORAL 2 TIMES DAILY
Qty: 180 TABLET | Refills: 2 | Status: SHIPPED | OUTPATIENT
Start: 2023-01-30 | End: 2023-05-03 | Stop reason: SDUPTHER

## 2023-01-30 NOTE — TELEPHONE ENCOUNTER
Refill Decision Note   Stefan Forbes  is requesting a refill authorization.  Brief Assessment and Rationale for Refill:  Approve     Medication Therapy Plan:  FLOS    Medication Reconciliation Completed: No   Comments:     No Care Gaps recommended.     Note composed:12:43 AM 01/30/2023

## 2023-02-06 ENCOUNTER — TELEPHONE (OUTPATIENT)
Dept: ADMINISTRATIVE | Facility: HOSPITAL | Age: 68
End: 2023-02-06
Payer: MEDICARE

## 2023-02-28 ENCOUNTER — OFFICE VISIT (OUTPATIENT)
Dept: UROLOGY | Facility: CLINIC | Age: 68
End: 2023-02-28
Payer: MEDICARE

## 2023-02-28 VITALS
HEART RATE: 72 BPM | TEMPERATURE: 98 F | DIASTOLIC BLOOD PRESSURE: 67 MMHG | SYSTOLIC BLOOD PRESSURE: 108 MMHG | WEIGHT: 230.38 LBS | BODY MASS INDEX: 34.92 KG/M2 | HEIGHT: 68 IN

## 2023-02-28 DIAGNOSIS — N40.1 BENIGN PROSTATIC HYPERPLASIA WITH WEAK URINARY STREAM: ICD-10-CM

## 2023-02-28 DIAGNOSIS — R39.12 BENIGN PROSTATIC HYPERPLASIA WITH WEAK URINARY STREAM: ICD-10-CM

## 2023-02-28 DIAGNOSIS — R39.15 URGENCY OF URINATION: Primary | ICD-10-CM

## 2023-02-28 PROCEDURE — 1126F AMNT PAIN NOTED NONE PRSNT: CPT | Mod: CPTII,S$GLB,, | Performed by: UROLOGY

## 2023-02-28 PROCEDURE — 99999 PR PBB SHADOW E&M-EST. PATIENT-LVL III: ICD-10-PCS | Mod: PBBFAC,,, | Performed by: UROLOGY

## 2023-02-28 PROCEDURE — 99999 PR PBB SHADOW E&M-EST. PATIENT-LVL III: CPT | Mod: PBBFAC,,, | Performed by: UROLOGY

## 2023-02-28 PROCEDURE — 3008F PR BODY MASS INDEX (BMI) DOCUMENTED: ICD-10-PCS | Mod: CPTII,S$GLB,, | Performed by: UROLOGY

## 2023-02-28 PROCEDURE — 4010F PR ACE/ARB THEARPY RXD/TAKEN: ICD-10-PCS | Mod: CPTII,S$GLB,, | Performed by: UROLOGY

## 2023-02-28 PROCEDURE — 3288F PR FALLS RISK ASSESSMENT DOCUMENTED: ICD-10-PCS | Mod: CPTII,S$GLB,, | Performed by: UROLOGY

## 2023-02-28 PROCEDURE — 1159F PR MEDICATION LIST DOCUMENTED IN MEDICAL RECORD: ICD-10-PCS | Mod: CPTII,S$GLB,, | Performed by: UROLOGY

## 2023-02-28 PROCEDURE — 1126F PR PAIN SEVERITY QUANTIFIED, NO PAIN PRESENT: ICD-10-PCS | Mod: CPTII,S$GLB,, | Performed by: UROLOGY

## 2023-02-28 PROCEDURE — 3074F SYST BP LT 130 MM HG: CPT | Mod: CPTII,S$GLB,, | Performed by: UROLOGY

## 2023-02-28 PROCEDURE — 1101F PT FALLS ASSESS-DOCD LE1/YR: CPT | Mod: CPTII,S$GLB,, | Performed by: UROLOGY

## 2023-02-28 PROCEDURE — 99214 OFFICE O/P EST MOD 30 MIN: CPT | Mod: S$GLB,,, | Performed by: UROLOGY

## 2023-02-28 PROCEDURE — 1160F PR REVIEW ALL MEDS BY PRESCRIBER/CLIN PHARMACIST DOCUMENTED: ICD-10-PCS | Mod: CPTII,S$GLB,, | Performed by: UROLOGY

## 2023-02-28 PROCEDURE — 99214 PR OFFICE/OUTPT VISIT, EST, LEVL IV, 30-39 MIN: ICD-10-PCS | Mod: S$GLB,,, | Performed by: UROLOGY

## 2023-02-28 PROCEDURE — 1160F RVW MEDS BY RX/DR IN RCRD: CPT | Mod: CPTII,S$GLB,, | Performed by: UROLOGY

## 2023-02-28 PROCEDURE — 3078F DIAST BP <80 MM HG: CPT | Mod: CPTII,S$GLB,, | Performed by: UROLOGY

## 2023-02-28 PROCEDURE — 3078F PR MOST RECENT DIASTOLIC BLOOD PRESSURE < 80 MM HG: ICD-10-PCS | Mod: CPTII,S$GLB,, | Performed by: UROLOGY

## 2023-02-28 PROCEDURE — 3074F PR MOST RECENT SYSTOLIC BLOOD PRESSURE < 130 MM HG: ICD-10-PCS | Mod: CPTII,S$GLB,, | Performed by: UROLOGY

## 2023-02-28 PROCEDURE — 3072F LOW RISK FOR RETINOPATHY: CPT | Mod: CPTII,S$GLB,, | Performed by: UROLOGY

## 2023-02-28 PROCEDURE — 1101F PR PT FALLS ASSESS DOC 0-1 FALLS W/OUT INJ PAST YR: ICD-10-PCS | Mod: CPTII,S$GLB,, | Performed by: UROLOGY

## 2023-02-28 PROCEDURE — 3008F BODY MASS INDEX DOCD: CPT | Mod: CPTII,S$GLB,, | Performed by: UROLOGY

## 2023-02-28 PROCEDURE — 1159F MED LIST DOCD IN RCRD: CPT | Mod: CPTII,S$GLB,, | Performed by: UROLOGY

## 2023-02-28 PROCEDURE — 3288F FALL RISK ASSESSMENT DOCD: CPT | Mod: CPTII,S$GLB,, | Performed by: UROLOGY

## 2023-02-28 PROCEDURE — 4010F ACE/ARB THERAPY RXD/TAKEN: CPT | Mod: CPTII,S$GLB,, | Performed by: UROLOGY

## 2023-02-28 PROCEDURE — 3072F PR LOW RISK FOR RETINOPATHY: ICD-10-PCS | Mod: CPTII,S$GLB,, | Performed by: UROLOGY

## 2023-02-28 RX ORDER — TAMSULOSIN HYDROCHLORIDE 0.4 MG/1
0.8 CAPSULE ORAL DAILY
Qty: 180 CAPSULE | Refills: 3 | Status: SHIPPED | OUTPATIENT
Start: 2023-02-28 | End: 2024-02-13 | Stop reason: SDUPTHER

## 2023-02-28 NOTE — PROGRESS NOTES
Chief Complaint:  F/u urgency and BPH    HPI:   02/28/2023 - patient returns today for follow-up, overall urination is stable, stream is okay, still has some urgency and urge incontinence, denies any gross hematuria, nocturia times 0-1, denies dysuria    01/25/2022 - patient returns today for follow-up, notes that after a while the medications seemed less effective and feels like his urination is to his baseline, denies any gross hematuria or incomplete emptying, denies UTIs, PSA 0.38 last week    02/23/2021 - patient presents today for follow-up, notes that his symptoms are greatly improved with the addition of the myrbetriq, has decreased his urgency and frequency and now he had plenty of time to make it to the restroom, denies GH, dysuria, incomplete emptying     01/06/2021 - 67 y.o. male that presents for evaluation of BPH.  Patient has had LUTS for about 2 years for which has been managed with Flomax.  His symptoms include incomplete emptying and weak stream as well as a urgency.  He denies incontinence.  He has never been on anticholinergics for his bladder.  Denies a history of urinary tract infections or kidney stones.  Denies gross hematuria.  Denies dysuria.  His most bothersome symptom is his urgency.  Confirms long history of ED but is not interested in medications at this time. Patient also notes that his wife detected a ball on my left ball and wanted him to get checked out.  It is not bothersome and is not painful.  It is not growing in size.  His wife noticed it about 2 weeks ago.  IPSS - 4/4/4/3/3/3/2 = 23 QOL - 3(mixed mostly)      PMH:  Past Medical History:   Diagnosis Date    Arthritis     Cataract     COPD (chronic obstructive pulmonary disease)     Diabetes mellitus, type 2     Hyperlipidemia     Hypertension     Personal history of colonic polyps 2018       PSH:  Past Surgical History:   Procedure Laterality Date    ANGIOPLASTY      JOINT REPLACEMENT  2017    TOTAL KNEE ARTHROPLASTY          Family History:  Family History   Problem Relation Age of Onset    Diabetes Mother     Arthritis Mother     Cancer Father        Social History:  Social History     Tobacco Use    Smoking status: Former     Types: Cigars     Quit date: 2/4/2020     Years since quitting: 3.0    Smokeless tobacco: Never    Tobacco comments:     3-5 cigars each day   Substance Use Topics    Alcohol use: No    Drug use: No        Review of Systems:  General: No fever, chills  Skin: No rashes  Chest:  Denies cough and sputum production  Heart: Denies chest pain  Resp: Denies dyspnea  Abdomen: Denies diarrhea, abdominal pain, hematemesis, or blood in stool.  Musculoskeletal: No joint stiffness or swelling. Denies back pain.  : see HPI  Neuro: no dizziness or weakness    Allergies:  Patient has no known allergies.    Medications:    Current Outpatient Medications:     acetaminophen (TYLENOL) 500 MG tablet, Take 1,000 mg by mouth as needed. , Disp: , Rfl:     albuterol (PROVENTIL) 2.5 mg /3 mL (0.083 %) nebulizer solution, Take 3 mLs (2.5 mg total) by nebulization every 4 (four) hours as needed for Wheezing or Shortness of Breath. Rescue, Disp: 180 mL, Rfl: 1    albuterol (VENTOLIN HFA) 90 mcg/actuation inhaler, Inhale 2 puffs into the lungs every 6 (six) hours as needed for Wheezing or Shortness of Breath. Rescue, Disp: 36 g, Rfl: 3    amLODIPine (NORVASC) 2.5 MG tablet, Take 1 tablet (2.5 mg total) by mouth once daily., Disp: 30 tablet, Rfl: 5    ascorbic acid, vitamin C, (VITAMIN C) 1000 MG tablet, Take 1,000 mg by mouth once daily., Disp: , Rfl:     aspirin 325 MG tablet, Take 325 mg by mouth once daily., Disp: , Rfl:     blood sugar diagnostic (BLOOD GLUCOSE TEST) Strp, 1 strip by Misc.(Non-Drug; Combo Route) route 3 (three) times daily., Disp: 200 each, Rfl: 1    buPROPion (WELLBUTRIN SR) 150 MG TBSR 12 hr tablet, Take 1 tablet (150 mg total) by mouth 2 (two) times daily., Disp: 180 tablet, Rfl: 2    cetirizine (ZYRTEC) 10 MG  tablet, Take 10 mg by mouth every evening., Disp: , Rfl:     cholecalciferol, vitamin D3, (VITAMIN D3) 25 mcg (1,000 unit) capsule, Take 1,000 Units by mouth once daily., Disp: , Rfl:     cyanocobalamin (VITAMIN B-12) 500 MCG tablet, Take 500 mcg by mouth once daily. , Disp: , Rfl:     dapagliflozin (FARXIGA) 10 mg tablet, Take 1 tablet (10 mg total) by mouth once daily., Disp: 90 tablet, Rfl: 2    dulaglutide (TRULICITY) 3 mg/0.5 mL pen injector, Inject 3 mg into the skin every 7 days., Disp: 12 pen, Rfl: 1    famotidine (PEPCID) 40 MG tablet, Take 1 tablet by mouth once a day, Disp: 30 tablet, Rfl: 11    fluticasone propionate (FLONASE) 50 mcg/actuation nasal spray, Use 1 spray (50 mcg total) in each nostril once daily., Disp: 16 g, Rfl: 3    fluticasone-umeclidin-vilanter (TRELEGY ELLIPTA) 100-62.5-25 mcg DsDv, Inhale 1 puff into the lungs once daily., Disp: 60 each, Rfl: 11    ipratropium (ATROVENT) 21 mcg (0.03 %) nasal spray, Spray 1 or 2 sprays each nasal passage every 8 hours, if needed, as directed., Disp: 30 mL, Rfl: 12    lancets 30 gauge Misc, 1 lancet by Misc.(Non-Drug; Combo Route) route 3 (three) times daily., Disp: 200 each, Rfl: 0    linaCLOtide (LINZESS) 145 mcg Cap capsule, Take 1 capsule by mouth 30 minutes before breakfast, Disp: 30 capsule, Rfl: 11    metFORMIN (GLUCOPHAGE) 1000 MG tablet, TAKE ONE TABLET BY MOUTH TWICE DAILY WITH MEALS, Disp: 180 tablet, Rfl: 0    methscopolamine (PAMINE) 2.5 mg Tab, Take 1 tablet by mouth every 12 hours, as directed, if needed, Disp: 180 tablet, Rfl: 4    naproxen (NAPROSYN) 500 MG tablet, Take 1 tablet (500 mg total) by mouth 2 (two) times daily with meals. For arm pain, Disp: 20 tablet, Rfl: 0    nitroGLYCERIN (NITROSTAT) 0.4 MG SL tablet, Place 1 tablet under the tongue every 5 (five) minutes as needed for Chest pain., Disp: 25 tablet, Rfl: 0    olmesartan (BENICAR) 40 MG tablet, Take 1 tablet (40 mg total) by mouth once daily., Disp: 30 tablet, Rfl: 5     pantoprazole (PROTONIX) 40 MG tablet, Take 1 tablet by mouth twice a day, Disp: 60 tablet, Rfl: 11    pulse oximeter (PULSE OXIMETER) device, Use twice daily at 8 AM and 3 PM and record the value in MyChart as directed., Disp: 1 each, Rfl: 0    rosuvastatin (CRESTOR) 10 MG tablet, TAKE ONE TABLET BY MOUTH ONCE DAILY, Disp: 90 tablet, Rfl: 0    tamsulosin (FLOMAX) 0.4 mg Cap, Take 2 capsules (0.8 mg total) by mouth once daily., Disp: 180 capsule, Rfl: 3    traMADoL (ULTRAM) 50 mg tablet, Take 1 (one) tablet (50 mg total) by mouth every 6 (six) hours as needed for pain, Disp: 35 tablet, Rfl: 0    blood-glucose meter Misc, Use as directed to check glucose levels., Disp: 1 each, Rfl: 0    Physical Exam:  Vitals:    02/28/23 1501   BP: 108/67   Pulse: 72   Temp: 98.2 °F (36.8 °C)     General: awake, alert, cooperative  Head: NC/AT  Ears: external ears normal  Eyes: sclera normal  Lungs: normal inspiration, NAD  Heart: well-perfused  Abdomen: Soft, NT, ND   2/23: Normal circ'd phallus, meatus normal in size and position, BL testicles palpable, no masses, left testicular cyst noted, nontender, no abnormalities of epididymi  YESICA 2/23: Normal rectal tone, no hemorrhoids. Prostate smooth and normal, no nodules 50 gm SV not palpable. Perineum and anus normal.  Lymphatic: groin nodes negative  Skin: The skin is warm and dry  Ext: No c/c/e.  Neuro: grossly intact, AOx3    RADIOLOGY:  No recent relevant imaging available for review.    LABS:  I personally reviewed the following lab values:  Lab Results   Component Value Date    WBC 7.41 10/12/2022    HGB 12.5 (L) 10/12/2022    HCT 41.6 10/12/2022     10/12/2022     10/12/2022    K 4.8 10/12/2022     10/12/2022    CREATININE 0.9 10/12/2022    BUN 11 10/12/2022    CO2 25 10/12/2022    TSH 1.195 10/12/2022    PSA 0.38 01/18/2022    INR 0.9 10/04/2017    HGBA1C 7.1 (H) 10/12/2022    CHOL 120 10/12/2022    TRIG 86 10/12/2022    HDL 46 10/12/2022    ALT 20  10/12/2022    AST 15 10/12/2022       Assessment/Plan:   Stefan Forbes Jr. is a 67 y.o. male with:    BPH - continue flomax BID    Urgency - can restart myrbetriq    Prostate Cancer Screening - YESICA normal, PSA with his next lab appointment, follow-up 1 year with PSA      Stefan Eugene MD  Urology

## 2023-03-06 ENCOUNTER — OFFICE VISIT (OUTPATIENT)
Dept: OPHTHALMOLOGY | Facility: CLINIC | Age: 68
End: 2023-03-06
Payer: MEDICARE

## 2023-03-06 DIAGNOSIS — E11.36 DIABETIC CATARACT: ICD-10-CM

## 2023-03-06 DIAGNOSIS — E11.36 CONTROLLED TYPE 2 DIABETES MELLITUS WITH DIABETIC CATARACT, WITHOUT LONG-TERM CURRENT USE OF INSULIN: Primary | ICD-10-CM

## 2023-03-06 DIAGNOSIS — D31.31 NEVUS OF CHOROID OF RIGHT EYE: ICD-10-CM

## 2023-03-06 PROCEDURE — 4010F PR ACE/ARB THEARPY RXD/TAKEN: ICD-10-PCS | Mod: CPTII,S$GLB,, | Performed by: OPHTHALMOLOGY

## 2023-03-06 PROCEDURE — 92014 PR EYE EXAM, EST PATIENT,COMPREHESV: ICD-10-PCS | Mod: S$GLB,,, | Performed by: OPHTHALMOLOGY

## 2023-03-06 PROCEDURE — 1160F PR REVIEW ALL MEDS BY PRESCRIBER/CLIN PHARMACIST DOCUMENTED: ICD-10-PCS | Mod: CPTII,S$GLB,, | Performed by: OPHTHALMOLOGY

## 2023-03-06 PROCEDURE — 92014 COMPRE OPH EXAM EST PT 1/>: CPT | Mod: S$GLB,,, | Performed by: OPHTHALMOLOGY

## 2023-03-06 PROCEDURE — 1126F PR PAIN SEVERITY QUANTIFIED, NO PAIN PRESENT: ICD-10-PCS | Mod: CPTII,S$GLB,, | Performed by: OPHTHALMOLOGY

## 2023-03-06 PROCEDURE — 1159F MED LIST DOCD IN RCRD: CPT | Mod: CPTII,S$GLB,, | Performed by: OPHTHALMOLOGY

## 2023-03-06 PROCEDURE — 2023F PR DILATED RETINAL EXAM W/O EVID OF RETINOPATHY: ICD-10-PCS | Mod: CPTII,S$GLB,, | Performed by: OPHTHALMOLOGY

## 2023-03-06 PROCEDURE — 1160F RVW MEDS BY RX/DR IN RCRD: CPT | Mod: CPTII,S$GLB,, | Performed by: OPHTHALMOLOGY

## 2023-03-06 PROCEDURE — 99999 PR PBB SHADOW E&M-EST. PATIENT-LVL II: CPT | Mod: PBBFAC,,, | Performed by: OPHTHALMOLOGY

## 2023-03-06 PROCEDURE — 4010F ACE/ARB THERAPY RXD/TAKEN: CPT | Mod: CPTII,S$GLB,, | Performed by: OPHTHALMOLOGY

## 2023-03-06 PROCEDURE — 92134 POSTERIOR SEGMENT OCT RETINA (OCULAR COHERENCE TOMOGRAPHY)-BOTH EYES: ICD-10-PCS | Mod: S$GLB,,, | Performed by: OPHTHALMOLOGY

## 2023-03-06 PROCEDURE — 1126F AMNT PAIN NOTED NONE PRSNT: CPT | Mod: CPTII,S$GLB,, | Performed by: OPHTHALMOLOGY

## 2023-03-06 PROCEDURE — 2023F DILAT RTA XM W/O RTNOPTHY: CPT | Mod: CPTII,S$GLB,, | Performed by: OPHTHALMOLOGY

## 2023-03-06 PROCEDURE — 92134 CPTRZ OPH DX IMG PST SGM RTA: CPT | Mod: S$GLB,,, | Performed by: OPHTHALMOLOGY

## 2023-03-06 PROCEDURE — 99999 PR PBB SHADOW E&M-EST. PATIENT-LVL II: ICD-10-PCS | Mod: PBBFAC,,, | Performed by: OPHTHALMOLOGY

## 2023-03-06 PROCEDURE — 1159F PR MEDICATION LIST DOCUMENTED IN MEDICAL RECORD: ICD-10-PCS | Mod: CPTII,S$GLB,, | Performed by: OPHTHALMOLOGY

## 2023-03-06 NOTE — PROGRESS NOTES
===============================  Date today is 3/6/2023  Stefan Forbes Jr. is a 67 y.o. male  Last visit [facility]: :2/15/2022   Last visit eye dept. Visit date not found    Corrected distance visual acuity was 20/400 in the right eye and 20/200 in the left eye.  Tonometry       Tonometry (Applanation, 2:08 PM)         Right Left    Pressure 20 20                  Wearing Rx       Wearing Rx         Sphere Cylinder Axis Add    Right -2.75 +1.00 170 +2.50    Left -2.50 +1.50 150 +2.50                  Manifest Refraction       Manifest Refraction         Sphere Cylinder Axis Add    Right -2.75 +1.00 170 +2.50    Left -2.50 +1.50 150 +2.50                  Not recorded       Chief Complaint   Patient presents with    Diabetes     Yearly Diabetic exam/  off/on some blurred vision     HPI     Diabetes     Additional comments: Yearly Diabetic exam/  off/on some blurred vision           Comments    DM/no retinopathy   Choroidal nevus OD  NS ou            Last edited by Ayse Bear on 3/6/2023  1:49 PM.      Problem List Items Addressed This Visit          Eye/Vision problems    Nevus of choroid of right eye    Relevant Orders    Posterior Segment OCT Retina-Both eyes (Completed)     Other Visit Diagnoses       Controlled type 2 diabetes mellitus with diabetic cataract, without long-term current use of insulin    -  Primary    Relevant Orders    Posterior Segment OCT Retina-Both eyes (Completed)    Diabetic cataract              Instructed to call 24/7 for any worsening of vision, visual distortion or pain.  Check OU independently daily.    Gave my office and personal cell phone number.  ________________  3/6/2023 today  Stefan Forbes Jr.    DM no retinopathy  OCT looks good  1DD nevus superior arcade OD  1+ lamellar/NS OU  Update glasses today  No glaucoma  No macular degeneration    RTC 1 year  Instructed to call 24/7 for any worsening of vision or symptoms. Check OU daily.   Gave my office and cell phone  number.      =============================

## 2023-03-15 ENCOUNTER — PATIENT MESSAGE (OUTPATIENT)
Dept: INTERNAL MEDICINE | Facility: CLINIC | Age: 68
End: 2023-03-15
Payer: MEDICARE

## 2023-03-15 DIAGNOSIS — E11.69 HYPERLIPIDEMIA ASSOCIATED WITH TYPE 2 DIABETES MELLITUS: ICD-10-CM

## 2023-03-15 DIAGNOSIS — E78.5 HYPERLIPIDEMIA ASSOCIATED WITH TYPE 2 DIABETES MELLITUS: ICD-10-CM

## 2023-03-15 RX ORDER — ROSUVASTATIN CALCIUM 10 MG/1
TABLET, COATED ORAL
Qty: 90 TABLET | Refills: 0 | Status: CANCELLED | OUTPATIENT
Start: 2023-03-15

## 2023-03-15 RX ORDER — ROSUVASTATIN CALCIUM 10 MG/1
TABLET, COATED ORAL
Qty: 90 TABLET | Refills: 1 | Status: SHIPPED | OUTPATIENT
Start: 2023-03-15 | End: 2023-09-11 | Stop reason: SDUPTHER

## 2023-03-15 NOTE — TELEPHONE ENCOUNTER
No new care gaps identified.  [facility] Embedded Care Gaps. Reference number: [id number]. 3/15/2023   4:04:56 PM CDT

## 2023-03-15 NOTE — TELEPHONE ENCOUNTER
No new care gaps identified.  [facility] Embedded Care Gaps. Reference number: [id number]. 3/15/2023   4:12:47 PM CDT

## 2023-03-29 DIAGNOSIS — E11.9 CONTROLLED TYPE 2 DIABETES MELLITUS WITHOUT COMPLICATION, WITHOUT LONG-TERM CURRENT USE OF INSULIN: ICD-10-CM

## 2023-03-29 RX ORDER — METFORMIN HYDROCHLORIDE 1000 MG/1
1000 TABLET ORAL 2 TIMES DAILY WITH MEALS
Qty: 180 TABLET | Refills: 0 | Status: SHIPPED | OUTPATIENT
Start: 2023-03-29 | End: 2023-05-03 | Stop reason: SDUPTHER

## 2023-03-30 NOTE — TELEPHONE ENCOUNTER
No new care gaps identified.  [facility] Embedded Care Gaps. Reference number: [id number]. 3/29/2023   7:35:08 PM CDT

## 2023-03-30 NOTE — TELEPHONE ENCOUNTER
Refill Decision Note   Stefan Forbes  is requesting a refill authorization.  Brief Assessment and Rationale for Refill:  Approve     Medication Therapy Plan:       Medication Reconciliation Completed: No   Comments:     No Care Gaps recommended.     Note composed:8:17 PM 03/29/2023

## 2023-04-13 ENCOUNTER — LAB VISIT (OUTPATIENT)
Dept: LAB | Facility: HOSPITAL | Age: 68
End: 2023-04-13
Attending: INTERNAL MEDICINE
Payer: MEDICARE

## 2023-04-13 DIAGNOSIS — E11.9 CONTROLLED TYPE 2 DIABETES MELLITUS WITHOUT COMPLICATION, WITHOUT LONG-TERM CURRENT USE OF INSULIN: ICD-10-CM

## 2023-04-13 PROCEDURE — 83036 HEMOGLOBIN GLYCOSYLATED A1C: CPT | Performed by: INTERNAL MEDICINE

## 2023-04-13 PROCEDURE — 36415 COLL VENOUS BLD VENIPUNCTURE: CPT | Performed by: INTERNAL MEDICINE

## 2023-04-14 LAB
ESTIMATED AVG GLUCOSE: 157 MG/DL (ref 68–131)
HBA1C MFR BLD: 7.1 % (ref 4–5.6)

## 2023-04-28 ENCOUNTER — TELEPHONE (OUTPATIENT)
Dept: ADMINISTRATIVE | Facility: HOSPITAL | Age: 68
End: 2023-04-28
Payer: MEDICARE

## 2023-05-03 ENCOUNTER — OFFICE VISIT (OUTPATIENT)
Dept: INTERNAL MEDICINE | Facility: CLINIC | Age: 68
End: 2023-05-03
Payer: MEDICARE

## 2023-05-03 VITALS
DIASTOLIC BLOOD PRESSURE: 64 MMHG | WEIGHT: 229.94 LBS | OXYGEN SATURATION: 96 % | TEMPERATURE: 97 F | HEIGHT: 68 IN | SYSTOLIC BLOOD PRESSURE: 118 MMHG | BODY MASS INDEX: 34.85 KG/M2 | HEART RATE: 81 BPM

## 2023-05-03 DIAGNOSIS — J32.9 SINUSITIS, UNSPECIFIED CHRONICITY, UNSPECIFIED LOCATION: ICD-10-CM

## 2023-05-03 DIAGNOSIS — E78.5 HYPERLIPIDEMIA ASSOCIATED WITH TYPE 2 DIABETES MELLITUS: ICD-10-CM

## 2023-05-03 DIAGNOSIS — E11.59 HYPERTENSION ASSOCIATED WITH DIABETES: ICD-10-CM

## 2023-05-03 DIAGNOSIS — E11.69 HYPERLIPIDEMIA ASSOCIATED WITH TYPE 2 DIABETES MELLITUS: ICD-10-CM

## 2023-05-03 DIAGNOSIS — E66.01 SEVERE OBESITY: ICD-10-CM

## 2023-05-03 DIAGNOSIS — F17.200 NICOTINE DEPENDENCE: ICD-10-CM

## 2023-05-03 DIAGNOSIS — I25.10 CORONARY ARTERY DISEASE INVOLVING NATIVE CORONARY ARTERY OF NATIVE HEART WITHOUT ANGINA PECTORIS: ICD-10-CM

## 2023-05-03 DIAGNOSIS — I70.0 AORTIC ATHEROSCLEROSIS: ICD-10-CM

## 2023-05-03 DIAGNOSIS — E11.9 CONTROLLED TYPE 2 DIABETES MELLITUS WITHOUT COMPLICATION, WITHOUT LONG-TERM CURRENT USE OF INSULIN: Primary | ICD-10-CM

## 2023-05-03 DIAGNOSIS — Z12.5 ENCOUNTER FOR SCREENING FOR MALIGNANT NEOPLASM OF PROSTATE: ICD-10-CM

## 2023-05-03 DIAGNOSIS — Z86.711 HISTORY OF PULMONARY EMBOLISM: ICD-10-CM

## 2023-05-03 DIAGNOSIS — I15.2 HYPERTENSION ASSOCIATED WITH DIABETES: ICD-10-CM

## 2023-05-03 PROCEDURE — 3051F PR MOST RECENT HEMOGLOBIN A1C LEVEL 7.0 - < 8.0%: ICD-10-PCS | Mod: CPTII,S$GLB,, | Performed by: INTERNAL MEDICINE

## 2023-05-03 PROCEDURE — 99214 PR OFFICE/OUTPT VISIT, EST, LEVL IV, 30-39 MIN: ICD-10-PCS | Mod: S$GLB,,, | Performed by: INTERNAL MEDICINE

## 2023-05-03 PROCEDURE — 1126F PR PAIN SEVERITY QUANTIFIED, NO PAIN PRESENT: ICD-10-PCS | Mod: CPTII,S$GLB,, | Performed by: INTERNAL MEDICINE

## 2023-05-03 PROCEDURE — 3051F HG A1C>EQUAL 7.0%<8.0%: CPT | Mod: CPTII,S$GLB,, | Performed by: INTERNAL MEDICINE

## 2023-05-03 PROCEDURE — 3008F BODY MASS INDEX DOCD: CPT | Mod: CPTII,S$GLB,, | Performed by: INTERNAL MEDICINE

## 2023-05-03 PROCEDURE — 3078F DIAST BP <80 MM HG: CPT | Mod: CPTII,S$GLB,, | Performed by: INTERNAL MEDICINE

## 2023-05-03 PROCEDURE — 1101F PT FALLS ASSESS-DOCD LE1/YR: CPT | Mod: CPTII,S$GLB,, | Performed by: INTERNAL MEDICINE

## 2023-05-03 PROCEDURE — 4010F ACE/ARB THERAPY RXD/TAKEN: CPT | Mod: CPTII,S$GLB,, | Performed by: INTERNAL MEDICINE

## 2023-05-03 PROCEDURE — 3074F PR MOST RECENT SYSTOLIC BLOOD PRESSURE < 130 MM HG: ICD-10-PCS | Mod: CPTII,S$GLB,, | Performed by: INTERNAL MEDICINE

## 2023-05-03 PROCEDURE — 3288F PR FALLS RISK ASSESSMENT DOCUMENTED: ICD-10-PCS | Mod: CPTII,S$GLB,, | Performed by: INTERNAL MEDICINE

## 2023-05-03 PROCEDURE — 1159F MED LIST DOCD IN RCRD: CPT | Mod: CPTII,S$GLB,, | Performed by: INTERNAL MEDICINE

## 2023-05-03 PROCEDURE — 3288F FALL RISK ASSESSMENT DOCD: CPT | Mod: CPTII,S$GLB,, | Performed by: INTERNAL MEDICINE

## 2023-05-03 PROCEDURE — 1101F PR PT FALLS ASSESS DOC 0-1 FALLS W/OUT INJ PAST YR: ICD-10-PCS | Mod: CPTII,S$GLB,, | Performed by: INTERNAL MEDICINE

## 2023-05-03 PROCEDURE — 3074F SYST BP LT 130 MM HG: CPT | Mod: CPTII,S$GLB,, | Performed by: INTERNAL MEDICINE

## 2023-05-03 PROCEDURE — 99999 PR PBB SHADOW E&M-EST. PATIENT-LVL V: ICD-10-PCS | Mod: PBBFAC,,, | Performed by: INTERNAL MEDICINE

## 2023-05-03 PROCEDURE — 3008F PR BODY MASS INDEX (BMI) DOCUMENTED: ICD-10-PCS | Mod: CPTII,S$GLB,, | Performed by: INTERNAL MEDICINE

## 2023-05-03 PROCEDURE — 1159F PR MEDICATION LIST DOCUMENTED IN MEDICAL RECORD: ICD-10-PCS | Mod: CPTII,S$GLB,, | Performed by: INTERNAL MEDICINE

## 2023-05-03 PROCEDURE — 3078F PR MOST RECENT DIASTOLIC BLOOD PRESSURE < 80 MM HG: ICD-10-PCS | Mod: CPTII,S$GLB,, | Performed by: INTERNAL MEDICINE

## 2023-05-03 PROCEDURE — 99999 PR PBB SHADOW E&M-EST. PATIENT-LVL V: CPT | Mod: PBBFAC,,, | Performed by: INTERNAL MEDICINE

## 2023-05-03 PROCEDURE — 1126F AMNT PAIN NOTED NONE PRSNT: CPT | Mod: CPTII,S$GLB,, | Performed by: INTERNAL MEDICINE

## 2023-05-03 PROCEDURE — 99214 OFFICE O/P EST MOD 30 MIN: CPT | Mod: S$GLB,,, | Performed by: INTERNAL MEDICINE

## 2023-05-03 PROCEDURE — 3072F LOW RISK FOR RETINOPATHY: CPT | Mod: CPTII,S$GLB,, | Performed by: INTERNAL MEDICINE

## 2023-05-03 PROCEDURE — 3072F PR LOW RISK FOR RETINOPATHY: ICD-10-PCS | Mod: CPTII,S$GLB,, | Performed by: INTERNAL MEDICINE

## 2023-05-03 PROCEDURE — 4010F PR ACE/ARB THEARPY RXD/TAKEN: ICD-10-PCS | Mod: CPTII,S$GLB,, | Performed by: INTERNAL MEDICINE

## 2023-05-03 RX ORDER — DULAGLUTIDE 3 MG/.5ML
3 INJECTION, SOLUTION SUBCUTANEOUS
Qty: 12 PEN | Refills: 1 | Status: SHIPPED | OUTPATIENT
Start: 2023-05-03 | End: 2023-11-03 | Stop reason: SDUPTHER

## 2023-05-03 RX ORDER — DAPAGLIFLOZIN 10 MG/1
10 TABLET, FILM COATED ORAL DAILY
Qty: 90 TABLET | Refills: 2 | Status: SHIPPED | OUTPATIENT
Start: 2023-05-03 | End: 2023-11-03 | Stop reason: SDUPTHER

## 2023-05-03 RX ORDER — METHYLPREDNISOLONE 4 MG/1
TABLET ORAL
Qty: 21 EACH | Refills: 0 | Status: SHIPPED | OUTPATIENT
Start: 2023-05-03 | End: 2023-08-04

## 2023-05-03 RX ORDER — METFORMIN HYDROCHLORIDE 1000 MG/1
1000 TABLET ORAL 2 TIMES DAILY WITH MEALS
Qty: 180 TABLET | Refills: 0 | Status: SHIPPED | OUTPATIENT
Start: 2023-05-03 | End: 2023-08-30 | Stop reason: SDUPTHER

## 2023-05-03 RX ORDER — BUPROPION HYDROCHLORIDE 150 MG/1
150 TABLET, EXTENDED RELEASE ORAL 2 TIMES DAILY
Qty: 180 TABLET | Refills: 2 | Status: SHIPPED | OUTPATIENT
Start: 2023-05-03 | End: 2023-11-03 | Stop reason: SDUPTHER

## 2023-05-03 RX ORDER — AMOXICILLIN 875 MG/1
875 TABLET, FILM COATED ORAL EVERY 12 HOURS
Qty: 14 TABLET | Refills: 0 | Status: SHIPPED | OUTPATIENT
Start: 2023-05-03 | End: 2023-08-04

## 2023-05-03 RX ORDER — AMLODIPINE BESYLATE 2.5 MG/1
2.5 TABLET ORAL DAILY
Qty: 90 TABLET | Refills: 3 | Status: SHIPPED | OUTPATIENT
Start: 2023-05-03 | End: 2023-11-03 | Stop reason: SDUPTHER

## 2023-05-03 NOTE — PROGRESS NOTES
Subjective:      Patient ID: Stefan Forbes Jr. is a 67 y.o. male.    Chief Complaint: Follow-up    Sinus Problem  This is a new problem. The current episode started 1 to 4 weeks ago. The problem has been gradually improving since onset. Associated symptoms include congestion, coughing, headaches and sinus pressure. Pertinent negatives include no chills, diaphoresis, ear pain or shortness of breath. Treatments tried: otc meds. The treatment provided no relief.       68 yo with   Patient Active Problem List   Diagnosis    Controlled type 2 diabetes mellitus without complication, without long-term current use of insulin    Hypertension associated with diabetes    Hyperlipidemia associated with type 2 diabetes mellitus    Moderate COPD (chronic obstructive pulmonary disease)    CAD (coronary artery disease)    Vitamin D deficiency    Osteoarthritis of knee    Pulmonary nodule    Nevus of choroid of right eye    History of pulmonary embolism    Hyponatremia    Iron deficiency anemia due to chronic blood loss    MACHO on CPAP    PLMD (periodic limb movement disorder)    BMI 35.0-35.9,adult    Varicose vein of leg    Tobacco abuse, in remission    Tubular adenoma of colon    Benign prostatic hyperplasia with weak urinary stream    Urgency of urination    Morbid (severe) obesity due to excess calories    Aortic atherosclerosis    COVID     Past Medical History:   Diagnosis Date    Arthritis     Cataract     COPD (chronic obstructive pulmonary disease)     Diabetes mellitus, type 2     Hyperlipidemia     Hypertension     Personal history of colonic polyps 2018     Here today for management of mult med problems as outlined below.  Compliant with meds without significant side effects. Energy and appetite good.     Pt also c/o sinus problem.     Review of Systems   Constitutional:  Negative for chills and diaphoresis.   HENT:  Positive for congestion, postnasal drip and sinus pressure. Negative for ear pain, facial swelling  "and sinus pain.    Respiratory:  Positive for cough. Negative for shortness of breath and wheezing.    Cardiovascular:  Negative for chest pain, palpitations and leg swelling.   Neurological:  Positive for headaches.   Objective:   /64 (BP Location: Left arm, Patient Position: Sitting, BP Method: Large (Manual))   Pulse 81   Temp 97.1 °F (36.2 °C) (Tympanic)   Ht 5' 8" (1.727 m)   Wt 104.3 kg (229 lb 15 oz)   SpO2 96%   BMI 34.96 kg/m²     Physical Exam  Constitutional:       General: He is not in acute distress.     Appearance: He is well-developed.   HENT:      Right Ear: Tympanic membrane normal.      Left Ear: Tympanic membrane normal.      Nose: Mucosal edema and rhinorrhea present.      Right Sinus: No maxillary sinus tenderness or frontal sinus tenderness.      Left Sinus: No maxillary sinus tenderness or frontal sinus tenderness.      Mouth/Throat:      Pharynx: Posterior oropharyngeal erythema present. No oropharyngeal exudate.   Eyes:      Conjunctiva/sclera: Conjunctivae normal.      Pupils: Pupils are equal, round, and reactive to light.   Cardiovascular:      Rate and Rhythm: Normal rate.   Pulmonary:      Effort: Pulmonary effort is normal.      Breath sounds: Normal breath sounds.   Lymphadenopathy:      Cervical: No cervical adenopathy.   Skin:     General: Skin is warm and dry.      Findings: No rash.   Neurological:      Mental Status: He is alert and oriented to person, place, and time.   Psychiatric:         Behavior: Behavior normal.       Lab Results   Component Value Date    WBC 7.41 10/12/2022    HGB 12.5 (L) 10/12/2022    HGB 13.0 (L) 01/18/2022    HGB 12.4 (L) 10/14/2021    HCT 41.6 10/12/2022    MCV 96 10/12/2022    MCV 94 01/18/2022    MCV 93 10/14/2021     10/12/2022    CHOL 120 10/12/2022    TRIG 86 10/12/2022    HDL 46 10/12/2022    LDLCALC 56.8 (L) 10/12/2022    LDLCALC 55.6 (L) 10/14/2021    LDLCALC 65.6 09/02/2020    ALT 20 10/12/2022    AST 15 10/12/2022    NA " 136 10/12/2022    K 4.8 10/12/2022    CALCIUM 9.4 10/12/2022     10/12/2022    CO2 25 10/12/2022    BUN 11 10/12/2022    CREATININE 0.9 10/12/2022    CREATININE 0.9 10/14/2021    CREATININE 1.2 06/24/2021    EGFRNORACEVR >60.0 10/12/2022    TSH 1.195 10/12/2022    TSH 1.143 10/14/2021    TSH 0.935 03/05/2020    PSA 0.38 01/18/2022    PSA 0.17 09/02/2020    PSA 0.35 08/28/2019     10/12/2022    HGBA1C 7.1 (H) 04/13/2023    HGBA1C 7.1 (H) 10/12/2022    HGBA1C 6.9 (H) 04/18/2022    MVGVPGZA75KS 44 04/26/2017    HQOJGLAK09ZB 29 (L) 10/08/2015          The ASCVD Risk score (Mal DK, et al., 2019) failed to calculate for the following reasons:    The valid total cholesterol range is 130 to 320 mg/dL     Assessment:     1. Controlled type 2 diabetes mellitus without complication, without long-term current use of insulin    2. Aortic atherosclerosis    3. Coronary artery disease involving native coronary artery of native heart without angina pectoris    4. History of pulmonary embolism    5. Hyperlipidemia associated with type 2 diabetes mellitus    6. Hypertension associated with diabetes    7. Encounter for screening for malignant neoplasm of prostate    8. Sinusitis, unspecified chronicity, unspecified location    9. Nicotine dependence      Plan:   1. Controlled type 2 diabetes mellitus without complication, without long-term current use of insulin  -     Hemoglobin A1C; Future; Expected date: 10/30/2023  -     Comprehensive Metabolic Panel; Future; Expected date: 10/30/2023  -     dapagliflozin (FARXIGA) 10 mg tablet; Take 1 tablet (10 mg total) by mouth once daily.  Dispense: 90 tablet; Refill: 2  -     dulaglutide (TRULICITY) 3 mg/0.5 mL pen injector; Inject 3 mg into the skin every 7 days.  Dispense: 12 pen; Refill: 1  -     metFORMIN (GLUCOPHAGE) 1000 MG tablet; TAKE ONE TABLET BY MOUTH TWICE DAILY WITH MEALS  Dispense: 180 tablet; Refill: 0    2. Aortic atherosclerosis  Overview:  CT Chest  10/14/2019      3. Coronary artery disease involving native coronary artery of native heart without angina pectoris  Overview:  Sees Dr. Perry      4. History of pulmonary embolism    5. Hyperlipidemia associated with type 2 diabetes mellitus  -     Lipid Panel; Future; Expected date: 10/30/2023  -     CBC Auto Differential; Future; Expected date: 10/30/2023  -     TSH; Future; Expected date: 10/30/2023    6. Hypertension associated with diabetes  -     amLODIPine (NORVASC) 2.5 MG tablet; Take 1 tablet (2.5 mg total) by mouth once daily.  Dispense: 90 tablet; Refill: 3    7. Encounter for screening for malignant neoplasm of prostate  -     PSA, Screening; Future; Expected date: 10/30/2023    8. Sinusitis, unspecified chronicity, unspecified location  -     amoxicillin (AMOXIL) 875 MG tablet; Take 1 tablet (875 mg total) by mouth every 12 (twelve) hours.  Dispense: 14 tablet; Refill: 0  -     methylPREDNISolone (MEDROL, CHARLES,) 4 mg tablet; use as directed  Dispense: 21 each; Refill: 0    9. Nicotine dependence  -     buPROPion (WELLBUTRIN SR) 150 MG TBSR 12 hr tablet; Take 1 tablet (150 mg total) by mouth 2 (two) times daily.  Dispense: 180 tablet; Refill: 2        Patient Instructions   Check with your pharmacy regarding new shingles vaccine.      Future Appointments   Date Time Provider Department Center   9/14/2023 11:00 AM May Delgado MD [facility]   10/5/2023 10:00 AM [facility] XR2 [facility] XRAY [facility]   10/5/2023 10:15 [facility]   10/5/2023 10:45 [facility]   10/5/2023 11:20 AM Mazin Wells MD [facility] SLEEP [facility]   10/30/2023  7:50 AM LABORATORY, [facility] LAB [facility]   11/3/2023 11:40 AM Wallace Fisher MD [facility] IM [facility]   2/28/2024 10:15 AM Stefan Eugene MD [facility] UROLOGY [facility]       Lab Frequency Next Occurrence   Ambulatory referral/consult to Gastroenterology Once 04/14/2022   X-Ray Chest  PA And Lateral Once 10/06/2022   Hemoglobin A1C Once 04/18/2023   Microalbumin/creatinine urine ratio         Follow up in about 6 months (around 11/3/2023), or if symptoms worsen or fail to improve.

## 2023-05-04 ENCOUNTER — PATIENT MESSAGE (OUTPATIENT)
Dept: INTERNAL MEDICINE | Facility: CLINIC | Age: 68
End: 2023-05-04
Payer: MEDICARE

## 2023-05-19 ENCOUNTER — PES CALL (OUTPATIENT)
Dept: ADMINISTRATIVE | Facility: CLINIC | Age: 68
End: 2023-05-19
Payer: MEDICARE

## 2023-07-21 ENCOUNTER — TELEPHONE (OUTPATIENT)
Dept: DIABETES | Facility: CLINIC | Age: 68
End: 2023-07-21
Payer: MEDICARE

## 2023-07-21 DIAGNOSIS — E11.9 CONTROLLED TYPE 2 DIABETES MELLITUS WITHOUT COMPLICATION, WITHOUT LONG-TERM CURRENT USE OF INSULIN: Primary | ICD-10-CM

## 2023-07-21 NOTE — TELEPHONE ENCOUNTER
----- Message from Asia Carter sent at 7/21/2023 12:42 PM CDT -----  Contact: Lisa/Spouse  Type:  Sooner Apoointment Request    Caller is requesting a sooner appointment.  Caller declined first available appointment listed below.  Caller will not accept being placed on the waitlist and is requesting a message be sent to doctor.  Name of Caller:Lisa   When is the first available appointment?unknown  Symptoms:Diabetes  Would the patient rather a call back or a response via [facility]? Call   Best Call Back Number:[phone]  Additional Information: Patient's spouse request to schedule patient for next soonest availability. Please give Mrs. Gonzalez a call back to assist.   Thank you,  GH

## 2023-08-02 ENCOUNTER — CLINICAL SUPPORT (OUTPATIENT)
Dept: DIABETES | Facility: CLINIC | Age: 68
End: 2023-08-02
Payer: MEDICARE

## 2023-08-02 VITALS — BODY MASS INDEX: 33.62 KG/M2 | WEIGHT: 221.13 LBS

## 2023-08-02 DIAGNOSIS — E11.9 CONTROLLED TYPE 2 DIABETES MELLITUS WITHOUT COMPLICATION, WITHOUT LONG-TERM CURRENT USE OF INSULIN: ICD-10-CM

## 2023-08-02 PROCEDURE — 99999 PR PBB SHADOW E&M-EST. PATIENT-LVL IV: CPT | Mod: PBBFAC,,,

## 2023-08-02 PROCEDURE — 99999 PR PBB SHADOW E&M-EST. PATIENT-LVL IV: ICD-10-PCS | Mod: PBBFAC,,,

## 2023-08-02 PROCEDURE — G0108 PR DIAB MANAGE TRN  PER INDIV: ICD-10-PCS | Mod: S$GLB,,,

## 2023-08-02 PROCEDURE — G0108 DIAB MANAGE TRN  PER INDIV: HCPCS | Mod: S$GLB,,,

## 2023-08-02 NOTE — PROGRESS NOTES
Diabetes Care Specialist Progress Note  Author: Karen Sandra RN  Date: 8/3/2023    Program Intake  Reason for Diabetes Program Visit:: Initial Diabetes Assessment  Current diabetes risk level:: moderate  In the last 12 months, have you:: none  Permission to speak with others about care:: yes    Lab Results   Component Value Date    HGBA1C 7.1 (H) 04/13/2023     DM INTRO  Weight: 100.3 kg (221 lb 1.9 oz)       Body mass index is 33.62 kg/m².      CURRENT MEDS  Farxiga 10 mg daily (Has not taken in ~3 weeks d/t cost)  Trulicity 3 mg weekly (Has not taken in 1 week d/t cost)  Metformin 1,000 mg BID        Clinical    Patient Health Rating  Compared to other people your age, how would you rate your health?: Good    Problem Review  Reviewed Problem List with Patient: yes  Active comorbidities affecting diabetes self-care.: yes  Comorbidities: Heart Attack, Cardiovascular Disease, Hypertension, Other (comment) (COPD)  Reviewed health maintenance: yes    Clinical Assessment  Current Diabetes Treatment: Oral Medication, Injectable  Have you ever experienced hypoglycemia (low blood sugar)?: no  Have you ever experienced hyperglycemia (high blood sugar)?: yes  In the last month, how often have you experienced high blood sugar?: once a day  Are you able to tell when your blood sugar is high?: No (comment)  Have you ever been hospitalized because your blood sugar was high?: no    Medication Information  How do you obtain your medications?: Patient drives  How many days a week do you miss your medications?:  (Often due to affordability issues)  Do you sometimes have difficulty refilling your medications?: Yes (see comment) (Affordability issues)  Medication adherence impacting ability to self-manage diabetes?: Yes    Labs  Do you have regular lab work to monitor your medications?: Yes  Type of Regular Lab Work: A1c, Cholesterol, Microalbumin, CBC, BMP  Where do you get your labs drawn?: [facility]  Lab Compliance Barriers:  No    Nutritional Status  Diet: Regular (States he has been avoiding starches)  Meal Plan 24 Hour Recall: Breakfast, Dinner, Snack, Lunch  Meal Plan 24 Hour Recall - Breakfast: Corn Chex cereal with milk & splenda; Coffee with powdered creamer  Meal Plan 24 Hour Recall - Lunch: None.  Meal Plan 24 Hour Recall - Dinner: Tomato soup with hutchison & tomato sandwich; Coffee  Meal Plan 24 Hour Recall - Snack: Frozen Halo bar  Change in appetite?: No  Dentation:: Intact  Recent Changes in Weight: Weight Loss  Was weight loss intentional or unintentional?: Intentional  Current nutritional status an area of need that is impacting patient's ability to self-manage diabetes?: No    Additional Social History    Support  Does anyone support you with your diabetes care?: yes  Who supports you?: spouse, self  Who takes you to your medical appointments?: self  Does the current support meet the patient's needs?: Yes  Is Support an area impacting ability to self-manage diabetes?: No    Access to Mass Media & Technology  Does the patient have access to any of the following devices or technologies?: Smart phone  Media or technology needs impacting ability to self-manage diabetes?: No    Cognitive/Behavioral Health  Alert and Oriented: Yes  Difficulty Thinking: No  Requires Prompting: No  Requires assistance for routine expression?: No  Cognitive or behavioral barriers impacting ability to self-manage diabetes?: No    Culture/Sikhism  Culture or Holiness beliefs that may impact ability to access healthcare: No    Communication  Language preference: English  Hearing Problems: No  Vision Problems: Yes  Vision problem type:: Decreased Vision  Vision Assistance: Glasses  Communication needs impacting ability to self-manage diabetes?: No    Health Literacy  Preferred Learning Method: Face to Face, Reading Materials  How often do you need to have someone help you read instructions, pamphlets, or written material from your doctor or pharmacy?:  Never  Health literacy needs impacting ability to self-manage diabetes?: No      Diabetes Self-Management Skills Assessment    Diabetes Disease Process/Treatment Options  Patient/caregiver able to state what happens when someone has diabetes.: somewhat  Patient/caregiver knows what type of diabetes they have.: yes  Diabetes Type : Type II  Patient/caregiver able to identify at least three signs and symptoms of diabetes.: no  Patient able to identify at least three risk factors for diabetes.: no  Diabetes Disease Process/Treatment Options: Skills Assessment Completed: Yes  Assessment indicates:: Knowledge deficit  Area of need?: Yes    Nutrition/Healthy Eating  Challenges to healthy eating:: portion control, snacking between meals and at night  Method of carbohydrate measurement:: carb counting/reading labels, measuring cups/spoons  Patient can identify foods that impact blood sugar.: yes  Patient-identified foods:: starches (bread, pasta, rice, cereal), sweets, yogurt, fruit/fruit juice, milk  Nutrition/Healthy Eating Skills Assessment Completed:: Yes  Assessment indicates:: Knowledge deficit, Instruction Needed  Area of need?: Yes    Physical Activity/Exercise  Patient's daily activity level:: moderately active  Patient formally exercises outside of work.: no  Reasons for not exercising:: other (see comments) (Stays active in his yard)  Patient can identify forms of physical activity.: yes  Stated forms of physical activity:: any movement performed by muscles that uses energy, housework, yardwork  Patient can identify reasons why exercise/physical activity is important in diabetes management.: yes  Identified reasons:: helps insulin work better, improves blood circulation, lowers blood glucose, blood pressure, and cholesterol, strengthens heart, muscles, and bones  Physical Activity/Exercise Skills Assessment Completed: : Yes  Assessment indicates:: Knowledge deficit  Area of need?: Yes    Medications  Patient is  able to describe current diabetes management routine.: yes  Diabetes management routine:: diet, injectable medications, oral medications  Patient is able to identify current diabetes medications, dosages, and appropriate timing of medications.: yes  Patient understands the purpose of the medications taken for diabetes.: yes  Patient reports problems or concerns with current medication regimen.: yes  Medication regimen problems/concerns:: financial concerns  Medication Skills Assessment Completed:: Yes  Assessment indicates:: Knowledge deficit  Area of need?: Yes    Home Blood Glucose Monitoring  Patient states that blood sugar is checked at home daily.: yes  Monitoring Method:: home glucometer  How often do you check your blood sugar?: Once a day  When do you check your blood sugar?: Before breakfast  When you check what is your typical blood sugar range? : 149-360  Blood glucose logs:: yes  Blood glucose logs reviewed today?: yes  Home Blood Glucose Monitoring Skills Assessment Completed: : Yes  Assessment indicates:: Knowledge deficit, Instruction Needed  Area of need?: Yes    Acute Complications  Patient is able to identify types of acute complications: No  Acute Complications Skills Assessment Completed: : Yes  Assessment indicates:: Knowledge deficit, Instruction Needed  Area of need?: Yes    Chronic Complications  Patient can identify major chronic complications of diabetes.: yes  Stated chronic complications:: heart disease/heart attack, kidney disease, neuropathy/nerve damage, retinopathy, stroke  Patient can identify ways to prevent or delay diabetes complications.: yes  Stated ways to prevent complications:: controlling blood sugar, maintaining optimal blood glucose control, healthy eating and regular activity, having regular diabetic eye exams  Patient is aware that having diabetes increases risk of heart disease?: Yes  Patient is aware that heart disease is the leading cause of death and disability in  people with diabetes?: No  Patient able to state risk factors for heart disease?: Yes  Patient stated risk factors for heart disease:: High blood pressure, High cholesterol, Diet, Limited activity, Medication non-adherance, Having diabetes  Patient is taking statin?: Yes  Chronic Complications Skills Assessment Completed: : Yes  Assessment indicates:: Adequate understanding  Area of need?: No    Psychosocial/Coping  Patient can identify ways of coping with chronic disease.: yes  Patient-stated ways of coping with chronic disease:: support from loved ones  Psychosocial/Coping Skills Assessment Completed: : Yes  Assessment indicates:: Adequate understanding  Area of need?: No    Assessment Summary and Plan    Based on today's diabetes care assessment, the following areas of need were identified:      Social 8/2/2023   Support No   Access to Mass Media/Tech No   Cognitive/Behavioral Health No   Culture/Bahai No   Communication No   Health Literacy No        Clinical 8/2/2023   Medication Adherence Yes   Lab Compliance No   Nutritional Status No        Diabetes Self-Management Skills 8/2/2023   Diabetes Disease Process/Treatment Options Yes-Reviewed pathophysiology of type 2 diabetes.     Nutrition/Healthy Eating Yes-See care plan.     Physical Activity/Exercise Yes-Discussed physical activity as it relates to insulin resistance and weight loss.   Mr. Forbes states he usually works out in his garden/yard but it has been too hot recently. Encouraged him to get back to his routine when it cools off.      Medication Yes-Mr. Forbes is having affordability issues with Farxiga and Trulicity due to now being in the gap, so he has not been taking them for 1-3 weeks. He notices his blood sugars don't change much with or without the medication. He has an appointment with EUSEBIO Johnson on 8/30 for med management.      Home Blood Glucose Monitoring Yes-See care plan.     Acute Complications Yes-Reviewed blood glucose  goals, prevention, detection, signs and symptoms, and treatment of hypoglycemia and hyperglycemia, and when to contact the clinic.  He denies feeling symptoms of hyperglycemia, even when BGs are in the 300s. He states he takes a lot of medications that can contribute to frequent thirst, frequent urination, etc.      Chronic Complications No     Psychosocial/Coping No            Today's interventions were provided through individual discussion, instruction, and written materials were provided.      Patient verbalized understanding of instruction and written materials.  Pt was able to return back demonstration of instructions today. Patient understood key points, needs reinforcement and further instruction.     Diabetes Self-Management Care Plan:    Today's Diabetes Self-Management Care Plan was developed with Stefan's input. Stefan has agreed to work toward the following goal(s) to improve his/her overall diabetes control.      Care Plan: Diabetes Management   Updates made since 7/4/2023 12:00 AM        Problem: Healthy Eating         Goal: Eat 2-3 meals daily with ~45g of carbohydrates per meal and 0-15g per snack.    Start Date: 8/2/2023   Expected End Date: 8/2/2024   Priority: High   Barriers: No Barriers Identified   Note:    Mr. Forbes was diagnosed with diabetes at the time of his heart attack. At that time, he received thorough education, so his very knowledgeable about healthy eating. He states he has been avoiding starches because his blood sugars have been high, but when he does eat them, he reads food labels and measures them. Re-educated him on carbohydrates and their importance in providing the body energy. Educated he does not need to avoid them, rather eat them in moderation and pair them with a protein and/or healthy fat.   Reinforced the positive behavior of measuring foods and reading food labels.   He states one of his biggest barriers is late night snacking; he states he will over-do it from time  to time. Encouraged high protein snacks that will help him feel saldaña for longer. Encouraged him to look at the nutrition label on his Halo Bars.   He states he likes veggies, so the My Plate method is a good option for him.   Provided him with the carb list, meals, and snacks in the DM management guide, 25 healthy snack ideas from www.diabetesfoodhub.org, and the [place] meal plan.          Task: Reviewed the sources and role of Carbohydrate, Protein, and Fat and how each nutrient impacts blood sugar. Completed 8/3/2023        Task: Provided visual examples using dry measuring cups, food models, and other familiar objects such as computer mouse, deck or cards, tennis ball etc. to help with visualization of portions. Completed 8/3/2023        Task: Explained how to count carbohydrates using the food label and the use of dry measuring cups for accurate carb counting. Completed 8/3/2023        Task: Review the importance of balancing carbohydrates with each meal using portion control techniques to count servings of carbohydrate and label reading to identify serving size and amount of total carbs per serving. Completed 8/3/2023        Task: Provided Sample plate method and reviewed the use of the plate to estimate amounts of carbohydrate per meal. Completed 8/3/2023        Problem: Blood Glucose Self-Monitoring         Goal: Patient agrees to check and record blood sugars 1-2 times per day.    Start Date: 8/2/2023   Expected End Date: 8/2/2024   Priority: Medium   Barriers: No Barriers Identified   Note:    Mr. Forbes is checking his blood sugar 1x/day but not consistently at the same time. Educated on checking first thing in the morning before his coffee to get the most accurate reading. Educated on a goal of  mg/dL and a 2 hour PP goal of <160 mg/dL; these goals will help achieve an A1C of 6.5%.   He is part of the [employer] program.        Task: Reviewed the importance of self-monitoring blood glucose  and keeping logs. Completed 8/3/2023          Follow Up Plan     Follow up in about 6 weeks (around 9/13/2023) for follow-up after seeing EUSEBIO Johnson and for review of logs.    Today's care plan and follow up schedule was discussed with patient.  Stefan verbalized understanding of the care plan, goals, and agrees to follow up plan.        The patient was encouraged to communicate with his/her health care provider/physician and care team regarding his/her condition(s) and treatment.  I provided the patient with my contact information today and encouraged to contact me via phone or [facility] Patient Portal as needed.     Length of Visit   Total Time: 60 Minutes

## 2023-08-04 ENCOUNTER — OFFICE VISIT (OUTPATIENT)
Dept: INTERNAL MEDICINE | Facility: CLINIC | Age: 68
End: 2023-08-04
Payer: MEDICARE

## 2023-08-04 ENCOUNTER — LAB VISIT (OUTPATIENT)
Dept: LAB | Facility: HOSPITAL | Age: 68
End: 2023-08-04
Attending: INTERNAL MEDICINE
Payer: MEDICARE

## 2023-08-04 VITALS
HEART RATE: 72 BPM | SYSTOLIC BLOOD PRESSURE: 100 MMHG | HEIGHT: 68 IN | WEIGHT: 221.31 LBS | OXYGEN SATURATION: 97 % | DIASTOLIC BLOOD PRESSURE: 60 MMHG | BODY MASS INDEX: 33.54 KG/M2 | TEMPERATURE: 97 F

## 2023-08-04 DIAGNOSIS — R25.2 LEG CRAMPS: Primary | ICD-10-CM

## 2023-08-04 DIAGNOSIS — R25.2 LEG CRAMPS: ICD-10-CM

## 2023-08-04 LAB
ANION GAP SERPL CALC-SCNC: 12 MMOL/L (ref 8–16)
BUN SERPL-MCNC: 13 MG/DL (ref 8–23)
CALCIUM SERPL-MCNC: 9.2 MG/DL (ref 8.7–10.5)
CHLORIDE SERPL-SCNC: 96 MMOL/L (ref 95–110)
CO2 SERPL-SCNC: 21 MMOL/L (ref 23–29)
CREAT SERPL-MCNC: 0.9 MG/DL (ref 0.5–1.4)
EST. GFR  (NO RACE VARIABLE): >60 ML/MIN/1.73 M^2
GLUCOSE SERPL-MCNC: 130 MG/DL (ref 70–110)
MAGNESIUM SERPL-MCNC: 1.4 MG/DL (ref 1.6–2.6)
POTASSIUM SERPL-SCNC: 5 MMOL/L (ref 3.5–5.1)
SODIUM SERPL-SCNC: 129 MMOL/L (ref 136–145)

## 2023-08-04 PROCEDURE — 99999 PR PBB SHADOW E&M-EST. PATIENT-LVL V: CPT | Mod: PBBFAC,,, | Performed by: INTERNAL MEDICINE

## 2023-08-04 PROCEDURE — 99213 PR OFFICE/OUTPT VISIT, EST, LEVL III, 20-29 MIN: ICD-10-PCS | Mod: S$GLB,,, | Performed by: INTERNAL MEDICINE

## 2023-08-04 PROCEDURE — 3074F PR MOST RECENT SYSTOLIC BLOOD PRESSURE < 130 MM HG: ICD-10-PCS | Mod: CPTII,S$GLB,, | Performed by: INTERNAL MEDICINE

## 2023-08-04 PROCEDURE — 1101F PR PT FALLS ASSESS DOC 0-1 FALLS W/OUT INJ PAST YR: ICD-10-PCS | Mod: CPTII,S$GLB,, | Performed by: INTERNAL MEDICINE

## 2023-08-04 PROCEDURE — 83735 ASSAY OF MAGNESIUM: CPT | Performed by: INTERNAL MEDICINE

## 2023-08-04 PROCEDURE — 1126F AMNT PAIN NOTED NONE PRSNT: CPT | Mod: CPTII,S$GLB,, | Performed by: INTERNAL MEDICINE

## 2023-08-04 PROCEDURE — 3008F BODY MASS INDEX DOCD: CPT | Mod: CPTII,S$GLB,, | Performed by: INTERNAL MEDICINE

## 2023-08-04 PROCEDURE — 4010F ACE/ARB THERAPY RXD/TAKEN: CPT | Mod: CPTII,S$GLB,, | Performed by: INTERNAL MEDICINE

## 2023-08-04 PROCEDURE — 1159F MED LIST DOCD IN RCRD: CPT | Mod: CPTII,S$GLB,, | Performed by: INTERNAL MEDICINE

## 2023-08-04 PROCEDURE — 3288F PR FALLS RISK ASSESSMENT DOCUMENTED: ICD-10-PCS | Mod: CPTII,S$GLB,, | Performed by: INTERNAL MEDICINE

## 2023-08-04 PROCEDURE — 80048 BASIC METABOLIC PNL TOTAL CA: CPT | Performed by: INTERNAL MEDICINE

## 2023-08-04 PROCEDURE — 3078F DIAST BP <80 MM HG: CPT | Mod: CPTII,S$GLB,, | Performed by: INTERNAL MEDICINE

## 2023-08-04 PROCEDURE — 3051F HG A1C>EQUAL 7.0%<8.0%: CPT | Mod: CPTII,S$GLB,, | Performed by: INTERNAL MEDICINE

## 2023-08-04 PROCEDURE — 99213 OFFICE O/P EST LOW 20 MIN: CPT | Mod: S$GLB,,, | Performed by: INTERNAL MEDICINE

## 2023-08-04 PROCEDURE — 36415 COLL VENOUS BLD VENIPUNCTURE: CPT | Performed by: INTERNAL MEDICINE

## 2023-08-04 PROCEDURE — 3078F PR MOST RECENT DIASTOLIC BLOOD PRESSURE < 80 MM HG: ICD-10-PCS | Mod: CPTII,S$GLB,, | Performed by: INTERNAL MEDICINE

## 2023-08-04 PROCEDURE — 3051F PR MOST RECENT HEMOGLOBIN A1C LEVEL 7.0 - < 8.0%: ICD-10-PCS | Mod: CPTII,S$GLB,, | Performed by: INTERNAL MEDICINE

## 2023-08-04 PROCEDURE — 3288F FALL RISK ASSESSMENT DOCD: CPT | Mod: CPTII,S$GLB,, | Performed by: INTERNAL MEDICINE

## 2023-08-04 PROCEDURE — 3072F LOW RISK FOR RETINOPATHY: CPT | Mod: CPTII,S$GLB,, | Performed by: INTERNAL MEDICINE

## 2023-08-04 PROCEDURE — 1101F PT FALLS ASSESS-DOCD LE1/YR: CPT | Mod: CPTII,S$GLB,, | Performed by: INTERNAL MEDICINE

## 2023-08-04 PROCEDURE — 1159F PR MEDICATION LIST DOCUMENTED IN MEDICAL RECORD: ICD-10-PCS | Mod: CPTII,S$GLB,, | Performed by: INTERNAL MEDICINE

## 2023-08-04 PROCEDURE — 3072F PR LOW RISK FOR RETINOPATHY: ICD-10-PCS | Mod: CPTII,S$GLB,, | Performed by: INTERNAL MEDICINE

## 2023-08-04 PROCEDURE — 3074F SYST BP LT 130 MM HG: CPT | Mod: CPTII,S$GLB,, | Performed by: INTERNAL MEDICINE

## 2023-08-04 PROCEDURE — 99999 PR PBB SHADOW E&M-EST. PATIENT-LVL V: ICD-10-PCS | Mod: PBBFAC,,, | Performed by: INTERNAL MEDICINE

## 2023-08-04 PROCEDURE — 3008F PR BODY MASS INDEX (BMI) DOCUMENTED: ICD-10-PCS | Mod: CPTII,S$GLB,, | Performed by: INTERNAL MEDICINE

## 2023-08-04 PROCEDURE — 1126F PR PAIN SEVERITY QUANTIFIED, NO PAIN PRESENT: ICD-10-PCS | Mod: CPTII,S$GLB,, | Performed by: INTERNAL MEDICINE

## 2023-08-04 PROCEDURE — 4010F PR ACE/ARB THEARPY RXD/TAKEN: ICD-10-PCS | Mod: CPTII,S$GLB,, | Performed by: INTERNAL MEDICINE

## 2023-08-04 NOTE — PROGRESS NOTES
"Subjective:      Patient ID: Stefan Forbes Jr. is a 68 y.o. male.    Chief Complaint: Leg Pain    HPI    69 yo with   Patient Active Problem List   Diagnosis    Controlled type 2 diabetes mellitus without complication, without long-term current use of insulin    Hypertension associated with diabetes    Hyperlipidemia associated with type 2 diabetes mellitus    Moderate COPD (chronic obstructive pulmonary disease)    CAD (coronary artery disease)    Vitamin D deficiency    Osteoarthritis of knee    Pulmonary nodule    Nevus of choroid of right eye    History of pulmonary embolism    Hyponatremia    Iron deficiency anemia due to chronic blood loss    MACHO on CPAP    PLMD (periodic limb movement disorder)    BMI 35.0-35.9,adult    Varicose vein of leg    Tobacco abuse, in remission    Tubular adenoma of colon    Benign prostatic hyperplasia with weak urinary stream    Urgency of urination    Aortic atherosclerosis    COVID    Severe obesity     Past Medical History:   Diagnosis Date    Arthritis     Cataract     COPD (chronic obstructive pulmonary disease)     Diabetes mellitus, type 2     Hyperlipidemia     Hypertension     Personal history of colonic polyps 2018     C/o 3 mo of óscar leg and foot cramps. Usually feet.  Muscle relaxer given by Orthopedic improved symptoms but did not resolve them.  No redness warmth or edema.  Symptoms typically occur in bed.      Review of Systems   Constitutional:  Negative for chills and fever.   HENT:  Negative for ear pain and sore throat.    Respiratory:  Negative for cough.    Cardiovascular:  Negative for chest pain.   Gastrointestinal:  Negative for abdominal pain and blood in stool.   Genitourinary:  Negative for dysuria and hematuria.   Neurological:  Negative for seizures and syncope.     Objective:   /60 (BP Location: Right arm, Patient Position: Sitting, BP Method: Large (Manual))   Pulse 72   Temp 97 °F (36.1 °C) (Tympanic)   Ht 5' 8" (1.727 m)   Wt 100.4 kg " (221 lb 5.5 oz)   SpO2 97%   BMI 33.65 kg/m²     Physical Exam  Constitutional:       General: He is awake.      Appearance: Normal appearance.   HENT:      Head: Normocephalic and atraumatic.   Eyes:      Conjunctiva/sclera: Conjunctivae normal.   Pulmonary:      Effort: Pulmonary effort is normal.   Musculoskeletal:         General: No swelling or tenderness.      Cervical back: Normal range of motion.      Comments: No erythema   Neurological:      Mental Status: He is alert. Mental status is at baseline.   Psychiatric:         Mood and Affect: Mood normal.         Behavior: Behavior normal. Behavior is cooperative.         Thought Content: Thought content normal.         Judgment: Judgment normal.         Lab Results   Component Value Date    WBC 7.41 10/12/2022    HGB 12.5 (L) 10/12/2022    HGB 13.0 (L) 01/18/2022    HGB 12.4 (L) 10/14/2021    HCT 41.6 10/12/2022    MCV 96 10/12/2022    MCV 94 01/18/2022    MCV 93 10/14/2021     10/12/2022    CHOL 120 10/12/2022    TRIG 86 10/12/2022    HDL 46 10/12/2022    LDLCALC 56.8 (L) 10/12/2022    LDLCALC 55.6 (L) 10/14/2021    LDLCALC 65.6 09/02/2020    ALT 20 10/12/2022    AST 15 10/12/2022     (L) 08/04/2023    K 5.0 08/04/2023    CALCIUM 9.2 08/04/2023    CL 96 08/04/2023    CO2 21 (L) 08/04/2023    BUN 13 08/04/2023    CREATININE 0.9 08/04/2023    CREATININE 0.9 10/12/2022    CREATININE 0.9 10/14/2021    EGFRNORACEVR >60.0 08/04/2023    EGFRNORACEVR >60.0 10/12/2022    TSH 1.195 10/12/2022    TSH 1.143 10/14/2021    TSH 0.935 03/05/2020    PSA 0.38 01/18/2022    PSA 0.17 09/02/2020    PSA 0.35 08/28/2019     (H) 08/04/2023    HGBA1C 7.1 (H) 04/13/2023    HGBA1C 7.1 (H) 10/12/2022    HGBA1C 6.9 (H) 04/18/2022    [id number] 44 04/26/2017    GBDDWPUB22CJ 29 (L) 10/08/2015          The ASCVD Risk score (Mal DAVID, et al., 2019) failed to calculate for the following reasons:    The valid total cholesterol range is 130 to 320 mg/dL     Assessment:      1. Leg cramps      Plan:   1. Leg cramps  -     Basic Metabolic Panel; Future; Expected date: 08/04/2023  -     Magnesium; Future; Expected date: 08/04/2023    Hydration and stretching as discussed.    Patient Instructions   Okay to try a magnesium tablet over-the-counter or a potassium tablet over-the-counter daily for leg cramps.    Future Appointments   Date Time Provider Department Center   8/16/2023 11:00 AM Anton April, CTTS [facility] SMOKE [facility]   8/30/2023  3:00 PM Elizabeth Stephens NP [facility] DIABETE [facility]   9/14/2023 10:30 AM Karen Sandra RN [facility] DIBEDU [facility]   10/5/2023 10:00 AM [facility] XR2 [facility] XRAY [facility]   10/5/2023 10:15 [facility]   10/5/2023 10:45 [facility]   10/5/2023 11:20 AM Mazin Wells MD [facility] SLEEP [facility]   10/30/2023  7:50 AM LABORATORY, [facility] LAB [facility]   11/3/2023 11:40 AM Wallace Fisher MD [facility] IM [facility]   2/28/2024 10:15 AM Stefan Eugene MD [facility] UROLOGY [facility]       Lab Frequency Next Occurrence   X-Ray Chest PA And Lateral Once 10/06/2022   Lipid Panel Once 10/30/2023   Hemoglobin A1C Once 10/30/2023   Comprehensive Metabolic Panel Once 10/30/2023   CBC Auto Differential Once 10/30/2023   PSA, Screening Once 10/30/2023   TSH Once 10/30/2023   Microalbumin/creatinine urine ratio         Follow up if symptoms worsen or fail to improve.

## 2023-08-04 NOTE — PATIENT INSTRUCTIONS
Okay to try a magnesium tablet over-the-counter or a potassium tablet over-the-counter daily for leg cramps.

## 2023-08-05 ENCOUNTER — NURSE TRIAGE (OUTPATIENT)
Dept: ADMINISTRATIVE | Facility: CLINIC | Age: 68
End: 2023-08-05
Payer: MEDICARE

## 2023-08-05 NOTE — TELEPHONE ENCOUNTER
Pt's wife reports pt had blood work done the other day, has a history of low sodium, but it has been better as of late. Yesterday's lab is showing it to be low, 129. Wife wanting to know if their is anything they should do this weekend, denies pt is having any symptoms that require triage at this time. Wife/Pt advised to reach out to the office when it is open to discuss lab results with PCP per protocol, Wife/Pt encouraged to call back with any worsening symptoms or questions and verbalized understanding.    Reason for Disposition   Caller requesting routine or non-urgent lab result    Additional Information   Lab result questions    Protocols used: Information Only Call - No Triage-A-AH, PCP Call - No Triage-A-AH

## 2023-08-06 DIAGNOSIS — E83.42 HYPOMAGNESEMIA: Primary | ICD-10-CM

## 2023-08-06 DIAGNOSIS — E87.1 HYPONATREMIA: ICD-10-CM

## 2023-08-06 RX ORDER — LANOLIN ALCOHOL/MO/W.PET/CERES
400 CREAM (GRAM) TOPICAL DAILY
Qty: 90 TABLET | Refills: 3 | Status: SHIPPED | OUTPATIENT
Start: 2023-08-06

## 2023-08-10 DIAGNOSIS — J31.0 CHRONIC RHINITIS: ICD-10-CM

## 2023-08-11 RX ORDER — METHSCOPOLAMINE BROMIDE 2.5 MG/1
TABLET ORAL
Qty: 180 TABLET | Refills: 4 | Status: SHIPPED | OUTPATIENT
Start: 2023-08-11 | End: 2024-04-03

## 2023-08-15 DIAGNOSIS — J31.0 CHRONIC RHINITIS: ICD-10-CM

## 2023-08-16 ENCOUNTER — PATIENT MESSAGE (OUTPATIENT)
Dept: SMOKING CESSATION | Facility: CLINIC | Age: 68
End: 2023-08-16
Payer: MEDICARE

## 2023-08-16 ENCOUNTER — CLINICAL SUPPORT (OUTPATIENT)
Dept: SMOKING CESSATION | Facility: CLINIC | Age: 68
End: 2023-08-16
Payer: COMMERCIAL

## 2023-08-16 DIAGNOSIS — F17.200 NICOTINE DEPENDENCE: Primary | ICD-10-CM

## 2023-08-16 PROCEDURE — 99404 PR PREVENT COUNSEL,INDIV,60 MIN: ICD-10-PCS | Mod: ,,, | Performed by: SPEECH-LANGUAGE PATHOLOGIST

## 2023-08-16 PROCEDURE — 99404 PREV MED CNSL INDIV APPRX 60: CPT | Mod: ,,, | Performed by: SPEECH-LANGUAGE PATHOLOGIST

## 2023-08-16 RX ORDER — VARENICLINE TARTRATE 1 MG/1
TABLET, FILM COATED ORAL
Qty: 56 TABLET | Refills: 0 | Status: SHIPPED | OUTPATIENT
Start: 2023-08-16 | End: 2023-09-18 | Stop reason: SDUPTHER

## 2023-08-16 RX ORDER — IPRATROPIUM BROMIDE 21 UG/1
SPRAY, METERED NASAL
Qty: 30 ML | Refills: 12 | Status: SHIPPED | OUTPATIENT
Start: 2023-08-16

## 2023-08-16 NOTE — PROGRESS NOTES
VIRTUAL SCCON    At [facility], patient reports smoking 4 cigarillos. Discussed the role of tobacco cessation program, role of NRT & behavioral changes to assist the patient to reach his goal of being tobacco free. The patient reports he is willing to utilize Varenicline & will return for a follow up visit.  Education & instruction on the role of the NRT, usage & dosage. The patient verbalized understanding & willingness to apply. Patient instructed to call CTTS anytime. Follow up visit set with the patient for 9/6/2023 at 4:00 pm. Patient states he would like to be quit by next session.

## 2023-08-30 ENCOUNTER — LAB VISIT (OUTPATIENT)
Dept: LAB | Facility: HOSPITAL | Age: 68
End: 2023-08-30
Attending: INTERNAL MEDICINE
Payer: MEDICARE

## 2023-08-30 ENCOUNTER — OFFICE VISIT (OUTPATIENT)
Dept: DIABETES | Facility: CLINIC | Age: 68
End: 2023-08-30
Payer: MEDICARE

## 2023-08-30 VITALS
HEART RATE: 63 BPM | BODY MASS INDEX: 33.34 KG/M2 | HEIGHT: 68 IN | SYSTOLIC BLOOD PRESSURE: 126 MMHG | WEIGHT: 220 LBS | DIASTOLIC BLOOD PRESSURE: 72 MMHG

## 2023-08-30 DIAGNOSIS — E87.1 HYPONATREMIA: ICD-10-CM

## 2023-08-30 DIAGNOSIS — E78.5 HYPERLIPIDEMIA ASSOCIATED WITH TYPE 2 DIABETES MELLITUS: ICD-10-CM

## 2023-08-30 DIAGNOSIS — I15.2 HYPERTENSION ASSOCIATED WITH DIABETES: ICD-10-CM

## 2023-08-30 DIAGNOSIS — E83.42 HYPOMAGNESEMIA: ICD-10-CM

## 2023-08-30 DIAGNOSIS — E11.9 CONTROLLED TYPE 2 DIABETES MELLITUS WITHOUT COMPLICATION, WITHOUT LONG-TERM CURRENT USE OF INSULIN: Primary | ICD-10-CM

## 2023-08-30 DIAGNOSIS — E11.59 HYPERTENSION ASSOCIATED WITH DIABETES: ICD-10-CM

## 2023-08-30 DIAGNOSIS — E11.69 HYPERLIPIDEMIA ASSOCIATED WITH TYPE 2 DIABETES MELLITUS: ICD-10-CM

## 2023-08-30 PROCEDURE — 1126F AMNT PAIN NOTED NONE PRSNT: CPT | Mod: CPTII,S$GLB,, | Performed by: NURSE PRACTITIONER

## 2023-08-30 PROCEDURE — 4010F PR ACE/ARB THEARPY RXD/TAKEN: ICD-10-PCS | Mod: CPTII,S$GLB,, | Performed by: NURSE PRACTITIONER

## 2023-08-30 PROCEDURE — 80048 BASIC METABOLIC PNL TOTAL CA: CPT | Performed by: INTERNAL MEDICINE

## 2023-08-30 PROCEDURE — 3074F PR MOST RECENT SYSTOLIC BLOOD PRESSURE < 130 MM HG: ICD-10-PCS | Mod: CPTII,S$GLB,, | Performed by: NURSE PRACTITIONER

## 2023-08-30 PROCEDURE — 1101F PT FALLS ASSESS-DOCD LE1/YR: CPT | Mod: CPTII,S$GLB,, | Performed by: NURSE PRACTITIONER

## 2023-08-30 PROCEDURE — 3051F PR MOST RECENT HEMOGLOBIN A1C LEVEL 7.0 - < 8.0%: ICD-10-PCS | Mod: CPTII,S$GLB,, | Performed by: NURSE PRACTITIONER

## 2023-08-30 PROCEDURE — 3078F PR MOST RECENT DIASTOLIC BLOOD PRESSURE < 80 MM HG: ICD-10-PCS | Mod: CPTII,S$GLB,, | Performed by: NURSE PRACTITIONER

## 2023-08-30 PROCEDURE — 3008F PR BODY MASS INDEX (BMI) DOCUMENTED: ICD-10-PCS | Mod: CPTII,S$GLB,, | Performed by: NURSE PRACTITIONER

## 2023-08-30 PROCEDURE — 99205 PR OFFICE/OUTPT VISIT, NEW, LEVL V, 60-74 MIN: ICD-10-PCS | Mod: S$GLB,,, | Performed by: NURSE PRACTITIONER

## 2023-08-30 PROCEDURE — 99999 PR PBB SHADOW E&M-EST. PATIENT-LVL V: CPT | Mod: PBBFAC,,, | Performed by: NURSE PRACTITIONER

## 2023-08-30 PROCEDURE — 3074F SYST BP LT 130 MM HG: CPT | Mod: CPTII,S$GLB,, | Performed by: NURSE PRACTITIONER

## 2023-08-30 PROCEDURE — 3288F PR FALLS RISK ASSESSMENT DOCUMENTED: ICD-10-PCS | Mod: CPTII,S$GLB,, | Performed by: NURSE PRACTITIONER

## 2023-08-30 PROCEDURE — 36415 COLL VENOUS BLD VENIPUNCTURE: CPT | Performed by: INTERNAL MEDICINE

## 2023-08-30 PROCEDURE — 1159F MED LIST DOCD IN RCRD: CPT | Mod: CPTII,S$GLB,, | Performed by: NURSE PRACTITIONER

## 2023-08-30 PROCEDURE — 3078F DIAST BP <80 MM HG: CPT | Mod: CPTII,S$GLB,, | Performed by: NURSE PRACTITIONER

## 2023-08-30 PROCEDURE — 1101F PR PT FALLS ASSESS DOC 0-1 FALLS W/OUT INJ PAST YR: ICD-10-PCS | Mod: CPTII,S$GLB,, | Performed by: NURSE PRACTITIONER

## 2023-08-30 PROCEDURE — 3008F BODY MASS INDEX DOCD: CPT | Mod: CPTII,S$GLB,, | Performed by: NURSE PRACTITIONER

## 2023-08-30 PROCEDURE — 1159F PR MEDICATION LIST DOCUMENTED IN MEDICAL RECORD: ICD-10-PCS | Mod: CPTII,S$GLB,, | Performed by: NURSE PRACTITIONER

## 2023-08-30 PROCEDURE — 3288F FALL RISK ASSESSMENT DOCD: CPT | Mod: CPTII,S$GLB,, | Performed by: NURSE PRACTITIONER

## 2023-08-30 PROCEDURE — 1160F RVW MEDS BY RX/DR IN RCRD: CPT | Mod: CPTII,S$GLB,, | Performed by: NURSE PRACTITIONER

## 2023-08-30 PROCEDURE — 4010F ACE/ARB THERAPY RXD/TAKEN: CPT | Mod: CPTII,S$GLB,, | Performed by: NURSE PRACTITIONER

## 2023-08-30 PROCEDURE — 1160F PR REVIEW ALL MEDS BY PRESCRIBER/CLIN PHARMACIST DOCUMENTED: ICD-10-PCS | Mod: CPTII,S$GLB,, | Performed by: NURSE PRACTITIONER

## 2023-08-30 PROCEDURE — 3051F HG A1C>EQUAL 7.0%<8.0%: CPT | Mod: CPTII,S$GLB,, | Performed by: NURSE PRACTITIONER

## 2023-08-30 PROCEDURE — 83735 ASSAY OF MAGNESIUM: CPT | Performed by: INTERNAL MEDICINE

## 2023-08-30 PROCEDURE — 99999 PR PBB SHADOW E&M-EST. PATIENT-LVL V: ICD-10-PCS | Mod: PBBFAC,,, | Performed by: NURSE PRACTITIONER

## 2023-08-30 PROCEDURE — 1126F PR PAIN SEVERITY QUANTIFIED, NO PAIN PRESENT: ICD-10-PCS | Mod: CPTII,S$GLB,, | Performed by: NURSE PRACTITIONER

## 2023-08-30 PROCEDURE — 99205 OFFICE O/P NEW HI 60 MIN: CPT | Mod: S$GLB,,, | Performed by: NURSE PRACTITIONER

## 2023-08-30 RX ORDER — METFORMIN HYDROCHLORIDE 1000 MG/1
1000 TABLET ORAL 2 TIMES DAILY WITH MEALS
Qty: 180 TABLET | Refills: 1 | Status: SHIPPED | OUTPATIENT
Start: 2023-08-30 | End: 2024-04-10

## 2023-08-30 NOTE — PROGRESS NOTES
PCP: Wallace Fisher MD    Subjective:     Chief Complaint: Diabetes Consult    History of Present Illness: 68 y.o.  male for diabetes consult.   Type II diabetes since  .  Comorbidities: HTN, HLD, CAD, COPD, MACHO , and h/o PE, MEDINA, BPH.    Known diabetes complications:  DKA/HHS: denies  Retinopathy: no   Peripheral neuropathy: denies  Nephropathy: denies    Denies recent hospital admissions or ER visits.   Denies having hypoglycemia.   Endorses diabetes related symptoms: None.  Admits to + 28 lbs over the last 6 months with diet modification.    Of note, currently on insurance gap.  Receiving pharm assistance for Trulicity and Farxiga.    Blood glucose monitoring: fingerstick: 2 x/day   Enrolled in digital diabetes medicine.    Diabetes Digital Medicine Flowsheet Readings    Stefan's recent readings (past 60 days):  Time Taken Glucose (mg/dL)   2023  9:37    2023  7:30    2023  6:55    2023  8:18    2023  7:10    2023  7:03    2023 10:22    2023  3:19    2023 10:21    2023  9:34    2023  7:02    2023 11:22    2023  8:54    8/15/2023  6:54    8/15/2023  8:09    2023  7:57    2023  8:54    2023  7:13    2023 12:11    2023  7:26    2023  9:10    2023  9:07    8/10/2023  7:43    8/10/2023  9:27    2023  7:06    2023  9:24    2023  7:01    2023  8:38    2023  7:01    2023 10:12    8/3/2023  7:14    2023  8:53      Fasting AC dinner  Activity Level: Sedentary  Meal Plannin meals/day;1 snack/day.  Taking brunch and dinner.    Medication compliance all of the time, diet compliance most of the time.   Has attended diabetes education in the past.     Previous regimen: None    Current DM  "Medications:  Diabetes Medications               dapagliflozin propanediol (FARXIGA) 10 mg tablet Take 1 tablet (10 mg total) by mouth once daily.  Off x 2 months due to cost    dulaglutide (TRULICITY) 3 mg/0.5 mL pen injector Inject 3 mg into the skin every 7 days.  Off x 1.5 months, resumed 1 week    metFORMIN (GLUCOPHAGE) 1000 MG tablet TAKE ONE TABLET BY MOUTH TWICE DAILY WITH MEALS       Allergies  Review of patient's allergies indicates:  No Known Allergies    Labs Reviewed:  His most recent A1C is:  Lab Results   Component Value Date    HGBA1C 7.1 (H) 04/13/2023    HGBA1C 7.1 (H) 10/12/2022    HGBA1C 6.9 (H) 04/18/2022       His most recent BMP is:  Lab Results   Component Value Date     (L) 08/04/2023    K 5.0 08/04/2023    CL 96 08/04/2023    CO2 21 (L) 08/04/2023    BUN 13 08/04/2023    CREATININE 0.9 08/04/2023    CALCIUM 9.2 08/04/2023    ANIONGAP 12 08/04/2023    ESTGFRAFRICA >60.0 10/14/2021    EGFRNONAA >60.0 10/14/2021       No results found for: "CPEPTIDE"  No results found for: "GLUTAMICACID"    Body mass index is 33.45 kg/m².    Wt Readings from Last 3 Encounters:   08/30/23 1454 99.8 kg (220 lb 0.3 oz)   08/04/23 1316 100.4 kg (221 lb 5.5 oz)   08/02/23 1541 100.3 kg (221 lb 1.9 oz)        His blood sugar in clinic today is:  No results found for: "POCGLU"    Diabetes Management Status  Statin: Taking  ACE/ARB: Taking    Screening or Prevention Patient's value Goal Complete/Controlled?   HgA1C Testing and Control   Lab Results   Component Value Date    HGBA1C 7.1 (H) 04/13/2023      Annually/Less than 8% Yes   Lipid profile : 10/12/2022 Annually Yes   LDL control Lab Results   Component Value Date    LDLCALC 56.8 (L) 10/12/2022    Annually/Less than 100 mg/dl  Yes   Nephropathy screening Lab Results   Component Value Date    MICALBCREAT 53.7 (H) 10/12/2022     Lab Results   Component Value Date    PROTEINUA Negative 09/27/2019    Annually Yes   Blood pressure BP Readings from Last 1 " Encounters:   08/30/23 126/72    Less than 140/90 Yes   Dilated retinal exam : 03/06/2023 Annually Yes    Foot exam   : 08/30/2023 Annually No     Social History     Socioeconomic History    Marital status:    Occupational History    Occupation: retired   Tobacco Use    Smoking status: Former     Types: Cigars     Quit date: 2/4/2020     Years since quitting: 3.5    Smokeless tobacco: Never    Tobacco comments:     4 cigars each day   Substance and Sexual Activity    Alcohol use: No    Drug use: No    Sexual activity: Not Currently     Partners: Female     Social Determinants of Health     Financial Resource Strain: Low Risk  (10/20/2022)    Overall Financial Resource Strain (CARDIA)     Difficulty of Paying Living Expenses: Not very hard   Food Insecurity: No Food Insecurity (10/20/2022)    Hunger Vital Sign     Worried About Running Out of Food in the Last Year: Never true     Ran Out of Food in the Last Year: Never true   Transportation Needs: No Transportation Needs (10/20/2022)    PRAPARE - Transportation     Lack of Transportation (Medical): No     Lack of Transportation (Non-Medical): No   Physical Activity: Unknown (10/20/2022)    Exercise Vital Sign     Days of Exercise per Week: 1 day   Recent Concern: Physical Activity - Insufficiently Active (10/20/2022)    Exercise Vital Sign     Days of Exercise per Week: 1 day     Minutes of Exercise per Session: 10 min   Stress: Stress Concern Present (10/20/2022)    [employer] [place] [employer] - Occupational Stress Questionnaire     Feeling of Stress : To some extent   Social Connections: Unknown (10/20/2022)    Social Connection and Isolation Panel [NHANES]     Frequency of Communication with Friends and Family: Twice a week     Frequency of Social Gatherings with Friends and Family: Once a week     Active Member of Clubs or Organizations: No     Attends Club or Organization Meetings: Never     Marital Status:    Housing Stability: Low  Risk  (10/20/2022)    Housing Stability Vital Sign     Unable to Pay for Housing in the Last Year: No     Number of Places Lived in the Last Year: 1     Unstable Housing in the Last Year: No     Past Medical History:   Diagnosis Date    Arthritis     Cataract     COPD (chronic obstructive pulmonary disease)     Diabetes mellitus, type 2     Hyperlipidemia     Hypertension     Personal history of colonic polyps 2018     Review of Systems   Constitutional:  Negative for activity change, appetite change, fatigue and unexpected weight change.   HENT:  Negative for dental problem and trouble swallowing.    Eyes:  Negative for visual disturbance.   Respiratory:  Negative for chest tightness and shortness of breath.    Cardiovascular:  Negative for chest pain, palpitations and leg swelling.   Gastrointestinal:  Negative for abdominal pain, constipation, diarrhea, nausea and vomiting.   Endocrine: Negative for polydipsia, polyphagia and polyuria.   Genitourinary:  Negative for difficulty urinating, dysuria, frequency and urgency.        Negative yeast infection   Musculoskeletal:  Negative for gait problem, myalgias and neck pain.        Intermittent bilateral leg cramps   Integumentary:  Negative for rash and wound.   Allergic/Immunologic: Negative.    Neurological:  Negative for dizziness, syncope, weakness, numbness and headaches.   Hematological: Negative.    Psychiatric/Behavioral:  Negative for confusion and sleep disturbance.    All other systems reviewed and are negative.       Objective:      Physical Exam  Vitals reviewed.   Constitutional:       Appearance: Normal appearance.   HENT:      Head: Normocephalic and atraumatic.   Eyes:      General: Vision grossly intact.      Extraocular Movements: Extraocular movements intact.      Pupils: Pupils are equal, round, and reactive to light.   Neck:      Thyroid: No thyroid mass or thyroid tenderness.   Cardiovascular:      Rate and Rhythm: Normal rate and regular  rhythm.      Pulses: Normal pulses.           Radial pulses are 2+ on the right side and 2+ on the left side.        Dorsalis pedis pulses are 2+ on the right side and 2+ on the left side.      Heart sounds: Normal heart sounds.      Comments: +varicose veins on right lower leg  Pulmonary:      Effort: Pulmonary effort is normal.      Breath sounds: Normal breath sounds.   Abdominal:      General: Bowel sounds are normal.      Palpations: Abdomen is soft.   Genitourinary:     Comments: Deferred  Musculoskeletal:         General: Normal range of motion.      Cervical back: Normal range of motion and neck supple.      Right lower leg: No edema.      Left lower leg: No edema.      Right foot: No deformity or bunion.      Left foot: No deformity or bunion.   Feet:      Right foot:      Protective Sensation: 6 sites tested.  6 sites sensed.      Skin integrity: Dry skin present. No ulcer, skin breakdown or callus.      Toenail Condition: Right toenails are normal.      Left foot:      Protective Sensation: 6 sites tested.  6 sites sensed.      Skin integrity: Dry skin present. No ulcer, skin breakdown or callus.      Toenail Condition: Left toenails are normal.      Comments: Pinprick exam completed with 10-g monofilament. Vibration sensation intact.  Lymphadenopathy:      Cervical: No cervical adenopathy.   Skin:     General: Skin is warm and dry.      Findings: No rash or wound.      Comments: Negative for acanthosis nigricans. Well-healed SQ injection sites.   Neurological:      Mental Status: He is alert and oriented to person, place, and time.      Coordination: Coordination is intact.      Gait: Gait is intact.   Psychiatric:         Attention and Perception: Attention normal.         Mood and Affect: Mood normal.         Speech: Speech normal.         Behavior: Behavior normal. Behavior is cooperative.         Thought Content: Thought content normal.         Cognition and Memory: Cognition normal.          Assessment / Plan:     Controlled type 2 diabetes mellitus without complication, without long-term current use of insulin  -     Microalbumin/Creatinine Ratio, Urine; Future; Expected date: 10/30/2023  -     metFORMIN (GLUCOPHAGE) 1000 MG tablet; Take 1 tablet (1,000 mg total) by mouth 2 (two) times daily with meals.  Dispense: 180 tablet; Refill: 1    Hypertension associated with diabetes    Hyperlipidemia associated with type 2 diabetes mellitus    Additional Plan Details:  - Condition: Sub optimal control, most recent A1C of 7.1% was completed 3 months ago.   - Monitor blood glucose:  2x daily.Goals reviewed. Maintain BG log. Continue digital diabetes medicine program.  - Hypoglycemia protocol: Reviewed and risk discussed.  Notify if BG readings below 80. Protocol printout provided.   - Meal Planning: Limit carbohydrate intake 30-45 grams/meal, 3x daily and 15 grams/snack, limit 2/day.   - Activity level: Recommend at least 150 minutes of exercise in a week.  - BP and LDL: Recommend lifestyle modifications and follow up with PCP for management.   - Medication Regimen:     Resume Farxiga 10 mg every morning  Continue Trulicity 3 mg once a week  Continue Metformin 1000 mg, 1 tablet twice daily with meals    - Medication consideration: Continue regimen. Receives pharm assistance for Trulicity and Farxiga. Awaiting supplies for Farxiga.  - Health Maintenance standards addressed today: Foot Exam - completed today and documented in physical exam with feedback to patient about proper diabetic foot care and findings and Urine Microalbumin / Creatinine Ratio scheduled. Eye exam UTD with Dr. Sousa, ELGIN 3/23  - Risks of uncontrolled DM explained. Importance of routine foot and eye exams reviewed. Scheduled as appropriate.  -The patient was explained the above plan and given opportunity to ask questions.  He understands, chooses and consents to this plan and accepts all the risks, which include but are not limited to the  risks mentioned above.   - Labs ordered as above.  - CDE follow up: Scheduled   - Follow up: 3 months in clinic.        Charlotte T. Delacruz, FNP-C [facility]    A total of 60 minutes was spent in face to face time, of which over 50 % was spent in counseling patient on disease process, complications, treatment, and side effects of medications.

## 2023-08-30 NOTE — Clinical Note
New patient for me this afternoon. On insurance gap. Wanted to follow up on Farxiga. He said he received his Trulicity but not the Farxiga. Thank you.

## 2023-08-30 NOTE — PATIENT INSTRUCTIONS
Medication Regimen:   Resume Farxiga 10 mg every morning  Continue Trulicity 3 mg once a week  Continue Metformin 1000 mg, 1 tablet twice daily with meals    Patient Instructions:  Carbohydrate Count: 30-45 grams/meal and 15 grams/snack with limit of 2 snacks per day.  Exercise: Goal is 150 minutes or more per week.  Bring meter and blood sugar log to each appointment.     Goals for Blood Sugar:  Fastin-130 mg/dl  2 hours after meal:  mg/dl  Before Bed: 100-150 mg/dl  If Blood sugar is below 70 mg/dl, DO NOT take diabetes medication. Eat first and then recheck blood sugar in 20 minutes.  Foods to eat if blood sugar is below 70 mg/dl  1/2 glass of orange juice   1/2 regular cola can   3-4 hard candies   3-4 small glucose tabs.   Foods to eat if blood sugar is below 50 mg/dl  1 glass of orange juice  1 regular cola can  8 hard candies  8 small glucose tabs.   If you have repeated eating/blood sugar recheck process 2 times and blood sugar still below 70 mg/dl, call 911.                                                           \

## 2023-08-31 LAB
ANION GAP SERPL CALC-SCNC: 9 MMOL/L (ref 8–16)
BUN SERPL-MCNC: 8 MG/DL (ref 8–23)
CALCIUM SERPL-MCNC: 9.5 MG/DL (ref 8.7–10.5)
CHLORIDE SERPL-SCNC: 95 MMOL/L (ref 95–110)
CO2 SERPL-SCNC: 24 MMOL/L (ref 23–29)
CREAT SERPL-MCNC: 0.9 MG/DL (ref 0.5–1.4)
EST. GFR  (NO RACE VARIABLE): >60 ML/MIN/1.73 M^2
GLUCOSE SERPL-MCNC: 118 MG/DL (ref 70–110)
MAGNESIUM SERPL-MCNC: 1.5 MG/DL (ref 1.6–2.6)
POTASSIUM SERPL-SCNC: 5.2 MMOL/L (ref 3.5–5.1)
SODIUM SERPL-SCNC: 128 MMOL/L (ref 136–145)

## 2023-09-05 DIAGNOSIS — E83.42 HYPOMAGNESEMIA: Primary | ICD-10-CM

## 2023-09-05 DIAGNOSIS — E87.1 HYPONATREMIA: ICD-10-CM

## 2023-09-06 ENCOUNTER — CLINICAL SUPPORT (OUTPATIENT)
Dept: SMOKING CESSATION | Facility: CLINIC | Age: 68
End: 2023-09-06
Payer: COMMERCIAL

## 2023-09-06 DIAGNOSIS — F17.200 NICOTINE DEPENDENCE: Primary | ICD-10-CM

## 2023-09-06 PROCEDURE — 99403 PREV MED CNSL INDIV APPRX 45: CPT | Mod: S$GLB,,, | Performed by: SPEECH-LANGUAGE PATHOLOGIST

## 2023-09-06 PROCEDURE — 99999 PR PBB SHADOW E&M-EST. PATIENT-LVL II: ICD-10-PCS | Mod: PBBFAC,,, | Performed by: SPEECH-LANGUAGE PATHOLOGIST

## 2023-09-06 PROCEDURE — 99403 PR PREVENT COUNSEL,INDIV,45 MIN: ICD-10-PCS | Mod: S$GLB,,, | Performed by: SPEECH-LANGUAGE PATHOLOGIST

## 2023-09-06 PROCEDURE — 99999 PR PBB SHADOW E&M-EST. PATIENT-LVL II: CPT | Mod: PBBFAC,,, | Performed by: SPEECH-LANGUAGE PATHOLOGIST

## 2023-09-06 NOTE — PROGRESS NOTES
Individual Follow-Up Form    9/6/2023    Quit Date: TBD    Clinical Status of Patient: Outpatient    Continuing Medication: yes  Chantix    Other Medications:      Target Symptoms: Withdrawal and medication side effects. The following were  rated moderate (3) to severe (4) on TCRS:  Moderate (3): desire/crave, depressed mood/sadness, insomnia & unusual/vivid dreams  Severe (4): none    Comments: Telephone visit at the patient's request. Patient reports he is down to smoking 1-1.5 cigars per day. Praised patient on his gains & progress. He reports he takes the Varenicline 1 mg twice daily. He denies any negative side effects. Administered TCRS with patient reporting mild symptoms of anger/irritability/frustration, increased appetite, restless/can't sit still/impatient & moderate symptoms desire/crave, depressed mood/sadness, insomnia & unusual/vivid dreams. Discussed with patient if he feels that Varenicline is impacting his sleep to go down to 1 mg once daily. Patient states he feels he has restless leg syndrome which contributes to his inability to stay asleep.Discussed s/s of nicotine withdrawal the patient may experience. Discussed changing routines to include gardening first as well as writing recipes in place of smoking. Patient is willing to apply. Patient states his goal is to be smoke free. Follow up set for 10/2/2023 at 11:00 am.    Diagnosis: F17.200    Next Visit: 4 weeks

## 2023-09-07 ENCOUNTER — PATIENT MESSAGE (OUTPATIENT)
Dept: DIABETES | Facility: CLINIC | Age: 68
End: 2023-09-07
Payer: MEDICARE

## 2023-09-11 DIAGNOSIS — E11.69 HYPERLIPIDEMIA ASSOCIATED WITH TYPE 2 DIABETES MELLITUS: ICD-10-CM

## 2023-09-11 DIAGNOSIS — E78.5 HYPERLIPIDEMIA ASSOCIATED WITH TYPE 2 DIABETES MELLITUS: ICD-10-CM

## 2023-09-12 ENCOUNTER — LAB VISIT (OUTPATIENT)
Dept: LAB | Facility: HOSPITAL | Age: 68
End: 2023-09-12
Attending: INTERNAL MEDICINE
Payer: MEDICARE

## 2023-09-12 DIAGNOSIS — E87.1 HYPONATREMIA: ICD-10-CM

## 2023-09-12 LAB
BILIRUB UR QL STRIP: NEGATIVE
CLARITY UR: CLEAR
COLOR UR: YELLOW
GLUCOSE UR QL STRIP: NEGATIVE
HGB UR QL STRIP: NEGATIVE
KETONES UR QL STRIP: ABNORMAL
LEUKOCYTE ESTERASE UR QL STRIP: NEGATIVE
NITRITE UR QL STRIP: NEGATIVE
PH UR STRIP: 6 [PH] (ref 5–8)
PROT UR QL STRIP: NEGATIVE
SP GR UR STRIP: 1.02 (ref 1–1.03)
URN SPEC COLLECT METH UR: ABNORMAL

## 2023-09-12 PROCEDURE — 81003 URINALYSIS AUTO W/O SCOPE: CPT | Performed by: INTERNAL MEDICINE

## 2023-09-12 RX ORDER — ROSUVASTATIN CALCIUM 10 MG/1
TABLET, COATED ORAL
Qty: 90 TABLET | Refills: 0 | Status: SHIPPED | OUTPATIENT
Start: 2023-09-12 | End: 2023-11-03

## 2023-09-18 DIAGNOSIS — F17.200 NICOTINE DEPENDENCE: ICD-10-CM

## 2023-09-18 RX ORDER — VARENICLINE TARTRATE 1 MG/1
TABLET, FILM COATED ORAL
Qty: 60 TABLET | Refills: 0 | Status: SHIPPED | OUTPATIENT
Start: 2023-09-18 | End: 2023-10-16 | Stop reason: SDUPTHER

## 2023-09-19 ENCOUNTER — CLINICAL SUPPORT (OUTPATIENT)
Dept: DIABETES | Facility: CLINIC | Age: 68
End: 2023-09-19
Payer: MEDICARE

## 2023-09-19 VITALS — WEIGHT: 226 LBS | BODY MASS INDEX: 34.36 KG/M2

## 2023-09-19 DIAGNOSIS — E11.9 CONTROLLED TYPE 2 DIABETES MELLITUS WITHOUT COMPLICATION, WITHOUT LONG-TERM CURRENT USE OF INSULIN: Primary | ICD-10-CM

## 2023-09-19 PROCEDURE — G0108 PR DIAB MANAGE TRN  PER INDIV: ICD-10-PCS | Mod: S$GLB,,,

## 2023-09-19 PROCEDURE — G0108 DIAB MANAGE TRN  PER INDIV: HCPCS | Mod: S$GLB,,,

## 2023-09-19 PROCEDURE — 99999 PR PBB SHADOW E&M-EST. PATIENT-LVL III: ICD-10-PCS | Mod: PBBFAC,,,

## 2023-09-19 PROCEDURE — 99999 PR PBB SHADOW E&M-EST. PATIENT-LVL III: CPT | Mod: PBBFAC,,,

## 2023-09-19 NOTE — PROGRESS NOTES
Diabetes Care Specialist Follow-up Note  Author: Karen Sandra RN  Date: 9/19/2023    Program Intake  Reason for Diabetes Program Visit:: Intervention  Type of Intervention:: Individual  Individual: Education  Education: Self-Management Skill Review  Current diabetes risk level:: moderate  In the last 12 months, have you:: none  Permission to speak with others about care:: yes    Lab Results   Component Value Date    HGBA1C 7.1 (H) 04/13/2023     A1c Pre Diabetes Care Specialist Intervention:  7.1%    CURRENT MEDS  Farxiga 10 mg daily   Trulicity 3 mg weekly   Metformin 1,000 mg BID    Clinical    Weight: 102.5 kg (225 lb 15.5 oz)   Body mass index is 34.36 kg/m².  Wt gain of 4 lbs since last visit on 8/02/02023    Nutritional Status  Diet: Regular  Meal Plan 24 Hour Recall: Breakfast, Lunch, Dinner, Snack  Meal Plan 24 Hour Recall - Breakfast: 1.5 cup of cinnamon Chex cereal with milk; Coffee  Meal Plan 24 Hour Recall - Lunch: None.  Meal Plan 24 Hour Recall - Dinner: Soup with grilled cheese sandwich; Coffee  Meal Plan 24 Hour Recall - Snack: [employer]. Potato chips.    Physical activity/Exercise:   Mr. Forbes states he has not been as active lately due to being very tired due to multiple life stressors and current health status.   He plans to be more active as the weather cools down, though.     SMBG: Finger sticks 2x/day          During today's follow-up visit,  the following areas required further assessment and content was provided/reviewed.    Based on today's diabetes care assessment, the following areas of need were identified:          8/2/2023    12:01 AM   Social   Support No   Access to Mass Media/Tech No   Cognitive/Behavioral Health No   Culture/Episcopal No   Communication No   Health Literacy No            8/2/2023    12:01 AM   Clinical   Medication Adherence Yes   Lab Compliance No   Nutritional Status No            8/2/2023    12:01 AM   Diabetes Self-Management Skills   Nutrition/Healthy  Eating Yes-See care plan for update.      Home Blood Glucose Monitoring Yes-See care plan for update.           Today's interventions were provided through individual discussion, instruction, and written materials were provided.    Patient verbalized understanding of instruction and written materials.  Pt was able to return back demonstration of instructions today. Patient understood key points, needs reinforcement and further instruction.     Diabetes Self-Management Care Plan Review and Evaluation of Progress:    During today's follow-up Stefan's Diabetes Self-Management Care Plan progress was reviewed and progress was evaluated including his/her input. Stefan has agreed to continue his/her journey to improve/maintain overall diabetes control by continuing to set health goals. See care plan progress below.      Care Plan: Diabetes Management   Updates made since 8/20/2023 12:00 AM        Problem: Healthy Eating         Goal: Eat 2-3 meals daily with ~45g of carbohydrates per meal and 0-15g per snack.    Start Date: 8/2/2023   Expected End Date: 8/2/2024   This Visit's Progress: No change   Priority: High   Barriers: No Barriers Identified   Note:    Mr. Forbes was diagnosed with diabetes at the time of his heart attack. At that time, he received thorough education, so his very knowledgeable about healthy eating. He states he has been avoiding starches because his blood sugars have been high, but when he does eat them, he reads food labels and measures them. Re-educated him on carbohydrates and their importance in providing the body energy. Educated he does not need to avoid them, rather eat them in moderation and pair them with a protein and/or healthy fat.   Reinforced the positive behavior of measuring foods and reading food labels.   He states one of his biggest barriers is late night snacking; he states he will over-do it from time to time. Encouraged high protein snacks that will help him feel saldaña for  "longer. Encouraged him to look at the nutrition label on his Halo Bars.   He states he likes veggies, so the My Plate method is a good option for him.   Provided him with the carb list, meals, and snacks in the DM management guide, 25 healthy snack ideas from www.diabetesfoodhub.org, and the [place] meal plan.     9/19/23: Mr. Forbes states he had a lot going on the past month or so, so he has not been putting himself and his health first. Him and his wife state they have been eating quick, convenient meals due to time constraints. He does state that he is still reading labels and measuring foods some of the time. Educated him that quick meals are OK, but it is still important to read labels and measure food consistently, so he knows he's staying within the recommendation. He states he knows he needs to get "back on track", and he states he will do so. He states he has the foods and tools he needs, but he just needs to make the decision to eat right.           Problem: Blood Glucose Self-Monitoring         Goal: Patient agrees to check and record blood sugars 1-2 times per day.    Start Date: 8/2/2023   Expected End Date: 8/2/2024   This Visit's Progress: On track   Priority: Medium   Barriers: No Barriers Identified   Note:    Mr. Forbes is checking his blood sugar 1x/day but not consistently at the same time. Educated on checking first thing in the morning before his coffee to get the most accurate reading. Educated on a goal of  mg/dL and a 2 hour PP goal of <160 mg/dL; these goals will help achieve an A1C of 6.5%.   He is part of the [facility] program.     9/19/23: Mr. Forbes is checking his blood sugars usually 2x/day; he has not checked in the last week or so due to life stressors. Encouraged him to start checking his blood sugars again because based on the readings we do have, he may need an adjustment in his medications.          Follow Up Plan     Follow up in about 2 weeks (around " 10/3/2023) for review of logs and re-assessment of eating.    Today's care plan and follow up schedule was discussed with patient.  Stefan verbalized understanding of the care plan, goals, and agrees to follow up plan.        The patient was encouraged to communicate with his/her health care provider/physician and care team regarding his/her condition(s) and treatment.  I provided the patient with my contact information today and encouraged to contact me via phone or [facility] Patient Portal as needed.     Length of Visit   Total Time: 30 Minutes

## 2023-09-21 DIAGNOSIS — J44.9 MODERATE COPD (CHRONIC OBSTRUCTIVE PULMONARY DISEASE): ICD-10-CM

## 2023-09-26 NOTE — ASSESSMENT & PLAN NOTE
2 nodules RUl: 5.5mm  35-40 pack year smoker, quit 8 months ago  Imaging 07/2016  Need follow up 12 months  Oct 2018   Subjective:       Patient ID: Ubaldo Quach is a 51 y.o. male.    Chief Complaint: Cerumen Impaction and Sinusitis (Patient states he had a ball of wax removed from his left ear 5 weeks ago. Now he is having pain and feels like water is in his right ear. That he can't hear out of this ear.He has been using ear drops. He also complains of sinus congestion and states he takes a bottle of Mucinex a week.)    Sinusitis  Associated symptoms include congestion, ear pain and sinus pressure.     Review of Systems   HENT:  Positive for congestion, ear pain, hearing loss, sinus pressure and sinus pain.    All other systems reviewed and are negative.      Objective:      Physical Exam  General: NAD  Head: Normocephalic, atraumatic, no facial asymmetry/normal strength,  Ears: Both auricules normal in appearance, w/o deformities tympanic membranes normal external auditory canals wax impaction right   Nose: External nose w/o deformities swollen  turbinates no drainage or inflammation  Oral Cavity: Lips, gums, floor of mouth, tongue hard palate, and buccal mucosa without mass/lesion  Oropharynx: Mucosa pink and moist, soft palate, posterior pharynx and oropharyngeal wall without mass/lesion  Neck: Supple, symmetric, trachea midline, no palpable mass/lesion, no palpable cervical lymphadenopathy  Skin: Warm and dry, no concerning lesions  Respiratory: Respirations even, unlabored     Procedure: Binocular microscopy with removal of cerumen impaction using microsurgical instrumentation.  After explaining the procedure and obtaining verbal assent,  the right external auditory canal visualized with the 250 mm focal length lens through the operating microscope. The obstructing cerumen was removed with microsurgical instrumentation to reveal normal and healthy external auditory canal. The patient tolerated this procedure well without complication.     Assessment:       1. Hearing loss due to cerumen impaction, right    2. Diffuse otitis  externa of right ear, unspecified chronicity    3. Chronic nasal congestion        Plan:       Switch to astelin   F/u 2 weeks   Continue CSP drops

## 2023-10-02 ENCOUNTER — CLINICAL SUPPORT (OUTPATIENT)
Dept: SMOKING CESSATION | Facility: CLINIC | Age: 68
End: 2023-10-02
Payer: COMMERCIAL

## 2023-10-02 DIAGNOSIS — F17.200 NICOTINE DEPENDENCE: Primary | ICD-10-CM

## 2023-10-02 PROCEDURE — 99404 PR PREVENT COUNSEL,INDIV,60 MIN: ICD-10-PCS | Mod: 95,,, | Performed by: SPEECH-LANGUAGE PATHOLOGIST

## 2023-10-02 PROCEDURE — 99404 PREV MED CNSL INDIV APPRX 60: CPT | Mod: 95,,, | Performed by: SPEECH-LANGUAGE PATHOLOGIST

## 2023-10-02 NOTE — PROGRESS NOTES
Individual Follow-Up Form    10/2/2023    Quit Date: 10/1/2023    Clinical Status of Patient: Outpatient    Continuing Medication: yes  Chantix    Other Medications:      Target Symptoms: Withdrawal and medication side effects. The following were  rated moderate (3) to severe (4) on TCRS:  Moderate (3): anger/irritability/frustration, desire/crave, difficulty concentrating,  Severe (4): none    Comments: Virtual visit. Patient reports he smoked his last piece of a cigar on 9/30/2023 & hasn't had any since. Celebrated & congratulated the patient on his gains & accomplishment. He reports he remains on Varenicline 1 mg twice daily. Administered TCRS with patient reporting moderate symptoms of anger/irritability/frustration, desire/crave, difficulty concentrating, slight symptoms of increased appetite/hunger, depressed mood/sadness, insomnia & mild symptoms anxious/nervous & restless/can't sit still/impatient. Discussed with patient the s/s of nicotine withdrawal & the amount of time he may experience those symptoms. Discussed with patient strategies the patient has been utilizing to deter him from smoking which he reports has been keeping busy with yard/home tasks. Discussed past quit & slip a few months ago which patient stated was feeling overwhelmed. Patient states he thought a few puffs would help; however he states he realized it doesn't. Discussed using self soothing strategies to include breathing & taking a walk. Patient reports his goal is to remain smoke free. Follow up set for 10/30/2023 at 10:00 am.     Diagnosis: F17.200    Next Visit: 4 weeks

## 2023-10-05 ENCOUNTER — CLINICAL SUPPORT (OUTPATIENT)
Dept: DIABETES | Facility: CLINIC | Age: 68
End: 2023-10-05
Payer: MEDICARE

## 2023-10-05 ENCOUNTER — HOSPITAL ENCOUNTER (OUTPATIENT)
Dept: RADIOLOGY | Facility: HOSPITAL | Age: 68
Discharge: HOME | End: 2023-10-05
Attending: INTERNAL MEDICINE
Payer: MEDICARE

## 2023-10-05 ENCOUNTER — CLINICAL SUPPORT (OUTPATIENT)
Dept: PULMONOLOGY | Facility: CLINIC | Age: 68
End: 2023-10-05
Payer: MEDICARE

## 2023-10-05 ENCOUNTER — OFFICE VISIT (OUTPATIENT)
Dept: SLEEP MEDICINE | Facility: CLINIC | Age: 68
End: 2023-10-05
Payer: MEDICARE

## 2023-10-05 VITALS
WEIGHT: 219.13 LBS | HEART RATE: 76 BPM | HEIGHT: 68 IN | RESPIRATION RATE: 17 BRPM | OXYGEN SATURATION: 98 % | BODY MASS INDEX: 33.21 KG/M2 | SYSTOLIC BLOOD PRESSURE: 135 MMHG | WEIGHT: 219.13 LBS | DIASTOLIC BLOOD PRESSURE: 68 MMHG | BODY MASS INDEX: 33.21 KG/M2 | HEIGHT: 68 IN

## 2023-10-05 VITALS — WEIGHT: 219.13 LBS | BODY MASS INDEX: 33.32 KG/M2

## 2023-10-05 DIAGNOSIS — J44.9 MODERATE COPD (CHRONIC OBSTRUCTIVE PULMONARY DISEASE): ICD-10-CM

## 2023-10-05 DIAGNOSIS — E11.9 CONTROLLED TYPE 2 DIABETES MELLITUS WITHOUT COMPLICATION, WITHOUT LONG-TERM CURRENT USE OF INSULIN: Primary | ICD-10-CM

## 2023-10-05 DIAGNOSIS — F17.210 CIGARETTE NICOTINE DEPENDENCE WITHOUT COMPLICATION: ICD-10-CM

## 2023-10-05 DIAGNOSIS — E11.69 HYPERLIPIDEMIA ASSOCIATED WITH TYPE 2 DIABETES MELLITUS: ICD-10-CM

## 2023-10-05 DIAGNOSIS — G25.81 RESTLESS LEGS: ICD-10-CM

## 2023-10-05 DIAGNOSIS — J44.9 MODERATE COPD (CHRONIC OBSTRUCTIVE PULMONARY DISEASE): Primary | ICD-10-CM

## 2023-10-05 DIAGNOSIS — R91.1 PULMONARY NODULE: ICD-10-CM

## 2023-10-05 DIAGNOSIS — Z86.711 HISTORY OF PULMONARY EMBOLISM: ICD-10-CM

## 2023-10-05 DIAGNOSIS — G47.33 OSA ON CPAP: ICD-10-CM

## 2023-10-05 DIAGNOSIS — I83.90 VARICOSE VEINS OF LOWER EXTREMITY, UNSPECIFIED LATERALITY, UNSPECIFIED WHETHER COMPLICATED: ICD-10-CM

## 2023-10-05 DIAGNOSIS — E78.5 HYPERLIPIDEMIA ASSOCIATED WITH TYPE 2 DIABETES MELLITUS: ICD-10-CM

## 2023-10-05 DIAGNOSIS — E11.9 CONTROLLED TYPE 2 DIABETES MELLITUS WITHOUT COMPLICATION, WITHOUT LONG-TERM CURRENT USE OF INSULIN: ICD-10-CM

## 2023-10-05 DIAGNOSIS — E11.59 HYPERTENSION ASSOCIATED WITH DIABETES: ICD-10-CM

## 2023-10-05 DIAGNOSIS — I15.2 HYPERTENSION ASSOCIATED WITH DIABETES: ICD-10-CM

## 2023-10-05 LAB
BRPFT: ABNORMAL
FEF 25 75 LLN: 1.14
FEF 25 75 PRE REF: 25.6 %
FEF 25 75 REF: 2.51
FEV1 FVC LLN: 64
FEV1 FVC PRE REF: 66.1 %
FEV1 FVC REF: 77
FEV1 LLN: 2.31
FEV1 PRE REF: 72.9 %
FEV1 REF: 3.15
FVC LLN: 3.08
FVC PRE REF: 110.2 %
FVC REF: 4.11
PEF LLN: 6.12
PEF PRE REF: 78.3 %
PEF REF: 8.32
PRE FEF 25 75: 0.64 L/S (ref 1.14–3.87)
PRE FET 100: 14.59 SEC
PRE FEV1 FVC: 50.77 % (ref 63.91–89.78)
PRE FEV1: 2.3 L (ref 2.31–3.99)
PRE FVC: 4.53 L (ref 3.08–5.14)
PRE PEF: 6.51 L/S (ref 6.12–10.51)

## 2023-10-05 PROCEDURE — 1101F PT FALLS ASSESS-DOCD LE1/YR: CPT | Mod: CPTII,S$GLB,, | Performed by: INTERNAL MEDICINE

## 2023-10-05 PROCEDURE — G0108 PR DIAB MANAGE TRN  PER INDIV: ICD-10-PCS | Mod: S$GLB,,,

## 2023-10-05 PROCEDURE — 1101F PR PT FALLS ASSESS DOC 0-1 FALLS W/OUT INJ PAST YR: ICD-10-PCS | Mod: CPTII,S$GLB,, | Performed by: INTERNAL MEDICINE

## 2023-10-05 PROCEDURE — 3051F PR MOST RECENT HEMOGLOBIN A1C LEVEL 7.0 - < 8.0%: ICD-10-PCS | Mod: CPTII,S$GLB,, | Performed by: INTERNAL MEDICINE

## 2023-10-05 PROCEDURE — 3078F PR MOST RECENT DIASTOLIC BLOOD PRESSURE < 80 MM HG: ICD-10-PCS | Mod: CPTII,S$GLB,, | Performed by: INTERNAL MEDICINE

## 2023-10-05 PROCEDURE — 94618 PULMONARY STRESS TESTING: ICD-10-PCS | Mod: S$GLB,,, | Performed by: INTERNAL MEDICINE

## 2023-10-05 PROCEDURE — 4010F ACE/ARB THERAPY RXD/TAKEN: CPT | Mod: CPTII,S$GLB,, | Performed by: INTERNAL MEDICINE

## 2023-10-05 PROCEDURE — 99999 PR PBB SHADOW E&M-EST. PATIENT-LVL V: ICD-10-PCS | Mod: PBBFAC,,, | Performed by: INTERNAL MEDICINE

## 2023-10-05 PROCEDURE — 99999 PR PBB SHADOW E&M-EST. PATIENT-LVL III: CPT | Mod: PBBFAC,,,

## 2023-10-05 PROCEDURE — 3075F PR MOST RECENT SYSTOLIC BLOOD PRESS GE 130-139MM HG: ICD-10-PCS | Mod: CPTII,S$GLB,, | Performed by: INTERNAL MEDICINE

## 2023-10-05 PROCEDURE — 4010F PR ACE/ARB THEARPY RXD/TAKEN: ICD-10-PCS | Mod: CPTII,S$GLB,, | Performed by: INTERNAL MEDICINE

## 2023-10-05 PROCEDURE — 71046 XR CHEST PA AND LATERAL: ICD-10-PCS | Mod: 26,,, | Performed by: RADIOLOGY

## 2023-10-05 PROCEDURE — 99999 PR PBB SHADOW E&M-EST. PATIENT-LVL V: CPT | Mod: PBBFAC,,, | Performed by: INTERNAL MEDICINE

## 2023-10-05 PROCEDURE — 1160F PR REVIEW ALL MEDS BY PRESCRIBER/CLIN PHARMACIST DOCUMENTED: ICD-10-PCS | Mod: CPTII,S$GLB,, | Performed by: INTERNAL MEDICINE

## 2023-10-05 PROCEDURE — 1159F PR MEDICATION LIST DOCUMENTED IN MEDICAL RECORD: ICD-10-PCS | Mod: CPTII,S$GLB,, | Performed by: INTERNAL MEDICINE

## 2023-10-05 PROCEDURE — 71046 X-RAY EXAM CHEST 2 VIEWS: CPT | Mod: TC

## 2023-10-05 PROCEDURE — 99214 PR OFFICE/OUTPT VISIT, EST, LEVL IV, 30-39 MIN: ICD-10-PCS | Mod: 25,S$GLB,, | Performed by: INTERNAL MEDICINE

## 2023-10-05 PROCEDURE — 94618 PULMONARY STRESS TESTING: CPT | Mod: S$GLB,,, | Performed by: INTERNAL MEDICINE

## 2023-10-05 PROCEDURE — 1159F MED LIST DOCD IN RCRD: CPT | Mod: CPTII,S$GLB,, | Performed by: INTERNAL MEDICINE

## 2023-10-05 PROCEDURE — 94010 BREATHING CAPACITY TEST: CPT | Mod: 59,S$GLB,, | Performed by: INTERNAL MEDICINE

## 2023-10-05 PROCEDURE — 1160F RVW MEDS BY RX/DR IN RCRD: CPT | Mod: CPTII,S$GLB,, | Performed by: INTERNAL MEDICINE

## 2023-10-05 PROCEDURE — G0008 FLU VACCINE - QUADRIVALENT - ADJUVANTED: ICD-10-PCS | Mod: S$GLB,,, | Performed by: INTERNAL MEDICINE

## 2023-10-05 PROCEDURE — 99999 PR PBB SHADOW E&M-EST. PATIENT-LVL III: ICD-10-PCS | Mod: PBBFAC,,,

## 2023-10-05 PROCEDURE — 99214 OFFICE O/P EST MOD 30 MIN: CPT | Mod: 25,S$GLB,, | Performed by: INTERNAL MEDICINE

## 2023-10-05 PROCEDURE — 3051F HG A1C>EQUAL 7.0%<8.0%: CPT | Mod: CPTII,S$GLB,, | Performed by: INTERNAL MEDICINE

## 2023-10-05 PROCEDURE — 90694 FLU VACCINE - QUADRIVALENT - ADJUVANTED: ICD-10-PCS | Mod: S$GLB,,, | Performed by: INTERNAL MEDICINE

## 2023-10-05 PROCEDURE — 3288F PR FALLS RISK ASSESSMENT DOCUMENTED: ICD-10-PCS | Mod: CPTII,S$GLB,, | Performed by: INTERNAL MEDICINE

## 2023-10-05 PROCEDURE — G0108 DIAB MANAGE TRN  PER INDIV: HCPCS | Mod: S$GLB,,,

## 2023-10-05 PROCEDURE — 99999 PR PBB SHADOW E&M-EST. PATIENT-LVL I: CPT | Mod: PBBFAC,,,

## 2023-10-05 PROCEDURE — 90694 VACC AIIV4 NO PRSRV 0.5ML IM: CPT | Mod: S$GLB,,, | Performed by: INTERNAL MEDICINE

## 2023-10-05 PROCEDURE — 3078F DIAST BP <80 MM HG: CPT | Mod: CPTII,S$GLB,, | Performed by: INTERNAL MEDICINE

## 2023-10-05 PROCEDURE — 94010 BREATHING CAPACITY TEST: ICD-10-PCS | Mod: 59,S$GLB,, | Performed by: INTERNAL MEDICINE

## 2023-10-05 PROCEDURE — 99999 PR PBB SHADOW E&M-EST. PATIENT-LVL I: ICD-10-PCS | Mod: PBBFAC,,,

## 2023-10-05 PROCEDURE — 3075F SYST BP GE 130 - 139MM HG: CPT | Mod: CPTII,S$GLB,, | Performed by: INTERNAL MEDICINE

## 2023-10-05 PROCEDURE — 3008F BODY MASS INDEX DOCD: CPT | Mod: CPTII,S$GLB,, | Performed by: INTERNAL MEDICINE

## 2023-10-05 PROCEDURE — 3288F FALL RISK ASSESSMENT DOCD: CPT | Mod: CPTII,S$GLB,, | Performed by: INTERNAL MEDICINE

## 2023-10-05 PROCEDURE — G0008 ADMIN INFLUENZA VIRUS VAC: HCPCS | Mod: S$GLB,,, | Performed by: INTERNAL MEDICINE

## 2023-10-05 PROCEDURE — 71046 X-RAY EXAM CHEST 2 VIEWS: CPT | Mod: 26,,, | Performed by: RADIOLOGY

## 2023-10-05 PROCEDURE — 3008F PR BODY MASS INDEX (BMI) DOCUMENTED: ICD-10-PCS | Mod: CPTII,S$GLB,, | Performed by: INTERNAL MEDICINE

## 2023-10-05 RX ORDER — ALBUTEROL SULFATE 90 UG/1
2 AEROSOL, METERED RESPIRATORY (INHALATION) EVERY 6 HOURS PRN
Qty: 25.5 G | Refills: 3 | Status: SHIPPED | OUTPATIENT
Start: 2023-10-05 | End: 2024-10-04

## 2023-10-05 RX ORDER — METHYLPREDNISOLONE 4 MG/1
TABLET ORAL
Qty: 21 TABLET | Refills: 1 | Status: SHIPPED | OUTPATIENT
Start: 2023-10-05 | End: 2024-01-12

## 2023-10-05 RX ORDER — PRAMIPEXOLE DIHYDROCHLORIDE 0.12 MG/1
0.25 TABLET ORAL NIGHTLY
Qty: 60 TABLET | Refills: 0 | Status: SHIPPED | OUTPATIENT
Start: 2023-10-05 | End: 2023-11-05 | Stop reason: SDUPTHER

## 2023-10-05 NOTE — ASSESSMENT & PLAN NOTE
COPD ROS: taking medications as instructed, no medication side effects noted, no significant ongoing wheezing or shortness of breath, using bronchodilator MDI less than twice a week.     New concerns: None.     Some chest congestion      Normal exercise capacity  Distance 487.68m( 99.11%)    FEV1: 2.30L( 72.9%)    Reviewed Spirometry and CXR  Moderate obstruction: on TRELEGY  Immunisations are current'    Exam: appears well, vitals normal, no respiratory distress, acyanotic, normal RR, chest clear, no wheezing, crepitations, rhonchi, normal symmetric air entry.     Assessment: COPD reasonably well controlled.     Plan: current treatment plan is effective, no change in therapy.

## 2023-10-05 NOTE — ASSESSMENT & PLAN NOTE
Dangers of cigarette smoking were reviewed with patient in detail. Patient was Counseled for 3-10 minutes. Nicotine replacement options were discussed. Nicotine replacement was discussed- enrolled with cessation

## 2023-10-05 NOTE — Clinical Note
Diann Johnson, I met with Mr. Forbes today. He has been prescribed steroids for a sinus infection and is already having higher fasting numbers. I wanted to give you a heads-up in case you wanted to change medications. Thanks!  Kandis,  I put Mr. Forbes as a follow-up on your schedule in ~3 weeks. I don't want him to wait until I get back for a follow-up; he needs the support and education, especially before the holidays. Thank you!!

## 2023-10-05 NOTE — PROCEDURES
"[facility]  Six Minute Walk     SUMMARY     Ordering Provider:    Interpreting Provider:   Performing nurse/tech/RT: MICHELA Thomas RRT  Diagnosis:  (Moderate COPD)  Height: 5' 8" (172.7 cm)  Weight: 99.4 kg (219 lb 2.2 oz)  BMI (Calculated): 33.3   Patient Race:             Phase Oxygen Assessment Supplemental O2 Heart   Rate Blood Pressure Huong Dyspnea Scale Rating   Resting 98 % Room Air 75 bpm 145/73 0   Exercise        Minute        1 97 % Room Air 84 bpm     2 96 % Room Air 94 bpm     3 97 % Room Air 100 bpm     4 98 % Room Air 106 bpm     5 98 % Room Air 105 bpm     6  98 % Room Air 104 bpm 163/65 3   Recovery        Minute        1 97 % Room Air 91 bpm     2 98 % Room Air 81 bpm     3 98 % Room Air 80 bpm     4 98 % Room Air 76 bpm 135/68 0     Six Minute Walk Summary  6MWT Status: completed without stopping  Patient Reported: Dyspnea     Interpretation:  Did the patient stop or pause?: No            Total Time Walked (Calculated): 360 seconds  Final Partial Lap Distance (feet): 0 feet  Total Distance Meters (Calculated): 487.68 meters  Predicted Distance Meters (Calculated): 492.08 meters  Percentage of Predicted (Calculated): 99.11  Peak VO2 (Calculated): 18.61  Mets: 5.32  Has The Patient Had a Previous Six Minute Walk Test?: Yes       Previous 6MWT Results  Has The Patient Had a Previous Six Minute Walk Test?: Yes  Date of Previous Test: 10/14/19  Total Time Walked: 360 seconds  Total Distance (meters): 426.72  Predicted Distance (meters): 501.42 meters  Percentage of Predicted: 85.1  Percent Change (Calculated): -0.14      Six minute walk distance is 487.68m /492.08 meters (99.11 % predicted) with very light.Patient did complete the study, walking 360 seconds of the 360 second test . During exercise, there was no  significant desaturation while breathing room air .Lowest oxygen saturation was 96% .Maximum heart rate during exercise was 106 bpm which is 68 " % of maximum predicted heart rate of 154 bpm. Blood pressure remained stable and Heart rate remained stable Based upon age and body mass index, exercise capacity is normal.  Peak VO2 during walking was 18.61 ml/kg/min which is 56 % of predicted Peak VO2 max of 33 ml/kg/min based on a resting heart rate of 75/min.  .  Patient had a previous study. Since the previous study in 10/14/2019, exercise capacity may be somewhat improved.

## 2023-10-05 NOTE — ASSESSMENT & PLAN NOTE
10/5/2023    10:55 AM   EPWORTH SLEEPINESS SCALE   Sitting and reading 0   Watching TV 0   Sitting, inactive in a public place (e.g. a theatre or a meeting) 1   As a passenger in a car for an hour without a break 0   Lying down to rest in the afternoon when circumstances permit 3   Sitting and talking to someone 0   Sitting quietly after a lunch without alcohol 0   In a car, while stopped for a few minutes in traffic 0   Total score 4      Adherence to CPAP discussed

## 2023-10-05 NOTE — PROGRESS NOTES
Patient Active Problem List   Diagnosis    Controlled type 2 diabetes mellitus without complication, without long-term current use of insulin    Hypertension associated with diabetes    Hyperlipidemia associated with type 2 diabetes mellitus    Moderate COPD (chronic obstructive pulmonary disease)    CAD (coronary artery disease)    Vitamin D deficiency    Osteoarthritis of knee    Pulmonary nodule    Nevus of choroid of right eye    History of pulmonary embolism    Hyponatremia    Iron deficiency anemia due to chronic blood loss    MACHO on CPAP    PLMD (periodic limb movement disorder)    BMI 35.0-35.9,adult    Varicose vein of leg    Tobacco abuse, in remission    Tubular adenoma of colon    Benign prostatic hyperplasia with weak urinary stream    Urgency of urination    Aortic atherosclerosis    COVID    Severe obesity    Hypomagnesemia    Nicotine dependence    Restless legs     Social History     Tobacco Use   Smoking Status Former    Types: Cigars    Quit date: 10/1/2023    Years since quittin.0   Smokeless Tobacco Never   Tobacco Comments    4 cigars each day     Immunization History   Administered Date(s) Administered    COVID-19, MRNA, LN-S, PF (Pfizer) (Purple Cap) 2021, 2021, 12/10/2021    Influenza (FLUAD) - Quadrivalent - Adjuvanted - PF *Preferred* (65+) 10/20/2022, 10/05/2023    Influenza - Quadrivalent 2016    Influenza - Quadrivalent - PF *Preferred* (6 months and older) 2014, 10/25/2015, 10/03/2017, 10/04/2018, 2019, 2020, 10/08/2021    Pneumococcal Polysaccharide - 23 Valent 2014, 2016    Tdap 2015    Zoster 2017       COPD Questionnaire  How often do you cough?: A little of the time  How often do you have phlegm (mucus) in your chest?: Most of the time  How often does your chest feel tight?: Almost never  When you walk up a hill or one flight of stairs, how often are you breathless?: Almost never  How often are you limited doing any  activities at home?: Never  How often are you confident leaving the house despite your lung condition?: All of the time  How often do you sleep soundly?: Almost never  How often do you have energy?: Most of the time  Total score: 13          10/5/2023    10:55 AM   EPWORTH SLEEPINESS SCALE   Sitting and reading 0   Watching TV 0   Sitting, inactive in a public place (e.g. a theatre or a meeting) 1   As a passenger in a car for an hour without a break 0   Lying down to rest in the afternoon when circumstances permit 3   Sitting and talking to someone 0   Sitting quietly after a lunch without alcohol 0   In a car, while stopped for a few minutes in traffic 0   Total score 4           SUBJECTIVE:     Patient is a 68 y.o. male presents with Moderate COPD , MACHO on CPAP .  Last visit 10/06/2022  CPAP use intermittent  Unable wear all night  Confides sleeps better 5-6 hrs  Bed time 12 MN, wake 6 am  Legs aches, restless, better with moving  Added Mirapex  Has Varicose veins considered see Vascular  Hx bilateral Knee  Slight cough and congestion: attributed to allergies  CAT score 13  Stable on TRELEGY  Flu shot today  FEV1: 2.30L( 72.9%)  Had stopped smoking and relapsed  Signed himself up with cessation        Will need flu shot    No recent COPD exacerbations.  No cough no wheezing no shortness of breath or sputum.    Former smoker 35-40 pack year smoking history :  Currently enrolled in smoking cessation    I have reviewed the patient's medical history in detail and updated the computerized patient record.    History of varicose veins.    Chief Complaint   Patient presents with    Sleep Apnea    COPD         Review of patient's allergies indicates:  No Known Allergies    Current Outpatient Medications   Medication Sig Dispense Refill    acetaminophen (TYLENOL) 500 MG tablet Take 1,000 mg by mouth as needed.       albuterol (VENTOLIN HFA) 90 mcg/actuation inhaler Inhale 2 puffs into the lungs every 6 (six) hours as  needed for Wheezing or Shortness of Breath. Rescue 36 g 3    amLODIPine (NORVASC) 2.5 MG tablet Take 1 tablet (2.5 mg total) by mouth once daily. 90 tablet 3    ascorbic acid, vitamin C, (VITAMIN C) 1000 MG tablet Take 1,000 mg by mouth once daily.      aspirin 325 MG tablet Take 325 mg by mouth once daily.      blood sugar diagnostic (BLOOD GLUCOSE TEST) Strp 1 strip by Misc.(Non-Drug; Combo Route) route 3 (three) times daily. 200 each 1    buPROPion (WELLBUTRIN SR) 150 MG TBSR 12 hr tablet Take 1 tablet (150 mg total) by mouth 2 (two) times daily. 180 tablet 2    cetirizine (ZYRTEC) 10 MG tablet Take 10 mg by mouth every evening.      cholecalciferol, vitamin D3, (VITAMIN D3) 25 mcg (1,000 unit) capsule Take 1,000 Units by mouth once daily.      cyanocobalamin (VITAMIN B-12) 500 MCG tablet Take 500 mcg by mouth once daily.       dapagliflozin propanediol (FARXIGA) 10 mg tablet Take 1 tablet (10 mg total) by mouth once daily. 90 tablet 2    dulaglutide (TRULICITY) 3 mg/0.5 mL pen injector Inject 3 mg into the skin every 7 days. 12 pen 1    famotidine (PEPCID) 40 MG tablet Take 1 tablet by mouth once a day 30 tablet 11    famotidine (PEPCID) 40 MG tablet Take 1 tablet by mouth once a day 30 tablet 6    fluticasone propionate (FLONASE) 50 mcg/actuation nasal spray Use 1 spray (50 mcg total) in each nostril once daily. 16 g 3    fluticasone-umeclidin-vilanter (TRELEGY ELLIPTA) 100-62.5-25 mcg DsDv Inhale 1 puff into the lungs once daily. 60 each 11    ipratropium (ATROVENT) 21 mcg (0.03 %) nasal spray Spray 1 or 2 sprays in each nasal passage every 8 hours, if needed, as directed. 30 mL 12    lancets 30 gauge Misc 1 lancet by Misc.(Non-Drug; Combo Route) route 3 (three) times daily. 200 each 0    linaCLOtide (LINZESS) 145 mcg Cap capsule Take 1 capsule by mouth 30 minutes before breakfast 30 capsule 11    magnesium oxide (MAG-OX) 400 mg (241.3 mg magnesium) tablet Take 1 tablet (400 mg total) by mouth once daily. 90  tablet 3    metFORMIN (GLUCOPHAGE) 1000 MG tablet Take 1 tablet (1,000 mg total) by mouth 2 (two) times daily with meals. 180 tablet 1    methscopolamine (PAMINE) 2.5 mg Tab Take 1 tablet by mouth every 12 hours, as directed, if needed 180 tablet 4    methylPREDNISolone (MEDROL DOSEPACK) 4 mg tablet use as directed 21 tablet 1    naproxen (NAPROSYN) 500 MG tablet Take 1 tablet (500 mg total) by mouth 2 (two) times daily with meals. For arm pain 20 tablet 0    nitroGLYCERIN (NITROSTAT) 0.4 MG SL tablet Place 1 tablet under the tongue every 5 (five) minutes as needed for Chest pain. 25 tablet 0    nitroGLYCERIN (NITROSTAT) 0.4 MG SL tablet Place 1 tablet under the tongue every 5 (five) minutes as needed for Chest pain. 25 tablet 3    olmesartan (BENICAR) 40 MG tablet Take 1 tablet (40 mg total) by mouth once daily. 90 tablet 1    pantoprazole (PROTONIX) 40 MG tablet Take 1 tablet by mouth twice a day 60 tablet 11    pantoprazole (PROTONIX) 40 MG tablet Take 1 tablet by mouth twice a day before breakfast and dinner 60 tablet 11    pramipexole (MIRAPEX) 0.125 MG tablet Take 2 tablets (0.25 mg total) by mouth every evening. 60 tablet 0    pulse oximeter (PULSE OXIMETER) device Use twice daily at 8 AM and 3 PM and [facility]. 1 each 0    rosuvastatin (CRESTOR) 10 MG tablet TAKE ONE TABLET BY MOUTH ONCE DAILY 90 tablet 0    tamsulosin (FLOMAX) 0.4 mg Cap Take 2 capsules (0.8 mg total) by mouth once daily. 180 capsule 3    varenicline (CHANTIX) 1 mg Tab Take 1 tablet twice daily. Take with full glass of water & with food to avoid nausea 60 tablet 0     No current facility-administered medications for this visit.       Past Medical History:   Diagnosis Date    Arthritis     Cataract     COPD (chronic obstructive pulmonary disease)     Diabetes mellitus, type 2     Hyperlipidemia     Hypertension     Personal history of colonic polyps 2018     Past Surgical History:   Procedure Laterality Date     "ANGIOPLASTY      JOINT REPLACEMENT  2017    TOTAL KNEE ARTHROPLASTY       Family History   Problem Relation Age of Onset    Diabetes Mother     Arthritis Mother     Cancer Father      Social History     Tobacco Use    Smoking status: Former     Types: Cigars     Quit date: 10/1/2023     Years since quittin.0    Smokeless tobacco: Never    Tobacco comments:     4 cigars each day   Substance Use Topics    Alcohol use: No    Drug use: No        Review of Systems:  Review of Systems   Constitutional: Negative.    HENT:  Negative for congestion.    Eyes: Negative.    Respiratory:  Negative for apnea, cough, shortness of breath and wheezing.    Cardiovascular: Negative.         Varicose veins bilaterally   Gastrointestinal: Negative.    Endocrine: Negative.    Genitourinary: Negative.    Musculoskeletal: Negative.    Skin: Negative.    Allergic/Immunologic: Negative.    Neurological: Negative.    Hematological: Negative.    Psychiatric/Behavioral:  Negative for sleep disturbance.        OBJECTIVE:     Vital Signs (Most Recent)  Pulse: 76 (10/05/23 1053)  Resp: 17 (10/05/23 1053)  BP: 135/68 (10/05/23 1053)  SpO2: 98 % (10/05/23 1053)  5' 8" (1.727 m)  99.4 kg (219 lb 2.2 oz)     Physical Exam:  Physical Exam  Vitals and nursing note reviewed.   Constitutional:       Appearance: He is well-developed.       HENT:      Head: Normocephalic and atraumatic.      Nose: No mucosal edema or rhinorrhea.      Mouth/Throat:      Pharynx: No oropharyngeal exudate.   Eyes:      General: No scleral icterus.        Left eye: No discharge.      Conjunctiva/sclera: Conjunctivae normal.      Pupils: Pupils are equal, round, and reactive to light.   Neck:      Thyroid: No thyromegaly.      Trachea: No tracheal deviation.   Cardiovascular:      Rate and Rhythm: Normal rate and regular rhythm.      Heart sounds: Normal heart sounds. No murmur heard.  Pulmonary:      Effort: Pulmonary effort is normal.      Breath sounds: Examination of the " "right-lower field reveals decreased breath sounds. Examination of the left-lower field reveals decreased breath sounds. Decreased breath sounds present. No wheezing, rhonchi or rales.   Chest:      Chest wall: No tenderness.   Abdominal:      General: Bowel sounds are normal. There is no distension.      Palpations: Abdomen is soft.   Musculoskeletal:         General: No tenderness or deformity. Normal range of motion.      Cervical back: Normal range of motion and neck supple.   Skin:     General: Skin is warm and dry.      Capillary Refill: Capillary refill takes 2 to 3 seconds.   Neurological:      Mental Status: He is alert and oriented to person, place, and time.      Cranial Nerves: No cranial nerve deficit.         Laboratory  CBC: Reviewed  BMP: Reviewed      DOWNLOAD    DOWNLOAD       Spirometry;  FEV1: 2.30 L ( 72.9%), FVC 4.53L( 110.2 %)  FEV1/FVC  51  MODERATE Obstruction      Chest  Xray   X-Ray Chest PA And Lateral  Narrative: EXAM: XR CHEST PA AND LATERAL    CLINICAL HISTORY:   [J44.9]-Chronic obstructive pulmonary disease, unspecified.    COMPARISON:  10/06/2022.    FINDINGS:  2 view chest.  Mediastinal silhouette is within normal limits.  Left basilar atelectasis.  The lungs are otherwise clear.  No pneumothorax or pleural effusion.  No acute osseous finding.  Impression: No acute finding in the chest.    Finalized on: 10/5/2023 10:48 AM By:  Arsh Ahuja MD  [facility]# [id number]      2023-10-05 10:50:57.077    [facility]     Ordering Provider:            Interpreting Provider:   Performing nurse/tech/RT: MICHELA Thomas RRT  Diagnosis:  (Moderate COPD)  Height: 5' 8" (172.7 cm)  Weight: 99.4 kg (219 lb 2.2 oz)  BMI (Calculated): 33.3              Patient Race:                                                                Phase Oxygen Assessment Supplemental O2 Heart   Rate Blood Pressure Huong Dyspnea Scale Rating   Resting 98 % Room Air 75 bpm 145/73 0   Exercise             Minute    "          1 97 % Room Air 84 bpm       2 96 % Room Air 94 bpm       3 97 % Room Air 100 bpm       4 98 % Room Air 106 bpm       5 98 % Room Air 105 bpm       6  98 % Room Air 104 bpm 163/65 3   Recovery             Minute             1 97 % Room Air 91 bpm       2 98 % Room Air 81 bpm       3 98 % Room Air 80 bpm       4 98 % Room Air 76 bpm 135/68 0      Six Minute Walk Summary  6MWT Status: completed without stopping  Patient Reported: Dyspnea         Interpretation:  Did the patient stop or pause?: No  Total Time Walked (Calculated): 360 seconds  Final Partial Lap Distance (feet): 0 feet  Total Distance Meters (Calculated): 487.68 meters  Predicted Distance Meters (Calculated): 492.08 meters  Percentage of Predicted (Calculated): 99.11  Peak VO2 (Calculated): 18.61  Mets: 5.32  Has The Patient Had a Previous Six Minute Walk Test?: Yes     Previous 6MWT Results  Has The Patient Had a Previous Six Minute Walk Test?: Yes  Date of Previous Test: 10/14/19  Total Time Walked: 360 seconds  Total Distance (meters): 426.72  Predicted Distance (meters): 501.42 meters  Percentage of Predicted: 85.1  Percent Change (Calculated): -0.14      ASSESSMENT/PLAN:     Problem List Items Addressed This Visit       History of pulmonary embolism    Controlled type 2 diabetes mellitus without complication, without long-term current use of insulin    Relevant Orders    FERRITIN    Hypertension associated with diabetes     Stable on Norvasc Benicar         Hyperlipidemia associated with type 2 diabetes mellitus     Stable on Crestor         Moderate COPD (chronic obstructive pulmonary disease) - Primary     COPD ROS: taking medications as instructed, no medication side effects noted, no significant ongoing wheezing or shortness of breath, using bronchodilator MDI less than twice a week.     New concerns: None.     Some chest congestion      Normal exercise capacity  Distance 487.68m( 99.11%)    FEV1: 2.30L( 72.9%)    Reviewed Spirometry  and CXR  Moderate obstruction: on TRELEGY  Immunisations are current'    Exam: appears well, vitals normal, no respiratory distress, acyanotic, normal RR, chest clear, no wheezing, crepitations, rhonchi, normal symmetric air entry.     Assessment: COPD reasonably well controlled.     Plan: current treatment plan is effective, no change in therapy.             Relevant Medications    fluticasone-umeclidin-vilanter (TRELEGY ELLIPTA) 100-62.5-25 mcg DsDv    albuterol (VENTOLIN HFA) 90 mcg/actuation inhaler    methylPREDNISolone (MEDROL DOSEPACK) 4 mg tablet    Other Relevant Orders    X-Ray Chest PA And Lateral    Spirometry with/without bronchodilator    Pulmonary nodule     Stable Annual surveilance         MACHO on CPAP         10/5/2023    10:55 AM   EPWORTH SLEEPINESS SCALE   Sitting and reading 0   Watching TV 0   Sitting, inactive in a public place (e.g. a theatre or a meeting) 1   As a passenger in a car for an hour without a break 0   Lying down to rest in the afternoon when circumstances permit 3   Sitting and talking to someone 0   Sitting quietly after a lunch without alcohol 0   In a car, while stopped for a few minutes in traffic 0   Total score 4      Adherence to CPAP discussed         BMI 35.0-35.9,adult     Weight loss and exercise         Varicose vein of leg     Dated stockings   Follow-up with vascular         Nicotine dependence     Dangers of cigarette smoking were reviewed with patient in detail. Patient was Counseled for 3-10 minutes. Nicotine replacement options were discussed. Nicotine replacement was discussed- enrolled with cessation         Restless legs     Added Mirapex         Relevant Medications    pramipexole (MIRAPEX) 0.125 MG tablet    Other Relevant Orders    FERRITIN       PLAN:Plan     Reassurance   Continue with smoking cessation   Added Mirapex will inform me of therapeutic response    Requested Prescriptions     Signed Prescriptions Disp Refills     fluticasone-umeclidin-vilanter (TRELEGY ELLIPTA) 100-62.5-25 mcg DsDv 60 each 11     Sig: Inhale 1 puff into the lungs once daily.    albuterol (VENTOLIN HFA) 90 mcg/actuation inhaler 36 g 3     Sig: Inhale 2 puffs into the lungs every 6 (six) hours as needed for Wheezing or Shortness of Breath. Rescue    methylPREDNISolone (MEDROL DOSEPACK) 4 mg tablet 21 tablet 1     Sig: use as directed    pramipexole (MIRAPEX) 0.125 MG tablet 60 tablet 0     Sig: Take 2 tablets (0.25 mg total) by mouth every evening.         Follow up in about 1 year (around 10/5/2024), or lab, cxr, gonzalo, flu shot.    This note was prepared using voice recognition system and is likely to have sound alike errors that may have been overlooked even after proof reading.  Please call me with any questions    Discussed diagnosis, its evaluation, treatment and usual course. All questions answered.    Thank you for the courtesy of participating in the care of this patient    Mazin Wells MD    Orders Placed This Encounter   Procedures    X-Ray Chest PA And Lateral     Standing Status:   Future     Standing Expiration Date:   10/30/2024    Influenza - Quadrivalent (Adjuvanted)    FERRITIN     Standing Status:   Future     Number of Occurrences:   1     Standing Expiration Date:   12/3/2024    Spirometry with/without bronchodilator     Standing Status:   Future     Standing Expiration Date:   10/30/2024     Order Specific Question:   Release to patient     Answer:   Immediate       AISHA Index for COPD Survival Prediction   Select Criteria:   FEV1 % Predicted After Bronchodialator     [x] >= 65% (0 points)    [] 50-64% (1 point)    [] 36-49% (2 points)    [] <= 35% (3 points)   6 Minute Walk Distance     [x] >= 350 Meters (0 points)    [] 250-349 Meters (1 point)    [] 150-249 Meters (2 points)    [] <= 149 Meters (3 points)   MMRC Dyspnea Scale (4 is worst)     [x] MMRC 0: Dyspneic on strenuous excercise (0 points)    [] MMRC 1: Dyspneic on  walking a slight hill (0 points)    [] MMRC 2: Dyspneic on walking level ground; must stop occasionally due to breathlessness (1 point)    [] MMRC 3: Must stop for breathlessness after walking 100 yards or after a few minutes (2 points)    [] MMRC 4: Cannot leave house; breathless on dressing/undressing (3 points)   Body Mass Index     [x] > 21 (0 points)    [] <= 21 (1 point)   Results: Total Criteria Point Count:     Approximate 4 Year Survival Interpretation   0-2 Points:  [x] 80%   3-4 Points:  [] 67%   5-6 Points:  [] 57%   7-10 Points:[] 18%         References  Cellal BR, Timothy CG, Hiram JM, et. al. The body-mass index, airflow obstruction, dyspnea and exercise capacity index in chronic obstructive pulmonary disease. N Engl J Med. 2004 Mar 4;350(10):1005-12.  Cam DA, Wells CK. Evaluation of clinical methods for rating dyspnea. Chest. 1988 Mar;93(3):580-6

## 2023-10-05 NOTE — PROGRESS NOTES
Diabetes Care Specialist Follow-up Note  Author: Karen Sandra RN  Date: 10/5/2023    Program Intake  Reason for Diabetes Program Visit:: Intervention  Type of Intervention:: Individual  Individual: Education  Education: Self-Management Skill Review  Current diabetes risk level:: moderate  In the last 12 months, have you:: none  Permission to speak with others about care:: yes    Lab Results   Component Value Date    HGBA1C 7.1 (H) 04/13/2023     A1c Pre Diabetes Care Specialist Intervention:  7.1%    Clinical    Weight: 99.4 kg (219 lb 2.2 oz)   Body mass index is 33.32 kg/m².  Wt decrease of 6 lbs since last visit on 9/19/2023    CURRENT MEDS  Farxiga 10 mg daily   Trulicity 3 mg weekly   Metformin 1,000 mg BID    Physical activity/Exercise:   Active around his home.   A lot of stress.     [facility]: [facility]        During today's follow-up visit,  the following areas required further assessment and content was provided/reviewed.    Based on today's diabetes care assessment, the following areas of need were identified:          8/2/2023    12:01 AM   Social   Support No   Access to Mass Media/Tech No   Cognitive/Behavioral Health No   Culture/Pentecostalism No   Communication No   Health Literacy No            8/2/2023    12:01 AM   Clinical   Medication Adherence Yes   Lab Compliance No   Nutritional Status No            8/2/2023    12:01 AM   Diabetes Self-Management Skills   Nutrition/Healthy Eating Yes-See care plan for update.      Home Blood Glucose Monitoring Yes-See care plan for update.     Psychosocial/Coping No-Mr. Forbes has multiple life stressors at this time that are affecting him in many ways including prioritizing his health. His support system is his wife and daughter, but they are not always on the same page. He wants a change to happen in his personal life. Provided verbal encouragement and support.          Today's interventions were provided through individual discussion,  instruction, and written materials were provided.    Patient verbalized understanding of instruction and written materials.  Pt was able to return back demonstration of instructions today. Patient understood key points, needs reinforcement and further instruction.     Diabetes Self-Management Care Plan Review and Evaluation of Progress:    During today's follow-up Stefan's Diabetes Self-Management Care Plan progress was reviewed and progress was evaluated including his/her input. Stefan has agreed to continue his/her journey to improve/maintain overall diabetes control by continuing to set health goals. See care plan progress below.      Care Plan: Diabetes Management   Updates made since 9/5/2023 12:00 AM        Problem: Healthy Eating         Goal: Eat 2-3 meals daily with ~45g of carbohydrates per meal and 0-15g per snack.    Start Date: 8/2/2023   Expected End Date: 8/2/2024   This Visit's Progress: No change   Recent Progress: No change   Priority: High   Barriers: No Barriers Identified   Note:    Mr. Forbes was diagnosed with diabetes at the time of his heart attack. At that time, he received thorough education, so his very knowledgeable about healthy eating. He states he has been avoiding starches because his blood sugars have been high, but when he does eat them, he reads food labels and measures them. Re-educated him on carbohydrates and their importance in providing the body energy. Educated he does not need to avoid them, rather eat them in moderation and pair them with a protein and/or healthy fat.   Reinforced the positive behavior of measuring foods and reading food labels.   He states one of his biggest barriers is late night snacking; he states he will over-do it from time to time. Encouraged high protein snacks that will help him feel saldaña for longer. Encouraged him to look at the nutrition label on his Halo Bars.   He states he likes veggies, so the My Plate method is a good option for him.  "  Provided him with the carb list, meals, and snacks in the DM management guide, 25 healthy snack ideas from www.diabetesfoodhub.org, and the [place] meal plan.     9/19/23: Mr. Forbes states he had a lot going on the past month or so, so he has not been putting himself and his health first. Him and his wife state they have been eating quick, convenient meals due to time constraints. He does state that he is still reading labels and measuring foods some of the time. Educated him that quick meals are OK, but it is still important to read labels and measure food consistently, so he knows he's staying within the recommendation. He states he knows he needs to get "back on track", and he states he will do so. He states he has the foods and tools he needs, but he just needs to make the decision to eat right.      10/05/23: Mr. Forbes still has a lot going on family and health wise, so it has been hard for him to prioritize his diabetes health. He is trying to make better choices when he can. Sometimes he finds himself stress eating due to issues going on at home. It is also difficult for him to eat healthy when no one else in the house his, even though he enjoys cooking healthy meals. Encouraged him to prioritize himself and to take care of himself. He states he will continue to work towards healthy eating.          Problem: Blood Glucose Self-Monitoring         Goal: Patient agrees to check and record blood sugars 1-2 times per day.    Start Date: 8/2/2023   Expected End Date: 8/2/2024   This Visit's Progress: On track   Recent Progress: On track   Priority: Medium   Barriers: No Barriers Identified   Note:    Mr. Forbes is checking his blood sugar 1x/day but not consistently at the same time. Educated on checking first thing in the morning before his coffee to get the most accurate reading. Educated on a goal of  mg/dL and a 2 hour PP goal of <160 mg/dL; these goals will help achieve an A1C of 6.5%. "   He is part of the Digital DM program.     9/19/23: Mr. Forbes is checking his blood sugars usually 2x/day; he has not checked in the last week or so due to life stressors. Encouraged him to start checking his blood sugars again because based on the readings we do have, he may need an adjustment in his medications.     10/05/23: Because of stress, Mr. Forbes is usually only checking 1x/day. BGs under episodes tab. Most/all are above fasting goal. He was also prescribed steroids today for a sinus infection. Concerned about hyperglyemia; will reach out to EUSEBIO Johnson.         Follow Up Plan     Follow up in about 6 weeks (around 11/16/2023) for review of healthy eating before the holidays and review of logs.    Today's care plan and follow up schedule was discussed with patient.  Stefan verbalized understanding of the care plan, goals, and agrees to follow up plan.        The patient was encouraged to communicate with his/her health care provider/physician and care team regarding his/her condition(s) and treatment.  I provided the patient with my contact information today and encouraged to contact me via phone or [facility] Patient Portal as needed.     Length of Visit   Total Time: 30 Minutes

## 2023-10-06 ENCOUNTER — TELEPHONE (OUTPATIENT)
Dept: SLEEP MEDICINE | Facility: CLINIC | Age: 68
End: 2023-10-06
Payer: MEDICARE

## 2023-10-06 ENCOUNTER — TELEPHONE (OUTPATIENT)
Dept: DIABETES | Facility: CLINIC | Age: 68
End: 2023-10-06
Payer: MEDICARE

## 2023-10-06 ENCOUNTER — PATIENT MESSAGE (OUTPATIENT)
Dept: INTERNAL MEDICINE | Facility: CLINIC | Age: 68
End: 2023-10-06
Payer: MEDICARE

## 2023-10-06 DIAGNOSIS — R79.0 LOW FERRITIN: Primary | ICD-10-CM

## 2023-10-06 DIAGNOSIS — R79.0 LOW FERRITIN: ICD-10-CM

## 2023-10-06 DIAGNOSIS — E83.42 HYPOMAGNESEMIA: Primary | ICD-10-CM

## 2023-10-06 DIAGNOSIS — E87.1 HYPONATREMIA: ICD-10-CM

## 2023-10-06 RX ORDER — FERROUS SULFATE 325(65) MG
325 TABLET ORAL 2 TIMES DAILY
Qty: 180 TABLET | Refills: 0 | Status: SHIPPED | OUTPATIENT
Start: 2023-10-06 | End: 2024-01-03 | Stop reason: SDUPTHER

## 2023-10-06 NOTE — TELEPHONE ENCOUNTER
Chart indicates that dr. Wells was sending iron tablet but I also recommend cbc. Lab ordered.  Also rec recheck mag and bmp.

## 2023-10-06 NOTE — TELEPHONE ENCOUNTER
Spoke w/ pt and notified him of what Karen said. He understood, and said he would call back if BG readings continued to be high into early next week.     ----- Message from Elizabeth Stephens NP sent at 10/6/2023  2:36 PM CDT -----  Inform patient I have reviewed blood glucose readings over the past few days since he was started on the dose pack for the sinus infection. Blood glucose readings tend to run higher than usual when on steroids and should stabilize by the 3rd or 4th day. Continue to monitor dietary intake and portion. Continue to hydrate. Inform me if his BG readings continue to stay elevated by early next week.    ----- Message -----  From: Karen Sandra RN  Sent: 10/5/2023   2:55 PM CDT  To: EUSEBIO Sinclair, I met with Mr. Forbes today. He has been prescribed steroids for a sinus infection and is already having higher fasting numbers. I wanted to give you a heads-up in case you wanted to change medications. Thanks!    Kandis,   I put Mr. Forbes as a follow-up on your schedule in ~3 weeks. I don't want him to wait until I get back for a follow-up; he needs the support and education, especially before the holidays. Thank you!!

## 2023-10-06 NOTE — TELEPHONE ENCOUNTER
Requested Prescriptions     Signed Prescriptions Disp Refills    ferrous sulfate (IRON) 325 mg (65 mg iron) Tab tablet 180 tablet 0     Sig: Take 1 tablet (325 mg total) by mouth 2 (two) times daily.     Authorizing Provider: CHIO PLASCENCIA

## 2023-10-16 DIAGNOSIS — F17.200 NICOTINE DEPENDENCE: ICD-10-CM

## 2023-10-16 RX ORDER — VARENICLINE TARTRATE 1 MG/1
TABLET, FILM COATED ORAL
Qty: 60 TABLET | Refills: 0 | Status: SHIPPED | OUTPATIENT
Start: 2023-10-16 | End: 2023-11-15 | Stop reason: SDUPTHER

## 2023-10-30 ENCOUNTER — CLINICAL SUPPORT (OUTPATIENT)
Dept: SMOKING CESSATION | Facility: CLINIC | Age: 68
End: 2023-10-30
Payer: COMMERCIAL

## 2023-10-30 ENCOUNTER — LAB VISIT (OUTPATIENT)
Dept: LAB | Facility: HOSPITAL | Age: 68
End: 2023-10-30
Attending: INTERNAL MEDICINE
Payer: MEDICARE

## 2023-10-30 DIAGNOSIS — E11.9 CONTROLLED TYPE 2 DIABETES MELLITUS WITHOUT COMPLICATION, WITHOUT LONG-TERM CURRENT USE OF INSULIN: ICD-10-CM

## 2023-10-30 DIAGNOSIS — E11.69 HYPERLIPIDEMIA ASSOCIATED WITH TYPE 2 DIABETES MELLITUS: ICD-10-CM

## 2023-10-30 DIAGNOSIS — E78.5 HYPERLIPIDEMIA ASSOCIATED WITH TYPE 2 DIABETES MELLITUS: ICD-10-CM

## 2023-10-30 DIAGNOSIS — Z12.5 ENCOUNTER FOR SCREENING FOR MALIGNANT NEOPLASM OF PROSTATE: ICD-10-CM

## 2023-10-30 DIAGNOSIS — R79.0 LOW FERRITIN: ICD-10-CM

## 2023-10-30 DIAGNOSIS — F17.200 NICOTINE DEPENDENCE: Primary | ICD-10-CM

## 2023-10-30 LAB
ALBUMIN SERPL BCP-MCNC: 4 G/DL (ref 3.5–5.2)
ALP SERPL-CCNC: 57 U/L (ref 55–135)
ALT SERPL W/O P-5'-P-CCNC: 17 U/L (ref 10–44)
ANION GAP SERPL CALC-SCNC: 14 MMOL/L (ref 8–16)
AST SERPL-CCNC: 12 U/L (ref 10–40)
BASOPHILS # BLD AUTO: 0.04 K/UL (ref 0–0.2)
BASOPHILS NFR BLD: 0.6 % (ref 0–1.9)
BILIRUB SERPL-MCNC: 0.3 MG/DL (ref 0.1–1)
BUN SERPL-MCNC: 11 MG/DL (ref 8–23)
CALCIUM SERPL-MCNC: 10.2 MG/DL (ref 8.7–10.5)
CHLORIDE SERPL-SCNC: 98 MMOL/L (ref 95–110)
CHOLEST SERPL-MCNC: 145 MG/DL (ref 120–199)
CHOLEST/HDLC SERPL: 2.7 {RATIO} (ref 2–5)
CO2 SERPL-SCNC: 24 MMOL/L (ref 23–29)
COMPLEXED PSA SERPL-MCNC: 0.25 NG/ML (ref 0–4)
CREAT SERPL-MCNC: 1 MG/DL (ref 0.5–1.4)
DIFFERENTIAL METHOD: ABNORMAL
EOSINOPHIL # BLD AUTO: 0.1 K/UL (ref 0–0.5)
EOSINOPHIL NFR BLD: 2 % (ref 0–8)
ERYTHROCYTE [DISTWIDTH] IN BLOOD BY AUTOMATED COUNT: 14.4 % (ref 11.5–14.5)
EST. GFR  (NO RACE VARIABLE): >60 ML/MIN/1.73 M^2
ESTIMATED AVG GLUCOSE: 177 MG/DL (ref 68–131)
GLUCOSE SERPL-MCNC: 118 MG/DL (ref 70–110)
HBA1C MFR BLD: 7.8 % (ref 4–5.6)
HCT VFR BLD AUTO: 40.7 % (ref 40–54)
HDLC SERPL-MCNC: 54 MG/DL (ref 40–75)
HDLC SERPL: 37.2 % (ref 20–50)
HGB BLD-MCNC: 13.4 G/DL (ref 14–18)
IMM GRANULOCYTES # BLD AUTO: 0.01 K/UL (ref 0–0.04)
IMM GRANULOCYTES NFR BLD AUTO: 0.1 % (ref 0–0.5)
LDLC SERPL CALC-MCNC: 66.6 MG/DL (ref 63–159)
LYMPHOCYTES # BLD AUTO: 1.7 K/UL (ref 1–4.8)
LYMPHOCYTES NFR BLD: 24.2 % (ref 18–48)
MAGNESIUM SERPL-MCNC: 1.7 MG/DL (ref 1.6–2.6)
MCH RBC QN AUTO: 29.6 PG (ref 27–31)
MCHC RBC AUTO-ENTMCNC: 32.9 G/DL (ref 32–36)
MCV RBC AUTO: 90 FL (ref 82–98)
MONOCYTES # BLD AUTO: 0.7 K/UL (ref 0.3–1)
MONOCYTES NFR BLD: 9.4 % (ref 4–15)
NEUTROPHILS # BLD AUTO: 4.4 K/UL (ref 1.8–7.7)
NEUTROPHILS NFR BLD: 63.7 % (ref 38–73)
NONHDLC SERPL-MCNC: 91 MG/DL
NRBC BLD-RTO: 0 /100 WBC
PLATELET # BLD AUTO: 270 K/UL (ref 150–450)
PMV BLD AUTO: 10.7 FL (ref 9.2–12.9)
POTASSIUM SERPL-SCNC: 4.8 MMOL/L (ref 3.5–5.1)
PROT SERPL-MCNC: 7.3 G/DL (ref 6–8.4)
RBC # BLD AUTO: 4.52 M/UL (ref 4.6–6.2)
SODIUM SERPL-SCNC: 136 MMOL/L (ref 136–145)
TRIGL SERPL-MCNC: 122 MG/DL (ref 30–150)
TSH SERPL DL<=0.005 MIU/L-ACNC: 1.44 UIU/ML (ref 0.4–4)
WBC # BLD AUTO: 6.9 K/UL (ref 3.9–12.7)

## 2023-10-30 PROCEDURE — 36415 COLL VENOUS BLD VENIPUNCTURE: CPT | Performed by: INTERNAL MEDICINE

## 2023-10-30 PROCEDURE — 83036 HEMOGLOBIN GLYCOSYLATED A1C: CPT | Performed by: INTERNAL MEDICINE

## 2023-10-30 PROCEDURE — 84153 ASSAY OF PSA TOTAL: CPT | Performed by: INTERNAL MEDICINE

## 2023-10-30 PROCEDURE — 84443 ASSAY THYROID STIM HORMONE: CPT | Performed by: INTERNAL MEDICINE

## 2023-10-30 PROCEDURE — 80053 COMPREHEN METABOLIC PANEL: CPT | Performed by: INTERNAL MEDICINE

## 2023-10-30 PROCEDURE — 83735 ASSAY OF MAGNESIUM: CPT | Performed by: INTERNAL MEDICINE

## 2023-10-30 PROCEDURE — 85025 COMPLETE CBC W/AUTO DIFF WBC: CPT | Performed by: INTERNAL MEDICINE

## 2023-10-30 PROCEDURE — 99403 PR PREVENT COUNSEL,INDIV,45 MIN: ICD-10-PCS | Mod: 95,,, | Performed by: SPEECH-LANGUAGE PATHOLOGIST

## 2023-10-30 PROCEDURE — 99403 PREV MED CNSL INDIV APPRX 45: CPT | Mod: 95,,, | Performed by: SPEECH-LANGUAGE PATHOLOGIST

## 2023-10-30 PROCEDURE — 80061 LIPID PANEL: CPT | Performed by: INTERNAL MEDICINE

## 2023-10-30 NOTE — PROGRESS NOTES
Individual Follow-Up Form    10/30/2023    Quit Date: 10/1/2023     Clinical Status of Patient: Outpatient     Continuing Medication: yes  Chantix     Other Medications:                  Target Symptoms: Withdrawal and medication side effects. The following were   rated moderate (3) to severe (4) on TCRS:  Moderate (3): anger/irritability/frustration, insomnia  Severe (4): none      Comments: Virtual visit. Patient states he has remained smoke free since his 10/1/2023 quit date & remains on the Varenicline 1 mg twice daily. Congratulated the patient on his continued determination of living smoke free. Administered TCRS with patient reporting mild symptoms of desire/crave, increased appetite/hunger, depressed mood/sadness, difficulty concentrating, anxious/nervous, restless/can't sit still/impatient. Patient reports he does miss smoking his cigars stating he misses relaxing with it. Discussed with patient the withdrawal process & the waxing & waning nicotine levels that created the illusion of relaxation. Discussed with patient the ways he is finding to relax as a non smoker which he states being inside when it's quit. He reports a lot of noise & talking bothers him. Discussed using noise cancelling headphones/ear plugs to assist when he's in public spaces. Discussed with patient finding 10 min daily to practice relaxation so that he can identify true relaxation from when he was smoking. His wife reports the patient was easier to be around with this quit vs his past quit.  Discussed with patient the length of time that he may deal with withdrawal symptoms. Informed patient some of the side effects of Wellbutrin which he takes 150 mg once daily prescribed by his dr & he states he's been on it for 7 yrs now. Session ended to allow the patient to get his labs at [facility]. Patient states his goal is to remain smoke free. Follow up set for 11/29/2023 at 1:00 pm    Diagnosis: F17.200    Next Visit: 4 weeks

## 2023-11-01 NOTE — PROGRESS NOTES
Subjective:      Patient ID: Stefan Forbes Jr. is a 68 y.o. male.    Chief Complaint: Follow-up    HPI    69 yo with   Patient Active Problem List   Diagnosis    Controlled type 2 diabetes mellitus without complication, without long-term current use of insulin    Hypertension associated with diabetes    Hyperlipidemia associated with type 2 diabetes mellitus    Moderate COPD (chronic obstructive pulmonary disease)    CAD (coronary artery disease)    Vitamin D deficiency    Osteoarthritis of knee    Pulmonary nodule    Nevus of choroid of right eye    History of pulmonary embolism    Hyponatremia    Iron deficiency anemia due to chronic blood loss    MACHO on CPAP    PLMD (periodic limb movement disorder)    BMI 35.0-35.9,adult    Varicose vein of leg    Tobacco abuse, in remission    Tubular adenoma of colon    Benign prostatic hyperplasia with weak urinary stream    Urgency of urination    Aortic atherosclerosis    COVID    Severe obesity    Hypomagnesemia    Nicotine dependence    Restless legs    Preventative health care     Past Medical History:   Diagnosis Date    Arthritis     Cataract     COPD (chronic obstructive pulmonary disease)     Diabetes mellitus, type 2     Hyperlipidemia     Hypertension     Personal history of colonic polyps 2018     Here today for management of mult med problems as outlined below.  Compliant with meds without significant side effects. Energy and appetite good.     Review of Systems   Constitutional:  Negative for chills and fever.   HENT:  Negative for ear pain and sore throat.    Respiratory:  Negative for cough.    Cardiovascular:  Negative for chest pain.   Gastrointestinal:  Negative for abdominal pain and blood in stool.   Genitourinary:  Negative for dysuria and hematuria.   Neurological:  Negative for seizures and syncope.     Objective:   /78 (BP Location: Right arm, Patient Position: Sitting, BP Method: Large (Manual))   Pulse 76   Temp 97.6 °F (36.4 °C)  "(Tympanic)   Ht 5' 8" (1.727 m)   Wt 100.5 kg (221 lb 9 oz)   SpO2 97%   BMI 33.69 kg/m²     Physical Exam  Constitutional:       General: He is not in acute distress.     Appearance: He is well-developed.   HENT:      Head: Normocephalic and atraumatic.   Eyes:      Extraocular Movements: Extraocular movements intact.   Neck:      Thyroid: No thyromegaly.   Cardiovascular:      Rate and Rhythm: Normal rate and regular rhythm.   Pulmonary:      Breath sounds: Normal breath sounds. No wheezing or rales.   Abdominal:      General: Bowel sounds are normal.      Palpations: Abdomen is soft.      Tenderness: There is no abdominal tenderness.   Musculoskeletal:         General: No swelling.      Cervical back: Neck supple. No rigidity.   Lymphadenopathy:      Cervical: No cervical adenopathy.   Skin:     General: Skin is warm and dry.   Neurological:      Mental Status: He is alert and oriented to person, place, and time.   Psychiatric:         Behavior: Behavior normal.         Lab Results   Component Value Date    WBC 6.90 10/30/2023    HGB 13.4 (L) 10/30/2023    HGB 12.5 (L) 10/12/2022    HGB 13.0 (L) 01/18/2022    HCT 40.7 10/30/2023    MCV 90 10/30/2023    MCV 96 10/12/2022    MCV 94 01/18/2022     10/30/2023    CHOL 145 10/30/2023    TRIG 122 10/30/2023    HDL 54 10/30/2023    LDLCALC 66.6 10/30/2023    LDLCALC 56.8 (L) 10/12/2022    LDLCALC 55.6 (L) 10/14/2021    ALT 17 10/30/2023    AST 12 10/30/2023     10/30/2023    K 4.8 10/30/2023    CALCIUM 10.2 10/30/2023    CL 98 10/30/2023    CO2 24 10/30/2023    BUN 11 10/30/2023    CREATININE 1.0 10/30/2023    CREATININE 0.8 09/12/2023    CREATININE 0.9 08/30/2023    EGFRNORACEVR >60.0 10/30/2023    EGFRNORACEVR >60.0 09/12/2023    EGFRNORACEVR >60.0 08/30/2023    TSH 1.437 10/30/2023    TSH 1.195 10/12/2022    TSH 1.143 10/14/2021    PSA 0.25 10/30/2023    PSA 0.38 01/18/2022    PSA 0.17 09/02/2020     (H) 10/30/2023    HGBA1C 7.8 (H) 10/30/2023 "    HGBA1C 7.1 (H) 04/13/2023    HGBA1C 7.1 (H) 10/12/2022    UGFFRLVA00SK 44 04/26/2017    [id number] 29 (L) 10/08/2015          The 10-year ASCVD risk score (Mal DAVID, et al., 2019) is: 20.4%    Values used to calculate the score:      Age: 68 years      Sex: Male      Is Non- : No      Diabetic: Yes      Tobacco smoker: No      Systolic Blood Pressure: 120 mmHg      Is BP treated: Yes      HDL Cholesterol: 54 mg/dL      Total Cholesterol: 145 mg/dL     Assessment:     1. Controlled type 2 diabetes mellitus without complication, without long-term current use of insulin    2. Hypertension associated with diabetes    3. Hyperlipidemia associated with type 2 diabetes mellitus    4. Coronary artery disease involving native coronary artery of native heart without angina pectoris    5. Moderate COPD (chronic obstructive pulmonary disease)    6. Nicotine dependence    7. Aortic atherosclerosis    8. Preventative health care      Plan:   1. Controlled type 2 diabetes mellitus without complication, without long-term current use of insulin  Overview:        Assessment & Plan:  a1c risking to 7.8. he will improve diet and cont working with dm clinic.     Orders:  -     dapagliflozin propanediol (FARXIGA) 10 mg tablet; Take 1 tablet (10 mg total) by mouth once daily.  Dispense: 90 tablet; Refill: 2  -     dulaglutide (TRULICITY) 3 mg/0.5 mL pen injector; Inject 3 mg into the skin every 7 days.  Dispense: 12 pen ; Refill: 1  -     Hemoglobin A1C; Future; Expected date: 05/01/2024    2. Hypertension associated with diabetes  Overview:  Controlled. Cont current meds.       Orders:  -     amLODIPine (NORVASC) 2.5 MG tablet; Take 1 tablet (2.5 mg total) by mouth once daily.  Dispense: 90 tablet; Refill: 3  -     olmesartan (BENICAR) 40 MG tablet; Take 1 tablet (40 mg total) by mouth once daily.  Dispense: 90 tablet; Refill: 1  -     Comprehensive Metabolic Panel; Future; Expected date: 05/01/2024  -      CBC Auto Differential; Future; Expected date: 05/01/2024    3. Hyperlipidemia associated with type 2 diabetes mellitus  Assessment & Plan:  H/o MI.  Intensify statin.     Orders:  -     rosuvastatin (CRESTOR) 20 MG tablet; Take 1 tablet (20 mg total) by mouth once daily.  Dispense: 90 tablet; Refill: 3  -     Lipid Panel; Future; Expected date: 02/01/2024  -     ALT (SGPT); Future; Expected date: 02/03/2024    4. Coronary artery disease involving native coronary artery of native heart without angina pectoris  Overview:  MI 2012  Sees Dr. Perry    Assessment & Plan:    Intensify statin.       5. Moderate COPD (chronic obstructive pulmonary disease)  Overview:  FEV1 73% of predicted.   Trelegy      6. Nicotine dependence  Assessment & Plan:  Quit smoking x 2 month.     Orders:  -     buPROPion (WELLBUTRIN SR) 150 MG TBSR 12 hr tablet; Take 1 tablet (150 mg total) by mouth 2 (two) times daily.  Dispense: 180 tablet; Refill: 2    7. Aortic atherosclerosis  Overview:  CT Chest 10/14/2019      8. Preventative health care    Check with your pharmacy regarding new shingles vaccine and rsv vaccine.     Patient Instructions   Check with your pharmacy regarding new shingles vaccine and rsv vaccine.     Future Appointments   Date Time Provider Department Center   11/16/2023 11:30 AM Kandis Bhatia RD, CDE [facility] DIBEDU [facility]   11/29/2023  1:00 PM Anton April, CTTS [facility] SMOKE [facility]   12/6/2023 11:30 AM Elizabeth Stephens, NP [facility] DIABETE [facility]   1/12/2024  1:00 PM Gianna Godoy, NP [facility] BHEMON [facility]   2/1/2024  7:35 AM LABORATORY, HGV [facility] LAB [facility]   2/28/2024 10:15 AM Stefan Eugene MD [facility] UROLOGY [facility]   4/29/2024  9:40 AM LABORATORY, [facility]GV [facility] LAB [facility]   5/3/2024 11:40 AM Wallace Fisher MD [facility] IM [facility]   10/11/2024 10:00 AM [facility] XR2 [facility] XRAY [place]   10/11/2024 10:15 AM PULMONARY LAB, HERIBERTO [facility] PULMFUN [place]   10/11/2024 11:00 AM Priscilla,  MD Mazin [facility] PULMSVC [place]       Lab Frequency Next Occurrence   X-Ray Chest PA And Lateral Once 10/05/2023   Spirometry with/without bronchodilator Once 10/05/2023   CBC Auto Differential Once 10/06/2023   Basic Metabolic Panel Once 10/06/2023   Microalbumin/creatinine urine ratio         Follow up in about 6 months (around 5/3/2024), or if symptoms worsen or fail to improve.

## 2023-11-03 ENCOUNTER — OFFICE VISIT (OUTPATIENT)
Dept: INTERNAL MEDICINE | Facility: CLINIC | Age: 68
End: 2023-11-03
Payer: MEDICARE

## 2023-11-03 VITALS
HEIGHT: 68 IN | BODY MASS INDEX: 33.58 KG/M2 | OXYGEN SATURATION: 97 % | TEMPERATURE: 98 F | HEART RATE: 76 BPM | DIASTOLIC BLOOD PRESSURE: 78 MMHG | WEIGHT: 221.56 LBS | SYSTOLIC BLOOD PRESSURE: 120 MMHG

## 2023-11-03 DIAGNOSIS — Z00.00 PREVENTATIVE HEALTH CARE: ICD-10-CM

## 2023-11-03 DIAGNOSIS — E11.69 HYPERLIPIDEMIA ASSOCIATED WITH TYPE 2 DIABETES MELLITUS: ICD-10-CM

## 2023-11-03 DIAGNOSIS — E78.5 HYPERLIPIDEMIA ASSOCIATED WITH TYPE 2 DIABETES MELLITUS: ICD-10-CM

## 2023-11-03 DIAGNOSIS — J44.9 MODERATE COPD (CHRONIC OBSTRUCTIVE PULMONARY DISEASE): ICD-10-CM

## 2023-11-03 DIAGNOSIS — E11.59 HYPERTENSION ASSOCIATED WITH DIABETES: ICD-10-CM

## 2023-11-03 DIAGNOSIS — I25.10 CORONARY ARTERY DISEASE INVOLVING NATIVE CORONARY ARTERY OF NATIVE HEART WITHOUT ANGINA PECTORIS: ICD-10-CM

## 2023-11-03 DIAGNOSIS — E11.9 CONTROLLED TYPE 2 DIABETES MELLITUS WITHOUT COMPLICATION, WITHOUT LONG-TERM CURRENT USE OF INSULIN: Primary | ICD-10-CM

## 2023-11-03 DIAGNOSIS — I15.2 HYPERTENSION ASSOCIATED WITH DIABETES: ICD-10-CM

## 2023-11-03 DIAGNOSIS — I70.0 AORTIC ATHEROSCLEROSIS: ICD-10-CM

## 2023-11-03 DIAGNOSIS — F17.200 NICOTINE DEPENDENCE: ICD-10-CM

## 2023-11-03 PROCEDURE — 99999 PR PBB SHADOW E&M-EST. PATIENT-LVL III: ICD-10-PCS | Mod: PBBFAC,,, | Performed by: INTERNAL MEDICINE

## 2023-11-03 PROCEDURE — 1101F PR PT FALLS ASSESS DOC 0-1 FALLS W/OUT INJ PAST YR: ICD-10-PCS | Mod: CPTII,S$GLB,, | Performed by: INTERNAL MEDICINE

## 2023-11-03 PROCEDURE — 1101F PT FALLS ASSESS-DOCD LE1/YR: CPT | Mod: CPTII,S$GLB,, | Performed by: INTERNAL MEDICINE

## 2023-11-03 PROCEDURE — 99999 PR PBB SHADOW E&M-EST. PATIENT-LVL III: CPT | Mod: PBBFAC,,, | Performed by: INTERNAL MEDICINE

## 2023-11-03 PROCEDURE — 99214 PR OFFICE/OUTPT VISIT, EST, LEVL IV, 30-39 MIN: ICD-10-PCS | Mod: S$GLB,,, | Performed by: INTERNAL MEDICINE

## 2023-11-03 PROCEDURE — 3060F POS MICROALBUMINURIA REV: CPT | Mod: CPTII,S$GLB,, | Performed by: INTERNAL MEDICINE

## 2023-11-03 PROCEDURE — 3288F FALL RISK ASSESSMENT DOCD: CPT | Mod: CPTII,S$GLB,, | Performed by: INTERNAL MEDICINE

## 2023-11-03 PROCEDURE — 3078F PR MOST RECENT DIASTOLIC BLOOD PRESSURE < 80 MM HG: ICD-10-PCS | Mod: CPTII,S$GLB,, | Performed by: INTERNAL MEDICINE

## 2023-11-03 PROCEDURE — 3078F DIAST BP <80 MM HG: CPT | Mod: CPTII,S$GLB,, | Performed by: INTERNAL MEDICINE

## 2023-11-03 PROCEDURE — 3051F HG A1C>EQUAL 7.0%<8.0%: CPT | Mod: CPTII,S$GLB,, | Performed by: INTERNAL MEDICINE

## 2023-11-03 PROCEDURE — 99214 OFFICE O/P EST MOD 30 MIN: CPT | Mod: S$GLB,,, | Performed by: INTERNAL MEDICINE

## 2023-11-03 PROCEDURE — 3060F PR POS MICROALBUMINURIA RESULT DOCUMENTED/REVIEW: ICD-10-PCS | Mod: CPTII,S$GLB,, | Performed by: INTERNAL MEDICINE

## 2023-11-03 PROCEDURE — 1126F AMNT PAIN NOTED NONE PRSNT: CPT | Mod: CPTII,S$GLB,, | Performed by: INTERNAL MEDICINE

## 2023-11-03 PROCEDURE — 3051F PR MOST RECENT HEMOGLOBIN A1C LEVEL 7.0 - < 8.0%: ICD-10-PCS | Mod: CPTII,S$GLB,, | Performed by: INTERNAL MEDICINE

## 2023-11-03 PROCEDURE — 3066F PR DOCUMENTATION OF TREATMENT FOR NEPHROPATHY: ICD-10-PCS | Mod: CPTII,S$GLB,, | Performed by: INTERNAL MEDICINE

## 2023-11-03 PROCEDURE — 1159F PR MEDICATION LIST DOCUMENTED IN MEDICAL RECORD: ICD-10-PCS | Mod: CPTII,S$GLB,, | Performed by: INTERNAL MEDICINE

## 2023-11-03 PROCEDURE — 3288F PR FALLS RISK ASSESSMENT DOCUMENTED: ICD-10-PCS | Mod: CPTII,S$GLB,, | Performed by: INTERNAL MEDICINE

## 2023-11-03 PROCEDURE — 4010F ACE/ARB THERAPY RXD/TAKEN: CPT | Mod: CPTII,S$GLB,, | Performed by: INTERNAL MEDICINE

## 2023-11-03 PROCEDURE — 3008F PR BODY MASS INDEX (BMI) DOCUMENTED: ICD-10-PCS | Mod: CPTII,S$GLB,, | Performed by: INTERNAL MEDICINE

## 2023-11-03 PROCEDURE — 3008F BODY MASS INDEX DOCD: CPT | Mod: CPTII,S$GLB,, | Performed by: INTERNAL MEDICINE

## 2023-11-03 PROCEDURE — 1126F PR PAIN SEVERITY QUANTIFIED, NO PAIN PRESENT: ICD-10-PCS | Mod: CPTII,S$GLB,, | Performed by: INTERNAL MEDICINE

## 2023-11-03 PROCEDURE — 3066F NEPHROPATHY DOC TX: CPT | Mod: CPTII,S$GLB,, | Performed by: INTERNAL MEDICINE

## 2023-11-03 PROCEDURE — 1159F MED LIST DOCD IN RCRD: CPT | Mod: CPTII,S$GLB,, | Performed by: INTERNAL MEDICINE

## 2023-11-03 PROCEDURE — 3074F SYST BP LT 130 MM HG: CPT | Mod: CPTII,S$GLB,, | Performed by: INTERNAL MEDICINE

## 2023-11-03 PROCEDURE — 3074F PR MOST RECENT SYSTOLIC BLOOD PRESSURE < 130 MM HG: ICD-10-PCS | Mod: CPTII,S$GLB,, | Performed by: INTERNAL MEDICINE

## 2023-11-03 PROCEDURE — 4010F PR ACE/ARB THEARPY RXD/TAKEN: ICD-10-PCS | Mod: CPTII,S$GLB,, | Performed by: INTERNAL MEDICINE

## 2023-11-03 RX ORDER — AMLODIPINE BESYLATE 2.5 MG/1
2.5 TABLET ORAL DAILY
Qty: 90 TABLET | Refills: 3 | Status: SHIPPED | OUTPATIENT
Start: 2023-11-03 | End: 2024-11-02

## 2023-11-03 RX ORDER — DAPAGLIFLOZIN 10 MG/1
10 TABLET, FILM COATED ORAL DAILY
Qty: 90 TABLET | Refills: 2 | Status: SHIPPED | OUTPATIENT
Start: 2023-11-03

## 2023-11-03 RX ORDER — BUPROPION HYDROCHLORIDE 150 MG/1
150 TABLET, EXTENDED RELEASE ORAL 2 TIMES DAILY
Qty: 180 TABLET | Refills: 2 | Status: SHIPPED | OUTPATIENT
Start: 2023-11-03

## 2023-11-03 RX ORDER — DULAGLUTIDE 3 MG/.5ML
3 INJECTION, SOLUTION SUBCUTANEOUS
Qty: 12 PEN | Refills: 1 | Status: SHIPPED | OUTPATIENT
Start: 2023-11-03 | End: 2024-12-02

## 2023-11-03 RX ORDER — ROSUVASTATIN CALCIUM 20 MG/1
20 TABLET, COATED ORAL DAILY
Qty: 90 TABLET | Refills: 3 | Status: SHIPPED | OUTPATIENT
Start: 2023-11-03 | End: 2024-11-02

## 2023-11-03 RX ORDER — OLMESARTAN MEDOXOMIL 40 MG/1
40 TABLET ORAL DAILY
Qty: 90 TABLET | Refills: 1 | Status: SHIPPED | OUTPATIENT
Start: 2023-11-03

## 2023-11-05 DIAGNOSIS — G25.81 RESTLESS LEGS: ICD-10-CM

## 2023-11-07 RX ORDER — PRAMIPEXOLE DIHYDROCHLORIDE 0.12 MG/1
0.25 TABLET ORAL NIGHTLY
Qty: 60 TABLET | Refills: 5 | Status: SHIPPED | OUTPATIENT
Start: 2023-11-07 | End: 2024-04-03

## 2023-11-15 DIAGNOSIS — F17.200 NICOTINE DEPENDENCE: ICD-10-CM

## 2023-11-15 RX ORDER — VARENICLINE TARTRATE 1 MG/1
TABLET, FILM COATED ORAL
Qty: 60 TABLET | Refills: 0 | Status: SHIPPED | OUTPATIENT
Start: 2023-11-15 | End: 2023-12-19 | Stop reason: SDUPTHER

## 2023-11-16 ENCOUNTER — TELEPHONE (OUTPATIENT)
Dept: DIABETES | Facility: CLINIC | Age: 68
End: 2023-11-16
Payer: MEDICARE

## 2023-11-16 NOTE — TELEPHONE ENCOUNTER
Spoke with pt. Assisted with appt RS. He will check wife's calendar and send message via [employer] if needed. Successful call.

## 2023-11-20 ENCOUNTER — CLINICAL SUPPORT (OUTPATIENT)
Dept: SMOKING CESSATION | Facility: CLINIC | Age: 68
End: 2023-11-20
Payer: COMMERCIAL

## 2023-11-20 DIAGNOSIS — F17.200 NICOTINE DEPENDENCE: Primary | ICD-10-CM

## 2023-11-20 PROCEDURE — 99407 PR TOBACCO USE CESSATION INTENSIVE >10 MINUTES: ICD-10-PCS | Mod: S$GLB,,,

## 2023-11-20 PROCEDURE — 99407 BEHAV CHNG SMOKING > 10 MIN: CPT | Mod: S$GLB,,,

## 2023-11-20 NOTE — PROGRESS NOTES
Spoke with patient today in regard to smoking cessation progress for 3 month telephone follow up, he states tobacco free. Commended patient on the accomplishment thus far. Patient states the use of Chantix and is currently enrolled in the program with a scheduled visit. Informed patient of benefit period, future follow ups, and contact information if any further help or support is needed. Will complete smart form for 3 month follow up on Quit attempt #2.

## 2023-11-29 ENCOUNTER — CLINICAL SUPPORT (OUTPATIENT)
Dept: SMOKING CESSATION | Facility: CLINIC | Age: 68
End: 2023-11-29
Payer: COMMERCIAL

## 2023-11-29 DIAGNOSIS — F17.200 NICOTINE DEPENDENCE: Primary | ICD-10-CM

## 2023-11-29 PROCEDURE — 99403 PREV MED CNSL INDIV APPRX 45: CPT | Mod: 95,,, | Performed by: SPEECH-LANGUAGE PATHOLOGIST

## 2023-11-29 PROCEDURE — 99403 PR PREVENT COUNSEL,INDIV,45 MIN: ICD-10-PCS | Mod: 95,,, | Performed by: SPEECH-LANGUAGE PATHOLOGIST

## 2023-11-29 NOTE — PROGRESS NOTES
Individual Follow-Up Form    11/29/2023    Quit Date: 10/1/2023     Clinical Status of Patient: Outpatient     Continuing Medication: yes  Chantix     Other Medications:                  Target Symptoms: Withdrawal and medication side effects. The following were   rated moderate (3) to severe (4) on TCRS:  Moderate (3): anger/irritation/frustration, increased appetite, constipation (premorbid)   Severe (4): none    Comments: Telephone visit done due to patient was available last minute earlier than his scheduled appointment due to patient has another appointment at his scheduled 1 pm time & he asked to reschedule & CTTS had a 9 am cancellation. Patient reports he's remained smoke free since his quit date of 10/1/2023 & remains on his Varenicline 1 mg twice daily. Administered TCRS with patient reporting mild symptoms of desire/crave, depressed mood/sadness, difficulty concentrating, anxious/nervous, insomnia, mild-moderate symptoms of restless/can't sit still/impatient & moderate symptoms of anger/irritation/frustration, increased appetite, constipation (which he states is premorbid due to other medical conditions). He reports he did have a stressful situation where he left his home to take a drive & said that he was going get a pack of cigars; but that he didn't. He reports taking a drive to clear his head which helped. Celebrated with the patient his problem solving & decision making skills that resulted in a new outcome for him.  Discussed high risk situations which he reports is stress. Discussed strategies to manage stress that include taking space to breath by walking away, going for a walk and/or going for a drive. Patient reports he is enjoying his smoke free holidays & looking forward to his upcoming smoke free holiday season & entering the new year as a non smoker. Patient's goal is to remain smoke free. Follow up set for 1/10/2024 at 11:30 am.     Diagnosis: F17.200    Next Visit: 6 weeks

## 2023-11-30 ENCOUNTER — CLINICAL SUPPORT (OUTPATIENT)
Dept: DIABETES | Facility: CLINIC | Age: 68
End: 2023-11-30
Payer: MEDICARE

## 2023-11-30 DIAGNOSIS — E11.9 CONTROLLED TYPE 2 DIABETES MELLITUS WITHOUT COMPLICATION, WITHOUT LONG-TERM CURRENT USE OF INSULIN: Primary | ICD-10-CM

## 2023-11-30 PROCEDURE — G0108 PR DIAB MANAGE TRN  PER INDIV: ICD-10-PCS | Mod: 95,,, | Performed by: DIETITIAN, REGISTERED

## 2023-11-30 PROCEDURE — G0108 DIAB MANAGE TRN  PER INDIV: HCPCS | Mod: 95,,, | Performed by: DIETITIAN, REGISTERED

## 2023-11-30 NOTE — PROGRESS NOTES
Diabetes Care Specialist Virtual Visit Note     The patient location is: home in [place]  The chief complaint leading to consultation is: Diabetes  Visit type: audiovisual  Total time spent with patient: 30 min   Each patient to whom he or she provides medical services by telemedicine is:  (1) informed of the relationship between the physician and patient and the respective role of any other health care provider with respect to management of the patient; and (2) notified that he or she may decline to receive medical services by telemedicine and may withdraw from such care at any time.      Diabetes Care Specialist Follow-up Note  Author: Kandis Bhatia RD, CDE  Date: 11/30/2023    Program Intake  Reason for Diabetes Program Visit:: Intervention (DM referral 7/21/23 Wallace Fisher MD)  Type of Intervention:: Individual  Education: Self-Management Skill Review  Current diabetes risk level:: moderate  In the last 12 months, have you::  (pt denies ER/hosp visits)  Permission to speak with others about care:: yes    Lab Results   Component Value Date    HGBA1C 7.8 (H) 10/30/2023     Clinical    Problem Review  Active comorbidities affecting diabetes self-care.: yes (HTN, HLD, CAD, COPD, VitD defn, Obesity by BMI, MACHO/cpap, Tobacco cessation ~2mos.)    Clinical Assessment  Current Diabetes Treatment: Diet, Oral Medication, Injectable, Exercise (Trulicity 3mg weekly. Farxiga 10mg 1tab daily. Metformin 1000mg 1tab twice daily.)  Have you ever experienced hypoglycemia (low blood sugar)?: no  Have you ever experienced hyperglycemia (high blood sugar)?: no    Medication Information  How many days a week do you miss your medications?: Never  Do you sometimes have difficulty refilling your medications?: No  Medication adherence impacting ability to self-manage diabetes?: No    Labs  Do you have regular lab work to monitor your medications?: Yes  Type of Regular Lab Work: A1c, Cholesterol, Microalbumin, BMP  Where do you get  your labs drawn?: [facility]  Lab Compliance Barriers: No    Nutritional Status  Diet: Regular (Pt reports 3meals w/ snacks daily. Excess carb, sat fat and sodium from fried foods, portions, reg.desserts. Inadequate non-starchy vegetables but likes variety.)  Meal Plan 24 Hour Recall - Breakfast: blueberry muffin - coffee, nondairy creamer, no sugar  Meal Plan 24 Hour Recall - Lunch: raisin canes - 2pc chix, fries, diet jessica  Meal Plan 24 Hour Recall - Dinner: ground beef with sweet potatoes, white potatoes, pea rice 2cups  Meal Plan 24 Hour Recall - Snack: desserts  Change in appetite?: No  Recent Changes in Weight: Weight Loss  Was weight loss intentional or unintentional?: Intentional (~11lbs since 10/22)  Current nutritional status an area of need that is impacting patient's ability to self-manage diabetes?: Yes    Physical activity/Exercise: None structured, likes to walk.    SMBG: Reviewed digital diabetes logs w/ pt. Noted recent inconsistency of testing. Pt states he isn't doing well with diet and knows readings will be high, so he doesn't feel motivated to test.    Diabetes Self-Management Skills Assessment     Chronic Complications  Patient can identify major chronic complications of diabetes.: yes  Stated chronic complications:: heart disease/heart attack, kidney disease, neuropathy/nerve damage, retinopathy, stroke  Patient can identify ways to prevent or delay diabetes complications.: yes  Stated ways to prevent complications:: controlling blood sugar, maintaining optimal blood glucose control, healthy eating and regular activity, having regular diabetic eye exams  Patient is aware that having diabetes increases risk of heart disease?: Yes  Patient is aware that heart disease is the leading cause of death and disability in people with diabetes?: No  Chronic Complications Skills Assessment Completed: : Yes  Assessment indicates:: Adequate understanding  Area of need?: Yes    During today's follow-up visit,   "the following areas required further assessment and content was provided/reviewed.  Based on today's diabetes care assessment, the following areas of need were identified:          11/30/2023    12:02 AM   Clinical   Medication Adherence No   Lab Compliance No   Nutritional Status Yes -see care plan          11/30/2023    12:02 AM   Diabetes Self-Management Skills   Chronic Complications Yes -see care plan      Today's interventions were provided through individual discussion, instruction, and written materials were provided.    Patient verbalized understanding of instruction and written materials.  Pt was able to return back demonstration of instructions today. Patient understood key points, needs reinforcement and further instruction.     Diabetes Self-Management Care Plan Review and Evaluation of Progress:  During today's follow-up Stefan's Diabetes Self-Management Care Plan progress was reviewed and progress was evaluated including his/her input. Stefan has agreed to continue his/her journey to improve/maintain overall diabetes control by continuing to set health goals. See care plan progress below.      Care Plan: Diabetes Management   Updates made since 10/31/2023 12:00 AM        Problem: Healthy Eating         Goal: Eat 2-3 meals daily with ~45g of carbohydrates per meal and 0-15g per snack.    Start Date: 8/2/2023   Expected End Date: 7/21/2024   This Visit's Progress: No change   Recent Progress: No change   Priority: Low   Barriers: Lack of Motivation to Change; Other (comments)   Note:    Pt reports social stressors contribute to overeating. Pt verbalizes good understanding diabetes meal plan. He describes "new" recipes/dishes that are mostly non-starchy vegetables and healthy. However, he tends to cook entrees that he's familiar preparing. Discussed tips for quick, easy non-starchy vegetable additions (pre-washed salads, steamables, lower sodium canned) to assist with lowering main dish portions.     Also, " discussed flexibility of lifestyle change and how to incorporate favorite higher carb/fat dishes occasionally to maintain longer-term progress.    Reviewed non-food stress mgmt tips and support from therapist or counselor with resources reviewed. Encouraged notifying pcp for possible medication support. Pt engaged and will consider.     Problem: Blood Glucose Self-Monitoring         Goal: Patient agrees to check and record blood sugars 1-2 times per day.    Start Date: 8/2/2023   Expected End Date: 7/21/2024   This Visit's Progress: No change   Recent Progress: On track   Priority: Medium   Barriers: Other (comments)   Note:    Encouragement provided for several BG levels within target when he was testing. Discussed role testing BG in driving healthy lifestyle changes. Pt agreed to resume using digital diabetes. He is able to identify BG targets and troubleshooting hyperglycemia/hypoglycemia.    Reviewed complications diabetes and mortality risk associated with DM cardiovascular events.       Problem: Physical Activity and Exercise         Goal: Patient agrees to increase physical activity to a goal of 150min/wk.    Start Date: 11/30/2023   Expected End Date: 7/21/2024   Priority: High   Barriers: Other (comments)   Note:    Pt identifies stress as greatest barrier to making diabetes self-care bx  improvements. Discussed role exercise on stress mgmt, diabetes and cardio health. Pt reports staying active but none structured. Since pt enjoys walking, he agreed to start structured daily walk 10-15min outdoor if possible.      Task: Discussed role of physical activity on reducing insulin resistance and improvement in overall glycemic control. Completed 11/30/2023        Task: Offered suggestions on how patient could increase their regular physical activity Completed 11/30/2023          Follow Up Plan  Follow up in about 2 weeks (around 12/14/2023).  -review digital diabetes logs  -eval goals    Today's care plan and  follow up schedule was discussed with patient.  Stefan verbalized understanding of the care plan, goals, and agrees to follow up plan.        The patient was encouraged to communicate with his/her health care provider/physician and care team regarding his/her condition(s) and treatment.  I provided the patient with my contact information today and encouraged to contact me via phone or [facility] Patient Portal as needed.     Length of Visit   Total Time: 30 Minutes

## 2023-12-19 DIAGNOSIS — F17.200 NICOTINE DEPENDENCE: ICD-10-CM

## 2023-12-19 RX ORDER — VARENICLINE TARTRATE 1 MG/1
TABLET, FILM COATED ORAL
Qty: 60 TABLET | Refills: 0 | Status: SHIPPED | OUTPATIENT
Start: 2023-12-19 | End: 2024-01-10 | Stop reason: SDUPTHER

## 2024-01-03 DIAGNOSIS — R79.0 LOW FERRITIN: ICD-10-CM

## 2024-01-04 RX ORDER — FERROUS SULFATE 325(65) MG
325 TABLET ORAL 2 TIMES DAILY
Qty: 180 TABLET | Refills: 0 | Status: SHIPPED | OUTPATIENT
Start: 2024-01-04 | End: 2024-04-10

## 2024-01-10 ENCOUNTER — CLINICAL SUPPORT (OUTPATIENT)
Dept: SMOKING CESSATION | Facility: CLINIC | Age: 69
End: 2024-01-10
Payer: COMMERCIAL

## 2024-01-10 DIAGNOSIS — F17.200 NICOTINE DEPENDENCE: Primary | ICD-10-CM

## 2024-01-10 PROCEDURE — 99999 PR PBB SHADOW E&M-EST. PATIENT-LVL II: CPT | Mod: PBBFAC,,, | Performed by: SPEECH-LANGUAGE PATHOLOGIST

## 2024-01-10 PROCEDURE — 99403 PREV MED CNSL INDIV APPRX 45: CPT | Mod: S$GLB,,, | Performed by: SPEECH-LANGUAGE PATHOLOGIST

## 2024-01-10 RX ORDER — VARENICLINE TARTRATE 1 MG/1
TABLET, FILM COATED ORAL
Qty: 60 TABLET | Refills: 0 | Status: SHIPPED | OUTPATIENT
Start: 2024-01-10 | End: 2024-02-26 | Stop reason: SDUPTHER

## 2024-01-10 NOTE — PROGRESS NOTES
Individual Follow-Up Form    1/10/2024    Quit Date: 10/1/2023     Clinical Status of Patient: Outpatient     Continuing Medication: yes  Chantix     Other Medications:                  Target Symptoms: Withdrawal and medication side effects. The following were   rated moderate (3) to severe (4) on TCRS:  Moderate (3):  anger/irritability/frustration, increased appetite/hunger, insomnia, constipation   Severe (4): none     Comments: Telephone visit at the patient's request due to his location. Patient reports he has remained smoke free since his quit date of 10/1/2023 & remains on his Varenicline 1 mg twice daily. Administered TCRS with patient reporting slight symptoms of anxious/nervous, restless/can't sit still/impatient & moderate symptoms of anger/irritability/frustration, increased appetite/hunger, insomnia, constipation. Patient reports his symptoms of due to some family situations & the constipation is due to IBS. Patient reports increased snacking & eating sweets since he's quit which has his glucose level high. He reports stress eating. He reports he & his wife are on a health journey together. He is working with a nutritionist & diabetic educator as well.  Discussed alternative strategies such as taking a walk and/or listening to relaxing music. Patient reports he does walk at times & uses prayer. He reports getting the thought of smoking from time to time; but states for as quick as it comes to mind it goes away. Praised the patient on not acting on his thought as well as for not turning to smoking to deal with stressful family situations. Patient states his goal is to remain smoke free. Follow up set for 2/5/2024 at 10:00 am.     Diagnosis: F17.200    Next Visit: 4 weeks

## 2024-01-11 ENCOUNTER — TELEPHONE (OUTPATIENT)
Dept: HEMATOLOGY/ONCOLOGY | Facility: CLINIC | Age: 69
End: 2024-01-11
Payer: MEDICARE

## 2024-01-11 DIAGNOSIS — D50.0 IRON DEFICIENCY ANEMIA DUE TO CHRONIC BLOOD LOSS: Primary | ICD-10-CM

## 2024-01-11 DIAGNOSIS — E53.8 B12 DEFICIENCY: ICD-10-CM

## 2024-01-11 NOTE — TELEPHONE ENCOUNTER
----- Message from Maggie Sandra sent at 1/11/2024  2:40 PM CST -----  Contact: pt  Type:  Patient Returning Call    Who Called:pt  Who Left Message for Patient: Jody  Does the patient know what this is regarding?:   Would the patient rather a call back or a response via [facility]? phone  Best Call Back Number: [phone]  Additional Information:

## 2024-01-12 ENCOUNTER — OFFICE VISIT (OUTPATIENT)
Dept: HEMATOLOGY/ONCOLOGY | Facility: CLINIC | Age: 69
End: 2024-01-12
Payer: MEDICARE

## 2024-01-12 ENCOUNTER — LAB VISIT (OUTPATIENT)
Dept: LAB | Facility: HOSPITAL | Age: 69
End: 2024-01-12
Attending: INTERNAL MEDICINE
Payer: MEDICARE

## 2024-01-12 VITALS
BODY MASS INDEX: 34.98 KG/M2 | SYSTOLIC BLOOD PRESSURE: 111 MMHG | WEIGHT: 230.81 LBS | DIASTOLIC BLOOD PRESSURE: 74 MMHG | TEMPERATURE: 98 F | OXYGEN SATURATION: 95 % | HEART RATE: 77 BPM | HEIGHT: 68 IN

## 2024-01-12 DIAGNOSIS — F17.201 TOBACCO ABUSE, IN REMISSION: ICD-10-CM

## 2024-01-12 DIAGNOSIS — R91.1 PULMONARY NODULE: ICD-10-CM

## 2024-01-12 DIAGNOSIS — E53.8 B12 DEFICIENCY: Primary | ICD-10-CM

## 2024-01-12 DIAGNOSIS — R91.8 OTHER NONSPECIFIC ABNORMAL FINDING OF LUNG FIELD: ICD-10-CM

## 2024-01-12 DIAGNOSIS — D64.9 ANEMIA, UNSPECIFIED TYPE: ICD-10-CM

## 2024-01-12 DIAGNOSIS — D50.0 IRON DEFICIENCY ANEMIA DUE TO CHRONIC BLOOD LOSS: ICD-10-CM

## 2024-01-12 DIAGNOSIS — E61.1 IRON DEFICIENCY: ICD-10-CM

## 2024-01-12 DIAGNOSIS — E53.8 B12 DEFICIENCY: ICD-10-CM

## 2024-01-12 DIAGNOSIS — D12.6 TUBULAR ADENOMA OF COLON: ICD-10-CM

## 2024-01-12 DIAGNOSIS — K22.70 BARRETT'S ESOPHAGUS DETERMINED BY ENDOSCOPY: ICD-10-CM

## 2024-01-12 LAB
BASOPHILS # BLD AUTO: 0.05 K/UL (ref 0–0.2)
BASOPHILS NFR BLD: 0.6 % (ref 0–1.9)
DIFFERENTIAL METHOD BLD: NORMAL
EOSINOPHIL # BLD AUTO: 0.2 K/UL (ref 0–0.5)
EOSINOPHIL NFR BLD: 2 % (ref 0–8)
ERYTHROCYTE [DISTWIDTH] IN BLOOD BY AUTOMATED COUNT: 12.4 % (ref 11.5–14.5)
FERRITIN SERPL-MCNC: 35 NG/ML (ref 20–300)
FOLATE SERPL-MCNC: 9.3 NG/ML (ref 4–24)
HCT VFR BLD AUTO: 42.7 % (ref 40–54)
HGB BLD-MCNC: 14.3 G/DL (ref 14–18)
IMM GRANULOCYTES # BLD AUTO: 0.02 K/UL (ref 0–0.04)
IMM GRANULOCYTES NFR BLD AUTO: 0.2 % (ref 0–0.5)
IRON SERPL-MCNC: 137 UG/DL (ref 45–160)
LYMPHOCYTES # BLD AUTO: 1.8 K/UL (ref 1–4.8)
LYMPHOCYTES NFR BLD: 21.8 % (ref 18–48)
MCH RBC QN AUTO: 30.1 PG (ref 27–31)
MCHC RBC AUTO-ENTMCNC: 33.5 G/DL (ref 32–36)
MCV RBC AUTO: 90 FL (ref 82–98)
MONOCYTES # BLD AUTO: 0.7 K/UL (ref 0.3–1)
MONOCYTES NFR BLD: 8.9 % (ref 4–15)
NEUTROPHILS # BLD AUTO: 5.3 K/UL (ref 1.8–7.7)
NEUTROPHILS NFR BLD: 66.5 % (ref 38–73)
NRBC BLD-RTO: 0 /100 WBC
PLATELET # BLD AUTO: 228 K/UL (ref 150–450)
PMV BLD AUTO: 9.4 FL (ref 9.2–12.9)
RBC # BLD AUTO: 4.75 M/UL (ref 4.6–6.2)
SATURATED IRON: 30 % (ref 20–50)
TOTAL IRON BINDING CAPACITY: 451 UG/DL (ref 250–450)
TRANSFERRIN SERPL-MCNC: 305 MG/DL (ref 200–375)
VIT B12 SERPL-MCNC: 1226 PG/ML (ref 210–950)
WBC # BLD AUTO: 8.01 K/UL (ref 3.9–12.7)

## 2024-01-12 PROCEDURE — 82728 ASSAY OF FERRITIN: CPT | Performed by: NURSE PRACTITIONER

## 2024-01-12 PROCEDURE — 83540 ASSAY OF IRON: CPT | Performed by: NURSE PRACTITIONER

## 2024-01-12 PROCEDURE — 82746 ASSAY OF FOLIC ACID SERUM: CPT | Performed by: NURSE PRACTITIONER

## 2024-01-12 PROCEDURE — 99999 PR PBB SHADOW E&M-EST. PATIENT-LVL V: CPT | Mod: PBBFAC,,, | Performed by: NURSE PRACTITIONER

## 2024-01-12 PROCEDURE — 85025 COMPLETE CBC W/AUTO DIFF WBC: CPT | Performed by: NURSE PRACTITIONER

## 2024-01-12 PROCEDURE — 82607 VITAMIN B-12: CPT | Performed by: NURSE PRACTITIONER

## 2024-01-12 PROCEDURE — 99205 OFFICE O/P NEW HI 60 MIN: CPT | Mod: S$GLB,,, | Performed by: NURSE PRACTITIONER

## 2024-01-12 PROCEDURE — 36415 COLL VENOUS BLD VENIPUNCTURE: CPT | Performed by: NURSE PRACTITIONER

## 2024-01-12 NOTE — PROGRESS NOTES
Subjective:      Patient ID: Stefan Forbes Jr. is a 68 y.o. male.    Chief Complaint: shortness of breath on exertion    HPI:  patient is a 67 yo male who presents to the hematology clinic as a new patient for further evaluation and recommendation of low iron. Taking B12 supplements daily and ferrous sulfate 325 mg PO bid.  Currently taking Linzess for chronic constipation and has been diagnosed with IBS in the past.    He has been seen in the clinic in 2019 for iron deficiency anemia and treated with IV feraheme .  He had a colonoscopy in 2018 which was no contributory to MEDINA.  Since then he has been lost to follow up.     He has previously been followed by pulmonology for bilateral pulmonary nodules; however has not had a CT chest with contrast since 10/2019.      He has a history of provoked PE after knee replacement and completed 6 months of anticoagulation.  Has had no other incidence since.    Denies any known abnormal bleeding.  C/o extreme acid reflux symptoms taking protonix 40 mg twice daily and pepcid 40 mg PO as needed.      Last EGD and colonoscopy + polyps 6/2023 and was told to f/u in 1 year.  Does have a h/o h. Pylori.      Denies f/c/ns or unintentional weight loss; however has lost 10-15 lbs recently then gained it all back.     Family hx of cancer:  Father: bone cancer?  Paternal grandfather: bone cancer?  Maternal uncle: pancreatic cancer    Former smoker. Quit in October 2023 - smoked cigarettes in the past and cigars - about 40 year about 1 to 1-1/2 ppd.  Denies alcohol or illicit drug use.     Worked as a  for 30 years - retired now.     I have reviewed all of the patient's relevant lab work available in the medical record and have utilized this in my evaluation and management recommendations today.      Social History     Socioeconomic History    Marital status:    Occupational History    Occupation: retired   Tobacco Use    Smoking status: Former     Types: Cigars      Quit date: 10/1/2023     Years since quittin.2    Smokeless tobacco: Never    Tobacco comments:     4 cigars each day   Substance and Sexual Activity    Alcohol use: No    Drug use: No    Sexual activity: Not Currently     Partners: Female     Social Determinants of Health     Financial Resource Strain: Low Risk  (10/20/2022)    Overall Financial Resource Strain (CARDIA)     Difficulty of Paying Living Expenses: Not very hard   Food Insecurity: No Food Insecurity (10/20/2022)    Hunger Vital Sign     Worried About Running Out of Food in the Last Year: Never true     Ran Out of Food in the Last Year: Never true   Transportation Needs: No Transportation Needs (10/20/2022)    PRAPARE - Transportation     Lack of Transportation (Medical): No     Lack of Transportation (Non-Medical): No   Physical Activity: Unknown (10/20/2022)    Exercise Vital Sign     Days of Exercise per Week: 1 day   Recent Concern: Physical Activity - Insufficiently Active (10/20/2022)    Exercise Vital Sign     Days of Exercise per Week: 1 day     Minutes of Exercise per Session: 10 min   Stress: Stress Concern Present (10/20/2022)    [place] [place] [employer] - Occupational Stress Questionnaire     Feeling of Stress : To some extent   Social Connections: Unknown (10/20/2022)    Social Connection and Isolation Panel [NHANES]     Frequency of Communication with Friends and Family: Twice a week   Housing Stability: Low Risk  (10/20/2022)    Housing Stability Vital Sign     Unable to Pay for Housing in the Last Year: No     Number of Places Lived in the Last Year: 1     Unstable Housing in the Last Year: No       Family History   Problem Relation Age of Onset    Diabetes Mother     Arthritis Mother     Cancer Father        Past Surgical History:   Procedure Laterality Date    ANGIOPLASTY      JOINT REPLACEMENT  2017    TOTAL KNEE ARTHROPLASTY         Past Medical History:   Diagnosis Date    Arthritis     Cataract     COPD (chronic  obstructive pulmonary disease)     Diabetes mellitus, type 2     Hyperlipidemia     Hypertension     Personal history of colonic polyps 2018       Review of Systems   Constitutional: Negative.  Negative for chills and fever. Fatigue: improving.  HENT:  Negative for nosebleeds and trouble swallowing.         Dry mouth   Eyes: Negative.    Respiratory:  Positive for shortness of breath (with bending over or with exertion).    Cardiovascular:  Positive for leg swelling (right leg swelling chronic). Negative for chest pain and palpitations.   Gastrointestinal:  Positive for constipation. Negative for anal bleeding and blood in stool.        Acid reflux   Endocrine: Negative.    Genitourinary:  Positive for difficulty urinating (followed by urology). Negative for hematuria.   Musculoskeletal:  Positive for arthralgias.        Restless legs   Allergic/Immunologic: Negative.    Neurological:  Positive for headaches (every now and then). Negative for dizziness and light-headedness.   Hematological:  Negative for adenopathy. Bruises/bleeds easily.          Medication List with Changes/Refills   Current Medications    ACETAMINOPHEN (TYLENOL) 500 MG TABLET    Take 1,000 mg by mouth as needed.     ALBUTEROL (VENTOLIN HFA) 90 MCG/ACTUATION INHALER    Inhale 2 puffs into the lungs every 6 (six) hours as needed for Wheezing or Shortness of Breath. Rescue    AMLODIPINE (NORVASC) 2.5 MG TABLET    Take 1 tablet (2.5 mg total) by mouth once daily.    ASCORBIC ACID, VITAMIN C, (VITAMIN C) 1000 MG TABLET    Take 1,000 mg by mouth once daily.    ASPIRIN 325 MG TABLET    Take 325 mg by mouth once daily.    BLOOD SUGAR DIAGNOSTIC (BLOOD GLUCOSE TEST) STRP    1 strip by Misc.(Non-Drug; Combo Route) route 3 (three) times daily.    BUPROPION (WELLBUTRIN SR) 150 MG TBSR 12 HR TABLET    Take 1 tablet (150 mg total) by mouth 2 (two) times daily.    CETIRIZINE (ZYRTEC) 10 MG TABLET    Take 10 mg by mouth every evening.    CHOLECALCIFEROL, VITAMIN  D3, (VITAMIN D3) 25 MCG (1,000 UNIT) CAPSULE    Take 1,000 Units by mouth once daily.    CYANOCOBALAMIN (VITAMIN B-12) 500 MCG TABLET    Take 500 mcg by mouth once daily.     DAPAGLIFLOZIN PROPANEDIOL (FARXIGA) 10 MG TABLET    Take 1 tablet (10 mg total) by mouth once daily.    DULAGLUTIDE (TRULICITY) 3 MG/0.5 ML PEN INJECTOR    Inject 3 mg into the skin every 7 days.    FAMOTIDINE (PEPCID) 40 MG TABLET    Take 1 tablet by mouth once a day    FAMOTIDINE (PEPCID) 40 MG TABLET    Take 1 tablet by mouth once a day    FERROUS SULFATE (FEROSUL) 325 MG (65 MG IRON) TAB TABLET    Take 1 tablet (325 mg total) by mouth 2 (two) times daily.    FLUTICASONE PROPIONATE (FLONASE) 50 MCG/ACTUATION NASAL SPRAY    Use 1 spray (50 mcg total) in each nostril once daily.    FLUTICASONE-UMECLIDIN-VILANTER (TRELEGY ELLIPTA) 100-62.5-25 MCG DSDV    Inhale 1 puff into the lungs once daily.    IPRATROPIUM (ATROVENT) 21 MCG (0.03 %) NASAL SPRAY    Spray 1 or 2 sprays in each nasal passage every 8 hours, if needed, as directed.    LANCETS 30 GAUGE MISC    1 lancet by Misc.(Non-Drug; Combo Route) route 3 (three) times daily.    LINACLOTIDE (LINZESS) 145 MCG CAP CAPSULE    Take 1 capsule by mouth 30 minutes before breakfast    MAGNESIUM OXIDE (MAG-OX) 400 MG (241.3 MG MAGNESIUM) TABLET    Take 1 tablet (400 mg total) by mouth once daily.    METFORMIN (GLUCOPHAGE) 1000 MG TABLET    Take 1 tablet (1,000 mg total) by mouth 2 (two) times daily with meals.    METHSCOPOLAMINE (PAMINE) 2.5 MG TAB    Take 1 tablet by mouth every 12 hours, as directed, if needed    METHYLPREDNISOLONE (MEDROL DOSEPACK) 4 MG TABLET    use as directed    NAPROXEN (NAPROSYN) 500 MG TABLET    Take 1 tablet (500 mg total) by mouth 2 (two) times daily with meals. For arm pain    NITROGLYCERIN (NITROSTAT) 0.4 MG SL TABLET    Place 1 tablet under the tongue every 5 (five) minutes as needed for Chest pain.    OLMESARTAN (BENICAR) 40 MG TABLET    Take 1 tablet (40 mg total) by  mouth once daily.    PANTOPRAZOLE (PROTONIX) 40 MG TABLET    Take 1 tablet by mouth twice a day    PANTOPRAZOLE (PROTONIX) 40 MG TABLET    Take 1 tablet by mouth twice a day before breakfast and dinner    PRAMIPEXOLE (MIRAPEX) 0.125 MG TABLET    Take 2 tablets (0.25 mg total) by mouth every evening.    PULSE OXIMETER (PULSE OXIMETER) DEVICE    Use twice daily at 8 AM and 3 PM and [facility] as directed.    ROSUVASTATIN (CRESTOR) 20 MG TABLET    Take 1 tablet (20 mg total) by mouth once daily.    TAMSULOSIN (FLOMAX) 0.4 MG CAP    Take 2 capsules (0.8 mg total) by mouth once daily.    VARENICLINE (CHANTIX) 1 MG TAB    Take 1 tablet twice daily. Take with full glass of water & with food to avoid nausea        Objective:     Vitals:    01/12/24 1308   BP: 111/74   Pulse: 77   Temp: 97.6 °F (36.4 °C)       Physical Exam  Lymphadenopathy:      Head:      Right side of head: No submental, submandibular, tonsillar, preauricular, posterior auricular or occipital adenopathy.      Left side of head: No submental, submandibular, tonsillar, preauricular, posterior auricular or occipital adenopathy.      Cervical: No cervical adenopathy.      Upper Body:      Right upper body: No supraclavicular, axillary or pectoral adenopathy.      Left upper body: No supraclavicular, axillary or pectoral adenopathy.         Assessment:     Problem List Items Addressed This Visit          Pulmonary    Pulmonary nodule    Relevant Orders    CBC Auto Differential    Basic Metabolic Panel       GI    Tubular adenoma of colon    Relevant Orders    CBC Auto Differential    Basic Metabolic Panel       Other    Tobacco abuse, in remission    Relevant Orders    CBC Auto Differential    Basic Metabolic Panel     Other Visit Diagnoses       B12 deficiency    -  Primary    Relevant Orders    CBC Auto Differential    Iron deficiency        Relevant Orders    CBC Auto Differential    Basic Metabolic Panel    Ferritin    Iron and TIBC     Thacker's esophagus determined by endoscopy        Relevant Orders    CBC Auto Differential    Basic Metabolic Panel    Ferritin    Iron and TIBC    Other nonspecific abnormal finding of lung field        Relevant Orders    CT Chest Lung Screening Low Dose    Anemia, unspecified type        Relevant Orders    Protein Electrophoresis, Serum    Immunofixation Electrophoresis    Immunoglobulin Free LT Chains Blood    CBC Auto Differential    Basic Metabolic Panel    Ferritin    Iron and TIBC            Lab Results   Component Value Date    WBC 8.01 01/12/2024    RBC 4.75 01/12/2024    HGB 14.3 01/12/2024    HCT 42.7 01/12/2024    MCV 90 01/12/2024    MCH 30.1 01/12/2024    MCHC 33.5 01/12/2024    RDW 12.4 01/12/2024     01/12/2024    MPV 9.4 01/12/2024    GRAN 5.3 01/12/2024    GRAN 66.5 01/12/2024    LYMPH 1.8 01/12/2024    LYMPH 21.8 01/12/2024    MONO 0.7 01/12/2024    MONO 8.9 01/12/2024    EOS 0.2 01/12/2024    BASO 0.05 01/12/2024    EOSINOPHIL 2.0 01/12/2024    BASOPHIL 0.6 01/12/2024      Lab Results   Component Value Date     10/30/2023    K 4.8 10/30/2023    CL 98 10/30/2023    CO2 24 10/30/2023    BUN 11 10/30/2023    CREATININE 1.0 10/30/2023    CALCIUM 10.2 10/30/2023    ANIONGAP 14 10/30/2023    ESTGFRAFRICA >60.0 10/14/2021    EGFRNONAA >60.0 10/14/2021     Lab Results   Component Value Date    ALT 17 10/30/2023    AST 12 10/30/2023    ALKPHOS 57 10/30/2023    BILITOT 0.3 10/30/2023     Lab Results   Component Value Date    IRON 79 12/16/2019    TRANSFERRIN 280 12/16/2019    TIBC 414 12/16/2019    FESATURATED 19 (L) 12/16/2019    FERRITIN 15 (L) 10/05/2023      PSA, Screen (ng/mL)   Date Value   10/30/2023 0.25         Plan:   B12 deficiency  -     CBC Auto Differential; Future; Expected date: 01/12/2024    Iron deficiency  -     CBC Auto Differential; Future; Expected date: 01/12/2024  -     Basic Metabolic Panel; Future; Expected date: 01/12/2024  -     Ferritin; Future; Expected date:  01/12/2024  -     Iron and TIBC; Future; Expected date: 01/12/2024    Tobacco abuse, in remission  -     CBC Auto Differential; Future; Expected date: 01/12/2024  -     Basic Metabolic Panel; Future; Expected date: 01/12/2024    Thacker's esophagus determined by endoscopy  -     CBC Auto Differential; Future; Expected date: 01/12/2024  -     Basic Metabolic Panel; Future; Expected date: 01/12/2024  -     Ferritin; Future; Expected date: 01/12/2024  -     Iron and TIBC; Future; Expected date: 01/12/2024    Tubular adenoma of colon  -     CBC Auto Differential; Future; Expected date: 01/12/2024  -     Basic Metabolic Panel; Future; Expected date: 01/12/2024    Pulmonary nodule  -     CBC Auto Differential; Future; Expected date: 01/12/2024  -     Basic Metabolic Panel; Future; Expected date: 01/12/2024    Other nonspecific abnormal finding of lung field  -     CT Chest Lung Screening Low Dose; Future; Expected date: 01/12/2024    Anemia, unspecified type  -     Protein Electrophoresis, Serum; Future; Expected date: 01/12/2024  -     Immunofixation Electrophoresis; Future; Expected date: 01/12/2024  -     Immunoglobulin Free LT Chains Blood; Future; Expected date: 01/12/2024  -     CBC Auto Differential; Future; Expected date: 01/12/2024  -     Basic Metabolic Panel; Future; Expected date: 01/12/2024  -     Ferritin; Future; Expected date: 01/12/2024  -     Iron and TIBC; Future; Expected date: 01/12/2024      Med Onc Chart Routing      Follow up with physician    Follow up with [facility] 3 months. f/u 3 months -VV labs prior   Infusion scheduling note   n/a   Injection scheduling note n/a   Labs   Scheduling:  Preferred lab: [facility]  Lab interval:  cbc, bmp, iron studies, chanel, flc, spep   Imaging   LDCT chest - lung cancer sceening   Pharmacy appointment No pharmacy appointment needed      Other referrals       No additional referrals needed  n/a          F[facility]u with [facility] for repeat EGD for sure and  possible colonoscopy..  If nothing found that is contributory to MEDINA on upper and lower endoscopy then recommend VCS.  Recommend eval with low dose CT scan lungs due to long history of smoking and bilateral lung pulmonary nodules.  Encouraged continued smoking cessation.  Continue ferrous sulfate 325 mg PO daily with a small amount of OJ in the middle of the day - away from PPI.    Continue B12 supplements daily due to metformin use.     Total time spent on encounter: 60 minutes       Collaborating Provider:  Dr. Evaristo Pisano    Thank You,  Christiano Godoy, MIKAELP-C  Benign Hematology

## 2024-01-16 ENCOUNTER — PATIENT MESSAGE (OUTPATIENT)
Dept: HEMATOLOGY/ONCOLOGY | Facility: CLINIC | Age: 69
End: 2024-01-16
Payer: MEDICARE

## 2024-02-01 ENCOUNTER — LAB VISIT (OUTPATIENT)
Dept: LAB | Facility: HOSPITAL | Age: 69
End: 2024-02-01
Attending: INTERNAL MEDICINE
Payer: MEDICARE

## 2024-02-01 DIAGNOSIS — E78.5 HYPERLIPIDEMIA ASSOCIATED WITH TYPE 2 DIABETES MELLITUS: ICD-10-CM

## 2024-02-01 DIAGNOSIS — E11.69 HYPERLIPIDEMIA ASSOCIATED WITH TYPE 2 DIABETES MELLITUS: ICD-10-CM

## 2024-02-01 PROCEDURE — 84460 ALANINE AMINO (ALT) (SGPT): CPT | Performed by: INTERNAL MEDICINE

## 2024-02-01 PROCEDURE — 80061 LIPID PANEL: CPT | Performed by: INTERNAL MEDICINE

## 2024-02-01 PROCEDURE — 36415 COLL VENOUS BLD VENIPUNCTURE: CPT | Performed by: INTERNAL MEDICINE

## 2024-02-02 LAB
ALT SERPL W/O P-5'-P-CCNC: 30 U/L (ref 10–44)
CHOLEST SERPL-MCNC: 123 MG/DL (ref 120–199)
CHOLEST/HDLC SERPL: 2.7 {RATIO} (ref 2–5)
HDLC SERPL-MCNC: 45 MG/DL (ref 40–75)
HDLC SERPL: 36.6 % (ref 20–50)
LDLC SERPL CALC-MCNC: 41.8 MG/DL (ref 63–159)
NONHDLC SERPL-MCNC: 78 MG/DL
TRIGL SERPL-MCNC: 181 MG/DL (ref 30–150)

## 2024-02-05 ENCOUNTER — CLINICAL SUPPORT (OUTPATIENT)
Dept: SMOKING CESSATION | Facility: CLINIC | Age: 69
End: 2024-02-05
Payer: COMMERCIAL

## 2024-02-05 DIAGNOSIS — F17.200 NICOTINE DEPENDENCE: Primary | ICD-10-CM

## 2024-02-05 PROBLEM — Z00.00 PREVENTATIVE HEALTH CARE: Status: RESOLVED | Noted: 2023-11-03 | Resolved: 2024-02-05

## 2024-02-05 PROCEDURE — 99404 PREV MED CNSL INDIV APPRX 60: CPT | Mod: S$GLB,,, | Performed by: SPEECH-LANGUAGE PATHOLOGIST

## 2024-02-05 PROCEDURE — 99999 PR PBB SHADOW E&M-EST. PATIENT-LVL II: CPT | Mod: PBBFAC,,, | Performed by: SPEECH-LANGUAGE PATHOLOGIST

## 2024-02-05 NOTE — PROGRESS NOTES
Individual Follow-Up Form    2/5/2024    Quit Date: 10/1/2023     Clinical Status of Patient: Outpatient     Continuing Medication: yes  Chantix     Other Medications:                  Target Symptoms: Withdrawal and medication side effects. The following were   rated moderate (3) to severe (4) on TCRS:  Moderate (3):   difficulty concentrating  Severe (4): constipation     Comments: Telephone visit at the patient's request due to his location. He reports he has remained smoke free since his 10/1/2023 & remains on Varenicline 1 mg twice daily. He denies any negative side effects or mood changes. Administered TCRS with patient reporting slight symptoms of depressed mood/sadness, insomnia, mild anger, desire, increased appetite/hunger, difficulty concentrating, restless/can't sit still/impatient, moderate symptoms of difficulty concentrating & severe symptoms of constipation which he reports is chronic & has a dr's appointment to address. Discussed the role of adequate nutrition & hydration. Patient states his dr doesn't want him drinking too much water due to low sodium. Discussed high risk situations which include idle time & stress.  Patient reports he uses scripture during idle times. He reports his stress level is a level 4-5 on a scale of 1-10. Dicussed hobbies/areas of interest the patient has which patient enjoys gardening & working with wood carving that he can engage in during idle time. Patient states his goal is to remain smoke free & he states it would be nice to not have the thoughts/urges. Discussed with patient the normalcy of experiencing urges/craves for weeks/months after a quit & commended the patient on his continued decision making on not doing anything about his urges/craves & allowing them to pass. Follow up set for 2/27/2024 at 10:00 am.     Diagnosis: F17.200    Next Visit: 3 weeks     Anesthesia Volume In Cc (Will Not Render If 0): 0.5

## 2024-02-13 DIAGNOSIS — N40.1 BENIGN PROSTATIC HYPERPLASIA WITH WEAK URINARY STREAM: ICD-10-CM

## 2024-02-13 DIAGNOSIS — R39.12 BENIGN PROSTATIC HYPERPLASIA WITH WEAK URINARY STREAM: ICD-10-CM

## 2024-02-20 RX ORDER — TAMSULOSIN HYDROCHLORIDE 0.4 MG/1
0.8 CAPSULE ORAL DAILY
Qty: 180 CAPSULE | Refills: 3 | Status: SHIPPED | OUTPATIENT
Start: 2024-02-20 | End: 2024-06-11 | Stop reason: SDUPTHER

## 2024-02-26 DIAGNOSIS — F17.200 NICOTINE DEPENDENCE: ICD-10-CM

## 2024-02-26 RX ORDER — VARENICLINE TARTRATE 1 MG/1
TABLET, FILM COATED ORAL
Qty: 60 TABLET | Refills: 0 | Status: SHIPPED | OUTPATIENT
Start: 2024-02-26 | End: 2024-04-03

## 2024-02-27 ENCOUNTER — CLINICAL SUPPORT (OUTPATIENT)
Dept: SMOKING CESSATION | Facility: CLINIC | Age: 69
End: 2024-02-27
Payer: COMMERCIAL

## 2024-02-27 DIAGNOSIS — F17.200 NICOTINE DEPENDENCE: Primary | ICD-10-CM

## 2024-02-27 PROCEDURE — 99999 PR PBB SHADOW E&M-EST. PATIENT-LVL II: CPT | Mod: PBBFAC,,, | Performed by: SPEECH-LANGUAGE PATHOLOGIST

## 2024-02-27 PROCEDURE — 99404 PREV MED CNSL INDIV APPRX 60: CPT | Mod: S$GLB,,, | Performed by: SPEECH-LANGUAGE PATHOLOGIST

## 2024-02-27 NOTE — PROGRESS NOTES
Individual Follow-Up Form    2/27/2024    Quit Date: 10/1/2024    Clinical Status of Patient: Outpatient    Length of Service: 60 minutes    Continuing Medication: yes  Chantix    Other Medications:      Target Symptoms: Withdrawal and medication side effects. The following were  rated moderate (3) to severe (4) on TCRS:  Moderate (3): anger/irritability/frustration, constipation  Severe (4): none    Comments: Telephone visit due to the patient's location. Patient reports he has remained smoke free since his 10/1/2023 quit date. He remains on Varenicline 1 mg twice daily. He denies any negative side effects or mood changes. Administered TCRS with patient reporting mild symptoms of depressed mood/sadness, difficulty concentrating, depressed mood/sadness, insomnia, restless/can't sit still/impatient & moderate symptoms of anger/irritability/frustration, constipation. He reports he continues to deal with constipation & the Dr has upcoming tests to assess for an upper & lower GI for further assessment. Discussed with patient high risk situations which includes stress & idle time. He reports his stress level of 9 out of 10. Patient reports he has chronic stress due to some family issues. Praised patient for remaining smoke free despite his high stress level.  He reports he took a job working for [employer] that he has been enjoying which allows him some time away. Patient's goal is to remain smoke free. Follow up set for 3/19/2024 at 10:00 am.       Diagnosis: F17.200    Next Visit: 3 weeks

## 2024-03-19 ENCOUNTER — CLINICAL SUPPORT (OUTPATIENT)
Dept: SMOKING CESSATION | Facility: CLINIC | Age: 69
End: 2024-03-19
Payer: COMMERCIAL

## 2024-03-19 DIAGNOSIS — F17.200 NICOTINE DEPENDENCE: Primary | ICD-10-CM

## 2024-03-19 PROCEDURE — 99999 PR PBB SHADOW E&M-EST. PATIENT-LVL II: CPT | Mod: PBBFAC,,, | Performed by: SPEECH-LANGUAGE PATHOLOGIST

## 2024-03-19 PROCEDURE — 99404 PREV MED CNSL INDIV APPRX 60: CPT | Mod: S$GLB,,, | Performed by: SPEECH-LANGUAGE PATHOLOGIST

## 2024-03-19 NOTE — PROGRESS NOTES
Individual Follow-Up Form    3/19/2024    Quit Date: 10/1/2023    Clinical Status of Patient: Outpatient    Length of Service: 60 minutes    Continuing Medication: yes  Chantix    Other Medications:      Target Symptoms: Withdrawal and medication side effects. The following were  rated moderate (3) to severe (4) on TCRS:  Moderate (3): constipation which he reports is premorbid.  Severe (4): none    Comments: Telephone visit due to his location. He reports he has remained smoke free since 10/1/2023. Administered TCRS with patient mild symptoms of anger/irritability/frustration, difficulty concentrating, anxious/nervous, restless/can't sit still/impatient & moderate symptoms of constipation which he reports is premorbid. He remains on Varenicline 1 mg twice daily. He denies any negative side effects or mood changes. Discussed his prescription of Varenicline now that the patient has hit his 6 month quit. Patient states he would like to be taking 1 less pill & will plan to finish this prescription & be done with it by next session. He reports experiencing several major stressful events with his family; however he did not turn to smoking to cope. Praised the patient on his decision making which resulted in a different outcome. He reports his job working for [employer] has been helpful with stress management allowing him some time away & he enjoys doing his job.   Patient reports his goal is to remain smoke free. Follow up set for 4/15/2024 at 10:00 am.     Diagnosis: F17.200    Next Visit: 4 weeks

## 2024-04-03 ENCOUNTER — OFFICE VISIT (OUTPATIENT)
Dept: INTERNAL MEDICINE | Facility: CLINIC | Age: 69
End: 2024-04-03
Payer: MEDICARE

## 2024-04-03 ENCOUNTER — PATIENT MESSAGE (OUTPATIENT)
Dept: PULMONOLOGY | Facility: CLINIC | Age: 69
End: 2024-04-03
Payer: MEDICARE

## 2024-04-03 VITALS
TEMPERATURE: 97 F | HEART RATE: 97 BPM | OXYGEN SATURATION: 95 % | SYSTOLIC BLOOD PRESSURE: 110 MMHG | DIASTOLIC BLOOD PRESSURE: 60 MMHG | WEIGHT: 220.69 LBS | HEIGHT: 68 IN | BODY MASS INDEX: 33.45 KG/M2

## 2024-04-03 DIAGNOSIS — R09.89 CHEST CONGESTION: ICD-10-CM

## 2024-04-03 DIAGNOSIS — J44.1 COPD EXACERBATION: Primary | ICD-10-CM

## 2024-04-03 LAB
CTP QC/QA: YES
SARS-COV-2 RDRP RESP QL NAA+PROBE: NEGATIVE

## 2024-04-03 PROCEDURE — 99999 PR PBB SHADOW E&M-EST. PATIENT-LVL III: CPT | Mod: PBBFAC,,, | Performed by: INTERNAL MEDICINE

## 2024-04-03 PROCEDURE — 1126F AMNT PAIN NOTED NONE PRSNT: CPT | Mod: CPTII,S$GLB,, | Performed by: INTERNAL MEDICINE

## 2024-04-03 PROCEDURE — 3074F SYST BP LT 130 MM HG: CPT | Mod: CPTII,S$GLB,, | Performed by: INTERNAL MEDICINE

## 2024-04-03 PROCEDURE — 3072F LOW RISK FOR RETINOPATHY: CPT | Mod: CPTII,S$GLB,, | Performed by: INTERNAL MEDICINE

## 2024-04-03 PROCEDURE — 87635 SARS-COV-2 COVID-19 AMP PRB: CPT | Mod: QW,S$GLB,, | Performed by: INTERNAL MEDICINE

## 2024-04-03 PROCEDURE — 1101F PT FALLS ASSESS-DOCD LE1/YR: CPT | Mod: CPTII,S$GLB,, | Performed by: INTERNAL MEDICINE

## 2024-04-03 PROCEDURE — 99214 OFFICE O/P EST MOD 30 MIN: CPT | Mod: S$GLB,,, | Performed by: INTERNAL MEDICINE

## 2024-04-03 PROCEDURE — 3288F FALL RISK ASSESSMENT DOCD: CPT | Mod: CPTII,S$GLB,, | Performed by: INTERNAL MEDICINE

## 2024-04-03 PROCEDURE — 4010F ACE/ARB THERAPY RXD/TAKEN: CPT | Mod: CPTII,S$GLB,, | Performed by: INTERNAL MEDICINE

## 2024-04-03 PROCEDURE — 1159F MED LIST DOCD IN RCRD: CPT | Mod: CPTII,S$GLB,, | Performed by: INTERNAL MEDICINE

## 2024-04-03 PROCEDURE — 3078F DIAST BP <80 MM HG: CPT | Mod: CPTII,S$GLB,, | Performed by: INTERNAL MEDICINE

## 2024-04-03 PROCEDURE — 3008F BODY MASS INDEX DOCD: CPT | Mod: CPTII,S$GLB,, | Performed by: INTERNAL MEDICINE

## 2024-04-03 RX ORDER — PRAMIPEXOLE DIHYDROCHLORIDE 0.12 MG/1
2 TABLET ORAL NIGHTLY
COMMUNITY

## 2024-04-03 RX ORDER — METHYLPREDNISOLONE 4 MG/1
TABLET ORAL
Qty: 21 EACH | Refills: 0 | Status: SHIPPED | OUTPATIENT
Start: 2024-04-03 | End: 2024-04-10 | Stop reason: ALTCHOICE

## 2024-04-03 RX ORDER — AZITHROMYCIN 250 MG/1
TABLET, FILM COATED ORAL
Qty: 6 TABLET | Refills: 0 | Status: SHIPPED | OUTPATIENT
Start: 2024-04-03 | End: 2024-04-08

## 2024-04-03 NOTE — PROGRESS NOTES
Subjective:      Patient ID: Stefan Forbes Jr. is a 68 y.o. male.    Chief Complaint: Chest Congestion    HPI    69 yo with   Patient Active Problem List   Diagnosis    Controlled type 2 diabetes mellitus without complication, without long-term current use of insulin    Hypertension associated with diabetes    Hyperlipidemia associated with type 2 diabetes mellitus    Moderate COPD (chronic obstructive pulmonary disease)    CAD (coronary artery disease)    Vitamin D deficiency    Osteoarthritis of knee    Pulmonary nodule    Nevus of choroid of right eye    History of pulmonary embolism    Hyponatremia    Iron deficiency anemia due to chronic blood loss    MACHO on CPAP    PLMD (periodic limb movement disorder)    BMI 35.0-35.9,adult    Varicose vein of leg    Tobacco abuse, in remission    Tubular adenoma of colon    Benign prostatic hyperplasia with weak urinary stream    Urgency of urination    Aortic atherosclerosis    COVID    Severe obesity    Hypomagnesemia    Nicotine dependence    Restless legs     Past Medical History:   Diagnosis Date    Arthritis     Cataract     COPD (chronic obstructive pulmonary disease)     Diabetes mellitus, type 2     Hyperlipidemia     Hypertension     Personal history of colonic polyps 2018     C/o chest congestion, mild wheezing.   No using his albuterol  Denies sinus problems.      Review of Systems   Constitutional:  Negative for chills, diaphoresis, fatigue and fever.   HENT:  Positive for congestion and rhinorrhea. Negative for ear pain, postnasal drip, sinus pressure, sinus pain, sore throat and trouble swallowing.    Respiratory:  Positive for cough and wheezing. Negative for shortness of breath.    Cardiovascular:  Negative for chest pain, palpitations and leg swelling.   Gastrointestinal:  Negative for abdominal pain and blood in stool.   Genitourinary:  Negative for dysuria and hematuria.   Skin:  Negative for rash and wound.   Neurological:  Negative for seizures and  "syncope.     Objective:   /60 (BP Location: Right arm, Patient Position: Sitting, BP Method: Large (Manual))   Pulse 97   Temp 97.1 °F (36.2 °C) (Tympanic)   Ht 5' 8" (1.727 m)   Wt 100.1 kg (220 lb 10.9 oz)   SpO2 95%   BMI 33.55 kg/m²     Physical Exam  Constitutional:       General: He is awake. He is not in acute distress.     Appearance: Normal appearance. He is not ill-appearing, toxic-appearing or diaphoretic.   HENT:      Head: Normocephalic and atraumatic.   Eyes:      Conjunctiva/sclera: Conjunctivae normal.   Cardiovascular:      Rate and Rhythm: Normal rate.   Pulmonary:      Effort: Pulmonary effort is normal. No respiratory distress.      Breath sounds: No wheezing or rales.   Musculoskeletal:         General: No swelling.      Cervical back: Normal range of motion.   Neurological:      Mental Status: He is alert. Mental status is at baseline.   Psychiatric:         Mood and Affect: Mood normal.         Behavior: Behavior normal. Behavior is cooperative.         Thought Content: Thought content normal.         Judgment: Judgment normal.         Lab Results   Component Value Date    WBC 8.0 03/05/2024    HGB 13.9 03/05/2024    HGB 14.3 01/12/2024    HGB 13.4 (L) 10/30/2023    HCT 41.8 03/05/2024    MCV 90 01/12/2024    MCV 90 10/30/2023    MCV 96 10/12/2022     03/05/2024    CHOL 123 02/01/2024    TRIG 181 (H) 02/01/2024    HDL 45 02/01/2024    LDLCALC 41.8 (L) 02/01/2024    LDLCALC 66.6 10/30/2023    LDLCALC 56.8 (L) 10/12/2022    ALT 30 02/01/2024    AST 12 10/30/2023     10/30/2023    K 4.8 10/30/2023    CALCIUM 10.2 10/30/2023    CL 98 10/30/2023    CO2 24 10/30/2023    BUN 11 10/30/2023    CREATININE 1.0 10/30/2023    CREATININE 0.8 09/12/2023    CREATININE 0.9 08/30/2023    EGFRNORACEVR >60.0 10/30/2023    EGFRNORACEVR >60.0 09/12/2023    EGFRNORACEVR >60.0 08/30/2023    TSH 1.437 10/30/2023    TSH 1.195 10/12/2022    TSH 1.143 10/14/2021    PSA 0.25 10/30/2023    PSA 0.38 " 01/18/2022    PSA 0.17 09/02/2020     (H) 10/30/2023    HGBA1C 7.8 (H) 10/30/2023    HGBA1C 7.1 (H) 04/13/2023    HGBA1C 7.1 (H) 10/12/2022    [id number] 44 04/26/2017    [id number] 29 (L) 10/08/2015          The ASCVD Risk score (Mal DK, et al., 2019) failed to calculate for the following reasons:    The valid total cholesterol range is 130 to 320 mg/dL     Assessment:     1. COPD exacerbation    2. Chest congestion      Plan:   1. COPD exacerbation  -     azithromycin (Z-CHARLES) 250 MG tablet; Take 2 tablets by mouth on day 1; Take 1 tablet by mouth on days 2-5  Dispense: 6 tablet; Refill: 0  -     methylPREDNISolone (MEDROL, CHARLES,) 4 mg tablet; use as directed  Dispense: 21 each; Refill: 0    2. Chest congestion  -     POCT COVID-19 Rapid Screening        Patient Instructions   flonase nasal spray 2 spays each nostril for nasal congestion, post nasal drip, runny nose    Saline nasal spray throughout the day for nasal congestion.    Sudafed for nasal congestion    Allegra or zyrtec for allergies, post nasal drip, runny nose, watery eyes.    cepacol throat lozenges and/or chloraseptic spray for sore throat    mucinex for chest congestion.     Tylenol or ibuprofen for pain or fever.        Future Appointments   Date Time Provider Department Center   4/12/2024 10:15 AM LABORATORY, [facility] LAB [facility]   4/12/2024  2:30 PM Gianna Godoy NP [facility] BHEMON [facility]   4/15/2024 10:00 AM Anton, April, CTTS [facility] SMOKE [facility]   4/29/2024  9:40 AM LABORATORY, [facility] LAB [facility]   5/3/2024 11:40 AM Wallace Fisher MD [facility]   6/11/2024  7:45 AM Stefan Eugene MD [facility] UROLOGY [facility]   7/11/2024  8:00 AM Wallace Fisher MD [facility]   10/11/2024 10:00 AM HGV XR2 [facility] XRAY [place]   10/11/2024 10:15 AM PULMONARY LAB, [facility] PULMFUN [place]   10/11/2024 11:00 AM Mazin Wells MD [facility] PULMSVC [facility]       Lab Frequency Next Occurrence   X-Ray  Chest PA And Lateral Once 10/05/2023   Spirometry with/without bronchodilator Once 10/05/2023   Basic Metabolic Panel Once 10/06/2023   Hemoglobin A1C Once 05/01/2024   Comprehensive Metabolic Panel Once 05/01/2024   CBC Auto Differential Once 05/01/2024   CT Chest Lung Screening Low Dose Once 01/12/2024   Protein Electrophoresis, Serum Once 01/12/2024   Immunofixation Electrophoresis Once 01/12/2024   Immunoglobulin Free LT Chains Blood Once 01/12/2024   CBC Auto Differential Once 01/12/2024   Basic Metabolic Panel Once 01/12/2024   Ferritin Once 01/12/2024   Iron and TIBC Once 01/12/2024   Microalbumin/creatinine urine ratio         Follow up in about 3 months (around 7/3/2024), or if symptoms worsen or fail to improve.

## 2024-04-03 NOTE — PATIENT INSTRUCTIONS
flonase nasal spray 2 spays each nostril for nasal congestion, post nasal drip, runny nose    Saline nasal spray throughout the day for nasal congestion.    Sudafed for nasal congestion    Allegra or zyrtec for allergies, post nasal drip, runny nose, watery eyes.    cepacol throat lozenges and/or chloraseptic spray for sore throat    mucinex for chest congestion.     Tylenol or ibuprofen for pain or fever.

## 2024-04-04 DIAGNOSIS — E11.9 CONTROLLED TYPE 2 DIABETES MELLITUS WITHOUT COMPLICATION, WITHOUT LONG-TERM CURRENT USE OF INSULIN: ICD-10-CM

## 2024-04-04 RX ORDER — METFORMIN HYDROCHLORIDE 1000 MG/1
1000 TABLET ORAL 2 TIMES DAILY WITH MEALS
Qty: 180 TABLET | Refills: 1 | OUTPATIENT
Start: 2024-04-04 | End: 2024-10-01

## 2024-04-08 ENCOUNTER — TELEPHONE (OUTPATIENT)
Dept: HEMATOLOGY/ONCOLOGY | Facility: CLINIC | Age: 69
End: 2024-04-08
Payer: MEDICARE

## 2024-04-08 ENCOUNTER — TELEPHONE (OUTPATIENT)
Dept: DIABETES | Facility: CLINIC | Age: 69
End: 2024-04-08
Payer: MEDICARE

## 2024-04-08 NOTE — TELEPHONE ENCOUNTER
----- Message from Maggie Flores sent at 4/8/2024 11:01 AM CDT -----  Contact: pt  Type:  Sooner Apoointment Request    Caller is requesting a sooner appointment.  Caller declined first available appointment listed below.  Caller will not accept being placed on the waitlist and is requesting a message be sent to doctor.  Name of Caller: pt  When is the first available appointment? No solutions  Symptoms: high blood sugar/monitor maybe off  Would the patient rather a call back or a response via [facility]?  phone  Best Call Back Number: [phone]  Additional Information:

## 2024-04-08 NOTE — TELEPHONE ENCOUNTER
----- Message from April Palma sent at 4/8/2024 11:17 AM CDT -----  Contact: Lisa/ Spouse  .Patient is calling to speak with the nurse regarding questions and concerns . Reports having questions about appt  . Please give patient a call back at   [phone]

## 2024-04-08 NOTE — TELEPHONE ENCOUNTER
Nurse spoke with pts spouse in regards to his appt. Pts spouse wanted to know if it was ok for the provider to see him even if he didn't have his colonoscopy. A message has been sent to Gianna damico to advise. Nurse will call the pt back with an update.

## 2024-04-10 ENCOUNTER — TELEPHONE (OUTPATIENT)
Dept: DIABETES | Facility: CLINIC | Age: 69
End: 2024-04-10
Payer: MEDICARE

## 2024-04-10 ENCOUNTER — HOSPITAL ENCOUNTER (INPATIENT)
Facility: HOSPITAL | Age: 69
LOS: 1 days | Discharge: HOME | DRG: 638 | End: 2024-04-12
Attending: EMERGENCY MEDICINE | Admitting: INTERNAL MEDICINE
Payer: MEDICARE

## 2024-04-10 DIAGNOSIS — R73.9 HYPERGLYCEMIA: ICD-10-CM

## 2024-04-10 DIAGNOSIS — E86.9 VOLUME DEPLETION: ICD-10-CM

## 2024-04-10 DIAGNOSIS — I95.9 HYPOTENSION, UNSPECIFIED HYPOTENSION TYPE: ICD-10-CM

## 2024-04-10 DIAGNOSIS — R07.9 CHEST PAIN: ICD-10-CM

## 2024-04-10 DIAGNOSIS — R73.9 HYPERGLYCEMIA WITHOUT KETOSIS: Primary | ICD-10-CM

## 2024-04-10 DIAGNOSIS — N17.9 AKI (ACUTE KIDNEY INJURY): ICD-10-CM

## 2024-04-10 DIAGNOSIS — E11.65 CONTROLLED TYPE 2 DIABETES MELLITUS WITH HYPERGLYCEMIA, WITHOUT LONG-TERM CURRENT USE OF INSULIN: ICD-10-CM

## 2024-04-10 DIAGNOSIS — E87.1 HYPONATREMIA: ICD-10-CM

## 2024-04-10 DIAGNOSIS — R79.0 LOW FERRITIN: ICD-10-CM

## 2024-04-10 LAB
ALBUMIN SERPL BCP-MCNC: 3.8 G/DL (ref 3.5–5.2)
ALLENS TEST: ABNORMAL
ALP SERPL-CCNC: 74 U/L (ref 55–135)
ALT SERPL W/O P-5'-P-CCNC: 13 U/L (ref 10–44)
ANION GAP SERPL CALC-SCNC: 14 MMOL/L (ref 8–16)
ANION GAP SERPL CALC-SCNC: 9 MMOL/L (ref 8–16)
AST SERPL-CCNC: 11 U/L (ref 10–40)
B-OH-BUTYR BLD STRIP-SCNC: 1.1 MMOL/L (ref 0–0.5)
BACTERIA #/AREA URNS HPF: NORMAL /HPF
BASOPHILS # BLD AUTO: 0.05 K/UL (ref 0–0.2)
BASOPHILS NFR BLD: 0.4 % (ref 0–1.9)
BILIRUB SERPL-MCNC: 0.3 MG/DL (ref 0.1–1)
BILIRUB UR QL STRIP: NEGATIVE
BUN SERPL-MCNC: 32 MG/DL (ref 8–23)
BUN SERPL-MCNC: 36 MG/DL (ref 8–23)
CALCIUM SERPL-MCNC: 8.1 MG/DL (ref 8.7–10.5)
CALCIUM SERPL-MCNC: 9 MG/DL (ref 8.7–10.5)
CHLORIDE SERPL-SCNC: 104 MMOL/L (ref 95–110)
CHLORIDE SERPL-SCNC: 96 MMOL/L (ref 95–110)
CLARITY UR: CLEAR
CO2 SERPL-SCNC: 19 MMOL/L (ref 23–29)
CO2 SERPL-SCNC: 19 MMOL/L (ref 23–29)
COLOR UR: YELLOW
CREAT SERPL-MCNC: 1.4 MG/DL (ref 0.5–1.4)
CREAT SERPL-MCNC: 2.1 MG/DL (ref 0.5–1.4)
DELSYS: ABNORMAL
DIFFERENTIAL METHOD BLD: ABNORMAL
EOSINOPHIL # BLD AUTO: 0.1 K/UL (ref 0–0.5)
EOSINOPHIL NFR BLD: 0.5 % (ref 0–8)
ERYTHROCYTE [DISTWIDTH] IN BLOOD BY AUTOMATED COUNT: 12 % (ref 11.5–14.5)
EST. GFR  (NO RACE VARIABLE): 34 ML/MIN/1.73 M^2
EST. GFR  (NO RACE VARIABLE): 55 ML/MIN/1.73 M^2
FIO2: 21
GLUCOSE SERPL-MCNC: 237 MG/DL (ref 70–110)
GLUCOSE SERPL-MCNC: 594 MG/DL (ref 70–110)
GLUCOSE SERPL-MCNC: 622 MG/DL (ref 70–110)
GLUCOSE UR QL STRIP: ABNORMAL
HCO3 UR-SCNC: 19.6 MMOL/L (ref 24–28)
HCT VFR BLD AUTO: 42.8 % (ref 40–54)
HCT VFR BLD CALC: 43 %PCV (ref 36–54)
HCV AB SERPL QL IA: NEGATIVE
HEP C VIRUS HOLD SPECIMEN: NORMAL
HGB BLD-MCNC: 14.4 G/DL (ref 14–18)
HGB UR QL STRIP: NEGATIVE
HIV 1+2 AB+HIV1 P24 AG SERPL QL IA: NEGATIVE
IMM GRANULOCYTES # BLD AUTO: 0.05 K/UL (ref 0–0.04)
IMM GRANULOCYTES NFR BLD AUTO: 0.4 % (ref 0–0.5)
KETONES UR QL STRIP: ABNORMAL
LEUKOCYTE ESTERASE UR QL STRIP: NEGATIVE
LYMPHOCYTES # BLD AUTO: 2.1 K/UL (ref 1–4.8)
LYMPHOCYTES NFR BLD: 18.6 % (ref 18–48)
MCH RBC QN AUTO: 29.7 PG (ref 27–31)
MCHC RBC AUTO-ENTMCNC: 33.6 G/DL (ref 32–36)
MCV RBC AUTO: 88 FL (ref 82–98)
MICROSCOPIC COMMENT: NORMAL
MODE: ABNORMAL
MONOCYTES # BLD AUTO: 0.6 K/UL (ref 0.3–1)
MONOCYTES NFR BLD: 5.6 % (ref 4–15)
NEUTROPHILS # BLD AUTO: 8.4 K/UL (ref 1.8–7.7)
NEUTROPHILS NFR BLD: 74.5 % (ref 38–73)
NITRITE UR QL STRIP: NEGATIVE
NRBC BLD-RTO: 0 /100 WBC
PCO2 BLDA: 36.3 MMHG (ref 35–45)
PH SMN: 7.34 [PH] (ref 7.35–7.45)
PH UR STRIP: 5 [PH] (ref 5–8)
PLATELET # BLD AUTO: 270 K/UL (ref 150–450)
PMV BLD AUTO: 10.9 FL (ref 9.2–12.9)
PO2 BLDA: 53 MMHG (ref 40–60)
POC BE: -6 MMOL/L
POC IONIZED CALCIUM: 1.19 MMOL/L (ref 1.06–1.42)
POC SATURATED O2: 86 % (ref 95–100)
POCT GLUCOSE: 229 MG/DL (ref 70–110)
POCT GLUCOSE: 232 MG/DL (ref 70–110)
POCT GLUCOSE: 463 MG/DL (ref 70–110)
POCT GLUCOSE: >500 MG/DL (ref 70–110)
POTASSIUM BLD-SCNC: 5.2 MMOL/L (ref 3.5–5.1)
POTASSIUM SERPL-SCNC: 4.5 MMOL/L (ref 3.5–5.1)
POTASSIUM SERPL-SCNC: 5.4 MMOL/L (ref 3.5–5.1)
PROT SERPL-MCNC: 7.2 G/DL (ref 6–8.4)
PROT UR QL STRIP: NEGATIVE
RBC # BLD AUTO: 4.85 M/UL (ref 4.6–6.2)
SAMPLE: ABNORMAL
SITE: ABNORMAL
SODIUM BLD-SCNC: 127 MMOL/L (ref 136–145)
SODIUM SERPL-SCNC: 129 MMOL/L (ref 136–145)
SODIUM SERPL-SCNC: 132 MMOL/L (ref 136–145)
SP GR UR STRIP: >1.03 (ref 1–1.03)
TROPONIN I SERPL DL<=0.01 NG/ML-MCNC: <0.006 NG/ML (ref 0–0.03)
URN SPEC COLLECT METH UR: ABNORMAL
UROBILINOGEN UR STRIP-ACNC: NEGATIVE EU/DL
WBC # BLD AUTO: 11.34 K/UL (ref 3.9–12.7)
YEAST URNS QL MICRO: NORMAL

## 2024-04-10 PROCEDURE — G0378 HOSPITAL OBSERVATION PER HR: HCPCS

## 2024-04-10 PROCEDURE — 93010 ELECTROCARDIOGRAM REPORT: CPT | Mod: ,,, | Performed by: INTERNAL MEDICINE

## 2024-04-10 PROCEDURE — 84132 ASSAY OF SERUM POTASSIUM: CPT

## 2024-04-10 PROCEDURE — 63600175 PHARM REV CODE 636 W HCPCS: Performed by: EMERGENCY MEDICINE

## 2024-04-10 PROCEDURE — 82803 BLOOD GASES ANY COMBINATION: CPT

## 2024-04-10 PROCEDURE — 87389 HIV-1 AG W/HIV-1&-2 AB AG IA: CPT | Performed by: NURSE PRACTITIONER

## 2024-04-10 PROCEDURE — 99900035 HC TECH TIME PER 15 MIN (STAT)

## 2024-04-10 PROCEDURE — 25000003 PHARM REV CODE 250: Performed by: EMERGENCY MEDICINE

## 2024-04-10 PROCEDURE — 84295 ASSAY OF SERUM SODIUM: CPT

## 2024-04-10 PROCEDURE — 84484 ASSAY OF TROPONIN QUANT: CPT | Performed by: NURSE PRACTITIONER

## 2024-04-10 PROCEDURE — 63600175 PHARM REV CODE 636 W HCPCS: Performed by: INTERNAL MEDICINE

## 2024-04-10 PROCEDURE — 82800 BLOOD PH: CPT

## 2024-04-10 PROCEDURE — 82330 ASSAY OF CALCIUM: CPT

## 2024-04-10 PROCEDURE — 25000003 PHARM REV CODE 250: Performed by: INTERNAL MEDICINE

## 2024-04-10 PROCEDURE — 96372 THER/PROPH/DIAG INJ SC/IM: CPT | Performed by: INTERNAL MEDICINE

## 2024-04-10 PROCEDURE — 96374 THER/PROPH/DIAG INJ IV PUSH: CPT

## 2024-04-10 PROCEDURE — 85025 COMPLETE CBC W/AUTO DIFF WBC: CPT | Performed by: NURSE PRACTITIONER

## 2024-04-10 PROCEDURE — 81000 URINALYSIS NONAUTO W/SCOPE: CPT | Performed by: NURSE PRACTITIONER

## 2024-04-10 PROCEDURE — 96375 TX/PRO/DX INJ NEW DRUG ADDON: CPT

## 2024-04-10 PROCEDURE — 99285 EMERGENCY DEPT VISIT HI MDM: CPT | Mod: 25

## 2024-04-10 PROCEDURE — 86803 HEPATITIS C AB TEST: CPT | Performed by: NURSE PRACTITIONER

## 2024-04-10 PROCEDURE — 25000003 PHARM REV CODE 250: Performed by: NURSE PRACTITIONER

## 2024-04-10 PROCEDURE — 93005 ELECTROCARDIOGRAM TRACING: CPT

## 2024-04-10 PROCEDURE — 96361 HYDRATE IV INFUSION ADD-ON: CPT

## 2024-04-10 PROCEDURE — 83036 HEMOGLOBIN GLYCOSYLATED A1C: CPT | Performed by: EMERGENCY MEDICINE

## 2024-04-10 PROCEDURE — 82962 GLUCOSE BLOOD TEST: CPT

## 2024-04-10 PROCEDURE — 80048 BASIC METABOLIC PNL TOTAL CA: CPT | Mod: XB | Performed by: INTERNAL MEDICINE

## 2024-04-10 PROCEDURE — 85014 HEMATOCRIT: CPT

## 2024-04-10 PROCEDURE — 82010 KETONE BODYS QUAN: CPT | Performed by: NURSE PRACTITIONER

## 2024-04-10 PROCEDURE — 80053 COMPREHEN METABOLIC PANEL: CPT | Performed by: NURSE PRACTITIONER

## 2024-04-10 RX ORDER — TAMSULOSIN HYDROCHLORIDE 0.4 MG/1
0.8 CAPSULE ORAL DAILY
Status: DISCONTINUED | OUTPATIENT
Start: 2024-04-11 | End: 2024-04-10

## 2024-04-10 RX ORDER — IPRATROPIUM BROMIDE 21 UG/1
1 SPRAY, METERED NASAL 3 TIMES DAILY PRN
Status: DISCONTINUED | OUTPATIENT
Start: 2024-04-10 | End: 2024-04-12 | Stop reason: HOSPADM

## 2024-04-10 RX ORDER — SODIUM CHLORIDE, SODIUM LACTATE, POTASSIUM CHLORIDE, CALCIUM CHLORIDE 600; 310; 30; 20 MG/100ML; MG/100ML; MG/100ML; MG/100ML
INJECTION, SOLUTION INTRAVENOUS CONTINUOUS
Status: DISCONTINUED | OUTPATIENT
Start: 2024-04-10 | End: 2024-04-11

## 2024-04-10 RX ORDER — CETIRIZINE HYDROCHLORIDE 10 MG/1
10 TABLET ORAL NIGHTLY
Status: DISCONTINUED | OUTPATIENT
Start: 2024-04-10 | End: 2024-04-12 | Stop reason: HOSPADM

## 2024-04-10 RX ORDER — MORPHINE SULFATE 4 MG/ML
4 INJECTION, SOLUTION INTRAMUSCULAR; INTRAVENOUS
Status: COMPLETED | OUTPATIENT
Start: 2024-04-10 | End: 2024-04-10

## 2024-04-10 RX ORDER — IBUPROFEN 200 MG
16 TABLET ORAL
Status: DISCONTINUED | OUTPATIENT
Start: 2024-04-10 | End: 2024-04-12 | Stop reason: HOSPADM

## 2024-04-10 RX ORDER — NALOXONE HCL 0.4 MG/ML
0.02 VIAL (ML) INJECTION
Status: DISCONTINUED | OUTPATIENT
Start: 2024-04-10 | End: 2024-04-12 | Stop reason: HOSPADM

## 2024-04-10 RX ORDER — ALBUTEROL SULFATE 0.83 MG/ML
2.5 SOLUTION RESPIRATORY (INHALATION) EVERY 6 HOURS PRN
Status: DISCONTINUED | OUTPATIENT
Start: 2024-04-10 | End: 2024-04-12 | Stop reason: HOSPADM

## 2024-04-10 RX ORDER — PRAMIPEXOLE DIHYDROCHLORIDE 0.25 MG/1
0.25 TABLET ORAL NIGHTLY
Status: DISCONTINUED | OUTPATIENT
Start: 2024-04-10 | End: 2024-04-12 | Stop reason: HOSPADM

## 2024-04-10 RX ORDER — LANOLIN ALCOHOL/MO/W.PET/CERES
1 CREAM (GRAM) TOPICAL DAILY
Status: DISCONTINUED | OUTPATIENT
Start: 2024-04-11 | End: 2024-04-12 | Stop reason: HOSPADM

## 2024-04-10 RX ORDER — CHOLECALCIFEROL (VITAMIN D3) 25 MCG
1000 TABLET ORAL DAILY
Status: DISCONTINUED | OUTPATIENT
Start: 2024-04-11 | End: 2024-04-12 | Stop reason: HOSPADM

## 2024-04-10 RX ORDER — TAMSULOSIN HYDROCHLORIDE 0.4 MG/1
0.8 CAPSULE ORAL NIGHTLY
Status: DISCONTINUED | OUTPATIENT
Start: 2024-04-10 | End: 2024-04-12 | Stop reason: HOSPADM

## 2024-04-10 RX ORDER — ENOXAPARIN SODIUM 100 MG/ML
40 INJECTION SUBCUTANEOUS EVERY 24 HOURS
Status: DISCONTINUED | OUTPATIENT
Start: 2024-04-10 | End: 2024-04-12 | Stop reason: HOSPADM

## 2024-04-10 RX ORDER — LUBIPROSTONE 24 UG/1
24 CAPSULE ORAL 2 TIMES DAILY
Status: DISCONTINUED | OUTPATIENT
Start: 2024-04-10 | End: 2024-04-12 | Stop reason: HOSPADM

## 2024-04-10 RX ORDER — ALBUTEROL SULFATE 90 UG/1
2 AEROSOL, METERED RESPIRATORY (INHALATION) EVERY 6 HOURS PRN
Status: DISCONTINUED | OUTPATIENT
Start: 2024-04-10 | End: 2024-04-10 | Stop reason: CLARIF

## 2024-04-10 RX ORDER — GLUCAGON 1 MG
1 KIT INJECTION
Status: DISCONTINUED | OUTPATIENT
Start: 2024-04-10 | End: 2024-04-12 | Stop reason: HOSPADM

## 2024-04-10 RX ORDER — CYANOCOBALAMIN (VITAMIN B-12) 250 MCG
500 TABLET ORAL DAILY
Status: DISCONTINUED | OUTPATIENT
Start: 2024-04-11 | End: 2024-04-12 | Stop reason: HOSPADM

## 2024-04-10 RX ORDER — ACETAMINOPHEN 325 MG/1
650 TABLET ORAL EVERY 4 HOURS PRN
Status: DISCONTINUED | OUTPATIENT
Start: 2024-04-10 | End: 2024-04-12 | Stop reason: HOSPADM

## 2024-04-10 RX ORDER — ASPIRIN 325 MG
325 TABLET ORAL DAILY
Status: DISCONTINUED | OUTPATIENT
Start: 2024-04-11 | End: 2024-04-12 | Stop reason: HOSPADM

## 2024-04-10 RX ORDER — TIZANIDINE 4 MG/1
4 TABLET ORAL ONCE
Status: COMPLETED | OUTPATIENT
Start: 2024-04-10 | End: 2024-04-10

## 2024-04-10 RX ORDER — BUPROPION HYDROCHLORIDE 150 MG/1
150 TABLET, EXTENDED RELEASE ORAL 2 TIMES DAILY
Status: DISCONTINUED | OUTPATIENT
Start: 2024-04-10 | End: 2024-04-10

## 2024-04-10 RX ORDER — SODIUM CHLORIDE 0.9 % (FLUSH) 0.9 %
10 SYRINGE (ML) INJECTION EVERY 12 HOURS PRN
Status: DISCONTINUED | OUTPATIENT
Start: 2024-04-10 | End: 2024-04-12 | Stop reason: HOSPADM

## 2024-04-10 RX ORDER — FLUTICASONE PROPIONATE 50 MCG
1 SPRAY, SUSPENSION (ML) NASAL DAILY
Status: DISCONTINUED | OUTPATIENT
Start: 2024-04-11 | End: 2024-04-12 | Stop reason: HOSPADM

## 2024-04-10 RX ORDER — BUPROPION HYDROCHLORIDE 150 MG/1
150 TABLET, EXTENDED RELEASE ORAL DAILY
Status: DISCONTINUED | OUTPATIENT
Start: 2024-04-11 | End: 2024-04-12 | Stop reason: HOSPADM

## 2024-04-10 RX ORDER — IBUPROFEN 200 MG
24 TABLET ORAL
Status: DISCONTINUED | OUTPATIENT
Start: 2024-04-10 | End: 2024-04-12 | Stop reason: HOSPADM

## 2024-04-10 RX ORDER — ASCORBIC ACID 500 MG
1000 TABLET ORAL DAILY
Status: DISCONTINUED | OUTPATIENT
Start: 2024-04-11 | End: 2024-04-12 | Stop reason: HOSPADM

## 2024-04-10 RX ORDER — INSULIN ASPART 100 [IU]/ML
0-10 INJECTION, SOLUTION INTRAVENOUS; SUBCUTANEOUS
Status: DISCONTINUED | OUTPATIENT
Start: 2024-04-10 | End: 2024-04-10

## 2024-04-10 RX ORDER — PANTOPRAZOLE SODIUM 40 MG/1
40 TABLET, DELAYED RELEASE ORAL 2 TIMES DAILY
Status: DISCONTINUED | OUTPATIENT
Start: 2024-04-10 | End: 2024-04-12 | Stop reason: HOSPADM

## 2024-04-10 RX ORDER — INSULIN ASPART 100 [IU]/ML
0-10 INJECTION, SOLUTION INTRAVENOUS; SUBCUTANEOUS EVERY 6 HOURS PRN
Status: DISCONTINUED | OUTPATIENT
Start: 2024-04-10 | End: 2024-04-12 | Stop reason: HOSPADM

## 2024-04-10 RX ORDER — ATORVASTATIN CALCIUM 40 MG/1
80 TABLET, FILM COATED ORAL DAILY
Status: DISCONTINUED | OUTPATIENT
Start: 2024-04-11 | End: 2024-04-10

## 2024-04-10 RX ORDER — ATORVASTATIN CALCIUM 40 MG/1
80 TABLET, FILM COATED ORAL NIGHTLY
Status: DISCONTINUED | OUTPATIENT
Start: 2024-04-10 | End: 2024-04-12 | Stop reason: HOSPADM

## 2024-04-10 RX ADMIN — ENOXAPARIN SODIUM 40 MG: 40 INJECTION SUBCUTANEOUS at 06:04

## 2024-04-10 RX ADMIN — SODIUM CHLORIDE, POTASSIUM CHLORIDE, SODIUM LACTATE AND CALCIUM CHLORIDE: 600; 310; 30; 20 INJECTION, SOLUTION INTRAVENOUS at 07:04

## 2024-04-10 RX ADMIN — LUBIPROSTONE 24 MCG: 24 CAPSULE, GELATIN COATED ORAL at 09:04

## 2024-04-10 RX ADMIN — MORPHINE SULFATE 4 MG: 4 INJECTION INTRAVENOUS at 05:04

## 2024-04-10 RX ADMIN — ATORVASTATIN CALCIUM 80 MG: 40 TABLET, FILM COATED ORAL at 09:04

## 2024-04-10 RX ADMIN — TAMSULOSIN HYDROCHLORIDE 0.8 MG: 0.4 CAPSULE ORAL at 09:04

## 2024-04-10 RX ADMIN — CETIRIZINE HYDROCHLORIDE 10 MG: 10 TABLET, FILM COATED ORAL at 09:04

## 2024-04-10 RX ADMIN — INSULIN HUMAN 10 UNITS: 100 INJECTION, SOLUTION PARENTERAL at 04:04

## 2024-04-10 RX ADMIN — SODIUM CHLORIDE 1000 ML: 9 INJECTION, SOLUTION INTRAVENOUS at 05:04

## 2024-04-10 RX ADMIN — SODIUM CHLORIDE 1000 ML: 9 INJECTION, SOLUTION INTRAVENOUS at 02:04

## 2024-04-10 RX ADMIN — PANTOPRAZOLE SODIUM 40 MG: 40 TABLET, DELAYED RELEASE ORAL at 09:04

## 2024-04-10 RX ADMIN — TIZANIDINE 4 MG: 4 TABLET ORAL at 05:04

## 2024-04-10 RX ADMIN — PRAMIPEXOLE DIHYDROCHLORIDE 0.25 MG: 0.25 TABLET ORAL at 09:04

## 2024-04-10 NOTE — ASSESSMENT & PLAN NOTE
Patient with acute kidney injury/acute renal failure likely due to pre-renal azotemia due to IVVD ALICIA is currently worsening. Baseline creatinine  1.0  - Labs reviewed- Renal function/electrolytes with Estimated Creatinine Clearance: 39.1 mL/min (A) (based on SCr of 2.1 mg/dL (H)). according to latest data. Monitor urine output and serial BMP and adjust therapy as needed. Avoid nephrotoxins and renally dose meds for GFR listed above.  - s/p 2L fluids in ED  - continue LR at 100/hr   - monitor BMP   - hold ARB

## 2024-04-10 NOTE — ASSESSMENT & PLAN NOTE
- last A1c 7.8%, repeat pending   - hyperglycemia may have been exacerbated by recent steroid use   - repeat stat Bmp   - monitor BG q4h   - start mod dose SSI, weight based detemir   - LR at 100/hr   - monitor BMP q12h   - DM educator consult

## 2024-04-10 NOTE — H&P
"  [facility] - Emergency Dept.  [facility]  History & Physical    Patient Name: Stefan Forbes Jr.  MRN: [id number]  Patient Class: OP- Observation  Admission Date: 4/10/2024  Attending Physician: Annetta Bach MD  Primary Care Provider: Wallace Fisher MD         Patient information was obtained from patient, spouse/SO, past medical records, and ER records.     Subjective:     Principal Problem:Controlled type 2 diabetes mellitus with hyperglycemia, without long-term current use of insulin    Chief Complaint:   Chief Complaint   Patient presents with    Dizziness     Pt states he has been lightheaded and "flushed feeling" for a couple days. Pt states he takes olmesartan for BP & his BP readings "have been low." BP at 1100 today was 83/57 (98).     Hyperglycemia     Pt is a type 2 diabetic. Pt states he has lost 11 pounds in the past week. Pt states his blood sugars have been running 400s-500s in the past two days. Pt's blood glucose >500 in triage.        HPI: Pt is a 69 YO male with PMH notable for Type 2 DM, HTN, HLD, CAD, COPD, MACHO (noncompliant with CPAP), prior PE who presented to the ED 04/10 for evaluation of high blood sugar and low blood pressure. Pt notes that for the last week his blood sugars have been running 400-500s, thought his BG meter was broken. Also notes that his BP has been running 80-90s systolic, usually 110-120s. He has been increasingly thirsty, tired over the same time and with increased urinary frequency. Also repots cramping to bilateral lower extremities. Does not use insulin at home but reports compliance with Metformin, trulicity and Farxiga at home.     Reports that he quit smoking cigars approx 2 months ago and since  then has been eating a lot more sugary foods. Also notes that he was started on Medrol dose bruna and Zpak by PCP approx 1 week ago for URI symptoms/COPD, completed both 1 day prior to admission.    In the ED, initial VS: afebrile, HR 93, RR 16, sats 100% on " room air, /60. Work up notable for: WBC 11, Na 129, K 5.4, AG 14, bicarb 19, . UA with 4+ glucose, trace ketones. ABG without signigicant acidosis. Pt received 2L NS, 10 U IV insulin. Hospital medicine consulted for admission for management of hyperglycemia without DKA     Past Medical History:   Diagnosis Date    Arthritis     Cataract     COPD (chronic obstructive pulmonary disease)     Diabetes mellitus, type 2     Hyperlipidemia     Hypertension     Personal history of colonic polyps 2018       Past Surgical History:   Procedure Laterality Date    ANGIOPLASTY      JOINT REPLACEMENT  2017    TOTAL KNEE ARTHROPLASTY         Review of patient's allergies indicates:  No Known Allergies    No current facility-administered medications on file prior to encounter.     Current Outpatient Medications on File Prior to Encounter   Medication Sig    amLODIPine (NORVASC) 2.5 MG tablet Take 1 tablet (2.5 mg total) by mouth once daily.    aspirin 325 MG tablet Take 325 mg by mouth once daily.    buPROPion (WELLBUTRIN SR) 150 MG TBSR 12 hr tablet Take 1 tablet (150 mg total) by mouth 2 (two) times daily. (Patient taking differently: Take 150 mg by mouth once daily.)    famotidine (PEPCID) 40 MG tablet Take 1 tablet by mouth once a day    ferrous sulfate (FEROSUL) 325 mg (65 mg iron) Tab tablet Take 1 tablet (325 mg total) by mouth 2 (two) times daily. (Patient taking differently: Take 325 mg by mouth Daily.)    fluticasone propionate (FLONASE) 50 mcg/actuation nasal spray Use 1 spray (50 mcg total) in each nostril once daily.    fluticasone-umeclidin-vilanter (TRELEGY ELLIPTA) 100-62.5-25 mcg DsDv Inhale 1 puff into the lungs once daily.    ipratropium (ATROVENT) 21 mcg (0.03 %) nasal spray Spray 1 or 2 sprays in each nasal passage every 8 hours, if needed, as directed.    lubiprostone (AMITIZA) 24 MCG Cap Take 1 capsule by mouth twice a day    magnesium oxide (MAG-OX) 400 mg (241.3 mg magnesium) tablet Take 1 tablet  (400 mg total) by mouth once daily.    metFORMIN (GLUCOPHAGE) 1000 MG tablet Take 1 tablet (1,000 mg total) by mouth 2 (two) times daily with meals.    olmesartan (BENICAR) 40 MG tablet Take 1 tablet (40 mg total) by mouth once daily.    pantoprazole (PROTONIX) 40 MG tablet Take 1 tablet by mouth twice a day before breakfast and dinner    pramipexole (MIRAPEX) 0.125 MG tablet Take 2 tablets by mouth every evening.    rosuvastatin (CRESTOR) 20 MG tablet Take 1 tablet (20 mg total) by mouth once daily. (Patient taking differently: Take 20 mg by mouth every evening.)    tamsulosin (FLOMAX) 0.4 mg Cap Take 2 capsules (0.8 mg total) by mouth once daily. (Patient taking differently: Take 0.8 mg by mouth every evening.)    acetaminophen (TYLENOL) 500 MG tablet Take 1,000 mg by mouth as needed.     albuterol (VENTOLIN HFA) 90 mcg/actuation inhaler Inhale 2 puffs into the lungs every 6 (six) hours as needed for Wheezing or Shortness of Breath. Rescue    ascorbic acid, vitamin C, (VITAMIN C) 1000 MG tablet Take 1,000 mg by mouth once daily.    blood sugar diagnostic (BLOOD GLUCOSE TEST) Strp 1 strip by Misc.(Non-Drug; Combo Route) route 3 (three) times daily.    cetirizine (ZYRTEC) 10 MG tablet Take 10 mg by mouth every evening.    cholecalciferol, vitamin D3, (VITAMIN D3) 25 mcg (1,000 unit) capsule Take 1,000 Units by mouth once daily.    cyanocobalamin (VITAMIN B-12) 500 MCG tablet Take 500 mcg by mouth once daily.     dapagliflozin propanediol (FARXIGA) 10 mg tablet Take 1 tablet (10 mg total) by mouth once daily.    dulaglutide (TRULICITY) 3 mg/0.5 mL pen injector Inject 3 mg into the skin every 7 days. (Patient taking differently: Inject 3 mg into the skin every 7 days. (Thursdays))    lancets 30 gauge Misc 1 lancet by Misc.(Non-Drug; Combo Route) route 3 (three) times daily.    nitroGLYCERIN (NITROSTAT) 0.4 MG SL tablet Place 1 tablet under the tongue every 5 (five) minutes as needed for Chest pain.    pulse oximeter  (PULSE OXIMETER) device Use twice daily at 8 AM and 3 PM and record the value in MyChart as directed.    sod picosulf-mag ox-citric ac (CLENPIQ) 10 mg-3.5 gram- 12 gram/175 mL Soln Drink 2 bottle by mouth split dose as directed    [DISCONTINUED] methylPREDNISolone (MEDROL, CHARLES,) 4 mg tablet use as directed     Family History       Problem Relation (Age of Onset)    Arthritis Mother    Cancer Father    Diabetes Mother          Tobacco Use    Smoking status: Former     Types: Cigars     Quit date: 10/1/2023     Years since quittin.5    Smokeless tobacco: Never    Tobacco comments:     4 cigars each day   Substance and Sexual Activity    Alcohol use: No    Drug use: No    Sexual activity: Not Currently     Partners: Female     Review of Systems   Constitutional:  Positive for activity change, appetite change, fatigue and unexpected weight change (10 lb weight loss over last month).   HENT:  Negative for congestion, rhinorrhea and sore throat.    Eyes:  Negative for visual disturbance.   Respiratory:  Positive for cough (chronic). Negative for chest tightness, shortness of breath and stridor.    Cardiovascular:  Negative for chest pain, palpitations and leg swelling.   Gastrointestinal:  Negative for abdominal pain, blood in stool, constipation, diarrhea, nausea and vomiting.   Endocrine: Positive for polydipsia and polyuria.   Genitourinary:  Positive for frequency. Negative for difficulty urinating, dysuria and hematuria.   Musculoskeletal:         Bilateral lower leg cramps    Skin:  Negative for rash and wound.   Allergic/Immunologic: Negative.    Neurological:  Negative for dizziness, weakness, light-headedness and headaches.   Hematological: Negative.    Psychiatric/Behavioral: Negative.       Objective:     Vital Signs (Most Recent):  Temp: 97.9 °F (36.6 °C) (04/10/24 1600)  Pulse: 78 (04/10/24 1600)  Resp: 18 (04/10/24 1705)  BP: 107/69 (04/10/24 1600)  SpO2: 95 % (04/10/24 1600) Vital Signs (24h  Range):  Temp:  [97.9 °F (36.6 °C)] 97.9 °F (36.6 °C)  Pulse:  [78-93] 78  Resp:  [16-18] 18  SpO2:  [95 %-100 %] 95 %  BP: (103-107)/(60-69) 107/69     Weight: 97.2 kg (214 lb 4.6 oz)  Body mass index is 32.58 kg/m².     Physical Exam  Vitals and nursing note reviewed.   Constitutional:       General: He is not in acute distress.     Appearance: He is not ill-appearing or diaphoretic.   HENT:      Head: Normocephalic and atraumatic.      Mouth/Throat:      Mouth: Mucous membranes are dry.      Pharynx: Oropharynx is clear. No posterior oropharyngeal erythema.   Eyes:      General: No scleral icterus.     Extraocular Movements: Extraocular movements intact.      Conjunctiva/sclera: Conjunctivae normal.   Cardiovascular:      Rate and Rhythm: Normal rate and regular rhythm.      Heart sounds: No murmur heard.     No friction rub. No gallop.   Pulmonary:      Effort: Pulmonary effort is normal.      Breath sounds: Normal breath sounds. No wheezing, rhonchi or rales.      Comments: On room air   Abdominal:      General: Bowel sounds are normal. There is no distension.      Palpations: Abdomen is soft.      Tenderness: There is no abdominal tenderness. There is no guarding or rebound.   Genitourinary:     Comments: deferred  Musculoskeletal:      Right lower leg: No edema.      Left lower leg: No edema.   Skin:     General: Skin is dry.      Findings: No rash.   Neurological:      General: No focal deficit present.      Mental Status: He is alert and oriented to person, place, and time. Mental status is at baseline.   Psychiatric:         Mood and Affect: Mood normal.         Behavior: Behavior normal.                Significant Labs: All pertinent labs within the past 24 hours have been reviewed.    Significant Imaging: I have reviewed all pertinent imaging results/findings within the past 24 hours.  Assessment/Plan:     * Controlled type 2 diabetes mellitus with hyperglycemia, without long-term current use of  insulin  - last A1c 7.8%, repeat pending   - hyperglycemia may have been exacerbated by recent steroid use   - repeat stat Bmp   - monitor BG q4h   - start mod dose SSI, weight based detemir   - LR at 100/hr   - monitor BMP q12h   - DM educator consult     ALICIA (acute kidney injury)  Patient with acute kidney injury/acute renal failure likely due to pre-renal azotemia due to IVVD ALICIA is currently worsening. Baseline creatinine  1.0  - Labs reviewed- Renal function/electrolytes with Estimated Creatinine Clearance: 39.1 mL/min (A) (based on SCr of 2.1 mg/dL (H)). according to latest data. Monitor urine output and serial BMP and adjust therapy as needed. Avoid nephrotoxins and renally dose meds for GFR listed above.  - s/p 2L fluids in ED  - continue LR at 100/hr   - monitor BMP   - hold ARB     Hyponatremia  Patient has hyponatremia which is uncontrolled,We will aim to correct the sodium by 4-6mEq in 24 hours. We will monitor sodium Every 12 hours. The hyponatremia is due to Dehydration/hypovolemia and pseudohyponatremia in setting of hyperglycemia. We will obtain the following studies: BMP. We will treat the hyponatremia with IV fluids  and management of hyperglycemia. The patient's sodium results have been reviewed and are listed below.  Recent Labs   Lab 04/10/24  1445   *       MACHO on CPAP  Noncompliant with home CPAP  Monitor pulse ox       CAD (coronary artery disease)  Patient with known CAD , which is controlled Will continue ASA and Statin and monitor for S/Sx of angina/ACS. Continue to monitor on telemetry.   Followed by Dr. Perry with [facility] as outpatient, denies prior hx stents   Continue homeASA, statin   Tele monitoring     Moderate COPD (chronic obstructive pulmonary disease)  Patient's COPD is controlled currently.  Patient is currently off COPD Pathway. Continue scheduled inhalers  and monitor respiratory status closely.     Hyperlipidemia associated with type 2 diabetes mellitus  - continue  home statin       Hypertension associated with diabetes  - hold ARB in setting of ALICIA  - hold norvasc as BP low normal   - monitor BP         VTE Risk Mitigation (From admission, onward)           Ordered     enoxaparin injection 40 mg  Daily         04/10/24 1743     IP VTE HIGH RISK PATIENT  Once         04/10/24 1743     Place sequential compression device  Until discontinued         04/10/24 1743                       On 04/10/2024, patient should be placed in hospital observation services under my care.             Annetta Bach MD  Department of Hospital Medicine  [facility] - Emergency Dept.

## 2024-04-10 NOTE — HPI
Pt is a 67 YO male with PMH notable for Type 2 DM, HTN, HLD, CAD, COPD, MACHO (noncompliant with CPAP), prior PE who presented to the ED 04/10 for evaluation of high blood sugar and low blood pressure. Pt notes that for the last week his blood sugars have been running 400-500s, thought his BG meter was broken. Also notes that his BP has been running 80-90s systolic, usually 110-120s. He has been increasingly thirsty, tired over the same time and with increased urinary frequency. Also repots cramping to bilateral lower extremities. Does not use insulin at home but reports compliance with Metformin, trulicity and Farxiga at home.     Reports that he quit smoking cigars approx 2 months ago and since  then has been eating a lot more sugary foods. Also notes that he was started on Medrol dose bruna and Zpak by PCP approx 1 week ago for URI symptoms/COPD, completed both 1 day prior to admission.    In the ED, initial VS: afebrile, HR 93, RR 16, sats 100% on room air, /60. Work up notable for: WBC 11, Na 129, K 5.4, AG 14, bicarb 19, . UA with 4+ glucose, trace ketones. ABG without signigicant acidosis. Pt received 2L NS, 10 U IV insulin. Hospital medicine consulted for admission for management of hyperglycemia without DKA
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
## 2024-04-10 NOTE — ASSESSMENT & PLAN NOTE
Patient has hyponatremia which is uncontrolled,We will aim to correct the sodium by 4-6mEq in 24 hours. We will monitor sodium Every 12 hours. The hyponatremia is due to Dehydration/hypovolemia and pseudohyponatremia in setting of hyperglycemia. We will obtain the following studies: BMP. We will treat the hyponatremia with IV fluids  and management of hyperglycemia. The patient's sodium results have been reviewed and are listed below.  Recent Labs   Lab 04/10/24  1445   *

## 2024-04-10 NOTE — SUBJECTIVE & OBJECTIVE
Past Medical History:   Diagnosis Date    Arthritis     Cataract     COPD (chronic obstructive pulmonary disease)     Diabetes mellitus, type 2     Hyperlipidemia     Hypertension     Personal history of colonic polyps 2018       Past Surgical History:   Procedure Laterality Date    ANGIOPLASTY      JOINT REPLACEMENT  2017    TOTAL KNEE ARTHROPLASTY         Review of patient's allergies indicates:  No Known Allergies    No current facility-administered medications on file prior to encounter.     Current Outpatient Medications on File Prior to Encounter   Medication Sig    amLODIPine (NORVASC) 2.5 MG tablet Take 1 tablet (2.5 mg total) by mouth once daily.    aspirin 325 MG tablet Take 325 mg by mouth once daily.    buPROPion (WELLBUTRIN SR) 150 MG TBSR 12 hr tablet Take 1 tablet (150 mg total) by mouth 2 (two) times daily. (Patient taking differently: Take 150 mg by mouth once daily.)    famotidine (PEPCID) 40 MG tablet Take 1 tablet by mouth once a day    ferrous sulfate (FEROSUL) 325 mg (65 mg iron) Tab tablet Take 1 tablet (325 mg total) by mouth 2 (two) times daily. (Patient taking differently: Take 325 mg by mouth Daily.)    fluticasone propionate (FLONASE) 50 mcg/actuation nasal spray Use 1 spray (50 mcg total) in each nostril once daily.    fluticasone-umeclidin-vilanter (TRELEGY ELLIPTA) 100-62.5-25 mcg DsDv Inhale 1 puff into the lungs once daily.    ipratropium (ATROVENT) 21 mcg (0.03 %) nasal spray Spray 1 or 2 sprays in each nasal passage every 8 hours, if needed, as directed.    lubiprostone (AMITIZA) 24 MCG Cap Take 1 capsule by mouth twice a day    magnesium oxide (MAG-OX) 400 mg (241.3 mg magnesium) tablet Take 1 tablet (400 mg total) by mouth once daily.    metFORMIN (GLUCOPHAGE) 1000 MG tablet Take 1 tablet (1,000 mg total) by mouth 2 (two) times daily with meals.    olmesartan (BENICAR) 40 MG tablet Take 1 tablet (40 mg total) by mouth once daily.    pantoprazole (PROTONIX) 40 MG tablet Take 1  tablet by mouth twice a day before breakfast and dinner    pramipexole (MIRAPEX) 0.125 MG tablet Take 2 tablets by mouth every evening.    rosuvastatin (CRESTOR) 20 MG tablet Take 1 tablet (20 mg total) by mouth once daily. (Patient taking differently: Take 20 mg by mouth every evening.)    tamsulosin (FLOMAX) 0.4 mg Cap Take 2 capsules (0.8 mg total) by mouth once daily. (Patient taking differently: Take 0.8 mg by mouth every evening.)    acetaminophen (TYLENOL) 500 MG tablet Take 1,000 mg by mouth as needed.     albuterol (VENTOLIN HFA) 90 mcg/actuation inhaler Inhale 2 puffs into the lungs every 6 (six) hours as needed for Wheezing or Shortness of Breath. Rescue    ascorbic acid, vitamin C, (VITAMIN C) 1000 MG tablet Take 1,000 mg by mouth once daily.    blood sugar diagnostic (BLOOD GLUCOSE TEST) Strp 1 strip by Misc.(Non-Drug; Combo Route) route 3 (three) times daily.    cetirizine (ZYRTEC) 10 MG tablet Take 10 mg by mouth every evening.    cholecalciferol, vitamin D3, (VITAMIN D3) 25 mcg (1,000 unit) capsule Take 1,000 Units by mouth once daily.    cyanocobalamin (VITAMIN B-12) 500 MCG tablet Take 500 mcg by mouth once daily.     dapagliflozin propanediol (FARXIGA) 10 mg tablet Take 1 tablet (10 mg total) by mouth once daily.    dulaglutide (TRULICITY) 3 mg/0.5 mL pen injector Inject 3 mg into the skin every 7 days. (Patient taking differently: Inject 3 mg into the skin every 7 days. (Thursdays))    lancets 30 gauge Misc 1 lancet by Misc.(Non-Drug; Combo Route) route 3 (three) times daily.    nitroGLYCERIN (NITROSTAT) 0.4 MG SL tablet Place 1 tablet under the tongue every 5 (five) minutes as needed for Chest pain.    pulse oximeter (PULSE OXIMETER) device Use twice daily at 8 AM and 3 PM and record the value in MyChart as directed.    sod picosulf-mag ox-citric ac (CLENPIQ) 10 mg-3.5 gram- 12 gram/175 mL Soln Drink 2 bottle by mouth split dose as directed    [DISCONTINUED] methylPREDNISolone (MEDROL, CHARLES,) 4  mg tablet use as directed     Family History       Problem Relation (Age of Onset)    Arthritis Mother    Cancer Father    Diabetes Mother          Tobacco Use    Smoking status: Former     Types: Cigars     Quit date: 10/1/2023     Years since quittin.5    Smokeless tobacco: Never    Tobacco comments:     4 cigars each day   Substance and Sexual Activity    Alcohol use: No    Drug use: No    Sexual activity: Not Currently     Partners: Female     Review of Systems   Constitutional:  Positive for activity change, appetite change, fatigue and unexpected weight change (10 lb weight loss over last month).   HENT:  Negative for congestion, rhinorrhea and sore throat.    Eyes:  Negative for visual disturbance.   Respiratory:  Positive for cough (chronic). Negative for chest tightness, shortness of breath and stridor.    Cardiovascular:  Negative for chest pain, palpitations and leg swelling.   Gastrointestinal:  Negative for abdominal pain, blood in stool, constipation, diarrhea, nausea and vomiting.   Endocrine: Positive for polydipsia and polyuria.   Genitourinary:  Positive for frequency. Negative for difficulty urinating, dysuria and hematuria.   Musculoskeletal:         Bilateral lower leg cramps    Skin:  Negative for rash and wound.   Allergic/Immunologic: Negative.    Neurological:  Negative for dizziness, weakness, light-headedness and headaches.   Hematological: Negative.    Psychiatric/Behavioral: Negative.       Objective:     Vital Signs (Most Recent):  Temp: 97.9 °F (36.6 °C) (04/10/24 1600)  Pulse: 78 (04/10/24 1600)  Resp: 18 (04/10/24 1705)  BP: 107/69 (04/10/24 1600)  SpO2: 95 % (04/10/24 1600) Vital Signs (24h Range):  Temp:  [97.9 °F (36.6 °C)] 97.9 °F (36.6 °C)  Pulse:  [78-93] 78  Resp:  [16-18] 18  SpO2:  [95 %-100 %] 95 %  BP: (103-107)/(60-69) 107/69     Weight: 97.2 kg (214 lb 4.6 oz)  Body mass index is 32.58 kg/m².     Physical Exam  Vitals and nursing note reviewed.   Constitutional:        General: He is not in acute distress.     Appearance: He is not ill-appearing or diaphoretic.   HENT:      Head: Normocephalic and atraumatic.      Mouth/Throat:      Mouth: Mucous membranes are dry.      Pharynx: Oropharynx is clear. No posterior oropharyngeal erythema.   Eyes:      General: No scleral icterus.     Extraocular Movements: Extraocular movements intact.      Conjunctiva/sclera: Conjunctivae normal.   Cardiovascular:      Rate and Rhythm: Normal rate and regular rhythm.      Heart sounds: No murmur heard.     No friction rub. No gallop.   Pulmonary:      Effort: Pulmonary effort is normal.      Breath sounds: Normal breath sounds. No wheezing, rhonchi or rales.      Comments: On room air   Abdominal:      General: Bowel sounds are normal. There is no distension.      Palpations: Abdomen is soft.      Tenderness: There is no abdominal tenderness. There is no guarding or rebound.   Genitourinary:     Comments: deferred  Musculoskeletal:      Right lower leg: No edema.      Left lower leg: No edema.   Skin:     General: Skin is dry.      Findings: No rash.   Neurological:      General: No focal deficit present.      Mental Status: He is alert and oriented to person, place, and time. Mental status is at baseline.   Psychiatric:         Mood and Affect: Mood normal.         Behavior: Behavior normal.                Significant Labs: All pertinent labs within the past 24 hours have been reviewed.    Significant Imaging: I have reviewed all pertinent imaging results/findings within the past 24 hours.

## 2024-04-10 NOTE — PHARMACY MED REC
"Admission Medication History     The home medication history was taken by Josh Stovall.    You may go to "Admission" then "Reconcile Home Medications" tabs to review and/or act upon these items.     The home medication list has been updated by the Pharmacy department.   Please read ALL comments highlighted in yellow.   Please address this information as you see fit.    Feel free to contact us if you have any questions or require assistance.      The medications listed below were removed from the home medication list. Please reorder if appropriate:  Patient reports no longer taking the following medication(s):  MEDROL DOSEPACK    Medications listed below were obtained from: Patient/family and Analytic software- [employer]: SPOUSE  (Not in a hospital admission)        Josh Stovall  [id number]    Current Outpatient Medications on File Prior to Encounter   Medication Sig Dispense Refill Last Dose    amLODIPine (NORVASC) 2.5 MG tablet Take 1 tablet (2.5 mg total) by mouth once daily. 90 tablet 3 4/10/2024    aspirin 325 MG tablet Take 325 mg by mouth once daily.   4/10/2024    buPROPion (WELLBUTRIN SR) 150 MG TBSR 12 hr tablet Take 1 tablet (150 mg total) by mouth 2 (two) times daily. (Patient taking differently: Take 150 mg by mouth once daily.) 180 tablet 2 4/10/2024    famotidine (PEPCID) 40 MG tablet Take 1 tablet by mouth once a day 30 tablet 6 4/10/2024    ferrous sulfate (FEROSUL) 325 mg (65 mg iron) Tab tablet Take 1 tablet (325 mg total) by mouth 2 (two) times daily. (Patient taking differently: Take 325 mg by mouth Daily.) 180 tablet 0 4/10/2024    fluticasone propionate (FLONASE) 50 mcg/actuation nasal spray Use 1 spray (50 mcg total) in each nostril once daily. 16 g 3 4/10/2024    fluticasone-umeclidin-vilanter (TRELEGY ELLIPTA) 100-62.5-25 mcg DsDv Inhale 1 puff into the lungs once daily. 60 each 11 4/10/2024    ipratropium (ATROVENT) 21 mcg (0.03 %) nasal spray Spray 1 or 2 sprays in each nasal " passage every 8 hours, if needed, as directed. 30 mL 12 4/10/2024    lubiprostone (AMITIZA) 24 MCG Cap Take 1 capsule by mouth twice a day 60 capsule 3 4/10/2024    magnesium oxide (MAG-OX) 400 mg (241.3 mg magnesium) tablet Take 1 tablet (400 mg total) by mouth once daily. 90 tablet 3 4/10/2024    metFORMIN (GLUCOPHAGE) 1000 MG tablet Take 1 tablet (1,000 mg total) by mouth 2 (two) times daily with meals. 180 tablet 1 4/10/2024    olmesartan (BENICAR) 40 MG tablet Take 1 tablet (40 mg total) by mouth once daily. 90 tablet 1 4/10/2024    pantoprazole (PROTONIX) 40 MG tablet Take 1 tablet by mouth twice a day before breakfast and dinner 60 tablet 11 4/10/2024    pramipexole (MIRAPEX) 0.125 MG tablet Take 2 tablets by mouth every evening.   4/9/2024    rosuvastatin (CRESTOR) 20 MG tablet Take 1 tablet (20 mg total) by mouth once daily. (Patient taking differently: Take 20 mg by mouth every evening.) 90 tablet 3 4/9/2024    tamsulosin (FLOMAX) 0.4 mg Cap Take 2 capsules (0.8 mg total) by mouth once daily. (Patient taking differently: Take 0.8 mg by mouth every evening.) 180 capsule 3 4/9/2024    acetaminophen (TYLENOL) 500 MG tablet Take 1,000 mg by mouth as needed.        albuterol (VENTOLIN HFA) 90 mcg/actuation inhaler Inhale 2 puffs into the lungs every 6 (six) hours as needed for Wheezing or Shortness of Breath. Rescue 25.5 g 3     ascorbic acid, vitamin C, (VITAMIN C) 1000 MG tablet Take 1,000 mg by mouth once daily.       blood sugar diagnostic (BLOOD GLUCOSE TEST) Strp 1 strip by Misc.(Non-Drug; Combo Route) route 3 (three) times daily. 200 each 1     cetirizine (ZYRTEC) 10 MG tablet Take 10 mg by mouth every evening.       cholecalciferol, vitamin D3, (VITAMIN D3) 25 mcg (1,000 unit) capsule Take 1,000 Units by mouth once daily.       cyanocobalamin (VITAMIN B-12) 500 MCG tablet Take 500 mcg by mouth once daily.        dapagliflozin propanediol (FARXIGA) 10 mg tablet Take 1 tablet (10 mg total) by mouth once  daily. 90 tablet 2     dulaglutide (TRULICITY) 3 mg/0.5 mL pen injector Inject 3 mg into the skin every 7 days. (Patient taking differently: Inject 3 mg into the skin every 7 days. (Thursdays)) 12 pen 1     lancets 30 gauge Misc 1 lancet by Misc.(Non-Drug; Combo Route) route 3 (three) times daily. 200 each 0     nitroGLYCERIN (NITROSTAT) 0.4 MG SL tablet Place 1 tablet under the tongue every 5 (five) minutes as needed for Chest pain. 25 tablet 3 Unknown    pulse oximeter (PULSE OXIMETER) device Use twice daily at 8 AM and 3 PM and [facility] directed. 1 each 0     sod picosulf-mag ox-citric ac (CLENPIQ) 10 mg-3.5 gram- 12 gram/175 mL Soln Drink 2 bottle by mouth split dose as directed 350 mL 0                          .

## 2024-04-10 NOTE — ED NOTES
Bed: [facility] 02  Expected date:   Expected time:   Means of arrival:   Comments:

## 2024-04-10 NOTE — FIRST PROVIDER EVALUATION
"Medical screening examination initiated.  I have conducted a focused provider triage encounter, findings are as follows:    Brief history of present illness:  Patient reports low blood pressure at home.  Patient also reports high blood sugar    Vitals:    04/10/24 1358 04/10/24 1400   BP:  103/60   BP Location:  Right arm   Patient Position:  Sitting   Pulse:  93   Resp:  16   Temp:  97.9 °F (36.6 °C)   TempSrc:  Oral   SpO2:  100%   Weight: 97.2 kg (214 lb 4.6 oz)    Height: 5' 8" (1.727 m)        Pertinent physical exam:  CBG greater than 500    Brief workup plan:  Labs, imaging, further eval    Preliminary workup initiated; this workup will be continued and followed by the physician or advanced practice provider that is assigned to the patient when roomed.  "

## 2024-04-10 NOTE — ASSESSMENT & PLAN NOTE
Patient with known CAD , which is controlled Will continue ASA and Statin and monitor for S/Sx of angina/ACS. Continue to monitor on telemetry.   Followed by Dr. Perry with [facility] as outpatient, denies prior hx stents   Continue homeASA, statin   Tele monitoring

## 2024-04-11 ENCOUNTER — TELEPHONE (OUTPATIENT)
Dept: HEMATOLOGY/ONCOLOGY | Facility: CLINIC | Age: 69
End: 2024-04-11
Payer: MEDICARE

## 2024-04-11 DIAGNOSIS — J44.9 MODERATE COPD (CHRONIC OBSTRUCTIVE PULMONARY DISEASE): Primary | ICD-10-CM

## 2024-04-11 LAB
ALBUMIN SERPL BCP-MCNC: 3.1 G/DL (ref 3.5–5.2)
ALP SERPL-CCNC: 56 U/L (ref 55–135)
ALT SERPL W/O P-5'-P-CCNC: 13 U/L (ref 10–44)
ANION GAP SERPL CALC-SCNC: 9 MMOL/L (ref 8–16)
AST SERPL-CCNC: 9 U/L (ref 10–40)
BASOPHILS # BLD AUTO: 0.05 K/UL (ref 0–0.2)
BASOPHILS NFR BLD: 0.4 % (ref 0–1.9)
BILIRUB SERPL-MCNC: 0.4 MG/DL (ref 0.1–1)
BUN SERPL-MCNC: 24 MG/DL (ref 8–23)
CALCIUM SERPL-MCNC: 8.3 MG/DL (ref 8.7–10.5)
CHLORIDE SERPL-SCNC: 105 MMOL/L (ref 95–110)
CO2 SERPL-SCNC: 18 MMOL/L (ref 23–29)
CREAT SERPL-MCNC: 1.1 MG/DL (ref 0.5–1.4)
DIFFERENTIAL METHOD BLD: ABNORMAL
EOSINOPHIL # BLD AUTO: 0.1 K/UL (ref 0–0.5)
EOSINOPHIL NFR BLD: 1 % (ref 0–8)
ERYTHROCYTE [DISTWIDTH] IN BLOOD BY AUTOMATED COUNT: 12.1 % (ref 11.5–14.5)
EST. GFR  (NO RACE VARIABLE): >60 ML/MIN/1.73 M^2
ESTIMATED AVG GLUCOSE: 344 MG/DL (ref 68–131)
GLUCOSE SERPL-MCNC: 177 MG/DL (ref 70–110)
HBA1C MFR BLD: 13.6 % (ref 4–5.6)
HCT VFR BLD AUTO: 36.7 % (ref 40–54)
HGB BLD-MCNC: 12.5 G/DL (ref 14–18)
IMM GRANULOCYTES # BLD AUTO: 0.05 K/UL (ref 0–0.04)
IMM GRANULOCYTES NFR BLD AUTO: 0.4 % (ref 0–0.5)
LYMPHOCYTES # BLD AUTO: 3 K/UL (ref 1–4.8)
LYMPHOCYTES NFR BLD: 26.6 % (ref 18–48)
MAGNESIUM SERPL-MCNC: 1.9 MG/DL (ref 1.6–2.6)
MCH RBC QN AUTO: 29.8 PG (ref 27–31)
MCHC RBC AUTO-ENTMCNC: 34.1 G/DL (ref 32–36)
MCV RBC AUTO: 87 FL (ref 82–98)
MONOCYTES # BLD AUTO: 0.7 K/UL (ref 0.3–1)
MONOCYTES NFR BLD: 5.8 % (ref 4–15)
NEUTROPHILS # BLD AUTO: 7.3 K/UL (ref 1.8–7.7)
NEUTROPHILS NFR BLD: 65.8 % (ref 38–73)
NRBC BLD-RTO: 0 /100 WBC
PHOSPHATE SERPL-MCNC: 2.8 MG/DL (ref 2.7–4.5)
PLATELET # BLD AUTO: 203 K/UL (ref 150–450)
PMV BLD AUTO: 10.5 FL (ref 9.2–12.9)
POCT GLUCOSE: 165 MG/DL (ref 70–110)
POCT GLUCOSE: 167 MG/DL (ref 70–110)
POCT GLUCOSE: 190 MG/DL (ref 70–110)
POCT GLUCOSE: 190 MG/DL (ref 70–110)
POCT GLUCOSE: 207 MG/DL (ref 70–110)
POCT GLUCOSE: 280 MG/DL (ref 70–110)
POTASSIUM SERPL-SCNC: 4.3 MMOL/L (ref 3.5–5.1)
PROT SERPL-MCNC: 5.8 G/DL (ref 6–8.4)
RBC # BLD AUTO: 4.2 M/UL (ref 4.6–6.2)
SODIUM SERPL-SCNC: 132 MMOL/L (ref 136–145)
WBC # BLD AUTO: 11.15 K/UL (ref 3.9–12.7)

## 2024-04-11 PROCEDURE — 63600175 PHARM REV CODE 636 W HCPCS: Performed by: INTERNAL MEDICINE

## 2024-04-11 PROCEDURE — 83735 ASSAY OF MAGNESIUM: CPT | Performed by: INTERNAL MEDICINE

## 2024-04-11 PROCEDURE — 82962 GLUCOSE BLOOD TEST: CPT

## 2024-04-11 PROCEDURE — 85025 COMPLETE CBC W/AUTO DIFF WBC: CPT | Performed by: INTERNAL MEDICINE

## 2024-04-11 PROCEDURE — 96372 THER/PROPH/DIAG INJ SC/IM: CPT | Performed by: INTERNAL MEDICINE

## 2024-04-11 PROCEDURE — 25000242 PHARM REV CODE 250 ALT 637 W/ HCPCS: Performed by: INTERNAL MEDICINE

## 2024-04-11 PROCEDURE — 84100 ASSAY OF PHOSPHORUS: CPT | Performed by: INTERNAL MEDICINE

## 2024-04-11 PROCEDURE — 21400001 HC TELEMETRY ROOM

## 2024-04-11 PROCEDURE — 25000003 PHARM REV CODE 250: Performed by: INTERNAL MEDICINE

## 2024-04-11 PROCEDURE — 96361 HYDRATE IV INFUSION ADD-ON: CPT

## 2024-04-11 PROCEDURE — 80053 COMPREHEN METABOLIC PANEL: CPT | Performed by: INTERNAL MEDICINE

## 2024-04-11 RX ADMIN — LUBIPROSTONE 24 MCG: 24 CAPSULE, GELATIN COATED ORAL at 09:04

## 2024-04-11 RX ADMIN — ATORVASTATIN CALCIUM 80 MG: 40 TABLET, FILM COATED ORAL at 09:04

## 2024-04-11 RX ADMIN — OXYCODONE HYDROCHLORIDE AND ACETAMINOPHEN 1000 MG: 500 TABLET ORAL at 10:04

## 2024-04-11 RX ADMIN — ENOXAPARIN SODIUM 40 MG: 40 INJECTION SUBCUTANEOUS at 04:04

## 2024-04-11 RX ADMIN — FERROUS SULFATE TAB 325 MG (65 MG ELEMENTAL FE) 1 EACH: 325 (65 FE) TAB at 10:04

## 2024-04-11 RX ADMIN — SODIUM CHLORIDE, POTASSIUM CHLORIDE, SODIUM LACTATE AND CALCIUM CHLORIDE: 600; 310; 30; 20 INJECTION, SOLUTION INTRAVENOUS at 08:04

## 2024-04-11 RX ADMIN — CETIRIZINE HYDROCHLORIDE 10 MG: 10 TABLET, FILM COATED ORAL at 09:04

## 2024-04-11 RX ADMIN — LUBIPROSTONE 24 MCG: 24 CAPSULE, GELATIN COATED ORAL at 10:04

## 2024-04-11 RX ADMIN — PANTOPRAZOLE SODIUM 40 MG: 40 TABLET, DELAYED RELEASE ORAL at 09:04

## 2024-04-11 RX ADMIN — ACETAMINOPHEN 650 MG: 325 TABLET ORAL at 09:04

## 2024-04-11 RX ADMIN — Medication 1000 UNITS: at 10:04

## 2024-04-11 RX ADMIN — PRAMIPEXOLE DIHYDROCHLORIDE 0.25 MG: 0.25 TABLET ORAL at 09:04

## 2024-04-11 RX ADMIN — FLUTICASONE PROPIONATE 50 MCG: 50 SPRAY, METERED NASAL at 10:04

## 2024-04-11 RX ADMIN — INSULIN ASPART 1 UNITS: 100 INJECTION, SOLUTION INTRAVENOUS; SUBCUTANEOUS at 09:04

## 2024-04-11 RX ADMIN — TAMSULOSIN HYDROCHLORIDE 0.8 MG: 0.4 CAPSULE ORAL at 09:04

## 2024-04-11 RX ADMIN — BUPROPION HYDROCHLORIDE 150 MG: 150 TABLET, EXTENDED RELEASE ORAL at 10:04

## 2024-04-11 RX ADMIN — INSULIN ASPART 6 UNITS: 100 INJECTION, SOLUTION INTRAVENOUS; SUBCUTANEOUS at 12:04

## 2024-04-11 RX ADMIN — PANTOPRAZOLE SODIUM 40 MG: 40 TABLET, DELAYED RELEASE ORAL at 10:04

## 2024-04-11 RX ADMIN — INSULIN ASPART 2 UNITS: 100 INJECTION, SOLUTION INTRAVENOUS; SUBCUTANEOUS at 04:04

## 2024-04-11 RX ADMIN — INSULIN ASPART 2 UNITS: 100 INJECTION, SOLUTION INTRAVENOUS; SUBCUTANEOUS at 12:04

## 2024-04-11 RX ADMIN — INSULIN DETEMIR 5 UNITS: 100 INJECTION, SOLUTION SUBCUTANEOUS at 10:04

## 2024-04-11 RX ADMIN — Medication 500 MCG: at 10:04

## 2024-04-11 RX ADMIN — ASPIRIN 325 MG ORAL TABLET 325 MG: 325 PILL ORAL at 10:04

## 2024-04-11 NOTE — ASSESSMENT & PLAN NOTE
Patient has hyponatremia which is uncontrolled,We will aim to correct the sodium by 4-6mEq in 24 hours. We will monitor sodium Every 12 hours. The hyponatremia is due to Dehydration/hypovolemia and pseudohyponatremia in setting of hyperglycemia. We will obtain the following studies: BMP. We will treat the hyponatremia with IV fluids  and management of hyperglycemia. The patient's sodium results have been reviewed and are listed below.  Recent Labs   Lab 04/11/24  0355   *     - continue correction of hyperglycemia   - monitor BMP

## 2024-04-11 NOTE — ASSESSMENT & PLAN NOTE
- last A1c 7.8%, repeat 13.6%, likely due to dietary indiscretion, hyperglycemia may have been exacerbated by recent steroid use   - start Detemir 5 u BID, continue mod dose SSI   -advance to diabetic diet   - stop fluids  - DM educator consult

## 2024-04-11 NOTE — TELEPHONE ENCOUNTER
Nurse spoke with pt in regards to  rescheduling his appt. Pt is currently admitted into [facility]. Pts appt has been rescheduled. Pt verbalized understanding of changes. Call ended well

## 2024-04-11 NOTE — HOSPITAL COURSE
BG trends improved with IV fluids and initiation of SSI. HbA1c 13.6. ALICIA resolved with hydration and management of hyperglycemia, Cr 2.1--> 1.2 prior to discharge. Diabetes educator consulted and pt underwent insulin teaching. BG remained within acceptable limits on Detemir 5 BID. PT seen and examined on day of discharge and appears stable for discharge home today per my exam. Pt counseled re: adherence with diabetic diet. Followup with PCP and Diabetes clinic within 1 week.     See below for further details.

## 2024-04-11 NOTE — PROGRESS NOTES
[facility] Medicine  Progress Note    Patient Name: Stefan Forbes Jr.  MRN: [id number]  Patient Class: OP- Observation   Admission Date: 4/10/2024  Length of Stay: 0 days  Attending Physician: Annetta Bach MD  Primary Care Provider: Wallace Fisher MD        Subjective:     Principal Problem:Controlled type 2 diabetes mellitus with hyperglycemia, without long-term current use of insulin        HPI:  Pt is a 67 YO male with PMH notable for Type 2 DM, HTN, HLD, CAD, COPD, MACHO (noncompliant with CPAP), prior PE who presented to the ED 04/10 for evaluation of high blood sugar and low blood pressure. Pt notes that for the last week his blood sugars have been running 400-500s, thought his BG meter was broken. Also notes that his BP has been running 80-90s systolic, usually 110-120s. He has been increasingly thirsty, tired over the same time and with increased urinary frequency. Also repots cramping to bilateral lower extremities. Does not use insulin at home but reports compliance with Metformin, trulicity and Farxiga at home.     Reports that he quit smoking cigars approx 2 months ago and since  then has been eating a lot more sugary foods. Also notes that he was started on Medrol dose bruna and Zpak by PCP approx 1 week ago for URI symptoms/COPD, completed both 1 day prior to admission.    In the ED, initial VS: afebrile, HR 93, RR 16, sats 100% on room air, /60. Work up notable for: WBC 11, Na 129, K 5.4, AG 14, bicarb 19, . UA with 4+ glucose, trace ketones. ABG without signigicant acidosis. Pt received 2L NS, 10 U IV insulin. Hospital medicine consulted for admission for management of hyperglycemia without DKA     Overview/Hospital Course:  BG trends improved with IV fluids and initiation of SSI. HbA1c 13.6. Diabetes educator consulted. Dispo pending improvement in hyperglycemia     Interval History: NAEON. Pt reports he feels ok today, less fatigue. Dry mouth improved, asking to  eat. Denies CP, SOB.     Review of Systems  Objective:     Vital Signs (Most Recent):  Temp: 96.3 °F (35.7 °C) (04/11/24 1215)  Pulse: 69 (04/11/24 1215)  Resp: 17 (04/11/24 1215)  BP: 111/65 (04/11/24 1215)  SpO2: 95 % (04/11/24 1215) Vital Signs (24h Range):  Temp:  [96.3 °F (35.7 °C)-98.4 °F (36.9 °C)] 96.3 °F (35.7 °C)  Pulse:  [55-78] 69  Resp:  [13-23] 17  SpO2:  [95 %-98 %] 95 %  BP: (102-142)/(61-78) 111/65     Weight: 98.5 kg (217 lb 2.5 oz)  Body mass index is 33.02 kg/m².    Intake/Output Summary (Last 24 hours) at 4/11/2024 1414  Last data filed at 4/11/2024 0730  Gross per 24 hour   Intake 2000 ml   Output 1100 ml   Net 900 ml         Physical Exam  Vitals and nursing note reviewed.   Constitutional:       General: He is not in acute distress.     Appearance: Normal appearance. He is not ill-appearing.   Cardiovascular:      Rate and Rhythm: Normal rate and regular rhythm.      Heart sounds: No murmur heard.     No friction rub. No gallop.   Pulmonary:      Effort: Pulmonary effort is normal.      Breath sounds: Normal breath sounds. No wheezing, rhonchi or rales.   Abdominal:      General: Bowel sounds are normal. There is no distension.      Palpations: Abdomen is soft.      Tenderness: There is no abdominal tenderness.   Musculoskeletal:      Right lower leg: No edema.      Left lower leg: No edema.   Neurological:      Mental Status: He is alert and oriented to person, place, and time. Mental status is at baseline.             Significant Labs: All pertinent labs within the past 24 hours have been reviewed.    Significant Imaging: I have reviewed all pertinent imaging results/findings within the past 24 hours.    Assessment/Plan:      * Controlled type 2 diabetes mellitus with hyperglycemia, without long-term current use of insulin  - last A1c 7.8%, repeat 13.6%, likely due to dietary indiscretion, hyperglycemia may have been exacerbated by recent steroid use   - start Detemir 5 u BID, continue mod  dose SSI   -advance to diabetic diet   - stop fluids  - DM educator consult     ALICIA (acute kidney injury)  Patient with acute kidney injury/acute renal failure likely due to pre-renal azotemia due to IVVD ALICIA is currently improving. Baseline creatinine  1.0  - Labs reviewed- Renal function/electrolytes with Estimated Creatinine Clearance: 75.1 mL/min (based on SCr of 1.1 mg/dL). according to latest data. Monitor urine output and serial BMP and adjust therapy as needed. Avoid nephrotoxins and renally dose meds for GFR listed above.  - s/p 2L fluids in ED and additional LR infusion   - stop fluids, resume diet   - monitor BMP   - hold ARB for now, will resume if BP and Cr remain stable     Hyponatremia  Patient has hyponatremia which is uncontrolled,We will aim to correct the sodium by 4-6mEq in 24 hours. We will monitor sodium Every 12 hours. The hyponatremia is due to Dehydration/hypovolemia and pseudohyponatremia in setting of hyperglycemia. We will obtain the following studies: BMP. We will treat the hyponatremia with IV fluids  and management of hyperglycemia. The patient's sodium results have been reviewed and are listed below.  Recent Labs   Lab 04/11/24  0355   *     - continue correction of hyperglycemia   - monitor BMP     MACHO on CPAP  Noncompliant with home CPAP  Monitor pulse ox       CAD (coronary artery disease)  Patient with known CAD , which is controlled Will continue ASA and Statin and monitor for S/Sx of angina/ACS. Continue to monitor on telemetry.   Followed by Dr. Perry with [facility] as outpatient, denies prior hx stents   Continue homeASA, statin   Tele monitoring     Moderate COPD (chronic obstructive pulmonary disease)  Patient's COPD is controlled currently.  Patient is currently off COPD Pathway. Continue scheduled inhalers  and monitor respiratory status closely.     Hyperlipidemia associated with type 2 diabetes mellitus  - continue home statin       Hypertension associated with  diabetes  - hold ARB in setting of ALICIA  - hold norvasc as BP low normal   - monitor BP         VTE Risk Mitigation (From admission, onward)           Ordered     enoxaparin injection 40 mg  Daily         04/10/24 1743     IP VTE HIGH RISK PATIENT  Once         04/10/24 1743     Place sequential compression device  Until discontinued         04/10/24 1743                    Discharge Planning   JACQUI:      Code Status: Full Code   Is the patient medically ready for discharge?: No    Reason for patient still in hospital (select all that apply): Patient trending condition and Treatment  Discharge Plan A: Home with family, Home          Inpatient Upgrade Note    Stefan NOE Leidy Griffin. has warranted treatment spanning two or more midnights of hospital level care for the management of  hyperglycemia without DKA . He continues to require daily labs and advancing diet. His condition is also complicated by the following comorbidities: Coronary Artery Disease, Hypertension, Diabetes, and Obesity.          Annetta Bach MD  Department of Hospital Medicine   [facility] Med [facility]

## 2024-04-11 NOTE — CONSULTS
Consult received. The medical records were reviewed.     Current admission diagnosis: Hyponatremia [E87.1]  Hyperglycemia [R73.9]  Hyperglycemia without ketosis [R73.9]  Volume depletion [E86.9]  ALICIA (acute kidney injury) [N17.9]  Chest pain [R07.9]  Hypotension, unspecified hypotension type [I95.9    Patient states he has been diagnosed with diabetes since 2012. States he had a don't care attitude lately. States he has not been eating a diabetic diet because he has been depressed and stopped smoking. Patient encouraged. Verbalized understanding of a diabetic diet. Demonstrated insulin administration and  patient returned demonstration correctly.     Past Medical History:   Diagnosis Date    Arthritis     Cataract     COPD (chronic obstructive pulmonary disease)     Diabetes mellitus, type 2     Hyperlipidemia     Hypertension     Personal history of colonic polyps 2018       Lab Results   Component Value Date    HGBA1C 13.6 (H) 04/10/2024          Lab Results   Component Value Date    POCTGLUCOSE 280 (H) 04/11/2024    POCTGLUCOSE 167 (H) 04/11/2024    POCTGLUCOSE 165 (H) 04/11/2024    POCTGLUCOSE 207 (H) 04/11/2024    POCTGLUCOSE 229 (H) 04/10/2024       Met with pt  for diabetes management practices. Reviewed current A1C and target. Reviewed disease process and medical complications associated with uncontrolled diabetes. Encouraged lifestyle management to include weight loss, increased activity, and appropriate diet as a part of diabetes management.     Reviewed medications, how they work and possible S/E. Discussed insulin: including types of insulin & onset, peak and duration of action, proper insulin storage, proper injection technique, importance of site rotation, and disposal of sharps. Reviewed insulin injections technique and patient meets all protocols.     Pt reports having  meter and supplies at home and also participating in digital medicine with [facility]. Pt checks blood sugars twice monthly on  average. This months blood sugar levels have been 300-500. States the  digital medicine providers do call him. Provided log with recommended testing times and expected results. Discussed S/S of hypoglycemia. Reviewed appropriate treatment options. Reviewed S/S of hyperglycemia. Discussed when to test for ketones: BS>250, as well as S/S of DKA. Reviewed ketone results and when to seek medical help.     Discussed carbohydrate containing foods,  serving sizes, Plate Method, label reading,  and AdExtent Carlos Renee.  Encouraged to avoid obvious sources of sugar.     Reviewed the diabetes care checklist and the importance of regular follow up with physician. Left diabetes education packet for reference and contact information. Encouraged pt to contact diabetes education team with any questions or concerns.

## 2024-04-11 NOTE — SUBJECTIVE & OBJECTIVE
Interval History: NAEON. Pt reports he feels ok today, less fatigue. Dry mouth improved, asking to eat. Denies CP, SOB.     Review of Systems  Objective:     Vital Signs (Most Recent):  Temp: 96.3 °F (35.7 °C) (04/11/24 1215)  Pulse: 69 (04/11/24 1215)  Resp: 17 (04/11/24 1215)  BP: 111/65 (04/11/24 1215)  SpO2: 95 % (04/11/24 1215) Vital Signs (24h Range):  Temp:  [96.3 °F (35.7 °C)-98.4 °F (36.9 °C)] 96.3 °F (35.7 °C)  Pulse:  [55-78] 69  Resp:  [13-23] 17  SpO2:  [95 %-98 %] 95 %  BP: (102-142)/(61-78) 111/65     Weight: 98.5 kg (217 lb 2.5 oz)  Body mass index is 33.02 kg/m².    Intake/Output Summary (Last 24 hours) at 4/11/2024 1414  Last data filed at 4/11/2024 0730  Gross per 24 hour   Intake 2000 ml   Output 1100 ml   Net 900 ml         Physical Exam  Vitals and nursing note reviewed.   Constitutional:       General: He is not in acute distress.     Appearance: Normal appearance. He is not ill-appearing.   Cardiovascular:      Rate and Rhythm: Normal rate and regular rhythm.      Heart sounds: No murmur heard.     No friction rub. No gallop.   Pulmonary:      Effort: Pulmonary effort is normal.      Breath sounds: Normal breath sounds. No wheezing, rhonchi or rales.   Abdominal:      General: Bowel sounds are normal. There is no distension.      Palpations: Abdomen is soft.      Tenderness: There is no abdominal tenderness.   Musculoskeletal:      Right lower leg: No edema.      Left lower leg: No edema.   Neurological:      Mental Status: He is alert and oriented to person, place, and time. Mental status is at baseline.             Significant Labs: All pertinent labs within the past 24 hours have been reviewed.    Significant Imaging: I have reviewed all pertinent imaging results/findings within the past 24 hours.

## 2024-04-11 NOTE — PLAN OF CARE
[facility]  Initial Discharge Assessment       Primary Care Provider: Wallace Fisher MD    Admission Diagnosis: Hyponatremia [E87.1]  Hyperglycemia [R73.9]  Hyperglycemia without ketosis [R73.9]  Volume depletion [E86.9]  ALICIA (acute kidney injury) [N17.9]  Chest pain [R07.9]  Hypotension, unspecified hypotension type [I95.9]    Admission Date: 4/10/2024  Expected Discharge Date: per attending    Transition of Care Barriers: None    Payor: [employer] [facility] / Plan: [employer] CHOICES / Product Type: Medicare Advantage /     Extended Emergency Contact Information  Primary Emergency Contact: Lisa Forbes  Address: [address] [place] [address] [id number] [place] [address]  Home Phone: [phone]  Relation: Spouse    Discharge Plan A: Home with family, Home         [facility] [address] [address]  Phone: [phone] Fax: [phone]    [facility] [address] [facility]  [address] [facility] [address]  Phone: [phone] Fax: [phone]    [facility] [address] [place], [place] - [address]  [address] [facility] [address]  Phone: [phone] Fax: [phone]      Initial Assessment (most recent)       Adult Discharge Assessment - 04/11/24 1106          Discharge Assessment    Assessment Type Discharge Planning Assessment     Confirmed/corrected address, phone number and insurance Yes     Confirmed Demographics Correct on Facesheet     Source of Information patient     Does patient/caregiver understand observation status Yes     Communicated JACQUI with patient/caregiver Date not available/Unable to determine     Reason For Admission controlled type 2 diabetes mellitus with hyperglycemia, without long term current use of insulin     People in Home spouse     Facility Arrived From: home     Do you expect to return to your current living situation? Yes     Do  you have help at home or someone to help you manage your care at home? Yes     Who are your caregiver(s) and their phone number(s)? spouse Lisa Forbes [phone]     Prior to hospitilization cognitive status: Alert/Oriented     Current cognitive status: Alert/Oriented     Walking or Climbing Stairs Difficulty no     Dressing/Bathing Difficulty no     Equipment Currently Used at Home none;CPAP   sometimes uses CPAP; states that the machine does not work correctly sometimes    Readmission within 30 days? No     Patient currently being followed by outpatient case management? No     Do you currently have service(s) that help you manage your care at home? No     Do you take prescription medications? Yes     Do you have prescription coverage? Yes     Coverage [employer]     Do you have any problems affording any of your prescribed medications? No     Is the patient taking medications as prescribed? yes     Who is going to help you get home at discharge? spouse Lisa Forbes [phone]     How do you get to doctors appointments? car, drives self     Are you on dialysis? No     Do you take coumadin? No     Discharge Plan A Home with family;Home     Discharge Plan discussed with: Patient     Transition of Care Barriers None        Housing Stability    In the last 12 months, was there a time when you were not able to pay the mortgage or rent on time? No     In the last 12 months, was there a time when you did not have a steady place to sleep or slept in a shelter (including now)? No        Transportation Needs    In the past 12 months, has lack of transportation kept you from medical appointments or from getting medications? No     In the past 12 months, has lack of transportation kept you from meetings, work, or from getting things needed for daily living? No        Food Insecurity    Within the past 12 months, you worried that your food would run out before you got the money to buy  more. Never true     Within the past 12 months, the food you bought just didn't last and you didn't have money to get more. Never true                          SW Intern met with pt to complete initial assessment and to discuss d/c planning needs. SW Intern introduced pt on the role of case management. Pt stated he has a CPAP at home but does not use it regularly because he does not think it is needed regularly. Pt also stated CPAP does not work sometimes. Pt has no d/c needs known at this time. SW Intern to follow up with planning purposes as needed.

## 2024-04-11 NOTE — ASSESSMENT & PLAN NOTE
Patient with acute kidney injury/acute renal failure likely due to pre-renal azotemia due to IVVD ALICIA is currently improving. Baseline creatinine  1.0  - Labs reviewed- Renal function/electrolytes with Estimated Creatinine Clearance: 75.1 mL/min (based on SCr of 1.1 mg/dL). according to latest data. Monitor urine output and serial BMP and adjust therapy as needed. Avoid nephrotoxins and renally dose meds for GFR listed above.  - s/p 2L fluids in ED and additional LR infusion   - stop fluids, resume diet   - monitor BMP   - hold ARB for now, will resume if BP and Cr remain stable

## 2024-04-12 ENCOUNTER — TELEPHONE (OUTPATIENT)
Dept: DIABETES | Facility: CLINIC | Age: 69
End: 2024-04-12
Payer: MEDICARE

## 2024-04-12 VITALS
BODY MASS INDEX: 32.91 KG/M2 | TEMPERATURE: 98 F | HEART RATE: 66 BPM | RESPIRATION RATE: 16 BRPM | OXYGEN SATURATION: 96 % | HEIGHT: 68 IN | SYSTOLIC BLOOD PRESSURE: 120 MMHG | DIASTOLIC BLOOD PRESSURE: 74 MMHG | WEIGHT: 217.13 LBS

## 2024-04-12 LAB
ALBUMIN SERPL BCP-MCNC: 3.3 G/DL (ref 3.5–5.2)
ALP SERPL-CCNC: 62 U/L (ref 55–135)
ALT SERPL W/O P-5'-P-CCNC: 12 U/L (ref 10–44)
ANION GAP SERPL CALC-SCNC: 12 MMOL/L (ref 8–16)
AST SERPL-CCNC: 12 U/L (ref 10–40)
BASOPHILS # BLD AUTO: 0.05 K/UL (ref 0–0.2)
BASOPHILS NFR BLD: 0.6 % (ref 0–1.9)
BILIRUB SERPL-MCNC: 0.3 MG/DL (ref 0.1–1)
BUN SERPL-MCNC: 17 MG/DL (ref 8–23)
CALCIUM SERPL-MCNC: 8.9 MG/DL (ref 8.7–10.5)
CHLORIDE SERPL-SCNC: 101 MMOL/L (ref 95–110)
CO2 SERPL-SCNC: 21 MMOL/L (ref 23–29)
CREAT SERPL-MCNC: 1.2 MG/DL (ref 0.5–1.4)
DIFFERENTIAL METHOD BLD: ABNORMAL
EOSINOPHIL # BLD AUTO: 0.2 K/UL (ref 0–0.5)
EOSINOPHIL NFR BLD: 2.4 % (ref 0–8)
ERYTHROCYTE [DISTWIDTH] IN BLOOD BY AUTOMATED COUNT: 12.2 % (ref 11.5–14.5)
EST. GFR  (NO RACE VARIABLE): >60 ML/MIN/1.73 M^2
GLUCOSE SERPL-MCNC: 139 MG/DL (ref 70–110)
HCT VFR BLD AUTO: 40.2 % (ref 40–54)
HGB BLD-MCNC: 13.5 G/DL (ref 14–18)
IMM GRANULOCYTES # BLD AUTO: 0.03 K/UL (ref 0–0.04)
IMM GRANULOCYTES NFR BLD AUTO: 0.4 % (ref 0–0.5)
LYMPHOCYTES # BLD AUTO: 2.8 K/UL (ref 1–4.8)
LYMPHOCYTES NFR BLD: 33.5 % (ref 18–48)
MAGNESIUM SERPL-MCNC: 1.7 MG/DL (ref 1.6–2.6)
MCH RBC QN AUTO: 29.8 PG (ref 27–31)
MCHC RBC AUTO-ENTMCNC: 33.6 G/DL (ref 32–36)
MCV RBC AUTO: 89 FL (ref 82–98)
MONOCYTES # BLD AUTO: 0.7 K/UL (ref 0.3–1)
MONOCYTES NFR BLD: 7.9 % (ref 4–15)
NEUTROPHILS # BLD AUTO: 4.6 K/UL (ref 1.8–7.7)
NEUTROPHILS NFR BLD: 55.2 % (ref 38–73)
NRBC BLD-RTO: 0 /100 WBC
PHOSPHATE SERPL-MCNC: 2.7 MG/DL (ref 2.7–4.5)
PLATELET # BLD AUTO: 217 K/UL (ref 150–450)
PMV BLD AUTO: 11.1 FL (ref 9.2–12.9)
POCT GLUCOSE: 139 MG/DL (ref 70–110)
POCT GLUCOSE: 143 MG/DL (ref 70–110)
POCT GLUCOSE: 230 MG/DL (ref 70–110)
POTASSIUM SERPL-SCNC: 4.5 MMOL/L (ref 3.5–5.1)
PROT SERPL-MCNC: 6.5 G/DL (ref 6–8.4)
RBC # BLD AUTO: 4.53 M/UL (ref 4.6–6.2)
SODIUM SERPL-SCNC: 134 MMOL/L (ref 136–145)
WBC # BLD AUTO: 8.35 K/UL (ref 3.9–12.7)

## 2024-04-12 PROCEDURE — 80053 COMPREHEN METABOLIC PANEL: CPT | Performed by: INTERNAL MEDICINE

## 2024-04-12 PROCEDURE — 84100 ASSAY OF PHOSPHORUS: CPT | Performed by: INTERNAL MEDICINE

## 2024-04-12 PROCEDURE — 36415 COLL VENOUS BLD VENIPUNCTURE: CPT | Performed by: INTERNAL MEDICINE

## 2024-04-12 PROCEDURE — 83735 ASSAY OF MAGNESIUM: CPT | Performed by: INTERNAL MEDICINE

## 2024-04-12 PROCEDURE — 25000003 PHARM REV CODE 250: Performed by: INTERNAL MEDICINE

## 2024-04-12 PROCEDURE — 85025 COMPLETE CBC W/AUTO DIFF WBC: CPT | Performed by: INTERNAL MEDICINE

## 2024-04-12 RX ORDER — FERROUS SULFATE 325(65) MG
325 TABLET ORAL DAILY
Qty: 90 TABLET | Refills: 0 | Status: SHIPPED | OUTPATIENT
Start: 2024-04-12 | End: 2024-05-30 | Stop reason: ALTCHOICE

## 2024-04-12 RX ORDER — PEN NEEDLE, DIABETIC 30 GX3/16"
1 NEEDLE, DISPOSABLE MISCELLANEOUS 2 TIMES DAILY
Qty: 100 EACH | Refills: 0 | Status: SHIPPED | OUTPATIENT
Start: 2024-04-12 | End: 2024-05-28 | Stop reason: SDUPTHER

## 2024-04-12 RX ADMIN — INSULIN ASPART 4 UNITS: 100 INJECTION, SOLUTION INTRAVENOUS; SUBCUTANEOUS at 11:04

## 2024-04-12 RX ADMIN — Medication 500 MCG: at 11:04

## 2024-04-12 RX ADMIN — OXYCODONE HYDROCHLORIDE AND ACETAMINOPHEN 1000 MG: 500 TABLET ORAL at 11:04

## 2024-04-12 RX ADMIN — Medication 1000 UNITS: at 11:04

## 2024-04-12 RX ADMIN — ASPIRIN 325 MG ORAL TABLET 325 MG: 325 PILL ORAL at 11:04

## 2024-04-12 RX ADMIN — LUBIPROSTONE 24 MCG: 24 CAPSULE, GELATIN COATED ORAL at 11:04

## 2024-04-12 RX ADMIN — BUPROPION HYDROCHLORIDE 150 MG: 150 TABLET, EXTENDED RELEASE ORAL at 11:04

## 2024-04-12 NOTE — PLAN OF CARE
Discussed poc with pt, verbalized understanding     Purposeful rounding every 2hours    VS wnl  Cardiac monitoring in use  Fall precautions in place, remains injury free  Pt denies c/o n/v  Pain being managed with PRN medication    Accurate I&Os  Bed locked at lowest position  Call light within reach    Chart check complete  Will cont with POC    Problem: Adult Inpatient Plan of Care  Goal: Plan of Care Review  Outcome: Ongoing, Progressing  Goal: Patient-Specific Goal (Individualized)  Outcome: Ongoing, Progressing  Goal: Absence of Hospital-Acquired Illness or Injury  Outcome: Ongoing, Progressing  Goal: Optimal Comfort and Wellbeing  Outcome: Ongoing, Progressing  Goal: Readiness for Transition of Care  Outcome: Ongoing, Progressing     Problem: Diabetes Comorbidity  Goal: Blood Glucose Level Within Targeted Range  Outcome: Ongoing, Progressing     Problem: Fluid and Electrolyte Imbalance (Acute Kidney Injury/Impairment)  Goal: Fluid and Electrolyte Balance  Outcome: Ongoing, Progressing     Problem: Oral Intake Inadequate (Acute Kidney Injury/Impairment)  Goal: Optimal Nutrition Intake  Outcome: Ongoing, Progressing     Problem: Renal Function Impairment (Acute Kidney Injury/Impairment)  Goal: Effective Renal Function  Outcome: Ongoing, Progressing

## 2024-04-12 NOTE — ASSESSMENT & PLAN NOTE
Resolved   - s/p 2L fluids in ED and additional LR infusion   Cr improved from 2.1--1.2, Cr now at baseline   Followup with PCP for repeat labs

## 2024-04-12 NOTE — PLAN OF CARE
Discussed poc with pt, pt verbalized understanding    VS wnl  Cardiac monitoring in use, pt is NSR  Blood glucose monitoring   Fall precautions in place, remains injury free  Pt denies c/o pain    Accurate I&Os  Bed locked at lowest position  Call light within reach    Chart check complete  Will cont with POC

## 2024-04-12 NOTE — DISCHARGE SUMMARY
[facility] Medicine  Discharge Summary      Patient Name: Stefan Forbes Jr.  MRN: [id number]  [facility]: [id number]  Patient Class: IP- Inpatient  Admission Date: 4/10/2024  Hospital Length of Stay: 1 days  Discharge Date and Time:  04/12/2024 11:01 AM  Attending Physician: Annetta Bach MD   Discharging Provider: Annetta Bach MD  Primary Care Provider: Wallace Fisher MD    Primary Care Team: Networked reference to record PCT     HPI:   Pt is a 67 YO male with PMH notable for Type 2 DM, HTN, HLD, CAD, COPD, MACHO (noncompliant with CPAP), prior PE who presented to the ED 04/10 for evaluation of high blood sugar and low blood pressure. Pt notes that for the last week his blood sugars have been running 400-500s, thought his BG meter was broken. Also notes that his BP has been running 80-90s systolic, usually 110-120s. He has been increasingly thirsty, tired over the same time and with increased urinary frequency. Also repots cramping to bilateral lower extremities. Does not use insulin at home but reports compliance with Metformin, trulicity and Farxiga at home.     Reports that he quit smoking cigars approx 2 months ago and since  then has been eating a lot more sugary foods. Also notes that he was started on Medrol dose bruna and Zpak by PCP approx 1 week ago for URI symptoms/COPD, completed both 1 day prior to admission.    In the ED, initial VS: afebrile, HR 93, RR 16, sats 100% on room air, /60. Work up notable for: WBC 11, Na 129, K 5.4, AG 14, bicarb 19, . UA with 4+ glucose, trace ketones. ABG without signigicant acidosis. Pt received 2L NS, 10 U IV insulin. Hospital medicine consulted for admission for management of hyperglycemia without DKA     * No surgery found *      Hospital Course:   BG trends improved with IV fluids and initiation of SSI. HbA1c 13.6. ALICIA resolved with hydration and management of hyperglycemia, Cr 2.1--> 1.2 prior to discharge. Diabetes educator  consulted and pt underwent insulin teaching. BG remained within acceptable limits on Detemir 5 BID. PT seen and examined on day of discharge and appears stable for discharge home today per my exam. Pt counseled re: adherence with diabetic diet. Followup with PCP and Diabetes clinic within 1 week.     See below for further details.      Goals of Care Treatment Preferences:  Code Status: Full Code      Consults:   Consults (From admission, onward)          Status Ordering Provider     Inpatient consult to Diabetes educator  Once        Provider:  (Not yet assigned)    Completed EARLENE KIRBY            Cardiac/Vascular  Hypertension associated with diabetes  - resume ARB   - hold norvasc   - monitor BP       Renal/  ALICIA (acute kidney injury)  Resolved   - s/p 2L fluids in ED and additional LR infusion   Cr improved from 2.1--1.2, Cr now at baseline   Followup with PCP for repeat labs     Endocrine  * Controlled type 2 diabetes mellitus with hyperglycemia, without long-term current use of insulin  - last A1c 7.8%, repeat 13.6%, likely due to dietary indiscretion, hyperglycemia may have been exacerbated by recent steroid use   - Continue Detemir 5 u BID   - Diabetic diet   - DM education completed   - followup with PCP and diabetes clinic for further monitoring and management     Hyponatremia    - resolved with correction of hyperglycemia   - monitor BMP       Final Active Diagnoses:    Diagnosis Date Noted POA    PRINCIPAL PROBLEM:  Controlled type 2 diabetes mellitus with hyperglycemia, without long-term current use of insulin [E11.65]  Yes    ALICIA (acute kidney injury) [N17.9] 04/10/2024 Yes    Hyponatremia [E87.1] 02/22/2018 Yes    MACHO on CPAP [G47.33]  Yes    CAD (coronary artery disease) [I25.10] 02/02/2015 Yes    Hypertension associated with diabetes [E11.59, I15.2]  Yes    Hyperlipidemia associated with type 2 diabetes mellitus [E11.69, E78.5]  Yes    Moderate COPD (chronic obstructive pulmonary disease)  "[J44.9]  Yes      Problems Resolved During this Admission:       Discharged Condition: good    Disposition: Home or Self Care    Follow Up:   Follow-up Information       Wallace Fisher MD. Schedule an appointment as soon as possible for a visit in 3 day(s).    Specialty: Internal Medicine  Contact information:  [address]  [place] [address]  [phone]               Lashell Harrington FNP. Schedule an appointment as soon as possible for a visit.    Specialty: Diabetes  Contact information:  [address] [place] [address]  [phone]                           Patient Instructions:      Diet diabetic     Notify your health care provider if you experience any of the following:  persistent dizziness, light-headedness, or visual disturbances     Notify your health care provider if you experience any of the following:  increased confusion or weakness     Notify your health care provider if you experience any of the following:  persistent nausea and vomiting or diarrhea     Activity as tolerated       Significant Diagnostic Studies: See Hospital Course     Pending Diagnostic Studies:       None           Medications:  Reconciled Home Medications:      Medication List        START taking these medications      insulin detemir U-100 (Levemir) 100 unit/mL (3 mL) Inpn pen  Inject 5 Units into the skin 2 (two) times daily.     pen needle, diabetic 32 gauge x 5/32" Ndle  1 each by Misc.(Non-Drug; Combo Route) route 2 (two) times a day.            CHANGE how you take these medications      rosuvastatin 20 MG tablet  Commonly known as: CRESTOR  Take 1 tablet (20 mg total) by mouth once daily.  What changed: when to take this     tamsulosin 0.4 mg Cap  Commonly known as: FLOMAX  Take 2 capsules (0.8 mg total) by mouth once daily.  What changed: when to take this            CONTINUE taking these medications      acetaminophen 500 MG tablet  Commonly known as: TYLENOL  Take 1,000 mg by mouth as " needed.     albuterol 90 mcg/actuation inhaler  Commonly known as: VENTOLIN HFA  Inhale 2 puffs into the lungs every 6 (six) hours as needed for Wheezing or Shortness of Breath. Rescue     ascorbic acid (vitamin C) 1000 MG tablet  Commonly known as: VITAMIN C  Take 1,000 mg by mouth once daily.     aspirin 325 MG tablet  Take 325 mg by mouth once daily.     buPROPion 150 MG TBSR 12 hr tablet  Commonly known as: WELLBUTRIN SR  Take 1 tablet (150 mg total) by mouth 2 (two) times daily.     cetirizine 10 MG tablet  Commonly known as: ZYRTEC  Take 10 mg by mouth every evening.     cholecalciferol (vitamin D3) 25 mcg (1,000 unit) capsule  Commonly known as: VITAMIN D3  Take 1,000 Units by mouth once daily.     CLENPIQ 10 mg-3.5 gram- 12 gram/175 mL Soln  Generic drug: sod picosulf-mag ox-citric ac  Drink 2 bottle by mouth split dose as directed     famotidine 40 MG tablet  Commonly known as: PEPCID  Take 1 tablet by mouth once a day     FARXIGA 10 mg tablet  Generic drug: dapagliflozin propanediol  Take 1 tablet (10 mg total) by mouth once daily.     ferrous sulfate 325 mg (65 mg iron) Tab tablet  Commonly known as: FeroSuL  Take 1 tablet (325 mg total) by mouth Daily.     fluticasone propionate 50 mcg/actuation nasal spray  Commonly known as: FLONASE  Use 1 spray (50 mcg total) in each nostril once daily.     ipratropium 21 mcg (0.03 %) nasal spray  Commonly known as: ATROVENT  Spray 1 or 2 sprays in each nasal passage every 8 hours, if needed, as directed.     lubiprostone 24 MCG Cap  Commonly known as: AMITIZA  Take 1 capsule by mouth twice a day     magnesium oxide 400 mg (241.3 mg magnesium) tablet  Commonly known as: MAG-OX  Take 1 tablet (400 mg total) by mouth once daily.     metFORMIN 1000 MG tablet  Commonly known as: GLUCOPHAGE  Take 1 tablet (1,000 mg total) by mouth 2 (two) times daily with meals.     nitroGLYCERIN 0.4 MG SL tablet  Commonly known as: NITROSTAT  Place 1 tablet under the tongue every 5 (five)  minutes as needed for Chest pain.     olmesartan 40 MG tablet  Commonly known as: BENICAR  Take 1 tablet (40 mg total) by mouth once daily.     pantoprazole 40 MG tablet  Commonly known as: PROTONIX  Take 1 tablet by mouth twice a day before breakfast and dinner     pramipexole 0.125 MG tablet  Commonly known as: MIRAPEX  Take 2 tablets by mouth every evening.     pulse oximeter device  Commonly known as: pulse oximeter  Use twice daily at 8 AM and 3 PM and [facility] as [facility].     TRELEGY ELLIPTA 100-62.5-25 mcg Dsdv  Generic drug: fluticasone-umeclidin-vilanter  Inhale 1 puff into the lungs once daily.     TRUE METRIX GLUCOSE TEST STRIP Strp  Generic drug: blood sugar diagnostic  1 strip by Misc.(Non-Drug; Combo Route) route 3 (three) times daily.     TRUEPLUS LANCETS 30 gauge Misc  Generic drug: lancets  1 lancet by Misc.(Non-Drug; Combo Route) route 3 (three) times daily.     TRULICITY 3 mg/0.5 mL pen injector  Generic drug: dulaglutide  Inject 3 mg into the skin every 7 days.     VITAMIN B-12 500 MCG tablet  Generic drug: cyanocobalamin  Take 500 mcg by mouth once daily.            STOP taking these medications      amLODIPine 2.5 MG tablet  Commonly known as: NORVASC              Indwelling Lines/Drains at time of discharge:   Lines/Drains/Airways       None                   Time spent on the discharge of patient: 35 minutes         Annetta Bach MD  Department of Hospital Medicine  [facility]

## 2024-04-12 NOTE — PLAN OF CARE
[facility]  Discharge Final Note    Primary Care Provider: Wallace Fisher MD    Expected Discharge Date: 4/12/2024    Final Discharge Note (most recent)       Final Note - 04/12/24 0858          Final Note    Assessment Type Final Discharge Note     Anticipated Discharge Disposition Home or Self Care     Hospital Resources/Appts/Education Provided Appointments scheduled and added to AVS                                Contact Info       Wallace Fisher MD   Specialty: Internal Medicine   Relationship: PCP - General  Hypertension Digital Medicine Responsible Provider  Diabetes Digital Medicine Responsible Provider    [address] [place] [address]  [place] [address]   Phone: [phone]       Next Steps: Schedule an appointment as soon as possible for a visit in 3 day(s)    Lashell Harrington, MIKAELP   Specialty: Diabetes    [address] [place] [address] [address] [place] [address]   Phone: [phone]       Next Steps: Schedule an appointment as soon as possible for a visit

## 2024-04-12 NOTE — ASSESSMENT & PLAN NOTE
- last A1c 7.8%, repeat 13.6%, likely due to dietary indiscretion, hyperglycemia may have been exacerbated by recent steroid use   - Continue Detemir 5 u BID   - Diabetic diet   - DM education completed   - followup with PCP and diabetes clinic for further monitoring and management

## 2024-04-12 NOTE — TELEPHONE ENCOUNTER
----- Message from Karen Sandra RN sent at 4/12/2024  9:25 AM CDT -----  Contact: davis  Mr. Forbes is in the hospital for hyperglycemia.   He was a pt of [facility] and needs to be re-established with one of our providers.  Can you please help him get scheduled with the soonest appt available.   Thank you!!  ----- Message -----  From: Tatianna Salas  Sent: 4/11/2024   3:44 PM CDT  To: Francheska Gonzalez is calling requesting an appointment.  Please give her a call back at [phone]

## 2024-04-15 ENCOUNTER — TELEPHONE (OUTPATIENT)
Dept: INTERNAL MEDICINE | Facility: CLINIC | Age: 69
End: 2024-04-15
Payer: MEDICARE

## 2024-04-15 DIAGNOSIS — E11.9 CONTROLLED TYPE 2 DIABETES MELLITUS WITHOUT COMPLICATION, WITHOUT LONG-TERM CURRENT USE OF INSULIN: ICD-10-CM

## 2024-04-15 RX ORDER — METFORMIN HYDROCHLORIDE 1000 MG/1
1000 TABLET ORAL 2 TIMES DAILY WITH MEALS
Qty: 180 TABLET | Refills: 1 | Status: SHIPPED | OUTPATIENT
Start: 2024-04-15 | End: 2024-10-12

## 2024-04-15 NOTE — TELEPHONE ENCOUNTER
----- Message from Mitul Peterson sent at 4/15/2024  9:06 AM CDT -----  Contact: self  ..Type:  RX Refill Request    Who Called: .Stefan Forbes Jr.  Refill or New Rx:Refill   RX Name and Strength:metFORMIN (GLUCOPHAGE) 1000 MG tablet  How is the patient currently taking it? (ex. 1XDay):2xday   Is this a 30 day or 90 day RX: 90  Preferred Pharmacy with phone number:.  [facility] [address] [address]  Phone: [phone] Fax: [phone]  Local or Mail Order:local   Ordering Provider:Natalia   Would the patient rather a call back or a response via [facility]? Call back   Best Call Back Number:.[phone] (home)   Additional Information:

## 2024-04-16 ENCOUNTER — PATIENT OUTREACH (OUTPATIENT)
Dept: ADMINISTRATIVE | Facility: CLINIC | Age: 69
End: 2024-04-16
Payer: MEDICARE

## 2024-04-16 NOTE — PROGRESS NOTES
C3 nurse attempted to contact Stefan Forbes Jr. for a TCC post hospital discharge follow up call. The patient is unable to conduct the call @ this time. The patient requested a callback.    The patient has a scheduled HOS appointment with Wallace Fisher MD on 04/18/24 @ 7650.

## 2024-04-17 NOTE — PROGRESS NOTES
C3 nurse spoke with Stefan Forbes Jr. for a TCC post hospital discharge follow up call. The patient has a scheduled HOSFU appointment with Wallace Fisher MD on 04/18/24 @ 1020.

## 2024-04-18 ENCOUNTER — OFFICE VISIT (OUTPATIENT)
Dept: INTERNAL MEDICINE | Facility: CLINIC | Age: 69
End: 2024-04-18
Payer: MEDICARE

## 2024-04-18 VITALS
OXYGEN SATURATION: 98 % | HEIGHT: 68 IN | HEART RATE: 73 BPM | TEMPERATURE: 97 F | DIASTOLIC BLOOD PRESSURE: 66 MMHG | BODY MASS INDEX: 32.81 KG/M2 | WEIGHT: 216.5 LBS | SYSTOLIC BLOOD PRESSURE: 96 MMHG

## 2024-04-18 DIAGNOSIS — Z23 NEED FOR PNEUMOCOCCAL VACCINE: ICD-10-CM

## 2024-04-18 DIAGNOSIS — E87.1 HYPONATREMIA: ICD-10-CM

## 2024-04-18 DIAGNOSIS — I95.9 HYPOTENSION, UNSPECIFIED HYPOTENSION TYPE: ICD-10-CM

## 2024-04-18 DIAGNOSIS — E66.01 SEVERE OBESITY: ICD-10-CM

## 2024-04-18 DIAGNOSIS — R73.9 HYPERGLYCEMIA: ICD-10-CM

## 2024-04-18 DIAGNOSIS — E11.65 CONTROLLED TYPE 2 DIABETES MELLITUS WITH HYPERGLYCEMIA, WITHOUT LONG-TERM CURRENT USE OF INSULIN: Primary | ICD-10-CM

## 2024-04-18 PROCEDURE — 3074F SYST BP LT 130 MM HG: CPT | Mod: CPTII,S$GLB,, | Performed by: INTERNAL MEDICINE

## 2024-04-18 PROCEDURE — 1101F PT FALLS ASSESS-DOCD LE1/YR: CPT | Mod: CPTII,S$GLB,, | Performed by: INTERNAL MEDICINE

## 2024-04-18 PROCEDURE — 90677 PCV20 VACCINE IM: CPT | Mod: S$GLB,,, | Performed by: INTERNAL MEDICINE

## 2024-04-18 PROCEDURE — 3288F FALL RISK ASSESSMENT DOCD: CPT | Mod: CPTII,S$GLB,, | Performed by: INTERNAL MEDICINE

## 2024-04-18 PROCEDURE — 3078F DIAST BP <80 MM HG: CPT | Mod: CPTII,S$GLB,, | Performed by: INTERNAL MEDICINE

## 2024-04-18 PROCEDURE — 4010F ACE/ARB THERAPY RXD/TAKEN: CPT | Mod: CPTII,S$GLB,, | Performed by: INTERNAL MEDICINE

## 2024-04-18 PROCEDURE — 3046F HEMOGLOBIN A1C LEVEL >9.0%: CPT | Mod: CPTII,S$GLB,, | Performed by: INTERNAL MEDICINE

## 2024-04-18 PROCEDURE — 99999 PR PBB SHADOW E&M-EST. PATIENT-LVL V: CPT | Mod: PBBFAC,,, | Performed by: INTERNAL MEDICINE

## 2024-04-18 PROCEDURE — G0009 ADMIN PNEUMOCOCCAL VACCINE: HCPCS | Mod: S$GLB,,, | Performed by: INTERNAL MEDICINE

## 2024-04-18 PROCEDURE — 1111F DSCHRG MED/CURRENT MED MERGE: CPT | Mod: CPTII,S$GLB,, | Performed by: INTERNAL MEDICINE

## 2024-04-18 PROCEDURE — 1126F AMNT PAIN NOTED NONE PRSNT: CPT | Mod: CPTII,S$GLB,, | Performed by: INTERNAL MEDICINE

## 2024-04-18 PROCEDURE — 3072F LOW RISK FOR RETINOPATHY: CPT | Mod: CPTII,S$GLB,, | Performed by: INTERNAL MEDICINE

## 2024-04-18 PROCEDURE — 1159F MED LIST DOCD IN RCRD: CPT | Mod: CPTII,S$GLB,, | Performed by: INTERNAL MEDICINE

## 2024-04-18 PROCEDURE — 99495 TRANSJ CARE MGMT MOD F2F 14D: CPT | Mod: S$GLB,,, | Performed by: INTERNAL MEDICINE

## 2024-04-18 NOTE — PROGRESS NOTES
Subjective:      Patient ID: Stefan Forbes Jr. is a 68 y.o. male.    Chief Complaint: Hospital Follow Up    HPI    Transitional Care Note    Family and/or Caretaker present at visit?  No.  Diagnostic tests reviewed/disposition: No diagnosic tests pending after this hospitalization.  Disease/illness education: diet and medication compliance discussed.   Home health/community services discussion/referrals: Patient does not have home health established from hospital visit.  They do not need home health.  If needed, we will set up home health for the patient.   Establishment or re-establishment of referral orders for community resources: No other necessary community resources.   Discussion with other health care providers: No discussion with other health care providers necessary.       67 yo with   Patient Active Problem List   Diagnosis    Controlled type 2 diabetes mellitus with hyperglycemia, without long-term current use of insulin    Hypertension associated with diabetes    Hyperlipidemia associated with type 2 diabetes mellitus    Moderate COPD (chronic obstructive pulmonary disease)    CAD (coronary artery disease)    Vitamin D deficiency    Osteoarthritis of knee    Pulmonary nodule    Nevus of choroid of right eye    History of pulmonary embolism    Hyponatremia    Iron deficiency anemia due to chronic blood loss    MACHO on CPAP    PLMD (periodic limb movement disorder)    BMI 35.0-35.9,adult    Varicose vein of leg    Tobacco abuse, in remission    Tubular adenoma of colon    Benign prostatic hyperplasia with weak urinary stream    Urgency of urination    Aortic atherosclerosis    COVID    Severe obesity    Hypomagnesemia    Nicotine dependence    Restless legs    ALICIA (acute kidney injury)     Past Medical History:   Diagnosis Date    Arthritis     Cataract     COPD (chronic obstructive pulmonary disease)     Diabetes mellitus, type 2     Hyperlipidemia     Hypertension     Personal history of colonic polyps  "2018     Here today for hospital follow-up visit after recent admission for 1 night stay due to elevated glucose volume depletion, hyponatremia, acute kidney injury, and hypotension.  Discharged on [address].  Blood glucose 622.  Had completed Medrol Dosepak shortly before presentation.  He also reported significant diet noncompliance.  Acute kidney injury and hyponatremia improved with volume repletion.  Glucose controlled with insulin inpatient.  Dc'd on long acting insulin.  5 units b.i.d..  Home glucose in the 200s currently.  Home bp 116/70s.     Review of Systems   Constitutional:  Negative for chills and fever.   HENT:  Negative for ear pain and sore throat.    Respiratory:  Negative for cough.    Cardiovascular:  Negative for chest pain.   Gastrointestinal:  Negative for abdominal pain and blood in stool.   Genitourinary:  Negative for dysuria and hematuria.   Neurological:  Negative for seizures and syncope.     Objective:   BP 96/66 (BP Location: Right arm, Patient Position: Sitting, BP Method: Large (Manual))   Pulse 73   Temp 96.8 °F (36 °C) (Tympanic)   Ht 5' 8" (1.727 m)   Wt 98.2 kg (216 lb 7.9 oz)   SpO2 98%   BMI 32.92 kg/m²     Physical Exam  Constitutional:       General: He is awake. He is not in acute distress.     Appearance: Normal appearance. He is well-developed.   HENT:      Head: Normocephalic and atraumatic.   Eyes:      Conjunctiva/sclera: Conjunctivae normal.   Cardiovascular:      Rate and Rhythm: Normal rate.   Pulmonary:      Effort: Pulmonary effort is normal.      Breath sounds: Normal breath sounds.   Musculoskeletal:      Cervical back: Normal range of motion.   Skin:     General: Skin is warm and dry.   Neurological:      Mental Status: He is alert. Mental status is at baseline.   Psychiatric:         Mood and Affect: Mood normal.         Behavior: Behavior normal. Behavior is cooperative.         Thought Content: Thought content normal.         Judgment: Judgment normal. "         Lab Results   Component Value Date    WBC 8.35 04/12/2024    HGB 13.5 (L) 04/12/2024    HGB 12.5 (L) 04/11/2024    HGB 14.4 04/10/2024    HCT 40.2 04/12/2024    MCV 89 04/12/2024    MCV 87 04/11/2024    MCV 88 04/10/2024     04/12/2024    CHOL 123 02/01/2024    TRIG 181 (H) 02/01/2024    HDL 45 02/01/2024    LDLCALC 41.8 (L) 02/01/2024    LDLCALC 66.6 10/30/2023    LDLCALC 56.8 (L) 10/12/2022    ALT 12 04/12/2024    AST 12 04/12/2024     (L) 04/12/2024    K 4.5 04/12/2024    CALCIUM 8.9 04/12/2024     04/12/2024    CO2 21 (L) 04/12/2024    BUN 17 04/12/2024    CREATININE 1.2 04/12/2024    CREATININE 1.1 04/11/2024    CREATININE 1.4 04/10/2024    EGFRNORACEVR >60 04/12/2024    EGFRNORACEVR >60 04/11/2024    EGFRNORACEVR 55 (A) 04/10/2024    TSH 1.437 10/30/2023    TSH 1.195 10/12/2022    TSH 1.143 10/14/2021    PSA 0.25 10/30/2023    PSA 0.38 01/18/2022    PSA 0.17 09/02/2020     (H) 04/12/2024    HGBA1C 13.6 (H) 04/10/2024    HGBA1C 7.8 (H) 10/30/2023    HGBA1C 7.1 (H) 04/13/2023    [id number] 44 04/26/2017    IXZYCIIE92AR 29 (L) 10/08/2015          The ASCVD Risk score (Mal DAVID, et al., 2019) failed to calculate for the following reasons:    The valid total cholesterol range is 130 to 320 mg/dL     Assessment:     1. Controlled type 2 diabetes mellitus with hyperglycemia, without long-term current use of insulin    2. Need for pneumococcal vaccine    3. Severe obesity    4. Hyponatremia    5. Hypotension, unspecified hypotension type    6. Hyperglycemia      Plan:   Uncontrolled.  type 2 diabetes mellitus with hyperglycemia, without long-term current use of insulin  Overview:    Improved.  Home glucose 200s.  Reports compliance with his medications including new prescription of insulin long-acting 5 units b.i.d..  Advised to keep appointment with diabetes clinic.    Orders:  -     Ambulatory referral/consult to Optometry; Future; Expected date: 04/25/2024  -     Hemoglobin A1C;  Future; Expected date: 06/18/2024    2. Need for pneumococcal vaccine  -     pneumoc 20-charles conj-dip cr(PF) (PREVNAR-20 (PF)) injection Syrg 0.5 mL    3. Severe obesity  Overview:  Diet and exercise      4. Hyponatremia  Improved at discharge with sodium in the 130s.  -     Basic Metabolic Panel; Future; Expected date: 04/18/2024    5. Hypotension, unspecified hypotension type  Improving.  Home systolic blood pressure 117.  Continue to monitor.  Advised patient to notify me if systolic pressure less than 115.  Reports medications recently adjusted by Cardiology.    6. Hyperglycemia  Improved.  Home glucose 200s.  Reports compliance with his medications including new prescription of insulin long-acting 5 units b.i.d..  Advised to keep appointment with diabetes clinic.  -     Basic Metabolic Panel; Future; Expected date: 04/18/2024        Patient Instructions   bp goal 115-135 / 60-85     Notify Dr. Fisehr if home blood pressure not within these ranges    Future Appointments   Date Time Provider Department Center   4/19/2024 12:15 PM LABORATORY, [facility] LAB [facility]   4/23/2024  8:00 AM Ginny Little, EUSEBIO [facility] DIABETE [facility]   4/23/2024 10:00 AM Anton, April, CTTS [facility] SMOKE [facility]   4/29/2024  9:40 AM LABORATORY, [facility] LAB [facility]   5/3/2024 11:40 AM Wallace Fisher MD [facility] IM [facility]   5/10/2024 10:25 AM LABORATORY, [facility] LAB [facility]   5/17/2024 10:30 AM Gianna Godoy NP [facility] BHEMON [facility]   5/28/2024  9:50 AM Chano Nicole, MAC [facility] OPHTHAL [facility]   6/11/2024  7:45 AM Stefan Eugene MD [facility] UROLOGY [facility]   6/25/2024 10:15 AM LABORATORY, [facility] LAB [facility]   7/11/2024  8:00 AM Wallace Fisher MD [facility] IM [facility]   10/11/2024 10:00 AM [facility] XR2 [facility] XRAY [facility] [place]   10/11/2024 10:15 AM PULMONARY LAB, HERIBERTO [facility] PULMFULETY [facility]   10/11/2024 11:00 AM Mazin Wells MD [facility] PULMSVC [facility]   10/21/2024 10:40 AM Wallace iFsher MD  [facility] IM [facility] [place]       Lab Frequency Next Occurrence   X-Ray Chest PA And Lateral Once 10/05/2023   Spirometry with/without bronchodilator Once 10/05/2023   Basic Metabolic Panel Once 10/06/2023   Hemoglobin A1C Once 05/01/2024   Comprehensive Metabolic Panel Once 05/01/2024   CBC Auto Differential Once 05/01/2024   CT Chest Lung Screening Low Dose Once 01/12/2024   Protein Electrophoresis, Serum Once 01/12/2024   Immunofixation Electrophoresis Once 01/12/2024   Immunoglobulin Free LT Chains Blood Once 01/12/2024   CBC Auto Differential Once 01/12/2024   Basic Metabolic Panel Once 01/12/2024   Ferritin Once 01/12/2024   Iron and TIBC Once 01/12/2024   Ambulatory referral/consult to Pulmonary Disease Management w/ Respiratory Therapist Once 04/18/2024   Microalbumin/creatinine urine ratio         Follow up in about 6 months (around 10/18/2024), or if symptoms worsen or fail to improve.

## 2024-04-19 ENCOUNTER — LAB VISIT (OUTPATIENT)
Dept: LAB | Facility: HOSPITAL | Age: 69
End: 2024-04-19
Attending: INTERNAL MEDICINE
Payer: MEDICARE

## 2024-04-19 DIAGNOSIS — R73.9 HYPERGLYCEMIA: ICD-10-CM

## 2024-04-19 DIAGNOSIS — E87.1 HYPONATREMIA: ICD-10-CM

## 2024-04-19 LAB
ANION GAP SERPL CALC-SCNC: 11 MMOL/L (ref 8–16)
BUN SERPL-MCNC: 13 MG/DL (ref 8–23)
CALCIUM SERPL-MCNC: 9.1 MG/DL (ref 8.7–10.5)
CHLORIDE SERPL-SCNC: 100 MMOL/L (ref 95–110)
CO2 SERPL-SCNC: 24 MMOL/L (ref 23–29)
CREAT SERPL-MCNC: 1.2 MG/DL (ref 0.5–1.4)
EST. GFR  (NO RACE VARIABLE): >60 ML/MIN/1.73 M^2
GLUCOSE SERPL-MCNC: 216 MG/DL (ref 70–110)
POTASSIUM SERPL-SCNC: 4.7 MMOL/L (ref 3.5–5.1)
SODIUM SERPL-SCNC: 135 MMOL/L (ref 136–145)

## 2024-04-19 PROCEDURE — 80048 BASIC METABOLIC PNL TOTAL CA: CPT | Performed by: INTERNAL MEDICINE

## 2024-04-19 PROCEDURE — 36415 COLL VENOUS BLD VENIPUNCTURE: CPT | Performed by: INTERNAL MEDICINE

## 2024-04-23 ENCOUNTER — OFFICE VISIT (OUTPATIENT)
Dept: DIABETES | Facility: CLINIC | Age: 69
End: 2024-04-23
Payer: MEDICARE

## 2024-04-23 ENCOUNTER — PATIENT MESSAGE (OUTPATIENT)
Dept: INTERNAL MEDICINE | Facility: CLINIC | Age: 69
End: 2024-04-23
Payer: MEDICARE

## 2024-04-23 ENCOUNTER — CLINICAL SUPPORT (OUTPATIENT)
Dept: SMOKING CESSATION | Facility: CLINIC | Age: 69
End: 2024-04-23
Payer: COMMERCIAL

## 2024-04-23 VITALS
DIASTOLIC BLOOD PRESSURE: 65 MMHG | BODY MASS INDEX: 32.98 KG/M2 | SYSTOLIC BLOOD PRESSURE: 105 MMHG | WEIGHT: 217.63 LBS | HEART RATE: 71 BPM | HEIGHT: 68 IN

## 2024-04-23 DIAGNOSIS — E11.69 HYPERLIPIDEMIA ASSOCIATED WITH TYPE 2 DIABETES MELLITUS: ICD-10-CM

## 2024-04-23 DIAGNOSIS — E55.9 VITAMIN D DEFICIENCY: ICD-10-CM

## 2024-04-23 DIAGNOSIS — E11.59 HYPERTENSION ASSOCIATED WITH DIABETES: ICD-10-CM

## 2024-04-23 DIAGNOSIS — F17.200 NICOTINE DEPENDENCE: Primary | ICD-10-CM

## 2024-04-23 DIAGNOSIS — M62.838 MUSCLE SPASM: Primary | ICD-10-CM

## 2024-04-23 DIAGNOSIS — J44.9 MODERATE COPD (CHRONIC OBSTRUCTIVE PULMONARY DISEASE): ICD-10-CM

## 2024-04-23 DIAGNOSIS — I25.10 CORONARY ARTERY DISEASE INVOLVING NATIVE CORONARY ARTERY OF NATIVE HEART WITHOUT ANGINA PECTORIS: ICD-10-CM

## 2024-04-23 DIAGNOSIS — Z79.4 UNCONTROLLED TYPE 2 DIABETES MELLITUS WITH HYPERGLYCEMIA, WITH LONG-TERM CURRENT USE OF INSULIN: Primary | ICD-10-CM

## 2024-04-23 DIAGNOSIS — I15.2 HYPERTENSION ASSOCIATED WITH DIABETES: ICD-10-CM

## 2024-04-23 DIAGNOSIS — E11.65 UNCONTROLLED TYPE 2 DIABETES MELLITUS WITH HYPERGLYCEMIA, WITH LONG-TERM CURRENT USE OF INSULIN: Primary | ICD-10-CM

## 2024-04-23 DIAGNOSIS — E78.5 HYPERLIPIDEMIA ASSOCIATED WITH TYPE 2 DIABETES MELLITUS: ICD-10-CM

## 2024-04-23 DIAGNOSIS — Z86.711 HISTORY OF PULMONARY EMBOLISM: ICD-10-CM

## 2024-04-23 DIAGNOSIS — R91.1 PULMONARY NODULE: ICD-10-CM

## 2024-04-23 LAB — GLUCOSE SERPL-MCNC: 280 MG/DL (ref 70–110)

## 2024-04-23 PROCEDURE — 3072F LOW RISK FOR RETINOPATHY: CPT | Mod: CPTII,S$GLB,, | Performed by: NURSE PRACTITIONER

## 2024-04-23 PROCEDURE — 1101F PT FALLS ASSESS-DOCD LE1/YR: CPT | Mod: CPTII,S$GLB,, | Performed by: NURSE PRACTITIONER

## 2024-04-23 PROCEDURE — 99214 OFFICE O/P EST MOD 30 MIN: CPT | Mod: S$GLB,,, | Performed by: NURSE PRACTITIONER

## 2024-04-23 PROCEDURE — 99999 PR PBB SHADOW E&M-EST. PATIENT-LVL II: CPT | Mod: PBBFAC,,, | Performed by: SPEECH-LANGUAGE PATHOLOGIST

## 2024-04-23 PROCEDURE — 3074F SYST BP LT 130 MM HG: CPT | Mod: CPTII,S$GLB,, | Performed by: NURSE PRACTITIONER

## 2024-04-23 PROCEDURE — 3288F FALL RISK ASSESSMENT DOCD: CPT | Mod: CPTII,S$GLB,, | Performed by: NURSE PRACTITIONER

## 2024-04-23 PROCEDURE — 99404 PREV MED CNSL INDIV APPRX 60: CPT | Mod: S$GLB,,, | Performed by: SPEECH-LANGUAGE PATHOLOGIST

## 2024-04-23 PROCEDURE — 3046F HEMOGLOBIN A1C LEVEL >9.0%: CPT | Mod: CPTII,S$GLB,, | Performed by: NURSE PRACTITIONER

## 2024-04-23 PROCEDURE — 3078F DIAST BP <80 MM HG: CPT | Mod: CPTII,S$GLB,, | Performed by: NURSE PRACTITIONER

## 2024-04-23 PROCEDURE — 3008F BODY MASS INDEX DOCD: CPT | Mod: CPTII,S$GLB,, | Performed by: NURSE PRACTITIONER

## 2024-04-23 PROCEDURE — 99999 PR PBB SHADOW E&M-EST. PATIENT-LVL III: CPT | Mod: PBBFAC,,, | Performed by: NURSE PRACTITIONER

## 2024-04-23 PROCEDURE — 82962 GLUCOSE BLOOD TEST: CPT | Mod: S$GLB,,, | Performed by: NURSE PRACTITIONER

## 2024-04-23 PROCEDURE — 1159F MED LIST DOCD IN RCRD: CPT | Mod: CPTII,S$GLB,, | Performed by: NURSE PRACTITIONER

## 2024-04-23 PROCEDURE — 1160F RVW MEDS BY RX/DR IN RCRD: CPT | Mod: CPTII,S$GLB,, | Performed by: NURSE PRACTITIONER

## 2024-04-23 PROCEDURE — 4010F ACE/ARB THERAPY RXD/TAKEN: CPT | Mod: CPTII,S$GLB,, | Performed by: NURSE PRACTITIONER

## 2024-04-23 PROCEDURE — 1126F AMNT PAIN NOTED NONE PRSNT: CPT | Mod: CPTII,S$GLB,, | Performed by: NURSE PRACTITIONER

## 2024-04-23 PROCEDURE — 1111F DSCHRG MED/CURRENT MED MERGE: CPT | Mod: CPTII,S$GLB,, | Performed by: NURSE PRACTITIONER

## 2024-04-23 RX ORDER — DULAGLUTIDE 4.5 MG/.5ML
4.5 INJECTION, SOLUTION SUBCUTANEOUS
Qty: 12 PEN | Refills: 1 | Status: SHIPPED | OUTPATIENT
Start: 2024-04-23

## 2024-04-23 RX ORDER — BLOOD-GLUCOSE SENSOR
EACH MISCELLANEOUS
Qty: 2 EACH | Refills: 11 | Status: SHIPPED | OUTPATIENT
Start: 2024-04-23

## 2024-04-23 RX ORDER — BLOOD-GLUCOSE,RECEIVER,CONT
EACH MISCELLANEOUS
Qty: 1 EACH | Refills: 0 | Status: SHIPPED | OUTPATIENT
Start: 2024-04-23

## 2024-04-23 RX ORDER — INSULIN DEGLUDEC 200 U/ML
18 INJECTION, SOLUTION SUBCUTANEOUS DAILY
Qty: 3 PEN | Refills: 5 | Status: SHIPPED | OUTPATIENT
Start: 2024-04-23

## 2024-04-23 RX ORDER — CYCLOBENZAPRINE HCL 10 MG
TABLET ORAL
Qty: 30 TABLET | Refills: 0 | Status: SHIPPED | OUTPATIENT
Start: 2024-04-23

## 2024-04-23 NOTE — PROGRESS NOTES
Individual Follow-Up Form    4/23/2024    Quit Date: 10/1/2023    Clinical Status of Patient: Outpatient    Length of Service: 60 minutes    Continuing Medication: no    Other Medications:      Target Symptoms: Withdrawal and medication side effects. The following were  rated moderate (3) to severe (4) on TCRS:  Moderate (3):   Severe (4):     Comments: Patient seen in clinic. Patient reports he has remained smoke free since his quit date of 10/1/2023 & is not on NRT at this time. Assessed CO. CO 2. Patient reports he is not taking the Varenicline. Administered TCRS with patient reporting slight symptoms of increased appetite/hunger, depressed mood/sadness, insomnia, restless/can't sit still/impatient & moderate symptoms of anger/irritability/frustration due to environmental situations. Discussed high risk situations which include stress. Patient reports stress at home with his environmental situation is a 9-10 level. Patient states his focus is raising his 11 yr old grand daughter & her custody process. He reports eating to cope which resulted in his blood sugars exceeding 600 & a hospitalization due to the blood sugars. Patient met with the diabetic educator prior to this visit & he got a new machine which will send the readings to his dr. Patient reports he continues to do [employer] as well as his daughter bought some noise cancelling headphones for him to watch his shows on his ipad which helps some. Discussed ways to manage stress & alterative behaviors to manage stress. Discussed taking walks at home to step away from the stressful situations in addition to verbal communication. Patient reports he does use verbal communication with his family; however factors have not changed. Discussed incorporating exercise which he reports he used to go to the gym. Provided the patient with the names of some gyms in the community who offer swimming pools & kids programs for his granddaughter that were listed on his  insurance website to allow for coverage. Goal is to remain smoke free. Follow up set for 5/22/2024 at 10:00 am.       Diagnosis: F17.200    Next Visit: 4 weeks

## 2024-04-23 NOTE — PROGRESS NOTES
Subjective:         Patient ID: Stefan Forbes Jr. is a 68 y.o. male.  Patient's current PCP is Wallace Fisher MD.     Chief Complaint: Diabetes Mellitus    HPI  Stefan Forbes Jr. is a 68 y.o. White male presenting for a new consult with me, previously seen by Elizabeth Stephens NP  for diabetes. Patient has been diagnosed with type 2 diabetes since 2012 .  Received diabetes education: Yes-[facility], 2023    CURRENT DM MEDICATIONS:   Diabetes Medications               dapagliflozin propanediol (FARXIGA) 10 mg tablet Take 1 tablet (10 mg total) by mouth once daily.    insulin detemir U-100, Levemir, 100 unit/mL (3 mL) SubQ InPn pen Inject 5 Units into the skin 2 (two) times daily.    metFORMIN (GLUCOPHAGE) 1000 MG tablet Take 1 tablet (1,000 mg total) by mouth 2 (two) times daily with meals.    dulaglutide (TRULICITY) 3 mg/0.5 mL pen injector Inject 3 mg into the skin every 7 days.     Past failed treatment include: Levemir-failure to control diabetes    Blood glucose testing is performed regularly. Patient is testing 2 times per day.  Meter: [employer]  Preferred lab: [place]    Any episodes of hypoglycemia? Denies    Complications related to diabetes: cardiovascular disease    His blood sugar in the clinic today was:   Lab Results   Component Value Date    POCGLU 280 (A) 04/23/2024     Stefan Forbes Jr. presents today for follow up visit to discuss diabetes management. Previously managed by Elizabeth Stephens - last visit 08/2023. This is his first visit with me.    He was recently hospitalized for hyperglycemia and ALICIA following treatment with steroids. He was discharged home on Levemir 5 units BID which he takes faithfully. He is tolerating Trulicity well without GI side effects, and Farxiga well without  side effects. Most recent re-check of renal function with PCP with improvement.    We discussed CGM and switching to a once daily basal insulin if covered.     Per review of Bgs, lowest glucose since hospital  "stay at 180, highest at 350. We discussed increasing Trulicity and increasing basal insulin. He verbalized understanding.    Relies on patient assistance for Trulicity (when in the gap) and Farxiga.     Current diet: 2 meals/day, 1 snack/day  Activity Level: No structured exercise    Lab Results   Component Value Date    HGBA1C 13.6 (H) 04/10/2024    HGBA1C 7.8 (H) 10/30/2023    HGBA1C 7.1 (H) 04/13/2023     STANDARDS OF CARE  Diabetes Management Status    Statin: Taking  ACE/ARB: Taking    Screening or Prevention Patient's value Goal Complete/Controlled?   HgA1C Testing and Control   Lab Results   Component Value Date    HGBA1C 13.6 (H) 04/10/2024      Annually/Less than 8% No   Lipid profile : 02/01/2024 Annually Yes   LDL control Lab Results   Component Value Date    LDLCALC 41.8 (L) 02/01/2024    Annually/Less than 100 mg/dl  Yes   Nephropathy screening Lab Results   Component Value Date    LABMICR 26.0 10/30/2023     Lab Results   Component Value Date    PROTEINUA Negative 04/10/2024     No results found for: "UTPCR"   Annually Yes   Blood pressure BP Readings from Last 1 Encounters:   04/23/24 105/65    Less than 140/90 Yes   Dilated retinal exam : 03/06/2023 Annually No   Foot exam   : 08/30/2023 Annually Yes        Labs reviewed and are noted below.    Lab Results   Component Value Date    WBC 8.35 04/12/2024    HGB 13.5 (L) 04/12/2024    HCT 40.2 04/12/2024     04/12/2024    CHOL 123 02/01/2024    TRIG 181 (H) 02/01/2024    HDL 45 02/01/2024    LDLCALC 41.8 (L) 02/01/2024    ALT 12 04/12/2024    AST 12 04/12/2024     (L) 04/19/2024    K 4.7 04/19/2024     04/19/2024    ANIONGAP 11 04/19/2024    CREATININE 1.2 04/19/2024    ESTGFRAFRICA >60.0 10/14/2021    EGFRNONAA >60.0 10/14/2021    BUN 13 04/19/2024    CO2 24 04/19/2024    TSH 1.437 10/30/2023    PSA 0.25 10/30/2023    INR 0.9 10/04/2017     (H) 04/19/2024     Lab Results   Component Value Date    TSH 1.437 10/30/2023    IRON " "137 2024    TIBC 451 (H) 2024    FERRITIN 35 2024    ARLTLTMK02 1226 (H) 2024    CALCIUM 9.1 2024    PHOS 2.7 2024     No results found for: "CPEPTIDE"  No results found for: "GLUTAMICACID"  Glucose   Date Value Ref Range Status   2024 216 (H) 70 - 110 mg/dL Final     Anion Gap   Date Value Ref Range Status   2024 11 8 - 16 mmol/L Final     eGFR if    Date Value Ref Range Status   10/14/2021 >60.0 >60 mL/min/1.73 m^2 Final     eGFR if non    Date Value Ref Range Status   10/14/2021 >60.0 >60 mL/min/1.73 m^2 Final     Comment:     Calculation used to obtain the estimated glomerular filtration  rate (eGFR) is the CKD-EPI equation.          The following portions of the patient's history were reviewed and updated as appropriate: allergies, current medications, past family history, past medical history, past social history, past surgical history, and problem list.    Review of patient's allergies indicates:  No Known Allergies  Social History     Socioeconomic History    Marital status:    Occupational History    Occupation: retired   Tobacco Use    Smoking status: Former     Types: Cigars     Quit date: 10/1/2023     Years since quittin.5    Smokeless tobacco: Never    Tobacco comments:     4 cigars each day   Substance and Sexual Activity    Alcohol use: No    Drug use: No    Sexual activity: Not Currently     Partners: Female     Social Determinants of Health     Financial Resource Strain: Low Risk  (10/20/2022)    Overall Financial Resource Strain (CARDIA)     Difficulty of Paying Living Expenses: Not very hard   Food Insecurity: No Food Insecurity (2024)    Hunger Vital Sign     Worried About Running Out of Food in the Last Year: Never true     Ran Out of Food in the Last Year: Never true   Transportation Needs: No Transportation Needs (2024)    PRAPARE - Transportation     Lack of Transportation (Medical): No     " Lack of Transportation (Non-Medical): No   Physical Activity: Unknown (10/20/2022)    Exercise Vital Sign     Days of Exercise per Week: 1 day   Recent Concern: Physical Activity - Insufficiently Active (10/20/2022)    Exercise Vital Sign     Days of Exercise per Week: 1 day     Minutes of Exercise per Session: 10 min   Stress: Stress Concern Present (10/20/2022)    [place] [employer] - Occupational Stress Questionnaire     Feeling of Stress : To some extent   Social Connections: Unknown (10/20/2022)    Social Connection and Isolation Panel [NHANES]     Frequency of Communication with Friends and Family: Twice a week   Housing Stability: Unknown (4/11/2024)    Housing Stability Vital Sign     Unable to Pay for Housing in the Last Year: No     Unstable Housing in the Last Year: No     Past Medical History:   Diagnosis Date    Arthritis     Cataract     COPD (chronic obstructive pulmonary disease)     Diabetes mellitus, type 2     Hyperlipidemia     Hypertension     Personal history of colonic polyps 2018     REVIEW OF SYSTEMS:  Eyes No history of DR.  Cardiovascular: History of CAD, HTN and HLD.  GI: Tolerating Trulicity well without GI side effects.   : No CKD.  Neuro: No neuropathy.  PSYCH: No tobacco use.  ENDO: See HPI.        Objective:      Vitals:    04/23/24 0803   BP: 105/65   Pulse: 71     RESPIRATORY: No respiratory distress  NEUROLOGIC: Cranial nerves II-XII grossly intact.   PSYCHIATRIC: Alert & oriented x3. Normal mood and affect.  FOOT EXAMINATION: UTD  Assessment:       1. Uncontrolled type 2 diabetes mellitus with hyperglycemia, with long-term current use of insulin    2. Hypertension associated with diabetes    3. Hyperlipidemia associated with type 2 diabetes mellitus    4. Moderate COPD (chronic obstructive pulmonary disease)    5. Pulmonary nodule    6. Coronary artery disease involving native coronary artery of native heart without angina pectoris    7. History of  "pulmonary embolism    8. Vitamin D deficiency        Plan:   Uncontrolled type 2 diabetes mellitus with hyperglycemia, with long-term current use of insulin  -     POCT Glucose, Hand-Held Device  -     insulin degludec (TRESIBA FLEXTOUCH U-200) 200 unit/mL (3 mL) insulin pen; Inject 18 Units into the skin once daily. Increase by 2 units every 2 days until fasting sugar is below 130. Stop at 50 units.  Dispense: 3 pen ; Refill: 5  -     dulaglutide (TRULICITY) 4.5 mg/0.5 mL pen injector; Inject 4.5 mg into the skin every 7 days.  Dispense: 12 pen ; Refill: 1  -     blood-glucose sensor (FREESTYLE JASMINE 3 SENSOR) Agnieszka; Change sensor every 14 days  Dispense: 2 each; Refill: 11  -     blood-glucose meter,continuous (FREESTYLE JASMINE 3 READER) Misc; Use as directed.  Dispense: 1 each; Refill: 0  -     Ambulatory referral/consult to Diabetes Education; Future; Expected date: 04/23/2024    Chronic -     Medication Regimen:   Resume Farxiga 10 mg every morning  Increase Trulicity 4.5 mg once weekly.  Continue Metformin 1000 mg, 1 tablet twice daily with meals  Finish Levemir 9 units twice a day, then start Tresiba 18 units ONCE daily.     Hypertension associated with diabetes    Hyperlipidemia associated with type 2 diabetes mellitus    Moderate COPD (chronic obstructive pulmonary disease)    Pulmonary nodule    Coronary artery disease involving native coronary artery of native heart without angina pectoris    History of pulmonary embolism    Vitamin D deficiency        - Follow up: 3 months    Portions of this note may have been created with voice recognition software. Occasional "wrong-word" or "sound-a-like" substitutions may have occurred due to the inherent limitations of voice recognition software. Please, read the note carefully and recognize, using context, where substitutions have occurred.           Ginny Little,MALIKAC, BC-ADM  [facility]  "

## 2024-04-23 NOTE — PATIENT INSTRUCTIONS
Medication Regimen:   Resume Farxiga 10 mg every morning  Increase Trulicity 4.5 mg once weekly.  Continue Metformin 1000 mg, 1 tablet twice daily with meals  Finish Levemir 9 units twice a day, then start Tresiba 18 units ONCE daily.     Patient Instructions:  Carbohydrate Count: 30-45 grams/meal and 15 grams/snack with limit of 2 snacks per day.  Exercise: Goal is 150 minutes or more per week.  Bring meter and blood sugar log to each appointment.     Goals for Blood Sugar:  Fastin-130 mg/dl  2 hours after meal:  mg/dl  Before Bed: 100-150 mg/dl  If Blood sugar is below 70 mg/dl, DO NOT take diabetes medication. Eat first and then recheck blood sugar in 20 minutes.  Foods to eat if blood sugar is below 70 mg/dl  1/2 glass of orange juice   1/2 regular cola can   3-4 hard candies   3-4 small glucose tabs.   Foods to eat if blood sugar is below 50 mg/dl  1 glass of orange juice  1 regular cola can  8 hard candies  8 small glucose tabs.   If you have repeated eating/blood sugar recheck process 2 times and blood sugar still below 70 mg/dl, call 911.                                                           \

## 2024-04-25 ENCOUNTER — NURSE TRIAGE (OUTPATIENT)
Dept: ADMINISTRATIVE | Facility: CLINIC | Age: 69
End: 2024-04-25
Payer: MEDICARE

## 2024-04-26 LAB — CRC RECOMMENDATION EXT: NORMAL

## 2024-04-26 NOTE — TELEPHONE ENCOUNTER
Pt reports he has a colonoscopy scheduled for tomorrow that he is prepping for. Reports his blood glucose is currently 120 and he is wondering if he should skip his night insulin. I have spoke with the on call provider in this regard. Dr Ga reports patient should hold his insulin tonight and check insulin at night and in the AM. Suggest checking insulin around 0300 if possible. He also suggest having some calories, such as sprite/jello. Patient was updated and verbalizes understanding. Will call back with any questions/concerns throughout the night.    Reason for Disposition   Bowel prep for colonoscopy, questions about   [1] Caller has URGENT medicine question about med that PCP or specialist prescribed AND [2] triager unable to answer question    Protocols used: Colonoscopy Symptoms and Fmwebtpuk-N-WE, Medication Question Call-A-AH

## 2024-04-26 NOTE — TELEPHONE ENCOUNTER
Called patient to let him know he can resume meds as normal after colonoscopy No answer Left a message

## 2024-04-29 ENCOUNTER — LAB VISIT (OUTPATIENT)
Dept: LAB | Facility: HOSPITAL | Age: 69
End: 2024-04-29
Attending: INTERNAL MEDICINE
Payer: MEDICARE

## 2024-04-29 ENCOUNTER — CLINICAL SUPPORT (OUTPATIENT)
Dept: DIABETES | Facility: CLINIC | Age: 69
End: 2024-04-29
Payer: MEDICARE

## 2024-04-29 DIAGNOSIS — E11.65 UNCONTROLLED TYPE 2 DIABETES MELLITUS WITH HYPERGLYCEMIA, WITH LONG-TERM CURRENT USE OF INSULIN: ICD-10-CM

## 2024-04-29 DIAGNOSIS — Z79.4 UNCONTROLLED TYPE 2 DIABETES MELLITUS WITH HYPERGLYCEMIA, WITH LONG-TERM CURRENT USE OF INSULIN: ICD-10-CM

## 2024-04-29 DIAGNOSIS — E11.9 CONTROLLED TYPE 2 DIABETES MELLITUS WITHOUT COMPLICATION, WITHOUT LONG-TERM CURRENT USE OF INSULIN: ICD-10-CM

## 2024-04-29 LAB
ESTIMATED AVG GLUCOSE: 312 MG/DL (ref 68–131)
HBA1C MFR BLD: 12.5 % (ref 4–5.6)

## 2024-04-29 PROCEDURE — 83036 HEMOGLOBIN GLYCOSYLATED A1C: CPT | Performed by: INTERNAL MEDICINE

## 2024-04-29 PROCEDURE — G0108 DIAB MANAGE TRN  PER INDIV: HCPCS | Mod: S$GLB,,, | Performed by: PEDIATRICS

## 2024-04-29 PROCEDURE — 36415 COLL VENOUS BLD VENIPUNCTURE: CPT | Performed by: INTERNAL MEDICINE

## 2024-04-29 PROCEDURE — 99999 PR PBB SHADOW E&M-EST. PATIENT-LVL III: CPT | Mod: PBBFAC,,,

## 2024-04-29 NOTE — PROGRESS NOTES
Diabetes Care Specialist Follow-up Note  Author: aKren Sandra RN  Date: 4/29/2024    Program Intake  Reason for Diabetes Program Visit:: Intervention  Type of Intervention:: Individual  Individual: Device Training  Device Training: Personal CGM  Current diabetes risk level:: moderate  In the last 12 months, have you:: used emergency room services  Was the ER or hospital admission related to diabetes?: Yes  Permission to speak with others about care:: yes  Continuous Glucose Monitoring  Patient has CGM: No    Lab Results   Component Value Date    HGBA1C 13.6 (H) 04/10/2024     A1c Pre Diabetes Care Specialist Intervention:  7.1%    CURRENT DM MEDICATIONS:   Farxiga 10 mg daily   Trulicity 4.5 mg weekly (Thursdays)  Metformin 1,000 mg BID  Levermir 9 units BID (Will switch to Tresiba 18 units when finished with Levemir)    Clinical    Nutritional Status  Diet: Diabetic diet ([facility] sending 30 days of pre-made meals)  Meal Plan 24 Hour Recall: Breakfast, Lunch, Dinner, Snack  Meal Plan 24 Hour Recall - Breakfast: Oatmeal with blueberries  Meal Plan 24 Hour Recall - Lunch: Penne pasta with meatballs; SF cranberry juice  Meal Plan 24 Hour Recall - Dinner: Chicken with vegetables; SF cranberry juice  Meal Plan 24 Hour Recall - Snack: 1 cup of bran flakes with milk and splenda    Physical activity/Exercise:   See care plan    SMBG: Finger sticks 2x/day. Dig Med program.       During today's follow-up visit,  the following areas required further assessment and content was provided/reviewed.    Based on today's diabetes care assessment, the following areas of need were identified:          8/2/2023    12:01 AM   Social   Support No   Access to Mass Media/Tech No   Cognitive/Behavioral Health No   Culture/Restorationism No   Communication No   Health Literacy No            11/30/2023    12:02 AM   Clinical   Medication Adherence No   Lab Compliance No   Nutritional Status Yes            11/30/2023    12:02 AM  "  Diabetes Self-Management Skills   Healthy Eating See care plan for update.      Monitoring See care plan for update.     Physical Activity  See care plan for update.      Coping A1C increased dramatically over the last few months. Mr. Forbes states it is due to stress and that he stopped taking care of himself like he should. Since being discharged from the hospital for hyperglycemia, he has taken better care of his diabetes by checking blood sugars, healthy eating, and taking his medications.   Of note, he has stopped smoking for the past 7 months!           Today's interventions were provided through individual discussion, instruction, and written materials were provided.    Patient verbalized understanding of instruction and written materials.  Pt was able to return back demonstration of instructions today. Patient understood key points, needs reinforcement and further instruction.     Diabetes Self-Management Care Plan Review and Evaluation of Progress:    During today's follow-up Evelyns Diabetes Self-Management Care Plan progress was reviewed and progress was evaluated including his/her input. Stefan has agreed to continue his/her journey to improve/maintain overall diabetes control by continuing to set health goals. See care plan progress below.      Care Plan: Diabetes Management   Updates made since 3/30/2024 12:00 AM        Problem: Healthy Eating         Goal: Eat 2-3 meals daily with ~45g of carbohydrates per meal and 0-15g per snack.    Start Date: 8/2/2023   Expected End Date: 7/21/2024   This Visit's Progress: On track   Recent Progress: No change   Priority: Low   Barriers: Lack of Motivation to Change; Other (comments)   Note:    Pt reports social stressors contribute to overeating. Pt verbalizes good understanding diabetes meal plan. He describes "new" recipes/dishes that are mostly non-starchy vegetables and healthy. However, he tends to cook entrees that he's familiar preparing. Discussed tips " for quick, easy non-starchy vegetable additions (pre-washed salads, steamables, lower sodium canned) to assist with lowering main dish portions.     Also, discussed flexibility of lifestyle change and how to incorporate favorite higher carb/fat dishes occasionally to maintain longer-term progress.    Reviewed non-food stress mgmt tips and support from therapist or counselor with resources reviewed. Encouraged notifying pcp for possible medication support. Pt engaged and will consider.    4/29/24: Since being discharged from the hospital with hyperglycemia, he has enrolled in a [employer] program where they are sending him 30 days of pre-made meals for breakfast, lunch, and dinner. Sometimes wife steams veggies or makes a salad to add to meals. Mr. Forbes is satisfied with meals. Him and his wife have been counting carbohydrates and paying attention to portion sizes.          Problem: Blood Glucose Self-Monitoring         Goal: Patient agrees to check and record blood sugars 1-2 times per day.    Start Date: 8/2/2023   Expected End Date: 7/21/2024   This Visit's Progress: On track   Recent Progress: No change   Priority: Medium   Barriers: Other (comments)   Note:    Encouragement provided for several BG levels within target when he was testing. Discussed role testing BG in driving healthy lifestyle changes. Pt agreed to resume using digital diabetes. He is able to identify BG targets and troubleshooting hyperglycemia/hypoglycemia.    4/29/24: FREESTYLE JASMINE 3 CGM TRAINING  Education provided per [employer] Freestyle Jasmine 3 protocol, quick start guide and videos. Assisted with mobile pavel download and [facility] clinic data share.       The FreeStyle Jasmine 3 Continuous Glucose Monitoring System is a real time continuous glucose monitoring (CGM) device with alarms capability indicated for the management of diabetes in persons age 4 and older.   Use your blood glucose meter to make diabetes treatment decisions when  you see the symbol during the first 12 hours of wearing a Sensor, if your Sensor glucose reading does not match how you feel, or if the reading does not include a number.  For you to receive alarms, they must be on and your Presho should be within 33 feet of you at all times. If using pavel, check mobile device to ensure ongoing compatibility.  To prevent missed alarms, make sure the Presho has sufficient charge and that sound and/or vibration are turned on. Do not force close reader pavel.   Remove the Sensor before MRI, CT scan, X-ray, or diathermy treatment.    Alarms:   Urgent Low Glucose below 55: default ON   Low Glucose: 80 mg/dl   High Glucose 3000 mg/dl  Signal Loss: ON    Only apply the Sensor to the back of the upper arm. If placed in other areas, the Sensor may not function properly. Avoid areas with scars, moles, stretch marks, or lumps. Select an area of skin that generally stays flat during normal daily activities (no bending or folding). Choose a site that is at least 1 inch away from an insulin injection site. Rotate sensor sites.   Sensor can be worn for up to 14 days. Scan sensor to start sensor session. Sensor warm-up 60min and then automatically sends a new glucose reading to your pavel every minute.   The Sensor is water-resistant in up to 3 feet (1 meter) of water. Do not immerse longer than 30 minutes.  Instructed on how to understand SG graph, trend arrows. Reviewed menu options. Discussed how to remove and discard sensor.   Differences in glucose readings between interstitial fluid and capillary blood may be observed during times of rapid change in blood glucose, such as after eating, dosing insulin, or exercising.  Taking ascorbic acid (vitamin C) supplements while wearing the Sensor may falsely raise Sensor glucose readings. Taking more than 500 mg of ascorbic acid per day may affect the Sensor readings which could cause you to miss a severe low glucose event. Ascorbic acid can be found in  supplements including multivitamins. Some supplements, including cold remedies such as Airborne® and Emergen-C®, may contain high doses of 1000 mg of ascorbic acid and should not be taken while using the Sensor. See your health care professional to understand how long ascorbic acid is active in your body.  Contact Customer Service if you receive a Replace Sensor message before the end of the 14 day wear period. Customer Service is available at [phone] 7 Days a Week from 8AM to 8PM Eastern Standard Time.    Pt verbalized understanding of all instruction and able to demonstrate sensor application technique per protocol.          Problem: Physical Activity and Exercise         Goal: Patient agrees to increase physical activity to a goal of 150min/wk.    Start Date: 11/30/2023   Expected End Date: 7/21/2024   This Visit's Progress: No change   Priority: High   Barriers: Other (comments)   Note:    Pt identifies stress as greatest barrier to making diabetes self-care bx  improvements. Discussed role exercise on stress mgmt, diabetes and cardio health. Pt reports staying active but none structured. Since pt enjoys walking, he agreed to start structured daily walk 10-15min outdoor if possible.     4/29/24: Active in the yard and around the house and as a Door Doffing, but no formal activity. Agrees to try to implement some formal walking.          Follow Up Plan     Follow up in about 2 weeks (around 5/13/2024) for review of Griselda 3.    Today's care plan and follow up schedule was discussed with patient.  Stefan verbalized understanding of the care plan, goals, and agrees to follow up plan.        The patient was encouraged to communicate with his/her health care provider/physician and care team regarding his/her condition(s) and treatment.  I provided the patient with my contact information today and encouraged to contact me via phone or [facility] Patient Portal as needed.     Length of Visit   Total Time: 60 Minutes

## 2024-05-03 ENCOUNTER — CLINICAL SUPPORT (OUTPATIENT)
Dept: PULMONOLOGY | Facility: CLINIC | Age: 69
End: 2024-05-03
Payer: MEDICARE

## 2024-05-03 VITALS — RESPIRATION RATE: 16 BRPM | HEART RATE: 79 BPM | OXYGEN SATURATION: 97 %

## 2024-05-03 DIAGNOSIS — J44.9 MODERATE COPD (CHRONIC OBSTRUCTIVE PULMONARY DISEASE): ICD-10-CM

## 2024-05-03 PROCEDURE — 99999 PR PBB SHADOW E&M-EST. PATIENT-LVL II: CPT | Mod: PBBFAC,,,

## 2024-05-03 NOTE — PROGRESS NOTES
Pulmonary Disease Management  [facility]  Initial Visit       Diagnosis: Moderate COPD  Last Hospital Admission: 4/10/24  Last provider visit: 10/5/23      Current Outpatient Medications:     acetaminophen (TYLENOL) 500 MG tablet, Take 1,000 mg by mouth as needed. , Disp: , Rfl:     albuterol (VENTOLIN HFA) 90 mcg/actuation inhaler, Inhale 2 puffs into the lungs every 6 (six) hours as needed for Wheezing or Shortness of Breath. Rescue, Disp: 25.5 g, Rfl: 3    ascorbic acid, vitamin C, (VITAMIN C) 1000 MG tablet, Take 1,000 mg by mouth once daily., Disp: , Rfl:     aspirin 325 MG tablet, Take 325 mg by mouth once daily., Disp: , Rfl:     blood sugar diagnostic (BLOOD GLUCOSE TEST) Strp, 1 strip by Misc.(Non-Drug; Combo Route) route 3 (three) times daily., Disp: 200 each, Rfl: 1    blood-glucose meter,continuous (FREESTYLE JASMINE 3 READER) [facility], Use as directed., Disp: 1 each, Rfl: 0    blood-glucose sensor (FREESTYLE JASMINE 3 SENSOR) Agnieszka, Change sensor every 14 days, Disp: 2 each, Rfl: 11    buPROPion (WELLBUTRIN SR) 150 MG TBSR 12 hr tablet, Take 1 tablet (150 mg total) by mouth 2 (two) times daily. (Patient not taking: Reported on 4/23/2024), Disp: 180 tablet, Rfl: 2    cetirizine (ZYRTEC) 10 MG tablet, Take 10 mg by mouth as needed for Allergies., Disp: , Rfl:     cholecalciferol, vitamin D3, (VITAMIN D3) 25 mcg (1,000 unit) capsule, Take 1,000 Units by mouth once daily., Disp: , Rfl:     cyanocobalamin (VITAMIN B-12) 500 MCG tablet, Take 500 mcg by mouth once daily. , Disp: , Rfl:     cyclobenzaprine (FLEXERIL) 10 MG tablet, Take 1/2 to one tab by mouth at bedtime as needed for muscle spasm, Disp: 30 tablet, Rfl: 0    dapagliflozin propanediol (FARXIGA) 10 mg tablet, Take 1 tablet (10 mg total) by mouth once daily., Disp: 90 tablet, Rfl: 2    dulaglutide (TRULICITY) 4.5 mg/0.5 mL pen injector, Inject 4.5 mg into the skin every 7 days., Disp: 12 pen , Rfl: 1    famotidine (PEPCID) 40 MG tablet, Take 1  "tablet by mouth once a day (Patient taking differently: Take 40 mg by mouth as needed for Heartburn.), Disp: 30 tablet, Rfl: 6    ferrous sulfate (FEROSUL) 325 mg (65 mg iron) Tab tablet, Take 1 tablet (325 mg total) by mouth Daily., Disp: 90 tablet, Rfl: 0    fluticasone propionate (FLONASE) 50 mcg/actuation nasal spray, Use 1 spray (50 mcg total) in each nostril once daily. (Patient taking differently: 1 spray by Each Nostril route as needed for Allergies.), Disp: 16 g, Rfl: 3    fluticasone-umeclidin-vilanter (TRELEGY ELLIPTA) 100-62.5-25 mcg DsDv, Inhale 1 puff into the lungs once daily., Disp: 60 each, Rfl: 11    insulin degludec (TRESIBA FLEXTOUCH U-200) 200 unit/mL (3 mL) insulin pen, Inject 18 Units into the skin once daily. Increase by 2 units every 2 days until fasting sugar is below 130. Stop at 50 units., Disp: 3 pen , Rfl: 5    ipratropium (ATROVENT) 21 mcg (0.03 %) nasal spray, Spray 1 or 2 sprays in each nasal passage every 8 hours, if needed, as directed., Disp: 30 mL, Rfl: 12    lancets 30 gauge Misc, 1 lancet by Misc.(Non-Drug; Combo Route) route 3 (three) times daily., Disp: 200 each, Rfl: 0    lubiprostone (AMITIZA) 24 MCG Cap, Take 1 capsule by mouth twice a day, Disp: 60 capsule, Rfl: 3    magnesium oxide (MAG-OX) 400 mg (241.3 mg magnesium) tablet, Take 1 tablet (400 mg total) by mouth once daily., Disp: 90 tablet, Rfl: 3    metFORMIN (GLUCOPHAGE) 1000 MG tablet, Take 1 tablet (1,000 mg total) by mouth 2 (two) times daily with meals., Disp: 180 tablet, Rfl: 1    nitroGLYCERIN (NITROSTAT) 0.4 MG SL tablet, Place 1 tablet under the tongue every 5 (five) minutes as needed for Chest pain., Disp: 25 tablet, Rfl: 3    olmesartan (BENICAR) 40 MG tablet, Take 1 tablet (40 mg total) by mouth once daily., Disp: 90 tablet, Rfl: 1    pantoprazole (PROTONIX) 40 MG tablet, Take 1 tablet by mouth twice a day before breakfast and dinner, Disp: 60 tablet, Rfl: 11    pen needle, diabetic 32 gauge x 5/32" Ndle, " 1 each by Misc.(Non-Drug; Combo Route) route 2 (two) times a day., Disp: 100 each, Rfl: 0    pramipexole (MIRAPEX) 0.125 MG tablet, Take 2 tablets by mouth every evening., Disp: , Rfl:     pulse oximeter (PULSE OXIMETER) device, Use twice daily at 8 AM and 3 PM and [facility]., Disp: 1 each, Rfl: 0    rosuvastatin (CRESTOR) 20 MG tablet, Take 1 tablet (20 mg total) by mouth once daily. (Patient taking differently: Take 20 mg by mouth every evening.), Disp: 90 tablet, Rfl: 3    sod picosulf-mag ox-citric ac (CLENPIQ) 10 mg-3.5 gram- 12 gram/175 mL Soln, Drink 2 bottle by mouth split dose as directed, Disp: 350 mL, Rfl: 0    tamsulosin (FLOMAX) 0.4 mg Cap, Take 2 capsules (0.8 mg total) by mouth once daily. (Patient taking differently: Take 0.8 mg by mouth every evening.), Disp: 180 capsule, Rfl: 3    Review of patient's allergies indicates:  No Known Allergies    Smoking history:   Social History     Tobacco Use   Smoking Status Former    Types: Cigars    Quit date: 10/1/2023    Years since quittin.5   Smokeless Tobacco Never   Tobacco Comments    4 cigars each day       Pulse: 79  SpO2: 97 %  Resp: 16    COPD Questionnaire:  COPD Questionnaire  How often do you cough?: Some of the time  How often do you have phlegm (mucus) in your chest?: Some of the time  How often does your chest feel tight?: Almost never  When you walk up a hill or one flight of stairs, how often are you breathless?: All of the time  How often are you limited doing any activities at home?: A little of the time  How often are you confident leaving the house despite your lung condition?: Most of the time  How often do you sleep soundly?: All of the time  How often do you have energy?: Some of the time  Total score: 17       PFT Results:  Pre FVC   Date/Time Value Ref Range Status   10/05/2023 10:38 AM 4.53 3.08 - 5.14 L Final     Pre FEV1   Date/Time Value Ref Range Status   10/05/2023 10:38 AM 2.30 (L) 2.31 - 3.99  L Final     Pre FEV1 FVC   Date/Time Value Ref Range Status   10/05/2023 10:38 AM 50.77 (L) 63.91 - 89.78 % Final     Pre TLC   Date/Time Value Ref Range Status   02/27/2018 03:11 PM 8.42 (H) 5.68 - 6.68 L Final     Pre DLCO   Date/Time Value Ref Range Status   02/27/2018 03:11 PM 16.11 (L) 22.51 - 30.8 ml/mmHg/min Final         Educational assessment:   [x]            Good  []            Fair  []            Poor    Readiness to learn:   [x]            Good  []            Fair  []            Poor    Vision Status:   [x]            Good  []            Fair  []            Poor    Reading Ability:  [x]            Good  []            Fair  []            Poor    Knowledge of condition:   [x]            Good  []            Fair  []            Poor    Language Barriers:   []            Good  []            Fair  []            Poor  [x]            None    Cognitive/ Physical Barriers:   []            Good  []            Fair  []            Poor  [x]            None    Learning best by:                       [x]            Seeing  []            Hearing  []            Reading                         [x]            Doing    Describe any barrier /Limitation or financial implications of care choices identified     []            Financial  []            Emotional  []            Education  []            Vision/Hearing  []            Physical  [x]            None  []                TOPIC /CONTENT FOR TODAY:    [x]            MDI with or without spacer  [x]            Dry powder inhaler  []            Acapella   []           Peak Flow meter  [x]            COPD action plan  [x]            Nebulizer use  []            Oxygen use safety  [x]            Breathing and cough techniques  [x]            Energy conservation  [x]            Infection prevention  []           OTHER________________________        Learner:    [x]            Patient   []            Caregiver    Method:    [x]            Verbal explanation  [x]            Audio  visual    [x]            Literature  [x]            Teach back      Evaluation:    [x]            Teach back  [x]            Demonstrate  [x]            Follow up phone call    []            2 weeks     [x]            4 weeks   [x]            PRN    Therapist Comments:   Patient was seen today to review respiratory medication purpose and proper technique for use of inhalers. Patient practiced proper technique using MDI with valved holding chamber (spacer) and DPI inhalers. Patient demonstrated understanding. Literature was given to patient.     Asthma trigger checklist was verbally reviewed and literature given to patient.     Infection prevention was discussed. Patient was reminded to get influenza vaccine. Hand hygiene and cleaning of respiratory equipment was also discussed. Patient verbalized understanding.      COPD action plan was reviewed and literature was given to patient. Patient verbalized understanding.     Plan:  Monthly Pulmonary Disease Management Questionnaire  Follow-up PDM appointment scheduled for  8/8/24  Reinforce education  Meds: Trelegy, albuterol   DME Needs: OHME  Action Plan: COPD   Immunization: Pneumococcal- current, Flu-current  Next Provider Visit: 10/11/24  Next Spirometry/CPFT: 10/11/24  Approximate time spent with patient: 40 mins

## 2024-05-10 ENCOUNTER — CLINICAL SUPPORT (OUTPATIENT)
Dept: DIABETES | Facility: CLINIC | Age: 69
End: 2024-05-10
Payer: MEDICARE

## 2024-05-10 ENCOUNTER — LAB VISIT (OUTPATIENT)
Dept: LAB | Facility: HOSPITAL | Age: 69
End: 2024-05-10
Attending: NURSE PRACTITIONER
Payer: MEDICARE

## 2024-05-10 VITALS — WEIGHT: 215.63 LBS | BODY MASS INDEX: 32.78 KG/M2

## 2024-05-10 DIAGNOSIS — K22.70 BARRETT'S ESOPHAGUS DETERMINED BY ENDOSCOPY: ICD-10-CM

## 2024-05-10 DIAGNOSIS — E61.1 IRON DEFICIENCY: ICD-10-CM

## 2024-05-10 DIAGNOSIS — R91.1 PULMONARY NODULE: ICD-10-CM

## 2024-05-10 DIAGNOSIS — D64.9 ANEMIA, UNSPECIFIED TYPE: ICD-10-CM

## 2024-05-10 DIAGNOSIS — F17.201 TOBACCO ABUSE, IN REMISSION: ICD-10-CM

## 2024-05-10 DIAGNOSIS — E11.65 CONTROLLED TYPE 2 DIABETES MELLITUS WITH HYPERGLYCEMIA, WITHOUT LONG-TERM CURRENT USE OF INSULIN: Primary | ICD-10-CM

## 2024-05-10 DIAGNOSIS — D12.6 TUBULAR ADENOMA OF COLON: ICD-10-CM

## 2024-05-10 DIAGNOSIS — E53.8 B12 DEFICIENCY: ICD-10-CM

## 2024-05-10 LAB
ANION GAP SERPL CALC-SCNC: 11 MMOL/L (ref 8–16)
BASOPHILS # BLD AUTO: 0.03 K/UL (ref 0–0.2)
BASOPHILS NFR BLD: 0.4 % (ref 0–1.9)
BUN SERPL-MCNC: 11 MG/DL (ref 8–23)
CALCIUM SERPL-MCNC: 9.7 MG/DL (ref 8.7–10.5)
CHLORIDE SERPL-SCNC: 100 MMOL/L (ref 95–110)
CO2 SERPL-SCNC: 23 MMOL/L (ref 23–29)
CREAT SERPL-MCNC: 1.1 MG/DL (ref 0.5–1.4)
DIFFERENTIAL METHOD BLD: ABNORMAL
EOSINOPHIL # BLD AUTO: 0.1 K/UL (ref 0–0.5)
EOSINOPHIL NFR BLD: 1.3 % (ref 0–8)
ERYTHROCYTE [DISTWIDTH] IN BLOOD BY AUTOMATED COUNT: 13.6 % (ref 11.5–14.5)
EST. GFR  (NO RACE VARIABLE): >60 ML/MIN/1.73 M^2
FERRITIN SERPL-MCNC: 37 NG/ML (ref 20–300)
GLUCOSE SERPL-MCNC: 155 MG/DL (ref 70–110)
HCT VFR BLD AUTO: 38.6 % (ref 40–54)
HGB BLD-MCNC: 13 G/DL (ref 14–18)
IMM GRANULOCYTES # BLD AUTO: 0.03 K/UL (ref 0–0.04)
IMM GRANULOCYTES NFR BLD AUTO: 0.4 % (ref 0–0.5)
IRON SERPL-MCNC: 51 UG/DL (ref 45–160)
LYMPHOCYTES # BLD AUTO: 1.4 K/UL (ref 1–4.8)
LYMPHOCYTES NFR BLD: 17.5 % (ref 18–48)
MCH RBC QN AUTO: 30.7 PG (ref 27–31)
MCHC RBC AUTO-ENTMCNC: 33.7 G/DL (ref 32–36)
MCV RBC AUTO: 91 FL (ref 82–98)
MONOCYTES # BLD AUTO: 0.8 K/UL (ref 0.3–1)
MONOCYTES NFR BLD: 10.1 % (ref 4–15)
NEUTROPHILS # BLD AUTO: 5.5 K/UL (ref 1.8–7.7)
NEUTROPHILS NFR BLD: 70.3 % (ref 38–73)
NRBC BLD-RTO: 0 /100 WBC
PLATELET # BLD AUTO: 217 K/UL (ref 150–450)
PMV BLD AUTO: 9.1 FL (ref 9.2–12.9)
POTASSIUM SERPL-SCNC: 4.2 MMOL/L (ref 3.5–5.1)
RBC # BLD AUTO: 4.23 M/UL (ref 4.6–6.2)
SATURATED IRON: 13 % (ref 20–50)
SODIUM SERPL-SCNC: 134 MMOL/L (ref 136–145)
TOTAL IRON BINDING CAPACITY: 383 UG/DL (ref 250–450)
TRANSFERRIN SERPL-MCNC: 259 MG/DL (ref 200–375)
WBC # BLD AUTO: 7.85 K/UL (ref 3.9–12.7)

## 2024-05-10 PROCEDURE — 82728 ASSAY OF FERRITIN: CPT | Performed by: NURSE PRACTITIONER

## 2024-05-10 PROCEDURE — 83540 ASSAY OF IRON: CPT | Performed by: NURSE PRACTITIONER

## 2024-05-10 PROCEDURE — 85025 COMPLETE CBC W/AUTO DIFF WBC: CPT | Performed by: NURSE PRACTITIONER

## 2024-05-10 PROCEDURE — 99999 PR PBB SHADOW E&M-EST. PATIENT-LVL III: CPT | Mod: PBBFAC,,,

## 2024-05-10 PROCEDURE — 86334 IMMUNOFIX E-PHORESIS SERUM: CPT | Performed by: NURSE PRACTITIONER

## 2024-05-10 PROCEDURE — 83521 IG LIGHT CHAINS FREE EACH: CPT | Performed by: NURSE PRACTITIONER

## 2024-05-10 PROCEDURE — 84165 PROTEIN E-PHORESIS SERUM: CPT | Mod: 26,,, | Performed by: PATHOLOGY

## 2024-05-10 PROCEDURE — 36415 COLL VENOUS BLD VENIPUNCTURE: CPT | Performed by: NURSE PRACTITIONER

## 2024-05-10 PROCEDURE — 80048 BASIC METABOLIC PNL TOTAL CA: CPT | Performed by: NURSE PRACTITIONER

## 2024-05-10 PROCEDURE — 95249 CONT GLUC MNTR PT PROV EQP: CPT | Mod: S$GLB,,, | Performed by: PEDIATRICS

## 2024-05-10 PROCEDURE — 84165 PROTEIN E-PHORESIS SERUM: CPT | Performed by: NURSE PRACTITIONER

## 2024-05-10 NOTE — PROGRESS NOTES
Diabetes Care Specialist Follow-up Note  Author: Karen Sandra RN  Date: 5/10/2024    Program Intake  Reason for Diabetes Program Visit:: Intervention  Type of Intervention:: Individual  Individual: Education  Education: Self-Management Skill Review  Current diabetes risk level:: moderate  In the last 12 months, have you:: used emergency room services  Was the ER or hospital admission related to diabetes?: Yes  Permission to speak with others about care:: yes  Continuous Glucose Monitoring  Patient has CGM: Yes  Personal CGM type:: Freestyle Griselda 3  GMI Date: 05/10/24  GMI Value: 7.1 %    Lab Results   Component Value Date    HGBA1C 12.5 (H) 04/29/2024     A1c Pre Diabetes Care Specialist Intervention:  7.1%    CURRENT DM MEDICATIONS:   Farxiga 10 mg daily   Trulicity 4.5 mg weekly (Thursdays)  Metformin 1,000 mg BID  Levermir 10 units BID (Will switch to Tresiba 18 units when finished with Levemir)    Clinical    Weight: 97.8 kg (215 lb 9.8 oz)       Body mass index is 32.78 kg/m².    Nutritional Status  Diet: Diabetic diet  Meal Plan 24 Hour Recall: Breakfast, Lunch, Dinner, Snack  Meal Plan 24 Hour Recall - Breakfast: 2 Eggo waffles with 2 eggs; Coffee  Meal Plan 24 Hour Recall - Lunch: None.  Meal Plan 24 Hour Recall - Dinner: Pork chop, roasted potatoes, and vegetables; SF cranberry juice  Meal Plan 24 Hour Recall - Snack: 1.5 cup of bran flakes with milk and splenda (normally: fruit cups and sf pudding cups)    Physical activity/Exercise:   See care plan for update.    SMBG: Freestyle Griselda 3; Report in media.         Diabetes Self-Management Skills Assessment     Home Blood Glucose Monitoring  Personal CGM type:: Freestyle Griselda 3    During today's follow-up visit,  the following areas required further assessment and content was provided/reviewed.    Based on today's diabetes care assessment, the following areas of need were identified:          8/2/2023    12:01 AM   Social   Support No   Access to Mass  "Media/Tech No   Cognitive/Behavioral Health No   Culture/Tenriism No   Communication No   Health Literacy No            11/30/2023    12:02 AM   Clinical   Medication Adherence No   Lab Compliance No   Nutritional Status Yes            11/30/2023    12:02 AM   Diabetes Self-Management Skills   Healthy Eating See care plan for update.     Monitoring See care plan for update.     Physical Activity See care plan for update.          Today's interventions were provided through individual discussion, instruction, and written materials were provided.    Patient verbalized understanding of instruction and written materials.  Pt was able to return back demonstration of instructions today. Patient understood key points, needs reinforcement and further instruction.     Diabetes Self-Management Care Plan Review and Evaluation of Progress:    During today's follow-up Stefan's Diabetes Self-Management Care Plan progress was reviewed and progress was evaluated including his/her input. Stefan has agreed to continue his/her journey to improve/maintain overall diabetes control by continuing to set health goals. See care plan progress below.      Care Plan: Diabetes Management   Updates made since 4/10/2024 12:00 AM        Problem: Healthy Eating         Goal: Eat 2-3 meals daily with ~45g of carbohydrates per meal and 0-15g per snack.    Start Date: 8/2/2023   Expected End Date: 7/21/2024   This Visit's Progress: On track   Recent Progress: On track   Priority: Low   Barriers: Lack of Motivation to Change; Other (comments)   Note:    Pt reports social stressors contribute to overeating. Pt verbalizes good understanding diabetes meal plan. He describes "new" recipes/dishes that are mostly non-starchy vegetables and healthy. However, he tends to cook entrees that he's familiar preparing. Discussed tips for quick, easy non-starchy vegetable additions (pre-washed salads, steamables, lower sodium canned) to assist with lowering main dish " portions.     Also, discussed flexibility of lifestyle change and how to incorporate favorite higher carb/fat dishes occasionally to maintain longer-term progress.    Reviewed non-food stress mgmt tips and support from therapist or counselor with resources reviewed. Encouraged notifying pcp for possible medication support. Pt engaged and will consider.    4/29/24: Since being discharged from the hospital with hyperglycemia, he has enrolled in a [facility] Meridium program where they are sending him 30 days of pre-made meals for breakfast, lunch, and dinner. Sometimes wife steams veggies or makes a salad to add to meals. Mr. Forbes is satisfied with meals. Him and his wife have been counting carbohydrates and paying attention to portion sizes.     5/10/24: [facility] Mer[facility] meals have stopped but still doing well with healthy eating. Wife and him are reading food labels, measuring foods, choosing SF over regular. Practicing balanced meals with veggies, protein, and carbs.          Problem: Blood Glucose Self-Monitoring         Goal: Patient agrees to check and record blood sugars 1-2 times per day.    Start Date: 8/2/2023   Expected End Date: 7/21/2024   This Visit's Progress: On track   Recent Progress: On track   Priority: Medium   Barriers: Other (comments)   Note:    Encouragement provided for several BG levels within target when he was testing. Discussed role testing BG in driving healthy lifestyle changes. Pt agreed to resume using digital diabetes. He is able to identify BG targets and troubleshooting hyperglycemia/hypoglycemia.    4/29/24: FREESTYLE JASMINE 3 CGM TRAINING  Education provided per [employer] Freestyle Jasmine 3 protocol, quick start guide and videos. Assisted with mobile pavel download and [facility] clinic data share.       The FreeStyle Jasmine 3 Continuous Glucose Monitoring System is a real time continuous glucose monitoring (CGM) device with alarms capability indicated for the management of diabetes in  persons age 4 and older.   Use your blood glucose meter to make diabetes treatment decisions when you see the symbol during the first 12 hours of wearing a Sensor, if your Sensor glucose reading does not match how you feel, or if the reading does not include a number.  For you to receive alarms, they must be on and your Moapa should be within 33 feet of you at all times. If using pavel, check mobile device to ensure ongoing compatibility.  To prevent missed alarms, make sure the Moapa has sufficient charge and that sound and/or vibration are turned on. Do not force close reader pavel.   Remove the Sensor before MRI, CT scan, X-ray, or diathermy treatment.    Alarms:   Urgent Low Glucose below 55: default ON   Low Glucose: 80 mg/dl   High Glucose 3000 mg/dl  Signal Loss: ON    Only apply the Sensor to the back of the upper arm. If placed in other areas, the Sensor may not function properly. Avoid areas with scars, moles, stretch marks, or lumps. Select an area of skin that generally stays flat during normal daily activities (no bending or folding). Choose a site that is at least 1 inch away from an insulin injection site. Rotate sensor sites.   Sensor can be worn for up to 14 days. Scan sensor to start sensor session. Sensor warm-up 60min and then automatically sends a new glucose reading to your pavel every minute.   The Sensor is water-resistant in up to 3 feet (1 meter) of water. Do not immerse longer than 30 minutes.  Instructed on how to understand SG graph, trend arrows. Reviewed menu options. Discussed how to remove and discard sensor.   Differences in glucose readings between interstitial fluid and capillary blood may be observed during times of rapid change in blood glucose, such as after eating, dosing insulin, or exercising.  Taking ascorbic acid (vitamin C) supplements while wearing the Sensor may falsely raise Sensor glucose readings. Taking more than 500 mg of ascorbic acid per day may affect the Sensor  readings which could cause you to miss a severe low glucose event. Ascorbic acid can be found in supplements including multivitamins. Some supplements, including cold remedies such as Airborne® and Emergen-C®, may contain high doses of 1000 mg of ascorbic acid and should not be taken while using the Sensor. See your health care professional to understand how long ascorbic acid is active in your body.  Contact Customer Service if you receive a Replace Sensor message before the end of the 14 day wear period. Customer Service is available at [phone] 7 Days a Week from 8AM to 8PM Eastern Standard Time.    Pt verbalized understanding of all instruction and able to demonstrate sensor application technique per protocol.     5/10/24: Doing well with Freestyle Griselda 3.          Problem: Physical Activity and Exercise         Goal: Patient agrees to increase physical activity to a goal of 150min/wk.    Start Date: 11/30/2023   Expected End Date: 7/21/2024   This Visit's Progress: No change   Recent Progress: No change   Priority: High   Barriers: Other (comments)   Note:    Pt identifies stress as greatest barrier to making diabetes self-care bx  improvements. Discussed role exercise on stress mgmt, diabetes and cardio health. Pt reports staying active but none structured. Since pt enjoys walking, he agreed to start structured daily walk 10-15min outdoor if possible.     4/29/24: Active in the yard and around the house and as a [place], but no formal activity. Agrees to try to implement some formal walking.     5/10/24: Plans to check out gym in [place] and use the cardio machines (bike, elliptical) and weight machines.          Follow Up Plan     Follow up in about 2 months (around 7/10/2024) for final follow-up.    Today's care plan and follow up schedule was discussed with patient.  Stefan verbalized understanding of the care plan, goals, and agrees to follow up plan.        The patient was encouraged to  communicate with his/her health care provider/physician and care team regarding his/her condition(s) and treatment.  I provided the patient with my contact information today and encouraged to contact me via phone or [facility] Patient Portal as needed.     Length of Visit   Total Time: 30 Minutes

## 2024-05-13 LAB
ALBUMIN SERPL ELPH-MCNC: 3.74 G/DL (ref 3.35–5.55)
ALPHA1 GLOB SERPL ELPH-MCNC: 0.3 G/DL (ref 0.17–0.41)
ALPHA2 GLOB SERPL ELPH-MCNC: 0.75 G/DL (ref 0.43–0.99)
B-GLOBULIN SERPL ELPH-MCNC: 0.84 G/DL (ref 0.5–1.1)
GAMMA GLOB SERPL ELPH-MCNC: 0.76 G/DL (ref 0.67–1.58)
INTERPRETATION SERPL IFE-IMP: NORMAL
KAPPA LC SER QL IA: 3.56 MG/DL (ref 0.33–1.94)
KAPPA LC/LAMBDA SER IA: 1.84 (ref 0.26–1.65)
LAMBDA LC SER QL IA: 1.94 MG/DL (ref 0.57–2.63)
PROT SERPL-MCNC: 6.4 G/DL (ref 6–8.4)

## 2024-05-14 LAB — PATHOLOGIST INTERPRETATION SPE: NORMAL

## 2024-05-15 ENCOUNTER — TELEPHONE (OUTPATIENT)
Dept: HEMATOLOGY/ONCOLOGY | Facility: CLINIC | Age: 69
End: 2024-05-15
Payer: MEDICARE

## 2024-05-15 NOTE — TELEPHONE ENCOUNTER
N/a lvm to inform pt that his appt has been rescheduled due to the provider not being in at the time. Nurse advised the pt to nazanin back with any questions or concerns

## 2024-05-17 ENCOUNTER — TELEPHONE (OUTPATIENT)
Dept: HEMATOLOGY/ONCOLOGY | Facility: CLINIC | Age: 69
End: 2024-05-17
Payer: MEDICARE

## 2024-05-17 NOTE — TELEPHONE ENCOUNTER
----- Message from Brie Ramirez sent at 5/17/2024  3:16 PM CDT -----  Contact: [phone]  Patient wife called in requesting a call back about an appointment to be reschedule (3 mo prior lab/cbd) please call back [phone]

## 2024-05-19 LAB
OHS QRS DURATION: 108 MS
OHS QTC CALCULATION: 430 MS

## 2024-05-20 RX ORDER — PEN NEEDLE, DIABETIC 30 GX3/16"
1 NEEDLE, DISPOSABLE MISCELLANEOUS 2 TIMES DAILY
Qty: 100 EACH | Refills: 0 | OUTPATIENT
Start: 2024-05-20 | End: 2024-06-19

## 2024-05-21 ENCOUNTER — TELEPHONE (OUTPATIENT)
Dept: DIABETES | Facility: CLINIC | Age: 69
End: 2024-05-21
Payer: MEDICARE

## 2024-05-21 NOTE — TELEPHONE ENCOUNTER
----- Message from Mitul Peterson sent at 5/21/2024 11:14 AM CDT -----  Contact: self  Pt is asking for an return call in reference to questions and concerns he has ,please call back at .[phone] Thx CJ

## 2024-05-21 NOTE — TELEPHONE ENCOUNTER
Spoke with patient and he states that the tape that is placed over the dexcom sensor is giving him a rash so he wants to know how important is it to place that over the sensor or if he even has to do that step

## 2024-05-21 NOTE — TELEPHONE ENCOUNTER
Ginny Little, Devin De León, MA  Caller: Unspecified (Today, 11:40 AM)  Let patient know he does not have to use the overlay patch.    If he has difficulty keeping in place, I would suggest skin tac to help. Tac away would be the adhesive remover when time to remove the sensor. But this is only if the sensor isn't staying in place without the overlay patch.    Patient made aware

## 2024-05-22 ENCOUNTER — CLINICAL SUPPORT (OUTPATIENT)
Dept: SMOKING CESSATION | Facility: CLINIC | Age: 69
End: 2024-05-22
Payer: COMMERCIAL

## 2024-05-22 DIAGNOSIS — F17.200 NICOTINE DEPENDENCE: Primary | ICD-10-CM

## 2024-05-22 PROCEDURE — 99999 PR PBB SHADOW E&M-EST. PATIENT-LVL II: CPT | Mod: PBBFAC,,, | Performed by: SPEECH-LANGUAGE PATHOLOGIST

## 2024-05-22 PROCEDURE — 99403 PREV MED CNSL INDIV APPRX 45: CPT | Mod: S$GLB,,, | Performed by: SPEECH-LANGUAGE PATHOLOGIST

## 2024-05-22 NOTE — PROGRESS NOTES
"Individual Follow-Up Form    5/22/2024    Quit Date: 10/1/2023     Clinical Status of Patient: Outpatient     Length of Service: 45 minutes     Continuing Medication: no     Other Medications:        Target Symptoms: Withdrawal and medication side effects. The following were  rated moderate (3) to severe (4) on TCRS:  Moderate (3): none  Severe (4): none    Comments: Telephone visit at the patient's request due to his location. Patient reports he has remained smoke free since last session & his 10/1/2023 quit date. He remains off of Varenicline & reports he is doing well. Administered TCRS with patient reporting slight symptoms of anger/irritability/frustration, increased appetite/hunger, difficulty concentrating, restless/can't still/impatient. Discussed patient's stress levels which would be a high risk situation for relapse & patient reported high stress levels & moderate levels of anger/irritability/frustration due to situations during last session. Patient reports improved stress levels with calmer, stable situations & that his stress level has been around a level 3. Discussed the normalcy of urges/craves especially during high stress events due to the psychological & sensory cues. He reports he hasn't not had any thoughts or urges in a while. Discussed the role & importance of balance & the "3 legged stool" theory. Patient reports he hasn't gone by the gym to look in to it just yet. Discussed adequate nutrition & hydration. He reports he is eating better which is allowing his blood sugars to be better & stable. He reports he realized he wasn't eating enough carbs which had his sugars low. Discussed sleep patterns.  He reports his sleep is "pretty good" reporting he has some health issues such as reflux that impacts his sleep & he has an appointment with his GI tomorrow. Discussed the impact of smoking & his quit on reflux/gerd management. Discussed how his quit can allow for increased endurance & energy & heat " tolerance in the summer. He reports they have plans to bring their granddaughter to various places for trips to explore. Patient is looking forward to a smoke free Father's Day. Patient's goal is to remain smoke free. Follow up set for 6/19/2024 at 10:00 am.     Diagnosis: F17.200    Next Visit: 4 weeks     Negative

## 2024-05-25 NOTE — TELEPHONE ENCOUNTER
Provider Staff:  Action required for this patient     Please see care gap opportunities below in Care Due Message.    Thanks!  [facility]     Appointments      Date Provider   Last Visit   8/4/2023 Wallace Fisher MD   Next Visit   11/3/2023 Wallace Fisher MD     Refill Decision Note   Stefan Forbes  is requesting a refill authorization.  Brief Assessment and Rationale for Refill:  Approve     Medication Therapy Plan:         Comments:     Note composed:6:35 AM 09/12/2023             Appointments     Last Visit   8/4/2023 Wallace Fisher MD   Next Visit   11/3/2023 Wallace Fisher MD      
Care Due:                  Date            Visit Type   Department     Provider  --------------------------------------------------------------------------------                                MYCHART                              FOLLOWUP/OF  [facility] INTERNAL  Last Visit: 08-      [facility] VISIT   MEDICINE       Wallace Fisher                              EP -                              PRIMARY      [facility] INTER[facility]  Next Visit: 11-      CARE ([facility])   MEDICINE       Wallace Fisher                                                            Last  Test          Frequency    Reason                     Performed    Due Date  --------------------------------------------------------------------------------    CMP.........  12 months..  rosuvastatin.............  10-   10-    HBA1C.......  6 months...  dapagliflozin,             04-   10-                             dulaglutide..............    Lipid Panel.  12 months..  rosuvastatin.............  10-   10-    [facility] Embedded Care Due Messages. Reference number: [id number].   9/11/2023 11:18:37 PM CDT  
60

## 2024-05-28 ENCOUNTER — OFFICE VISIT (OUTPATIENT)
Dept: OPHTHALMOLOGY | Facility: CLINIC | Age: 69
End: 2024-05-28
Payer: MEDICARE

## 2024-05-28 DIAGNOSIS — H52.203 MYOPIA WITH ASTIGMATISM AND PRESBYOPIA, BILATERAL: ICD-10-CM

## 2024-05-28 DIAGNOSIS — H52.4 MYOPIA WITH ASTIGMATISM AND PRESBYOPIA, BILATERAL: ICD-10-CM

## 2024-05-28 DIAGNOSIS — H25.013 CORTICAL AGE-RELATED CATARACT OF BOTH EYES: ICD-10-CM

## 2024-05-28 DIAGNOSIS — E11.36 DIABETIC CATARACT OF BOTH EYES: ICD-10-CM

## 2024-05-28 DIAGNOSIS — E11.65 CONTROLLED TYPE 2 DIABETES MELLITUS WITH HYPERGLYCEMIA, WITHOUT LONG-TERM CURRENT USE OF INSULIN: ICD-10-CM

## 2024-05-28 DIAGNOSIS — H25.13 NUCLEAR SCLEROSIS, BILATERAL: ICD-10-CM

## 2024-05-28 DIAGNOSIS — E11.9 TYPE 2 DIABETES MELLITUS WITHOUT RETINOPATHY: Primary | ICD-10-CM

## 2024-05-28 DIAGNOSIS — H52.13 MYOPIA WITH ASTIGMATISM AND PRESBYOPIA, BILATERAL: ICD-10-CM

## 2024-05-28 DIAGNOSIS — D31.31 NEVUS OF CHOROID OF RIGHT EYE: ICD-10-CM

## 2024-05-28 PROCEDURE — 4010F ACE/ARB THERAPY RXD/TAKEN: CPT | Mod: CPTII,S$GLB,, | Performed by: OPTOMETRIST

## 2024-05-28 PROCEDURE — 92250 FUNDUS PHOTOGRAPHY W/I&R: CPT | Mod: S$GLB,,, | Performed by: OPTOMETRIST

## 2024-05-28 PROCEDURE — 2023F DILAT RTA XM W/O RTNOPTHY: CPT | Mod: CPTII,S$GLB,, | Performed by: OPTOMETRIST

## 2024-05-28 PROCEDURE — 92015 DETERMINE REFRACTIVE STATE: CPT | Mod: S$GLB,,, | Performed by: OPTOMETRIST

## 2024-05-28 PROCEDURE — 1126F AMNT PAIN NOTED NONE PRSNT: CPT | Mod: CPTII,S$GLB,, | Performed by: OPTOMETRIST

## 2024-05-28 PROCEDURE — 1159F MED LIST DOCD IN RCRD: CPT | Mod: CPTII,S$GLB,, | Performed by: OPTOMETRIST

## 2024-05-28 PROCEDURE — 92004 COMPRE OPH EXAM NEW PT 1/>: CPT | Mod: S$GLB,,, | Performed by: OPTOMETRIST

## 2024-05-28 PROCEDURE — 1160F RVW MEDS BY RX/DR IN RCRD: CPT | Mod: CPTII,S$GLB,, | Performed by: OPTOMETRIST

## 2024-05-28 PROCEDURE — 99999 PR PBB SHADOW E&M-EST. PATIENT-LVL IV: CPT | Mod: PBBFAC,,, | Performed by: OPTOMETRIST

## 2024-05-28 PROCEDURE — 3046F HEMOGLOBIN A1C LEVEL >9.0%: CPT | Mod: CPTII,S$GLB,, | Performed by: OPTOMETRIST

## 2024-05-28 RX ORDER — PEN NEEDLE, DIABETIC 30 GX3/16"
1 NEEDLE, DISPOSABLE MISCELLANEOUS 2 TIMES DAILY
Qty: 100 EACH | Refills: 0 | Status: SHIPPED | OUTPATIENT
Start: 2024-05-28 | End: 2024-06-29

## 2024-05-28 NOTE — PROGRESS NOTES
HPI     Diabetic Eye Exam            Comments: Diagnosed with diabetes in 2012  Lab Results       Component                Value               Date                       HGBA1C                   12.5 (H)            04/29/2024              Vision changes since last eye exam?: yes, BG also fluctuating   Any eye pain today: no  Other ocular symptoms: no  Interested in contact lens fitting today? no                 Last edited by Saima Laguna MA on 5/28/2024  9:47 AM.            Assessment /Plan     For exam results, see Encounter Report.    Type 2 diabetes mellitus without retinopathy  There was no diabetic retinopathy present in either eye today.   Recommended that pt continue care with PCP and/or specialists regarding diabetes.  Follow-up dilated eye exam recommended in 12 months, sooner with any vision changes or new concerns.    Nuclear sclerosis, bilateral  Cortical age-related cataract of both eyes  Diabetic cataract of both eyes  Cataract accounts for vision change. New Rx for glasses/contacts will not improve vision.   Refer to ophthalmologist for cataract evaluation.     Nevus of choroid of right eye  -     Color Fundus Photography - OU - Both Eyes  Stable Nevus x 2 OD  No changes in size or appearance compared to previous photos  Monitor 12 months    Myopia with astigmatism and presbyopia, bilateral  Hold spec Rx      RTC next available with Dr. Khalil for cataract evaluation or PRN if any problems.   Discussed above and answered questions.

## 2024-05-30 ENCOUNTER — OFFICE VISIT (OUTPATIENT)
Dept: HEMATOLOGY/ONCOLOGY | Facility: CLINIC | Age: 69
End: 2024-05-30
Payer: MEDICARE

## 2024-05-30 ENCOUNTER — TELEPHONE (OUTPATIENT)
Dept: OPHTHALMOLOGY | Facility: CLINIC | Age: 69
End: 2024-05-30
Payer: MEDICARE

## 2024-05-30 DIAGNOSIS — Z87.891 HISTORY OF CIGARETTE SMOKING: ICD-10-CM

## 2024-05-30 DIAGNOSIS — E53.8 B12 DEFICIENCY: Primary | ICD-10-CM

## 2024-05-30 DIAGNOSIS — R91.8 MULTIPLE PULMONARY NODULES: ICD-10-CM

## 2024-05-30 DIAGNOSIS — G25.81 RESTLESS LEG SYNDROME DUE TO IRON DEFICIENCY ANEMIA: Chronic | ICD-10-CM

## 2024-05-30 DIAGNOSIS — J43.9 PULMONARY EMPHYSEMA, UNSPECIFIED EMPHYSEMA TYPE: ICD-10-CM

## 2024-05-30 DIAGNOSIS — D50.9 RESTLESS LEG SYNDROME DUE TO IRON DEFICIENCY ANEMIA: Chronic | ICD-10-CM

## 2024-05-30 DIAGNOSIS — D12.6 TUBULAR ADENOMA OF COLON: ICD-10-CM

## 2024-05-30 DIAGNOSIS — D50.0 IRON DEFICIENCY ANEMIA DUE TO CHRONIC BLOOD LOSS: ICD-10-CM

## 2024-05-30 PROCEDURE — 99215 OFFICE O/P EST HI 40 MIN: CPT | Mod: 95,,, | Performed by: NURSE PRACTITIONER

## 2024-05-30 PROCEDURE — 3046F HEMOGLOBIN A1C LEVEL >9.0%: CPT | Mod: CPTII,95,, | Performed by: NURSE PRACTITIONER

## 2024-05-30 PROCEDURE — 1160F RVW MEDS BY RX/DR IN RCRD: CPT | Mod: CPTII,95,, | Performed by: NURSE PRACTITIONER

## 2024-05-30 PROCEDURE — 1159F MED LIST DOCD IN RCRD: CPT | Mod: CPTII,95,, | Performed by: NURSE PRACTITIONER

## 2024-05-30 PROCEDURE — 4010F ACE/ARB THERAPY RXD/TAKEN: CPT | Mod: CPTII,95,, | Performed by: NURSE PRACTITIONER

## 2024-05-30 RX ORDER — EPINEPHRINE 0.3 MG/.3ML
0.3 INJECTION SUBCUTANEOUS ONCE AS NEEDED
Status: CANCELLED | OUTPATIENT
Start: 2024-05-30

## 2024-05-30 RX ORDER — DIPHENHYDRAMINE HYDROCHLORIDE 50 MG/ML
50 INJECTION INTRAMUSCULAR; INTRAVENOUS ONCE AS NEEDED
Status: CANCELLED | OUTPATIENT
Start: 2024-05-30

## 2024-05-30 RX ORDER — SODIUM CHLORIDE 0.9 % (FLUSH) 0.9 %
10 SYRINGE (ML) INJECTION
Status: CANCELLED | OUTPATIENT
Start: 2024-05-30

## 2024-05-30 NOTE — TELEPHONE ENCOUNTER
Left VM explaining he is scheduled for an evaluation for cataract surgery with Dr. Khalil. Explained there all multiple providers who perform cataract surgery in our office but they are booked out for a while and Dr. Khalil has soonest available. Also explained that we do not perform surgeries at [facility] but at eye [facility] behind [facility]

## 2024-05-30 NOTE — TELEPHONE ENCOUNTER
----- Message from Pattie Hemphill sent at 5/30/2024 12:35 PM CDT -----  Contact: Lisa/wife  Lisa is needing call back regarding Stefan eye surgery. She wants to know if there is more than one doctor who does the surgery and can it be done at the [facility]. Please call back at [phone].    Thank you summer

## 2024-05-30 NOTE — PROGRESS NOTES
The patient location is: home  The chief complaint leading to consultation is: no complaints    Visit type: audiovisual    Face to Face time with patient: 30  45 minutes of total time spent on the encounter, which includes face to face time and non-face to face time preparing to see the patient (eg, review of tests), Obtaining and/or reviewing separately obtained history, Documenting clinical information in the electronic or other health record, Independently interpreting results (not separately reported) and communicating results to the patient/family/caregiver, or Care coordination (not separately reported).     Each patient to whom he or she provides medical services by telemedicine is:  (1) informed of the relationship between the physician and patient and the respective role of any other health care provider with respect to management of the patient; and (2) notified that he or she may decline to receive medical services by telemedicine and may withdraw from such care at any time.    Patient ID: Stefan Forbes Jr. is a 69 y.o. male.    Chief Complaint: anxiety    HPI:  70 yo male who presents to the hematology clinic as a new patient for further evaluation and recommendation of low iron. Taking B12 supplements daily and ferrous sulfate 325 mg PO bid.  Currently taking Linzess for chronic constipation and has been diagnosed with IBS in the past.     He has been seen in the clinic in 2019 for iron deficiency anemia and treated with IV feraheme .  He had a colonoscopy in 2018 which was no contributory to MEDINA.  Since then he has been lost to follow up.      He has previously been followed by pulmonology for bilateral pulmonary nodules; however has not had a CT chest with contrast since 10/2019.       He has a history of provoked PE after knee replacement and completed 6 months of anticoagulation.  Has had no other incidence since.     Denies any known abnormal bleeding.  C/o extreme acid reflux symptoms taking  protonix 40 mg twice daily and pepcid 40 mg PO as needed.       Last EGD and colonoscopy + polyps 2023 and was told to f/u in 1 year.  Does have a h/o h. Pylori.       Denies f/c/ns or unintentional weight loss; however has lost 10-15 lbs recently then gained it all back.      Family hx of cancer:  Father: bone cancer?  Paternal grandfather: bone cancer?  Maternal uncle: pancreatic cancer     Former smoker. Quit in 2023 - smoked cigarettes in the past and cigars - about 40 year about 1 to 1-1/2 ppd.  Denies alcohol or illicit drug use.      Worked as a  for 30 years - retired now.     Interval History:  2024  States has no complaints.  Anxiety currently due to daughter moving to [place] for job.  Is being followed by GI found with severe constipation and acid reflux.  Currently being treated with Trulance. Currently with stable normocytic anemia - hgb 13 g/dL with slightly low saturated iron 13%.  Currently taking ferrous sulfate 325 mg PO bid.  Denies any known abnormal bleeding.  Had hospitalization due to hyperglycemia - found with bronchial infection, hypotension, and BG in the 600's.  States that he is doing much better now.  EGD colonoscopy 2024 - negative for contributory finding of MEDINA.     I have reviewed all of the patient's relevant lab work available in the medical record and have utilized this in my evaluation and management recommendations today.      Social History     Socioeconomic History    Marital status:    Occupational History    Occupation: retired   Tobacco Use    Smoking status: Former     Types: Cigars     Quit date: 10/1/2023     Years since quittin.6    Smokeless tobacco: Never    Tobacco comments:     4 cigars each day   Substance and Sexual Activity    Alcohol use: No    Drug use: No    Sexual activity: Not Currently     Partners: Female     Social Determinants of Health     Financial Resource Strain: Low Risk  (10/20/2022)    Overall Financial  Resource Strain (CARDIA)     Difficulty of Paying Living Expenses: Not very hard   Food Insecurity: No Food Insecurity (4/11/2024)    Hunger Vital Sign     Worried About Running Out of Food in the Last Year: Never true     Ran Out of Food in the Last Year: Never true   Transportation Needs: No Transportation Needs (4/11/2024)    PRAPARE - Transportation     Lack of Transportation (Medical): No     Lack of Transportation (Non-Medical): No   Physical Activity: Unknown (10/20/2022)    Exercise Vital Sign     Days of Exercise per Week: 1 day   Recent Concern: Physical Activity - Insufficiently Active (10/20/2022)    Exercise Vital Sign     Days of Exercise per Week: 1 day     Minutes of Exercise per Session: 10 min   Stress: Stress Concern Present (10/20/2022)    [employer] - Occupational Stress Questionnaire     Feeling of Stress : To some extent   Housing Stability: Unknown (4/11/2024)    Housing Stability Vital Sign     Unable to Pay for Housing in the Last Year: No     Unstable Housing in the Last Year: No       Family History   Problem Relation Name Age of Onset    Diabetes Mother Mom     Arthritis Mother Mom     Cancer Father Dad        Past Surgical History:   Procedure Laterality Date    ANGIOPLASTY      JOINT REPLACEMENT  2017    TOTAL KNEE ARTHROPLASTY         Past Medical History:   Diagnosis Date    Arthritis     Cataract     COPD (chronic obstructive pulmonary disease)     Diabetes mellitus, type 2     Hyperlipidemia     Hypertension     Personal history of colonic polyps 2018       Review of Systems   Constitutional: Negative.    HENT: Negative.  Negative for nosebleeds.    Eyes: Negative.    Respiratory: Negative.     Cardiovascular: Negative.    Gastrointestinal:  Positive for constipation. Negative for anal bleeding and blood in stool. Abdominal distention: acid reflux associated with constipation - followed by GI.  Endocrine: Negative.    Genitourinary: Negative.  Negative  for hematuria.   Musculoskeletal: Negative.    Skin: Negative.    Allergic/Immunologic: Negative.    Neurological: Negative.    Hematological: Negative.    Psychiatric/Behavioral:  The patient is nervous/anxious.           Medication List with Changes/Refills   Current Medications    ACETAMINOPHEN (TYLENOL) 500 MG TABLET    Take 1,000 mg by mouth as needed.     ALBUTEROL (VENTOLIN HFA) 90 MCG/ACTUATION INHALER    Inhale 2 puffs into the lungs every 6 (six) hours as needed for Wheezing or Shortness of Breath. Rescue    ASCORBIC ACID, VITAMIN C, (VITAMIN C) 1000 MG TABLET    Take 1,000 mg by mouth once daily.    ASPIRIN 325 MG TABLET    Take 325 mg by mouth once daily.    BLOOD SUGAR DIAGNOSTIC (BLOOD GLUCOSE TEST) STRP    1 strip by Misc.(Non-Drug; Combo Route) route 3 (three) times daily.    BLOOD-GLUCOSE METER,CONTINUOUS (FREESTYLE JASMINE 3 READER) [facility]    Use as directed.    BLOOD-GLUCOSE SENSOR (FREESTYLE JASMINE 3 SENSOR) [facility]    Change sensor every 14 days    BUPROPION (WELLBUTRIN SR) 150 MG TBSR 12 HR TABLET    Take 1 tablet (150 mg total) by mouth 2 (two) times daily.    CETIRIZINE (ZYRTEC) 10 MG TABLET    Take 10 mg by mouth as needed for Allergies.    CHOLECALCIFEROL, VITAMIN D3, (VITAMIN D3) 25 MCG (1,000 UNIT) CAPSULE    Take 1,000 Units by mouth once daily.    CYANOCOBALAMIN (VITAMIN B-12) 500 MCG TABLET    Take 500 mcg by mouth once daily.     CYCLOBENZAPRINE (FLEXERIL) 10 MG TABLET    Take 1/2 to one tab by mouth at bedtime as needed for muscle spasm    DAPAGLIFLOZIN PROPANEDIOL (FARXIGA) 10 MG TABLET    Take 1 tablet (10 mg total) by mouth once daily.    DULAGLUTIDE (TRULICITY) 4.5 MG/0.5 ML PEN INJECTOR    Inject 4.5 mg into the skin every 7 days.    FAMOTIDINE (PEPCID) 40 MG TABLET    Take 1 tablet by mouth once a day    FLUTICASONE PROPIONATE (FLONASE) 50 MCG/ACTUATION NASAL SPRAY    Use 1 spray (50 mcg total) in each nostril once daily.    FLUTICASONE-UMECLIDIN-VILANTER (TRELEGY ELLIPTA) 100-62.5-25  "MCG DSDV    Inhale 1 puff into the lungs once daily.    INSULIN DEGLUDEC (TRESIBA FLEXTOUCH U-200) 200 UNIT/ML (3 ML) INSULIN PEN    Inject 18 Units into the skin once daily. Increase by 2 units every 2 days until fasting sugar is below 130. Stop at 50 units.    IPRATROPIUM (ATROVENT) 21 MCG (0.03 %) NASAL SPRAY    Spray 1 or 2 sprays in each nasal passage every 8 hours, if needed, as directed.    LANCETS 30 GAUGE MISC    1 lancet by Misc.(Non-Drug; Combo Route) route 3 (three) times daily.    MAGNESIUM OXIDE (MAG-OX) 400 MG (241.3 MG MAGNESIUM) TABLET    Take 1 tablet (400 mg total) by mouth once daily.    METFORMIN (GLUCOPHAGE) 1000 MG TABLET    Take 1 tablet (1,000 mg total) by mouth 2 (two) times daily with meals.    NITROGLYCERIN (NITROSTAT) 0.4 MG SL TABLET    Place 1 tablet under the tongue every 5 (five) minutes as needed for Chest pain.    OLMESARTAN (BENICAR) 40 MG TABLET    Take 1 tablet (40 mg total) by mouth once daily.    PANTOPRAZOLE (PROTONIX) 40 MG TABLET    Take 1 tablet by mouth twice a day before breakfast and dinner    PEN NEEDLE, DIABETIC (BD ULTRA-FINE BISI PEN NEEDLE) 32 GAUGE X 5/32" NDLE    Use 1 needle 2 (two) times a day.    PRAMIPEXOLE (MIRAPEX) 0.125 MG TABLET    Take 2 tablets by mouth every evening.    PULSE OXIMETER (PULSE OXIMETER) DEVICE    Use twice daily at 8 AM and 3 PM and record the value in Psychiatrict as directed.    ROSUVASTATIN (CRESTOR) 20 MG TABLET    Take 1 tablet (20 mg total) by mouth once daily.    TAMSULOSIN (FLOMAX) 0.4 MG CAP    Take 2 capsules (0.8 mg total) by mouth once daily.   Discontinued Medications    FERROUS SULFATE (FEROSUL) 325 MG (65 MG IRON) TAB TABLET    Take 1 tablet (325 mg total) by mouth Daily.    LUBIPROSTONE (AMITIZA) 24 MCG CAP    Take 1 capsule by mouth twice a day        Objective:   There were no vitals filed for this visit.    Physical Exam  Constitutional:       Appearance: Normal appearance.   Pulmonary:      Effort: Pulmonary effort is " normal.   Neurological:      Mental Status: He is alert and oriented to person, place, and time.   Psychiatric:         Mood and Affect: Mood normal.         Behavior: Behavior normal.         Thought Content: Thought content normal.         Judgment: Judgment normal.         Assessment:     Problem List Items Addressed This Visit          Neuro    Restless leg syndrome due to iron deficiency anemia (Chronic)    Relevant Orders    CBC Auto Differential    Comprehensive Metabolic Panel    Ferritin    Iron and TIBC       Oncology    Iron deficiency anemia due to chronic blood loss    Relevant Orders    CBC Auto Differential    Comprehensive Metabolic Panel    Ferritin    Iron and TIBC       GI    Tubular adenoma of colon    Relevant Orders    CBC Auto Differential    Comprehensive Metabolic Panel    Ferritin    Iron and TIBC     Other Visit Diagnoses       B12 deficiency    -  Primary    Relevant Orders    CBC Auto Differential    Comprehensive Metabolic Panel    Multiple pulmonary nodules        Relevant Orders    CT Chest Lung Screening Low Dose    CBC Auto Differential    Comprehensive Metabolic Panel    History of cigarette smoking        Relevant Orders    CT Chest Lung Screening Low Dose    CBC Auto Differential    Comprehensive Metabolic Panel    Pulmonary emphysema, unspecified emphysema type        Relevant Orders    CT Chest Lung Screening Low Dose    CBC Auto Differential    Comprehensive Metabolic Panel            Lab Results   Component Value Date    WBC 7.85 05/10/2024    RBC 4.23 (L) 05/10/2024    HGB 13.0 (L) 05/10/2024    HCT 38.6 (L) 05/10/2024    MCV 91 05/10/2024    MCH 30.7 05/10/2024    MCHC 33.7 05/10/2024    RDW 13.6 05/10/2024     05/10/2024    MPV 9.1 (L) 05/10/2024    GRAN 5.5 05/10/2024    GRAN 70.3 05/10/2024    LYMPH 1.4 05/10/2024    LYMPH 17.5 (L) 05/10/2024    MONO 0.8 05/10/2024    MONO 10.1 05/10/2024    EOS 0.1 05/10/2024    BASO 0.03 05/10/2024    EOSINOPHIL 1.3 05/10/2024     BASOPHIL 0.4 05/10/2024      Lab Results   Component Value Date     (L) 05/10/2024    K 4.2 05/10/2024     05/10/2024    CO2 23 05/10/2024    BUN 11 05/10/2024    CREATININE 1.1 05/10/2024    CALCIUM 9.7 05/10/2024    ANIONGAP 11 05/10/2024    ESTGFRAFRICA >60.0 10/14/2021    EGFRNONAA >60.0 10/14/2021     Lab Results   Component Value Date    ALT 12 04/12/2024    AST 12 04/12/2024    ALKPHOS 62 04/12/2024    BILITOT 0.3 04/12/2024     Lab Results   Component Value Date    IRON 51 05/10/2024    TRANSFERRIN 259 05/10/2024    TIBC 383 05/10/2024    FESATURATED 13 (L) 05/10/2024    FERRITIN 37 05/10/2024        Plan:   B12 deficiency  -     CBC Auto Differential; Future; Expected date: 05/30/2024  -     Comprehensive Metabolic Panel; Future; Expected date: 05/30/2024    Iron deficiency anemia due to chronic blood loss  -     CBC Auto Differential; Future; Expected date: 05/30/2024  -     Comprehensive Metabolic Panel; Future; Expected date: 05/30/2024  -     Ferritin; Future; Expected date: 05/30/2024  -     Iron and TIBC; Future; Expected date: 05/30/2024    Restless leg syndrome due to iron deficiency anemia  -     CBC Auto Differential; Future; Expected date: 05/30/2024  -     Comprehensive Metabolic Panel; Future; Expected date: 05/30/2024  -     Ferritin; Future; Expected date: 05/30/2024  -     Iron and TIBC; Future; Expected date: 05/30/2024    Tubular adenoma of colon  -     CBC Auto Differential; Future; Expected date: 05/30/2024  -     Comprehensive Metabolic Panel; Future; Expected date: 05/30/2024  -     Ferritin; Future; Expected date: 05/30/2024  -     Iron and TIBC; Future; Expected date: 05/30/2024    Multiple pulmonary nodules  -     CT Chest Lung Screening Low Dose; Future; Expected date: 05/30/2024  -     CBC Auto Differential; Future; Expected date: 05/30/2024  -     Comprehensive Metabolic Panel; Future; Expected date: 05/30/2024    History of cigarette smoking  -     CT Chest Lung  Screening Low Dose; Future; Expected date: 05/30/2024  -     CBC Auto Differential; Future; Expected date: 05/30/2024  -     Comprehensive Metabolic Panel; Future; Expected date: 05/30/2024    Pulmonary emphysema, unspecified emphysema type  -     CT Chest Lung Screening Low Dose; Future; Expected date: 05/30/2024  -     CBC Auto Differential; Future; Expected date: 05/30/2024  -     Comprehensive Metabolic Panel; Future; Expected date: 05/30/2024    Other orders  -     ferumoxytoL (FERAHEME) 510 mg in dextrose 5 % (D5W) 117 mL IVPB  -     EPINEPHrine (EPIPEN) 0.3 mg/0.3 mL pen injection 0.3 mg  -     diphenhydrAMINE injection 50 mg  -     hydrocortisone sodium succinate injection 100 mg  -     sodium chloride 0.9% flush 10 mL  -     sodium chloride 0.9% 100 mL flush bag      Med Onc Chart Routing      Follow up with physician    Follow up with [facility] . F/u 6 weeks after last last dose of Feraheme with labs prior - [facility]   Infusion scheduling note   Schedule Feraheme infusions x 2 at [facility]   Injection scheduling note n/a   Labs   Scheduling:  Preferred lab: [facility]  Lab interval:  cbc, cmp, iron studies   Imaging   Schedule LDCT chest prior to next visit   Pharmacy appointment No pharmacy appointment needed      Other referrals       No additional referrals needed  n/a         Reduce ferrous sulfate tablets to one tablet every other day.  Continue B12 SL tablet daily.     Collaborating Provider:  Dr. Evaristo Pisano    Thank You,  MIKAEL CarrascoP-C  [facility]

## 2024-05-31 ENCOUNTER — TELEPHONE (OUTPATIENT)
Dept: OPHTHALMOLOGY | Facility: CLINIC | Age: 69
End: 2024-05-31
Payer: MEDICARE

## 2024-05-31 ENCOUNTER — PATIENT OUTREACH (OUTPATIENT)
Dept: ADMINISTRATIVE | Facility: HOSPITAL | Age: 69
End: 2024-05-31
Payer: MEDICARE

## 2024-05-31 NOTE — TELEPHONE ENCOUNTER
----- Message from Adrien Cordoba sent at 5/31/2024 12:47 PM CDT -----  Contact: Lisa/wife  Lisa/wife would like a call back at [phone] in regards to needing to speak with nurse about questions she has about  a consult.  Thanks   Am

## 2024-05-31 NOTE — TELEPHONE ENCOUNTER
Pt wife concerned about Dr for cat eval. Pt wife req list of all the doctors who perform surgery. Gave information about who does the surgery and where it will be performed. Pts wife states that they may need to reschedule

## 2024-06-04 ENCOUNTER — TELEPHONE (OUTPATIENT)
Dept: DIABETES | Facility: CLINIC | Age: 69
End: 2024-06-04
Payer: MEDICARE

## 2024-06-04 NOTE — TELEPHONE ENCOUNTER
Returned call. Educated on how to change the alarm on Griselda. Educated on low blood sugars and treatment. Successful call.     ----- Message from Chacha Goodman sent at 6/4/2024 11:27 AM CDT -----  Contact: Lisa/ Wife  Patient's wife is calling to speak with Gini Sandra. Patient's diabetes is dropping really low. Please callback [phone]

## 2024-06-10 ENCOUNTER — TELEPHONE (OUTPATIENT)
Dept: DIABETES | Facility: CLINIC | Age: 69
End: 2024-06-10
Payer: MEDICARE

## 2024-06-10 NOTE — TELEPHONE ENCOUNTER
Fax received informing that request for medication coverage was received on 06/07/24 please contact pharmacy management prior auth team at [phone] if any questions. Scanned and uploaded to media

## 2024-06-11 ENCOUNTER — OFFICE VISIT (OUTPATIENT)
Dept: UROLOGY | Facility: CLINIC | Age: 69
End: 2024-06-11
Payer: MEDICARE

## 2024-06-11 VITALS
WEIGHT: 215.63 LBS | SYSTOLIC BLOOD PRESSURE: 128 MMHG | BODY MASS INDEX: 32.78 KG/M2 | HEART RATE: 79 BPM | DIASTOLIC BLOOD PRESSURE: 68 MMHG

## 2024-06-11 DIAGNOSIS — N40.1 BENIGN PROSTATIC HYPERPLASIA WITH WEAK URINARY STREAM: ICD-10-CM

## 2024-06-11 DIAGNOSIS — R39.12 BENIGN PROSTATIC HYPERPLASIA WITH WEAK URINARY STREAM: ICD-10-CM

## 2024-06-11 PROCEDURE — 3078F DIAST BP <80 MM HG: CPT | Mod: CPTII,S$GLB,, | Performed by: UROLOGY

## 2024-06-11 PROCEDURE — 3046F HEMOGLOBIN A1C LEVEL >9.0%: CPT | Mod: CPTII,S$GLB,, | Performed by: UROLOGY

## 2024-06-11 PROCEDURE — 99214 OFFICE O/P EST MOD 30 MIN: CPT | Mod: S$GLB,,, | Performed by: UROLOGY

## 2024-06-11 PROCEDURE — 1126F AMNT PAIN NOTED NONE PRSNT: CPT | Mod: CPTII,S$GLB,, | Performed by: UROLOGY

## 2024-06-11 PROCEDURE — 1159F MED LIST DOCD IN RCRD: CPT | Mod: CPTII,S$GLB,, | Performed by: UROLOGY

## 2024-06-11 PROCEDURE — 99999 PR PBB SHADOW E&M-EST. PATIENT-LVL V: CPT | Mod: PBBFAC,,, | Performed by: UROLOGY

## 2024-06-11 PROCEDURE — 3288F FALL RISK ASSESSMENT DOCD: CPT | Mod: CPTII,S$GLB,, | Performed by: UROLOGY

## 2024-06-11 PROCEDURE — 4010F ACE/ARB THERAPY RXD/TAKEN: CPT | Mod: CPTII,S$GLB,, | Performed by: UROLOGY

## 2024-06-11 PROCEDURE — 3008F BODY MASS INDEX DOCD: CPT | Mod: CPTII,S$GLB,, | Performed by: UROLOGY

## 2024-06-11 PROCEDURE — 1160F RVW MEDS BY RX/DR IN RCRD: CPT | Mod: CPTII,S$GLB,, | Performed by: UROLOGY

## 2024-06-11 PROCEDURE — 3074F SYST BP LT 130 MM HG: CPT | Mod: CPTII,S$GLB,, | Performed by: UROLOGY

## 2024-06-11 PROCEDURE — 1101F PT FALLS ASSESS-DOCD LE1/YR: CPT | Mod: CPTII,S$GLB,, | Performed by: UROLOGY

## 2024-06-11 RX ORDER — TAMSULOSIN HYDROCHLORIDE 0.4 MG/1
0.8 CAPSULE ORAL DAILY
Qty: 180 CAPSULE | Refills: 3 | Status: SHIPPED | OUTPATIENT
Start: 2024-06-11

## 2024-06-11 NOTE — PROGRESS NOTES
Chief Complaint:  F/u urgency and BPH    HPI:   06/11/2024 - patient returns today for follow-up, no new issues in the interim, still having some urgency and urge incontinence, has not been taking the Myrbetriq, has significant constipation and is seeing GI for this, no gross hematuria or dysuria, flowing well with the b.i.d. Flomax    02/28/2023 - patient returns today for follow-up, overall urination is stable, stream is okay, still has some urgency and urge incontinence, denies any gross hematuria, nocturia times 0-1, denies dysuria    01/25/2022 - patient returns today for follow-up, notes that after a while the medications seemed less effective and feels like his urination is to his baseline, denies any gross hematuria or incomplete emptying, denies UTIs, PSA 0.38 last week    02/23/2021 - patient presents today for follow-up, notes that his symptoms are greatly improved with the addition of the myrbetriq, has decreased his urgency and frequency and now he had plenty of time to make it to the restroom, denies GH, dysuria, incomplete emptying     01/06/2021 - 69 y.o. male that presents for evaluation of BPH.  Patient has had LUTS for about 2 years for which has been managed with Flomax.  His symptoms include incomplete emptying and weak stream as well as a urgency.  He denies incontinence.  He has never been on anticholinergics for his bladder.  Denies a history of urinary tract infections or kidney stones.  Denies gross hematuria.  Denies dysuria.  His most bothersome symptom is his urgency.  Confirms long history of ED but is not interested in medications at this time. Patient also notes that his wife detected a ball on my left ball and wanted him to get checked out.  It is not bothersome and is not painful.  It is not growing in size.  His wife noticed it about 2 weeks ago.  IPSS - 4/4/4/3/3/3/2 = 23 QOL - 3(mixed mostly)      PMH:  Past Medical History:   Diagnosis Date    Arthritis     Cataract     COPD  (chronic obstructive pulmonary disease)     Diabetes mellitus, type 2     Hyperlipidemia     Hypertension     Personal history of colonic polyps 2018       PSH:  Past Surgical History:   Procedure Laterality Date    ANGIOPLASTY      JOINT REPLACEMENT  2017    TOTAL KNEE ARTHROPLASTY         Family History:  Family History   Problem Relation Name Age of Onset    Diabetes Mother Mom     Arthritis Mother Mom     Cancer Father Dad        Social History:  Social History     Tobacco Use    Smoking status: Former     Types: Cigars     Quit date: 10/1/2023     Years since quittin.6    Smokeless tobacco: Never    Tobacco comments:     4 cigars each day   Substance Use Topics    Alcohol use: No    Drug use: No        Review of Systems:  General: No fever, chills  Skin: No rashes  Chest:  Denies cough and sputum production  Heart: Denies chest pain  Resp: Denies dyspnea  Abdomen: Denies diarrhea, abdominal pain, hematemesis, or blood in stool.  Musculoskeletal: No joint stiffness or swelling. Denies back pain.  : see HPI  Neuro: no dizziness or weakness    Allergies:  Patient has no known allergies.    Medications:    Current Outpatient Medications:     acetaminophen (TYLENOL) 500 MG tablet, Take 1,000 mg by mouth as needed. , Disp: , Rfl:     albuterol (VENTOLIN HFA) 90 mcg/actuation inhaler, Inhale 2 puffs into the lungs every 6 (six) hours as needed for Wheezing or Shortness of Breath. Rescue, Disp: 25.5 g, Rfl: 3    ascorbic acid, vitamin C, (VITAMIN C) 1000 MG tablet, Take 1,000 mg by mouth once daily., Disp: , Rfl:     aspirin 325 MG tablet, Take 325 mg by mouth once daily., Disp: , Rfl:     blood sugar diagnostic (BLOOD GLUCOSE TEST) Strp, 1 strip by Misc.(Non-Drug; Combo Route) route 3 (three) times daily., Disp: 200 each, Rfl: 1    blood-glucose meter,continuous (FREESTYLE JASMINE 3 READER) [facility], Use as directed., Disp: 1 each, Rfl: 0    blood-glucose sensor (FREESTYLE JASMINE 3 SENSOR) Agnieszka, Change sensor every 14  days, Disp: 2 each, Rfl: 11    buPROPion (WELLBUTRIN SR) 150 MG TBSR 12 hr tablet, Take 1 tablet (150 mg total) by mouth 2 (two) times daily., Disp: 180 tablet, Rfl: 2    cetirizine (ZYRTEC) 10 MG tablet, Take 10 mg by mouth as needed for Allergies., Disp: , Rfl:     cholecalciferol, vitamin D3, (VITAMIN D3) 25 mcg (1,000 unit) capsule, Take 1,000 Units by mouth once daily., Disp: , Rfl:     cyanocobalamin (VITAMIN B-12) 500 MCG tablet, Take 500 mcg by mouth once daily. , Disp: , Rfl:     cyclobenzaprine (FLEXERIL) 10 MG tablet, Take 1/2 to one tab by mouth at bedtime as needed for muscle spasm, Disp: 30 tablet, Rfl: 0    dapagliflozin propanediol (FARXIGA) 10 mg tablet, Take 1 tablet (10 mg total) by mouth once daily., Disp: 90 tablet, Rfl: 2    dulaglutide (TRULICITY) 4.5 mg/0.5 mL pen injector, Inject 4.5 mg into the skin every 7 days., Disp: 12 pen , Rfl: 1    famotidine (PEPCID) 40 MG tablet, Take 1 tablet by mouth once a day, Disp: 30 tablet, Rfl: 5    fluticasone propionate (FLONASE) 50 mcg/actuation nasal spray, Use 1 spray (50 mcg total) in each nostril once daily. (Patient taking differently: 1 spray by Each Nostril route as needed for Allergies.), Disp: 16 g, Rfl: 3    fluticasone-umeclidin-vilanter (TRELEGY ELLIPTA) 100-62.5-25 mcg DsDv, Inhale 1 puff into the lungs once daily., Disp: 60 each, Rfl: 11    insulin degludec (TRESIBA FLEXTOUCH U-200) 200 unit/mL (3 mL) insulin pen, Inject 18 Units into the skin once daily. Increase by 2 units every 2 days until fasting sugar is below 130. Stop at 50 units., Disp: 3 pen , Rfl: 5    ipratropium (ATROVENT) 21 mcg (0.03 %) nasal spray, Spray 1 or 2 sprays in each nasal passage every 8 hours, if needed, as directed., Disp: 30 mL, Rfl: 12    lancets 30 gauge Misc, 1 lancet by Misc.(Non-Drug; Combo Route) route 3 (three) times daily., Disp: 200 each, Rfl: 0    magnesium oxide (MAG-OX) 400 mg (241.3 mg magnesium) tablet, Take 1 tablet (400 mg total) by mouth once  "daily., Disp: 90 tablet, Rfl: 3    metFORMIN (GLUCOPHAGE) 1000 MG tablet, Take 1 tablet (1,000 mg total) by mouth 2 (two) times daily with meals., Disp: 180 tablet, Rfl: 1    nitroGLYCERIN (NITROSTAT) 0.4 MG SL tablet, Place 1 tablet under the tongue every 5 (five) minutes as needed for Chest pain., Disp: 25 tablet, Rfl: 3    olmesartan (BENICAR) 40 MG tablet, Take 1 tablet (40 mg total) by mouth once daily., Disp: 90 tablet, Rfl: 1    pantoprazole (PROTONIX) 40 MG tablet, Take 1 tablet by mouth twice a day before breakfast and dinner, Disp: 60 tablet, Rfl: 11    pen needle, diabetic (BD ULTRA-FINE BISI PEN NEEDLE) 32 gauge x 5/32" Ndle, Use 1 needle 2 (two) times a day., Disp: 100 each, Rfl: 0    plecanatide (TRULANCE) 3 mg Tab, Take 1 tablet by mouth once a day, Disp: 90 tablet, Rfl: 3    pramipexole (MIRAPEX) 0.125 MG tablet, Take 2 tablets by mouth every evening., Disp: , Rfl:     pulse oximeter (PULSE OXIMETER) device, Use twice daily at 8 AM and 3 PM and record the value in MyChart as directed., Disp: 1 each, Rfl: 0    rosuvastatin (CRESTOR) 20 MG tablet, Take 1 tablet (20 mg total) by mouth once daily., Disp: 90 tablet, Rfl: 3    tamsulosin (FLOMAX) 0.4 mg Cap, Take 2 capsules (0.8 mg total) by mouth once daily., Disp: 180 capsule, Rfl: 3    Physical Exam:  Vitals:    06/11/24 1028   BP: 128/68   Pulse: 79     General: awake, alert, cooperative  Head: NC/AT  Ears: external ears normal  Eyes: sclera normal  Lungs: normal inspiration, NAD  Heart: well-perfused  Abdomen: Soft, NT, ND   2/23: Normal circ'd phallus, meatus normal in size and position, BL testicles palpable, no masses, left testicular cyst noted, nontender, no abnormalities of epididymi  YESICA 2/23: Normal rectal tone, no hemorrhoids. Prostate smooth and normal, no nodules 50 gm SV not palpable. Perineum and anus normal.  Lymphatic: groin nodes negative  Skin: The skin is warm and dry  Ext: No c/c/e.  Neuro: grossly intact, AOx3    RADIOLOGY:  No " recent relevant imaging available for review.    LABS:  I personally reviewed the following lab values:  Lab Results   Component Value Date    WBC 7.85 05/10/2024    HGB 13.0 (L) 05/10/2024    HCT 38.6 (L) 05/10/2024     05/10/2024     (L) 05/10/2024    K 4.2 05/10/2024     05/10/2024    CREATININE 1.1 05/10/2024    BUN 11 05/10/2024    CO2 23 05/10/2024    TSH 1.437 10/30/2023    PSA 0.25 10/30/2023    INR 0.9 10/04/2017    HGBA1C 12.5 (H) 04/29/2024    CHOL 123 02/01/2024    TRIG 181 (H) 02/01/2024    HDL 45 02/01/2024    ALT 12 04/12/2024    AST 12 04/12/2024       Assessment/Plan:   Stefan Forbes  is a 69 y.o. male with:    BPH - continue flomax BID    Urgency - reviewed options, noted that severe constipation can certainly contribute to the urgency, reviewed medication options, reviewed that anticholinergics can certainly worsen constipation, patient will hold off on this for now, but will let us know if his constipation improved    Prostate Cancer Screening - PSA 0.25      Stefan Eugene MD  Urology

## 2024-06-14 ENCOUNTER — INFUSION (OUTPATIENT)
Dept: INFUSION THERAPY | Facility: HOSPITAL | Age: 69
End: 2024-06-14
Attending: NURSE PRACTITIONER
Payer: MEDICARE

## 2024-06-14 VITALS
SYSTOLIC BLOOD PRESSURE: 127 MMHG | TEMPERATURE: 98 F | OXYGEN SATURATION: 98 % | HEART RATE: 86 BPM | DIASTOLIC BLOOD PRESSURE: 78 MMHG | RESPIRATION RATE: 18 BRPM

## 2024-06-14 DIAGNOSIS — G25.81 RESTLESS LEG SYNDROME DUE TO IRON DEFICIENCY ANEMIA: Primary | ICD-10-CM

## 2024-06-14 DIAGNOSIS — D50.9 RESTLESS LEG SYNDROME DUE TO IRON DEFICIENCY ANEMIA: Primary | ICD-10-CM

## 2024-06-14 PROCEDURE — 96374 THER/PROPH/DIAG INJ IV PUSH: CPT

## 2024-06-14 PROCEDURE — 25000003 PHARM REV CODE 250: Performed by: NURSE PRACTITIONER

## 2024-06-14 PROCEDURE — 63600175 PHARM REV CODE 636 W HCPCS: Mod: JZ,JG | Performed by: NURSE PRACTITIONER

## 2024-06-14 RX ORDER — EPINEPHRINE 0.3 MG/.3ML
0.3 INJECTION SUBCUTANEOUS ONCE AS NEEDED
OUTPATIENT
Start: 2024-06-14

## 2024-06-14 RX ORDER — DIPHENHYDRAMINE HYDROCHLORIDE 50 MG/ML
50 INJECTION INTRAMUSCULAR; INTRAVENOUS ONCE AS NEEDED
OUTPATIENT
Start: 2024-06-14

## 2024-06-14 RX ORDER — SODIUM CHLORIDE 0.9 % (FLUSH) 0.9 %
10 SYRINGE (ML) INJECTION
OUTPATIENT
Start: 2024-06-14

## 2024-06-14 RX ADMIN — FERUMOXYTOL 510 MG: 510 INJECTION INTRAVENOUS at 02:06

## 2024-06-14 NOTE — PLAN OF CARE
Problem: Adult Inpatient Plan of Care  Goal: Plan of Care Review  Outcome: Progressing  Flowsheets (Taken 6/14/2024 1406)  Plan of Care Reviewed With: patient  Goal: Patient-Specific Goal (Individualized)  Outcome: Progressing  Flowsheets (Taken 6/14/2024 1406)  Individualized Care Needs: none  Anxieties, Fears or Concerns: noen  Patient/Family-Specific Goals (Include Timeframe): none  Goal: Optimal Comfort and Wellbeing  Outcome: Progressing

## 2024-06-19 ENCOUNTER — CLINICAL SUPPORT (OUTPATIENT)
Dept: SMOKING CESSATION | Facility: CLINIC | Age: 69
End: 2024-06-19
Payer: COMMERCIAL

## 2024-06-19 DIAGNOSIS — F17.200 NICOTINE DEPENDENCE: Primary | ICD-10-CM

## 2024-06-19 PROCEDURE — 99999 PR PBB SHADOW E&M-EST. PATIENT-LVL II: CPT | Mod: PBBFAC,,, | Performed by: SPEECH-LANGUAGE PATHOLOGIST

## 2024-06-19 PROCEDURE — 99404 PREV MED CNSL INDIV APPRX 60: CPT | Mod: S$GLB,,, | Performed by: SPEECH-LANGUAGE PATHOLOGIST

## 2024-06-19 NOTE — PROGRESS NOTES
Individual Follow-Up Form    6/19/2024    Quit Date: 10/1/2023     Clinical Status of Patient: Outpatient     Length of Service: 60 minutes     Continuing Medication: no     Other Medications:                     Target Symptoms: Withdrawal and medication side effects. The following were   rated moderate (3) to severe (4) on TCRS:  Moderate (3): none  Severe (4): none     Comments: Telephone visit at the patient's request due to his location. Patient reports he has remained smoke free & NRT free since last session & his 10/1/2023 quit date. He reports he enjoyed his first smoke free Father's Day. He reports he struggled a little bit due to his daughter has moved to [place]; but he did not turn to smoking to cope nor did the thought crossed his mind. Praised the patient on his continued dedication to living a smoke free life.  He reports he has had other life events that includes being baptized & getting awarded custody of his grand daughter. He reports they are starting a travel fund to go out to [place] to visit his daughter & the money from not smoking can go to that fund. Discussed adequate nutrition, hydration & stress management. He reports he has been getting iron infusions to see if that will help his constipation issues.  He reports his stress level has been fine & manageable as of now. He reports he continues to enjoy [employer] which he reports he uses to help his stress levels. He reports he hasn't gotten to a gym just yet; but at some point he intends to go. He reports he has been staying busy & active. He reports he will be having eye surgery soon to remove cataracts & will have a low dose CT lung scan in July & will find out about the 2 little spots on his lungs. Patient reports his goal is to remain smoke free & to keep his stress levels low. Follow up set for 7/23/2024 at 10:00 am.       Diagnosis: F17.200    Next Visit: 5 weeks

## 2024-06-21 ENCOUNTER — INFUSION (OUTPATIENT)
Dept: INFUSION THERAPY | Facility: HOSPITAL | Age: 69
End: 2024-06-21
Attending: NURSE PRACTITIONER
Payer: MEDICARE

## 2024-06-21 VITALS
SYSTOLIC BLOOD PRESSURE: 148 MMHG | OXYGEN SATURATION: 97 % | DIASTOLIC BLOOD PRESSURE: 80 MMHG | TEMPERATURE: 98 F | HEART RATE: 56 BPM | RESPIRATION RATE: 18 BRPM

## 2024-06-21 DIAGNOSIS — G25.81 RESTLESS LEG SYNDROME DUE TO IRON DEFICIENCY ANEMIA: Primary | ICD-10-CM

## 2024-06-21 DIAGNOSIS — D50.9 RESTLESS LEG SYNDROME DUE TO IRON DEFICIENCY ANEMIA: Primary | ICD-10-CM

## 2024-06-21 PROCEDURE — 25000003 PHARM REV CODE 250: Performed by: NURSE PRACTITIONER

## 2024-06-21 PROCEDURE — 96374 THER/PROPH/DIAG INJ IV PUSH: CPT

## 2024-06-21 PROCEDURE — 63600175 PHARM REV CODE 636 W HCPCS: Mod: JZ,JG | Performed by: NURSE PRACTITIONER

## 2024-06-21 RX ORDER — SODIUM CHLORIDE 0.9 % (FLUSH) 0.9 %
10 SYRINGE (ML) INJECTION
OUTPATIENT
Start: 2024-06-21

## 2024-06-21 RX ORDER — DIPHENHYDRAMINE HYDROCHLORIDE 50 MG/ML
50 INJECTION INTRAMUSCULAR; INTRAVENOUS ONCE AS NEEDED
OUTPATIENT
Start: 2024-06-21

## 2024-06-21 RX ORDER — EPINEPHRINE 0.3 MG/.3ML
0.3 INJECTION SUBCUTANEOUS ONCE AS NEEDED
OUTPATIENT
Start: 2024-06-21

## 2024-06-21 RX ADMIN — FERUMOXYTOL 510 MG: 510 INJECTION INTRAVENOUS at 11:06

## 2024-06-21 NOTE — PLAN OF CARE
Patient tolerated Feraheme well today; no adverse reaction noted.  POC reviewed with pt.  NAD noted upon discharge.   Has f/u appt(s) scheduled per MD request.    Problem: Adult Inpatient Plan of Care  Goal: Plan of Care Review  Outcome: Progressing  Flowsheets (Taken 6/21/2024 1203)  Plan of Care Reviewed With: patient  Goal: Patient-Specific Goal (Individualized)  Outcome: Progressing  Flowsheets (Taken 6/21/2024 1203)  Individualized Care Needs: legs elevated, IV to R AC  Anxieties, Fears or Concerns: none verbalized  Goal: Optimal Comfort and Wellbeing  Outcome: Progressing  Intervention: Provide Person-Centered Care  Flowsheets (Taken 6/21/2024 1203)  Trust Relationship/Rapport:   care explained   questions answered   thoughts/feelings acknowledged   choices provided   questions encouraged   emotional support provided   reassurance provided   empathic listening provided

## 2024-06-24 ENCOUNTER — TELEPHONE (OUTPATIENT)
Dept: PHARMACY | Facility: CLINIC | Age: 69
End: 2024-06-24
Payer: MEDICARE

## 2024-06-24 DIAGNOSIS — J44.9 MODERATE COPD (CHRONIC OBSTRUCTIVE PULMONARY DISEASE): ICD-10-CM

## 2024-06-24 NOTE — TELEPHONE ENCOUNTER
----- Message from Deo Faustin sent at 6/24/2024  1:51 PM CDT -----  Regarding: Pharmacy Patient Assistance  Good afternoon,    I am trying to assist the above patient with obtaining his inhaler through the drug company.  I need a prescription printed for his Trelegy 100-62.5-25mcg.  Please do a 90 day supply with 3 refills.  Please fax to [phone] or email [email or web address]    Sincerely,  Deo Faustin

## 2024-06-24 NOTE — TELEPHONE ENCOUNTER
Stefan Forbes Jr. has been informed of the [employer] application process for Trelegy and what's required to apply.  He will provide the following documents: Proof of household Income( such as social security statement, 1099 form, pension statement or 3 consecutive pay stubs and Printout from your Insurance or Pharmacy that shows how much you have spent on prescriptions this year       Follow-up will be made in 5 business days.

## 2024-06-25 ENCOUNTER — LAB VISIT (OUTPATIENT)
Dept: LAB | Facility: HOSPITAL | Age: 69
End: 2024-06-25
Attending: INTERNAL MEDICINE
Payer: MEDICARE

## 2024-06-25 DIAGNOSIS — E11.65 CONTROLLED TYPE 2 DIABETES MELLITUS WITH HYPERGLYCEMIA, WITHOUT LONG-TERM CURRENT USE OF INSULIN: ICD-10-CM

## 2024-06-25 LAB
ESTIMATED AVG GLUCOSE: 169 MG/DL (ref 68–131)
HBA1C MFR BLD: 7.5 % (ref 4–5.6)

## 2024-06-25 PROCEDURE — 36415 COLL VENOUS BLD VENIPUNCTURE: CPT | Performed by: INTERNAL MEDICINE

## 2024-06-25 PROCEDURE — 83036 HEMOGLOBIN GLYCOSYLATED A1C: CPT | Performed by: INTERNAL MEDICINE

## 2024-06-27 DIAGNOSIS — J44.9 MODERATE COPD (CHRONIC OBSTRUCTIVE PULMONARY DISEASE): Primary | ICD-10-CM

## 2024-06-27 RX ORDER — PRAMIPEXOLE DIHYDROCHLORIDE 0.12 MG/1
TABLET ORAL NIGHTLY
Qty: 60 TABLET | Refills: 5 | Status: SHIPPED | OUTPATIENT
Start: 2024-06-27

## 2024-07-06 DIAGNOSIS — I15.2 HYPERTENSION ASSOCIATED WITH DIABETES: ICD-10-CM

## 2024-07-06 DIAGNOSIS — E11.59 HYPERTENSION ASSOCIATED WITH DIABETES: ICD-10-CM

## 2024-07-07 NOTE — TELEPHONE ENCOUNTER
No care due was identified.  [facility] Embedded Care Due Messages. Reference number: [id number].   7/06/2024 9:27:22 PM CDT

## 2024-07-08 RX ORDER — OLMESARTAN MEDOXOMIL 40 MG/1
40 TABLET ORAL DAILY
Qty: 90 TABLET | Refills: 1 | Status: SHIPPED | OUTPATIENT
Start: 2024-07-08

## 2024-07-08 NOTE — TELEPHONE ENCOUNTER
Refill Routing Note   Medication(s) are not appropriate for processing by [facility] for the following reason(s):        Required vitals abnormal    [facility] action(s):  Defer               Appointments  past 12m or future 3m with PCP    Date Provider   Last Visit   4/18/2024 Wallace Fisher MD   Next Visit   10/21/2024 Wallace Fisher MD   ED visits in past 90 days: 0        Note composed:2:46 AM 07/08/2024

## 2024-07-11 ENCOUNTER — CLINICAL SUPPORT (OUTPATIENT)
Dept: DIABETES | Facility: CLINIC | Age: 69
End: 2024-07-11
Payer: MEDICARE

## 2024-07-11 ENCOUNTER — DOCUMENTATION ONLY (OUTPATIENT)
Dept: OPHTHALMOLOGY | Facility: CLINIC | Age: 69
End: 2024-07-11

## 2024-07-11 ENCOUNTER — OFFICE VISIT (OUTPATIENT)
Dept: OPHTHALMOLOGY | Facility: CLINIC | Age: 69
End: 2024-07-11
Payer: MEDICARE

## 2024-07-11 VITALS — BODY MASS INDEX: 32.52 KG/M2 | WEIGHT: 213.88 LBS

## 2024-07-11 DIAGNOSIS — H17.9 LEFT CORNEAL SCAR: ICD-10-CM

## 2024-07-11 DIAGNOSIS — D31.31 NEVUS OF CHOROID OF RIGHT EYE: ICD-10-CM

## 2024-07-11 DIAGNOSIS — H25.12 NUCLEAR SCLEROSIS OF LEFT EYE: ICD-10-CM

## 2024-07-11 DIAGNOSIS — E11.9 TYPE 2 DIABETES MELLITUS WITHOUT RETINOPATHY: ICD-10-CM

## 2024-07-11 DIAGNOSIS — E11.65 CONTROLLED TYPE 2 DIABETES MELLITUS WITH HYPERGLYCEMIA, WITHOUT LONG-TERM CURRENT USE OF INSULIN: Primary | ICD-10-CM

## 2024-07-11 DIAGNOSIS — H18.513 FUCHS' CORNEAL DYSTROPHY OF BOTH EYES: ICD-10-CM

## 2024-07-11 DIAGNOSIS — H25.11 NUCLEAR SCLEROSIS OF RIGHT EYE: Primary | ICD-10-CM

## 2024-07-11 PROCEDURE — 99999 PR PBB SHADOW E&M-EST. PATIENT-LVL II: CPT | Mod: PBBFAC,,, | Performed by: STUDENT IN AN ORGANIZED HEALTH CARE EDUCATION/TRAINING PROGRAM

## 2024-07-11 PROCEDURE — 99999 PR PBB SHADOW E&M-EST. PATIENT-LVL III: CPT | Mod: PBBFAC,,,

## 2024-07-11 RX ORDER — PREDNISOLONE ACETATE 10 MG/ML
1 SUSPENSION/ DROPS OPHTHALMIC 4 TIMES DAILY
Qty: 5 ML | Refills: 1 | Status: SHIPPED | OUTPATIENT
Start: 2024-07-11

## 2024-07-11 NOTE — PROGRESS NOTES
Diabetes Care Specialist Follow-up Note  Author: Karen Sandra RN  Date: 7/11/2024    Program Intake  Reason for Diabetes Program Visit:: Intervention  Type of Intervention:: Individual  Individual: Education  Education: Self-Management Skill Review  Current diabetes risk level:: moderate  In the last 12 months, have you:: used emergency room services  Was the ER or hospital admission related to diabetes?: Yes  Permission to speak with others about care:: no    Continuous Glucose Monitoring  Patient has CGM: Yes  Personal CGM type:: Freestyle Griselda 3  GMI Date: 07/11/24  GMI Value: 7.2 %    Medications/Current Diabetes Treatment   Current Diabetes Treatment: Oral Medications, DM Injectables, Insulin  Oral Medication Type/Dose: Farxiga 10 mg daily. Metformin 1,000 mg BID.  DM Injectables Type/Dose: Trulicity 4.5 mg weekly.  Method of delivery?: Injections  Injection Type: Pens  Pen Type/Dose: Tresiba 20 units daily.    Lab Results   Component Value Date    HGBA1C 7.5 (H) 06/25/2024     A1c Pre Diabetes Care Specialist Intervention:  7.1%    Clinical    Weight: 97 kg (213 lb 13.5 oz)       Body mass index is 32.52 kg/m².  Wt decrease of 2 lbs since last visit on 5/10/2024      Physical activity/Exercise:   See care plan for update.    SMBG: Freestyle Griselda 3.      Diabetes Self-Management Skills Assessment    Nutrition/Healthy Eating  Meal Plan 24 Hour Recall - Breakfast: Sausage biscuit with grape jelly; Coffee (Usually egg on toast, special k, low sugar oatmeal, cream of wheat)  Meal Plan 24 Hour Recall - Lunch: [employer] pizza; Coffee (Often skips or has a banana)  Meal Plan 24 Hour Recall - Dinner: Blackened catfish smothered with crawfish etoufee (Usually biggest meal of the day-spaghetti, fried chicken strips, gumbo with brown rice, bbq)  Meal Plan 24 Hour Recall - Snack: Fried mushrooms (appetizer). Big slice of cheesecake (dinner); Coffee  Meal Plan 24 Hour Recall - Beverage: Coffee, SF Powerade,  "Water.    Home Blood Glucose Monitoring  Personal CGM type:: Freestyle Griselda 3      During today's follow-up visit,  the following areas required further assessment and content was provided/reviewed.    Based on today's diabetes care assessment, the following areas of need were identified:          7/11/2024    12:02 AM   Diabetes Self-Management Skills   Healthy Eating Goal not met.      Monitoring Goal met.     Physical Activity Goal not met.           Today's interventions were provided through individual discussion, instruction, and written materials were provided.    Patient verbalized understanding of instruction and written materials.  Pt was able to return back demonstration of instructions today. Patient understood key points, needs reinforcement and further instruction.     Diabetes Self-Management Care Plan Review and Evaluation of Progress:    During today's follow-up Stefan's Diabetes Self-Management Care Plan progress was reviewed and progress was evaluated including his/her input. Stefan has agreed to continue his/her journey to improve/maintain overall diabetes control by continuing to set health goals. See care plan progress below.      Care Plan: Diabetes Management   Updates made since 6/11/2024 12:00 AM        Problem: Healthy Eating         Goal: Eat 2-3 meals daily with ~45g of carbohydrates per meal and 0-15g per snack. Completed 7/11/2024   Start Date: 8/2/2023   Expected End Date: 7/21/2024   This Visit's Progress: Not met   Recent Progress: On track   Priority: Low   Barriers: Lack of Motivation to Change; Other (comments)   Note:    Pt reports social stressors contribute to overeating. Pt verbalizes good understanding diabetes meal plan. He describes "new" recipes/dishes that are mostly non-starchy vegetables and healthy. However, he tends to cook entrees that he's familiar preparing. Discussed tips for quick, easy non-starchy vegetable additions (pre-washed salads, steamables, lower sodium " canned) to assist with lowering main dish portions.     Also, discussed flexibility of lifestyle change and how to incorporate favorite higher carb/fat dishes occasionally to maintain longer-term progress.    Reviewed non-food stress mgmt tips and support from therapist or counselor with resources reviewed. Encouraged notifying pcp for possible medication support. Pt engaged and will consider.    4/29/24: Since being discharged from the hospital with hyperglycemia, he has enrolled in a [employer] program where they are sending him 30 days of pre-made meals for breakfast, lunch, and dinner. Sometimes wife steams veggies or makes a salad to add to meals. Mr. Forbes is satisfied with meals. Him and his wife have been counting carbohydrates and paying attention to portion sizes.     5/10/24: [employer] meals have stopped but still doing well with healthy eating. Wife and him are reading food labels, measuring foods, choosing SF over regular. Practicing balanced meals with veggies, protein, and carbs.     7/11/24: States he is doing well with healthy eating. Measuring foods more and reading food labels more now that wife is more onboard. Upon review, eating out 1-2x/week and overeating at dinner. Re-educated on the importance of staying ~45 grams of carbs/meal for optimal BG management and decreasing saturated fats for heart health. He states he does not eat much during the day, so he is very hungry in the evening. Educated on bringing a lunch from home (turkey sandwich, pb & sf jelly sandwich, something he can eat on the go) and packing snacks (carb + protein and/or healthy fat). Re-educated on the My Plate method and the importance of measuring out foods.   Yesterday (7/10) was a celebratory day; gained full custody of granddaughter, so they ate out for dinner and were on the road for breakfast and lunch.        Problem: Blood Glucose Self-Monitoring         Goal: Patient agrees to check and record blood  sugars 1-2 times per day. Completed 7/11/2024   Start Date: 8/2/2023   Expected End Date: 7/21/2024   This Visit's Progress: Met   Recent Progress: On track   Priority: Medium   Barriers: Other (comments)   Note:    Encouragement provided for several BG levels within target when he was testing. Discussed role testing BG in driving healthy lifestyle changes. Pt agreed to resume using digital diabetes. He is able to identify BG targets and troubleshooting hyperglycemia/hypoglycemia.    4/29/24: FREESTYLE JASMINE 3 CGM TRAINING  Education provided per Abbott Freestyle Jasmine 3 protocol, quick start guide and videos. Assisted with mobile pavel download and [facility] data share.       The FreeStyle Jasmine 3 Continuous Glucose Monitoring System is a real time continuous glucose monitoring (CGM) device with alarms capability indicated for the management of diabetes in persons age 4 and older.   Use your blood glucose meter to make diabetes treatment decisions when you see the symbol during the first 12 hours of wearing a Sensor, if your Sensor glucose reading does not match how you feel, or if the reading does not include a number.  For you to receive alarms, they must be on and your Eugene should be within 33 feet of you at all times. If using pavel, check mobile device to ensure ongoing compatibility.  To prevent missed alarms, make sure the Eugene has sufficient charge and that sound and/or vibration are turned on. Do not force close reader pavel.   Remove the Sensor before MRI, CT scan, X-ray, or diathermy treatment.    Alarms:   Urgent Low Glucose below 55: default ON   Low Glucose: 80 mg/dl   High Glucose 3000 mg/dl  Signal Loss: ON    Only apply the Sensor to the back of the upper arm. If placed in other areas, the Sensor may not function properly. Avoid areas with scars, moles, stretch marks, or lumps. Select an area of skin that generally stays flat during normal daily activities (no bending or folding). Choose a  site that is at least 1 inch away from an insulin injection site. Rotate sensor sites.   Sensor can be worn for up to 14 days. Scan sensor to start sensor session. Sensor warm-up 60min and then automatically sends a new glucose reading to your pavel every minute.   The Sensor is water-resistant in up to 3 feet (1 meter) of water. Do not immerse longer than 30 minutes.  Instructed on how to understand SG graph, trend arrows. Reviewed menu options. Discussed how to remove and discard sensor.   Differences in glucose readings between interstitial fluid and capillary blood may be observed during times of rapid change in blood glucose, such as after eating, dosing insulin, or exercising.  Taking ascorbic acid (vitamin C) supplements while wearing the Sensor may falsely raise Sensor glucose readings. Taking more than 500 mg of ascorbic acid per day may affect the Sensor readings which could cause you to miss a severe low glucose event. Ascorbic acid can be found in supplements including multivitamins. Some supplements, including cold remedies such as Airborne® and Emergen-C®, may contain high doses of 1000 mg of ascorbic acid and should not be taken while using the Sensor. See your health care professional to understand how long ascorbic acid is active in your body.  Contact Customer Service if you receive a Replace Sensor message before the end of the 14 day wear period. Customer Service is available at [phone] 7 Days a Week from 8AM to 8PM Eastern Standard Time.    Pt verbalized understanding of all instruction and able to demonstrate sensor application technique per protocol.     5/10/24: Doing well with Freestyle Griselda 3.     7/11/24: No technical issues with Griselda 3.          Problem: Physical Activity and Exercise         Goal: Patient agrees to increase physical activity to a goal of 150min/wk. Completed 7/11/2024   Start Date: 11/30/2023   Expected End Date: 7/21/2024   This Visit's Progress: Not met   Recent  Progress: No change   Priority: High   Barriers: Other (comments)   Note:    Pt identifies stress as greatest barrier to making diabetes self-care bx  improvements. Discussed role exercise on stress mgmt, diabetes and cardio health. Pt reports staying active but none structured. Since pt enjoys walking, he agreed to start structured daily walk 10-15min outdoor if possible.     4/29/24: Active in the yard and around the house and as a Door [place], but no formal activity. Agrees to try to implement some formal walking.     5/10/24: Plans to check out gym in [place] and use the cardio machines (bike, elliptical) and weight machines.     7/11/24: Joined the gym at the [facility] yesterday. Signed granddaughter up swim lessons M-Th, and he plans to exercise while she is in lessons. 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 Works around the house but no formal exercise.          Follow Up Plan     Follow up in about 2 months (around 9/11/2024) for new assessment.    Today's care plan and follow up schedule was discussed with patient.  Stefan verbalized understanding of the care plan, goals, and agrees to follow up plan.        The patient was encouraged to communicate with his/her health care provider/physician and care team regarding his/her condition(s) and treatment.  I provided the patient with my contact information today and encouraged to contact me via phone or [facility] Patient Portal as needed.     Length of Visit   Total Time: 30 Minutes   Had eye appt scheduled at 9 am.

## 2024-07-11 NOTE — PROGRESS NOTES
Diabetes Care Specialist Follow-up Note  Author: Karen Sandra RN  Date: 7/11/2024    Intake    Program Intake  Reason for Diabetes Program Visit:: Intervention  Type of Intervention:: Individual  Individual: Education  Education: Self-Management Skill Review  Current diabetes risk level:: moderate  In the last 12 months, have you:: used emergency room services  Was the ER or hospital admission related to diabetes?: Yes  Permission to speak with others about care:: no    Continuous Glucose Monitoring  Patient has CGM: Yes  Personal CGM type:: Freestyle Griselda 3  GMI Date: 07/11/24  GMI Value: 7.2 %    Medications/Current Diabetes Treatment   Current Diabetes Treatment: Oral Medications, DM Injectables, Insulin  Oral Medication Type/Dose: Farxiga 10 mg daily. Metformin 1,000 mg BID.  DM Injectables Type/Dose: Trulicity 4.5 mg weekly.  Method of delivery?: Injections  Injection Type: Pens  Pen Type/Dose: Tresiba 20 units daily.    Patient is able to identify current diabetes medications, dosages, and appropriate timing of medications.: yes  Patient reports problems or concerns with current medication regimen.: no  Patient is  aware that some diabetes medications can cause low blood sugar?: Yes  Medication Skills Assessment Completed:: Yes  Assessment indicates:: Adequate understanding  Area of need?: No    Lab Results   Component Value Date    HGBA1C 7.5 (H) 06/25/2024     A1c Pre Diabetes Care Specialist Intervention:  ***%      Weight: 97 kg (213 lb 13.5 oz)       Body mass index is 32.52 kg/m².  Wt *** of *** lbs since last visit on ***/***/***      Physical activity/Exercise:   ***    SMBG: ***  Fasting, ***  Post prandial, ***    Lifestyle Coping Support & Clinical              Diabetes Self-Management Skills Assessment         Nutrition/Healthy Eating  Meal Plan 24 Hour Recall - Breakfast: Sausage biscuit with grape jelly; Coffee  Meal Plan 24 Hour Recall - Lunch: [employer] pizza; Coffee  Meal Plan 24 Hour Recall  "- Dinner: Blackened catfish smothered with crawfish etoufee  Meal Plan 24 Hour Recall - Snack: Fried mushrooms (appetizer). Big slice of cheesecake (dinner); Coffee  Meal Plan 24 Hour Recall - Beverage: Coffee, SF Powerade, Water.         Home Blood Glucose Monitoring  Personal CGM type:: Freestyle Griselda 3                During today's follow-up visit,  the following areas required further assessment and content was provided/reviewed.    Based on today's diabetes care assessment, the following areas of need were identified:          7/11/2024    12:02 AM   Areas of Need   Medication No        Today's interventions were provided through individual discussion, instruction, and written materials were provided.    Patient verbalized understanding of instruction and written materials.  Pt was able to return back demonstration of instructions today. Patient understood key points, needs reinforcement and further instruction.     Diabetes Self-Management Care Plan Review and Evaluation of Progress:    During today's follow-up Stefan's Diabetes Self-Management Care Plan progress was reviewed and progress was evaluated including his/her input. Stefan has agreed to continue his/her journey to improve/maintain overall diabetes control by continuing to set health goals. See care plan progress below.      Care Plan: Diabetes Management   Updates made since 6/11/2024 12:00 AM        Problem: Healthy Eating         Goal: Eat 2-3 meals daily with ~45g of carbohydrates per meal and 0-15g per snack. Completed 7/11/2024   Start Date: 8/2/2023   Expected End Date: 7/21/2024   This Visit's Progress: Not met   Recent Progress: On track   Priority: Low   Barriers: Lack of Motivation to Change; Other (comments)   Note:    Pt reports social stressors contribute to overeating. Pt verbalizes good understanding diabetes meal plan. He describes "new" recipes/dishes that are mostly non-starchy vegetables and healthy. However, he tends to cook entrees " that he's familiar preparing. Discussed tips for quick, easy non-starchy vegetable additions (pre-washed salads, steamables, lower sodium canned) to assist with lowering main dish portions.     Also, discussed flexibility of lifestyle change and how to incorporate favorite higher carb/fat dishes occasionally to maintain longer-term progress.    Reviewed non-food stress mgmt tips and support from therapist or counselor with resources reviewed. Encouraged notifying pcp for possible medication support. Pt engaged and will consider.    4/29/24: Since being discharged from the hospital with hyperglycemia, he has enrolled in a [email or web address] program where they are sending him 30 days of pre-made meals for breakfast, lunch, and dinner. Sometimes wife steams veggies or makes a salad to add to meals. Mr. Forbes is satisfied with meals. Him and his wife have been counting carbohydrates and paying attention to portion sizes.     5/10/24: [email or web address] meals have stopped but still doing well with healthy eating. Wife and him are reading food labels, measuring foods, choosing SF over regular. Practicing balanced meals with veggies, protein, and carbs.     7/11/24: States he is doing well with healthy eating. Measuring foods more and reading food labels more now that wife is more onboard. Upon review, eating out 1-2x/week and overeating at dinner.          Problem: Blood Glucose Self-Monitoring         Goal: Patient agrees to check and record blood sugars 1-2 times per day. Completed 7/11/2024   Start Date: 8/2/2023   Expected End Date: 7/21/2024   This Visit's Progress: Met   Recent Progress: On track   Priority: Medium   Barriers: Other (comments)   Note:    Encouragement provided for several BG levels within target when he was testing. Discussed role testing BG in driving healthy lifestyle changes. Pt agreed to resume using digital diabetes. He is able to identify BG targets and troubleshooting  hyperglycemia/hypoglycemia.    4/29/24: FREESTYLE JASMINE 3 CGM TRAINING  Education provided per [employer] Freestyle Jasmine 3 protocol, quick start guide and videos. Assisted with mobile pavel download and [facility] data share.       The FreeStyle Jasmine 3 Continuous Glucose Monitoring System is a real time continuous glucose monitoring (CGM) device with alarms capability indicated for the management of diabetes in persons age 4 and older.   Use your blood glucose meter to make diabetes treatment decisions when you see the symbol during the first 12 hours of wearing a Sensor, if your Sensor glucose reading does not match how you feel, or if the reading does not include a number.  For you to receive alarms, they must be on and your Entriken should be within 33 feet of you at all times. If using pavel, check mobile device to ensure ongoing compatibility.  To prevent missed alarms, make sure the Entriken has sufficient charge and that sound and/or vibration are turned on. Do not force close reader pavel.   Remove the Sensor before MRI, CT scan, X-ray, or diathermy treatment.    Alarms:   Urgent Low Glucose below 55: default ON   Low Glucose: 80 mg/dl   High Glucose 3000 mg/dl  Signal Loss: ON    Only apply the Sensor to the back of the upper arm. If placed in other areas, the Sensor may not function properly. Avoid areas with scars, moles, stretch marks, or lumps. Select an area of skin that generally stays flat during normal daily activities (no bending or folding). Choose a site that is at least 1 inch away from an insulin injection site. Rotate sensor sites.   Sensor can be worn for up to 14 days. Scan sensor to start sensor session. Sensor warm-up 60min and then automatically sends a new glucose reading to your pavel every minute.   The Sensor is water-resistant in up to 3 feet (1 meter) of water. Do not immerse longer than 30 minutes.  Instructed on how to understand SG graph, trend arrows. Reviewed menu options. Discussed  how to remove and discard sensor.   Differences in glucose readings between interstitial fluid and capillary blood may be observed during times of rapid change in blood glucose, such as after eating, dosing insulin, or exercising.  Taking ascorbic acid (vitamin C) supplements while wearing the Sensor may falsely raise Sensor glucose readings. Taking more than 500 mg of ascorbic acid per day may affect the Sensor readings which could cause you to miss a severe low glucose event. Ascorbic acid can be found in supplements including multivitamins. Some supplements, including cold remedies such as Airborne® and Emergen-C®, may contain high doses of 1000 mg of ascorbic acid and should not be taken while using the Sensor. See your health care professional to understand how long ascorbic acid is active in your body.  Contact Customer Service if you receive a Replace Sensor message before the end of the 14 day wear period. Customer Service is available at [phone] 7 Days a Week from 8AM to 8PM Eastern Standard Time.    Pt verbalized understanding of all instruction and able to demonstrate sensor application technique per protocol.     5/10/24: Doing well with Freestyle Griselda 3.     7/11/24: No technical issues with Griselda 3.          Problem: Physical Activity and Exercise         Goal: Patient agrees to increase physical activity to a goal of 150min/wk. Completed 7/11/2024   Start Date: 11/30/2023   Expected End Date: 7/21/2024   This Visit's Progress: Not met   Recent Progress: No change   Priority: High   Barriers: Other (comments)   Note:    Pt identifies stress as greatest barrier to making diabetes self-care bx  improvements. Discussed role exercise on stress mgmt, diabetes and cardio health. Pt reports staying active but none structured. Since pt enjoys walking, he agreed to start structured daily walk 10-15min outdoor if possible.     4/29/24: Active in the yard and around the house and as a Door [place], but no  formal activity. Agrees to try to implement some formal walking.     5/10/24: Plans to check out gym in [place] and use the cardio machines (bike, elliptical) and weight machines.     7/11/24: Joined the gym at the [facility] yesterday. Signed granddaughter up swim lessons M-Th, and he plans to exercise while she is in lessons. 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 Works around the house but no formal exercise.          Follow Up Plan     Follow up in about 2 months (around 9/11/2024) for new assessment.    Today's care plan and follow up schedule was discussed with patient.  Stefan verbalized understanding of the care plan, goals, and agrees to follow up plan.        The patient was encouraged to communicate with his/her health care provider/physician and care team regarding his/her condition(s) and treatment.  I provided the patient with my contact information today and encouraged to contact me via phone or [facility]'s Patient Portal as needed.     Length of Visit   Total Time: 30 Minutes

## 2024-07-11 NOTE — PROGRESS NOTES
Short Stay Record    Diagnosis: Nuclear Sclerotic Cataract right    CC: Blurry Vision     HPI:  Stefan Forbes Jr. is a 69 y.o. male who presents for evaluation prior to ophthalmic surgery. No current complaints.     Past Medical History:   Diagnosis Date    Arthritis     Cataract     COPD (chronic obstructive pulmonary disease)     Diabetes mellitus, type 2     Hyperlipidemia     Hypertension     Personal history of colonic polyps 2018     Past Surgical History:   Procedure Laterality Date    ANGIOPLASTY      JOINT REPLACEMENT  2017    TOTAL KNEE ARTHROPLASTY       Social History     Tobacco Use    Smoking status: Former     Types: Cigars     Quit date: 10/1/2023     Years since quittin.7    Smokeless tobacco: Never    Tobacco comments:     4 cigars each day   Substance Use Topics    Alcohol use: No     Family History   Problem Relation Name Age of Onset    Diabetes Mother Mom     Arthritis Mother Mom     Cancer Father Dad      Review of patient's allergies indicates:  No Known Allergies      Current Outpatient Medications:     acetaminophen (TYLENOL) 500 MG tablet, Take 1,000 mg by mouth as needed. , Disp: , Rfl:     albuterol (VENTOLIN HFA) 90 mcg/actuation inhaler, Inhale 2 puffs into the lungs every 6 (six) hours as needed for Wheezing or Shortness of Breath. Rescue, Disp: 25.5 g, Rfl: 3    ascorbic acid, vitamin C, (VITAMIN C) 1000 MG tablet, Take 1,000 mg by mouth once daily., Disp: , Rfl:     aspirin 325 MG tablet, Take 325 mg by mouth once daily., Disp: , Rfl:     blood sugar diagnostic (BLOOD GLUCOSE TEST) Strp, 1 strip by Misc.(Non-Drug; Combo Route) route 3 (three) times daily., Disp: 200 each, Rfl: 1    blood-glucose meter,continuous (FREESTYLE JASMINE 3 READER) [facility], Use as directed., Disp: 1 each, Rfl: 0    blood-glucose sensor (FREESTYLE JASMINE 3 SENSOR) Agnies[facility]ka, Change sensor every 14 days, Disp: 2 each, Rfl: 11    buPROPion (WELLBUTRIN SR) 150 MG TBSR 12 hr tablet, Take 1 tablet (150 mg total) by  mouth 2 (two) times daily., Disp: 180 tablet, Rfl: 2    cetirizine (ZYRTEC) 10 MG tablet, Take 10 mg by mouth as needed for Allergies., Disp: , Rfl:     cholecalciferol, vitamin D3, (VITAMIN D3) 25 mcg (1,000 unit) capsule, Take 1,000 Units by mouth once daily., Disp: , Rfl:     cyanocobalamin (VITAMIN B-12) 500 MCG tablet, Take 500 mcg by mouth once daily. , Disp: , Rfl:     cyclobenzaprine (FLEXERIL) 10 MG tablet, Take 1/2 to one tab by mouth at bedtime as needed for muscle spasm, Disp: 30 tablet, Rfl: 0    dapagliflozin propanediol (FARXIGA) 10 mg tablet, Take 1 tablet (10 mg total) by mouth once daily., Disp: 90 tablet, Rfl: 2    dulaglutide (TRULICITY) 4.5 mg/0.5 mL pen injector, Inject 4.5 mg into the skin every 7 days., Disp: 12 pen , Rfl: 1    famotidine (PEPCID) 40 MG tablet, Take 1 tablet by mouth once a day, Disp: 30 tablet, Rfl: 5    fluticasone propionate (FLONASE) 50 mcg/actuation nasal spray, Use 1 spray (50 mcg total) in each nostril once daily. (Patient taking differently: 1 spray by Each Nostril route as needed for Allergies.), Disp: 16 g, Rfl: 3    fluticasone-umeclidin-vilanter (TRELEGY ELLIPTA) 100-62.5-25 mcg DsDv, Inhale 1 puff into the lungs once daily., Disp: 180 each, Rfl: 3    insulin degludec (TRESIBA FLEXTOUCH U-200) 200 unit/mL (3 mL) insulin pen, Inject 18 Units into the skin once daily. Increase by 2 units every 2 days until fasting sugar is below 130. Stop at 50 units., Disp: 3 pen , Rfl: 5    ipratropium (ATROVENT) 21 mcg (0.03 %) nasal spray, Spray 1 or 2 sprays in each nasal passage every 8 hours, if needed, as directed., Disp: 30 mL, Rfl: 12    lancets 30 gauge Misc, 1 lancet by Misc.(Non-Drug; Combo Route) route 3 (three) times daily., Disp: 200 each, Rfl: 0    magnesium oxide (MAG-OX) 400 mg (241.3 mg magnesium) tablet, Take 1 tablet (400 mg total) by mouth once daily., Disp: 90 tablet, Rfl: 3    metFORMIN (GLUCOPHAGE) 1000 MG tablet, Take 1 tablet (1,000 mg total) by mouth 2  (two) times daily with meals., Disp: 180 tablet, Rfl: 1    nitroGLYCERIN (NITROSTAT) 0.4 MG SL tablet, Place 1 tablet under the tongue every 5 (five) minutes as needed for Chest pain., Disp: 25 tablet, Rfl: 3    olmesartan (BENICAR) 40 MG tablet, Take 1 tablet (40 mg total) by mouth once daily., Disp: 90 tablet, Rfl: 1    pantoprazole (PROTONIX) 40 MG tablet, Take 1 tablet by mouth twice a day before breakfast and dinner, Disp: 60 tablet, Rfl: 11    plecanatide (TRULANCE) 3 mg Tab, Take 1 tablet by mouth once a day, Disp: 90 tablet, Rfl: 3    plecanatide (TRULANCE) 3 mg Tab, Take 1 tablet by mouth once a day, Disp: 90 tablet, Rfl: 3    pramipexole (MIRAPEX) 0.125 MG tablet, TAKE 2 TABLETS BY MOUTH EVERY EVENING, Disp: 60 tablet, Rfl: 5    prednisoLONE acetate (PRED FORTE) 1 % DrpS, Place 1 drop into the right eye 4 (four) times daily., Disp: 5 mL, Rfl: 1    pulse oximeter (PULSE OXIMETER) device, Use twice daily at 8 AM and 3 PM and record the value in MyChart as directed., Disp: 1 each, Rfl: 0    rosuvastatin (CRESTOR) 20 MG tablet, Take 1 tablet (20 mg total) by mouth once daily., Disp: 90 tablet, Rfl: 3    tamsulosin (FLOMAX) 0.4 mg Cap, Take 2 capsules (0.8 mg total) by mouth once daily., Disp: 180 capsule, Rfl: 3    Review of Systems:  10 Pt ROS negative except as stated in HPI    Physical Exam:  General Appearance:    A&Ox3, no distress, appears stated age   Head:    Normocephalic, without obvious abnormality, atraumatic   Eyes:    PERRL, EOM's intact   Back:     Symmetric, no curvature   Lungs:     respirations unlabored   Chest Wall:    No tenderness or deformity    Heart:  Abdomen:  Extremities:  Skin:    S1 and S2 present    Soft, non-tender    Extremities normal, atraumatic    Skin color, texture, turgor normal     Patient is stable for ophthalmic surgery under local and MAC.       _

## 2024-07-11 NOTE — PROGRESS NOTES
HPI    C/o blurred vision , glare, trouble driving at night , difficulty reading,   and seeing the television over the past several months    Which eye is most affected? OU    Do you have difficulty, even with glasses, with the following activities?    Reading small print such as labels on medicine bottles, a telephone book,   or food labels.   Yes  Reading a newspaper or book?  No  Seeing steps, stairs, or curbs  No  Reading traffic signs, street signs, or store signs  Yes  Doing fine handwork like sewing, knitting, crocheting or carpentry?  No  Writing checks or filling out forms  No  Playing games such as bingo, [email or web address], card games or mahjong?  No  Watching television?  Yes  Driving at night?  Yes    Do you have any upcoming procedures or surgeries?  No    Have you had any eye surgeries including LASIK or PRK?  No (if so, what kind)    Do you have VA insurance?   No (if so, notify MD for potential toric lens)     Last edited by Jazmine Monteiro on 7/11/2024  9:16 AM.            Assessment /Plan     For exam results, see Encounter Report.    Nuclear sclerosis of right eye- Visually Significant Cataract OU  Patient reports decreased vision consistent with the clinical amount of lenticular opacity, which reaches the level of visual significance and affects activities of daily living including reading and glare. Risks, benefits, and alternatives to cataract surgery were discussed.  Discussion of risks included possibility of infection as well as permanent vision loss.The pt expressed a desire to proceed with surgery with the potential for some reasonable degree of visual improvement. Recommended regular use of artificial tears and good lid hygiene to optimize surgical outcome.     Discussed IOL options and refractive outcomes for this patient.    [place] right eye, Topical    Additional considerations:   +/- I-Ring    Will aim for Monofocal - Distance IOL   *Patient is considering multifocal due to astigmatism      Intraop Kenalog 40: No    Post op gtts:   PF      Discussed that vision may be limited by:  Fuch's, Poor dilation, and Astigmatism >1D, Central Corneal scar OS  + Flomax    Explained that patient may need glasses after surgery.    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

## 2024-07-15 PROBLEM — N17.9 AKI (ACUTE KIDNEY INJURY): Status: RESOLVED | Noted: 2024-04-10 | Resolved: 2024-07-15

## 2024-07-23 ENCOUNTER — CLINICAL SUPPORT (OUTPATIENT)
Dept: SMOKING CESSATION | Facility: CLINIC | Age: 69
End: 2024-07-23
Payer: COMMERCIAL

## 2024-07-23 ENCOUNTER — PATIENT OUTREACH (OUTPATIENT)
Dept: PULMONOLOGY | Facility: CLINIC | Age: 69
End: 2024-07-23
Payer: MEDICARE

## 2024-07-23 ENCOUNTER — TELEPHONE (OUTPATIENT)
Dept: SMOKING CESSATION | Facility: CLINIC | Age: 69
End: 2024-07-23
Payer: MEDICARE

## 2024-07-23 DIAGNOSIS — F17.200 NICOTINE DEPENDENCE: Primary | ICD-10-CM

## 2024-07-23 PROCEDURE — 99404 PREV MED CNSL INDIV APPRX 60: CPT | Mod: S$GLB,,, | Performed by: SPEECH-LANGUAGE PATHOLOGIST

## 2024-07-23 PROCEDURE — 99999 PR PBB SHADOW E&M-EST. PATIENT-LVL II: CPT | Mod: PBBFAC,,, | Performed by: SPEECH-LANGUAGE PATHOLOGIST

## 2024-07-23 NOTE — TELEPHONE ENCOUNTER
Patient text to say he has his granddaughter's swimming lessons at the same time as his appointment today. CTTS offered him 1 pm & patient accepted

## 2024-07-23 NOTE — PROGRESS NOTES
Individual Follow-Up Form    7/23/2024    Quit Date: 10/1/2023     Clinical Status of Patient: Outpatient    Length of Service: 60 minutes     Continuing Medication: no     Other Medications:                     Target Symptoms: Withdrawal and medication side effects. The following were   rated moderate (3) to severe (4) on TCRS:  Moderate (3): none  Severe (4): none     Comments: Telephone visit at the patient's request due to his location & he had to take his granddaughter to swimming lessons.  Patient reports he has remained smoke free & remains off of Varenicline. Administered TCRS with patient reporting mild symptoms of anger/irritability/frustration, increased appetite/hunger, anxious/nervous. He reports he will be having his low dose CT lung scan next week as well as he is preparing for cataract surgery in early August.  Discussed the positive benefits of his quit on his eye health & ability to have surgery without worry to have to stop to have surgery. He reports he continues to deal with painful leg & foot cramps that happen without warning & that he plans on having a talk with his Dr about it. Discussed with patient his high risk situations which include environmental stress. He reports he hasn't had any thoughts of smoking these days. He reports his stress levels have been better & that he's been learning to cope with situations.  He reports he's been turning to his cecilio & God to cope with situations & that has been helping him navigate. He reports he joined the [facility] gym to get some exercise & to allow his granddaughter that he now has custody of to be able to swim. Discussed the positive benefits of physical exercise on stress management. He reports they are excited for their daughter who recently moved to [place] to return home for a visit soon. He reports all in all things are going well for him at this time.  Patient reports his goal is to remain smoke free & stress free. Follow up set for 8/13/2024 at  10:00 am.       Diagnosis: F17.200    Next Visit: 3 weeks

## 2024-07-30 ENCOUNTER — HOSPITAL ENCOUNTER (OUTPATIENT)
Dept: RADIOLOGY | Facility: HOSPITAL | Age: 69
Discharge: HOME | End: 2024-07-30
Attending: NURSE PRACTITIONER
Payer: MEDICARE

## 2024-07-30 DIAGNOSIS — J43.9 PULMONARY EMPHYSEMA, UNSPECIFIED EMPHYSEMA TYPE: ICD-10-CM

## 2024-07-30 DIAGNOSIS — R91.8 MULTIPLE PULMONARY NODULES: ICD-10-CM

## 2024-07-30 DIAGNOSIS — Z87.891 HISTORY OF CIGARETTE SMOKING: ICD-10-CM

## 2024-07-30 PROCEDURE — 71271 CT THORAX LUNG CANCER SCR C-: CPT | Mod: TC

## 2024-07-30 PROCEDURE — 71271 CT THORAX LUNG CANCER SCR C-: CPT | Mod: 26,,, | Performed by: RADIOLOGY

## 2024-07-31 ENCOUNTER — TELEPHONE (OUTPATIENT)
Dept: HEMATOLOGY/ONCOLOGY | Facility: CLINIC | Age: 69
End: 2024-07-31
Payer: MEDICARE

## 2024-07-31 ENCOUNTER — PATIENT MESSAGE (OUTPATIENT)
Dept: HEMATOLOGY/ONCOLOGY | Facility: CLINIC | Age: 69
End: 2024-07-31
Payer: MEDICARE

## 2024-07-31 DIAGNOSIS — R79.9 ELEVATED BUN: ICD-10-CM

## 2024-07-31 DIAGNOSIS — E87.5 HYPERKALEMIA: Primary | ICD-10-CM

## 2024-07-31 NOTE — TELEPHONE ENCOUNTER
----- Message from Gianna Godoy NP sent at 7/31/2024  3:23 PM CDT -----  Please let patient know that his potassium is elevated.  I would like him to avoid high potassium foods and increased is water intake.  He is also slightly dehydrated.    .Gianna nguyen'    Repeat CMP at next visit            7/31/24- Conchis KHAN   Pt was notified. Verbalized understanding

## 2024-08-01 ENCOUNTER — OFFICE VISIT (OUTPATIENT)
Dept: HEMATOLOGY/ONCOLOGY | Facility: CLINIC | Age: 69
End: 2024-08-01
Payer: MEDICARE

## 2024-08-01 DIAGNOSIS — R68.89 SUSPECTED LUNG CANCER: ICD-10-CM

## 2024-08-01 DIAGNOSIS — R91.8 MULTIPLE PULMONARY NODULES DETERMINED BY COMPUTED TOMOGRAPHY OF LUNG: Primary | ICD-10-CM

## 2024-08-01 DIAGNOSIS — R93.89 ABNORMAL CT OF THE CHEST: ICD-10-CM

## 2024-08-01 DIAGNOSIS — C80.1 OCCULT MALIGNANCY: ICD-10-CM

## 2024-08-01 PROCEDURE — 99215 OFFICE O/P EST HI 40 MIN: CPT | Mod: 95,,, | Performed by: NURSE PRACTITIONER

## 2024-08-01 PROCEDURE — 4010F ACE/ARB THERAPY RXD/TAKEN: CPT | Mod: CPTII,95,, | Performed by: NURSE PRACTITIONER

## 2024-08-01 PROCEDURE — 3051F HG A1C>EQUAL 7.0%<8.0%: CPT | Mod: CPTII,95,, | Performed by: NURSE PRACTITIONER

## 2024-08-01 PROCEDURE — G2211 COMPLEX E/M VISIT ADD ON: HCPCS | Mod: 95,,, | Performed by: NURSE PRACTITIONER

## 2024-08-01 PROCEDURE — 1159F MED LIST DOCD IN RCRD: CPT | Mod: CPTII,95,, | Performed by: NURSE PRACTITIONER

## 2024-08-01 PROCEDURE — 1160F RVW MEDS BY RX/DR IN RCRD: CPT | Mod: CPTII,95,, | Performed by: NURSE PRACTITIONER

## 2024-08-01 NOTE — PROGRESS NOTES
The patient location is: home  The chief complaint leading to consultation is: no complaints    Visit type: audiovisual    Face to Face time with patient: 40  50 minutes of total time spent on the encounter, which includes face to face time and non-face to face time preparing to see the patient (eg, review of tests), Obtaining and/or reviewing separately obtained history, Documenting clinical information in the electronic or other health record, Independently interpreting results (not separately reported) and communicating results to the patient/family/caregiver, or Care coordination (not separately reported).     Each patient to whom he or she provides medical services by telemedicine is:  (1) informed of the relationship between the physician and patient and the respective role of any other health care provider with respect to management of the patient; and (2) notified that he or she may decline to receive medical services by telemedicine and may withdraw from such care at any time.       Patient ID: Stefan Forbes Jr. is a 69 y.o. male.    Chief Complaint: no complaints    HPI:  70 yo male who presents to the hematology clinic as a new patient for further evaluation and recommendation of low iron. Taking B12 supplements daily and ferrous sulfate 325 mg PO bid.  Currently taking Linzess for chronic constipation and has been diagnosed with IBS in the past.     He has been seen in the clinic in 2019 for iron deficiency anemia and treated with IV feraheme .  He had a colonoscopy in 2018 which was no contributory to MEDINA.  Since then he has been lost to follow up.      He has previously been followed by pulmonology for bilateral pulmonary nodules; however has not had a CT chest with contrast since 10/2019.       He has a history of provoked PE after knee replacement and completed 6 months of anticoagulation.  Has had no other incidence since.     Denies any known abnormal bleeding.  C/o extreme acid reflux symptoms  taking protonix 40 mg twice daily and pepcid 40 mg PO as needed.       Last EGD and colonoscopy + polyps 6/2023 and was told to f/u in 1 year.  Does have a h/o h. Pylori.       Denies f/c/ns or unintentional weight loss; however has lost 10-15 lbs recently then gained it all back.      Family hx of cancer:  Father: bone cancer?  Paternal grandfather: bone cancer?  Maternal uncle: pancreatic cancer     Former smoker. Quit in October 2023 - smoked cigarettes in the past and cigars - about 40 year about 1 to 1-1/2 ppd.  Denies alcohol or illicit drug use.      Worked as a  for 30 years - retired now.      Interval History:  5/30/2024  States has no complaints.  Anxiety currently due to daughter moving to [place] for job.  Is being followed by GI found with severe constipation and acid reflux.  Currently being treated with Trulance. Currently with stable normocytic anemia - hgb 13 g/dL with slightly low saturated iron 13%.  Currently taking ferrous sulfate 325 mg PO bid.  Denies any known abnormal bleeding.  Had hospitalization due to hyperglycemia - found with bronchial infection, hypotension, and BG in the 600's.  States that he is doing much better now.  EGD colonoscopy 4/2024 - negative for contributory finding of MEDINA.    Interval History:  8/1/2024  Found with recurrent iron deficiency anemia and given feraheme infusions x 2 with last dose received on 6/21/2024.  States that he has lost some weight unintentionally over the past 8 month.  Currently no anemia or iron deficient.     CT chest Impression:     1.  Multiple new pulmonary nodules.  This includes a pleural-based nodule in the posterior right upper lobe measuring 9 mm, and groundglass nodule within the right lower lobe measuring 7 mm, as well as multiple small nodules measuring up to 3 mm.  2.  Negative for acute process.  3.  Stable findings as noted above.     CATEGORY (ACR Lung-RADS Version 1.0): Category 4b: Findings for which additional  diagnostic testing and/or tissue sampling is recommended.  Probability for malignancy is >15%. Recommend PET-CT and/or tissue sampling    PET CT scan done 2024 Impression:   No suspicious pulmonary nodules.  Recommend annual low-dose chest CT screening.     I have reviewed all of the patient's relevant lab work available in the medical record and have utilized this in my evaluation and management recommendations today.      Social History     Socioeconomic History    Marital status:    Occupational History    Occupation: retired   Tobacco Use    Smoking status: Former     Types: Cigars     Quit date: 10/1/2023     Years since quittin.8    Smokeless tobacco: Never    Tobacco comments:     4 cigars each day   Substance and Sexual Activity    Alcohol use: No    Drug use: No    Sexual activity: Not Currently     Partners: Female     Social Determinants of Health     Financial Resource Strain: Low Risk  (10/20/2022)    Overall Financial Resource Strain (CARDIA)     Difficulty of Paying Living Expenses: Not very hard   Food Insecurity: No Food Insecurity (2024)    Hunger Vital Sign     Worried About Running Out of Food in the Last Year: Never true     Ran Out of Food in the Last Year: Never true   Transportation Needs: No Transportation Needs (2024)    PRAPARE - Transportation     Lack of Transportation (Medical): No     Lack of Transportation (Non-Medical): No   Physical Activity: Unknown (10/20/2022)    Exercise Vital Sign     Days of Exercise per Week: 1 day   Recent Concern: Physical Activity - Insufficiently Active (10/20/2022)    Exercise Vital Sign     Days of Exercise per Week: 1 day     Minutes of Exercise per Session: 10 min   Stress: Stress Concern Present (10/20/2022)    [employer] [place] [employer] - Occupational Stress Questionnaire     Feeling of Stress : To some extent   Housing Stability: Unknown (2024)    Housing Stability Vital Sign     Unable to Pay for Housing  in the Last Year: No     Unstable Housing in the Last Year: No       Family History   Problem Relation Name Age of Onset    Diabetes Mother Mom     Arthritis Mother Mom     Cancer Father Dad        Past Surgical History:   Procedure Laterality Date    ANGIOPLASTY      JOINT REPLACEMENT  2017    TOTAL KNEE ARTHROPLASTY         Past Medical History:   Diagnosis Date    Arthritis     Cataract     COPD (chronic obstructive pulmonary disease)     Diabetes mellitus, type 2     Hyperlipidemia     Hypertension     Personal history of colonic polyps 2018       Review of Systems   Constitutional: Negative.  Negative for appetite change and unexpected weight change.   HENT: Negative.  Negative for mouth sores.    Eyes: Negative.  Negative for visual disturbance.   Respiratory: Negative.  Negative for cough and shortness of breath.    Cardiovascular: Negative.  Negative for chest pain.   Gastrointestinal: Negative.  Negative for abdominal pain and diarrhea.   Endocrine: Negative.    Genitourinary: Negative.  Negative for frequency.   Musculoskeletal: Negative.  Negative for back pain.   Skin: Negative.  Negative for rash.   Allergic/Immunologic: Negative.    Neurological: Negative.  Negative for headaches.   Hematological: Negative.  Negative for adenopathy.   Psychiatric/Behavioral: Negative.  The patient is not nervous/anxious.           Medication List with Changes/Refills   Current Medications    ACETAMINOPHEN (TYLENOL) 500 MG TABLET    Take 1,000 mg by mouth as needed.     ALBUTEROL (VENTOLIN HFA) 90 MCG/ACTUATION INHALER    Inhale 2 puffs into the lungs every 6 (six) hours as needed for Wheezing or Shortness of Breath. Rescue    ASCORBIC ACID, VITAMIN C, (VITAMIN C) 1000 MG TABLET    Take 1,000 mg by mouth once daily.    ASPIRIN 325 MG TABLET    Take 325 mg by mouth once daily.    BLOOD SUGAR DIAGNOSTIC (BLOOD GLUCOSE TEST) STRP    1 strip by Misc.(Non-Drug; Combo Route) route 3 (three) times daily.    BLOOD-GLUCOSE  METER,CONTINUOUS (FREESTYLE JASMINE 3 READER) [facility] [place] [facility]    Use as directed.    BLOOD-GLUCOSE SENSOR (FREESTYLE JASMINE 3 SENSOR) [facility]    Change sensor every 14 days    BUPROPION (WELLBUTRIN SR) 150 MG TBSR 12 HR TABLET    Take 1 tablet (150 mg total) by mouth 2 (two) times daily.    CETIRIZINE (ZYRTEC) 10 MG TABLET    Take 10 mg by mouth as needed for Allergies.    CHOLECALCIFEROL, VITAMIN D3, (VITAMIN D3) 25 MCG (1,000 UNIT) CAPSULE    Take 1,000 Units by mouth once daily.    CYANOCOBALAMIN (VITAMIN B-12) 500 MCG TABLET    Take 500 mcg by mouth once daily.     CYCLOBENZAPRINE (FLEXERIL) 10 MG TABLET    Take 1/2 to one tab by mouth at bedtime as needed for muscle spasm    DAPAGLIFLOZIN PROPANEDIOL (FARXIGA) 10 MG TABLET    Take 1 tablet (10 mg total) by mouth once daily.    DULAGLUTIDE (TRULICITY) 4.5 MG/0.5 ML PEN INJECTOR    Inject 4.5 mg into the skin every 7 days.    FAMOTIDINE (PEPCID) 40 MG TABLET    Take 1 tablet by mouth once a day    FLUTICASONE PROPIONATE (FLONASE) 50 MCG/ACTUATION NASAL SPRAY    Use 1 spray (50 mcg total) in each nostril once daily.    FLUTICASONE-UMECLIDIN-VILANTER (TRELEGY ELLIPTA) 100-62.5-25 MCG DSDV    Inhale 1 puff into the lungs once daily.    INSULIN DEGLUDEC (TRESIBA FLEXTOUCH U-200) 200 UNIT/ML (3 ML) INSULIN PEN    Inject 18 Units into the skin once daily. Increase by 2 units every 2 days until fasting sugar is below 130. Stop at 50 units.    IPRATROPIUM (ATROVENT) 21 MCG (0.03 %) NASAL SPRAY    Spray 1 or 2 sprays in each nasal passage every 8 hours, if needed, as directed.    LANCETS 30 GAUGE MISC    1 lancet by Misc.(Non-Drug; Combo Route) route 3 (three) times daily.    MAGNESIUM OXIDE (MAG-OX) 400 MG (241.3 MG MAGNESIUM) TABLET    Take 1 tablet (400 mg total) by mouth once daily.    METFORMIN (GLUCOPHAGE) 1000 MG TABLET    Take 1 tablet (1,000 mg total) by mouth 2 (two) times daily with meals.    NITROGLYCERIN (NITROSTAT) 0.4 MG SL TABLET    Place 1 tablet under the tongue every 5  (five) minutes as needed for Chest pain.    OLMESARTAN (BENICAR) 40 MG TABLET    Take 1 tablet (40 mg total) by mouth once daily.    PANTOPRAZOLE (PROTONIX) 40 MG TABLET    Take 1 tablet by mouth twice a day before breakfast and dinner    PLECANATIDE (TRULANCE) 3 MG TAB    Take 1 tablet by mouth once a day    PLECANATIDE (TRULANCE) 3 MG TAB    Take 1 tablet by mouth once a day    PRAMIPEXOLE (MIRAPEX) 0.125 MG TABLET    TAKE 2 TABLETS BY MOUTH EVERY EVENING    PREDNISOLONE ACETATE (PRED FORTE) 1 % DRPS    Place 1 drop into the right eye 4 (four) times daily.    PULSE OXIMETER (PULSE OXIMETER) DEVICE    Use twice daily at 8 AM and 3 PM and record the value in [employer] as directed.    ROSUVASTATIN (CRESTOR) 20 MG TABLET    Take 1 tablet (20 mg total) by mouth once daily.    TAMSULOSIN (FLOMAX) 0.4 MG CAP    Take 2 capsules (0.8 mg total) by mouth once daily.        Objective:   There were no vitals filed for this visit.    Physical Exam  Constitutional:       Appearance: Normal appearance.   Pulmonary:      Effort: Pulmonary effort is normal.   Neurological:      Mental Status: He is alert and oriented to person, place, and time.   Psychiatric:         Mood and Affect: Mood normal.         Behavior: Behavior normal.         Thought Content: Thought content normal.         Judgment: Judgment normal.         Assessment:     Problem List Items Addressed This Visit    None  Visit Diagnoses       Multiple pulmonary nodules determined by computed tomography of lung    -  Primary    Relevant Orders    NM PET CT FDG Skull Base to Mid Thigh    Abnormal CT of the chest        Relevant Orders    NM PET CT FDG Skull Base to Mid Thigh    Occult malignancy        Relevant Orders    NM PET CT FDG Skull Base to Mid Thigh    Suspected lung cancer        Relevant Orders    NM PET CT FDG Skull Base to Mid Thigh            Lab Results   Component Value Date    WBC 7.58 07/30/2024    RBC 4.71 07/30/2024    HGB 14.6 07/30/2024    HCT 43.8  07/30/2024    MCV 93 07/30/2024    MCH 31.0 07/30/2024    MCHC 33.3 07/30/2024    RDW 12.9 07/30/2024     07/30/2024    MPV 9.3 07/30/2024    GRAN 4.8 07/30/2024    GRAN 62.8 07/30/2024    LYMPH 1.8 07/30/2024    LYMPH 23.4 07/30/2024    MONO 0.8 07/30/2024    MONO 9.9 07/30/2024    EOS 0.2 07/30/2024    BASO 0.05 07/30/2024    EOSINOPHIL 2.8 07/30/2024    BASOPHIL 0.7 07/30/2024      Lab Results   Component Value Date     (L) 07/30/2024    K 5.5 (H) 07/30/2024    CL 99 07/30/2024    CO2 25 07/30/2024    BUN 25 (H) 07/30/2024    CREATININE 1.3 07/30/2024    CALCIUM 10.0 07/30/2024    ANIONGAP 11 07/30/2024    ESTGFRAFRICA >60.0 10/14/2021    EGFRNONAA >60.0 10/14/2021     Lab Results   Component Value Date    ALT 15 07/30/2024    AST 13 07/30/2024    ALKPHOS 57 07/30/2024    BILITOT 0.2 07/30/2024     Lab Results   Component Value Date    IRON 102 07/30/2024    TRANSFERRIN 239 07/30/2024    TIBC 354 07/30/2024    FESATURATED 29 07/30/2024    FERRITIN 167 07/30/2024        Plan:   Multiple pulmonary nodules determined by computed tomography of lung  -     NM PET CT FDG Skull Base to Mid Thigh; Future; Expected date: 08/01/2024    Abnormal CT of the chest  -     NM PET CT FDG Skull Base to Mid Thigh; Future; Expected date: 08/01/2024    Occult malignancy  -     NM PET CT FDG Skull Base to Mid Thigh; Future; Expected date: 08/01/2024    Suspected lung cancer  -     NM PET CT FDG Skull Base to Mid Thigh; Future; Expected date: 08/01/2024      Med Onc Chart Routing      Follow up with physician    Follow up with [facility] . F/u after Pet CT to discuss results   Infusion scheduling note   n/a   Injection scheduling note n/a   Labs    Imaging   Pet ct scheduled   Pharmacy appointment No pharmacy appointment needed      Other referrals       No additional referrals needed  n/a              Collaborating Provider:  Dr. Evaristo Pisano    Thank You,  DAQUAN Carrasco-ILIR  Benign Hematology

## 2024-08-02 ENCOUNTER — TELEPHONE (OUTPATIENT)
Dept: OPHTHALMOLOGY | Facility: CLINIC | Age: 69
End: 2024-08-02
Payer: MEDICARE

## 2024-08-05 ENCOUNTER — OFFICE VISIT (OUTPATIENT)
Dept: PULMONOLOGY | Facility: CLINIC | Age: 69
End: 2024-08-05
Payer: MEDICARE

## 2024-08-05 ENCOUNTER — LAB VISIT (OUTPATIENT)
Dept: LAB | Facility: HOSPITAL | Age: 69
End: 2024-08-05
Attending: NURSE PRACTITIONER
Payer: MEDICARE

## 2024-08-05 VITALS
RESPIRATION RATE: 16 BRPM | DIASTOLIC BLOOD PRESSURE: 60 MMHG | BODY MASS INDEX: 32.23 KG/M2 | WEIGHT: 212.63 LBS | SYSTOLIC BLOOD PRESSURE: 120 MMHG | OXYGEN SATURATION: 98 % | HEIGHT: 68 IN | HEART RATE: 67 BPM

## 2024-08-05 DIAGNOSIS — E11.59 HYPERTENSION ASSOCIATED WITH DIABETES: ICD-10-CM

## 2024-08-05 DIAGNOSIS — G47.33 OSA ON CPAP: ICD-10-CM

## 2024-08-05 DIAGNOSIS — E11.69 HYPERLIPIDEMIA ASSOCIATED WITH TYPE 2 DIABETES MELLITUS: ICD-10-CM

## 2024-08-05 DIAGNOSIS — J44.9 MODERATE COPD (CHRONIC OBSTRUCTIVE PULMONARY DISEASE): ICD-10-CM

## 2024-08-05 DIAGNOSIS — R91.1 PULMONARY NODULE: ICD-10-CM

## 2024-08-05 DIAGNOSIS — D50.9 RESTLESS LEG SYNDROME DUE TO IRON DEFICIENCY ANEMIA: Primary | Chronic | ICD-10-CM

## 2024-08-05 DIAGNOSIS — R79.9 ELEVATED BUN: ICD-10-CM

## 2024-08-05 DIAGNOSIS — G25.81 RESTLESS LEG SYNDROME DUE TO IRON DEFICIENCY ANEMIA: Primary | Chronic | ICD-10-CM

## 2024-08-05 DIAGNOSIS — E87.5 HYPERKALEMIA: ICD-10-CM

## 2024-08-05 DIAGNOSIS — E78.5 HYPERLIPIDEMIA ASSOCIATED WITH TYPE 2 DIABETES MELLITUS: ICD-10-CM

## 2024-08-05 DIAGNOSIS — I15.2 HYPERTENSION ASSOCIATED WITH DIABETES: ICD-10-CM

## 2024-08-05 LAB
ALBUMIN SERPL BCP-MCNC: 3.8 G/DL (ref 3.5–5.2)
ALP SERPL-CCNC: 59 U/L (ref 55–135)
ALT SERPL W/O P-5'-P-CCNC: 16 U/L (ref 10–44)
ANION GAP SERPL CALC-SCNC: 8 MMOL/L (ref 8–16)
AST SERPL-CCNC: 11 U/L (ref 10–40)
BILIRUB SERPL-MCNC: 0.2 MG/DL (ref 0.1–1)
BUN SERPL-MCNC: 17 MG/DL (ref 8–23)
CALCIUM SERPL-MCNC: 9.6 MG/DL (ref 8.7–10.5)
CHLORIDE SERPL-SCNC: 101 MMOL/L (ref 95–110)
CO2 SERPL-SCNC: 26 MMOL/L (ref 23–29)
CREAT SERPL-MCNC: 1.2 MG/DL (ref 0.5–1.4)
EST. GFR  (NO RACE VARIABLE): >60 ML/MIN/1.73 M^2
GLUCOSE SERPL-MCNC: 160 MG/DL (ref 70–110)
POTASSIUM SERPL-SCNC: 4.6 MMOL/L (ref 3.5–5.1)
PROT SERPL-MCNC: 7 G/DL (ref 6–8.4)
SODIUM SERPL-SCNC: 135 MMOL/L (ref 136–145)

## 2024-08-05 PROCEDURE — 3008F BODY MASS INDEX DOCD: CPT | Mod: CPTII,S$GLB,, | Performed by: INTERNAL MEDICINE

## 2024-08-05 PROCEDURE — 1160F RVW MEDS BY RX/DR IN RCRD: CPT | Mod: CPTII,S$GLB,, | Performed by: INTERNAL MEDICINE

## 2024-08-05 PROCEDURE — 3078F DIAST BP <80 MM HG: CPT | Mod: CPTII,S$GLB,, | Performed by: INTERNAL MEDICINE

## 2024-08-05 PROCEDURE — 3288F FALL RISK ASSESSMENT DOCD: CPT | Mod: CPTII,S$GLB,, | Performed by: INTERNAL MEDICINE

## 2024-08-05 PROCEDURE — 99999 PR PBB SHADOW E&M-EST. PATIENT-LVL V: CPT | Mod: PBBFAC,,, | Performed by: INTERNAL MEDICINE

## 2024-08-05 PROCEDURE — 36415 COLL VENOUS BLD VENIPUNCTURE: CPT | Performed by: NURSE PRACTITIONER

## 2024-08-05 PROCEDURE — 3051F HG A1C>EQUAL 7.0%<8.0%: CPT | Mod: CPTII,S$GLB,, | Performed by: INTERNAL MEDICINE

## 2024-08-05 PROCEDURE — 80053 COMPREHEN METABOLIC PANEL: CPT | Performed by: NURSE PRACTITIONER

## 2024-08-05 PROCEDURE — 1101F PT FALLS ASSESS-DOCD LE1/YR: CPT | Mod: CPTII,S$GLB,, | Performed by: INTERNAL MEDICINE

## 2024-08-05 PROCEDURE — 1159F MED LIST DOCD IN RCRD: CPT | Mod: CPTII,S$GLB,, | Performed by: INTERNAL MEDICINE

## 2024-08-05 PROCEDURE — 99214 OFFICE O/P EST MOD 30 MIN: CPT | Mod: S$GLB,,, | Performed by: INTERNAL MEDICINE

## 2024-08-05 PROCEDURE — 3074F SYST BP LT 130 MM HG: CPT | Mod: CPTII,S$GLB,, | Performed by: INTERNAL MEDICINE

## 2024-08-05 PROCEDURE — 4010F ACE/ARB THERAPY RXD/TAKEN: CPT | Mod: CPTII,S$GLB,, | Performed by: INTERNAL MEDICINE

## 2024-08-06 DIAGNOSIS — E83.42 HYPOMAGNESEMIA: ICD-10-CM

## 2024-08-07 ENCOUNTER — TELEPHONE (OUTPATIENT)
Dept: PULMONOLOGY | Facility: CLINIC | Age: 69
End: 2024-08-07
Payer: MEDICARE

## 2024-08-07 RX ORDER — LANOLIN ALCOHOL/MO/W.PET/CERES
400 CREAM (GRAM) TOPICAL DAILY
Qty: 90 TABLET | Refills: 3 | Status: SHIPPED | OUTPATIENT
Start: 2024-08-07

## 2024-08-08 ENCOUNTER — HOSPITAL ENCOUNTER (OUTPATIENT)
Dept: RADIOLOGY | Facility: HOSPITAL | Age: 69
Discharge: HOME | End: 2024-08-08
Attending: NURSE PRACTITIONER
Payer: MEDICARE

## 2024-08-08 ENCOUNTER — PATIENT MESSAGE (OUTPATIENT)
Dept: HEMATOLOGY/ONCOLOGY | Facility: CLINIC | Age: 69
End: 2024-08-08
Payer: MEDICARE

## 2024-08-08 ENCOUNTER — TELEPHONE (OUTPATIENT)
Dept: PHARMACY | Facility: CLINIC | Age: 69
End: 2024-08-08
Payer: MEDICARE

## 2024-08-08 DIAGNOSIS — R93.89 ABNORMAL CT OF THE CHEST: ICD-10-CM

## 2024-08-08 DIAGNOSIS — R68.89 SUSPECTED LUNG CANCER: ICD-10-CM

## 2024-08-08 DIAGNOSIS — R91.8 MULTIPLE PULMONARY NODULES DETERMINED BY COMPUTED TOMOGRAPHY OF LUNG: ICD-10-CM

## 2024-08-08 DIAGNOSIS — C80.1 OCCULT MALIGNANCY: ICD-10-CM

## 2024-08-08 PROCEDURE — A9552 F18 FDG: HCPCS | Performed by: NURSE PRACTITIONER

## 2024-08-08 PROCEDURE — 78815 PET IMAGE W/CT SKULL-THIGH: CPT | Mod: TC

## 2024-08-08 PROCEDURE — 78815 PET IMAGE W/CT SKULL-THIGH: CPT | Mod: 26,PI,, | Performed by: RADIOLOGY

## 2024-08-08 RX ORDER — FLUDEOXYGLUCOSE F18 500 MCI/ML
11.5 INJECTION INTRAVENOUS
Status: COMPLETED | OUTPATIENT
Start: 2024-08-08 | End: 2024-08-08

## 2024-08-08 RX ADMIN — FLUDEOXYGLUCOSE F-18 11.5 MILLICURIE: 500 INJECTION INTRAVENOUS at 10:08

## 2024-08-12 ENCOUNTER — PATIENT MESSAGE (OUTPATIENT)
Dept: HEMATOLOGY/ONCOLOGY | Facility: CLINIC | Age: 69
End: 2024-08-12
Payer: MEDICARE

## 2024-08-13 ENCOUNTER — OFFICE VISIT (OUTPATIENT)
Dept: DIABETES | Facility: CLINIC | Age: 69
End: 2024-08-13
Payer: MEDICARE

## 2024-08-13 ENCOUNTER — CLINICAL SUPPORT (OUTPATIENT)
Dept: PULMONOLOGY | Facility: CLINIC | Age: 69
End: 2024-08-13
Payer: MEDICARE

## 2024-08-13 ENCOUNTER — CLINICAL SUPPORT (OUTPATIENT)
Dept: SMOKING CESSATION | Facility: CLINIC | Age: 69
End: 2024-08-13
Payer: COMMERCIAL

## 2024-08-13 VITALS
WEIGHT: 215.81 LBS | HEART RATE: 62 BPM | BODY MASS INDEX: 32.71 KG/M2 | SYSTOLIC BLOOD PRESSURE: 120 MMHG | HEIGHT: 68 IN | DIASTOLIC BLOOD PRESSURE: 73 MMHG

## 2024-08-13 VITALS
HEART RATE: 76 BPM | BODY MASS INDEX: 32.64 KG/M2 | OXYGEN SATURATION: 97 % | HEIGHT: 68 IN | WEIGHT: 215.38 LBS | RESPIRATION RATE: 16 BRPM

## 2024-08-13 DIAGNOSIS — Z79.4 UNCONTROLLED TYPE 2 DIABETES MELLITUS WITH HYPERGLYCEMIA, WITH LONG-TERM CURRENT USE OF INSULIN: Primary | ICD-10-CM

## 2024-08-13 DIAGNOSIS — F17.200 NICOTINE DEPENDENCE: Primary | ICD-10-CM

## 2024-08-13 DIAGNOSIS — R91.1 PULMONARY NODULE: ICD-10-CM

## 2024-08-13 DIAGNOSIS — I25.10 CORONARY ARTERY DISEASE INVOLVING NATIVE CORONARY ARTERY OF NATIVE HEART WITHOUT ANGINA PECTORIS: ICD-10-CM

## 2024-08-13 DIAGNOSIS — Z86.711 HISTORY OF PULMONARY EMBOLISM: ICD-10-CM

## 2024-08-13 DIAGNOSIS — E78.5 HYPERLIPIDEMIA ASSOCIATED WITH TYPE 2 DIABETES MELLITUS: ICD-10-CM

## 2024-08-13 DIAGNOSIS — J44.9 MODERATE COPD (CHRONIC OBSTRUCTIVE PULMONARY DISEASE): ICD-10-CM

## 2024-08-13 DIAGNOSIS — E11.59 HYPERTENSION ASSOCIATED WITH DIABETES: ICD-10-CM

## 2024-08-13 DIAGNOSIS — E11.69 HYPERLIPIDEMIA ASSOCIATED WITH TYPE 2 DIABETES MELLITUS: ICD-10-CM

## 2024-08-13 DIAGNOSIS — I15.2 HYPERTENSION ASSOCIATED WITH DIABETES: ICD-10-CM

## 2024-08-13 DIAGNOSIS — E55.9 VITAMIN D DEFICIENCY: ICD-10-CM

## 2024-08-13 DIAGNOSIS — J44.9 MODERATE COPD (CHRONIC OBSTRUCTIVE PULMONARY DISEASE): Primary | ICD-10-CM

## 2024-08-13 DIAGNOSIS — E11.65 UNCONTROLLED TYPE 2 DIABETES MELLITUS WITH HYPERGLYCEMIA, WITH LONG-TERM CURRENT USE OF INSULIN: Primary | ICD-10-CM

## 2024-08-13 LAB — GLUCOSE SERPL-MCNC: 195 MG/DL (ref 70–110)

## 2024-08-13 PROCEDURE — G2211 COMPLEX E/M VISIT ADD ON: HCPCS | Mod: S$GLB,,, | Performed by: NURSE PRACTITIONER

## 2024-08-13 PROCEDURE — 1126F AMNT PAIN NOTED NONE PRSNT: CPT | Mod: CPTII,S$GLB,, | Performed by: NURSE PRACTITIONER

## 2024-08-13 PROCEDURE — 99999 PR PBB SHADOW E&M-EST. PATIENT-LVL V: CPT | Mod: PBBFAC,,, | Performed by: NURSE PRACTITIONER

## 2024-08-13 PROCEDURE — 4010F ACE/ARB THERAPY RXD/TAKEN: CPT | Mod: CPTII,S$GLB,, | Performed by: NURSE PRACTITIONER

## 2024-08-13 PROCEDURE — 3008F BODY MASS INDEX DOCD: CPT | Mod: CPTII,S$GLB,, | Performed by: NURSE PRACTITIONER

## 2024-08-13 PROCEDURE — 99214 OFFICE O/P EST MOD 30 MIN: CPT | Mod: S$GLB,,, | Performed by: NURSE PRACTITIONER

## 2024-08-13 PROCEDURE — 99999 PR PBB SHADOW E&M-EST. PATIENT-LVL II: CPT | Mod: PBBFAC,,, | Performed by: SPEECH-LANGUAGE PATHOLOGIST

## 2024-08-13 PROCEDURE — 3078F DIAST BP <80 MM HG: CPT | Mod: CPTII,S$GLB,, | Performed by: NURSE PRACTITIONER

## 2024-08-13 PROCEDURE — 3074F SYST BP LT 130 MM HG: CPT | Mod: CPTII,S$GLB,, | Performed by: NURSE PRACTITIONER

## 2024-08-13 PROCEDURE — 1160F RVW MEDS BY RX/DR IN RCRD: CPT | Mod: CPTII,S$GLB,, | Performed by: NURSE PRACTITIONER

## 2024-08-13 PROCEDURE — 3051F HG A1C>EQUAL 7.0%<8.0%: CPT | Mod: CPTII,S$GLB,, | Performed by: NURSE PRACTITIONER

## 2024-08-13 PROCEDURE — 82962 GLUCOSE BLOOD TEST: CPT | Mod: S$GLB,,, | Performed by: NURSE PRACTITIONER

## 2024-08-13 PROCEDURE — 99404 PREV MED CNSL INDIV APPRX 60: CPT | Mod: S$GLB,,, | Performed by: SPEECH-LANGUAGE PATHOLOGIST

## 2024-08-13 PROCEDURE — 95251 CONT GLUC MNTR ANALYSIS I&R: CPT | Mod: S$GLB,,, | Performed by: NURSE PRACTITIONER

## 2024-08-13 PROCEDURE — 99999 PR PBB SHADOW E&M-EST. PATIENT-LVL IV: CPT | Mod: PBBFAC,,,

## 2024-08-13 PROCEDURE — 1101F PT FALLS ASSESS-DOCD LE1/YR: CPT | Mod: CPTII,S$GLB,, | Performed by: NURSE PRACTITIONER

## 2024-08-13 PROCEDURE — 3288F FALL RISK ASSESSMENT DOCD: CPT | Mod: CPTII,S$GLB,, | Performed by: NURSE PRACTITIONER

## 2024-08-13 PROCEDURE — 1159F MED LIST DOCD IN RCRD: CPT | Mod: CPTII,S$GLB,, | Performed by: NURSE PRACTITIONER

## 2024-08-13 RX ORDER — SEMAGLUTIDE 2.68 MG/ML
2 INJECTION, SOLUTION SUBCUTANEOUS
Start: 2024-08-13

## 2024-08-13 RX ORDER — SEMAGLUTIDE 1.34 MG/ML
1 INJECTION, SOLUTION SUBCUTANEOUS
Qty: 3 ML | Refills: 5 | Status: SHIPPED | OUTPATIENT
Start: 2024-08-13

## 2024-08-13 NOTE — PROGRESS NOTES
Pulmonary Disease Management  [facility]  Follow up Visit  Chronic Care Management    Stefan Forbes Jr.  was seen 8/13/2024  10:45 AM in the Pulmonary Disease Management Clinic for evaluation, education, reinforcement of self management techniques and exacerbation action plan.    Bereket Ferrer    Past Medical History:   Diagnosis Date    Arthritis     Cataract     COPD (chronic obstructive pulmonary disease)     Diabetes mellitus, type 2     Hyperlipidemia     Hypertension     Personal history of colonic polyps 2018       Patient's Medications   New Prescriptions    No medications on file   Previous Medications    ACETAMINOPHEN (TYLENOL) 500 MG TABLET    Take 1,000 mg by mouth as needed.     ALBUTEROL (VENTOLIN HFA) 90 MCG/ACTUATION INHALER    Inhale 2 puffs into the lungs every 6 (six) hours as needed for Wheezing or Shortness of Breath. Rescue    ASCORBIC ACID, VITAMIN C, (VITAMIN C) 1000 MG TABLET    Take 1,000 mg by mouth once daily.    ASPIRIN 325 MG TABLET    Take 325 mg by mouth once daily.    BLOOD SUGAR DIAGNOSTIC (BLOOD GLUCOSE TEST) STRP    1 strip by Misc.(Non-Drug; Combo Route) route 3 (three) times daily.    BLOOD-GLUCOSE METER,CONTINUOUS (FREESTYLE JASMINE 3 READER) [facility] [place]    Use as directed.    BLOOD-GLUCOSE SENSOR (FREESTYLE JASMINE 3 SENSOR) [facility]    Change sensor every 14 days    BUPROPION (WELLBUTRIN SR) 150 MG TBSR 12 HR TABLET    Take 1 tablet (150 mg total) by mouth 2 (two) times daily.    CETIRIZINE (ZYRTEC) 10 MG TABLET    Take 10 mg by mouth as needed for Allergies.    CHOLECALCIFEROL, VITAMIN D3, (VITAMIN D3) 25 MCG (1,000 UNIT) CAPSULE    Take 1,000 Units by mouth once daily.    CYANOCOBALAMIN (VITAMIN B-12) 500 MCG TABLET    Take 500 mcg by mouth once daily.     DAPAGLIFLOZIN PROPANEDIOL (FARXIGA) 10 MG TABLET    Take 1 tablet (10 mg total) by mouth once daily.    FAMOTIDINE (PEPCID) 40 MG TABLET    Take 1 tablet by mouth once a day    FLUTICASONE PROPIONATE (FLONASE) 50  MCG/ACTUATION NASAL SPRAY    Use 1 spray (50 mcg total) in each nostril once daily.    FLUTICASONE-UMECLIDIN-VILANTER (TRELEGY ELLIPTA) 100-62.5-25 MCG DSDV    Inhale 1 puff into the lungs once daily.    INSULIN DEGLUDEC (TRESIBA FLEXTOUCH U-200) 200 UNIT/ML (3 ML) INSULIN PEN    Inject 18 Units into the skin once daily. Increase by 2 units every 2 days until fasting sugar is below 130. Stop at 50 units.    IPRATROPIUM (ATROVENT) 21 MCG (0.03 %) NASAL SPRAY    Spray 1 or 2 sprays in each nasal passage every 8 hours, if needed, as directed.    LANCETS 30 GAUGE MISC    1 lancet by Misc.(Non-Drug; Combo Route) route 3 (three) times daily.    MAGNESIUM OXIDE (MAG-OX) 400 MG (241.3 MG MAGNESIUM) TABLET    Take 1 tablet (400 mg total) by mouth once daily.    METFORMIN (GLUCOPHAGE) 1000 MG TABLET    Take 1 tablet (1,000 mg total) by mouth 2 (two) times daily with meals.    NITROGLYCERIN (NITROSTAT) 0.4 MG SL TABLET    Place 1 tablet under the tongue every 5 (five) minutes as needed for Chest pain.    OLMESARTAN (BENICAR) 40 MG TABLET    Take 1 tablet (40 mg total) by mouth once daily.    PANTOPRAZOLE (PROTONIX) 40 MG TABLET    Take 1 tablet by mouth twice a day before breakfast and dinner    PLECANATIDE (TRULANCE) 3 MG TAB    Take 1 tablet by mouth once a day    PLECANATIDE (TRULANCE) 3 MG TAB    Take 1 tablet by mouth once a day    PRAMIPEXOLE (MIRAPEX) 0.125 MG TABLET    TAKE 2 TABLETS BY MOUTH EVERY EVENING    PREDNISOLONE ACETATE (PRED FORTE) 1 % DRPS    Place 1 drop into the right eye 4 (four) times daily.    PULSE OXIMETER (PULSE OXIMETER) DEVICE    Use twice daily at 8 AM and 3 PM and [facility].    ROSUVASTATIN (CRESTOR) 20 MG TABLET    Take 1 tablet (20 mg total) by mouth once daily.    SEMAGLUTIDE (OZEMPIC) 1 MG/DOSE (4 MG/3 ML)    Inject 1 mg into the skin every 7 days.    SEMAGLUTIDE (OZEMPIC) 2 MG/DOSE (8 MG/3 ML) PNIJ    Inject 2 mg into the skin every 7 days.    TAMSULOSIN  (FLOMAX) 0.4 MG CAP    Take 2 capsules (0.8 mg total) by mouth once daily.   Modified Medications    No medications on file   Discontinued Medications    No medications on file             Educational assessment:   [x]            Good  []            Fair  []            Poor    Readiness to learn:   [x]            Good  []            Fair  []            Poor    Vision Status:   [x]            Good  []            Fair  []            Poor    Reading Ability:  [x]            Good  []            Fair  []            Poor    Knowledge of condition:   [x]            Good  []            Fair  []            Poor    Language Barriers:   []            Good  []            Fair  []            Poor  [x]            None    Cognitive/ Physical Barriers:   []            Good  []            Fair  []            Poor  [x]            None    Learning best by:                       [x]            Seeing  []            Hearing  []            Reading                         [x]            Doing    Describe any barrier /Limitation or financial implications of care choices identified     []            Financial  []            Emotional  []            Education  []            Vision/Hearing  []            Physical  [x]            None  []                TOPIC /CONTENT FOR TODAY:    [x]            MDI with or without spacer  [x]            Dry powder inhaler  []            Acapella   []           Peak Flow meter  [x]            COPD action plan  [x]            Nebulizer use  []            Oxygen use safety  [x]            Breathing and cough techniques  [x]            Energy conservation  [x]            Infection prevention  [x]           CPAP use        Learner:    [x]            Patient   []            Caregiver    Method:    [x]            Verbal explanation  [x]            Audio visual    [x]            Literature  [x]            Teach back      Evaluation:    [x]            Teach back  [x]            Demonstrate  [x]            Follow up  phone call    []            2 weeks     [x]            4 weeks   [x]            PRN    Additional comments:   Patient was seen today to review respiratory medication purpose and proper technique for use of inhalers. Patient practiced proper technique using MDI with valved holding chamber (spacer) and DPI inhalers. Patient demonstrated understanding. Literature was given to patient.    Discussed therapeutic goals for positive airway pressure therapy(CPAP or BiPAP): Ideal is usage 100% of nights for 6 - 8 hours per night. Minimum usage is 70% of night for at least 4 hours per night used. Reviewed CPAP habituation plan with patient. Patient expressed understanding.      Discussed complications of untreated sleep apnea with patient, including but not limited to high blood pressure, heart attack, stroke, obesity, diabetes and worsening heart failure. Patient voiced understanding.      Asthma trigger checklist was verbally reviewed and literature given to patient.     Infection prevention was discussed. Patient was reminded to get influenza vaccine. Hand hygiene and cleaning of respiratory equipment was also discussed. Patient verbalized understanding.      COPD action plan was reviewed and literature was given to patient. Patient verbalized understanding.     Plan:  Monthly Pulmonary Disease Management Questionnaire  Follow-up PDM appointment scheduled on 2/18/25  Reinforce education  Meds: Trelegy, albuterol   DME Needs: [facility]ME   Action Plan: COPD  Immunization: Pneumococcal- current, Flu-current , Covid 19- current   Next Provider Visit: 9/10/24  Next Spirometry/CPFT: 10/11/24  Approximate time spent with patient: 60 mins

## 2024-08-13 NOTE — PROGRESS NOTES
Individual Follow-Up Form    8/13/2024    Quit Date: 10/1/2023    Clinical Status of Patient: Outpatient    Length of Service: 60 minutes    Continuing Medication: no    Other Medications:      Target Symptoms: Withdrawal and medication side effects. The following were  rated moderate (3) to severe (4) on TCRS:  Moderate (3): anger/irritability/frustration, increased appetite/hunger,  Severe (4): none    Comments: Patient seen in clinic. Patient reports he has remained smoke free since his last session & his 10/1/2023 quit date. Assessed CO. CO 1. He reports having a lung scan which caused some concern for his oncology NP & had a PET scan. He reports everything came back without any concern for cancer.  He reports that he didn't think about smoking due to he was concerned about his wife due to she was upset about hearing the initial concern of needing a PET scan. Patient reports his pulmonologist sent the patient to have blood work done for a new blood test that can assess for lung cancer & his results from that test came back fine.  Administered TCRS with patient reporting mild symptoms of depressed mood/sadness, anxious/nervous, restless/can't sit still/impatient & moderate symptoms of anger/irritability/frustration, increased appetite/hunger. Patient reports his symptoms have been due to the stress of hearing about his lung scan, the need for the PET scan & his wife being upset about it.  Patient reports he's been spending time wood carving when he gets bored creating rings & animal cut outs in addition to building furniture. Patient reports his frequency of thinking about smoking occurs once per month if experienced at all. He reports he has been able to get off several medications since his quit whereas when he was smoking, the patient was on several medications. Commended the patient for not turning to smoking when receiving upsetting & concerning news & discussed the benefits of his quit & impact on his lung  function & status. Patient reports his goal is to remain smoke free. Follow up set for 9/11/2024 at 9:30 am.       Diagnosis: F17.200    Next Visit: 4 weeks

## 2024-08-13 NOTE — PROGRESS NOTES
Subjective:         Patient ID: Stefan Forbes Jr. is a 69 y.o. male.  Patient's current PCP is Wallace Fisher MD.     Chief Complaint: Diabetes Mellitus    HPI  Stefan Forbes Jr. is a 69 y.o. White male presenting for a follow up for diabetes. Patient has been diagnosed with type 2 diabetes since 2012 .  Received diabetes education: Yes-[facility], 07/2024    CURRENT DM MEDICATIONS:   Diabetes Medications               dapagliflozin propanediol (FARXIGA) 10 mg tablet Take 1 tablet (10 mg total) by mouth once daily.    dulaglutide (TRULICITY) 4.5 mg/0.5 mL pen injector Inject 4.5 mg into the skin every 7 days.    insulin degludec (TRESIBA FLEXTOUCH U-200) 200 unit/mL (3 mL) insulin pen Inject 18 Units into the skin once daily. Increase by 2 units every 2 days until fasting sugar is below 130. Stop at 50 units. 20 units    metFORMIN (GLUCOPHAGE) 1000 MG tablet Take 1 tablet (1,000 mg total) by mouth 2 (two) times daily with meals.     Past failed treatment include: Levemir-failure to control diabetes    Blood glucose testing Continuous per [facility]  Preferred lab: [facility]    Any episodes of hypoglycemia? 0% per [facility]    Complications related to diabetes: cardiovascular disease    His blood sugar in the clinic today was:   Lab Results   Component Value Date    POCGLU 195 (A) 08/13/2024     Stefan Forbes Jr. presents today for follow up visit to discuss diabetes management.     Between visits, trained on [facility]. At last visit, Trulicity was increased and Levemir changed to Tresiba. He reports the following: Tolerating medications well without side effects. Pattern of stress eating.     Checking BP at home intermittently.     Per [facility] download, for the last 14 days: Average glucose of 187 mg/dL. He is 55% in range. 26% of readings are high, with 19% of readings > 250. 0% hypoglycemia. Pattern of postprandial hyperglycemia, with the most significant hyperglycemia following supper/bedtime - patient confirms he eats the  "most at night. Often skipping lunch, and typically a light breakfast. Based on review, will change Trulicity to Ozempic to see if this helps the stress eating as well as postprandial hyperglycemia.           Relies on patient assistance for  Farxiga.  Will see if he qualifies for Ozempic patient assistance.    Current diet: 2 meals/day, 1 snack/day  Activity Level: No structured exercise    Lab Results   Component Value Date    HGBA1C 7.5 (H) 06/25/2024    HGBA1C 12.5 (H) 04/29/2024    HGBA1C 13.6 (H) 04/10/2024     STANDARDS OF CARE  Diabetes Management Status    Statin: Taking  ACE/ARB: Taking    Screening or Prevention Patient's value Goal Complete/Controlled?   HgA1C Testing and Control   Lab Results   Component Value Date    HGBA1C 7.5 (H) 06/25/2024      Annually/Less than 8% Yes   Lipid profile : 02/01/2024 Annually Yes   LDL control Lab Results   Component Value Date    LDLCALC 41.8 (L) 02/01/2024    Annually/Less than 100 mg/dl  Yes   Nephropathy screening Lab Results   Component Value Date    LABMICR 26.0 10/30/2023     Lab Results   Component Value Date    PROTEINUA Negative 04/10/2024     No results found for: "UTPCR"   Annually Yes   Blood pressure BP Readings from Last 1 Encounters:   08/13/24 120/73    Less than 140/90 Yes   Dilated retinal exam : 07/11/2024 Annually Yes   Foot exam   : 08/30/2023 Annually Yes      Labs reviewed and are noted below.    Lab Results   Component Value Date    WBC 7.58 07/30/2024    HGB 14.6 07/30/2024    HCT 43.8 07/30/2024     07/30/2024    CHOL 123 02/01/2024    TRIG 181 (H) 02/01/2024    HDL 45 02/01/2024    LDLCALC 41.8 (L) 02/01/2024    ALT 16 08/05/2024    AST 11 08/05/2024     (L) 08/05/2024    K 4.6 08/05/2024     08/05/2024    ANIONGAP 8 08/05/2024    CREATININE 1.2 08/05/2024    ESTGFRAFRICA >60.0 10/14/2021    EGFRNONAA >60.0 10/14/2021    BUN 17 08/05/2024    CO2 26 08/05/2024    TSH 1.437 10/30/2023    PSA 0.25 10/30/2023    INR 0.9 " "10/04/2017     (H) 2024     Lab Results   Component Value Date    TSH 1.437 10/30/2023    IRON 102 2024    TIBC 354 2024    FERRITIN 167 2024    UQAZIFYL19 1226 (H) 2024    CALCIUM 9.6 2024    PHOS 2.7 2024     No results found for: "CPEPTIDE"  No results found for: "GLUTAMICACID"  Glucose   Date Value Ref Range Status   2024 160 (H) 70 - 110 mg/dL Final     Anion Gap   Date Value Ref Range Status   2024 8 8 - 16 mmol/L Final     eGFR if    Date Value Ref Range Status   10/14/2021 >60.0 >60 mL/min/1.73 m^2 Final     eGFR if non    Date Value Ref Range Status   10/14/2021 >60.0 >60 mL/min/1.73 m^2 Final     Comment:     Calculation used to obtain the estimated glomerular filtration  rate (eGFR) is the CKD-EPI equation.        The following portions of the patient's history were reviewed and updated as appropriate: allergies, current medications, past family history, past medical history, past social history, past surgical history, and problem list.    Review of patient's allergies indicates:  No Known Allergies  Social History     Socioeconomic History    Marital status:    Occupational History    Occupation: retired   Tobacco Use    Smoking status: Former     Types: Cigars     Quit date: 10/1/2023     Years since quittin.8    Smokeless tobacco: Never    Tobacco comments:     4 cigars each day   Substance and Sexual Activity    Alcohol use: No    Drug use: No    Sexual activity: Not Currently     Partners: Female     Social Determinants of Health     Financial Resource Strain: Medium Risk (2024)    Overall Financial Resource Strain (CARDIA)     Difficulty of Paying Living Expenses: Somewhat hard   Food Insecurity: No Food Insecurity (2024)    Hunger Vital Sign     Worried About Running Out of Food in the Last Year: Never true     Ran Out of Food in the Last Year: Never true   Transportation Needs: No " Transportation Needs (4/11/2024)    PRAPARE - Transportation     Lack of Transportation (Medical): No     Lack of Transportation (Non-Medical): No   Physical Activity: Unknown (8/7/2024)    Exercise Vital Sign     Days of Exercise per Week: 2 days   Stress: No Stress Concern Present (8/7/2024)    [employer] - Occupational Stress Questionnaire     Feeling of Stress : Only a little   Housing Stability: Unknown (4/11/2024)    Housing Stability Vital Sign     Unable to Pay for Housing in the Last Year: No     Unstable Housing in the Last Year: No     Past Medical History:   Diagnosis Date    Arthritis     Cataract     COPD (chronic obstructive pulmonary disease)     Diabetes mellitus, type 2     Hyperlipidemia     Hypertension     Personal history of colonic polyps 2018     REVIEW OF SYSTEMS:  Eyes No history of DR.  Cardiovascular: History of CAD, HTN and HLD.  GI: Tolerating Trulicity well without GI side effects.   : No CKD.  Neuro: No neuropathy.  PSYCH: No tobacco use.  ENDO: See HPI.        Objective:      Vitals:    08/13/24 0808   BP: 120/73   Pulse: 62     RESPIRATORY: No respiratory distress  NEUROLOGIC: Cranial nerves II-XII grossly intact.   PSYCHIATRIC: Alert & oriented x3. Normal mood and affect.  FOOT EXAMINATION: UTD  Assessment:       1. Uncontrolled type 2 diabetes mellitus with hyperglycemia, with long-term current use of insulin    2. Hypertension associated with diabetes    3. Hyperlipidemia associated with type 2 diabetes mellitus    4. Moderate COPD (chronic obstructive pulmonary disease)    5. Pulmonary nodule    6. Coronary artery disease involving native coronary artery of native heart without angina pectoris    7. Vitamin D deficiency    8. History of pulmonary embolism          Plan:   Stefan was seen today for diabetes mellitus.    Diagnoses and all orders for this visit:    Uncontrolled type 2 diabetes mellitus with hyperglycemia, with long-term current use of  "insulin  -     POCT Glucose, Hand-Held Device  -     semaglutide (OZEMPIC) 1 mg/dose (4 mg/3 mL); Inject 1 mg into the skin every 7 days.  -     semaglutide (OZEMPIC) 2 mg/dose (8 mg/3 mL) PnIj; Inject 2 mg into the skin every 7 days.    Chronic -     Medication Regimen:   Continue Farxiga 10 mg every morning  Finish Trulicity, then start Ozempic 1 mg weekly. The next dose through patient assistance will be for max strength, 2 mg.   Continue Metformin 1000 mg, 1 tablet twice daily with meals  Continue Tresiba 20 units once daily.     Continuous glucose monitoring report:    The patient's CGM was downloaded and was reviewed for the last 14 days. See HPI for interpretation & plan.    Hypertension associated with diabetes    Hyperlipidemia associated with type 2 diabetes mellitus    Moderate COPD (chronic obstructive pulmonary disease)    Pulmonary nodule    Coronary artery disease involving native coronary artery of native heart without angina pectoris    Vitamin D deficiency    History of pulmonary embolism      - Follow up: 3 months    Portions of this note may have been created with voice recognition software. Occasional "wrong-word" or "sound-a-like" substitutions may have occurred due to the inherent limitations of voice recognition software. Please, read the note carefully and recognize, using context, where substitutions have occurred.           DAKSHA Shipley, BC-ADM [facility]  "

## 2024-08-13 NOTE — Clinical Note
Can we get him enrolled for Ozempic 2 mg for patient assistance? He previously qualified for the Trulicity program. Thanks!

## 2024-08-13 NOTE — PATIENT INSTRUCTIONS
Medication Regimen:   Continue Farxiga 10 mg every morning  Finish Trulicity, then start Ozempic 1 mg weekly. The next dose through patient assistance will be for max strength, 2 mg.   Continue Metformin 1000 mg, 1 tablet twice daily with meals  Continue Tresiba 20 units once daily.     Patient Instructions:  Carbohydrate Count: 30-45 grams/meal and 15 grams/snack with limit of 2 snacks per day.  Exercise: Goal is 150 minutes or more per week.  Bring meter and blood sugar log to each appointment.     Goals for Blood Sugar:  Fastin-130 mg/dl  2 hours after meal:  mg/dl  Before Bed: 100-150 mg/dl  If Blood sugar is below 70 mg/dl, DO NOT take diabetes medication. Eat first and then recheck blood sugar in 20 minutes.  Foods to eat if blood sugar is below 70 mg/dl  1/2 glass of orange juice   1/2 regular cola can   3-4 hard candies   3-4 small glucose tabs.   Foods to eat if blood sugar is below 50 mg/dl  1 glass of orange juice  1 regular cola can  8 hard candies  8 small glucose tabs.   If you have repeated eating/blood sugar recheck process 2 times and blood sugar still below 70 mg/dl, call 911.                                                           \

## 2024-08-14 ENCOUNTER — OFFICE VISIT (OUTPATIENT)
Dept: HEMATOLOGY/ONCOLOGY | Facility: CLINIC | Age: 69
End: 2024-08-14
Payer: MEDICARE

## 2024-08-14 DIAGNOSIS — E61.1 IRON DEFICIENCY: ICD-10-CM

## 2024-08-14 DIAGNOSIS — R91.8 MULTIPLE PULMONARY NODULES: ICD-10-CM

## 2024-08-14 DIAGNOSIS — E53.8 B12 DEFICIENCY: ICD-10-CM

## 2024-08-14 DIAGNOSIS — R91.8 MULTIPLE PULMONARY NODULES DETERMINED BY COMPUTED TOMOGRAPHY OF LUNG: Primary | ICD-10-CM

## 2024-08-14 DIAGNOSIS — D12.6 TUBULAR ADENOMA OF COLON: ICD-10-CM

## 2024-08-14 PROCEDURE — 4010F ACE/ARB THERAPY RXD/TAKEN: CPT | Mod: CPTII,95,, | Performed by: NURSE PRACTITIONER

## 2024-08-14 PROCEDURE — 1160F RVW MEDS BY RX/DR IN RCRD: CPT | Mod: CPTII,95,, | Performed by: NURSE PRACTITIONER

## 2024-08-14 PROCEDURE — 99443 PR PHYSICIAN TELEPHONE EVALUATION 21-30 MIN: CPT | Mod: 95,,, | Performed by: NURSE PRACTITIONER

## 2024-08-14 PROCEDURE — 1159F MED LIST DOCD IN RCRD: CPT | Mod: CPTII,95,, | Performed by: NURSE PRACTITIONER

## 2024-08-14 PROCEDURE — 3051F HG A1C>EQUAL 7.0%<8.0%: CPT | Mod: CPTII,95,, | Performed by: NURSE PRACTITIONER

## 2024-08-17 NOTE — TELEPHONE ENCOUNTER
No care due was identified.  [facility] Embedded Care Due Messages. Reference number: [id number].   8/17/2024 8:05:36 AM CDT

## 2024-08-17 NOTE — TELEPHONE ENCOUNTER
Refill Routing Note   Medication(s) are not appropriate for processing by [facility] for the following reason(s):        No active prescription written by provider    [facility] action(s):  Defer               Appointments  past 12m or future 3m with PCP    Date Provider   Last Visit   4/18/2024 Wallace Fisher MD   Next Visit   10/21/2024 Wallace Fisher MD   ED visits in past 90 days: 0        Note composed:8:28 AM 08/17/2024

## 2024-08-19 RX ORDER — PEN NEEDLE, DIABETIC 30 GX3/16"
1 NEEDLE, DISPOSABLE MISCELLANEOUS 2 TIMES DAILY
Qty: 100 EACH | Refills: 0 | Status: SHIPPED | OUTPATIENT
Start: 2024-08-19 | End: 2024-10-10

## 2024-08-20 ENCOUNTER — TUMOR BOARD CONFERENCE (OUTPATIENT)
Dept: PULMONOLOGY | Facility: CLINIC | Age: 69
End: 2024-08-20
Payer: MEDICARE

## 2024-08-20 DIAGNOSIS — R91.1 PULMONARY NODULE: Primary | ICD-10-CM

## 2024-08-20 NOTE — PROGRESS NOTES
[facility]     Patient Name: Stefan Forbes Jr.    MRN: [id number]    Date of Tumor Board: 08/20/2024    Diagnosis:  Multiple Pulmonary Nodules    Referring Provider:  Mazin Wells MD    Present PCTP Providers: Christopher Fonseca MD, Chris Stallings MD, Mazin Wells MD, DAVID White, Elizabeth LeJeune, EUSEBIO, DAVID German    Smoking hx:  Former, 35 pack years    Diagnostic Work Up:    Nodify ID:  Pre-test risk 9%, CDT- No significant level, XL2- likely benign, 1% risk malignancy  Imaging:  PET 8/2024- No FDG avid lesions  LDCT 7/2024- Multiple new nodules, RUL 9mm and 7mm RLL ground glass       Board Recommendations:    CT Chest in one year          The Multidisciplinary Tumor Board (MTB) is intended to assist the treatment team in developing a coordinated, comprehensive management plan for the patient. The deliberations of the MTB are not intended and should not be assumed to indicate any particular course of treatment with regard to the diagnosis, treatment, or management of the patient. Deliberations made or treatment options explored by the MTB are not a substitute for the professional judgment of the treating physician in diagnosing and treating the patient in accordance with the appropriate standard of care, applicable regulations, and facility policies. The MTB shall not be deemed under any circumstance to prescribe, order, or require certain medical care or medical or diagnostic services. The treating physician will remain solely responsible for making all medical, diagnostic, or care decisions concerning the patient.

## 2024-08-20 NOTE — PROGRESS NOTES
Answers submitted by the patient for this visit:  Review of Systems Questionnaire (Submitted on 8/14/2024)  appetite change : No  unexpected weight change: No  mouth sores: No  visual disturbance: No  cough: No  shortness of breath: No  chest pain: No  abdominal pain: No  diarrhea: No  frequency: No  back pain: No  rash: No  headaches: No  adenopathy: No  nervous/ anxious: No  Audio Only Telehealth Visit     The patient location is: home  The chief complaint leading to consultation is: review pet ct results  Visit type: Virtual visit with audio only (telephone)  Total time spent with patient: 25     The reason for the audio only service rather than synchronous audio and video virtual visit was related to technical difficulties or patient preference/necessity.     Each patient to whom I provide medical services by telemedicine is:  (1) informed of the relationship between the physician and patient and the respective role of any other health care provider with respect to management of the patient; and (2) notified that they may decline to receive medical services by telemedicine and may withdraw from such care at any time. Patient verbally consented to receive this service via voice-only telephone call.       HPI:  68 yo male who presents to the hematology clinic as a new patient for further evaluation and recommendation of low iron. Taking B12 supplements daily and ferrous sulfate 325 mg PO bid.  Currently taking Linzess for chronic constipation and has been diagnosed with IBS in the past.     He has been seen in the clinic in 2019 for iron deficiency anemia and treated with IV feraheme .  He had a colonoscopy in 2018 which was no contributory to MEDINA.  Since then he has been lost to follow up.      He has previously been followed by pulmonology for bilateral pulmonary nodules; however has not had a CT chest with contrast since 10/2019.       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  Has had no other incidence since.     Denies any known abnormal bleeding.  C/o extreme acid reflux symptoms taking protonix 40 mg twice daily and pepcid 40 mg PO as needed.       Last EGD and colonoscopy + polyps 6/2023 and was told to f/u in 1 year.  Does have a h/o h. Pylori.       Denies f/c/ns or unintentional weight loss; however has lost 10-15 lbs recently then gained it all back.      Family hx of cancer:  Father: bone cancer?  Paternal grandfather: bone cancer?  Maternal uncle: pancreatic cancer     Former smoker. Quit in October 2023 - smoked cigarettes in the past and cigars - about 40 year about 1 to 1-1/2 ppd.  Denies alcohol or illicit drug use.      Worked as a  for 30 years - retired now.      Interval History:  5/30/2024  States has no complaints.  Anxiety currently due to daughter moving to [place] for job.  Is being followed by GI found with severe constipation and acid reflux.  Currently being treated with Trulance. Currently with stable normocytic anemia - hgb 13 g/dL with slightly low saturated iron 13%.  Currently taking ferrous sulfate 325 mg PO bid.  Denies any known abnormal bleeding.  Had hospitalization due to hyperglycemia - found with bronchial infection, hypotension, and BG in the 600's.  States that he is doing much better now.  EGD colonoscopy 4/2024 - negative for contributory finding of MEDINA.     Interval History:  8/1/2024  Found with recurrent iron deficiency anemia and given feraheme infusions x 2 with last dose received on 6/21/2024.  States that he has lost some weight unintentionally over the past 8 month.  Currently no anemia or iron deficient.      CT chest Impression:     1.  Multiple new pulmonary nodules.  This includes a pleural-based nodule in the posterior right upper lobe measuring 9 mm, and groundglass nodule within the right lower lobe measuring 7 mm, as well as multiple small nodules measuring up to 3 mm.  2.  Negative for  acute process.  3.  Stable findings as noted above.     CATEGORY (ACR Lung-RADS Version 1.0): Category 4b: Findings for which additional diagnostic testing and/or tissue sampling is recommended.  Probability for malignancy is >15%. Recommend PET-CT and/or tissue sampling     Interval History:  2024 States that he is feeling fine.  We discuss results of recent pet done 2024 showing no FDG avidity and no evidence of malignancy.  Recommended continued yearly LDCT chest.      This service was Assessment and plan:  not originating from a related E/M service provided within the previous 7 days nor will  to an E/M service or procedure within the next 24 hours or my soonest available appointment.  Prevailing standard of care was able to be met in this audio-only visit.        Social History     Socioeconomic History    Marital status:    Occupational History    Occupation: retired   Tobacco Use    Smoking status: Former     Types: Cigars     Quit date: 10/1/2023     Years since quittin.8    Smokeless tobacco: Never    Tobacco comments:     4 cigars each day   Substance and Sexual Activity    Alcohol use: No    Drug use: No    Sexual activity: Not Currently     Partners: Female     Social Determinants of Health     Financial Resource Strain: Medium Risk (2024)    Overall Financial Resource Strain (CARDIA)     Difficulty of Paying Living Expenses: Somewhat hard   Food Insecurity: No Food Insecurity (2024)    Hunger Vital Sign     Worried About Running Out of Food in the Last Year: Never true     Ran Out of Food in the Last Year: Never true   Transportation Needs: No Transportation Needs (2024)    PRAPARE - Transportation     Lack of Transportation (Medical): No     Lack of Transportation (Non-Medical): No   Physical Activity: Unknown (2024)    Exercise Vital Sign     Days of Exercise per Week: 2 days   Stress: No Stress Concern Present (2024)    [employer] [place] [employer] - Occupational Stress Questionnaire     Feeling of Stress : Only a little   Housing Stability: Unknown (4/11/2024)    Housing Stability Vital Sign     Unable to Pay for Housing in the Last Year: No     Unstable Housing in the Last Year: No       Family History   Problem Relation Name Age of Onset    Diabetes Mother Mom     Arthritis Mother Mom     Cancer Father Dad        Past Surgical History:   Procedure Laterality Date    ANGIOPLASTY      JOINT REPLACEMENT  2017    TOTAL KNEE ARTHROPLASTY         Past Medical History:   Diagnosis Date    Arthritis     Cataract     COPD (chronic obstructive pulmonary disease)     Diabetes mellitus, type 2     Hyperlipidemia     Hypertension     Personal history of colonic polyps 2018       Review of Systems   Constitutional:  Negative for appetite change and unexpected weight change.   HENT:  Negative for mouth sores.    Eyes:  Negative for visual disturbance.   Respiratory:  Negative for cough and shortness of breath.    Cardiovascular:  Negative for chest pain.   Gastrointestinal:  Negative for abdominal pain and diarrhea.   Endocrine: Negative.    Genitourinary:  Negative for frequency.   Musculoskeletal:  Negative for back pain.   Skin:  Negative for rash.   Allergic/Immunologic: Negative.    Neurological:  Negative for headaches.   Hematological:  Negative for adenopathy.   Psychiatric/Behavioral:  The patient is not nervous/anxious.           Medication List with Changes/Refills   Current Medications    ACETAMINOPHEN (TYLENOL) 500 MG TABLET    Take 1,000 mg by mouth as needed.     ALBUTEROL (VENTOLIN HFA) 90 MCG/ACTUATION INHALER    Inhale 2 puffs into the lungs every 6 (six) hours as needed for Wheezing or Shortness of Breath. Rescue    ASCORBIC ACID, VITAMIN C, (VITAMIN C) 1000 MG TABLET    Take 1,000 mg by mouth once daily.    ASPIRIN 325 MG TABLET    Take 325 mg by mouth once daily.    BLOOD SUGAR DIAGNOSTIC (BLOOD GLUCOSE TEST) STRP    1 strip by Misc.(Non-Drug;  Combo Route) route 3 (three) times daily.    BLOOD-GLUCOSE METER,CONTINUOUS (FREESTYLE JASMINE 3 READER) [facility]    Use as directed.    BLOOD-GLUCOSE SENSOR (FREESTYLE JASMINE 3 SENSOR) [facility]    Change sensor every 14 days    BUPROPION (WELLBUTRIN SR) 150 MG TBSR 12 HR TABLET    Take 1 tablet (150 mg total) by mouth 2 (two) times daily.    CETIRIZINE (ZYRTEC) 10 MG TABLET    Take 10 mg by mouth as needed for Allergies.    CHOLECALCIFEROL, VITAMIN D3, (VITAMIN D3) 25 MCG (1,000 UNIT) CAPSULE    Take 1,000 Units by mouth once daily.    CYANOCOBALAMIN (VITAMIN B-12) 500 MCG TABLET    Take 500 mcg by mouth once daily.     DAPAGLIFLOZIN PROPANEDIOL (FARXIGA) 10 MG TABLET    Take 1 tablet (10 mg total) by mouth once daily.    FAMOTIDINE (PEPCID) 40 MG TABLET    Take 1 tablet by mouth once a day    FLUTICASONE PROPIONATE (FLONASE) 50 MCG/ACTUATION NASAL SPRAY    Use 1 spray (50 mcg total) in each nostril once daily.    FLUTICASONE-UMECLIDIN-VILANTER (TRELEGY ELLIPTA) 100-62.5-25 MCG DSDV    Inhale 1 puff into the lungs once daily.    INSULIN DEGLUDEC (TRESIBA FLEXTOUCH U-200) 200 UNIT/ML (3 ML) INSULIN PEN    Inject 18 Units into the skin once daily. Increase by 2 units every 2 days until fasting sugar is below 130. Stop at 50 units.    IPRATROPIUM (ATROVENT) 21 MCG (0.03 %) NASAL SPRAY    Spray 1 or 2 sprays in each nasal passage every 8 hours, if needed, as directed.    LANCETS 30 GAUGE MISC    1 lancet by Misc.(Non-Drug; Combo Route) route 3 (three) times daily.    MAGNESIUM OXIDE (MAG-OX) 400 MG (241.3 MG MAGNESIUM) TABLET    Take 1 tablet (400 mg total) by mouth once daily.    METFORMIN (GLUCOPHAGE) 1000 MG TABLET    Take 1 tablet (1,000 mg total) by mouth 2 (two) times daily with meals.    NITROGLYCERIN (NITROSTAT) 0.4 MG SL TABLET    Place 1 tablet under the tongue every 5 (five) minutes as needed for Chest pain.    OLMESARTAN (BENICAR) 40 MG TABLET    Take 1 tablet (40 mg total) by mouth once daily.    PANTOPRAZOLE  (PROTONIX) 40 MG TABLET    Take 1 tablet by mouth twice a day before breakfast and dinner    PLECANATIDE (TRULANCE) 3 MG TAB    Take 1 tablet by mouth once a day    PLECANATIDE (TRULANCE) 3 MG TAB    Take 1 tablet by mouth once a day    PRAMIPEXOLE (MIRAPEX) 0.125 MG TABLET    TAKE 2 TABLETS BY MOUTH EVERY EVENING    PREDNISOLONE ACETATE (PRED FORTE) 1 % DRPS    Place 1 drop into the right eye 4 (four) times daily.    PULSE OXIMETER (PULSE OXIMETER) DEVICE    Use twice daily at 8 AM and 3 PM and record the value in [facility] as directed.    ROSUVASTATIN (CRESTOR) 20 MG TABLET    Take 1 tablet (20 mg total) by mouth once daily.    SEMAGLUTIDE (OZEMPIC) 1 MG/DOSE (4 MG/3 ML)    Inject 1 mg into the skin every 7 days.    SEMAGLUTIDE (OZEMPIC) 2 MG/DOSE (8 MG/3 ML) PNIJ    Inject 2 mg into the skin every 7 days.    TAMSULOSIN (FLOMAX) 0.4 MG CAP    Take 2 capsules (0.8 mg total) by mouth once daily.        Objective:   There were no vitals filed for this visit.    Physical Exam    Assessment:     Problem List Items Addressed This Visit    None      Lab Results   Component Value Date    WBC 7.58 07/30/2024    RBC 4.71 07/30/2024    HGB 14.6 07/30/2024    HCT 43.8 07/30/2024    MCV 93 07/30/2024    MCH 31.0 07/30/2024    MCHC 33.3 07/30/2024    RDW 12.9 07/30/2024     07/30/2024    MPV 9.3 07/30/2024    GRAN 4.8 07/30/2024    GRAN 62.8 07/30/2024    LYMPH 1.8 07/30/2024    LYMPH 23.4 07/30/2024    MONO 0.8 07/30/2024    MONO 9.9 07/30/2024    EOS 0.2 07/30/2024    BASO 0.05 07/30/2024    EOSINOPHIL 2.8 07/30/2024    BASOPHIL 0.7 07/30/2024      Lab Results   Component Value Date     (L) 08/05/2024    K 4.6 08/05/2024     08/05/2024    CO2 26 08/05/2024    BUN 17 08/05/2024    CREATININE 1.2 08/05/2024    CALCIUM 9.6 08/05/2024    ANIONGAP 8 08/05/2024    ESTGFRAFRICA >60.0 10/14/2021    EGFRNONAA >60.0 10/14/2021     Lab Results   Component Value Date    ALT 16 08/05/2024    AST 11 08/05/2024    ALKPHOS  59 08/05/2024    BILITOT 0.2 08/05/2024     Lab Results   Component Value Date    IRON 102 07/30/2024    TRANSFERRIN 239 07/30/2024    TIBC 354 07/30/2024    FESATURATED 29 07/30/2024    FERRITIN 167 07/30/2024        Plan:   There are no diagnoses linked to this encounter.  No care team member to display    Med Onc Chart Routing      Follow up with physician    Follow up with [facility]I . F/u end of November with labs prior - VV   Infusion scheduling note   n/a   Injection scheduling note n/a   Labs   Scheduling:  Preferred lab: [facility]  Lab interval:  cbc, cmp, iron studies   Imaging   N/a   Pharmacy appointment No pharmacy appointment needed      Other referrals       No additional referrals needed  n/a         Assessment and plan:  Recommend annual low-dose chest CT screening.    Total time spent on encounter: 35 minutes       This service was not originating from a related E/M service provided within the previous 7 days nor will  to an E/M service or procedure within the next 24 hours or my soonest available appointment.  Prevailing standard of care was able to be met in this audio-only visit.

## 2024-08-27 ENCOUNTER — DOCUMENTATION ONLY (OUTPATIENT)
Dept: SLEEP MEDICINE | Facility: CLINIC | Age: 69
End: 2024-08-27
Payer: MEDICARE

## 2024-08-27 NOTE — PROGRESS NOTES
Test Result Report  PATIENT INFORMATION PHYSICIAN INFORMATION  Patient:  Stefan Forbes  MRN (if provided):  [id number]  Physician:  Mazin Wells  YOB: 1955  Gender:  Male  Facility:  [facility]  Specimen Type:  Whole Blood  Date of Collection:  Aug 08, 2024  Address:  [address] [place] [address], [address]  Nodify XL2 Accession No:  [id number]  Date Performed:  Aug 13, 2024  Country:  [facility]  Date Received:  Aug 09, 2024  Date Reported:  Aug 13, 2024  Phone:  [phone]  Fax:  [phone]  PRE-TEST INFORMATION TEST RESULT  SOLITARY PULMONARY NODULE CALCULATOR1  PRE-TEST RISK OF MALIGNANCY  9%  risk of malignancy  LIKELY BENIGN  POST-NODIFY XL2 RISK OF MALIGNANCY  1%  risk of malignancy  Nodule Size (mm):  9.0  Nodule Location:  Other  Spiculation:  No  Smoking History:  Current or Former  Age (years):  69  History of Cancer:  No History of Cancer  INTERPRETATION OF RESULTS  Patients with a Likely Benign Nodify XL2® test result have a high      PATIENT INFORMATION PHYSICIAN INFORMATION  Patient:  Stefan Forbes  MRN (if provided):  [id number]  Physician:  Mazin Wells  YOB: 1955  Gender:  Male  Facility:  [facility]  Specimen Type:  Whole Blood  Date of Collection:  Aug 08, 2024  Address:  [address] [place] [address], [address]  Nodify CDT Accession No:  [id number]  Date Performed:  Aug 09, 2024  Country:  [place]  Date Received:  Aug 09, 2024  Date Reported:  Aug 09, 2024  Phone:  [phone]  Fax:  [phone]  PRE-TEST INFORMATION TEST RESULT  SOLITARY PULMONARY NODULE CALCULATOR1  PRE-TEST RISK OF MALIGNANCY  9%  risk of malignancy  NO SIGNIFICANT LEVEL OF AUTOANTIBODIES DETECTED  POST-NODIFY CDT RISK OF MALIGNANCY  Refer to  pre-test risk  of malignancy  Nodule Size (mm):  9.0  Nodule Location:  Other  Spiculation:  No  Smoking History:  Current or Former  Age  (years):  69  History of Cancer:  No History of Cancer  INTERPRETATION OF RESULTS  Patients with No Significant Level of Autoa

## 2024-09-01 DIAGNOSIS — J31.0 CHRONIC RHINITIS: ICD-10-CM

## 2024-09-03 RX ORDER — IPRATROPIUM BROMIDE 21 UG/1
SPRAY, METERED NASAL
Qty: 30 ML | Refills: 12 | OUTPATIENT
Start: 2024-09-03

## 2024-09-04 DIAGNOSIS — J31.0 CHRONIC RHINITIS: ICD-10-CM

## 2024-09-05 ENCOUNTER — CLINICAL SUPPORT (OUTPATIENT)
Dept: SMOKING CESSATION | Facility: CLINIC | Age: 69
End: 2024-09-05
Payer: COMMERCIAL

## 2024-09-05 DIAGNOSIS — F17.200 NICOTINE DEPENDENCE, UNCOMPLICATED: Primary | ICD-10-CM

## 2024-09-05 DIAGNOSIS — J31.0 CHRONIC RHINITIS: ICD-10-CM

## 2024-09-05 PROCEDURE — 99407 BEHAV CHNG SMOKING > 10 MIN: CPT | Mod: S$GLB,,, | Performed by: GENERAL PRACTICE

## 2024-09-05 RX ORDER — IPRATROPIUM BROMIDE 21 UG/1
SPRAY, METERED NASAL
Qty: 30 ML | Refills: 12 | OUTPATIENT
Start: 2024-09-05

## 2024-09-05 NOTE — PROGRESS NOTES
Spoke with patient today in regard to smoking cessation progress for 12 month follow up. He states he is tobacco free. Congratulated patient on their success to remain tobacco free. Patient is still enrolled into the program. Informed patient of benefit period, future follow ups and contact information if any further help is needed. Will complete smart form for 12 month follow up for Quit #2.

## 2024-09-06 RX ORDER — IPRATROPIUM BROMIDE 21 UG/1
SPRAY, METERED NASAL
Qty: 30 ML | Refills: 12 | OUTPATIENT
Start: 2024-09-06

## 2024-09-10 ENCOUNTER — TELEPHONE (OUTPATIENT)
Dept: SMOKING CESSATION | Facility: CLINIC | Age: 69
End: 2024-09-10
Payer: MEDICARE

## 2024-09-10 NOTE — TELEPHONE ENCOUNTER
Patient returned call to CTTS regarding reschedule. Informed patient of benefits expiring on 9/19/2024. Patient states he is doing well as a non smoker & is not on any NRT & feels he is able to graduate from the program. Informed patient to call CTTS at any time.
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
## 2024-09-11 DIAGNOSIS — J31.0 CHRONIC RHINITIS: ICD-10-CM

## 2024-09-12 RX ORDER — IPRATROPIUM BROMIDE 21 UG/1
SPRAY, METERED NASAL
Qty: 30 ML | Refills: 12 | OUTPATIENT
Start: 2024-09-12

## 2024-09-13 DIAGNOSIS — J31.0 CHRONIC RHINITIS: ICD-10-CM

## 2024-09-13 RX ORDER — IPRATROPIUM BROMIDE 21 UG/1
SPRAY, METERED NASAL
Qty: 30 ML | Refills: 12 | OUTPATIENT
Start: 2024-09-13

## 2024-09-16 DIAGNOSIS — J31.0 CHRONIC RHINITIS: ICD-10-CM

## 2024-09-16 RX ORDER — IPRATROPIUM BROMIDE 21 UG/1
SPRAY, METERED NASAL
Qty: 30 ML | Refills: 12 | OUTPATIENT
Start: 2024-09-16

## 2024-09-17 ENCOUNTER — TELEPHONE (OUTPATIENT)
Dept: PHARMACY | Facility: CLINIC | Age: 69
End: 2024-09-17
Payer: MEDICARE

## 2024-09-18 ENCOUNTER — TELEPHONE (OUTPATIENT)
Dept: PULMONOLOGY | Facility: CLINIC | Age: 69
End: 2024-09-18
Payer: MEDICARE

## 2024-09-18 ENCOUNTER — E-VISIT (OUTPATIENT)
Dept: PULMONOLOGY | Facility: CLINIC | Age: 69
End: 2024-09-18
Payer: MEDICARE

## 2024-09-18 DIAGNOSIS — J06.9 URI WITH COUGH AND CONGESTION: Primary | ICD-10-CM

## 2024-09-18 RX ORDER — METHYLPREDNISOLONE 4 MG/1
TABLET ORAL
Qty: 21 TABLET | Refills: 0 | Status: SHIPPED | OUTPATIENT
Start: 2024-09-18

## 2024-09-18 RX ORDER — AZITHROMYCIN 250 MG/1
TABLET, FILM COATED ORAL
Qty: 6 TABLET | Refills: 0 | Status: SHIPPED | OUTPATIENT
Start: 2024-09-18 | End: 2024-09-23

## 2024-09-18 NOTE — TELEPHONE ENCOUNTER
We are pleased to inform you Stefan Forbes Jr. has been approved in the [facility] Patient Assistance Program.  [facility] has shared this information with your patient.   Approval Details  Eligibility start Date: 09/12/2024  Eligibility end date: 12/31/2024 (Updated proof of eligibility required to re-enroll for 2025 calendar year).  Approved Medication: Trulance  Day supply:90  Allotted Refills: 0 refill(s) remain (Please be advised Program allows only 3 refills per eligibility period regardless of changes in strength).   Estimated Shipping: 10-14 business days or longer depending on availability and/or projected refill date  Shipping Location: Patient's Home-  Patient will receive further communication from Program representative before order ships.                                             Important Note  It is imperative that any changes to strength or discontinuation of this medication be referred to the Pharmacy Patient Assistance Team to prevent gaps in therapy.  Patient is responsible for contacting program to order refill       Deo Faustin  Pharmacy Patient Assistance Team

## 2024-09-18 NOTE — PROGRESS NOTES
Patient ID: Stefan Forbes Jr. is a 69 y.o. male.    Chief Complaint: URI (Entered automatically based on patient selection in Effective Measure.)    The patient initiated a request through Effective Measure on 9/18/2024 for evaluation and management with a chief complaint of URI (Entered automatically based on patient selection in Effective Measure.)     I evaluated the questionnaire submission on 09/18/2024 .    [facility] Peq E-Visit Covid    9/18/2024  9:52 AM CDT - Filed by Patient   Do you agree to participate in an E-Visit? Yes   If you have any of the following symptoms, go to your local emergency room or call 911: I acknowledge   Choose the state of your primary residence [place]   What is the main issue you would like addressed today? Started coughing up yellow phlegm this morning also, I have a little bit of sinus drainage   Do you think you might have COVID or the Flu? No   Have you tested positive for COVID or Flu? No   What symptoms do you currently have?  Cough;  Nasal Congestion   Describe your cough: Productive (containing mucus)   Describe the mucus: Yellow;  Thick   Have you ever smoked? I smoked in the past   Have you had a fever? No   When did your symptoms first appear? 9/18/2024   In the last two weeks, have you been in close contact with someone who has COVID-19 or the Flu? No   List what you have done or taken to help your symptoms. Just my prescribed nose spray I think its called Atrovent   How severe are your symptoms? Mild   Have your symptoms gotten better or worse since they started?  No change   Do you have transportation to get testing if it is needed and ordered for you at an [facility] location? Yes   Provide any additional information you feel is important. I just woke up this morning and was coughing up yellow phlegm, and my sinuses were dripping a little bit. The taste was viviana like possibly infectious was really thick.   Please attach any relevant images or files    Are you able to take your vital signs? No          Encounter Diagnosis   Name Primary?    URI with cough and congestion Yes        No orders of the defined types were placed in this encounter.     Medications Ordered This Encounter   Medications    azithromycin (Z-CHARLES) 250 MG tablet     Sig: Take 2 tablets by mouth on day 1; Take 1 tablet by mouth on days 2-5     Dispense:  6 tablet     Refill:  0    methylPREDNISolone (MEDROL DOSEPACK) 4 mg tablet     Sig: use as directed     Dispense:  21 tablet     Refill:  0        No follow-ups on file.      E-Visit Time Tracking:    Day 1 Time (in minutes): 10    Total Time (in minutes): 10

## 2024-09-18 NOTE — TELEPHONE ENCOUNTER
Chronic Disease Management:  Patient called in to PDM to report a change in respiratory symptoms. Patient reports a change in sputum consistency and color. Patient is coughing up yellow sputum. Patient denies other associated symptoms.    E-visit assigned to patient. Understanding was stated.

## 2024-09-19 ENCOUNTER — PATIENT MESSAGE (OUTPATIENT)
Dept: INTERNAL MEDICINE | Facility: CLINIC | Age: 69
End: 2024-09-19
Payer: MEDICARE

## 2024-09-19 DIAGNOSIS — J31.0 CHRONIC RHINITIS: ICD-10-CM

## 2024-09-19 RX ORDER — IPRATROPIUM BROMIDE 21 UG/1
SPRAY, METERED NASAL
Qty: 30 ML | Refills: 12 | Status: SHIPPED | OUTPATIENT
Start: 2024-09-19

## 2024-09-19 NOTE — TELEPHONE ENCOUNTER
No care due was identified.  [facility] Embedded Care Due Messages. Reference number: [id number].   9/19/2024 3:01:15 PM CDT

## 2024-09-30 ENCOUNTER — TELEPHONE (OUTPATIENT)
Dept: DIABETES | Facility: CLINIC | Age: 69
End: 2024-09-30

## 2024-09-30 ENCOUNTER — CLINICAL SUPPORT (OUTPATIENT)
Dept: DIABETES | Facility: CLINIC | Age: 69
End: 2024-09-30
Payer: MEDICARE

## 2024-09-30 ENCOUNTER — TELEPHONE (OUTPATIENT)
Dept: DIABETES | Facility: CLINIC | Age: 69
End: 2024-09-30
Payer: MEDICARE

## 2024-09-30 VITALS — BODY MASS INDEX: 32.98 KG/M2 | WEIGHT: 216.94 LBS

## 2024-09-30 DIAGNOSIS — Z79.4 UNCONTROLLED TYPE 2 DIABETES MELLITUS WITH HYPERGLYCEMIA, WITH LONG-TERM CURRENT USE OF INSULIN: ICD-10-CM

## 2024-09-30 DIAGNOSIS — E11.65 UNCONTROLLED TYPE 2 DIABETES MELLITUS WITH HYPERGLYCEMIA, WITH LONG-TERM CURRENT USE OF INSULIN: Primary | ICD-10-CM

## 2024-09-30 DIAGNOSIS — E11.65 UNCONTROLLED TYPE 2 DIABETES MELLITUS WITH HYPERGLYCEMIA, WITH LONG-TERM CURRENT USE OF INSULIN: ICD-10-CM

## 2024-09-30 DIAGNOSIS — Z79.4 UNCONTROLLED TYPE 2 DIABETES MELLITUS WITH HYPERGLYCEMIA, WITH LONG-TERM CURRENT USE OF INSULIN: Primary | ICD-10-CM

## 2024-09-30 PROCEDURE — 99999 PR PBB SHADOW E&M-EST. PATIENT-LVL IV: CPT | Mod: PBBFAC,,,

## 2024-09-30 PROCEDURE — G0108 DIAB MANAGE TRN  PER INDIV: HCPCS | Mod: S$GLB,,, | Performed by: PEDIATRICS

## 2024-09-30 RX ORDER — PEN NEEDLE, DIABETIC 30 GX3/16"
NEEDLE, DISPOSABLE MISCELLANEOUS
Qty: 200 EACH | Refills: 5 | Status: SHIPPED | OUTPATIENT
Start: 2024-09-30

## 2024-09-30 RX ORDER — INSULIN ASPART 100 [IU]/ML
5 INJECTION, SOLUTION INTRAVENOUS; SUBCUTANEOUS
Qty: 15 ML | Refills: 5 | Status: SHIPPED | OUTPATIENT
Start: 2024-09-30

## 2024-09-30 NOTE — Clinical Note
Hi, I met with Mr. oFrbes today. TIR significantly decreased to 21%. Not taking Trulicity or Ozempic. May need an increase in Tresiba and addition of SS insulin. Griselda report in media for reference. Let me know what you would like to do, and I will contact him. Thanks, Karen
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
## 2024-09-30 NOTE — PROGRESS NOTES
Diabetes Care Specialist Progress Note  Author: Karen Sandra RN  Date: 9/30/2024    Intake    Program Intake  Reason for Diabetes Program Visit:: Initial Diabetes Assessment  Current diabetes risk level:: moderate  In the last 12 months, have you:: none  Permission to speak with others about care:: no    Current Diabetes Treatment: Oral Medications, DM Injectables, Insulin  Oral Medication Type/Dose: Farxiga 10 mg daily. Metformin 1,000 mg BID.  DM Injectables Type/Dose: Ozempic 1 mg weekly (not taking; working on pharm assistance).  Method of insulin delivery?: Injections  Injection Type: Pens  Pen Type/Dose: Tresiba 20 units in the evening.    Continuous Glucose Monitoring  Patient has CGM: Yes  Personal CGM type:: Freestyle Griselda 3  GMI Date: 09/30/24  GMI Value: 9.2 %        Lab Results   Component Value Date    HGBA1C 7.5 (H) 06/25/2024       Weight: 98.4 kg (216 lb 14.9 oz)   Body mass index is 32.98 kg/m².    Lifestyle Coping Support & Clinical    Lifestyle/Coping/Support  Compared to other people your age, how would you rate your health?: Fair  Does anyone in your family have diabetes or does anyone in your family support you in your diabetes care?: Wife is supportive.  List anything about Diabetes that causes you stress?: When on prednisone, worried about how high blood sugar would go.  How do you deal with stress/distress?: Avoiding starches.  Learning Barriers:: Visual (Wear glasses.)  Culture or Congregational beliefs that may impact ability to access healthcare: No  Psychosocial/Coping Skills Assessment Completed: : Yes  Assessment indicates:: Knowledge deficit, Instruction Needed  Area of need?: Yes    Problem Review  Active Comorbidities: Heart Attack, Cardiovascular Disease, Hypertension, Other (comment), Hyperlipidemia/Dyslipidemia    Diabetes Self-Management Skills Assessment    Medication Skills Assessment  Patient is able to identify current diabetes medications, dosages, and appropriate timing  of medications.: yes  Patient reports problems or concerns with current medication regimen.: yes  Medication regimen problems/concerns:: financial concerns  Pharmacy assistance referral placed?: Yes  Patient is  aware that some diabetes medications can cause low blood sugar?: Yes  Medication Skills Assessment Completed:: Yes  Assessment indicates:: Knowledge deficit, Instruction Needed  Area of need?: Yes    Diabetes Disease Process/Treatment Options  Diabetes Type?: Type II  If previous diabetes education, when/where:: [facility] 2024.  What are your goals for this education session?: Review blood sugars and healthy eating.  Is patient aware of what causes diabetes?: Yes  Does patient understand the pathophysiology of diabetes?: Yes  Diabetes Disease Process/Treatment Options: Skills Assessment Completed: Yes  Assessment indicates:: Adequate understanding  Area of need?: No    Nutrition/Healthy Eating  Meal Plan 24 Hour Recall - Breakfast: [employer]: Sausage, eggs, and cheese sandwich; Coffee with extra shot and steamed half and half.  Meal Plan 24 Hour Recall - Lunch: None.  Meal Plan 24 Hour Recall - Dinner: Hot roast chicken poboy with salad; Coffee  Meal Plan 24 Hour Recall - Snack: None.  Meal Plan 24 Hour Recall - Beverage: Coffee.  Who shops/cooks?: Him and wife ~50/50  Patient can identify foods that impact blood sugar.: yes (Starches.)  Challenges to healthy eating:: eating when feeling depressed/stressed, eating out, going to parties, lack of will power, portion control, snacking between meals and at night  Nutrition/Healthy Eating Skills Assessment Completed:: Yes  Assessment indicates:: Knowledge deficit, Instruction Needed  Area of need?: Yes    Physical Activity/Exercise  Patient's daily activity level:: moderately active  Patient formally exercises outside of work.: no  Reasons for not exercising:: other (see comments) (Stays active with household activities.)  Patient can identify forms of physical  activity.: yes  Physical Activity/Exercise Skills Assessment Completed: : Yes  Assessment indicates:: Knowledge deficit, Instruction Needed  Area of need?: Yes    Home Blood Glucose Monitoring  Patient states that blood sugar is checked at home daily.: yes  Monitoring Method:: personal continuous glucose monitor  Personal CGM type:: Freestyle Griselda 3   What is your current Time in Range?: 21%  What is your A1c Target?: <7%  Home Blood Glucose Monitoring Skills Assessment Completed: : Yes  Assessment indicates:: Knowledge deficit, Instruction Needed  Area of need?: Yes    Acute Complications  Have you ever had hypoglycemia (low BG 70 or less)?: no  Do you know the symptoms of low blood sugar and how to treat?: Does not usually have lows so does not know symptoms. Had 1 episode of a false low and treated with 20 oz Sprite.  Have you ever had hyperglycemia (high  or more)?: yes  How often and what are your symptoms?: Daily. Does not have any symptoms.  How do you treat hyperglycemia? : Does not usually treat because already in bed.  Have you ever had DKA?: no  Do you ever test for ketones?: no  Do you have a sick day plan?: no  Acute Complications Skills Assessment Completed: : Yes  Assessment indicates:: Knowledge deficit, Instruction Needed  Area of need?: Yes    Chronic Complications  Chronic Complications Skills Assessment Completed: : No  Deferred due to:: Time  Area of need?: Deferred      Assessment Summary and Plan    Based on today's diabetes care assessment, the following areas of need were identified:          9/30/2024     9:26 AM   Areas of Need   Lifestyle and Support Mr. Forbes has a lot of stress in his life due to issues with stepson. Educated that stress alone can raise blood sugar. He is also stress eating; he mentioned talking to a counselor.      Diabetes Disease Process/Treatment Options No.     Medications Yes-Discussed MOA, onset, side effects, dosage of Farxiga, Ozempic, Metformin, and  Tresiba.   Currently not taking Ozempic due to affordability and medication being on back order.   Would benefit from an increase in Tresiba and possibly SS insulin.   Educated on the difference between fast acting and long acting insulin.      Monitoring Yes-TIR is at 21%. May need medication adjustment such as increase in Tresiba and SS insulin.      Healthy Eating Yes-See care plan.      Physical Activity  Yes-See care plan.      Acute Complications  Yes-Reviewed blood glucose goals, prevention, detection, signs and symptoms, and treatment of hypoglycemia and hyperglycemia, and when to contact the clinic.  Provided with the 15-15 Rule handout.      Chronic Complications  Deferred.          Today's interventions were provided through individual discussion, instruction, and written materials were provided.      Patient verbalized understanding of instruction and written materials.  Pt was able to return back demonstration of instructions today. Patient understood key points, needs reinforcement and further instruction.     Diabetes Self-Management Care Plan:    Today's Diabetes Self-Management Care Plan was developed with Stefan's input. Stefan has agreed to work toward the following goal(s) to improve his/her overall diabetes control.      Care Plan: Diabetes Management   Updates made since 8/31/2024 12:00 AM        Problem: Healthy Eating         Goal: Eat 3 meals daily with 45-60 grams of carbs per meal and 0-15 grams of carbs per snack.    Start Date: 9/30/2024   Expected End Date: 9/30/2025   Priority: High   Barriers: Lack of Motivation to Change   Note:    Stress eating is one of his biggest barriers to healthy eating.  Re-educated on the importance of balancing carbs with protein and/or healthy fat.   Educated on protein and fat help him feel saldaña so he is less likely to overeat the carbs.   Educated on the importance of consistency including eating breakfast, lunch, and dinner with snacks in between if  needed.   Educated that if he eats lunch he may be less likely to over eat at dinner and in the evening.   Provided with 25 healthy snack ideas.        Task: Reviewed the sources and role of Carbohydrate, Protein, and Fat and how each nutrient impacts blood sugar. Completed 9/30/2024        Task: Provided visual examples using dry measuring cups, food models, and other familiar objects such as computer mouse, deck or cards, tennis ball etc. to help with visualization of portions.         Task: Explained how to count carbohydrates using the food label and the use of dry measuring cups for accurate carb counting. Completed 9/30/2024        Task: Discussed strategies for choosing healthier menu options when dining out.         Task: Review the importance of balancing carbohydrates with each meal using portion control techniques to count servings of carbohydrate and label reading to identify serving size and amount of total carbs per serving. Completed 9/30/2024        Task: Provided Sample plate method and reviewed the use of the plate to estimate amounts of carbohydrate per meal. Completed 9/30/2024        Problem: Physical Activity and Exercise         Goal: Patient agrees to increase physical activity to a goal of 1-4x/week for 30-60 minutes.    Start Date: 9/30/2024   Expected End Date: 9/30/2025   Priority: Medium   Barriers: No Barriers Identified   Note:    Most active in the AM.   Plans to go to the gym before work.        Task: Discussed role of physical activity on reducing insulin resistance and improvement in overall glycemic control. Completed 9/30/2024        Task: Discussed role of physical activity as it relates to weight loss Completed 9/30/2024        Task: Offered suggestions on how patient could increase their regular physical activity Completed 9/30/2024        Task: Reviewed blood glucose monitoring before, during and after exercise/activity Completed 9/30/2024          Follow Up Plan     Follow  up in about 1 month (around 10/30/2024) for review of healthy eating and completion of assessment.    Today's care plan and follow up schedule was discussed with patient.  Stefan verbalized understanding of the care plan, goals, and agrees to follow up plan.        The patient was encouraged to communicate with his/her health care provider/physician and care team regarding his/her condition(s) and treatment.  I provided the patient with my contact information today and encouraged to contact me via phone or [facility] Patient Portal as needed.     Length of Visit   Total Time: 60 Minutes

## 2024-09-30 NOTE — TELEPHONE ENCOUNTER
"Sending Novolog to check coverage to the [place] to be started at 5 units TID AC meals that contain carbs. I added "plus sliding scale if needed" as likely will be necessary in the future. For now, have him initiate at 5 units and keep me updated. Also, if he re-starts Ozempic needs to let me know as we may need to discuss adjustment of doses.    "

## 2024-10-02 ENCOUNTER — TELEPHONE (OUTPATIENT)
Dept: OPHTHALMOLOGY | Facility: CLINIC | Age: 69
End: 2024-10-02
Payer: MEDICARE

## 2024-10-02 ENCOUNTER — OUTSIDE PLACE OF SERVICE (OUTPATIENT)
Dept: OPHTHALMOLOGY | Facility: CLINIC | Age: 69
End: 2024-10-02
Payer: MEDICARE

## 2024-10-02 PROCEDURE — 66984 XCAPSL CTRC RMVL W/O ECP: CPT | Mod: RT,,, | Performed by: STUDENT IN AN ORGANIZED HEALTH CARE EDUCATION/TRAINING PROGRAM

## 2024-10-02 NOTE — TELEPHONE ENCOUNTER
Spoke with patient and advised him he WILL need glasses at distance and near due to the amount of astigmatism, patient understands and wishes to proceed with standard IOL

## 2024-10-03 ENCOUNTER — OFFICE VISIT (OUTPATIENT)
Dept: OPHTHALMOLOGY | Facility: CLINIC | Age: 69
End: 2024-10-03
Payer: MEDICARE

## 2024-10-03 DIAGNOSIS — Z98.41 CATARACT EXTRACTION STATUS OF EYE, RIGHT: ICD-10-CM

## 2024-10-03 DIAGNOSIS — Z98.890 POST-OPERATIVE STATE: Primary | ICD-10-CM

## 2024-10-03 PROCEDURE — 3051F HG A1C>EQUAL 7.0%<8.0%: CPT | Mod: CPTII,S$GLB,, | Performed by: STUDENT IN AN ORGANIZED HEALTH CARE EDUCATION/TRAINING PROGRAM

## 2024-10-03 PROCEDURE — 99999 PR PBB SHADOW E&M-EST. PATIENT-LVL II: CPT | Mod: PBBFAC,,, | Performed by: STUDENT IN AN ORGANIZED HEALTH CARE EDUCATION/TRAINING PROGRAM

## 2024-10-03 PROCEDURE — 4010F ACE/ARB THERAPY RXD/TAKEN: CPT | Mod: CPTII,S$GLB,, | Performed by: STUDENT IN AN ORGANIZED HEALTH CARE EDUCATION/TRAINING PROGRAM

## 2024-10-03 PROCEDURE — 99024 POSTOP FOLLOW-UP VISIT: CPT | Mod: S$GLB,,, | Performed by: STUDENT IN AN ORGANIZED HEALTH CARE EDUCATION/TRAINING PROGRAM

## 2024-10-03 PROCEDURE — 1159F MED LIST DOCD IN RCRD: CPT | Mod: CPTII,S$GLB,, | Performed by: STUDENT IN AN ORGANIZED HEALTH CARE EDUCATION/TRAINING PROGRAM

## 2024-10-03 PROCEDURE — 1160F RVW MEDS BY RX/DR IN RCRD: CPT | Mod: CPTII,S$GLB,, | Performed by: STUDENT IN AN ORGANIZED HEALTH CARE EDUCATION/TRAINING PROGRAM

## 2024-10-03 RX ORDER — KETOROLAC TROMETHAMINE 5 MG/ML
1 SOLUTION OPHTHALMIC 4 TIMES DAILY
Qty: 5 ML | Refills: 1 | Status: SHIPPED | OUTPATIENT
Start: 2024-10-03 | End: 2025-10-03

## 2024-10-03 NOTE — PROGRESS NOTES
HPI     Post-op Evaluation     Additional comments: PCIOL 10/02/2024  Currently using pre forte qid no complaints at this time           Last edited by Corwin Peraza on 10/3/2024 11:53 AM.            Assessment /Plan     For exam results, see Encounter Report.    Post-operative state  Cataract extraction status of eye, right- POD#1 S/P CEIOL OD Doing well. Mild edema    Continue gtts to operative eye:  PF QID  Start Keto QID    Reinstructed in importance of compliance with post-op instructions including medications, shield at bedtime, protective glasses during the day, and limitation of activities. Patient instructed to call immediately for increased pain, redness or vision loss.     RTC 1 week. MOCT if PH worse than 20/25

## 2024-10-04 NOTE — TELEPHONE ENCOUNTER
Called to check on Mr. Forbes since adding Novolog. States he was just able to start it yesterday (10/03/24) but he has noticed a difference. Will call again on Monday. Successful call.

## 2024-10-05 DIAGNOSIS — E11.9 CONTROLLED TYPE 2 DIABETES MELLITUS WITHOUT COMPLICATION, WITHOUT LONG-TERM CURRENT USE OF INSULIN: ICD-10-CM

## 2024-10-06 NOTE — TELEPHONE ENCOUNTER
Refill Routing Note   Medication(s) are not appropriate for processing by [facility] for the following reason(s):        Non-participating provider    [facility] action(s):  Route        Medication Therapy Plan: Patient is a participant in digital medicine program      Appointments  past 12m or future 3m with PCP    Date Provider   Last Visit   Visit date not found Praveena Alas PharmD   Next Visit   Visit date not found Praveena Alas PharmD   ED visits in past 90 days: 0        Note composed:1:07 PM 10/06/2024

## 2024-10-07 RX ORDER — METFORMIN HYDROCHLORIDE 1000 MG/1
1000 TABLET ORAL 2 TIMES DAILY WITH MEALS
Qty: 180 TABLET | Refills: 1 | Status: SHIPPED | OUTPATIENT
Start: 2024-10-07 | End: 2025-04-05

## 2024-10-10 ENCOUNTER — DOCUMENTATION ONLY (OUTPATIENT)
Dept: OPHTHALMOLOGY | Facility: CLINIC | Age: 69
End: 2024-10-10

## 2024-10-10 ENCOUNTER — OFFICE VISIT (OUTPATIENT)
Dept: OPHTHALMOLOGY | Facility: CLINIC | Age: 69
End: 2024-10-10
Payer: MEDICARE

## 2024-10-10 DIAGNOSIS — Z98.41 CATARACT EXTRACTION STATUS OF EYE, RIGHT: ICD-10-CM

## 2024-10-10 DIAGNOSIS — H25.12 NUCLEAR SCLEROSIS OF LEFT EYE: ICD-10-CM

## 2024-10-10 DIAGNOSIS — Z98.890 POST-OPERATIVE STATE: Primary | ICD-10-CM

## 2024-10-10 PROCEDURE — 99999 PR PBB SHADOW E&M-EST. PATIENT-LVL II: CPT | Mod: PBBFAC,,, | Performed by: STUDENT IN AN ORGANIZED HEALTH CARE EDUCATION/TRAINING PROGRAM

## 2024-10-10 RX ORDER — PREDNISOLONE ACETATE 10 MG/ML
1 SUSPENSION/ DROPS OPHTHALMIC 4 TIMES DAILY
Qty: 5 ML | Refills: 1 | Status: SHIPPED | OUTPATIENT
Start: 2024-10-10 | End: 2025-10-10

## 2024-10-10 NOTE — PROGRESS NOTES
Short Stay Record    Diagnosis: Nuclear Sclerotic Cataract left    CC: Blurry Vision     HPI:  Stefan Forbes Jr. is a 69 y.o. male who presents for evaluation prior to ophthalmic surgery. No current complaints.     Past Medical History:   Diagnosis Date    Arthritis     Cataract     COPD (chronic obstructive pulmonary disease)     Diabetes mellitus, type 2     Hyperlipidemia     Hypertension     Personal history of colonic polyps 2018     Past Surgical History:   Procedure Laterality Date    ANGIOPLASTY      JOINT REPLACEMENT  2017    TOTAL KNEE ARTHROPLASTY       Social History     Tobacco Use    Smoking status: Former     Types: Cigars     Quit date: 10/1/2023     Years since quittin.0    Smokeless tobacco: Never    Tobacco comments:     4 cigars each day   Substance Use Topics    Alcohol use: No     Family History   Problem Relation Name Age of Onset    Diabetes Mother Mom     Arthritis Mother Mom     Cancer Father Dad      Review of patient's allergies indicates:  No Known Allergies      Current Outpatient Medications:     acetaminophen (TYLENOL) 500 MG tablet, Take 1,000 mg by mouth as needed. , Disp: , Rfl:     albuterol (VENTOLIN HFA) 90 mcg/actuation inhaler, Inhale 2 puffs into the lungs every 6 (six) hours as needed for Wheezing or Shortness of Breath. Rescue, Disp: 25.5 g, Rfl: 3    ascorbic acid, vitamin C, (VITAMIN C) 1000 MG tablet, Take 1,000 mg by mouth once daily., Disp: , Rfl:     aspirin 325 MG tablet, Take 325 mg by mouth once daily., Disp: , Rfl:     blood sugar diagnostic (BLOOD GLUCOSE TEST) Strp, 1 strip by Misc.(Non-Drug; Combo Route) route 3 (three) times daily., Disp: 200 each, Rfl: 1    blood-glucose meter,continuous (FREESTYLE JASMINE 3 READER) [facility], Use as directed., Disp: 1 each, Rfl: 0    blood-glucose sensor (FREESTYLE JASMINE 3 SENSOR) Agnieszka, Change sensor every 14 days, Disp: 2 each, Rfl: 11    buPROPion (WELLBUTRIN SR) 150 MG TBSR 12 hr tablet, Take 1 tablet (150 mg total)  by mouth 2 (two) times daily., Disp: 180 tablet, Rfl: 2    cetirizine (ZYRTEC) 10 MG tablet, Take 10 mg by mouth as needed for Allergies., Disp: , Rfl:     cholecalciferol, vitamin D3, (VITAMIN D3) 25 mcg (1,000 unit) capsule, Take 1,000 Units by mouth once daily., Disp: , Rfl:     cyanocobalamin (VITAMIN B-12) 500 MCG tablet, Take 500 mcg by mouth once daily. , Disp: , Rfl:     dapagliflozin propanediol (FARXIGA) 10 mg tablet, Take 1 tablet (10 mg total) by mouth once daily., Disp: 90 tablet, Rfl: 2    famotidine (PEPCID) 40 MG tablet, Take 1 tablet by mouth once a day, Disp: 30 tablet, Rfl: 5    fluticasone propionate (FLONASE) 50 mcg/actuation nasal spray, Use 1 spray (50 mcg total) in each nostril once daily. (Patient taking differently: 1 spray by Each Nostril route as needed for Allergies.), Disp: 16 g, Rfl: 3    fluticasone-umeclidin-vilanter (TRELEGY ELLIPTA) 100-62.5-25 mcg DsDv, Inhale 1 puff into the lungs once daily., Disp: 180 each, Rfl: 3    insulin aspart U-100 (NOVOLOG FLEXPEN U-100 INSULIN) 100 unit/mL (3 mL) InPn pen, Inject 5 Units into the skin 3 (three) times daily with meals. Add sliding scale if needed., Disp: 15 mL, Rfl: 5    insulin degludec (TRESIBA FLEXTOUCH U-200) 200 unit/mL (3 mL) insulin pen, Inject 18 Units into the skin once daily. Increase by 2 units every 2 days until fasting sugar is below 130. Stop at 50 units., Disp: 3 pen , Rfl: 5    ipratropium (ATROVENT) 21 mcg (0.03 %) nasal spray, Spray 1 or 2 sprays in each nasal passage every 8 hours, if needed, as directed., Disp: 30 mL, Rfl: 12    ketorolac 0.5% (ACULAR) 0.5 % Drop, Place 1 drop into the right eye 4 (four) times daily., Disp: 5 mL, Rfl: 1    lancets 30 gauge Misc, 1 lancet by Misc.(Non-Drug; Combo Route) route 3 (three) times daily., Disp: 200 each, Rfl: 0    magnesium oxide (MAG-OX) 400 mg (241.3 mg magnesium) tablet, Take 1 tablet (400 mg total) by mouth once daily., Disp: 90 tablet, Rfl: 3    metFORMIN (GLUCOPHAGE)  "1000 MG tablet, Take 1 tablet (1,000 mg total) by mouth 2 (two) times daily with meals., Disp: 180 tablet, Rfl: 1    methylPREDNISolone (MEDROL DOSEPACK) 4 mg tablet, Use as directed per package, Disp: 21 tablet, Rfl: 0    nitroGLYCERIN (NITROSTAT) 0.4 MG SL tablet, Place 1 tablet under the tongue every 5 (five) minutes as needed for Chest pain., Disp: 25 tablet, Rfl: 3    olmesartan (BENICAR) 40 MG tablet, Take 1 tablet (40 mg total) by mouth once daily., Disp: 90 tablet, Rfl: 1    pantoprazole (PROTONIX) 40 MG tablet, Take 1 tablet by mouth twice a day before breakfast and dinner, Disp: 60 tablet, Rfl: 10    pen needle, diabetic (BD ULTRA-FINE BISI PEN NEEDLE) 32 gauge x 5/32" Ndle, For use with insulin up to 4 times daily, Disp: 200 each, Rfl: 5    plecanatide (TRULANCE) 3 mg Tab, Take 1 tablet by mouth once a day, Disp: 90 tablet, Rfl: 3    pramipexole (MIRAPEX) 0.125 MG tablet, TAKE 2 TABLETS BY MOUTH EVERY EVENING, Disp: 60 tablet, Rfl: 5    prednisoLONE acetate (PRED FORTE) 1 % DrpS, Place 1 drop into the left eye 4 (four) times daily., Disp: 5 mL, Rfl: 1    pulse oximeter (PULSE OXIMETER) device, Use twice daily at 8 AM and 3 PM and [facility]., Disp: 1 each, Rfl: 0    rosuvastatin (CRESTOR) 20 MG tablet, Take 1 tablet (20 mg total) by mouth once daily., Disp: 90 tablet, Rfl: 3    semaglutide (OZEMPIC) 1 mg/dose (4 mg/3 mL), Inject 1 mg into the skin every 7 days., Disp: 3 mL, Rfl: 5    semaglutide (OZEMPIC) 2 mg/dose (8 mg/3 mL) PnIj, Inject 2 mg into the skin every 7 days., Disp: , Rfl:     tamsulosin (FLOMAX) 0.4 mg Cap, Take 2 capsules (0.8 mg total) by mouth once daily., Disp: 180 capsule, Rfl: 3    Review of Systems:  10 Pt ROS negative except as stated in HPI    Physical Exam:  General Appearance:    A&Ox3, no distress, appears stated age   Head:    Normocephalic, without obvious abnormality, atraumatic   Eyes:    PERRL, EOM's intact   Back:     Symmetric, no curvature "   Lungs:     respirations unlabored   Chest Wall:    No tenderness or deformity    Heart:  Abdomen:  Extremities:  Skin:    S1 and S2 present    Soft, non-tender    Extremities normal, atraumatic    Skin color, texture, turgor normal     Patient is stable for ophthalmic surgery under local and MAC.       _

## 2024-10-10 NOTE — PROGRESS NOTES
HPI     Post-op Evaluation     Additional comments: 1 week post op PCIOL OD 10/02/2024  Currently using pre forte qid no complaints at this time.             Comments    1.PCIOL OD 10/02/2024   DM x 2012  2. Choroidal nevus OD  3. NS OS            Last edited by Patricia Stovall on 10/10/2024 11:35 AM.            Assessment /Plan     For exam results, see Encounter Report.    Post-operative state  Cataract extraction status of eye, right- Impression/Plan  POW#1 S/P CEIOL OD : Doing well with no evidence of infection.     Continue gtts to operative eye:   Persistent inflammation. PF and Keto QID until next visit    Pt given and instructed in one week postop instructions. Can resume normal activitites and d/c eye shield. OTC reading glasses can be used until evaluated for final      Nuclear sclerosis of left eye- Patient reports decreased vision in the fellow eye consistent with the clinical amount of lenticular opacity, which reaches the level of visual significance and affects activities of daily living including reading and glare. Risks, benefits, and alternatives to cataract surgery were discussed and pt desired to schedule cataract surgery. Pt was consented and the biometry and lens options were reviewed.     Discussed IOL options and refractive outcomes for this patient.    [place] left eye, Topical    Additional considerations:   +/- I-Ring    Will aim for Monofocal - Distance IOL    Post op gtts:   PF, Keto    Discussed that vision may be limited by:  Fuch's, Poor dilation, and Astigmatism >1D, Central Corneal scar OS  + Flomax    Explained that patient may need glasses after surgery.    RTC for POD#1

## 2024-10-11 ENCOUNTER — OFFICE VISIT (OUTPATIENT)
Dept: PULMONOLOGY | Facility: CLINIC | Age: 69
End: 2024-10-11
Attending: INTERNAL MEDICINE
Payer: MEDICARE

## 2024-10-11 ENCOUNTER — HOSPITAL ENCOUNTER (OUTPATIENT)
Dept: RADIOLOGY | Facility: HOSPITAL | Age: 69
Discharge: HOME | End: 2024-10-11
Attending: INTERNAL MEDICINE
Payer: MEDICARE

## 2024-10-11 VITALS
BODY MASS INDEX: 32.87 KG/M2 | RESPIRATION RATE: 19 BRPM | WEIGHT: 216.88 LBS | HEART RATE: 64 BPM | SYSTOLIC BLOOD PRESSURE: 126 MMHG | OXYGEN SATURATION: 98 % | DIASTOLIC BLOOD PRESSURE: 82 MMHG | HEIGHT: 68 IN

## 2024-10-11 DIAGNOSIS — G25.81 RESTLESS LEG SYNDROME DUE TO IRON DEFICIENCY ANEMIA: Chronic | ICD-10-CM

## 2024-10-11 DIAGNOSIS — J44.9 MODERATE COPD (CHRONIC OBSTRUCTIVE PULMONARY DISEASE): Primary | ICD-10-CM

## 2024-10-11 DIAGNOSIS — Z23 NEED FOR VACCINATION: ICD-10-CM

## 2024-10-11 DIAGNOSIS — E11.65 CONTROLLED TYPE 2 DIABETES MELLITUS WITH HYPERGLYCEMIA, WITHOUT LONG-TERM CURRENT USE OF INSULIN: ICD-10-CM

## 2024-10-11 DIAGNOSIS — E11.69 HYPERLIPIDEMIA ASSOCIATED WITH TYPE 2 DIABETES MELLITUS: ICD-10-CM

## 2024-10-11 DIAGNOSIS — D50.9 RESTLESS LEG SYNDROME DUE TO IRON DEFICIENCY ANEMIA: Chronic | ICD-10-CM

## 2024-10-11 DIAGNOSIS — E78.5 HYPERLIPIDEMIA ASSOCIATED WITH TYPE 2 DIABETES MELLITUS: ICD-10-CM

## 2024-10-11 DIAGNOSIS — J44.9 MODERATE COPD (CHRONIC OBSTRUCTIVE PULMONARY DISEASE): ICD-10-CM

## 2024-10-11 DIAGNOSIS — R91.1 PULMONARY NODULE: ICD-10-CM

## 2024-10-11 DIAGNOSIS — E11.59 HYPERTENSION ASSOCIATED WITH DIABETES: ICD-10-CM

## 2024-10-11 DIAGNOSIS — I15.2 HYPERTENSION ASSOCIATED WITH DIABETES: ICD-10-CM

## 2024-10-11 DIAGNOSIS — G47.33 OSA ON CPAP: ICD-10-CM

## 2024-10-11 DIAGNOSIS — J44.9 CHRONIC OBSTRUCTIVE PULMONARY DISEASE, UNSPECIFIED COPD TYPE: Primary | ICD-10-CM

## 2024-10-11 LAB
BRPFT: ABNORMAL
FEF 25 75 LLN: 1.46
FEF 25 75 PRE REF: 19 %
FEF 25 75 REF: 3.17
FEV1 FVC LLN: 64
FEV1 FVC PRE REF: 66.2 %
FEV1 FVC REF: 77
FEV1 LLN: 2.27
FEV1 PRE REF: 76.1 %
FEV1 REF: 3.1
FVC LLN: 3.03
FVC PRE REF: 114.6 %
FVC REF: 4.06
PEF LLN: 6.02
PEF PRE REF: 82.8 %
PEF REF: 8.2
PRE FEF 25 75: 0.6 L/S (ref 1.46–4.88)
PRE FET 100: 15.9 SEC
PRE FEV1 FVC: 50.78 % (ref 63.63–88.24)
PRE FEV1: 2.36 L (ref 2.27–3.89)
PRE FVC: 4.65 L (ref 3.03–5.09)
PRE PEF: 6.79 L/S (ref 6.02–10.39)

## 2024-10-11 PROCEDURE — 99999 PR PBB SHADOW E&M-EST. PATIENT-LVL V: CPT | Mod: PBBFAC,,, | Performed by: INTERNAL MEDICINE

## 2024-10-11 PROCEDURE — 71046 X-RAY EXAM CHEST 2 VIEWS: CPT | Mod: TC

## 2024-10-11 PROCEDURE — 71046 X-RAY EXAM CHEST 2 VIEWS: CPT | Mod: 26,,, | Performed by: RADIOLOGY

## 2024-10-11 NOTE — PROGRESS NOTES
Patient Active Problem List   Diagnosis    Controlled type 2 diabetes mellitus with hyperglycemia, without long-term current use of insulin    Hypertension associated with diabetes    Hyperlipidemia associated with type 2 diabetes mellitus    Moderate COPD (chronic obstructive pulmonary disease)    CAD (coronary artery disease)    Vitamin D deficiency    Osteoarthritis of knee    Pulmonary nodule    Nevus of choroid of right eye    History of pulmonary embolism    Hyponatremia    Iron deficiency anemia due to chronic blood loss    MACHO on CPAP    PLMD (periodic limb movement disorder)    BMI 35.0-35.9,adult    Varicose vein of leg    Tobacco abuse, in remission    Tubular adenoma of colon    Benign prostatic hyperplasia with weak urinary stream    Urgency of urination    Aortic atherosclerosis    COVID    Severe obesity    Hypomagnesemia    Nicotine dependence    Restless leg syndrome due to iron deficiency anemia     Social History     Tobacco Use   Smoking Status Former    Types: Cigars    Quit date: 10/1/2023    Years since quittin.0   Smokeless Tobacco Never   Tobacco Comments    4 cigars each day     Immunization History   Administered Date(s) Administered    COVID-19, MRNA, LN-S, PF (Pfizer) (Purple Cap) 2021, 2021, 12/10/2021    COVID-19, mRNA, LNP-S, PF (Moderna) Ages 12+ 2023    Influenza (FLUAD) - Quadrivalent - Adjuvanted - PF *Preferred* (65+) 10/20/2022, 10/05/2023    Influenza - Quadrivalent 2016    Influenza - Quadrivalent - PF *Preferred* (6 months and older) 2014, 10/25/2015, 10/03/2017, 10/04/2018, 2019, 2020, 10/08/2021    Influenza - Trivalent - Fluad - Adjuvanted - PF (65 years and older 10/11/2024    Pneumococcal Conjugate - 20 Valent 2024    Pneumococcal Polysaccharide - 23 Valent 2014, 2016    Tdap 2015    Zoster 2017       COPD Questionnaire  How often do you cough?: A little of the time  How often do you have phlegm  (mucus) in your chest?: A little of the time  How often does your chest feel tight?: Almost never  When you walk up a hill or one flight of stairs, how often are you breathless?: Never  How often are you limited doing any activities at home?: Some of the time  How often are you confident leaving the house despite your lung condition?: All of the time  How often do you sleep soundly?: Most of the time  How often do you have energy?: Most of the time  Total score: 10             SUBJECTIVE:     Patient is a 69 y.o. male presents with Moderate COPD , MACHO on CPAP .  Last visit 10/06/2022  CPAP use intermittent  Unable wear all night  Confides sleeps better 5-6 hrs  Bed time 12 MN, wake 6 am  Legs aches, restless, better with moving  Added Mirapex  Has Varicose veins considered see Vascular  Hx bilateral Knee  Slight cough and congestion: attributed to allergies  CAT score 13  Stable on TRELEGY  Flu shot today  FEV1: 2.30L( 72.9%)  Had stopped smoking and relapsed  Signed himself up with cessation        Will need flu shot    No recent COPD exacerbations.  No cough no wheezing no shortness of breath or sputum.    Former smoker 35-40 pack year smoking history :  Currently enrolled in smoking cessation    I have reviewed the patient's medical history in detail and updated the computerized patient record.    History of varicose veins.    Chief Complaint   Patient presents with    COPD     08/05/2024  Follow up  Here with spouse  LDCT : 7 mm and 9 mm nodule : right side  Former smoker Quit 12 months  45 pack year  COPD: Adherent on Inhaler TRELEGY, VENTOLIN  Not using CPAP  Diagnostic AHI 7.6/hr and REM RDI 22.5    Bed time 12 Mn  Wake time 7 am    10/11/2024  Followup   Reviewed spirometry  COPD score 10   No cough, No wheezing, No SOB  FEV1: 2.36L( 76.1%)  [place] board reveiwed  Using meds  Had cataract surgery: Dr Madison      Tumor Board Conference  8/20/2024  [facility]     Nelida Ojeda  TAWANDA  Pulmonary Disease Pulmonary nodule  Dx     Progress Notes  Nelida Ojeda PA-C (Physician Assistant)  Pulmonary Disease  Encounter Date: 8/20/2024  Creation Time: 8/20/2024  1:47 PM  Signed  Cosigned by: Mazin Wells MD at 8/21/2024  2:51 PM        [facility] Pulmonary Nodule Review      Patient Name: Stefan Forbes Jr.     MRN: [id number]     Date of Tumor Board: 08/20/2024     Diagnosis:  Multiple Pulmonary Nodules     Referring Provider:  Mazin Wells MD     Present PCTP Providers: Christopher Fonseca MD, Chris Stallings MD, Mazin Wells MD, DAVID White, Elizabeth LeJeune, NP, DAVID German     Smoking hx:  Former, 35 pack years     Diagnostic Work Up:     Nodify ID:  Pre-test risk 9%, CDT- No significant level, XL2- likely benign, 1% risk malignancy  Imaging:  PET 8/2024- No FDG avid lesions  LDCT 7/2024- Multiple new nodules, RUL 9mm and 7mm RLL ground glass        Board Recommendations:     CT Chest in one year                   Review of patient's allergies indicates:  No Known Allergies    Current Outpatient Medications   Medication Sig Dispense Refill    acetaminophen (TYLENOL) 500 MG tablet Take 1,000 mg by mouth as needed.       ascorbic acid, vitamin C, (VITAMIN C) 1000 MG tablet Take 1,000 mg by mouth once daily.      aspirin 325 MG tablet Take 325 mg by mouth once daily.      blood sugar diagnostic (BLOOD GLUCOSE TEST) Strp 1 strip by Misc.(Non-Drug; Combo Route) route 3 (three) times daily. 200 each 1    blood-glucose meter,continuous (FREESTYLE JASMINE 3 READER) [facility] Use as directed. 1 each 0    blood-glucose sensor (FREESTYLE JASMINE 3 SENSOR) Agnieszka Change sensor every 14 days 2 each 11    buPROPion (WELLBUTRIN SR) 150 MG TBSR 12 hr tablet Take 1 tablet (150 mg total) by mouth 2 (two) times daily. 180 tablet 2    cetirizine (ZYRTEC) 10 MG tablet Take 10 mg by mouth as needed for Allergies.      cholecalciferol, vitamin D3, (VITAMIN D3) 25 mcg (1,000 unit)  capsule Take 1,000 Units by mouth once daily.      cyanocobalamin (VITAMIN B-12) 500 MCG tablet Take 500 mcg by mouth once daily.       dapagliflozin propanediol (FARXIGA) 10 mg tablet Take 1 tablet (10 mg total) by mouth once daily. 90 tablet 2    famotidine (PEPCID) 40 MG tablet Take 1 tablet by mouth once a day 30 tablet 5    fluticasone propionate (FLONASE) 50 mcg/actuation nasal spray Use 1 spray (50 mcg total) in each nostril once daily. (Patient taking differently: 1 spray by Each Nostril route as needed for Allergies.) 16 g 3    fluticasone-umeclidin-vilanter (TRELEGY ELLIPTA) 100-62.5-25 mcg DsDv Inhale 1 puff into the lungs once daily. 180 each 3    insulin aspart U-100 (NOVOLOG FLEXPEN U-100 INSULIN) 100 unit/mL (3 mL) InPn pen Inject 5 Units into the skin 3 (three) times daily with meals. Add sliding scale if needed. 15 mL 5    insulin degludec (TRESIBA FLEXTOUCH U-200) 200 unit/mL (3 mL) insulin pen Inject 18 Units into the skin once daily. Increase by 2 units every 2 days until fasting sugar is below 130. Stop at 50 units. 3 pen 5    ipratropium (ATROVENT) 21 mcg (0.03 %) nasal spray Spray 1 or 2 sprays in each nasal passage every 8 hours, if needed, as directed. 30 mL 12    ketorolac 0.5% (ACULAR) 0.5 % Drop Place 1 drop into the right eye 4 (four) times daily. 5 mL 1    lancets 30 gauge Misc 1 lancet by Misc.(Non-Drug; Combo Route) route 3 (three) times daily. 200 each 0    magnesium oxide (MAG-OX) 400 mg (241.3 mg magnesium) tablet Take 1 tablet (400 mg total) by mouth once daily. 90 tablet 3    metFORMIN (GLUCOPHAGE) 1000 MG tablet Take 1 tablet (1,000 mg total) by mouth 2 (two) times daily with meals. 180 tablet 1    methylPREDNISolone (MEDROL DOSEPACK) 4 mg tablet Use as directed per package 21 tablet 0    nitroGLYCERIN (NITROSTAT) 0.4 MG SL tablet Place 1 tablet under the tongue every 5 (five) minutes as needed for Chest pain. 25 tablet 3    olmesartan (BENICAR) 40 MG tablet Take 1 tablet (40 mg  "total) by mouth once daily. 90 tablet 1    pantoprazole (PROTONIX) 40 MG tablet Take 1 tablet by mouth twice a day before breakfast and dinner 60 tablet 10    pen needle, diabetic (BD ULTRA-FINE BISI PEN NEEDLE) 32 gauge x 5/32" Ndle For use with insulin up to 4 times daily 200 each 5    plecanatide (TRULANCE) 3 mg Tab Take 1 tablet by mouth once a day 90 tablet 3    pramipexole (MIRAPEX) 0.125 MG tablet TAKE 2 TABLETS BY MOUTH EVERY EVENING 60 tablet 5    prednisoLONE acetate (PRED FORTE) 1 % DrpS Place 1 drop into the left eye 4 (four) times daily. 5 mL 1    pulse oximeter (PULSE OXIMETER) device Use twice daily at 8 AM and 3 PM and record the value in [facility] as directed. 1 each 0    rosuvastatin (CRESTOR) 20 MG tablet Take 1 tablet (20 mg total) by mouth once daily. 90 tablet 3    semaglutide (OZEMPIC) 1 mg/dose (4 mg/3 mL) Inject 1 mg into the skin every 7 days. 3 mL 5    semaglutide (OZEMPIC) 2 mg/dose (8 mg/3 mL) PnIj Inject 2 mg into the skin every 7 days.      tamsulosin (FLOMAX) 0.4 mg Cap Take 2 capsules (0.8 mg total) by mouth once daily. 180 capsule 3    albuterol (VENTOLIN HFA) 90 mcg/actuation inhaler Inhale 2 puffs into the lungs every 6 (six) hours as needed for Wheezing or Shortness of Breath. Rescue 25.5 g 3     No current facility-administered medications for this visit.       Past Medical History:   Diagnosis Date    Arthritis     Cataract     COPD (chronic obstructive pulmonary disease)     Diabetes mellitus, type 2     Hyperlipidemia     Hypertension     Personal history of colonic polyps      Past Surgical History:   Procedure Laterality Date    ANGIOPLASTY      JOINT REPLACEMENT  2017    TOTAL KNEE ARTHROPLASTY       Family History   Problem Relation Name Age of Onset    Diabetes Mother Mom     Arthritis Mother Mom     Cancer Father Dad      Social History     Tobacco Use    Smoking status: Former     Types: Cigars     Quit date: 10/1/2023     Years since quittin.0    Smokeless " "tobacco: Never    Tobacco comments:     4 cigars each day   Substance Use Topics    Alcohol use: No    Drug use: No        Review of Systems:  Review of Systems   Constitutional: Negative.    HENT:  Negative for congestion.    Eyes: Negative.    Respiratory:  Negative for apnea, cough, shortness of breath and wheezing.    Cardiovascular: Negative.         Varicose veins bilaterally   Gastrointestinal: Negative.    Endocrine: Negative.    Genitourinary: Negative.    Musculoskeletal: Negative.    Skin: Negative.    Allergic/Immunologic: Negative.    Neurological: Negative.    Hematological: Negative.    Psychiatric/Behavioral:  Negative for sleep disturbance.        OBJECTIVE:     Vital Signs (Most Recent)  Pulse: 64 (10/11/24 1032)  Resp: 19 (10/11/24 1032)  BP: 126/82 (10/11/24 1032)  SpO2: 98 % (10/11/24 1032)  5' 8" (1.727 m)  98.4 kg (216 lb 14.4 oz)     Physical Exam:  Physical Exam  Vitals and nursing note reviewed.   Constitutional:       Appearance: He is well-developed.       HENT:      Head: Normocephalic and atraumatic.      Nose: No mucosal edema or rhinorrhea.      Mouth/Throat:      Pharynx: No oropharyngeal exudate.   Eyes:      General: No scleral icterus.        Left eye: No discharge.      Conjunctiva/sclera: Conjunctivae normal.      Pupils: Pupils are equal, round, and reactive to light.   Neck:      Thyroid: No thyromegaly.      Trachea: No tracheal deviation.   Cardiovascular:      Rate and Rhythm: Normal rate and regular rhythm.      Heart sounds: Normal heart sounds. No murmur heard.  Pulmonary:      Effort: Pulmonary effort is normal.      Breath sounds: Examination of the right-lower field reveals decreased breath sounds. Examination of the left-lower field reveals decreased breath sounds. Decreased breath sounds present. No wheezing, rhonchi or rales.   Chest:      Chest wall: No tenderness.   Abdominal:      General: Bowel sounds are normal. There is no distension.      Palpations: Abdomen " is soft.   Musculoskeletal:         General: No tenderness or deformity. Normal range of motion.      Cervical back: Normal range of motion and neck supple.   Skin:     General: Skin is warm and dry.      Capillary Refill: Capillary refill takes 2 to 3 seconds.   Neurological:      Mental Status: He is alert and oriented to person, place, and time.      Cranial Nerves: No cranial nerve deficit.         Laboratory  CBC: Reviewed  BMP: Reviewed             Chest  Xray   IOL Master - OS - Left Eye  Axial lengths reviewed with good spike pattern and reliability. Lens   chosen for operative eye            Daren  Fev1: 2.36L( 76.1%) FVC 4.65L( 114.6%)  FEV1/FVC 51      ASSESSMENT/PLAN:     Problem List Items Addressed This Visit       Restless leg syndrome due to iron deficiency anemia (Chronic)    BMI 35.0-35.9,adult    Hyperlipidemia associated with type 2 diabetes mellitus    Controlled type 2 diabetes mellitus with hyperglycemia, without long-term current use of insulin    Hypertension associated with diabetes    Moderate COPD (chronic obstructive pulmonary disease) - Primary     COPD Questionnaire  How often do you cough?: A little of the time  How often do you have phlegm (mucus) in your chest?: A little of the time  How often does your chest feel tight?: Almost never  When you walk up a hill or one flight of stairs, how often are you breathless?: Never  How often are you limited doing any activities at home?: Some of the time  How often are you confident leaving the house despite your lung condition?: All of the time  How often do you sleep soundly?: Most of the time  How often do you have energy?: Most of the time  Total score: 10     FEV1: 2.36l( 76%)    ALBUTEROL TRELEGY 100-62.5-25             Relevant Orders    RSVPreF Recombinant (Arexvy)    Pulmonary nodule     Repeat CT 12 months         MACHO on CPAP     Adherence with CPAP          Other Visit Diagnoses       Need for vaccination        Relevant Orders     Influenza - Trivalent (Adjuvanted) (Completed)            PLAN:Plan     Encourage to use CPAP  Chest CT next year      Requested Prescriptions      No prescriptions requested or ordered in this encounter         Follow up in about 10 months (around 8/11/2025), or , RSV and Flu shot , chest CT, gonzalo.    This note was prepared using voice recognition system and is likely to have sound alike errors that may have been overlooked even after proof reading.  Please call me with any questions    Discussed diagnosis, its evaluation, treatment and usual course. All questions answered.    Thank you for the courtesy of participating in the care of this patient    Mazin Wells MD    Orders Placed This Encounter   Procedures    RSVPreF Recombinant (Arexvy)    Influenza - Trivalent (Adjuvanted)

## 2024-10-11 NOTE — ASSESSMENT & PLAN NOTE
COPD Questionnaire  How often do you cough?: A little of the time  How often do you have phlegm (mucus) in your chest?: A little of the time  How often does your chest feel tight?: Almost never  When you walk up a hill or one flight of stairs, how often are you breathless?: Never  How often are you limited doing any activities at home?: Some of the time  How often are you confident leaving the house despite your lung condition?: All of the time  How often do you sleep soundly?: Most of the time  How often do you have energy?: Most of the time  Total score: 10     FEV1: 2.36l( 76%)    ALBUTEROL TRELEGY 100-62.5-25

## 2024-10-16 ENCOUNTER — TELEPHONE (OUTPATIENT)
Dept: DIABETES | Facility: CLINIC | Age: 69
End: 2024-10-16
Payer: MEDICARE

## 2024-10-16 NOTE — TELEPHONE ENCOUNTER
Called Mr. Forbes after checking Griselda report. No data since 10/08. States sensors are on backorder and has been using [employer] meter as much as he can. BGs range from the 80s-300s. Since on MDI, will leave sample at  for him to  next time he's at the [facility]. Pt appreciative. Successful call.

## 2024-10-18 ENCOUNTER — TELEPHONE (OUTPATIENT)
Dept: OPHTHALMOLOGY | Facility: CLINIC | Age: 69
End: 2024-10-18
Payer: MEDICARE

## 2024-10-21 ENCOUNTER — OUTSIDE PLACE OF SERVICE (OUTPATIENT)
Dept: OPHTHALMOLOGY | Facility: CLINIC | Age: 69
End: 2024-10-21
Payer: MEDICARE

## 2024-10-21 PROCEDURE — 66984 XCAPSL CTRC RMVL W/O ECP: CPT | Mod: 79,LT,, | Performed by: STUDENT IN AN ORGANIZED HEALTH CARE EDUCATION/TRAINING PROGRAM

## 2024-10-22 ENCOUNTER — OFFICE VISIT (OUTPATIENT)
Dept: OPHTHALMOLOGY | Facility: CLINIC | Age: 69
End: 2024-10-22
Payer: MEDICARE

## 2024-10-22 DIAGNOSIS — Z98.42 CATARACT EXTRACTION STATUS OF EYE, LEFT: ICD-10-CM

## 2024-10-22 DIAGNOSIS — Z98.890 POST-OPERATIVE STATE: Primary | ICD-10-CM

## 2024-10-22 DIAGNOSIS — Z98.41 CATARACT EXTRACTION STATUS OF EYE, RIGHT: ICD-10-CM

## 2024-10-22 PROCEDURE — 3051F HG A1C>EQUAL 7.0%<8.0%: CPT | Mod: CPTII,S$GLB,, | Performed by: STUDENT IN AN ORGANIZED HEALTH CARE EDUCATION/TRAINING PROGRAM

## 2024-10-22 PROCEDURE — 99999 PR PBB SHADOW E&M-EST. PATIENT-LVL II: CPT | Mod: PBBFAC,,, | Performed by: STUDENT IN AN ORGANIZED HEALTH CARE EDUCATION/TRAINING PROGRAM

## 2024-10-22 PROCEDURE — 1160F RVW MEDS BY RX/DR IN RCRD: CPT | Mod: CPTII,S$GLB,, | Performed by: STUDENT IN AN ORGANIZED HEALTH CARE EDUCATION/TRAINING PROGRAM

## 2024-10-22 PROCEDURE — 1159F MED LIST DOCD IN RCRD: CPT | Mod: CPTII,S$GLB,, | Performed by: STUDENT IN AN ORGANIZED HEALTH CARE EDUCATION/TRAINING PROGRAM

## 2024-10-22 PROCEDURE — 4010F ACE/ARB THERAPY RXD/TAKEN: CPT | Mod: CPTII,S$GLB,, | Performed by: STUDENT IN AN ORGANIZED HEALTH CARE EDUCATION/TRAINING PROGRAM

## 2024-10-22 PROCEDURE — 99024 POSTOP FOLLOW-UP VISIT: CPT | Mod: S$GLB,,, | Performed by: STUDENT IN AN ORGANIZED HEALTH CARE EDUCATION/TRAINING PROGRAM

## 2024-10-22 NOTE — PROGRESS NOTES
HPI     Post-op Evaluation     Additional comments:  PCIOL OS 10/21/2024  Currently using pre forte qid no complaints at this time              Comments    1.PCIOL OD 10/02/2024   DM x 2012  2. Choroidal nevus OD  3. PCIOL OS 10/21/2024          Last edited by Patricia Stovall on 10/22/2024  9:43 AM.            Assessment /Plan     For exam results, see Encounter Report.    Post-operative state    Cataract extraction status of eye, left    Cataract extraction status of eye, right    POD#1 S/P CEIOL OS Doing well. Mild edema    Continue gtts to operative eye:  PF QID      Reinstructed in importance of compliance with post-op instructions including medications, shield at bedtime, protective glasses during the day, and limitation of activities. Patient instructed to call immediately for increased pain, redness or vision loss.     RTC 1 week. MOCT if PH worse than 20/25

## 2024-10-23 ENCOUNTER — PATIENT OUTREACH (OUTPATIENT)
Dept: ADMINISTRATIVE | Facility: HOSPITAL | Age: 69
End: 2024-10-23
Payer: MEDICARE

## 2024-10-23 NOTE — PROGRESS NOTES
VBC OUTREACH: per chart review is overdue for DMFE, and micro will be due 10.30.224, pt has PCP appt 10.25.24, and has several other upcoming appt, attempted to contact pt to inform him of needed DMFE, no ans, lvm.

## 2024-10-25 ENCOUNTER — OFFICE VISIT (OUTPATIENT)
Dept: INTERNAL MEDICINE | Facility: CLINIC | Age: 69
End: 2024-10-25
Payer: MEDICARE

## 2024-10-25 VITALS
SYSTOLIC BLOOD PRESSURE: 132 MMHG | WEIGHT: 220.69 LBS | HEIGHT: 68 IN | OXYGEN SATURATION: 98 % | HEART RATE: 66 BPM | TEMPERATURE: 97 F | DIASTOLIC BLOOD PRESSURE: 58 MMHG | BODY MASS INDEX: 33.45 KG/M2

## 2024-10-25 DIAGNOSIS — F17.201 TOBACCO ABUSE, IN REMISSION: ICD-10-CM

## 2024-10-25 DIAGNOSIS — Z12.5 ENCOUNTER FOR SCREENING FOR MALIGNANT NEOPLASM OF PROSTATE: ICD-10-CM

## 2024-10-25 DIAGNOSIS — I70.0 AORTIC ATHEROSCLEROSIS: ICD-10-CM

## 2024-10-25 DIAGNOSIS — E11.9 CONTROLLED TYPE 2 DIABETES MELLITUS WITHOUT COMPLICATION, WITHOUT LONG-TERM CURRENT USE OF INSULIN: ICD-10-CM

## 2024-10-25 DIAGNOSIS — E11.59 HYPERTENSION ASSOCIATED WITH DIABETES: ICD-10-CM

## 2024-10-25 DIAGNOSIS — E11.65 CONTROLLED TYPE 2 DIABETES MELLITUS WITH HYPERGLYCEMIA, WITHOUT LONG-TERM CURRENT USE OF INSULIN: ICD-10-CM

## 2024-10-25 DIAGNOSIS — D50.0 IRON DEFICIENCY ANEMIA DUE TO CHRONIC BLOOD LOSS: ICD-10-CM

## 2024-10-25 DIAGNOSIS — E11.69 HYPERLIPIDEMIA ASSOCIATED WITH TYPE 2 DIABETES MELLITUS: ICD-10-CM

## 2024-10-25 DIAGNOSIS — F17.200 NICOTINE DEPENDENCE: ICD-10-CM

## 2024-10-25 DIAGNOSIS — I15.2 HYPERTENSION ASSOCIATED WITH DIABETES: ICD-10-CM

## 2024-10-25 DIAGNOSIS — E78.5 HYPERLIPIDEMIA ASSOCIATED WITH TYPE 2 DIABETES MELLITUS: ICD-10-CM

## 2024-10-25 DIAGNOSIS — G47.33 OSA ON CPAP: ICD-10-CM

## 2024-10-25 DIAGNOSIS — I25.10 CORONARY ARTERY DISEASE INVOLVING NATIVE CORONARY ARTERY OF NATIVE HEART WITHOUT ANGINA PECTORIS: ICD-10-CM

## 2024-10-25 DIAGNOSIS — Z00.00 ROUTINE GENERAL MEDICAL EXAMINATION AT A HEALTH CARE FACILITY: Primary | ICD-10-CM

## 2024-10-25 DIAGNOSIS — J44.9 MODERATE COPD (CHRONIC OBSTRUCTIVE PULMONARY DISEASE): ICD-10-CM

## 2024-10-25 PROCEDURE — 99999 PR PBB SHADOW E&M-EST. PATIENT-LVL IV: CPT | Mod: PBBFAC,,, | Performed by: INTERNAL MEDICINE

## 2024-10-25 RX ORDER — DAPAGLIFLOZIN 10 MG/1
10 TABLET, FILM COATED ORAL DAILY
Qty: 90 TABLET | Refills: 2 | Status: SHIPPED | OUTPATIENT
Start: 2024-10-25

## 2024-10-25 RX ORDER — ROSUVASTATIN CALCIUM 20 MG/1
20 TABLET, COATED ORAL DAILY
Qty: 90 TABLET | Refills: 3 | Status: SHIPPED | OUTPATIENT
Start: 2024-10-25 | End: 2025-10-25

## 2024-10-25 RX ORDER — OLMESARTAN MEDOXOMIL 40 MG/1
40 TABLET ORAL DAILY
Qty: 90 TABLET | Refills: 1 | Status: SHIPPED | OUTPATIENT
Start: 2024-10-25

## 2024-10-25 RX ORDER — BUPROPION HYDROCHLORIDE 150 MG/1
150 TABLET, EXTENDED RELEASE ORAL 2 TIMES DAILY
Qty: 180 TABLET | Refills: 2 | Status: SHIPPED | OUTPATIENT
Start: 2024-10-25

## 2024-10-25 RX ORDER — METFORMIN HYDROCHLORIDE 1000 MG/1
1000 TABLET ORAL 2 TIMES DAILY WITH MEALS
Qty: 180 TABLET | Refills: 1 | Status: SHIPPED | OUTPATIENT
Start: 2024-10-25 | End: 2025-04-23

## 2024-10-25 NOTE — PROGRESS NOTES
Subjective:      Patient ID: Stefan Forbes Jr. is a 69 y.o. male.    Chief Complaint: Follow-up    Follow-up  Pertinent negatives include no abdominal pain, chest pain, chills, coughing, fever or sore throat.         69 y.o. with  Patient Active Problem List   Diagnosis    Controlled type 2 diabetes mellitus with hyperglycemia, without long-term current use of insulin    Hypertension associated with diabetes    Hyperlipidemia associated with type 2 diabetes mellitus    Moderate COPD (chronic obstructive pulmonary disease)    CAD (coronary artery disease)    Vitamin D deficiency    Osteoarthritis of knee    Pulmonary nodule    Routine general medical examination at a health care facility    Nevus of choroid of right eye    History of pulmonary embolism    Hyponatremia    Iron deficiency anemia due to chronic blood loss    MACHO on CPAP    PLMD (periodic limb movement disorder)    BMI 35.0-35.9,adult    Varicose vein of leg    Tobacco abuse, in remission    Tubular adenoma of colon    Benign prostatic hyperplasia with weak urinary stream    Urgency of urination    Aortic atherosclerosis    COVID    Severe obesity    Hypomagnesemia    Nicotine dependence    Restless leg syndrome due to iron deficiency anemia     Past Medical History:   Diagnosis Date    Arthritis     Cataract     COPD (chronic obstructive pulmonary disease)     Diabetes mellitus, type 2     Hyperlipidemia     Hypertension     Personal history of colonic polyps 2018       Here today for annual prev exam.  Compliant with meds without significant side effects. Energy and appetite are good.         Past Surgical History:   Procedure Laterality Date    ANGIOPLASTY      CATARACT EXTRACTION  10/14/2024    JOINT REPLACEMENT  2017    TOTAL KNEE ARTHROPLASTY       Social History     Socioeconomic History    Marital status:    Occupational History    Occupation: retired   Tobacco Use    Smoking status: Former     Types: Cigars     Quit date: 10/1/2023      "Years since quittin.0    Smokeless tobacco: Never    Tobacco comments:     4 cigars each day   Substance and Sexual Activity    Alcohol use: No    Drug use: No    Sexual activity: Not Currently     Partners: Female     Social Drivers of Health     Financial Resource Strain: Medium Risk (2024)    Overall Financial Resource Strain (CARDIA)     Difficulty of Paying Living Expenses: Somewhat hard   Food Insecurity: No Food Insecurity (2024)    Hunger Vital Sign     Worried About Running Out of Food in the Last Year: Never true     Ran Out of Food in the Last Year: Never true   Transportation Needs: No Transportation Needs (2024)    PRAPARE - Transportation     Lack of Transportation (Medical): No     Lack of Transportation (Non-Medical): No   Physical Activity: Unknown (2024)    Exercise Vital Sign     Days of Exercise per Week: 2 days   Stress: No Stress Concern Present (2024)    [employer] [place] [employer] - Occupational Stress Questionnaire     Feeling of Stress : Only a little   Housing Stability: Unknown (2024)    Housing Stability Vital Sign     Unable to Pay for Housing in the Last Year: No     Unstable Housing in the Last Year: No     family history includes Arthritis in his mother; Cancer in his father; Diabetes in his mother.  Review of Systems   Constitutional:  Negative for chills and fever.   HENT:  Negative for ear pain and sore throat.    Respiratory:  Negative for cough.    Cardiovascular:  Negative for chest pain.   Gastrointestinal:  Negative for abdominal pain and blood in stool.   Genitourinary:  Negative for dysuria and hematuria.   Neurological:  Negative for seizures and syncope.     Objective:   BP (!) 132/58 (BP Location: Left arm, Patient Position: Sitting)   Pulse 66   Temp 97 °F (36.1 °C) (Tympanic)   Ht 5' 8" (1.727 m)   Wt 100.1 kg (220 lb 10.9 oz)   SpO2 98%   BMI 33.55 kg/m²     Physical Exam  Constitutional:       General: He is not in " acute distress.     Appearance: He is well-developed.   HENT:      Head: Normocephalic and atraumatic.   Eyes:      Extraocular Movements: Extraocular movements intact.   Neck:      Thyroid: No thyromegaly.   Cardiovascular:      Rate and Rhythm: Normal rate and regular rhythm.      Pulses:           Dorsalis pedis pulses are 2+ on the right side and 2+ on the left side.   Pulmonary:      Breath sounds: Normal breath sounds. No wheezing or rales.   Abdominal:      General: Bowel sounds are normal.      Palpations: Abdomen is soft.      Tenderness: There is no abdominal tenderness.   Musculoskeletal:         General: No swelling.      Cervical back: Neck supple. No rigidity.   Feet:      Right foot:      Protective Sensation: 8 sites tested.  8 sites sensed.      Skin integrity: No ulcer or blister.      Left foot:      Protective Sensation: 8 sites tested.  8 sites sensed.      Skin integrity: No ulcer or blister.   Lymphadenopathy:      Cervical: No cervical adenopathy.   Skin:     General: Skin is warm and dry.   Neurological:      Mental Status: He is alert and oriented to person, place, and time.   Psychiatric:         Behavior: Behavior normal.         Lab Results   Component Value Date    WBC 7.58 07/30/2024    HGB 14.6 07/30/2024    HGB 13.0 (L) 05/10/2024    HGB 13.5 (L) 04/12/2024    HCT 43.8 07/30/2024    MCV 93 07/30/2024    MCV 91 05/10/2024    MCV 89 04/12/2024     07/30/2024    CHOL 123 02/01/2024    TRIG 181 (H) 02/01/2024    HDL 45 02/01/2024    LDLCALC 41.8 (L) 02/01/2024    LDLCALC 66.6 10/30/2023    LDLCALC 56.8 (L) 10/12/2022    ALT 16 08/05/2024    AST 11 08/05/2024     (L) 08/05/2024    K 4.6 08/05/2024    CALCIUM 9.6 08/05/2024     08/05/2024    CO2 26 08/05/2024    BUN 17 08/05/2024    CREATININE 1.2 08/05/2024    CREATININE 1.3 07/30/2024    CREATININE 1.1 05/10/2024    EGFRNORACEVR >60 08/05/2024    EGFRNORACEVR >60 07/30/2024    EGFRNORACEVR >60 05/10/2024    TSH 1.437  10/30/2023    TSH 1.195 10/12/2022    TSH 1.143 10/14/2021    PSA 0.25 10/30/2023    PSA 0.38 01/18/2022    PSA 0.17 09/02/2020     (H) 08/05/2024    HGBA1C 7.5 (H) 06/25/2024    HGBA1C 12.5 (H) 04/29/2024    HGBA1C 13.6 (H) 04/10/2024    FUKDKFZS62RZ 44 04/26/2017    PIFCRPCW54AQ 29 (L) 10/08/2015          The ASCVD Risk score (Mal DK, et al., 2019) failed to calculate for the following reasons:    The valid total cholesterol range is 130 to 320 mg/dL     Assessment:     1. Routine general medical examination at a health care facility    2. Controlled type 2 diabetes mellitus with hyperglycemia, without long-term current use of insulin    3. Hypertension associated with diabetes    4. Hyperlipidemia associated with type 2 diabetes mellitus    5. Moderate COPD (chronic obstructive pulmonary disease)    6. Coronary artery disease involving native coronary artery of native heart without angina pectoris    7. Aortic atherosclerosis    8. Iron deficiency anemia due to chronic blood loss    9. MACHO on CPAP    10. Tobacco abuse, in remission    11. Encounter for screening for malignant neoplasm of prostate    12. Controlled type 2 diabetes mellitus without complication, without long-term current use of insulin    13. Nicotine dependence      Plan:   1. Routine general medical examination at a health care facility  Overview:  Heart healthy diet, regular exercise, and regular use of sunscreen.   HM reviewed        2. Controlled type 2 diabetes mellitus with hyperglycemia, without long-term current use of insulin  Overview:        Assessment & Plan:  Controlled. Cont current meds.       Orders:  -     Comprehensive Metabolic Panel; Future; Expected date: 10/25/2024  -     CBC Auto Differential; Future; Expected date: 10/25/2024  -     Hemoglobin A1C; Future; Expected date: 10/25/2024    3. Hypertension associated with diabetes  Overview:  Controlled. Cont current meds.       Orders:  -     olmesartan (BENICAR) 40 MG  tablet; Take 1 tablet (40 mg total) by mouth once daily.  Dispense: 90 tablet; Refill: 1    4. Hyperlipidemia associated with type 2 diabetes mellitus  Overview:  Controlled. Cont current meds.       Orders:  -     rosuvastatin (CRESTOR) 20 MG tablet; Take 1 tablet (20 mg total) by mouth once daily.  Dispense: 90 tablet; Refill: 3  -     TSH; Future; Expected date: 10/25/2024  -     Lipid Panel; Future; Expected date: 10/25/2024    5. Moderate COPD (chronic obstructive pulmonary disease)  Overview:  FEV1 73% of predicted.   Trelegy      6. Coronary artery disease involving native coronary artery of native heart without angina pectoris  Overview:  MI 2012  Sees Dr. Orlando Reed.       7. Aortic atherosclerosis  Overview:  CT Chest 10/14/2019      8. Iron deficiency anemia due to chronic blood loss    9. MACHO on CPAP  Overview:  3/28/2018 PSG The overall apnea-hypopnea index (AHI) was 7.6 events/hr.   On Auto CPAP 4-10 cm  Nasal mask   HME: [facility]       10. Tobacco abuse, in remission    11. Encounter for screening for malignant neoplasm of prostate  -     PSA, Screening; Future; Expected date: 10/25/2024    12. Controlled type 2 diabetes mellitus without complication, without long-term current use of insulin  -     metFORMIN (GLUCOPHAGE) 1000 MG tablet; Take 1 tablet (1,000 mg total) by mouth 2 (two) times daily with meals.  Dispense: 180 tablet; Refill: 1  -     dapagliflozin propanediol (FARXIGA) 10 mg tablet; Take 1 tablet (10 mg total) by mouth once daily.  Dispense: 90 tablet; Refill: 2    13. Nicotine dependence  -     buPROPion (WELLBUTRIN SR) 150 MG TBSR 12 hr tablet; Take 1 tablet (150 mg total) by mouth 2 (two) times daily.  Dispense: 180 tablet; Refill: 2        There are no Patient Instructions on file for this visit.    Future Appointments   Date Time Provider Department Center   10/29/2024 11:00 AM Eli Madison MD [facility]   10/30/2024  9:00 AM Karen Sandra RN [facility]  DIBEDU [facility]   11/12/2024  7:35 AM LABORATORY, [facility] LAB [facility]   11/12/2024  8:30 AM Ginny Little NP [facility] DIABETE [facility]   2/18/2025 11:00 AM PULMONARY DISEASE MANAGEMENT SUM [facility] PULMFUN [facility]   4/25/2025  8:20 AM Wallace Fisher MD [facility] IM [facility]   6/11/2025 10:15 AM Stefan Eugene MD [facility] UROLOGY [facility]   8/8/2025  9:45 AM HGV[facility] CT1 LIMIT 500 LBS [facility] CT SCAN [facility]   8/8/2025 10:00 AM PULMONARY LAB, SUMMA [facility] PULMFUN [facility]   8/8/2025 10:40 AM Mazin Wells MD [facility] PULMSVC [facility]       Lab Frequency Next Occurrence   Basic Metabolic Panel Once 10/06/2023   Comprehensive Metabolic Panel Once 05/01/2024   CT Chest Lung Screening Low Dose Once 01/12/2024   CBC Auto Differential Once 08/14/2024   Comprehensive Metabolic Panel Once 08/14/2024   Ferritin Once 08/14/2024   Iron and TIBC Once 08/14/2024   CT Chest Without Contrast Once 08/21/2024   Microalbumin/creatinine urine ratio         Follow up in about 6 months (around 4/25/2025), or if symptoms worsen or fail to improve.  All labs on November 12th.

## 2024-10-29 ENCOUNTER — OFFICE VISIT (OUTPATIENT)
Dept: OPHTHALMOLOGY | Facility: CLINIC | Age: 69
End: 2024-10-29
Payer: MEDICARE

## 2024-10-29 DIAGNOSIS — Z98.42 CATARACT EXTRACTION STATUS OF EYE, LEFT: ICD-10-CM

## 2024-10-29 DIAGNOSIS — Z98.890 POST-OPERATIVE STATE: Primary | ICD-10-CM

## 2024-10-29 PROCEDURE — 99999 PR PBB SHADOW E&M-EST. PATIENT-LVL III: CPT | Mod: PBBFAC,,, | Performed by: STUDENT IN AN ORGANIZED HEALTH CARE EDUCATION/TRAINING PROGRAM

## 2024-10-30 ENCOUNTER — TELEPHONE (OUTPATIENT)
Dept: DIABETES | Facility: CLINIC | Age: 69
End: 2024-10-30

## 2024-10-30 ENCOUNTER — CLINICAL SUPPORT (OUTPATIENT)
Dept: DIABETES | Facility: CLINIC | Age: 69
End: 2024-10-30
Payer: MEDICARE

## 2024-10-30 VITALS — BODY MASS INDEX: 33.96 KG/M2 | WEIGHT: 223.31 LBS

## 2024-10-30 DIAGNOSIS — E11.65 UNCONTROLLED TYPE 2 DIABETES MELLITUS WITH HYPERGLYCEMIA, WITH LONG-TERM CURRENT USE OF INSULIN: ICD-10-CM

## 2024-10-30 DIAGNOSIS — E11.65 CONTROLLED TYPE 2 DIABETES MELLITUS WITH HYPERGLYCEMIA, WITHOUT LONG-TERM CURRENT USE OF INSULIN: Primary | ICD-10-CM

## 2024-10-30 DIAGNOSIS — Z79.4 UNCONTROLLED TYPE 2 DIABETES MELLITUS WITH HYPERGLYCEMIA, WITH LONG-TERM CURRENT USE OF INSULIN: ICD-10-CM

## 2024-10-30 PROCEDURE — G0108 DIAB MANAGE TRN  PER INDIV: HCPCS | Mod: S$GLB,,, | Performed by: PEDIATRICS

## 2024-10-30 PROCEDURE — 99999 PR PBB SHADOW E&M-EST. PATIENT-LVL III: CPT | Mod: PBBFAC,,,

## 2024-10-30 RX ORDER — INSULIN ASPART 100 [IU]/ML
7 INJECTION, SOLUTION INTRAVENOUS; SUBCUTANEOUS
Qty: 15 ML | Refills: 5 | Status: SHIPPED | OUTPATIENT
Start: 2024-10-30

## 2024-10-30 RX ORDER — HYDROCHLOROTHIAZIDE 12.5 MG/1
CAPSULE ORAL
Qty: 2 EACH | Refills: 11 | Status: SHIPPED | OUTPATIENT
Start: 2024-10-30

## 2024-11-08 ENCOUNTER — PATIENT MESSAGE (OUTPATIENT)
Dept: DIABETES | Facility: CLINIC | Age: 69
End: 2024-11-08
Payer: MEDICARE

## 2024-11-08 ENCOUNTER — PATIENT MESSAGE (OUTPATIENT)
Dept: PHARMACY | Facility: CLINIC | Age: 69
End: 2024-11-08
Payer: MEDICARE

## 2024-11-08 ENCOUNTER — TELEPHONE (OUTPATIENT)
Dept: PHARMACY | Facility: CLINIC | Age: 69
End: 2024-11-08
Payer: MEDICARE

## 2024-11-08 NOTE — TELEPHONE ENCOUNTER
We have reached out to Stefan Forbes Jr. to inform him of the [employer], and [employer] Patient Assistance Program re-enrollment process for Farxiga, Novolog Pens, Ozempic 2mg, Pen Needles, Trelegy Ellipta, and Trulance. Patient must provided the following documentation to re-apply for 2025: Proof of household Income( such as social security statement, 1099 form, pension statement or 3 consecutive pay stubs, Copy of all Insurance cards( front and back), and Completed Medication Access Center Authorization Forms         [facility]  Pharmacy Patient Assistance

## 2024-11-11 ENCOUNTER — PATIENT OUTREACH (OUTPATIENT)
Dept: ADMINISTRATIVE | Facility: HOSPITAL | Age: 69
End: 2024-11-11
Payer: MEDICARE

## 2024-11-11 DIAGNOSIS — E11.65 CONTROLLED TYPE 2 DIABETES MELLITUS WITH HYPERGLYCEMIA, WITHOUT LONG-TERM CURRENT USE OF INSULIN: Primary | ICD-10-CM

## 2024-11-11 NOTE — PROGRESS NOTES
DM LABS: per chart review pt is overdue for Ha1c/MICROALBUMIN, I placed order and linked Home collect microalbumin to lab appt 11.12.24

## 2024-11-12 ENCOUNTER — TELEPHONE (OUTPATIENT)
Dept: DIABETES | Facility: CLINIC | Age: 69
End: 2024-11-12

## 2024-11-12 ENCOUNTER — LAB VISIT (OUTPATIENT)
Dept: LAB | Facility: HOSPITAL | Age: 69
End: 2024-11-12
Attending: INTERNAL MEDICINE
Payer: MEDICARE

## 2024-11-12 ENCOUNTER — OFFICE VISIT (OUTPATIENT)
Dept: DIABETES | Facility: CLINIC | Age: 69
End: 2024-11-12
Payer: MEDICARE

## 2024-11-12 VITALS
WEIGHT: 222.69 LBS | BODY MASS INDEX: 33.86 KG/M2 | HEART RATE: 61 BPM | SYSTOLIC BLOOD PRESSURE: 138 MMHG | DIASTOLIC BLOOD PRESSURE: 65 MMHG

## 2024-11-12 DIAGNOSIS — R91.1 PULMONARY NODULE: ICD-10-CM

## 2024-11-12 DIAGNOSIS — I15.2 HYPERTENSION ASSOCIATED WITH DIABETES: ICD-10-CM

## 2024-11-12 DIAGNOSIS — Z79.4 UNCONTROLLED TYPE 2 DIABETES MELLITUS WITH HYPERGLYCEMIA, WITH LONG-TERM CURRENT USE OF INSULIN: Primary | ICD-10-CM

## 2024-11-12 DIAGNOSIS — Z12.5 ENCOUNTER FOR SCREENING FOR MALIGNANT NEOPLASM OF PROSTATE: ICD-10-CM

## 2024-11-12 DIAGNOSIS — E78.5 HYPERLIPIDEMIA ASSOCIATED WITH TYPE 2 DIABETES MELLITUS: ICD-10-CM

## 2024-11-12 DIAGNOSIS — E11.65 UNCONTROLLED TYPE 2 DIABETES MELLITUS WITH HYPERGLYCEMIA, WITH LONG-TERM CURRENT USE OF INSULIN: Primary | ICD-10-CM

## 2024-11-12 DIAGNOSIS — E11.65 CONTROLLED TYPE 2 DIABETES MELLITUS WITH HYPERGLYCEMIA, WITHOUT LONG-TERM CURRENT USE OF INSULIN: ICD-10-CM

## 2024-11-12 DIAGNOSIS — E11.59 HYPERTENSION ASSOCIATED WITH DIABETES: ICD-10-CM

## 2024-11-12 DIAGNOSIS — I25.10 CORONARY ARTERY DISEASE INVOLVING NATIVE CORONARY ARTERY OF NATIVE HEART WITHOUT ANGINA PECTORIS: ICD-10-CM

## 2024-11-12 DIAGNOSIS — J44.9 MODERATE COPD (CHRONIC OBSTRUCTIVE PULMONARY DISEASE): ICD-10-CM

## 2024-11-12 DIAGNOSIS — Z79.4 UNCONTROLLED TYPE 2 DIABETES MELLITUS WITH HYPERGLYCEMIA, WITH LONG-TERM CURRENT USE OF INSULIN: ICD-10-CM

## 2024-11-12 DIAGNOSIS — E11.69 HYPERLIPIDEMIA ASSOCIATED WITH TYPE 2 DIABETES MELLITUS: ICD-10-CM

## 2024-11-12 DIAGNOSIS — E11.65 UNCONTROLLED TYPE 2 DIABETES MELLITUS WITH HYPERGLYCEMIA, WITH LONG-TERM CURRENT USE OF INSULIN: ICD-10-CM

## 2024-11-12 DIAGNOSIS — Z86.711 HISTORY OF PULMONARY EMBOLISM: ICD-10-CM

## 2024-11-12 DIAGNOSIS — E55.9 VITAMIN D DEFICIENCY: ICD-10-CM

## 2024-11-12 LAB
ALBUMIN SERPL BCP-MCNC: 3.9 G/DL (ref 3.5–5.2)
ALP SERPL-CCNC: 53 U/L (ref 40–150)
ALT SERPL W/O P-5'-P-CCNC: 14 U/L (ref 10–44)
ANION GAP SERPL CALC-SCNC: 10 MMOL/L (ref 8–16)
AST SERPL-CCNC: 12 U/L (ref 10–40)
BASOPHILS # BLD AUTO: 0.05 K/UL (ref 0–0.2)
BASOPHILS NFR BLD: 0.8 % (ref 0–1.9)
BILIRUB SERPL-MCNC: 0.3 MG/DL (ref 0.1–1)
BUN SERPL-MCNC: 18 MG/DL (ref 8–23)
CALCIUM SERPL-MCNC: 9.5 MG/DL (ref 8.7–10.5)
CHLORIDE SERPL-SCNC: 102 MMOL/L (ref 95–110)
CHOLEST SERPL-MCNC: 121 MG/DL (ref 120–199)
CHOLEST/HDLC SERPL: 3.1 {RATIO} (ref 2–5)
CO2 SERPL-SCNC: 27 MMOL/L (ref 23–29)
COMPLEXED PSA SERPL-MCNC: 0.25 NG/ML (ref 0–4)
CREAT SERPL-MCNC: 1.2 MG/DL (ref 0.5–1.4)
DIFFERENTIAL METHOD BLD: ABNORMAL
EOSINOPHIL # BLD AUTO: 0.2 K/UL (ref 0–0.5)
EOSINOPHIL NFR BLD: 3.3 % (ref 0–8)
ERYTHROCYTE [DISTWIDTH] IN BLOOD BY AUTOMATED COUNT: 13.2 % (ref 11.5–14.5)
EST. GFR  (NO RACE VARIABLE): >60 ML/MIN/1.73 M^2
ESTIMATED AVG GLUCOSE: 166 MG/DL (ref 68–131)
GLUCOSE SERPL-MCNC: 129 MG/DL (ref 70–110)
GLUCOSE SERPL-MCNC: 129 MG/DL (ref 70–110)
HBA1C MFR BLD: 7.4 % (ref 4–5.6)
HCT VFR BLD AUTO: 46.8 % (ref 40–54)
HDLC SERPL-MCNC: 39 MG/DL (ref 40–75)
HDLC SERPL: 32.2 % (ref 20–50)
HGB BLD-MCNC: 14.3 G/DL (ref 14–18)
IMM GRANULOCYTES # BLD AUTO: 0.03 K/UL (ref 0–0.04)
IMM GRANULOCYTES NFR BLD AUTO: 0.5 % (ref 0–0.5)
LDLC SERPL CALC-MCNC: 62.4 MG/DL (ref 63–159)
LYMPHOCYTES # BLD AUTO: 1.7 K/UL (ref 1–4.8)
LYMPHOCYTES NFR BLD: 25.5 % (ref 18–48)
MCH RBC QN AUTO: 30.2 PG (ref 27–31)
MCHC RBC AUTO-ENTMCNC: 30.6 G/DL (ref 32–36)
MCV RBC AUTO: 99 FL (ref 82–98)
MONOCYTES # BLD AUTO: 0.7 K/UL (ref 0.3–1)
MONOCYTES NFR BLD: 10.2 % (ref 4–15)
NEUTROPHILS # BLD AUTO: 4 K/UL (ref 1.8–7.7)
NEUTROPHILS NFR BLD: 59.7 % (ref 38–73)
NONHDLC SERPL-MCNC: 82 MG/DL
NRBC BLD-RTO: 0 /100 WBC
PLATELET # BLD AUTO: 242 K/UL (ref 150–450)
PMV BLD AUTO: 10.7 FL (ref 9.2–12.9)
POTASSIUM SERPL-SCNC: 5.3 MMOL/L (ref 3.5–5.1)
PROT SERPL-MCNC: 7 G/DL (ref 6–8.4)
RBC # BLD AUTO: 4.73 M/UL (ref 4.6–6.2)
SODIUM SERPL-SCNC: 139 MMOL/L (ref 136–145)
TRIGL SERPL-MCNC: 98 MG/DL (ref 30–150)
TSH SERPL DL<=0.005 MIU/L-ACNC: 1.5 UIU/ML (ref 0.4–4)
WBC # BLD AUTO: 6.6 K/UL (ref 3.9–12.7)

## 2024-11-12 PROCEDURE — 80061 LIPID PANEL: CPT | Performed by: INTERNAL MEDICINE

## 2024-11-12 PROCEDURE — 99214 OFFICE O/P EST MOD 30 MIN: CPT | Mod: S$GLB,,, | Performed by: NURSE PRACTITIONER

## 2024-11-12 PROCEDURE — 3288F FALL RISK ASSESSMENT DOCD: CPT | Mod: CPTII,S$GLB,, | Performed by: NURSE PRACTITIONER

## 2024-11-12 PROCEDURE — 84153 ASSAY OF PSA TOTAL: CPT | Performed by: INTERNAL MEDICINE

## 2024-11-12 PROCEDURE — 99999 PR PBB SHADOW E&M-EST. PATIENT-LVL V: CPT | Mod: PBBFAC,,, | Performed by: NURSE PRACTITIONER

## 2024-11-12 PROCEDURE — 1159F MED LIST DOCD IN RCRD: CPT | Mod: CPTII,S$GLB,, | Performed by: NURSE PRACTITIONER

## 2024-11-12 PROCEDURE — 83036 HEMOGLOBIN GLYCOSYLATED A1C: CPT | Performed by: INTERNAL MEDICINE

## 2024-11-12 PROCEDURE — 1126F AMNT PAIN NOTED NONE PRSNT: CPT | Mod: CPTII,S$GLB,, | Performed by: NURSE PRACTITIONER

## 2024-11-12 PROCEDURE — 3075F SYST BP GE 130 - 139MM HG: CPT | Mod: CPTII,S$GLB,, | Performed by: NURSE PRACTITIONER

## 2024-11-12 PROCEDURE — 3008F BODY MASS INDEX DOCD: CPT | Mod: CPTII,S$GLB,, | Performed by: NURSE PRACTITIONER

## 2024-11-12 PROCEDURE — 84443 ASSAY THYROID STIM HORMONE: CPT | Performed by: INTERNAL MEDICINE

## 2024-11-12 PROCEDURE — 3078F DIAST BP <80 MM HG: CPT | Mod: CPTII,S$GLB,, | Performed by: NURSE PRACTITIONER

## 2024-11-12 PROCEDURE — 4010F ACE/ARB THERAPY RXD/TAKEN: CPT | Mod: CPTII,S$GLB,, | Performed by: NURSE PRACTITIONER

## 2024-11-12 PROCEDURE — 85025 COMPLETE CBC W/AUTO DIFF WBC: CPT | Performed by: INTERNAL MEDICINE

## 2024-11-12 PROCEDURE — 95251 CONT GLUC MNTR ANALYSIS I&R: CPT | Mod: S$GLB,,, | Performed by: NURSE PRACTITIONER

## 2024-11-12 PROCEDURE — 1160F RVW MEDS BY RX/DR IN RCRD: CPT | Mod: CPTII,S$GLB,, | Performed by: NURSE PRACTITIONER

## 2024-11-12 PROCEDURE — 3051F HG A1C>EQUAL 7.0%<8.0%: CPT | Mod: CPTII,S$GLB,, | Performed by: NURSE PRACTITIONER

## 2024-11-12 PROCEDURE — 82962 GLUCOSE BLOOD TEST: CPT | Mod: S$GLB,,, | Performed by: NURSE PRACTITIONER

## 2024-11-12 PROCEDURE — G2211 COMPLEX E/M VISIT ADD ON: HCPCS | Mod: S$GLB,,, | Performed by: NURSE PRACTITIONER

## 2024-11-12 PROCEDURE — 1101F PT FALLS ASSESS-DOCD LE1/YR: CPT | Mod: CPTII,S$GLB,, | Performed by: NURSE PRACTITIONER

## 2024-11-12 PROCEDURE — 80053 COMPREHEN METABOLIC PANEL: CPT | Performed by: INTERNAL MEDICINE

## 2024-11-12 RX ORDER — BLOOD SUGAR DIAGNOSTIC
STRIP MISCELLANEOUS
Qty: 500 EACH | Refills: 3 | Status: SHIPPED | OUTPATIENT
Start: 2024-11-12

## 2024-11-12 RX ORDER — INSULIN ASPART 100 [IU]/ML
7 INJECTION, SOLUTION INTRAVENOUS; SUBCUTANEOUS
Qty: 45 ML | Refills: 3 | Status: SHIPPED | OUTPATIENT
Start: 2024-11-12

## 2024-11-12 RX ORDER — SEMAGLUTIDE 2.68 MG/ML
2 INJECTION, SOLUTION SUBCUTANEOUS
Qty: 3 EACH | Refills: 3 | Status: SHIPPED | OUTPATIENT
Start: 2024-11-12

## 2024-11-12 RX ORDER — INSULIN DEGLUDEC 200 U/ML
18 INJECTION, SOLUTION SUBCUTANEOUS DAILY
Qty: 12 PEN | Refills: 3 | Status: SHIPPED | OUTPATIENT
Start: 2024-11-12

## 2024-11-12 NOTE — PROGRESS NOTES
Subjective:         Patient ID: Stefan Forbes Jr. is a 69 y.o. male.  Patient's current PCP is Wallace Fisher MD.     Chief Complaint: Diabetes Mellitus    HPI  Stefan Forbes Jr. is a 69 y.o. White male presenting for a follow up for diabetes. Patient has been diagnosed with type 2 diabetes since 2012 .  Received diabetes education: Yes-[facility], 07/2024    CURRENT DM MEDICATIONS:   Diabetes Medications               dapagliflozin propanediol (FARXIGA) 10 mg tablet Take 1 tablet (10 mg total) by mouth once daily.    insulin aspart U-100 (NOVOLOG FLEXPEN U-100 INSULIN) 100 unit/mL (3 mL) InPn pen Inject 7 Units into the skin 3 (three) times daily with meals. Add sliding scale if needed.    insulin degludec (TRESIBA FLEXTOUCH U-200) 200 unit/mL (3 mL) insulin pen Inject 18 Units into the skin once daily. Increase by 2 units every 2 days until fasting sugar is below 130. Stop at 50 units. 26 units     metFORMIN (GLUCOPHAGE) 1000 MG tablet Take 1 tablet (1,000 mg total) by mouth 2 (two) times daily with meals.    semaglutide (OZEMPIC) 1 mg/dose (4 mg/3 mL) Inject 1 mg into the skin every 7 days. Not taking    semaglutide (OZEMPIC) 2 mg/dose (8 mg/3 mL) PnIj Inject 2 mg into the skin every 7 days. Not taking      Past failed treatment include: Levemir-failure to control diabetes    Blood glucose testing Continuous per Griselda 3 Plus  Preferred lab: [place]    Any episodes of hypoglycemia? 0% per Griselda    Complications related to diabetes: cardiovascular disease    His blood sugar in the clinic today was:   Lab Results   Component Value Date    POCGLU 129 (A) 11/12/2024     Stefan Forbes Jr. presents today for follow up visit to discuss diabetes management.     Between visits, Novolog initiated. Glycemic control slowly improving. He takes insulin consistently. He reports one episode of mild hypoglycemia associated with increased activity and skipped meal. He continues to stress eat after dinner - chips,cookies,etc.  "    He has not heard anything regarding patient assistance for Ozempic.     Per Griselda download, for the last 14 days:  Average glucose of 165 mg/dL. He is within target range 66% of the time. 20% of readings are high, with 14% of readings > 250. 0% hypoglycemia. Estimated GMI 7.3%. Pattern of late night hyperglycemia, associated with late night snacks. Daytime readings are stable. Based on review, will initiate an extra dose of Novolog when carb snacks are eaten following dinner.           Patient assistance-Farxiga.    Current diet: 2 meals/day, 1 snack/day  Activity Level: No structured exercise    Lab Results   Component Value Date    HGBA1C 7.5 (H) 06/25/2024    HGBA1C 12.5 (H) 04/29/2024    HGBA1C 13.6 (H) 04/10/2024     STANDARDS OF CARE  Diabetes Management Status    Statin: Taking  ACE/ARB: Taking    Screening or Prevention Patient's value Goal Complete/Controlled?   HgA1C Testing and Control   Lab Results   Component Value Date    HGBA1C 7.5 (H) 06/25/2024      Annually/Less than 8% Yes   Lipid profile : 02/01/2024 Annually Yes   LDL control Lab Results   Component Value Date    LDLCALC 41.8 (L) 02/01/2024    Annually/Less than 100 mg/dl  Yes   Nephropathy screening Lab Results   Component Value Date    LABMICR 26.0 10/30/2023     Lab Results   Component Value Date    PROTEINUA Negative 04/10/2024     No results found for: "UTPCR"   Annually Yes   Blood pressure BP Readings from Last 1 Encounters:   11/12/24 138/65    Less than 140/90 Yes   Dilated retinal exam : 07/11/2024 Annually Yes   Foot exam   : 10/25/2024 Annually Yes      Labs reviewed and are noted below.    Lab Results   Component Value Date    WBC 7.58 07/30/2024    HGB 14.6 07/30/2024    HCT 43.8 07/30/2024     07/30/2024    CHOL 123 02/01/2024    TRIG 181 (H) 02/01/2024    HDL 45 02/01/2024    LDLCALC 41.8 (L) 02/01/2024    ALT 16 08/05/2024    AST 11 08/05/2024     (L) 08/05/2024    K 4.6 08/05/2024     08/05/2024    " "ANIONGAP 8 2024    CREATININE 1.2 2024    ESTGFRAFRICA >60.0 10/14/2021    EGFRNONAA >60.0 10/14/2021    BUN 17 2024    CO2 26 2024    TSH 1.437 10/30/2023    PSA 0.25 10/30/2023    INR 0.9 10/04/2017     (H) 2024     Lab Results   Component Value Date    TSH 1.437 10/30/2023    IRON 102 2024    TIBC 354 2024    FERRITIN 167 2024    GJTAWUVE10 1226 (H) 2024    CALCIUM 9.6 2024    PHOS 2.7 2024     No results found for: "CPEPTIDE"  No results found for: "GLUTAMICACID"  Glucose   Date Value Ref Range Status   2024 160 (H) 70 - 110 mg/dL Final     Anion Gap   Date Value Ref Range Status   2024 8 8 - 16 mmol/L Final     eGFR if    Date Value Ref Range Status   10/14/2021 >60.0 >60 mL/min/1.73 m^2 Final     eGFR if non    Date Value Ref Range Status   10/14/2021 >60.0 >60 mL/min/1.73 m^2 Final     Comment:     Calculation used to obtain the estimated glomerular filtration  rate (eGFR) is the CKD-EPI equation.        The following portions of the patient's history were reviewed and updated as appropriate: allergies, current medications, past family history, past medical history, past social history, past surgical history, and problem list.    Review of patient's allergies indicates:  No Known Allergies  Social History     Socioeconomic History    Marital status:    Occupational History    Occupation: retired   Tobacco Use    Smoking status: Former     Types: Cigars     Quit date: 10/1/2023     Years since quittin.1    Smokeless tobacco: Never    Tobacco comments:     4 cigars each day   Substance and Sexual Activity    Alcohol use: No    Drug use: No    Sexual activity: Not Currently     Partners: Female     Social Drivers of Health     Financial Resource Strain: Medium Risk (2024)    Overall Financial Resource Strain (CARDIA)     Difficulty of Paying Living Expenses: Somewhat hard   Food " Insecurity: No Food Insecurity (8/7/2024)    Hunger Vital Sign     Worried About Running Out of Food in the Last Year: Never true     Ran Out of Food in the Last Year: Never true   Transportation Needs: No Transportation Needs (4/11/2024)    PRAPARE - Transportation     Lack of Transportation (Medical): No     Lack of Transportation (Non-Medical): No   Physical Activity: Unknown (8/7/2024)    Exercise Vital Sign     Days of Exercise per Week: 2 days   Stress: No Stress Concern Present (8/7/2024)    [employer] [employer] - Occupational Stress Questionnaire     Feeling of Stress : Only a little   Housing Stability: Unknown (4/11/2024)    Housing Stability Vital Sign     Unable to Pay for Housing in the Last Year: No     Unstable Housing in the Last Year: No     Past Medical History:   Diagnosis Date    Arthritis     Cataract     COPD (chronic obstructive pulmonary disease)     Diabetes mellitus, type 2     Hyperlipidemia     Hypertension     Personal history of colonic polyps 2018     REVIEW OF SYSTEMS:  Eyes No history of DR.  Cardiovascular: History of CAD, HTN and HLD.  GI: Tolerating Trulicity well without GI side effects.   : No CKD.  Neuro: No neuropathy.  PSYCH: No tobacco use.  ENDO: See HPI.        Objective:      Vitals:    11/12/24 0816   BP: 138/65   Pulse: 61   RESPIRATORY: No respiratory distress  NEUROLOGIC: Cranial nerves II-XII grossly intact.   PSYCHIATRIC: Alert & oriented x3. Normal mood and affect.  FOOT EXAMINATION: UTD  Assessment:       1. Uncontrolled type 2 diabetes mellitus with hyperglycemia, with long-term current use of insulin    2. Hypertension associated with diabetes    3. Hyperlipidemia associated with type 2 diabetes mellitus    4. Moderate COPD (chronic obstructive pulmonary disease)    5. Pulmonary nodule    6. Coronary artery disease involving native coronary artery of native heart without angina pectoris    7. Vitamin D deficiency    8. History of pulmonary  "embolism          Plan:   Stefan was seen today for diabetes mellitus.    Diagnoses and all orders for this visit:    Uncontrolled type 2 diabetes mellitus with hyperglycemia, with long-term current use of insulin  -     POCT Glucose, Hand-Held Device    Chronic -     Medication Regimen:   Continue Farxiga 10 mg every morning  If you are able to re-start Ozempic, let me know and I will advise on how to increase the dose slowly. We will also need to discuss adjustment of insulin doses.  Continue Metformin 1000 mg, 1 tablet twice daily with meals  Continue Tresiba 26 units once daily.   Continue Novolog 7 units before each main meal. If you have your carb snacks after dinner, go ahead and dose an additional 7 units.     Continuous glucose monitoring report:    The patient's CGM was downloaded and was reviewed for the last 14 days. See HPI for interpretation & plan.      Hypertension associated with diabetes    Hyperlipidemia associated with type 2 diabetes mellitus    Moderate COPD (chronic obstructive pulmonary disease)    Pulmonary nodule    Coronary artery disease involving native coronary artery of native heart without angina pectoris    Vitamin D deficiency    History of pulmonary embolism        - Follow up: 3 months    Portions of this note may have been created with voice recognition software. Occasional "wrong-word" or "sound-a-like" substitutions may have occurred due to the inherent limitations of voice recognition software. Please, read the note carefully and recognize, using context, where substitutions have occurred.           DAKSHA Shipley, BC-ADM  [facility]  "

## 2024-11-12 NOTE — TELEPHONE ENCOUNTER
----- Message from Priti Powers sent at 11/12/2024  1:26 PM CST -----  Regarding: Tresiba FlexTouch 200u Ozempic 2mg NovoLog FlexTouch 100u Patient Assistance Program  [facility] has received medication from [facility] patient assistance program for your patient Stefan Forbes  ([id number]).  At your earliest convenience please send to [facility] escript for.  1. Tresiba FlexTouch 200u (qty 12 pens)  2. Ozempic 2mg (qty 3 pens)  3. NovoLog FlexPen 100u (qty 15 pens)  Thanks

## 2024-11-12 NOTE — TELEPHONE ENCOUNTER
"----- Message from Pharmacist Aristides sent at 11/12/2024  1:11 PM CST -----  Regarding: [employer] Patient Assistance Program  [facility] has received medication from [employer] patient assistance program for your patient Stefan Forbes  ([phone]).  At your earliest convenience please send to [facility] escript for Novofine 32G 1/4" pen needles x 5 boxes.  Thanks, Aristides Gonzalez.  "

## 2024-11-12 NOTE — PATIENT INSTRUCTIONS
Medication Regimen:   Continue Farxiga 10 mg every morning  If you are able to re-start Ozempic, let me know and I will advise on how to increase the dose slowly. We will also need to discuss adjustment of insulin doses.  Continue Metformin 1000 mg, 1 tablet twice daily with meals  Continue Tresiba 26 units once daily.   Continue Novolog 7 units before each main meal. If you have your carb snacks after dinner, go ahead and dose an additional 7 units.     Patient Instructions:  Carbohydrate Count: 30-45 grams/meal and 15 grams/snack with limit of 2 snacks per day.  Exercise: Goal is 150 minutes or more per week.  Bring meter and blood sugar log to each appointment.     Goals for Blood Sugar:  Fastin-130 mg/dl  2 hours after meal:  mg/dl  Before Bed: 100-150 mg/dl  If Blood sugar is below 70 mg/dl, DO NOT take diabetes medication. Eat first and then recheck blood sugar in 20 minutes.  Foods to eat if blood sugar is below 70 mg/dl  1/2 glass of orange juice   1/2 regular cola can   3-4 hard candies   3-4 small glucose tabs.   Foods to eat if blood sugar is below 50 mg/dl  1 glass of orange juice  1 regular cola can  8 hard candies  8 small glucose tabs.   If you have repeated eating/blood sugar recheck process 2 times and blood sugar still below 70 mg/dl, call 911.                                                           \

## 2024-12-04 ENCOUNTER — CLINICAL SUPPORT (OUTPATIENT)
Dept: DIABETES | Facility: CLINIC | Age: 69
End: 2024-12-04
Payer: MEDICARE

## 2024-12-04 ENCOUNTER — PATIENT MESSAGE (OUTPATIENT)
Dept: DIABETES | Facility: CLINIC | Age: 69
End: 2024-12-04

## 2024-12-04 VITALS — BODY MASS INDEX: 34.63 KG/M2 | WEIGHT: 227.75 LBS

## 2024-12-04 DIAGNOSIS — E11.65 CONTROLLED TYPE 2 DIABETES MELLITUS WITH HYPERGLYCEMIA, WITHOUT LONG-TERM CURRENT USE OF INSULIN: Primary | ICD-10-CM

## 2024-12-04 PROCEDURE — 99999 PR PBB SHADOW E&M-EST. PATIENT-LVL III: CPT | Mod: PBBFAC,,,

## 2024-12-04 PROCEDURE — G0108 DIAB MANAGE TRN  PER INDIV: HCPCS | Mod: S$GLB,,, | Performed by: PEDIATRICS

## 2024-12-04 NOTE — PROGRESS NOTES
Diabetes Care Specialist Follow-up Note  Author: Karen Sandra RN  Date: 12/4/2024    Intake    Program Intake  Reason for Diabetes Program Visit:: Intervention  Type of Intervention:: Individual  Individual: Education  Education: Self-Management Skill Review  Current diabetes risk level:: moderate  In the last month, have you used the ER or been admitted to the hospital: No  Permission to speak with others about care:: no    Current Diabetes Treatment: Oral Medications, DM Injectables, Insulin  Oral Medication Type/Dose: Farxiga 10 mg daily. Metformin 1,000 mg BID.  DM Injectables Type/Dose: Ozempic 2 mg weekly (has not started; plans to start today).  Method of insulin delivery?: Injections  Injection Type: Pens  Pen Type/Dose: Tresiba 26 units in the evening. Novolog 7 units TID AC (not taking). Novolog SS Q3H outside of eating (taking).    Continuous Glucose Monitoring  Patient has CGM: Yes  Personal CGM type:: Freestyle Griselda 3  GMI Date: 12/04/24  GMI Value: 7.9 %    Lab Results   Component Value Date    HGBA1C 7.4 (H) 11/12/2024     A1c Pre Diabetes Care Specialist Intervention:  7.5%    Weight: 103.3 kg (227 lb 11.8 oz)   Body mass index is 34.63 kg/m².  Wt increase of 4 lbs since last visit on 10/30/2024    Physical activity/Exercise:   See care plan for update.    [facility]: Griselda 3. Report in media.       Diabetes Self-Management Skills Assessment    Home Blood Glucose Monitoring  Personal CGM type:: Freestyle Griselda 3    Chronic Complications  Chronic Complications Skills Assessment Completed: : No  Deferred due to:: Time  Area of need?: Deferred      During today's follow-up visit,  the following areas required further assessment and content was provided/reviewed.    Based on today's diabetes care assessment, the following areas of need were identified:      Identified Areas of Need      Medication/Current Diabetes Treatment: See care plan for update.     Nutrition/Healthy Eating: See care plan for  update.      Physical Activity/Exercise: See care plan for update.      Healthy Coping: See care plan for update.     Chronic Complications: Deferred.         Today's interventions were provided through individual discussion, instruction, and written materials were provided.    Patient verbalized understanding of instruction and written materials.  Pt was able to return back demonstration of instructions today. Patient understood key points, needs reinforcement and further instruction.     Diabetes Self-Management Care Plan Review and Evaluation of Progress:    During today's follow-up Stefan's Diabetes Self-Management Care Plan progress was reviewed and progress was evaluated including his/her input. Stefan has agreed to continue his/her journey to improve/maintain overall diabetes control by continuing to set health goals. See care plan progress below.      Care Plan: Diabetes Management   Updates made since 12/5/2023 12:00 AM        Problem: Healthy Eating         Goal: Eat 3 meals daily with 45-60 grams of carbs per meal and 0-15 grams of carbs per snack.    Start Date: 9/30/2024   Expected End Date: 9/30/2025   Recent Progress: No change   Priority: High   Barriers: Lack of Motivation to Change   Note:    Stress eating is one of his biggest barriers to healthy eating.  Re-educated on the importance of balancing carbs with protein and/or healthy fat.   Educated on protein and fat help him feel saldaña so he is less likely to overeat the carbs.   Educated on the importance of consistency including eating breakfast, lunch, and dinner with snacks in between if needed.   Educated that if he eats lunch he may be less likely to over eat at dinner and in the evening.   Provided with 25 healthy snack ideas.     10/30/24: Re-educated on all the above information. Encouraged 3 meals daily with snacks in between. Encouraged pre-making snacks so he's less likely to overeat.     12/04/24: Due to high stress, has been eating out  for most of meals. Re-educated on importance of smaller portions and balanced meals.        Task: Reviewed the sources and role of Carbohydrate, Protein, and Fat and how each nutrient impacts blood sugar. Completed 9/30/2024        Task: Provided visual examples using dry measuring cups, food models, and other familiar objects such as computer mouse, deck or cards, tennis ball etc. to help with visualization of portions.         Task: Explained how to count carbohydrates using the food label and the use of dry measuring cups for accurate carb counting. Completed 9/30/2024        Task: Discussed strategies for choosing healthier menu options when dining out.         Task: Review the importance of balancing carbohydrates with each meal using portion control techniques to count servings of carbohydrate and label reading to identify serving size and amount of total carbs per serving. Completed 9/30/2024        Task: Provided Sample plate method and reviewed the use of the plate to estimate amounts of carbohydrate per meal. Completed 9/30/2024        Problem: Physical Activity and Exercise         Goal: Patient agrees to increase physical activity to a goal of 1-4x/week for 30-60 minutes.    Start Date: 9/30/2024   Expected End Date: 9/30/2025   Recent Progress: Deferred   Priority: Medium   Barriers: No Barriers Identified   Note:    Most active in the AM.   Plans to go to the gym before work.     12/04/24: Since weather is cooling down, he is going outside more and doing activities around the house.        Task: Discussed role of physical activity on reducing insulin resistance and improvement in overall glycemic control. Completed 9/30/2024        Task: Discussed role of physical activity as it relates to weight loss Completed 9/30/2024        Task: Offered suggestions on how patient could increase their regular physical activity Completed 9/30/2024        Task: Reviewed blood glucose monitoring before, during and  after exercise/activity Completed 9/30/2024        Problem: Psychosocial/Healthy Coping         Goal: Patient agrees to identify and practice at least one healthy coping/self-care behavior each day.    Start Date: 10/30/2024   Expected End Date: 9/30/2025   This Visit's Progress: On track   Note:    Pt states one of his biggest barriers to healthy eating is stress eating.   Encouraged finding alternate outlets to stress such as working in shed, going for walk, crossword puzzles, watching TV, etc.     12/04/24: Now involved in family counseling.        Task: Discussed strategies for healthy coping with chronic disease. Completed 10/30/2024        Problem: Medications         Goal: Patient agrees to take Novolog as prescribed.    Start Date: 10/30/2024   Expected End Date: 9/30/2025   This Visit's Progress: No change   Note:    Taking 7 units of Novolog TID AC but may benefit from a SS.  Educated on SS insulin.   Will discuss with EUSEBIO Gross.    12/04/24: Has not been taking Novolog before meals because he thought the SS insulin replaced the meal time insulin. Sent message with medication instructions.       Task: Reviewed with patient all current diabetes medications and provided basic review of the purpose, dosage, frequency, side effects, and storage of both oral and injectable diabetes medications. Completed 10/30/2024        Task: Discussed guidelines for preventing, detecting and treating hypoglycemia and hyperglycemia and reviewed the importance of meal and medication timing with diabetes mediations for prevention of hypoglycemia and maximum drug benefit. Completed 10/30/2024          Follow Up Plan     Follow up in about 1 month (around 1/4/2025) for review of Griselda and of goals.    Today's care plan and follow up schedule was discussed with patient.  Stefan verbalized understanding of the care plan, goals, and agrees to follow up plan.        The patient was encouraged to communicate with his/her health care  provider/physician and care team regarding his/her condition(s) and treatment.  I provided the patient with my contact information today and encouraged to contact me via phone or [facility] Patient Portal as needed.     Length of Visit   Total Time: 45 Minutes

## 2024-12-23 ENCOUNTER — TELEPHONE (OUTPATIENT)
Dept: PULMONOLOGY | Facility: CLINIC | Age: 69
End: 2024-12-23
Payer: MEDICARE

## 2024-12-23 ENCOUNTER — PATIENT OUTREACH (OUTPATIENT)
Dept: PULMONOLOGY | Facility: CLINIC | Age: 69
End: 2024-12-23
Payer: MEDICARE

## 2024-12-23 ENCOUNTER — PATIENT MESSAGE (OUTPATIENT)
Dept: DIABETES | Facility: CLINIC | Age: 69
End: 2024-12-23
Payer: MEDICARE

## 2024-12-23 DIAGNOSIS — J44.9 MODERATE COPD (CHRONIC OBSTRUCTIVE PULMONARY DISEASE): ICD-10-CM

## 2024-12-23 DIAGNOSIS — J44.1 COPD EXACERBATION: Primary | ICD-10-CM

## 2024-12-23 RX ORDER — METHYLPREDNISOLONE 4 MG/1
TABLET ORAL
Qty: 21 TABLET | Refills: 0 | Status: SHIPPED | OUTPATIENT
Start: 2024-12-23

## 2024-12-23 RX ORDER — ALBUTEROL SULFATE 90 UG/1
2 INHALANT RESPIRATORY (INHALATION) EVERY 6 HOURS PRN
Qty: 25.5 G | Refills: 3 | Status: SHIPPED | OUTPATIENT
Start: 2024-12-23 | End: 2025-12-23

## 2024-12-23 RX ORDER — DOXYCYCLINE 100 MG/1
100 CAPSULE ORAL EVERY 12 HOURS
Qty: 20 CAPSULE | Refills: 1 | Status: SHIPPED | OUTPATIENT
Start: 2024-12-23

## 2024-12-23 NOTE — TELEPHONE ENCOUNTER
Mr. Forbes states he is on 0.5 mg of Ozempic and only taking 7 units of Novolog before dinner. Will discuss with EUSEBIO Gross any medication adjustments she would like to make. Successful call.     Ginny,  Do you want to make any adjustments to his Novolog, or are you okay with him only taking 7 units before dinner?  Griselda report in media for review.     Thanks,  IVETT Jones

## 2024-12-23 NOTE — TELEPHONE ENCOUNTER
----- Message from Nurse Velasco sent at 12/23/2024  1:12 PM CST -----  Regarding: FW: cough and congestion  Please see below. Pt states that he is using albuterol frequently and is experiencing thick, clear mucus. 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 The patient has increase cough and congestion. Mucus is thick and clear. Using albuterol more frequently (2 times daily). He would like something sent to pharmacy.

## 2024-12-23 NOTE — TELEPHONE ENCOUNTER
Griselda reviewed and most recent data looks very stable- I am OK with him continuing the Novolog as current!

## 2024-12-30 ENCOUNTER — HOSPITAL ENCOUNTER (EMERGENCY)
Facility: HOSPITAL | Age: 69
Discharge: HOME | End: 2024-12-30
Attending: EMERGENCY MEDICINE
Payer: MEDICARE

## 2024-12-30 VITALS
OXYGEN SATURATION: 97 % | HEART RATE: 80 BPM | WEIGHT: 226 LBS | DIASTOLIC BLOOD PRESSURE: 85 MMHG | SYSTOLIC BLOOD PRESSURE: 143 MMHG | TEMPERATURE: 98 F | RESPIRATION RATE: 16 BRPM | BODY MASS INDEX: 34.36 KG/M2

## 2024-12-30 DIAGNOSIS — S16.1XXA STRAIN OF NECK MUSCLE, INITIAL ENCOUNTER: ICD-10-CM
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
## 2024-12-30 PROCEDURE — 96372 THER/PROPH/DIAG INJ SC/IM: CPT | Performed by: NURSE PRACTITIONER

## 2024-12-30 PROCEDURE — 63600175 PHARM REV CODE 636 W HCPCS: Performed by: NURSE PRACTITIONER

## 2024-12-30 PROCEDURE — 25000003 PHARM REV CODE 250: Performed by: NURSE PRACTITIONER

## 2024-12-30 PROCEDURE — 99284 EMERGENCY DEPT VISIT MOD MDM: CPT | Mod: 25

## 2024-12-30 RX ORDER — METHOCARBAMOL 500 MG/1
500 TABLET, FILM COATED ORAL 4 TIMES DAILY
Qty: 40 TABLET | Refills: 0 | Status: SHIPPED | OUTPATIENT
Start: 2024-12-30 | End: 2025-01-09

## 2024-12-30 RX ORDER — METHOCARBAMOL 500 MG/1
500 TABLET, FILM COATED ORAL
Status: COMPLETED | OUTPATIENT
Start: 2024-12-30 | End: 2024-12-30

## 2024-12-30 RX ORDER — KETOROLAC TROMETHAMINE 30 MG/ML
15 INJECTION, SOLUTION INTRAMUSCULAR; INTRAVENOUS
Status: COMPLETED | OUTPATIENT
Start: 2024-12-30 | End: 2024-12-30

## 2024-12-30 RX ADMIN — METHOCARBAMOL 500 MG: 500 TABLET ORAL at 04:12

## 2024-12-30 RX ADMIN — KETOROLAC TROMETHAMINE 15 MG: 30 INJECTION, SOLUTION INTRAMUSCULAR at 04:12
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
## 2024-12-30 NOTE — DISCHARGE INSTRUCTIONS
Take medication as prescribed.  Alternate cold and warm compresses to painful area.  Follow up with your primary care provider for re-evaluation.  Return to ER for new or worsening symptoms.

## 2025-01-06 ENCOUNTER — CLINICAL SUPPORT (OUTPATIENT)
Dept: DIABETES | Facility: CLINIC | Age: 70
End: 2025-01-06
Payer: MEDICARE

## 2025-01-06 ENCOUNTER — PATIENT MESSAGE (OUTPATIENT)
Dept: DIABETES | Facility: CLINIC | Age: 70
End: 2025-01-06

## 2025-01-06 VITALS — WEIGHT: 229.25 LBS | BODY MASS INDEX: 34.86 KG/M2

## 2025-01-06 DIAGNOSIS — E11.65 CONTROLLED TYPE 2 DIABETES MELLITUS WITH HYPERGLYCEMIA, WITHOUT LONG-TERM CURRENT USE OF INSULIN: Primary | ICD-10-CM

## 2025-01-06 PROCEDURE — G0108 DIAB MANAGE TRN  PER INDIV: HCPCS | Mod: S$GLB,,, | Performed by: PEDIATRICS

## 2025-01-06 PROCEDURE — 99999 PR PBB SHADOW E&M-EST. PATIENT-LVL III: CPT | Mod: PBBFAC,,,

## 2025-01-06 NOTE — PROGRESS NOTES
Diabetes Care Specialist Follow-up Note  Author: Karen Sandra RN  Date: 1/6/2025    Intake    Program Intake  Reason for Diabetes Program Visit:: Intervention  Type of Intervention:: Individual  Individual: Education  Education: Self-Management Skill Review  Current diabetes risk level:: moderate  In the last month, have you used the ER or been admitted to the hospital: Yes  Was the ER or hospital admission related to diabetes?: No  Permission to speak with others about care:: no    Current Diabetes Treatment: Oral Medications, DM Injectables, Insulin  Oral Medication Type/Dose: Farxiga 10 mg daily. Metformin 1,000 mg BID.  DM Injectables Type/Dose: Ozempic 1 mg weekly.  Method of insulin delivery?: Injections  Injection Type: Pens  Pen Type/Dose: Tresiba 26 units in the evening (has not taken in 2 nights). Novolog 7 units AC dinner (has not taken in ~1.5 weeks). Novolog SS Q3H PRN outside of eating (has not needed).    Continuous Glucose Monitoring  Patient has CGM: Yes  Personal CGM type:: Freestyle Griselda 3  GMI Date: 01/06/25  GMI Value: 6.6 %    Lab Results   Component Value Date    HGBA1C 7.4 (H) 11/12/2024     A1c Pre Diabetes Care Specialist Intervention:  7.5%      Weight: 104 kg (229 lb 4.5 oz)   Body mass index is 34.86 kg/m².  Wt increase of 2 lbs since last visit on 12/2024.      Physical activity/Exercise:   Deferred.     SMBG: Freestyle Griselda 3. Report in media.      Diabetes Self-Management Skills Assessment    Nutrition/Healthy Eating  Meal Plan 24 Hour Recall - Breakfast: None.  Meal Plan 24 Hour Recall - Lunch: Cream of wheat and toast; Coffee.  Meal Plan 24 Hour Recall - Dinner: Chicken noodle soup and a few crackers; Coffee.  Meal Plan 24 Hour Recall - Snack: 1.5 of mini Circle's.  Meal Plan 24 Hour Recall - Beverage: Coffee.    Home Blood Glucose Monitoring  Personal CGM type:: Freestyle Griselda 3    Chronic Complications  Reviewed health maintenance: yes  Have you completed your annual  diabetes maintenance labwork? : no  Do you examine your feet daily?: no  Has your doctor examined your feet?: yes  Do you see an eye doctor?: yes  Eye doctor date of last visit:: Upcoming visit in January.  Chronic Complications Skills Assessment Completed: : Yes  Assessment indicates:: Adequate understanding  Area of need?: No      During today's follow-up visit,  the following areas required further assessment and content was provided/reviewed.    Based on today's diabetes care assessment, the following areas of need were identified:      Identified Areas of Need      Medication/Current Diabetes Treatment: See care plan for update.      Lifestyle Coping/Support: Deferred.     Nutrition/Healthy Eating: See care plan for update.     Physical Activity/Exercise: Deferred.      Chronic Complications: No-Discussed importance of A1c less than 7 to reduce risk of micro and macro complications, including nephropathy, neuropathy, retinopathy, heart attack and stroke. Reviewed the importance of controlled Blood Pressure and Cholesterol Lab Values in preventing disease.   Health maintenance reviewed.         Today's interventions were provided through individual discussion, instruction, and written materials were provided.    Patient verbalized understanding of instruction and written materials.  Pt was able to return back demonstration of instructions today. Patient understood key points, needs reinforcement and further instruction.     Diabetes Self-Management Care Plan Review and Evaluation of Progress:    During today's follow-up Stefan's Diabetes Self-Management Care Plan progress was reviewed and progress was evaluated including his/her input. Stefan has agreed to continue his/her journey to improve/maintain overall diabetes control by continuing to set health goals. See care plan progress below.      Care Plan: Diabetes Management   Updates made since 1/7/2024 12:00 AM        Problem: Healthy Eating         Goal: Eat 3  meals daily with 45-60 grams of carbs per meal and 0-15 grams of carbs per snack.    Start Date: 9/30/2024   Expected End Date: 9/30/2025   Recent Progress: No change   Priority: High   Barriers: Lack of Motivation to Change   Note:    Stress eating is one of his biggest barriers to healthy eating.  Re-educated on the importance of balancing carbs with protein and/or healthy fat.   Educated on protein and fat help him feel saldaña so he is less likely to overeat the carbs.   Educated on the importance of consistency including eating breakfast, lunch, and dinner with snacks in between if needed.   Educated that if he eats lunch he may be less likely to over eat at dinner and in the evening.   Provided with 25 healthy snack ideas.     10/30/24: Re-educated on all the above information. Encouraged 3 meals daily with snacks in between. Encouraged pre-making snacks so he's less likely to overeat.     12/04/24: Due to high stress, has been eating out for most of meals. Re-educated on importance of smaller portions and balanced meals.     1/06/25: Has not been eating as big of portions due to decreased appetite related to increase dose of Ozempic. Dinner continues to be larger meal of the day with some snacking afterwards but has improved.   States he is having a lot of issues with constipation. Encouraged increasing water and fiber intake. Encouraged reaching out to GI doctor if symptoms do not resolve or improve.       Task: Reviewed the sources and role of Carbohydrate, Protein, and Fat and how each nutrient impacts blood sugar. Completed 9/30/2024        Task: Explained how to count carbohydrates using the food label and the use of dry measuring cups for accurate carb counting. Completed 9/30/2024        Task: Review the importance of balancing carbohydrates with each meal using portion control techniques to count servings of carbohydrate and label reading to identify serving size and amount of total carbs per serving.  Completed 9/30/2024        Task: Provided Sample plate method and reviewed the use of the plate to estimate amounts of carbohydrate per meal. Completed 9/30/2024        Problem: Physical Activity and Exercise         Goal: Patient agrees to increase physical activity to a goal of 1-4x/week for 30-60 minutes.    Start Date: 9/30/2024   Expected End Date: 9/30/2025   Recent Progress: Deferred   Priority: Medium   Barriers: No Barriers Identified   Note:    Most active in the AM.   Plans to go to the gym before work.     12/04/24: Since weather is cooling down, he is going outside more and doing activities around the house.     1/06/25: Deferred.       Task: Discussed role of physical activity on reducing insulin resistance and improvement in overall glycemic control. Completed 9/30/2024        Task: Discussed role of physical activity as it relates to weight loss Completed 9/30/2024        Task: Offered suggestions on how patient could increase their regular physical activity Completed 9/30/2024        Task: Reviewed blood glucose monitoring before, during and after exercise/activity Completed 9/30/2024        Problem: Psychosocial/Healthy Coping         Goal: Patient agrees to identify and practice at least one healthy coping/self-care behavior each day.    Start Date: 10/30/2024   Expected End Date: 9/30/2025   Recent Progress: On track   Note:    Pt states one of his biggest barriers to healthy eating is stress eating.   Encouraged finding alternate outlets to stress such as working in shed, going for walk, crossword puzzles, watching TV, etc.     12/04/24: Now involved in family counseling.     1/06/25: Deferred.       Task: Discussed strategies for healthy coping with chronic disease. Completed 10/30/2024        Problem: Medications         Goal: Patient agrees to take Novolog as prescribed.    Start Date: 10/30/2024   Expected End Date: 9/30/2025   Recent Progress: No change   Note:    Taking 7 units of Novolog  TID AC but may benefit from a SS.  Educated on SS insulin.   Will discuss with EUSEBIO Gross.    12/04/24: Has not been taking Novolog before meals because he thought the SS insulin replaced the meal time insulin. Sent message with medication instructions.    1/06/25: Has not taken Tresiba in 2 nights and has not taken Novolog in ~1.5 weeks due to hypoglycemia. Spoke with EUSEBIO Gross who states to decrease Tresiba to 10 units nightly and Novolog 4 units AC supper. New doses sent to pt in a message.       Task: Reviewed with patient all current diabetes medications and provided basic review of the purpose, dosage, frequency, side effects, and storage of both oral and injectable diabetes medications. Completed 10/30/2024        Task: Discussed guidelines for preventing, detecting and treating hypoglycemia and hyperglycemia and reviewed the importance of meal and medication timing with diabetes mediations for prevention of hypoglycemia and maximum drug benefit. Completed 10/30/2024          Follow Up Plan     Follow up in about 8 weeks (around 3/3/2025) for review.    Today's care plan and follow up schedule was discussed with patient.  Stefan verbalized understanding of the care plan, goals, and agrees to follow up plan.        The patient was encouraged to communicate with his/her health care provider/physician and care team regarding his/her condition(s) and treatment.  I provided the patient with my contact information today and encouraged to contact me via phone or [facility] Patient Portal as needed.     Length of Visit   Total Time: 45 Minutes

## 2025-01-13 NOTE — TELEPHONE ENCOUNTER
Good afternoon Mr. Forbes,  I looked at your Griselda report.   Your blood sugars look great! Keep up the great work.     Thanks,   IVETT Jones  Diabetes Care and

## 2025-01-15 ENCOUNTER — TELEPHONE (OUTPATIENT)
Dept: ORTHOPEDICS | Facility: CLINIC | Age: 70
End: 2025-01-15
Payer: MEDICARE

## 2025-01-15 ENCOUNTER — PATIENT OUTREACH (OUTPATIENT)
Dept: PULMONOLOGY | Facility: CLINIC | Age: 70
End: 2025-01-15
Payer: MEDICARE

## 2025-01-15 NOTE — TELEPHONE ENCOUNTER
----- Message from Ely sent at 1/15/2025 11:57 AM CST -----  Contact: patient  Type:   Appointment Request      Name of Caller:Stefan Forbes Jr.   Symptoms: pt is needing to schedule an ER f/u  Best Call Back Number: [phone]  Additional Information:

## 2025-01-15 NOTE — TELEPHONE ENCOUNTER
Spoke with patient and informed him that [facility] does not have a provider that treats the spine. Patient informed that our Pain Management physicians are currently treating spine patients. Patient verbalized understanding and was transferred to schedule an appointment with their department.

## 2025-01-27 ENCOUNTER — E-VISIT (OUTPATIENT)
Dept: PULMONOLOGY | Facility: CLINIC | Age: 70
End: 2025-01-27
Payer: MEDICARE

## 2025-01-27 ENCOUNTER — TELEPHONE (OUTPATIENT)
Dept: PULMONOLOGY | Facility: CLINIC | Age: 70
End: 2025-01-27
Payer: MEDICARE

## 2025-01-27 DIAGNOSIS — J44.9 MODERATE COPD (CHRONIC OBSTRUCTIVE PULMONARY DISEASE): ICD-10-CM

## 2025-01-27 DIAGNOSIS — J06.9 UPPER RESPIRATORY TRACT INFECTION, UNSPECIFIED TYPE: Primary | ICD-10-CM

## 2025-01-28 ENCOUNTER — HOSPITAL ENCOUNTER (OUTPATIENT)
Dept: RADIOLOGY | Facility: HOSPITAL | Age: 70
Discharge: HOME | End: 2025-01-28
Attending: INTERNAL MEDICINE
Payer: MEDICARE

## 2025-01-28 DIAGNOSIS — J06.9 UPPER RESPIRATORY TRACT INFECTION, UNSPECIFIED TYPE: ICD-10-CM

## 2025-01-28 PROCEDURE — 71046 X-RAY EXAM CHEST 2 VIEWS: CPT | Mod: TC

## 2025-01-28 PROCEDURE — 71046 X-RAY EXAM CHEST 2 VIEWS: CPT | Mod: 26,,, | Performed by: RADIOLOGY

## 2025-01-28 RX ORDER — AZITHROMYCIN 250 MG/1
TABLET, FILM COATED ORAL
Qty: 6 TABLET | Refills: 0 | Status: SHIPPED | OUTPATIENT
Start: 2025-01-28

## 2025-01-28 RX ORDER — METHYLPREDNISOLONE 4 MG/1
TABLET ORAL
Qty: 21 TABLET | Refills: 0 | Status: SHIPPED | OUTPATIENT
Start: 2025-01-28

## 2025-01-28 NOTE — PROGRESS NOTES
Patient ID: Stefan Forbes Jr. is a 69 y.o. male.    Chief Complaint: URI (Entered automatically based on patient selection in [employer].)    The patient initiated a request through [employer] on 1/27/2025 for evaluation and management with a chief complaint of URI (Entered automatically based on patient selection in [employer].)     I evaluated the questionnaire submission on 01/28/2025 .    [facility] Peq E-Visit Covid    1/27/2025  4:26 PM CST - Filed by Patient   Do you agree to participate in an E-Visit? Yes   If you have any of the following symptoms, go to your local emergency room or call 911: I acknowledge   Choose the state of your primary residence [place]   What is the main issue you would like addressed today? Head cold or sinus, congestion, cough, scratchy throat   Do you think you might have COVID or the Flu? No   Have you tested positive for COVID or Flu? No   What symptoms do you currently have?  Cough;  Headache;  Nasal Congestion;  Runny nose;  Sore throat   Describe your cough: Dry   Have you had any of the following? Trouble breathing;  None of the above   Have you ever smoked? I currently smoke   Have you had a fever? No   When did your symptoms first appear? 1/25/2025   In the last two weeks, have you been in close contact with someone who has COVID-19 or the Flu? No   List what you have done or taken to help your symptoms. Cough drops breathing treatment   How severe are your symptoms? Moderate   Have your symptoms gotten better or worse since they started?  Worse   Do you have transportation to get testing if it is needed and ordered for you at an [facility] location? Yes   Provide any additional information you feel is important. Coughing, runny nose, coughing up a lot of some congestion   Please attach any relevant images or files    Are you able to take your vital signs? Yes         Encounter Diagnosis   Name Primary?    Upper respiratory tract infection, unspecified type Yes        Orders Placed This  Encounter   Procedures    X-Ray Chest PA And Lateral     Standing Status:   Future     Standing Expiration Date:   1/28/2026      Medications Ordered This Encounter   Medications    azithromycin (Z-CHARLES) 250 MG tablet     Sig: Take 2 tablets by mouth on day 1; Take 1 tablet by mouth on days 2-5     Dispense:  6 tablet     Refill:  0    methylPREDNISolone (MEDROL DOSEPACK) 4 mg tablet     Sig: use as directed     Dispense:  21 tablet     Refill:  0        Follow up needs CXR.      E-Visit Time Tracking:    Day 1 Time (in minutes): 15    Total Time (in minutes): 15

## 2025-01-30 RX ORDER — FLUTICASONE FUROATE, UMECLIDINIUM BROMIDE AND VILANTEROL TRIFENATATE 100; 62.5; 25 UG/1; UG/1; UG/1
1 POWDER RESPIRATORY (INHALATION) DAILY
Qty: 60 EACH | Refills: 11 | Status: SHIPPED | OUTPATIENT
Start: 2025-01-30

## 2025-01-31 ENCOUNTER — TELEPHONE (OUTPATIENT)
Dept: OPHTHALMOLOGY | Facility: CLINIC | Age: 70
End: 2025-01-31
Payer: MEDICARE

## 2025-01-31 NOTE — TELEPHONE ENCOUNTER
----- Message from [employer] sent at 1/31/2025  8:00 AM CST -----  Contact: Self  .Type:  Appointment Request      Name of Caller:Stefan Forbes Jr.  When is the first available appointment?1/31/25 10am  Symptoms:3  follow up  Would the patient rather a call back or a response via [facility]? Call back  Best Call Back Number:[phone]  Additional Information: the patient has a respiratory infection and would like to reschedule his appt today.

## 2025-01-31 NOTE — TELEPHONE ENCOUNTER
Phoned the Pt, reminded that he has an appointment with Dr. Nicole this morning at 10:00 at the [facility]. Left a ph# should he need any assistance

## 2025-02-03 ENCOUNTER — OFFICE VISIT (OUTPATIENT)
Dept: PULMONOLOGY | Facility: CLINIC | Age: 70
End: 2025-02-03
Payer: MEDICARE

## 2025-02-03 VITALS
RESPIRATION RATE: 16 BRPM | DIASTOLIC BLOOD PRESSURE: 62 MMHG | WEIGHT: 213.63 LBS | OXYGEN SATURATION: 94 % | HEIGHT: 68 IN | BODY MASS INDEX: 32.38 KG/M2 | SYSTOLIC BLOOD PRESSURE: 110 MMHG | HEART RATE: 94 BPM

## 2025-02-03 DIAGNOSIS — E11.69 HYPERLIPIDEMIA ASSOCIATED WITH TYPE 2 DIABETES MELLITUS: ICD-10-CM

## 2025-02-03 DIAGNOSIS — R91.1 PULMONARY NODULE: ICD-10-CM

## 2025-02-03 DIAGNOSIS — R05.8 UPPER AIRWAY COUGH SYNDROME: ICD-10-CM

## 2025-02-03 DIAGNOSIS — G47.33 OSA ON CPAP: ICD-10-CM

## 2025-02-03 DIAGNOSIS — E11.59 HYPERTENSION ASSOCIATED WITH DIABETES: ICD-10-CM

## 2025-02-03 DIAGNOSIS — E11.65 CONTROLLED TYPE 2 DIABETES MELLITUS WITH HYPERGLYCEMIA, WITHOUT LONG-TERM CURRENT USE OF INSULIN: ICD-10-CM

## 2025-02-03 DIAGNOSIS — Z86.711 HISTORY OF PULMONARY EMBOLISM: ICD-10-CM

## 2025-02-03 DIAGNOSIS — I15.2 HYPERTENSION ASSOCIATED WITH DIABETES: ICD-10-CM

## 2025-02-03 DIAGNOSIS — J44.9 MODERATE COPD (CHRONIC OBSTRUCTIVE PULMONARY DISEASE): Primary | ICD-10-CM

## 2025-02-03 DIAGNOSIS — E78.5 HYPERLIPIDEMIA ASSOCIATED WITH TYPE 2 DIABETES MELLITUS: ICD-10-CM

## 2025-02-03 PROBLEM — E66.01 SEVERE OBESITY: Status: RESOLVED | Noted: 2023-05-03 | Resolved: 2025-02-03

## 2025-02-03 PROCEDURE — 1159F MED LIST DOCD IN RCRD: CPT | Mod: CPTII,S$GLB,, | Performed by: INTERNAL MEDICINE

## 2025-02-03 PROCEDURE — 3074F SYST BP LT 130 MM HG: CPT | Mod: CPTII,S$GLB,, | Performed by: INTERNAL MEDICINE

## 2025-02-03 PROCEDURE — 3288F FALL RISK ASSESSMENT DOCD: CPT | Mod: CPTII,S$GLB,, | Performed by: INTERNAL MEDICINE

## 2025-02-03 PROCEDURE — 99214 OFFICE O/P EST MOD 30 MIN: CPT | Mod: 25,S$GLB,, | Performed by: INTERNAL MEDICINE

## 2025-02-03 PROCEDURE — 1125F AMNT PAIN NOTED PAIN PRSNT: CPT | Mod: CPTII,S$GLB,, | Performed by: INTERNAL MEDICINE

## 2025-02-03 PROCEDURE — 99999 PR PBB SHADOW E&M-EST. PATIENT-LVL V: CPT | Mod: PBBFAC,,, | Performed by: INTERNAL MEDICINE

## 2025-02-03 PROCEDURE — 96372 THER/PROPH/DIAG INJ SC/IM: CPT | Mod: S$GLB,,, | Performed by: INTERNAL MEDICINE

## 2025-02-03 PROCEDURE — 3008F BODY MASS INDEX DOCD: CPT | Mod: CPTII,S$GLB,, | Performed by: INTERNAL MEDICINE

## 2025-02-03 PROCEDURE — 3072F LOW RISK FOR RETINOPATHY: CPT | Mod: CPTII,S$GLB,, | Performed by: INTERNAL MEDICINE

## 2025-02-03 PROCEDURE — 4010F ACE/ARB THERAPY RXD/TAKEN: CPT | Mod: CPTII,S$GLB,, | Performed by: INTERNAL MEDICINE

## 2025-02-03 PROCEDURE — 1101F PT FALLS ASSESS-DOCD LE1/YR: CPT | Mod: CPTII,S$GLB,, | Performed by: INTERNAL MEDICINE

## 2025-02-03 PROCEDURE — 3078F DIAST BP <80 MM HG: CPT | Mod: CPTII,S$GLB,, | Performed by: INTERNAL MEDICINE

## 2025-02-03 RX ORDER — TRIAMCINOLONE ACETONIDE 40 MG/ML
40 INJECTION, SUSPENSION INTRA-ARTICULAR; INTRAMUSCULAR ONCE
Status: COMPLETED | OUTPATIENT
Start: 2025-02-03 | End: 2025-02-03

## 2025-02-03 RX ORDER — PROMETHAZINE HYDROCHLORIDE AND DEXTROMETHORPHAN HYDROBROMIDE 6.25; 15 MG/5ML; MG/5ML
5 SYRUP ORAL EVERY 4 HOURS PRN
Qty: 118 ML | Refills: 0 | Status: SHIPPED | OUTPATIENT
Start: 2025-02-03 | End: 2025-02-13

## 2025-02-03 RX ORDER — METHYLPREDNISOLONE 4 MG/1
TABLET ORAL
Qty: 21 TABLET | Refills: 0 | Status: SHIPPED | OUTPATIENT
Start: 2025-02-03 | End: 2025-02-13 | Stop reason: ALTCHOICE

## 2025-02-03 RX ADMIN — TRIAMCINOLONE ACETONIDE 40 MG: 40 INJECTION, SUSPENSION INTRA-ARTICULAR; INTRAMUSCULAR at 01:02

## 2025-02-03 NOTE — PROGRESS NOTES
Patient Active Problem List   Diagnosis    Controlled type 2 diabetes mellitus with hyperglycemia, without long-term current use of insulin    Hypertension associated with diabetes    Hyperlipidemia associated with type 2 diabetes mellitus    Moderate COPD (chronic obstructive pulmonary disease)    CAD (coronary artery disease)    Vitamin D deficiency    Osteoarthritis of knee    Pulmonary nodule    Routine general medical examination at a health care facility    Nevus of choroid of right eye    History of pulmonary embolism    Hyponatremia    Iron deficiency anemia due to chronic blood loss    MACHO on CPAP    PLMD (periodic limb movement disorder)    BMI 35.0-35.9,adult    Varicose vein of leg    Tobacco abuse, in remission    Tubular adenoma of colon    Benign prostatic hyperplasia with weak urinary stream    Urgency of urination    Aortic atherosclerosis    COVID    Hypomagnesemia    Nicotine dependence    Restless leg syndrome due to iron deficiency anemia    Upper airway cough syndrome     Social History     Tobacco Use   Smoking Status Every Day    Types: Cigars    Last attempt to quit: 10/1/2023    Years since quittin.3   Smokeless Tobacco Never   Tobacco Comments    2 cigars each day     Immunization History   Administered Date(s) Administered    COVID-19, MRNA, LN-S, PF (Pfizer) (Purple Cap) 2021, 2021, 12/10/2021    COVID-19, mRNA, LNP-S, PF (Moderna) Ages 12+ 2023    Influenza (FLUAD) - Quadrivalent - Adjuvanted - PF *Preferred* (65+) 10/20/2022, 10/05/2023    Influenza - Quadrivalent 2016    Influenza - Quadrivalent - PF *Preferred* (6 months and older) 2014, 10/25/2015, 10/03/2017, 10/04/2018, 2019, 2020, 10/08/2021    Influenza - Trivalent - Fluad - Adjuvanted - PF (65 years and older 10/11/2024    Pneumococcal Conjugate - 20 Valent 2024    Pneumococcal Polysaccharide - 23 Valent 2014, 2016    Tdap 2015    Zoster 2017       COPD  Questionnaire  How often do you cough?: Almost never  How often do you have phlegm (mucus) in your chest?: Almost never  How often does your chest feel tight?: Almost never  When you walk up a hill or one flight of stairs, how often are you breathless?: Never  How often are you limited doing any activities at home?: Almost never  How often are you confident leaving the house despite your lung condition?: All of the time  How often do you sleep soundly?: Most of the time  How often do you have energy?: All of the time  Total score: 5             SUBJECTIVE:     Patient is a 69 y.o. male presents with Moderate COPD , MACHO on CPAP .  Last visit 10/06/2022  CPAP use intermittent  Unable wear all night  Confides sleeps better 5-6 hrs  Bed time 12 MN, wake 6 am  Legs aches, restless, better with moving  Added Mirapex  Has Varicose veins considered see Vascular  Hx bilateral Knee  Slight cough and congestion: attributed to allergies  CAT score 13  Stable on TRELEGY  Flu shot today  FEV1: 2.30L( 72.9%)  Had stopped smoking and relapsed  Signed himself up with cessation        Will need flu shot    No recent COPD exacerbations.  No cough no wheezing no shortness of breath or sputum.    Former smoker 35-40 pack year smoking history :  Currently enrolled in smoking cessation    I have reviewed the patient's medical history in detail and updated the computerized patient record.    History of varicose veins.    Chief Complaint   Patient presents with    COPD     08/05/2024  Follow up  Here with spouse  LDCT : 7 mm and 9 mm nodule : right side  Former smoker Quit 12 months  45 pack year  COPD: Adherent on Inhaler TRELEGY, VENTOLIN  Not using CPAP  Diagnostic AHI 7.6/hr and REM RDI 22.5    Bed time 12 Mn  Wake time 7 am    10/11/2024  Followup   Reviewed spirometry  COPD score 10   No cough, No wheezing, No SOB  FEV1: 2.36L( 76.1%)  [place] board reveiwed  Using meds  Had cataract surgery: Dr Madison      Tumor Board  Conference  8/20/2024  [facility]     Nelida Ojeda PA-C  Pulmonary Disease Pulmonary nodule  Dx     Progress Notes  Nelida Ojeda PA-C (Physician Assistant)  Pulmonary Disease  Encounter Date: 8/20/2024  Creation Time: 8/20/2024  1:47 PM  Signed  Cosigned by: Mazin Wells MD at 8/21/2024  2:51 PM        [facility] Pulmonary Nodule Review      Patient Name: Stefan Forbes Jr.     MRN: [id number]     Date of Tumor Board: 08/20/2024     Diagnosis:  Multiple Pulmonary Nodules     Referring Provider:  Mazin Wells MD     Present PCTP Providers: Christopher Fonseca MD, Chris Stallings MD, Mazin Wells MD, DAVID White, Elizabeth LeJeune, NP, DAVID German     Smoking hx:  Former, 35 pack years     Diagnostic Work Up:     Nodify ID:  Pre-test risk 9%, CDT- No significant level, XL2- likely benign, 1% risk malignancy  Imaging:  PET 8/2024- No FDG avid lesions  LDCT 7/2024- Multiple new nodules, RUL 9mm and 7mm RLL ground glass        Board Recommendations:     CT Chest in one year             02/03/2025   Follow-up visit   Lingering cough, nasal congestion, wheezing  Green nasal discharge resolved  No fever  Hx Chr sinusitis  Has Mukesh Med sinus rinse and jordin pot stopped using  Stable on TRELEGY  COPD score 5  Epworh 1  Using flonase and atrovent  CXR was clear  Nasal congestion  Will get ct sinus  No HA or fever since Abx      Review of patient's allergies indicates:  No Known Allergies    Current Outpatient Medications   Medication Sig Dispense Refill    acetaminophen (TYLENOL) 500 MG tablet Take 1,000 mg by mouth as needed.       albuterol (VENTOLIN HFA) 90 mcg/actuation inhaler Inhale 2 puffs into the lungs every 6 (six) hours as needed for Wheezing or Shortness of Breath. Rescue 25.5 g 3    ascorbic acid, vitamin C, (VITAMIN C) 1000 MG tablet Take 1,000 mg by mouth once daily.      aspirin 325 MG tablet Take 325 mg by mouth once daily.      blood sugar  diagnostic (BLOOD GLUCOSE TEST) Strp 1 strip by Misc.(Non-Drug; Combo Route) route 3 (three) times daily. 200 each 1    blood-glucose meter,continuous (FREESTYLE JASMINE 3 READER) [facility] Use as directed. 1 each 0    buPROPion (WELLBUTRIN SR) 150 MG TBSR 12 hr tablet Take 1 tablet (150 mg total) by mouth 2 (two) times daily. 180 tablet 2    cetirizine (ZYRTEC) 10 MG tablet Take 10 mg by mouth as needed for Allergies.      cholecalciferol, vitamin D3, (VITAMIN D3) 25 mcg (1,000 unit) capsule Take 1,000 Units by mouth once daily.      cyanocobalamin (VITAMIN B-12) 500 MCG tablet Take 500 mcg by mouth once daily.       dapagliflozin propanediol (FARXIGA) 10 mg tablet Take 1 tablet (10 mg total) by mouth once daily. 90 tablet 2    doxycycline (MONODOX) 100 MG capsule Take 1 capsule (100 mg total) by mouth every 12 (twelve) hours. 20 capsule 1    famotidine (PEPCID) 40 MG tablet Take 1 tablet by mouth once a day 30 tablet 4    fluticasone propionate (FLONASE) 50 mcg/actuation nasal spray Use 1 spray (50 mcg total) in each nostril once daily. (Patient taking differently: 1 spray by Each Nostril route as needed for Allergies.) 16 g 3    fluticasone-umeclidin-vilanter (TRELEGY ELLIPTA) 100-62.5-25 mcg DsDv Inhale 1 puff into the lungs once daily. 180 each 3    fluticasone-umeclidin-vilanter (TRELEGY ELLIPTA) 100-62.5-25 mcg DsDv Inhale 1 puff into the lungs once daily. 60 each 11    FREESTYLE JASMINE 3 PLUS SENSOR Agnieszka Change sensor every 15 days 2 each 11    insulin aspart U-100 (NOVOLOG FLEXPEN U-100 INSULIN) 100 unit/mL (3 mL) InPn pen Inject 7 Units into the skin 3 (three) times daily with meals. Add sliding scale if needed. 45 mL 3    insulin degludec (TRESIBA FLEXTOUCH U-200) 200 unit/mL (3 mL) insulin pen Inject 18 Units into the skin once daily. Increase by 2 units every 2 days until fasting sugar is below 130. Stop at 50 units. 12 Pen 3    ipratropium (ATROVENT) 21 mcg (0.03 %) nasal spray Spray 1 or 2 sprays in each  "nasal passage every 8 hours, if needed, as directed. 30 mL 12    lancets 30 gauge Misc 1 lancet by Misc.(Non-Drug; Combo Route) route 3 (three) times daily. 200 each 0    magnesium oxide (MAG-OX) 400 mg (241.3 mg magnesium) tablet Take 1 tablet (400 mg total) by mouth once daily. 90 tablet 3    metFORMIN (GLUCOPHAGE) 1000 MG tablet Take 1 tablet (1,000 mg total) by mouth 2 (two) times daily with meals. 180 tablet 1    nitroGLYCERIN (NITROSTAT) 0.4 MG SL tablet Place 1 tablet under the tongue every 5 (five) minutes as needed for Chest pain. 25 tablet 3    olmesartan (BENICAR) 40 MG tablet Take 1 tablet (40 mg total) by mouth once daily. 90 tablet 1    pantoprazole (PROTONIX) 40 MG tablet Take 1 tablet by mouth twice a day before breakfast and dinner 60 tablet 10    pen needle, diabetic (NOVOFINE 32) 32 gauge x 1/4" Ndle Use with insulin 4 times daily 500 each 3    plecanatide (TRULANCE) 3 mg Tab Take 1 tablet by mouth once a day 90 tablet 3    pramipexole (MIRAPEX) 0.125 MG tablet TAKE 2 TABLETS BY MOUTH EVERY EVENING 60 tablet 5    pulse oximeter (PULSE OXIMETER) device Use twice daily at 8 AM and 3 PM and [facility] as [facility]. 1 each 0    rosuvastatin (CRESTOR) 20 MG tablet Take 1 tablet (20 mg total) by mouth once daily. 90 tablet 3    semaglutide (OZEMPIC) 2 mg/dose (8 mg/3 mL) PnIj Inject 2 mg into the skin every 7 days. 3 each 3    tamsulosin (FLOMAX) 0.4 mg Cap Take 2 capsules (0.8 mg total) by mouth once daily. 180 capsule 3    azithromycin (Z-CHARLES) 250 MG tablet Take 2 tablets by mouth on day 1; Take 1 tablet by mouth on days 2-5 6 tablet 0    ketorolac 0.5% (ACULAR) 0.5 % Drop Place 1 drop into the right eye 4 (four) times daily. 5 mL 1    methylPREDNISolone (MEDROL DOSEPACK) 4 mg tablet Use as directed per package 21 tablet 0    methylPREDNISolone (MEDROL DOSEPACK) 4 mg tablet use as directed 21 tablet 0    methylPREDNISolone (MEDROL DOSEPACK) 4 mg tablet Use as directed per package 21 " tablet 0    prednisoLONE acetate (PRED FORTE) 1 % DrpS Place 1 drop into the left eye 4 (four) times daily. 5 mL 1    promethazine-dextromethorphan (PROMETHAZINE-DM) 6.25-15 mg/5 mL Syrp Take 5 mL by mouth every 4 (four) hours as needed (cough). 118 mL 0    sodium,potassium,mag sulfates (SUPREP BOWEL PREP KIT) 17.5-3.13-1.6 gram SolR Take 1 kit by mouth split dose as directed for colonoscopy prep 1 kit 0     No current facility-administered medications for this visit.       Past Medical History:   Diagnosis Date    Arthritis     Cataract     COPD (chronic obstructive pulmonary disease)     Diabetes mellitus, type 2     Hyperlipidemia     Hypertension     Personal history of colonic polyps 2018     Past Surgical History:   Procedure Laterality Date    ANGIOPLASTY      CATARACT EXTRACTION  10/14/2024    JOINT REPLACEMENT  2017    TOTAL KNEE ARTHROPLASTY       Family History   Problem Relation Name Age of Onset    Diabetes Mother Mom     Arthritis Mother Mom     Cancer Father Dad      Social History     Tobacco Use    Smoking status: Every Day     Types: Cigars     Last attempt to quit: 10/1/2023     Years since quittin.3    Smokeless tobacco: Never    Tobacco comments:     2 cigars each day   Substance Use Topics    Alcohol use: No    Drug use: No        Review of Systems:  Review of Systems   Constitutional:  Positive for fatigue.   HENT:  Positive for congestion and postnasal drip.    Eyes: Negative.    Respiratory:  Positive for cough and chest tightness. Negative for apnea, shortness of breath and wheezing.    Cardiovascular: Negative.         Varicose veins bilaterally   Gastrointestinal: Negative.    Endocrine: Negative.    Genitourinary: Negative.    Musculoskeletal: Negative.    Skin: Negative.    Allergic/Immunologic: Negative.    Neurological: Negative.    Hematological: Negative.    Psychiatric/Behavioral:  Negative for sleep disturbance.        OBJECTIVE:     Vital Signs (Most Recent)  Pulse: 94  "(02/03/25 1316)  Resp: 16 (02/03/25 1316)  BP: 110/62 (02/03/25 1316)  SpO2: (!) 94 % (02/03/25 1316)  5' 8" (1.727 m)  96.9 kg (213 lb 10 oz)     Physical Exam:  Physical Exam  Vitals and nursing note reviewed.   Constitutional:       Appearance: He is well-developed.       HENT:      Head: Normocephalic and atraumatic.      Nose: No mucosal edema or rhinorrhea.      Mouth/Throat:      Pharynx: No oropharyngeal exudate.   Eyes:      General: No scleral icterus.        Left eye: No discharge.      Conjunctiva/sclera: Conjunctivae normal.      Pupils: Pupils are equal, round, and reactive to light.   Neck:      Thyroid: No thyromegaly.      Trachea: No tracheal deviation.   Cardiovascular:      Rate and Rhythm: Normal rate and regular rhythm.      Heart sounds: Normal heart sounds. No murmur heard.  Pulmonary:      Effort: Pulmonary effort is normal.      Breath sounds: Examination of the right-lower field reveals decreased breath sounds. Examination of the left-lower field reveals decreased breath sounds. Decreased breath sounds present. No wheezing, rhonchi or rales.   Chest:      Chest wall: No tenderness.   Abdominal:      General: Bowel sounds are normal. There is no distension.      Palpations: Abdomen is soft.   Musculoskeletal:         General: No tenderness or deformity. Normal range of motion.      Cervical back: Normal range of motion and neck supple.   Skin:     General: Skin is warm and dry.      Capillary Refill: Capillary refill takes 2 to 3 seconds.   Neurological:      Mental Status: He is alert and oriented to person, place, and time.      Cranial Nerves: No cranial nerve deficit.         Laboratory  CBC: Reviewed  BMP: Reviewed             Chest  Xray   X-Ray Chest PA And Lateral  Narrative: EXAM: XR CHEST PA AND LATERAL    CLINICAL HISTORY:  Respiratory infection    COMPARISON:  10/11/2024    FINDINGS:  2 view chest.  Heart size is normal and lungs are clear bilaterally.  Pulmonary vasculature is " normal.  Impression: No evidence of acute disease.    Finalized on: 1/28/2025 4:52 PM By:  [place]  [facility]# [id number]      2025-01-28 [id number]     [facility]                  ASSESSMENT/PLAN:     Problem List Items Addressed This Visit       History of pulmonary embolism    Hypertension associated with diabetes    Controlled type 2 diabetes mellitus with hyperglycemia, without long-term current use of insulin    Hyperlipidemia associated with type 2 diabetes mellitus    Moderate COPD (chronic obstructive pulmonary disease) - Primary     Continue Trelegy         Pulmonary nodule     Surveillance         MACHO on CPAP     Download next visit         Upper airway cough syndrome     Steroid dose   Cough medication   CT sinus  Nasal saline rinse         Relevant Medications    triamcinolone acetonide injection 40 mg (Completed) (Start on 2/3/2025  2:45 PM)    methylPREDNISolone (MEDROL DOSEPACK) 4 mg tablet    promethazine-dextromethorphan (PROMETHAZINE-DM) 6.25-15 mg/5 mL Syrp    Other Relevant Orders    CT Sinuses without Contrast       PLAN:Plan      Ct Sinus ASAP  Kenalog      Requested Prescriptions     Signed Prescriptions Disp Refills    methylPREDNISolone (MEDROL DOSEPACK) 4 mg tablet 21 tablet 0     Sig: Use as directed per package    promethazine-dextromethorphan (PROMETHAZINE-DM) 6.25-15 mg/5 mL Syrp 118 mL 0     Sig: Take 5 mL by mouth every 4 (four) hours as needed (cough).         Follow up CT sinus this week, cough meds, Mukesh Med sinus rinse, IM Kenalog, medrol.    This note was prepared using voice recognition system and is likely to have sound alike errors that may have been overlooked even after proof reading.  Please call me with any questions    Discussed diagnosis, its evaluation, treatment and usual course. All questions answered.    Thank you for the courtesy of participating in the care of this patient    Mazin Wells MD    Orders Placed This Encounter   Procedures    CT Sinuses  without Contrast     Standing Status:   Future     Standing Expiration Date:   2/3/2026     Order Specific Question:   May the Radiologist modify the order per protocol to meet the clinical needs of the patient?     Answer:   Yes

## 2025-02-13 ENCOUNTER — OFFICE VISIT (OUTPATIENT)
Dept: DIABETES | Facility: CLINIC | Age: 70
End: 2025-02-13
Payer: MEDICARE

## 2025-02-13 ENCOUNTER — LAB VISIT (OUTPATIENT)
Dept: LAB | Facility: HOSPITAL | Age: 70
End: 2025-02-13
Attending: INTERNAL MEDICINE
Payer: MEDICARE

## 2025-02-13 VITALS
BODY MASS INDEX: 32.68 KG/M2 | SYSTOLIC BLOOD PRESSURE: 106 MMHG | RESPIRATION RATE: 16 BRPM | OXYGEN SATURATION: 97 % | WEIGHT: 215.63 LBS | HEART RATE: 75 BPM | DIASTOLIC BLOOD PRESSURE: 62 MMHG | HEIGHT: 68 IN

## 2025-02-13 DIAGNOSIS — J44.9 MODERATE COPD (CHRONIC OBSTRUCTIVE PULMONARY DISEASE): ICD-10-CM

## 2025-02-13 DIAGNOSIS — I25.10 CORONARY ARTERY DISEASE INVOLVING NATIVE CORONARY ARTERY OF NATIVE HEART WITHOUT ANGINA PECTORIS: ICD-10-CM

## 2025-02-13 DIAGNOSIS — Z79.4 UNCONTROLLED TYPE 2 DIABETES MELLITUS WITH HYPERGLYCEMIA, WITH LONG-TERM CURRENT USE OF INSULIN: Primary | ICD-10-CM

## 2025-02-13 DIAGNOSIS — E11.9 CONTROLLED TYPE 2 DIABETES MELLITUS WITHOUT COMPLICATION, WITHOUT LONG-TERM CURRENT USE OF INSULIN: ICD-10-CM

## 2025-02-13 DIAGNOSIS — R91.1 PULMONARY NODULE: ICD-10-CM

## 2025-02-13 DIAGNOSIS — E78.5 HYPERLIPIDEMIA ASSOCIATED WITH TYPE 2 DIABETES MELLITUS: ICD-10-CM

## 2025-02-13 DIAGNOSIS — I15.2 HYPERTENSION ASSOCIATED WITH DIABETES: ICD-10-CM

## 2025-02-13 DIAGNOSIS — E11.65 UNCONTROLLED TYPE 2 DIABETES MELLITUS WITH HYPERGLYCEMIA, WITH LONG-TERM CURRENT USE OF INSULIN: Primary | ICD-10-CM

## 2025-02-13 DIAGNOSIS — E55.9 VITAMIN D DEFICIENCY: ICD-10-CM

## 2025-02-13 DIAGNOSIS — E11.59 HYPERTENSION ASSOCIATED WITH DIABETES: ICD-10-CM

## 2025-02-13 DIAGNOSIS — Z86.711 HISTORY OF PULMONARY EMBOLISM: ICD-10-CM

## 2025-02-13 DIAGNOSIS — E11.69 HYPERLIPIDEMIA ASSOCIATED WITH TYPE 2 DIABETES MELLITUS: ICD-10-CM

## 2025-02-13 LAB
ESTIMATED AVG GLUCOSE: 166 MG/DL (ref 68–131)
GLUCOSE SERPL-MCNC: 130 MG/DL (ref 70–110)
HBA1C MFR BLD: 7.4 % (ref 4–5.6)

## 2025-02-13 PROCEDURE — 83036 HEMOGLOBIN GLYCOSYLATED A1C: CPT | Performed by: INTERNAL MEDICINE

## 2025-02-13 PROCEDURE — 36415 COLL VENOUS BLD VENIPUNCTURE: CPT | Performed by: INTERNAL MEDICINE

## 2025-02-13 PROCEDURE — 99999 PR PBB SHADOW E&M-EST. PATIENT-LVL V: CPT | Mod: PBBFAC,,, | Performed by: NURSE PRACTITIONER

## 2025-02-13 PROCEDURE — 82962 GLUCOSE BLOOD TEST: CPT | Mod: S$GLB,,, | Performed by: NURSE PRACTITIONER

## 2025-02-13 NOTE — PROGRESS NOTES
Subjective:         Patient ID: Stefan Forbes Jr. is a 69 y.o. male.  Patient's current PCP is Wallace Fisher MD.     Chief Complaint: Diabetes Mellitus    HPI  Stefan Forbes Jr. is a 69 y.o. White male presenting for a follow up for diabetes. Patient has been diagnosed with type 2 diabetes since 2012 .  Received diabetes education: Yes-[facility], 07/2024    CURRENT DM MEDICATIONS:   Diabetes Medications               dapagliflozin propanediol (FARXIGA) 10 mg tablet Take 1 tablet (10 mg total) by mouth once daily.  Patient assistance    insulin aspart U-100 (NOVOLOG FLEXPEN U-100 INSULIN) 100 unit/mL (3 mL) InPn pen Inject 7 Units into the skin 3 (three) times daily with meals. Add sliding scale if needed.    insulin degludec (TRESIBA FLEXTOUCH U-200) 200 unit/mL (3 mL) insulin pen Inject 18 Units into the skin once daily. Increase by 2 units every 2 days until fasting sugar is below 130. Stop at 50 units. 10 units    Patient Assistance    metFORMIN (GLUCOPHAGE) 1000 MG tablet Take 1 tablet (1,000 mg total) by mouth 2 (two) times daily with meals.    semaglutide (OZEMPIC) 2 mg/dose (8 mg/3 mL) PnIj Inject 2 mg into the skin every 7 days. Patient Assistance     Past failed treatment include: Levemir-failure to control diabetes    Blood glucose testing Continuous per [facility]  Preferred lab: [facility]    Any episodes of hypoglycemia? 0% per [facility]    Complications related to diabetes: cardiovascular disease    His blood sugar in the clinic today was:   Lab Results   Component Value Date    POCGLU 130 (A) 02/13/2025     Stefan Forbes Jr. presents today for follow up visit to discuss diabetes management.     Per [facility] download, for the last 14 days: Average glucose of 135 mg/dL and 92% TIR. Occasional mild fasting hypoglycemia noted. Overall there is a pattern of euglycemia.         Current diet: 2 meals/day, 1 snack/day  Activity Level: No structured exercise    Lab Results   Component Value Date    HGBA1C 7.4 (H)  "11/12/2024    HGBA1C 7.5 (H) 06/25/2024    HGBA1C 12.5 (H) 04/29/2024     STANDARDS OF CARE  Diabetes Management Status    Statin: Taking  ACE/ARB: Taking    Screening or Prevention Patient's value Goal Complete/Controlled?   HgA1C Testing and Control   Lab Results   Component Value Date    HGBA1C 7.4 (H) 11/12/2024      Annually/Less than 8% Yes   Lipid profile : 11/12/2024 Annually Yes   LDL control Lab Results   Component Value Date    LDLCALC 62.4 (L) 11/12/2024    Annually/Less than 100 mg/dl  Yes   Nephropathy screening Lab Results   Component Value Date    LABMICR 21.0 11/12/2024     Lab Results   Component Value Date    PROTEINUA Negative 04/10/2024     No results found for: "UTPCR"   Annually Yes   Blood pressure BP Readings from Last 1 Encounters:   02/13/25 106/62    Less than 140/90 Yes   Dilated retinal exam : 07/11/2024 Annually Yes   Foot exam   : 10/25/2024 Annually Yes      Labs reviewed and are noted below.    Lab Results   Component Value Date    WBC 6.60 11/12/2024    HGB 14.3 11/12/2024    HCT 46.8 11/12/2024     11/12/2024    CHOL 121 11/12/2024    TRIG 98 11/12/2024    HDL 39 (L) 11/12/2024    LDLCALC 62.4 (L) 11/12/2024    ALT 14 11/12/2024    AST 12 11/12/2024     11/12/2024    K 5.3 (H) 11/12/2024     11/12/2024    ANIONGAP 10 11/12/2024    CREATININE 1.2 11/12/2024    ESTGFRAFRICA >60.0 10/14/2021    EGFRNONAA >60.0 10/14/2021    BUN 18 11/12/2024    CO2 27 11/12/2024    TSH 1.499 11/12/2024    PSA 0.25 11/12/2024    INR 0.9 10/04/2017     (H) 11/12/2024     Lab Results   Component Value Date    TSH 1.499 11/12/2024    IRON 102 07/30/2024    TIBC 354 07/30/2024    FERRITIN 167 07/30/2024    OXIAQQII87 1226 (H) 01/12/2024    CALCIUM 9.5 11/12/2024    PHOS 2.7 04/12/2024     No results found for: "CPEPTIDE"  No results found for: "GLUTAMICACID"  Glucose   Date Value Ref Range Status   11/12/2024 129 (H) 70 - 110 mg/dL Final     Anion Gap   Date Value Ref Range " Status   2024 10 8 - 16 mmol/L Final     eGFR if    Date Value Ref Range Status   10/14/2021 >60.0 >60 mL/min/1.73 m^2 Final     eGFR if non    Date Value Ref Range Status   10/14/2021 >60.0 >60 mL/min/1.73 m^2 Final     Comment:     Calculation used to obtain the estimated glomerular filtration  rate (eGFR) is the CKD-EPI equation.        The following portions of the patient's history were reviewed and updated as appropriate: allergies, current medications, past family history, past medical history, past social history, past surgical history, and problem list.    Review of patient's allergies indicates:  No Known Allergies  Social History     Socioeconomic History    Marital status:    Occupational History    Occupation: retired   Tobacco Use    Smoking status: Every Day     Types: Cigars     Last attempt to quit: 10/1/2023     Years since quittin.3    Smokeless tobacco: Never    Tobacco comments:     2 cigars each day   Substance and Sexual Activity    Alcohol use: No    Drug use: No    Sexual activity: Not Currently     Partners: Female     Social Drivers of Health     Financial Resource Strain: Medium Risk (2024)    Overall Financial Resource Strain (CARDIA)     Difficulty of Paying Living Expenses: Somewhat hard   Food Insecurity: No Food Insecurity (2024)    Hunger Vital Sign     Worried About Running Out of Food in the Last Year: Never true     Ran Out of Food in the Last Year: Never true   Transportation Needs: No Transportation Needs (2024)    PRAPARE - Transportation     Lack of Transportation (Medical): No     Lack of Transportation (Non-Medical): No   Physical Activity: Unknown (2024)    Exercise Vital Sign     Days of Exercise per Week: 2 days   Stress: No Stress Concern Present (2024)    [employer] [place] [employer] - Occupational Stress Questionnaire     Feeling of Stress : Only a little   Housing Stability: Unknown  (4/11/2024)    Housing Stability Vital Sign     Unable to Pay for Housing in the Last Year: No     Unstable Housing in the Last Year: No     Past Medical History:   Diagnosis Date    Arthritis     Cataract     COPD (chronic obstructive pulmonary disease)     Diabetes mellitus, type 2     Hyperlipidemia     Hypertension     Personal history of colonic polyps 2018     REVIEW OF SYSTEMS:  Eyes No history of DR.  Cardiovascular: History of CAD, HTN and HLD.  GI: Tolerating Trulicity well without GI side effects.   : No CKD.  Neuro: No neuropathy.  PSYCH: No tobacco use.  ENDO: See HPI.        Objective:      Vitals:    02/13/25 0818   BP: 106/62   Pulse: 75   Resp: 16     RESPIRATORY: No respiratory distress  NEUROLOGIC: Cranial nerves II-XII grossly intact.   PSYCHIATRIC: Alert & oriented x3. Normal mood and affect.  FOOT EXAMINATION: UTD  Assessment:       1. Uncontrolled type 2 diabetes mellitus with hyperglycemia, with long-term current use of insulin    2. Hypertension associated with diabetes    3. Hyperlipidemia associated with type 2 diabetes mellitus    4. Moderate COPD (chronic obstructive pulmonary disease)    5. Pulmonary nodule    6. Coronary artery disease involving native coronary artery of native heart without angina pectoris    7. Vitamin D deficiency    8. History of pulmonary embolism            Plan:   tSefan was seen today for diabetes mellitus.    Diagnoses and all orders for this visit:    Uncontrolled type 2 diabetes mellitus with hyperglycemia, with long-term current use of insulin  -     POCT Glucose, Hand-Held Device    Chronic -  Already has A1c ordered for today. Suspect good improvement.     Medication Regimen:   Continue Farxiga 10 mg every morning  Continue Ozempic 2 mg once a week.   Continue Metformin 1000 mg, 1 tablet twice daily with meals  Continue Tresiba 10 units once daily.  If you have consistent blood sugars below 80 consistently, decrease to 8 units and let me know.   Continue  "Novolog 7 units before each main meal.    Continuous glucose monitoring report:    The patient's CGM was downloaded and was reviewed for the last 14 days. See HPI for interpretation & plan.      Hypertension associated with diabetes    Hyperlipidemia associated with type 2 diabetes mellitus    Moderate COPD (chronic obstructive pulmonary disease)    Pulmonary nodule    Coronary artery disease involving native coronary artery of native heart without angina pectoris    Vitamin D deficiency    History of pulmonary embolism        - Follow up: 3 months    Portions of this note may have been created with voice recognition software. Occasional "wrong-word" or "sound-a-like" substitutions may have occurred due to the inherent limitations of voice recognition software. Please, read the note carefully and recognize, using context, where substitutions have occurred.           MALIKA ShipleyC, BC-ADM  [facility]  "

## 2025-02-13 NOTE — PATIENT INSTRUCTIONS
Medication Regimen:   Continue Farxiga 10 mg every morning  Continue Ozempic 2 mg once a week.   Continue Metformin 1000 mg, 1 tablet twice daily with meals  Continue Tresiba 10 units once daily.  If you have consistent blood sugars below 80 consistently, decrease to 8 units and let me know.   Continue Novolog 7 units before each main meal.    Patient Instructions:  Carbohydrate Count: 30-45 grams/meal and 15 grams/snack with limit of 2 snacks per day.  Exercise: Goal is 150 minutes or more per week.  Bring meter and blood sugar log to each appointment.     Goals for Blood Sugar:  Fastin-130 mg/dl  2 hours after meal:  mg/dl  Before Bed: 100-150 mg/dl  If Blood sugar is below 70 mg/dl, DO NOT take diabetes medication. Eat first and then recheck blood sugar in 20 minutes.  Foods to eat if blood sugar is below 70 mg/dl  1/2 glass of orange juice   1/2 regular cola can   3-4 hard candies   3-4 small glucose tabs.   Foods to eat if blood sugar is below 50 mg/dl  1 glass of orange juice  1 regular cola can  8 hard candies  8 small glucose tabs.   If you have repeated eating/blood sugar recheck process 2 times and blood sugar still below 70 mg/dl, call 911.                                                           \

## 2025-02-14 DIAGNOSIS — R09.81 NASAL CONGESTION: ICD-10-CM

## 2025-02-14 RX ORDER — FLUTICASONE PROPIONATE 50 MCG
1 SPRAY, SUSPENSION (ML) NASAL DAILY
Qty: 16 G | Refills: 3 | Status: SHIPPED | OUTPATIENT
Start: 2025-02-14

## 2025-02-14 NOTE — TELEPHONE ENCOUNTER
Care Due:                  Date            Visit Type   Department     Provider  --------------------------------------------------------------------------------                                EP -                              PRIMARY      [facility] INTER[facility]  Last Visit: 10-      CARE ([facility])   MEDICINE       Wallace Fisher  Next Visit: None Scheduled  None         None Found                                                            Last  Test          Frequency    Reason                     Performed    Due Date  --------------------------------------------------------------------------------    Mg Level....  12 months..  magnesium................  04- 04-    [facility] Embedded Care Due Messages. Reference number: [id number].   2/14/2025 2:28:57 PM CST

## 2025-02-17 ENCOUNTER — TELEPHONE (OUTPATIENT)
Dept: PULMONOLOGY | Facility: CLINIC | Age: 70
End: 2025-02-17
Payer: MEDICARE

## 2025-02-26 ENCOUNTER — OFFICE VISIT (OUTPATIENT)
Dept: INTERNAL MEDICINE | Facility: CLINIC | Age: 70
End: 2025-02-26
Payer: MEDICARE

## 2025-02-26 ENCOUNTER — RESULTS FOLLOW-UP (OUTPATIENT)
Dept: INTERNAL MEDICINE | Facility: CLINIC | Age: 70
End: 2025-02-26

## 2025-02-26 ENCOUNTER — HOSPITAL ENCOUNTER (OUTPATIENT)
Dept: RADIOLOGY | Facility: HOSPITAL | Age: 70
Discharge: HOME | End: 2025-02-26
Attending: INTERNAL MEDICINE
Payer: MEDICARE

## 2025-02-26 VITALS
WEIGHT: 212.5 LBS | OXYGEN SATURATION: 95 % | SYSTOLIC BLOOD PRESSURE: 102 MMHG | HEIGHT: 68 IN | HEART RATE: 105 BPM | TEMPERATURE: 96 F | DIASTOLIC BLOOD PRESSURE: 60 MMHG | BODY MASS INDEX: 32.21 KG/M2 | RESPIRATION RATE: 17 BRPM

## 2025-02-26 DIAGNOSIS — J44.1 COPD EXACERBATION: ICD-10-CM

## 2025-02-26 DIAGNOSIS — E78.5 HYPERLIPIDEMIA ASSOCIATED WITH TYPE 2 DIABETES MELLITUS: ICD-10-CM

## 2025-02-26 DIAGNOSIS — E11.65 CONTROLLED TYPE 2 DIABETES MELLITUS WITH HYPERGLYCEMIA, WITHOUT LONG-TERM CURRENT USE OF INSULIN: ICD-10-CM

## 2025-02-26 DIAGNOSIS — R50.9 FEVER, UNSPECIFIED FEVER CAUSE: ICD-10-CM

## 2025-02-26 DIAGNOSIS — I25.10 CORONARY ARTERY DISEASE INVOLVING NATIVE CORONARY ARTERY OF NATIVE HEART WITHOUT ANGINA PECTORIS: ICD-10-CM

## 2025-02-26 DIAGNOSIS — J10.1 INFLUENZA A: Primary | ICD-10-CM

## 2025-02-26 DIAGNOSIS — E11.59 HYPERTENSION ASSOCIATED WITH DIABETES: ICD-10-CM

## 2025-02-26 DIAGNOSIS — R05.1 ACUTE COUGH: ICD-10-CM

## 2025-02-26 DIAGNOSIS — E11.69 HYPERLIPIDEMIA ASSOCIATED WITH TYPE 2 DIABETES MELLITUS: ICD-10-CM

## 2025-02-26 DIAGNOSIS — J44.9 MODERATE COPD (CHRONIC OBSTRUCTIVE PULMONARY DISEASE): ICD-10-CM

## 2025-02-26 DIAGNOSIS — I15.2 HYPERTENSION ASSOCIATED WITH DIABETES: ICD-10-CM

## 2025-02-26 LAB
CTP QC/QA: YES
POC MOLECULAR INFLUENZA A AGN: POSITIVE
POC MOLECULAR INFLUENZA B AGN: NEGATIVE

## 2025-02-26 PROCEDURE — 3072F LOW RISK FOR RETINOPATHY: CPT | Mod: CPTII,S$GLB,, | Performed by: INTERNAL MEDICINE

## 2025-02-26 PROCEDURE — 1159F MED LIST DOCD IN RCRD: CPT | Mod: CPTII,S$GLB,, | Performed by: INTERNAL MEDICINE

## 2025-02-26 PROCEDURE — 71046 X-RAY EXAM CHEST 2 VIEWS: CPT | Mod: TC

## 2025-02-26 PROCEDURE — 99214 OFFICE O/P EST MOD 30 MIN: CPT | Mod: S$GLB,,, | Performed by: INTERNAL MEDICINE

## 2025-02-26 PROCEDURE — 3074F SYST BP LT 130 MM HG: CPT | Mod: CPTII,S$GLB,, | Performed by: INTERNAL MEDICINE

## 2025-02-26 PROCEDURE — 4010F ACE/ARB THERAPY RXD/TAKEN: CPT | Mod: CPTII,S$GLB,, | Performed by: INTERNAL MEDICINE

## 2025-02-26 PROCEDURE — 3288F FALL RISK ASSESSMENT DOCD: CPT | Mod: CPTII,S$GLB,, | Performed by: INTERNAL MEDICINE

## 2025-02-26 PROCEDURE — 1126F AMNT PAIN NOTED NONE PRSNT: CPT | Mod: CPTII,S$GLB,, | Performed by: INTERNAL MEDICINE

## 2025-02-26 PROCEDURE — 3078F DIAST BP <80 MM HG: CPT | Mod: CPTII,S$GLB,, | Performed by: INTERNAL MEDICINE

## 2025-02-26 PROCEDURE — 3008F BODY MASS INDEX DOCD: CPT | Mod: CPTII,S$GLB,, | Performed by: INTERNAL MEDICINE

## 2025-02-26 PROCEDURE — G2211 COMPLEX E/M VISIT ADD ON: HCPCS | Mod: S$GLB,,, | Performed by: INTERNAL MEDICINE

## 2025-02-26 PROCEDURE — 1101F PT FALLS ASSESS-DOCD LE1/YR: CPT | Mod: CPTII,S$GLB,, | Performed by: INTERNAL MEDICINE

## 2025-02-26 PROCEDURE — 99999 PR PBB SHADOW E&M-EST. PATIENT-LVL V: CPT | Mod: PBBFAC,,, | Performed by: INTERNAL MEDICINE

## 2025-02-26 PROCEDURE — 3051F HG A1C>EQUAL 7.0%<8.0%: CPT | Mod: CPTII,S$GLB,, | Performed by: INTERNAL MEDICINE

## 2025-02-26 PROCEDURE — 71046 X-RAY EXAM CHEST 2 VIEWS: CPT | Mod: 26,,, | Performed by: RADIOLOGY

## 2025-02-26 RX ORDER — OSELTAMIVIR PHOSPHATE 75 MG/1
75 CAPSULE ORAL 2 TIMES DAILY
Qty: 10 CAPSULE | Refills: 0 | Status: SHIPPED | OUTPATIENT
Start: 2025-02-26 | End: 2025-03-03

## 2025-02-26 RX ORDER — PREDNISONE 50 MG/1
50 TABLET ORAL DAILY
Qty: 5 TABLET | Refills: 0 | Status: SHIPPED | OUTPATIENT
Start: 2025-02-26 | End: 2025-03-03

## 2025-02-26 NOTE — PATIENT INSTRUCTIONS
flonase nasal spray 2 spays each nostril at night for nasal congestion, post nasal drip, runny nose    Delsym as needed for cough    Saline nasal spray throughout the day for nasal congestion.    Allegra or zyrtec for allergies, post nasal drip, runny nose, watery eyes.    cepacol throat lozenges and/or chloraseptic spray for sore throat    mucinex for chest congestion.     Tylenol or ibuprofen for pain or fever.

## 2025-02-26 NOTE — PROGRESS NOTES
"Subjective:      Patient ID: Stefan Forbes Jr. is a 69 y.o. male.    Chief Complaint: Chest Congestion (4-5 days)    Cough  This is a new problem. Episode onset: 3 days. The cough is Non-productive. Associated symptoms include a fever (Subjective), postnasal drip, shortness of breath and wheezing. Pertinent negatives include no chest pain, chills, ear congestion, ear pain, hemoptysis, nasal congestion or rash. Nothing aggravates the symptoms. Treatments tried: Tylenol, cough syrup. His past medical history is significant for COPD.     History of Present Illness               Mucinex , Tylenol, and Cough syrup helping some    Review of Systems   Constitutional:  Positive for diaphoresis, fatigue and fever (Subjective). Negative for chills.   HENT:  Positive for postnasal drip. Negative for ear pain, facial swelling and sinus pain.    Respiratory:  Positive for cough, shortness of breath and wheezing. Negative for hemoptysis.    Cardiovascular:  Negative for chest pain and leg swelling.   Gastrointestinal:  Negative for nausea and vomiting.   Skin:  Negative for rash and wound.     Objective:   /60 (BP Location: Right arm, Patient Position: Sitting)   Pulse 105   Temp 96 °F (35.6 °C) (Tympanic)   Resp 17   Ht 5' 8" (1.727 m)   Wt 96.4 kg (212 lb 8.4 oz)   SpO2 95%   BMI 32.31 kg/m²     Physical Exam  Constitutional:       General: He is not in acute distress.     Appearance: He is well-developed.   HENT:      Head: Normocephalic and atraumatic.      Nose:      Right Sinus: No maxillary sinus tenderness or frontal sinus tenderness.      Left Sinus: No maxillary sinus tenderness or frontal sinus tenderness.      Mouth/Throat:      Pharynx: Postnasal drip present. No oropharyngeal exudate or uvula swelling.   Eyes:      Extraocular Movements: Extraocular movements intact.   Neck:      Thyroid: No thyromegaly.   Cardiovascular:      Rate and Rhythm: Tachycardia present.   Pulmonary:      Breath sounds: No " stridor. Wheezing (Diffusely) present. No rales.   Abdominal:      General: Bowel sounds are normal.      Palpations: Abdomen is soft.      Tenderness: There is no abdominal tenderness.   Musculoskeletal:         General: No swelling.      Cervical back: Neck supple. No rigidity.   Lymphadenopathy:      Cervical: No cervical adenopathy.   Skin:     General: Skin is warm and dry.   Neurological:      Mental Status: He is alert and oriented to person, place, and time.   Psychiatric:         Behavior: Behavior normal.         Lab Results   Component Value Date    WBC 6.60 11/12/2024    HGB 14.3 11/12/2024    HGB 14.6 07/30/2024    HGB 13.0 (L) 05/10/2024    HCT 46.8 11/12/2024    MCV 99 (H) 11/12/2024    MCV 93 07/30/2024    MCV 91 05/10/2024     11/12/2024    CHOL 121 11/12/2024    TRIG 98 11/12/2024    HDL 39 (L) 11/12/2024    LDLCALC 62.4 (L) 11/12/2024    LDLCALC 41.8 (L) 02/01/2024    LDLCALC 66.6 10/30/2023    ALT 14 11/12/2024    AST 12 11/12/2024     11/12/2024    K 5.3 (H) 11/12/2024    CALCIUM 9.5 11/12/2024     11/12/2024    CO2 27 11/12/2024    BUN 18 11/12/2024    CREATININE 1.2 11/12/2024    CREATININE 1.2 08/05/2024    CREATININE 1.3 07/30/2024    EGFRNORACEVR >60.0 11/12/2024    EGFRNORACEVR >60 08/05/2024    EGFRNORACEVR >60 07/30/2024    TSH 1.499 11/12/2024    TSH 1.437 10/30/2023    TSH 1.195 10/12/2022    PSA 0.25 11/12/2024    PSA 0.25 10/30/2023    PSA 0.38 01/18/2022     (H) 11/12/2024    HGBA1C 7.4 (H) 02/13/2025    HGBA1C 7.4 (H) 11/12/2024    HGBA1C 7.5 (H) 06/25/2024    [id number] 44 04/26/2017    [id number] 29 (L) 10/08/2015          The ASCVD Risk score (Mal DAVID, et al., 2019) failed to calculate for the following reasons:    The valid total cholesterol range is 130 to 320 mg/dL     Assessment:     1. Influenza A    2. Controlled type 2 diabetes mellitus with hyperglycemia, without long-term current use of insulin    3. Hypertension associated with diabetes     4. Acute cough    5. Fever, unspecified fever cause    6. Moderate COPD (chronic obstructive pulmonary disease)    7. Hyperlipidemia associated with type 2 diabetes mellitus    8. Coronary artery disease involving native coronary artery of native heart without angina pectoris    9. COPD exacerbation      Plan:   1. Influenza A  -     oseltamivir (TAMIFLU) 75 MG capsule; Take 1 capsule (75 mg total) by mouth 2 (two) times daily. for 5 days  Dispense: 10 capsule; Refill: 0    2. Controlled type 2 diabetes mellitus with hyperglycemia, without long-term current use of insulin  Overview:          3. Hypertension associated with diabetes  Overview:  Controlled. Cont current meds.         4. Acute cough  -     POCT Influenza A/B Molecular  -     X-Ray Chest PA And Lateral; Future; Expected date: 02/26/2025    5. Fever, unspecified fever cause  -     X-Ray Chest PA And Lateral; Future; Expected date: 02/26/2025    6. Moderate COPD (chronic obstructive pulmonary disease)  Overview:  FEV1 73% of predicted.   Trelegy    Orders:  -     X-Ray Chest PA And Lateral; Future; Expected date: 02/26/2025    7. Hyperlipidemia associated with type 2 diabetes mellitus  Overview:  Controlled. Cont current meds.         8. Coronary artery disease involving native coronary artery of native heart without angina pectoris  Overview:  MI 2012  Sees Dr. Orlando Reed.       9. COPD exacerbation  -     predniSONE (DELTASONE) 50 MG Tab; Take 1 tablet (50 mg total) by mouth once daily. for 5 days  Dispense: 5 tablet; Refill: 0        Patient Instructions   flonase nasal spray 2 spays each nostril at night for nasal congestion, post nasal drip, runny nose    Delsym as needed for cough    Saline nasal spray throughout the day for nasal congestion.    Allegra or zyrtec for allergies, post nasal drip, runny nose, watery eyes.    cepacol throat lozenges and/or chloraseptic spray for sore throat    mucinex for chest congestion.     Tylenol or  ibuprofen for pain or fever.        Future Appointments   Date Time Provider Department Center   3/3/2025 10:00 AM Karen Sandra RN [facility] DIBEDU [facility]   4/25/2025  8:20 AM Wallace Fisher MD [facility] IM [facility]   6/11/2025 10:15 AM Stefan Eugene MD [facility] UROLOGY [facility]   8/8/2025  9:45 AM V CT1 LIMIT 500 LBS [facility] CT SCAN [facility]   8/8/2025 10:00 AM PULMONARY LAB, [facility] PULMFUN [facility]   8/8/2025 10:40 AM [facility]       Lab Frequency Next Occurrence   CBC Auto Differential Once 08/14/2024   Comprehensive Metabolic Panel Once 08/14/2024   Ferritin Once 08/14/2024   Iron and TIBC Once 08/14/2024   CT Chest Without Contrast Once 08/21/2024   CT Sinuses without Contrast Once 02/03/2025   Microalbumin/creatinine urine ratio         Follow up if symptoms worsen or fail to improve.         Visit today included increased complexity  addressed and managing the longitudinal care of the patient due to the serious and/or complex managed problem(s)

## 2025-03-21 ENCOUNTER — CLINICAL SUPPORT (OUTPATIENT)
Dept: DIABETES | Facility: CLINIC | Age: 70
End: 2025-03-21
Payer: MEDICARE

## 2025-03-21 VITALS — WEIGHT: 211.88 LBS | BODY MASS INDEX: 32.21 KG/M2

## 2025-03-21 DIAGNOSIS — E11.65 CONTROLLED TYPE 2 DIABETES MELLITUS WITH HYPERGLYCEMIA, WITHOUT LONG-TERM CURRENT USE OF INSULIN: Primary | ICD-10-CM

## 2025-03-21 PROCEDURE — 99999 PR PBB SHADOW E&M-EST. PATIENT-LVL III: CPT | Mod: PBBFAC,,,

## 2025-03-21 NOTE — PROGRESS NOTES
Diabetes Care Specialist Follow-up Note  Author: Karen Sandra RN  Date: 3/21/2025    Intake    Program Intake  Reason for Diabetes Program Visit:: Intervention  Type of Intervention:: Individual  Individual: Education  Education: Self-Management Skill Review  Current diabetes risk level:: moderate  In the last month, have you used the ER or been admitted to the hospital: No    Current Diabetes Treatment: Oral Medications, DM Injectables, Insulin  Oral Medication Type/Dose: Farxiga 10 mg daily. Metformin 1,000 mg BID.  DM Injectables Type/Dose: Ozempic 2 mg weekly.  Method of insulin delivery?: Injections  Injection Type: Pens  Pen Type/Dose: Tresiba 8 units in the evening. Novolog 7 units AC. Novolog SS Q3H PRN outside of eating.    Continuous Glucose Monitoring  Patient has CGM: Yes  Personal CGM type:: Freestyle Griselda 3  GMI Date: 03/21/25  GMI Value: 6.3 %    Lab Results   Component Value Date    HGBA1C 7.4 (H) 02/13/2025     A1c Pre Diabetes Care Specialist Intervention:  7.5%      Weight: 96.1 kg (211 lb 13.8 oz)   Body mass index is 32.21 kg/m².  Wt decrease of 18 lbs since last visit on 1/06/2025      Physical activity/Exercise:   Goal met.      SMBG: FreeStyle Griselda 3. Report in media.        Diabetes Self-Management Skills Assessment    Nutrition/Healthy Eating  Meal Plan 24 Hour Recall - Breakfast: Hashbrown, egg, & slice of toast; Coffee.  Meal Plan 24 Hour Recall - Lunch: None due to late breakfast.  Meal Plan 24 Hour Recall - Dinner: Fish patties, hashbrowns, and spinach; Coffee.  Meal Plan 24 Hour Recall - Snack: 2 cups of skinny pop popcorn.  Meal Plan 24 Hour Recall - Beverage: Coffee.    Home Blood Glucose Monitoring  Personal CGM type:: Freestyle Griselda 3    During today's follow-up visit,  the following areas required further assessment and content was provided/reviewed.    Based on today's diabetes care assessment, the following areas of need were identified:      Identified Areas of Need       Medication/Current Diabetes Treatment: Goal met.      Lifestyle Coping/Support: Goal met.     Nutrition/Healthy Eating: Goal met.      Physical Activity/Exercise: Goal met.          Today's interventions were provided through individual discussion, instruction, and written materials were provided.    Patient verbalized understanding of instruction and written materials.  Pt was able to return back demonstration of instructions today. Patient understood key points, needs reinforcement and further instruction.     Diabetes Self-Management Care Plan Review and Evaluation of Progress:    During today's follow-up Stefan's Diabetes Self-Management Care Plan progress was reviewed and progress was evaluated including his/her input. Stefan has agreed to continue his/her journey to improve/maintain overall diabetes control by continuing to set health goals. See care plan progress below.      Care Plan: Diabetes Management   Updates made since 3/21/2024 12:00 AM        Problem: Healthy Eating         Goal: Eat 3 meals daily with 45-60 grams of carbs per meal and 0-15 grams of carbs per snack. Completed 3/21/2025   Start Date: 9/30/2024   Expected End Date: 9/30/2025   This Visit's Progress: Met   Recent Progress: No change   Priority: High   Barriers: Lack of Motivation to Change   Note:    Stress eating is one of his biggest barriers to healthy eating.  Re-educated on the importance of balancing carbs with protein and/or healthy fat.   Educated on protein and fat help him feel saldaña so he is less likely to overeat the carbs.   Educated on the importance of consistency including eating breakfast, lunch, and dinner with snacks in between if needed.   Educated that if he eats lunch he may be less likely to over eat at dinner and in the evening.   Provided with 25 healthy snack ideas.     10/30/24: Re-educated on all the above information. Encouraged 3 meals daily with snacks in between. Encouraged pre-making snacks so he's less  likely to overeat.     12/04/24: Due to high stress, has been eating out for most of meals. Re-educated on importance of smaller portions and balanced meals.     1/06/25: Has not been eating as big of portions due to decreased appetite related to increase dose of Ozempic. Dinner continues to be larger meal of the day with some snacking afterwards but has improved.   States he is having a lot of issues with constipation. Encouraged increasing water and fiber intake. Encouraged reaching out to GI doctor if symptoms do not resolve or improve.    3/21/25: Doing well with healthy eating.       Task: Reviewed the sources and role of Carbohydrate, Protein, and Fat and how each nutrient impacts blood sugar. Completed 9/30/2024        Task: Explained how to count carbohydrates using the food label and the use of dry measuring cups for accurate carb counting. Completed 9/30/2024        Task: Review the importance of balancing carbohydrates with each meal using portion control techniques to count servings of carbohydrate and label reading to identify serving size and amount of total carbs per serving. Completed 9/30/2024        Task: Provided Sample plate method and reviewed the use of the plate to estimate amounts of carbohydrate per meal. Completed 9/30/2024        Problem: Physical Activity and Exercise         Goal: Patient agrees to increase physical activity to a goal of 1-4x/week for 30-60 minutes. Completed 3/21/2025   Start Date: 9/30/2024   Expected End Date: 9/30/2025   This Visit's Progress: Met   Recent Progress: Deferred   Priority: Medium   Barriers: No Barriers Identified   Note:    Most active in the AM.   Plans to go to the gym before work.     12/04/24: Since weather is cooling down, he is going outside more and doing activities around the house.     1/06/25: Deferred.    3/21/25: Has been more active lately by walking in the yard, cleaning shed, etc. Goes to PT [facility] and sometimes Friday. Does PT exercises at  home as well.        Task: Discussed role of physical activity on reducing insulin resistance and improvement in overall glycemic control. Completed 9/30/2024        Task: Discussed role of physical activity as it relates to weight loss Completed 9/30/2024        Task: Offered suggestions on how patient could increase their regular physical activity Completed 9/30/2024        Task: Reviewed blood glucose monitoring before, during and after exercise/activity Completed 9/30/2024        Problem: Psychosocial/Healthy Coping         Goal: Patient agrees to identify and practice at least one healthy coping/self-care behavior each day. Completed 3/21/2025   Start Date: 10/30/2024   Expected End Date: 9/30/2025   This Visit's Progress: Met   Recent Progress: On track   Note:    Pt states one of his biggest barriers to healthy eating is stress eating.   Encouraged finding alternate outlets to stress such as working in shed, going for walk, crossword puzzles, watching TV, etc.     12/04/24: Now involved in family counseling.     1/06/25: Deferred.    3/21/25: Counseling/facilitator comes to home about every 2 weeks.       Task: Discussed strategies for healthy coping with chronic disease. Completed 10/30/2024        Problem: Medications         Goal: Patient agrees to take Novolog as prescribed. Completed 3/21/2025   Start Date: 10/30/2024   Expected End Date: 9/30/2025   This Visit's Progress: Met   Recent Progress: No change   Note:    Taking 7 units of Novolog TID AC but may benefit from a SS.  Educated on SS insulin.   Will discuss with EUSEBIO Gross.    12/04/24: Has not been taking Novolog before meals because he thought the SS insulin replaced the meal time insulin. Sent message with medication instructions.    1/06/25: Has not taken Tresiba in 2 nights and has not taken Novolog in ~1.5 weeks due to hypoglycemia. Spoke with EUSEBIO Gross who states to decrease Tresiba to 10 units nightly and Novolog 4 units AC supper. New  doses sent to pt in a message.    3/21/25: Taking medications as prescribed.       Task: Reviewed with patient all current diabetes medications and provided basic review of the purpose, dosage, frequency, side effects, and storage of both oral and injectable diabetes medications. Completed 10/30/2024        Task: Discussed guidelines for preventing, detecting and treating hypoglycemia and hyperglycemia and reviewed the importance of meal and medication timing with diabetes mediations for prevention of hypoglycemia and maximum drug benefit. Completed 10/30/2024          Follow Up Plan     Follow up if symptoms worsen or fail to improve.    Today's care plan and follow up schedule was discussed with patient.  Stefan verbalized understanding of the care plan, goals, and agrees to follow up plan.        The patient was encouraged to communicate with his/her health care provider/physician and care team regarding his/her condition(s) and treatment.  I provided the patient with my contact information today and encouraged to contact me via phone or [facility] Patient Portal as needed.     Length of Visit   Total Time: 30 Minutes

## 2025-03-24 DIAGNOSIS — Z00.00 ENCOUNTER FOR MEDICARE ANNUAL WELLNESS EXAM: ICD-10-CM

## 2025-03-28 DIAGNOSIS — E11.65 UNCONTROLLED TYPE 2 DIABETES MELLITUS WITH HYPERGLYCEMIA, WITH LONG-TERM CURRENT USE OF INSULIN: ICD-10-CM

## 2025-03-28 DIAGNOSIS — Z79.4 UNCONTROLLED TYPE 2 DIABETES MELLITUS WITH HYPERGLYCEMIA, WITH LONG-TERM CURRENT USE OF INSULIN: ICD-10-CM

## 2025-03-28 RX ORDER — INSULIN DEGLUDEC 200 U/ML
8 INJECTION, SOLUTION SUBCUTANEOUS DAILY
Qty: 12 ML | Refills: 3
Start: 2025-03-28

## 2025-05-07 DIAGNOSIS — Z79.4 UNCONTROLLED TYPE 2 DIABETES MELLITUS WITH HYPERGLYCEMIA, WITH LONG-TERM CURRENT USE OF INSULIN: ICD-10-CM

## 2025-05-07 DIAGNOSIS — E11.65 UNCONTROLLED TYPE 2 DIABETES MELLITUS WITH HYPERGLYCEMIA, WITH LONG-TERM CURRENT USE OF INSULIN: ICD-10-CM

## 2025-05-07 RX ORDER — INSULIN DEGLUDEC 200 U/ML
8 INJECTION, SOLUTION SUBCUTANEOUS DAILY
Qty: 36 ML | Refills: 3 | Status: SHIPPED | OUTPATIENT
Start: 2025-05-07

## 2025-05-07 RX ORDER — INSULIN DEGLUDEC 200 U/ML
8 INJECTION, SOLUTION SUBCUTANEOUS DAILY
Start: 2025-05-07 | End: 2025-05-07 | Stop reason: SDUPTHER

## 2025-05-09 DIAGNOSIS — J44.9 MODERATE COPD (CHRONIC OBSTRUCTIVE PULMONARY DISEASE): ICD-10-CM

## 2025-05-09 RX ORDER — PRAMIPEXOLE DIHYDROCHLORIDE 0.12 MG/1
TABLET ORAL NIGHTLY
Qty: 60 TABLET | Refills: 5 | Status: SHIPPED | OUTPATIENT
Start: 2025-05-09

## 2025-05-23 ENCOUNTER — OFFICE VISIT (OUTPATIENT)
Dept: INTERNAL MEDICINE | Facility: CLINIC | Age: 70
End: 2025-05-23
Payer: MEDICARE

## 2025-05-23 VITALS
HEART RATE: 93 BPM | TEMPERATURE: 97 F | BODY MASS INDEX: 32.14 KG/M2 | DIASTOLIC BLOOD PRESSURE: 58 MMHG | SYSTOLIC BLOOD PRESSURE: 102 MMHG | HEIGHT: 68 IN | WEIGHT: 212.06 LBS | OXYGEN SATURATION: 96 %

## 2025-05-23 DIAGNOSIS — Z79.899 LONG-TERM USE OF HIGH-RISK MEDICATION: ICD-10-CM

## 2025-05-23 DIAGNOSIS — E11.59 HYPERTENSION ASSOCIATED WITH DIABETES: ICD-10-CM

## 2025-05-23 DIAGNOSIS — E83.42 HYPOMAGNESEMIA: ICD-10-CM

## 2025-05-23 DIAGNOSIS — I83.91 VARICOSE VEINS OF RIGHT LOWER EXTREMITY, UNSPECIFIED WHETHER COMPLICATED: ICD-10-CM

## 2025-05-23 DIAGNOSIS — E11.65 CONTROLLED TYPE 2 DIABETES MELLITUS WITH HYPERGLYCEMIA, WITHOUT LONG-TERM CURRENT USE OF INSULIN: ICD-10-CM

## 2025-05-23 DIAGNOSIS — F17.200 NICOTINE DEPENDENCE: ICD-10-CM

## 2025-05-23 DIAGNOSIS — G47.33 OSA ON CPAP: ICD-10-CM

## 2025-05-23 DIAGNOSIS — E78.5 HYPERLIPIDEMIA ASSOCIATED WITH TYPE 2 DIABETES MELLITUS: ICD-10-CM

## 2025-05-23 DIAGNOSIS — R25.2 FOOT CRAMPS: ICD-10-CM

## 2025-05-23 DIAGNOSIS — I25.10 CORONARY ARTERY DISEASE INVOLVING NATIVE CORONARY ARTERY OF NATIVE HEART WITHOUT ANGINA PECTORIS: ICD-10-CM

## 2025-05-23 DIAGNOSIS — I15.2 HYPERTENSION ASSOCIATED WITH DIABETES: ICD-10-CM

## 2025-05-23 DIAGNOSIS — N40.1 BENIGN PROSTATIC HYPERPLASIA WITH WEAK URINARY STREAM: ICD-10-CM

## 2025-05-23 DIAGNOSIS — I70.0 AORTIC ATHEROSCLEROSIS: Primary | ICD-10-CM

## 2025-05-23 DIAGNOSIS — E11.69 HYPERLIPIDEMIA ASSOCIATED WITH TYPE 2 DIABETES MELLITUS: ICD-10-CM

## 2025-05-23 DIAGNOSIS — R39.12 BENIGN PROSTATIC HYPERPLASIA WITH WEAK URINARY STREAM: ICD-10-CM

## 2025-05-23 DIAGNOSIS — E11.9 CONTROLLED TYPE 2 DIABETES MELLITUS WITHOUT COMPLICATION, WITHOUT LONG-TERM CURRENT USE OF INSULIN: ICD-10-CM

## 2025-05-23 DIAGNOSIS — Z12.5 ENCOUNTER FOR SCREENING FOR MALIGNANT NEOPLASM OF PROSTATE: ICD-10-CM

## 2025-05-23 DIAGNOSIS — J44.9 MODERATE COPD (CHRONIC OBSTRUCTIVE PULMONARY DISEASE): ICD-10-CM

## 2025-05-23 PROCEDURE — G2211 COMPLEX E/M VISIT ADD ON: HCPCS | Mod: S$GLB,,, | Performed by: INTERNAL MEDICINE

## 2025-05-23 PROCEDURE — 3074F SYST BP LT 130 MM HG: CPT | Mod: CPTII,S$GLB,, | Performed by: INTERNAL MEDICINE

## 2025-05-23 PROCEDURE — 99999 PR PBB SHADOW E&M-EST. PATIENT-LVL V: CPT | Mod: PBBFAC,,, | Performed by: INTERNAL MEDICINE

## 2025-05-23 PROCEDURE — 1101F PT FALLS ASSESS-DOCD LE1/YR: CPT | Mod: CPTII,S$GLB,, | Performed by: INTERNAL MEDICINE

## 2025-05-23 PROCEDURE — 3288F FALL RISK ASSESSMENT DOCD: CPT | Mod: CPTII,S$GLB,, | Performed by: INTERNAL MEDICINE

## 2025-05-23 PROCEDURE — 3051F HG A1C>EQUAL 7.0%<8.0%: CPT | Mod: CPTII,S$GLB,, | Performed by: INTERNAL MEDICINE

## 2025-05-23 PROCEDURE — 3078F DIAST BP <80 MM HG: CPT | Mod: CPTII,S$GLB,, | Performed by: INTERNAL MEDICINE

## 2025-05-23 PROCEDURE — 3072F LOW RISK FOR RETINOPATHY: CPT | Mod: CPTII,S$GLB,, | Performed by: INTERNAL MEDICINE

## 2025-05-23 PROCEDURE — 4010F ACE/ARB THERAPY RXD/TAKEN: CPT | Mod: CPTII,S$GLB,, | Performed by: INTERNAL MEDICINE

## 2025-05-23 PROCEDURE — 99214 OFFICE O/P EST MOD 30 MIN: CPT | Mod: S$GLB,,, | Performed by: INTERNAL MEDICINE

## 2025-05-23 PROCEDURE — 3008F BODY MASS INDEX DOCD: CPT | Mod: CPTII,S$GLB,, | Performed by: INTERNAL MEDICINE

## 2025-05-23 RX ORDER — OLMESARTAN MEDOXOMIL 40 MG/1
40 TABLET ORAL DAILY
Qty: 90 TABLET | Refills: 2 | Status: SHIPPED | OUTPATIENT
Start: 2025-05-23

## 2025-05-23 RX ORDER — BUPROPION HYDROCHLORIDE 150 MG/1
150 TABLET, EXTENDED RELEASE ORAL 2 TIMES DAILY
Qty: 180 TABLET | Refills: 2 | Status: SHIPPED | OUTPATIENT
Start: 2025-05-23

## 2025-05-23 RX ORDER — METFORMIN HYDROCHLORIDE 1000 MG/1
1000 TABLET ORAL 2 TIMES DAILY WITH MEALS
Qty: 180 TABLET | Refills: 1 | Status: SHIPPED | OUTPATIENT
Start: 2025-05-23 | End: 2025-11-19

## 2025-05-23 RX ORDER — ROSUVASTATIN CALCIUM 20 MG/1
20 TABLET, COATED ORAL DAILY
Qty: 90 TABLET | Refills: 3 | Status: SHIPPED | OUTPATIENT
Start: 2025-05-23 | End: 2026-05-23

## 2025-05-23 RX ORDER — DAPAGLIFLOZIN 10 MG/1
10 TABLET, FILM COATED ORAL DAILY
Qty: 90 TABLET | Refills: 2 | Status: SHIPPED | OUTPATIENT
Start: 2025-05-23 | End: 2025-06-05

## 2025-05-23 NOTE — PROGRESS NOTES
"Subjective:      Patient ID: Stefan Forbes Jr. is a 70 y.o. male.    Chief Complaint: Annual Exam    HPI  History of Present Illness               69 yo with Problem List[1]  Past Medical History:   Diagnosis Date    Arthritis     Cataract     COPD (chronic obstructive pulmonary disease)     Diabetes mellitus, type 2     Hyperlipidemia     Hypertension     Personal history of colonic polyps 2018     Here today for management of mult med problems as outlined below.  Compliant with meds without significant side effects. Energy and appetite good.     Also c/o   Foot cramps for 40 years.   Worsening over past one week. Worse at night.   Unable to identify triggers.  No new trauma    Review of Systems   Constitutional:  Negative for chills and fever.   HENT:  Negative for ear pain and sore throat.    Respiratory:  Negative for cough.    Cardiovascular:  Negative for chest pain.   Gastrointestinal:  Negative for abdominal pain and blood in stool.   Genitourinary:  Negative for dysuria and hematuria.   Neurological:  Negative for seizures and syncope.     Objective:   BP (!) 102/58 (BP Location: Right arm, Patient Position: Sitting)   Pulse 93   Temp 97 °F (36.1 °C)   Ht 5' 8" (1.727 m)   Wt 96.2 kg (212 lb 1.3 oz)   SpO2 96%   BMI 32.25 kg/m²     Physical Exam  Constitutional:       General: He is not in acute distress.     Appearance: He is well-developed.   HENT:      Head: Normocephalic and atraumatic.   Eyes:      Extraocular Movements: Extraocular movements intact.   Neck:      Thyroid: No thyromegaly.   Cardiovascular:      Rate and Rhythm: Normal rate and regular rhythm.   Pulmonary:      Breath sounds: Normal breath sounds. No wheezing or rales.   Abdominal:      General: Bowel sounds are normal.      Palpations: Abdomen is soft.      Tenderness: There is no abdominal tenderness.   Musculoskeletal:         General: No swelling.      Cervical back: Neck supple. No rigidity.   Lymphadenopathy:      " Cervical: No cervical adenopathy.   Skin:     General: Skin is warm and dry.   Neurological:      Mental Status: He is alert and oriented to person, place, and time.   Psychiatric:         Behavior: Behavior normal.         Lab Results   Component Value Date    WBC 6.60 11/12/2024    HGB 14.3 11/12/2024    HGB 14.6 07/30/2024    HGB 13.0 (L) 05/10/2024    HCT 46.8 11/12/2024    MCV 99 (H) 11/12/2024    MCV 93 07/30/2024    MCV 91 05/10/2024     11/12/2024    CHOL 121 11/12/2024    TRIG 98 11/12/2024    HDL 39 (L) 11/12/2024    LDLCALC 62.4 (L) 11/12/2024    LDLCALC 41.8 (L) 02/01/2024    LDLCALC 66.6 10/30/2023    ALT 14 11/12/2024    AST 12 11/12/2024     11/12/2024    K 5.3 (H) 11/12/2024    CALCIUM 9.5 11/12/2024     11/12/2024    CO2 27 11/12/2024    BUN 18 11/12/2024    CREATININE 1.2 11/12/2024    CREATININE 1.2 08/05/2024    CREATININE 1.3 07/30/2024    EGFRNORACEVR >60.0 11/12/2024    EGFRNORACEVR >60 08/05/2024    EGFRNORACEVR >60 07/30/2024    TSH 1.499 11/12/2024    TSH 1.437 10/30/2023    TSH 1.195 10/12/2022    PSA 0.25 11/12/2024    PSA 0.25 10/30/2023    PSA 0.38 01/18/2022     (H) 11/12/2024    HGBA1C 7.4 (H) 02/13/2025    HGBA1C 7.4 (H) 11/12/2024    HGBA1C 7.5 (H) 06/25/2024    [id number] 44 04/26/2017    [id number] 29 (L) 10/08/2015          The ASCVD Risk score (Mal DK, et al., 2019) failed to calculate for the following reasons:    The valid total cholesterol range is 130 to 320 mg/dL     Assessment:     1. Aortic atherosclerosis    2. Controlled type 2 diabetes mellitus without complication, without long-term current use of insulin    3. Nicotine dependence    4. Hyperlipidemia associated with type 2 diabetes mellitus    5. Hypertension associated with diabetes    6. Coronary artery disease involving native coronary artery of native heart without angina pectoris    7. Benign prostatic hyperplasia with weak urinary stream    8. Controlled type 2 diabetes mellitus  with hyperglycemia, without long-term current use of insulin    9. Hypomagnesemia    10. Moderate COPD (chronic obstructive pulmonary disease)    11. MACHO on CPAP    12. Encounter for screening for malignant neoplasm of prostate    13. Long-term use of high-risk medication    14. Varicose veins of right lower extremity, unspecified whether complicated    15. Foot cramps      Plan:   1. Aortic atherosclerosis  Overview:  CT Chest 10/14/2019      2. Controlled type 2 diabetes mellitus without complication, without long-term current use of insulin  Controlled. Cont current meds.     -     metFORMIN (GLUCOPHAGE) 1000 MG tablet; Take 1 tablet (1,000 mg total) by mouth 2 (two) times daily with meals.  Dispense: 180 tablet; Refill: 1  -     Discontinue: dapagliflozin propanediol (FARXIGA) 10 mg tablet; Take 1 tablet (10 mg total) by mouth once daily. (Patient not taking: Reported on 6/5/2025)  Dispense: 90 tablet; Refill: 2  -     Hemoglobin A1C; Future; Expected date: 11/19/2025  -     Comprehensive Metabolic Panel; Future; Expected date: 11/19/2025  -     CBC Auto Differential; Future; Expected date: 11/19/2025    3. Nicotine dependence  Continue cessation  -     buPROPion (WELLBUTRIN SR) 150 MG TBSR 12 hr tablet; Take 1 tablet (150 mg total) by mouth 2 (two) times daily.  Dispense: 180 tablet; Refill: 2    4. Hyperlipidemia associated with type 2 diabetes mellitus  Overview:  Controlled. Cont current meds.       Orders:  -     rosuvastatin (CRESTOR) 20 MG tablet; Take 1 tablet (20 mg total) by mouth once daily.  Dispense: 90 tablet; Refill: 3  -     Lipid Panel; Future; Expected date: 11/19/2025  -     TSH; Future; Expected date: 11/19/2025    5. Hypertension associated with diabetes  Overview:  Controlled. Cont current meds.       Orders:  -     olmesartan (BENICAR) 40 MG tablet; Take 1 tablet (40 mg total) by mouth once daily.  Dispense: 90 tablet; Refill: 2    6. Coronary artery disease involving native coronary  artery of native heart without angina pectoris  Overview:  MI 2012  Sees Dr. Perry  Stable.       7. Benign prostatic hyperplasia with weak urinary stream  Stable.     8. Controlled type 2 diabetes mellitus with hyperglycemia, without long-term current use of insulin  Overview:  Controlled. Cont current meds.           9. Hypomagnesemia  Monitor    10. Moderate COPD (chronic obstructive pulmonary disease)  Overview:  FEV1 73% of predicted.   Trelegy      11. MACHO on CPAP  Overview:  3/28/2018 PSG The overall apnea-hypopnea index (AHI) was 7.6 events/hr.   On Auto CPAP 4-10 cm  Nasal mask   HME: [facility]       12. Encounter for screening for malignant neoplasm of prostate  -     PSA, Screening; Future; Expected date: 11/19/2025    13. Long-term use of high-risk medication  -     Vitamin B12; Future; Expected date: 11/23/2025    14. Varicose veins of right lower extremity, unspecified whether complicated  -     Ambulatory referral/consult to Vascular Surgery; Future; Expected date: 05/30/2025    15. Foot cramps  -     Basic Metabolic Panel; Future; Expected date: 05/23/2025  -     Magnesium; Future; Expected date: 05/23/2025        Patient Instructions   Check with your pharmacy regarding shingles vaccine.    Check with your pharmacy regarding RSV vaccine.      Future Appointments   Date Time Provider Department Center   6/11/2025 10:15 AM Stefan Eugene MD [facility] UROLOGY [facility]   6/12/2025  2:00 PM Irineo Porras, NP [facility] C3HV [facility]   8/8/2025  9:45 AM HGV CT1 LIMIT 500 LBS HGV CT SCAN [facility]   8/8/2025 10:00 AM PULMONARY LAB, [facility] PULMFUN [facility]   8/8/2025 10:40 AM Mazin Wells MD [facility] PULMSVC [facility]   9/9/2025  9:30 AM Roxi Martinez NP [facility] DIABETE [facility]   11/20/2025  7:30 AM LABORATORY, [facility] LAB [facility]   12/1/2025 11:00 AM Wallace Fisher MD [facility] IM [place]       Lab Frequency Next Occurrence   CBC Auto Differential Once 08/14/2024   Comprehensive  Metabolic Panel Once 08/14/2024   Ferritin Once 08/14/2024   Iron and TIBC Once 08/14/2024   CT Chest Without Contrast Once 08/21/2024   CT Sinuses without Contrast Once 02/03/2025   Referral to Enhanced Annual Wellness Visit (eAWV) W+1 Once 03/31/2025   Lipid Panel Once 11/19/2025   Hemoglobin A1C Once 11/19/2025   Comprehensive Metabolic Panel Once 11/19/2025   CBC Auto Differential Once 11/19/2025   PSA, Screening Once 11/19/2025   TSH Once 11/19/2025   Vitamin B12 Once 11/23/2025       Follow up in about 6 months (around 11/23/2025), or if symptoms worsen or fail to improve.         Visit today included increased complexity  addressed and managing the longitudinal care of the patient due to the serious and/or complex managed problem(s)            [1]   Patient Active Problem List  Diagnosis    Controlled type 2 diabetes mellitus with hyperglycemia, without long-term current use of insulin    Hypertension associated with diabetes    Hyperlipidemia associated with type 2 diabetes mellitus    Moderate COPD (chronic obstructive pulmonary disease)    CAD (coronary artery disease)    Vitamin D deficiency    Osteoarthritis of knee    Pulmonary nodule    Routine general medical examination at a health care facility    Nevus of choroid of right eye    History of pulmonary embolism    Hyponatremia    Iron deficiency anemia due to chronic blood loss    MACHO on CPAP    PLMD (periodic limb movement disorder)    BMI 35.0-35.9,adult    Varicose vein of leg    Tobacco abuse, in remission    Tubular adenoma of colon    Benign prostatic hyperplasia with weak urinary stream    Urgency of urination    Aortic atherosclerosis    COVID    Hypomagnesemia    Nicotine dependence    Restless leg syndrome due to iron deficiency anemia    Upper airway cough syndrome

## 2025-05-23 NOTE — PATIENT INSTRUCTIONS
Check with your pharmacy regarding shingles vaccine.    Check with your pharmacy regarding RSV vaccine.

## 2025-05-28 ENCOUNTER — TELEPHONE (OUTPATIENT)
Dept: PHARMACY | Facility: CLINIC | Age: 70
End: 2025-05-28
Payer: MEDICARE

## 2025-05-28 NOTE — TELEPHONE ENCOUNTER
Mr. Forbes has been informed via phone and letter that his patient assistance case was transferred to Davidson Peterson. All case related questions and/or concerns  may be directed to Davidson Peterson @[phone].      Thank you,   Pharmacy Patient Assistance Team   [address] [place] [address]  [address] [place] [id number]  Fax: [phone]  Email: [email or web address]

## 2025-06-03 ENCOUNTER — TELEPHONE (OUTPATIENT)
Dept: INTERNAL MEDICINE | Facility: CLINIC | Age: 70
End: 2025-06-03
Payer: MEDICARE

## 2025-06-05 ENCOUNTER — OFFICE VISIT (OUTPATIENT)
Dept: DIABETES | Facility: CLINIC | Age: 70
End: 2025-06-05
Payer: MEDICARE

## 2025-06-05 VITALS
RESPIRATION RATE: 18 BRPM | HEIGHT: 68 IN | DIASTOLIC BLOOD PRESSURE: 60 MMHG | SYSTOLIC BLOOD PRESSURE: 98 MMHG | OXYGEN SATURATION: 95 % | WEIGHT: 214.06 LBS | HEART RATE: 87 BPM | BODY MASS INDEX: 32.44 KG/M2

## 2025-06-05 DIAGNOSIS — E11.65 UNCONTROLLED TYPE 2 DIABETES MELLITUS WITH HYPERGLYCEMIA, WITH LONG-TERM CURRENT USE OF INSULIN: Primary | ICD-10-CM

## 2025-06-05 DIAGNOSIS — Z79.4 UNCONTROLLED TYPE 2 DIABETES MELLITUS WITH HYPERGLYCEMIA, WITH LONG-TERM CURRENT USE OF INSULIN: Primary | ICD-10-CM

## 2025-06-05 DIAGNOSIS — E78.5 HYPERLIPIDEMIA ASSOCIATED WITH TYPE 2 DIABETES MELLITUS: ICD-10-CM

## 2025-06-05 DIAGNOSIS — E11.59 HYPERTENSION ASSOCIATED WITH DIABETES: ICD-10-CM

## 2025-06-05 DIAGNOSIS — I15.2 HYPERTENSION ASSOCIATED WITH DIABETES: ICD-10-CM

## 2025-06-05 DIAGNOSIS — E11.69 HYPERLIPIDEMIA ASSOCIATED WITH TYPE 2 DIABETES MELLITUS: ICD-10-CM

## 2025-06-05 DIAGNOSIS — I25.10 CORONARY ARTERY DISEASE INVOLVING NATIVE CORONARY ARTERY OF NATIVE HEART WITHOUT ANGINA PECTORIS: ICD-10-CM

## 2025-06-05 LAB — GLUCOSE SERPL-MCNC: 164 MG/DL (ref 70–110)

## 2025-06-05 PROCEDURE — 1160F RVW MEDS BY RX/DR IN RCRD: CPT | Mod: CPTII,S$GLB,, | Performed by: NURSE PRACTITIONER

## 2025-06-05 PROCEDURE — 3072F LOW RISK FOR RETINOPATHY: CPT | Mod: CPTII,S$GLB,, | Performed by: NURSE PRACTITIONER

## 2025-06-05 PROCEDURE — 3078F DIAST BP <80 MM HG: CPT | Mod: CPTII,S$GLB,, | Performed by: NURSE PRACTITIONER

## 2025-06-05 PROCEDURE — 3051F HG A1C>EQUAL 7.0%<8.0%: CPT | Mod: CPTII,S$GLB,, | Performed by: NURSE PRACTITIONER

## 2025-06-05 PROCEDURE — 3288F FALL RISK ASSESSMENT DOCD: CPT | Mod: CPTII,S$GLB,, | Performed by: NURSE PRACTITIONER

## 2025-06-05 PROCEDURE — 1101F PT FALLS ASSESS-DOCD LE1/YR: CPT | Mod: CPTII,S$GLB,, | Performed by: NURSE PRACTITIONER

## 2025-06-05 PROCEDURE — 99214 OFFICE O/P EST MOD 30 MIN: CPT | Mod: S$GLB,,, | Performed by: NURSE PRACTITIONER

## 2025-06-05 PROCEDURE — 3008F BODY MASS INDEX DOCD: CPT | Mod: CPTII,S$GLB,, | Performed by: NURSE PRACTITIONER

## 2025-06-05 PROCEDURE — 4010F ACE/ARB THERAPY RXD/TAKEN: CPT | Mod: CPTII,S$GLB,, | Performed by: NURSE PRACTITIONER

## 2025-06-05 PROCEDURE — 3074F SYST BP LT 130 MM HG: CPT | Mod: CPTII,S$GLB,, | Performed by: NURSE PRACTITIONER

## 2025-06-05 PROCEDURE — 99999 PR PBB SHADOW E&M-EST. PATIENT-LVL III: CPT | Mod: PBBFAC,,, | Performed by: NURSE PRACTITIONER

## 2025-06-05 PROCEDURE — 82962 GLUCOSE BLOOD TEST: CPT | Mod: S$GLB,,, | Performed by: NURSE PRACTITIONER

## 2025-06-05 PROCEDURE — G2211 COMPLEX E/M VISIT ADD ON: HCPCS | Mod: S$GLB,,, | Performed by: NURSE PRACTITIONER

## 2025-06-05 PROCEDURE — 1159F MED LIST DOCD IN RCRD: CPT | Mod: CPTII,S$GLB,, | Performed by: NURSE PRACTITIONER

## 2025-06-05 PROCEDURE — 95251 CONT GLUC MNTR ANALYSIS I&R: CPT | Mod: S$GLB,,, | Performed by: NURSE PRACTITIONER

## 2025-06-05 NOTE — PATIENT INSTRUCTIONS
Novolog 3 units before breakfast plus sliding scale                8 units before dinner plus sliding scale                3 units before snack (no sliding scale)    If blood sugar is:      < 60    = subtract 2 units  60-80      = subtract 1 unit      = NO change  151-200  = add 1 unit  201-250  = add 2 units  251-300  = add 3 units  301-350  = add 4 units  > 350      = add 5 units    Tresiba 8 units daily    Ozempic 9 clicks x 1 week, then increase to 18 clicks    Continue Metformin and Farxiga

## 2025-06-05 NOTE — PROGRESS NOTES
Patient ID: Stefan Forbes Jr. is a 70 y.o. male.  Patient's current PCP is Wallace Fisher MD.   Collaborating Physician: ZULMA Zepeda MD    Chief Complaint: General Diabetes Follow-up (3 mo)    HPI  Stefan Forbes Jr. is a 70 y.o. White male presenting for a new consult with me, previously seen by ADI Little NP for diabetes.       Patient has been diagnosed with type 2 diabetes since 2012 .  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 Coffee/tea - SF  Current weight: 214 on 6/5/25  Weight at FOV: 214 on 6/5/25  Activity level: Yard work, Door dash couple times per week     Changes made at the last visit: per Ginny  Continue Farxiga 10 mg every morning  Continue Ozempic 2 mg once a week.   Continue Metformin 1000 mg, 1 tablet twice daily with meals  Continue Tresiba 10 units once daily.  If you have consistent blood sugars below 80 consistently, decrease to 8 units and let me know.   Continue Novolog 7 units before each main meal.    Current issues: Miralax and MOM as needed for constipation. GI- Dr Lott with [facility] and Dr Perry with [facility], Steroid injection 6-8  weeks ago    Personal history of pancreatitis: denies  Personal history of abdominal surgery: denies  Personal history of thyroid surgery: denies  Family history of pancreatic cancer in first-degree relative: denies  Family history of MTC/MEN/endocrine tumors: denies       Past Medical History:   Diagnosis Date    Arthritis     Cataract     COPD (chronic obstructive pulmonary disease)     Diabetes mellitus, type 2     Hyperlipidemia     Hypertension     Personal history of colonic polyps 2018     Social History[1]    Review of patient's allergies indicates:  No Known Allergies    CURRENT DM  MEDICATIONS:   Diabetes Medications              insulin aspart U-100 (NOVOLOG FLEXPEN U-100 INSULIN) 100 unit/mL (3 mL) InPn pen Inject 7 Units into the skin 3 (three) times daily with meals. Add sliding scale if needed.  7 units ac supper    insulin degludec (TRESIBA FLEXTOUCH U-200) 200 unit/mL (3 mL) insulin pen Inject 8 Units into the skin once daily. Increase by 2 units every 2 days until fasting sugar is below 130. Stop at 50 units.    metFORMIN (GLUCOPHAGE) 1000 MG tablet Take 1 tablet (1,000 mg total) by mouth 2 (two) times daily with meals.    semaglutide (OZEMPIC) 2 mg/dose (8 mg/3 mL) PnIj Inject 2 mg into the skin every 7 days. 9 clicks per week x 1 week     Past failed treatment(s) include:   Levemir     Meter/cgm: Biztag 3+  Blood glucose testing is performed regularly.   Patient is testing continuously times per day.  Any episodes of hypoglycemia? Denies  Glucose trends:   Per CGM download, for the last 14 days:  Average glucose of 188 mg/dL. Patient is 50% in range. 36% of readings are mildly elevated with 13% of readings > 250. 1% hypoglycemia. SD 28 mg/dL. Estimated GMI 7.8%. Glycemic control is unstable - spiking after meals      CGM data:      His blood sugar in the clinic today was:   Lab Results   Component Value Date    POCGLU 164 (A) 06/05/2025       Statin: Taking  ACE/ARB: Taking    Labs reviewed and are noted below.    Screening or Prevention Patient's value Goal Complete/Controlled?   HgA1C Testing and Control   Lab Results   Component Value Date    HGBA1C 7.4 (H) 02/13/2025      Annually/Less than 8% Yes   Lipid profile : 11/12/2024 Annually Yes   LDL control Lab Results   Component Value Date    LDLCALC 62.4 (L) 11/12/2024    Annually/Less than 100 mg/dl  Yes   Nephropathy screening Lab Results   Component Value Date    MICALBCREAT 21.9 11/12/2024     Lab Results   Component Value Date    PROTEINUA Negative 04/10/2024    Annually Yes   Blood pressure BP Readings from Last 1 Encounters:  "  06/05/25 98/60    Less than 140/90 Yes   Dilated retinal exam : 07/11/2024 Annually Yes    Foot exam   : 10/25/2024 Annually Yes     Glucose   Date Value Ref Range Status   11/12/2024 129 (H) 70 - 110 mg/dL Final     Anion Gap   Date Value Ref Range Status   11/12/2024 10 8 - 16 mmol/L Final     eGFR if    Date Value Ref Range Status   10/14/2021 >60.0 >60 mL/min/1.73 m^2 Final     eGFR if non    Date Value Ref Range Status   10/14/2021 >60.0 >60 mL/min/1.73 m^2 Final     Comment:     Calculation used to obtain the estimated glomerular filtration  rate (eGFR) is the CKD-EPI equation.        Lab Results   Component Value Date    TSH 1.499 11/12/2024     No results found for: "CPEPTIDE"  No results found for: "GLUTAMICACID"    Wt Readings from Last 3 Encounters:   06/05/25 1121 97.1 kg (214 lb 1.1 oz)   05/23/25 1127 96.2 kg (212 lb 1.3 oz)   03/21/25 1034 96.1 kg (211 lb 13.8 oz)       Review of Systems   Constitutional:  Negative for malaise/fatigue and weight loss.   Eyes:  Negative for blurred vision and double vision.   Respiratory:  Negative for shortness of breath.    Cardiovascular: Negative.    Gastrointestinal:  Positive for constipation.   Genitourinary:  Negative for frequency.   Musculoskeletal:  Negative for myalgias.   Neurological: Negative.    Psychiatric/Behavioral: Negative.         Physical Exam  Vitals reviewed.   Constitutional:       Appearance: Normal appearance.   Eyes:      Pupils: Pupils are equal, round, and reactive to light.   Cardiovascular:      Rate and Rhythm: Normal rate and regular rhythm.      Pulses: Normal pulses.      Heart sounds: Normal heart sounds.   Pulmonary:      Effort: Pulmonary effort is normal.      Breath sounds: Normal breath sounds.   Abdominal:      General: Bowel sounds are normal.      Palpations: Abdomen is soft.   Skin:     General: Skin is warm and dry.      Comments: Sites without hypertrophy and/or signs of infection "   Neurological:      General: No focal deficit present.      Mental Status: He is alert and oriented to person, place, and time.   Psychiatric:         Mood and Affect: Mood normal.         Behavior: Behavior normal.           Assessment & Plan    Stefan was seen today for general diabetes follow-up.    Diagnoses and all orders for this visit:    Uncontrolled type 2 diabetes mellitus with hyperglycemia, with long-term current use of insulin  -     POCT Glucose, Hand-Held Device    Novolog 3 units before breakfast plus sliding scale                8 units before dinner plus sliding scale                3 units before snack (no sliding scale)  If blood sugar is:      < 60    = subtract 2 units  60-80      = subtract 1 unit      = NO change  151-200  = add 1 unit  201-250  = add 2 units  251-300  = add 3 units  301-350  = add 4 units  > 350      = add 5 units    Tresiba 8 units daily  Ozempic 9 clicks x 1 week, then increase to 18 clicks    Continue Metformin and Farxiga    Hyperlipidemia associated with type 2 diabetes mellitus - on statin    Hypertension associated with diabetes - on ARB    Coronary artery disease involving native coronary artery of native heart without angina pectoris      - Reviewed with patient:  The basic pathophysiology of Type 2 diabetes  Mechanism of action and action time of medications  Use of home glucose monitor/cgm  Basic diet/carbohydrate counting/avoiding simple sugars/plate method  Proper hydration   Risk of complications and preventive measures  When to call for assistance  Call for bs < 80 or > 180 consistently        - Follow up: 3 months    Visit today included increased complexity associated with the care of the episodic problem GLUCOSE CONTROL addressed and managing the longitudinal care of the patient due to the serious and/or complex managed problem(s) DIABETES.                   [1]   Social History  Socioeconomic History    Marital status:    Occupational History     Occupation: retired   Tobacco Use    Smoking status: Every Day     Types: Cigars     Last attempt to quit: 10/1/2023     Years since quittin.6    Smokeless tobacco: Never    Tobacco comments:     2 cigars each day   Substance and Sexual Activity    Alcohol use: No    Drug use: No    Sexual activity: Not Currently     Partners: Female     Social Drivers of Health     Financial Resource Strain: Medium Risk (2024)    Overall Financial Resource Strain (CARDIA)     Difficulty of Paying Living Expenses: Somewhat hard   Food Insecurity: No Food Insecurity (2024)    Hunger Vital Sign     Worried About Running Out of Food in the Last Year: Never true     Ran Out of Food in the Last Year: Never true   Transportation Needs: No Transportation Needs (2024)    PRAPARE - Transportation     Lack of Transportation (Medical): No     Lack of Transportation (Non-Medical): No   Physical Activity: Unknown (2024)    Exercise Vital Sign     Days of Exercise per Week: 2 days   Stress: No Stress Concern Present (2024)    [employer] - Occupational Stress Questionnaire     Feeling of Stress : Only a little   Housing Stability: Unknown (2024)    Housing Stability Vital Sign     Unable to Pay for Housing in the Last Year: No     Unstable Housing in the Last Year: No

## 2025-06-10 ENCOUNTER — PATIENT MESSAGE (OUTPATIENT)
Dept: ADMINISTRATIVE | Facility: CLINIC | Age: 70
End: 2025-06-10
Payer: MEDICARE

## 2025-06-11 ENCOUNTER — OFFICE VISIT (OUTPATIENT)
Dept: UROLOGY | Facility: CLINIC | Age: 70
End: 2025-06-11
Payer: MEDICARE

## 2025-06-11 VITALS
SYSTOLIC BLOOD PRESSURE: 132 MMHG | BODY MASS INDEX: 32.41 KG/M2 | DIASTOLIC BLOOD PRESSURE: 80 MMHG | HEIGHT: 68 IN | HEART RATE: 97 BPM | WEIGHT: 213.88 LBS

## 2025-06-11 DIAGNOSIS — R39.12 BENIGN PROSTATIC HYPERPLASIA WITH WEAK URINARY STREAM: ICD-10-CM

## 2025-06-11 DIAGNOSIS — N40.1 BENIGN PROSTATIC HYPERPLASIA WITH WEAK URINARY STREAM: ICD-10-CM

## 2025-06-11 DIAGNOSIS — N32.81 OAB (OVERACTIVE BLADDER): Primary | ICD-10-CM

## 2025-06-11 PROCEDURE — 3072F LOW RISK FOR RETINOPATHY: CPT | Mod: CPTII,S$GLB,, | Performed by: UROLOGY

## 2025-06-11 PROCEDURE — 1101F PT FALLS ASSESS-DOCD LE1/YR: CPT | Mod: CPTII,S$GLB,, | Performed by: UROLOGY

## 2025-06-11 PROCEDURE — 3008F BODY MASS INDEX DOCD: CPT | Mod: CPTII,S$GLB,, | Performed by: UROLOGY

## 2025-06-11 PROCEDURE — 1160F RVW MEDS BY RX/DR IN RCRD: CPT | Mod: CPTII,S$GLB,, | Performed by: UROLOGY

## 2025-06-11 PROCEDURE — 3051F HG A1C>EQUAL 7.0%<8.0%: CPT | Mod: CPTII,S$GLB,, | Performed by: UROLOGY

## 2025-06-11 PROCEDURE — 3079F DIAST BP 80-89 MM HG: CPT | Mod: CPTII,S$GLB,, | Performed by: UROLOGY

## 2025-06-11 PROCEDURE — 99214 OFFICE O/P EST MOD 30 MIN: CPT | Mod: S$GLB,,, | Performed by: UROLOGY

## 2025-06-11 PROCEDURE — 1159F MED LIST DOCD IN RCRD: CPT | Mod: CPTII,S$GLB,, | Performed by: UROLOGY

## 2025-06-11 PROCEDURE — 4010F ACE/ARB THERAPY RXD/TAKEN: CPT | Mod: CPTII,S$GLB,, | Performed by: UROLOGY

## 2025-06-11 PROCEDURE — 3075F SYST BP GE 130 - 139MM HG: CPT | Mod: CPTII,S$GLB,, | Performed by: UROLOGY

## 2025-06-11 PROCEDURE — 99999 PR PBB SHADOW E&M-EST. PATIENT-LVL III: CPT | Mod: PBBFAC,,, | Performed by: UROLOGY

## 2025-06-11 PROCEDURE — 1126F AMNT PAIN NOTED NONE PRSNT: CPT | Mod: CPTII,S$GLB,, | Performed by: UROLOGY

## 2025-06-11 PROCEDURE — 3288F FALL RISK ASSESSMENT DOCD: CPT | Mod: CPTII,S$GLB,, | Performed by: UROLOGY

## 2025-06-11 RX ORDER — TAMSULOSIN HYDROCHLORIDE 0.4 MG/1
0.8 CAPSULE ORAL DAILY
Qty: 180 CAPSULE | Refills: 3 | Status: SHIPPED | OUTPATIENT
Start: 2025-06-11

## 2025-06-11 NOTE — PROGRESS NOTES
Chief Complaint:  F/u urgency and BPH    HPI:   06/11/2025 - patient returns today for follow-up, no new issues in the interim, urgency about the same, flowing okay with the Flomax, PSA stable 0.25, no gross hematuria or dysuria, notes that he and his wife took custody of his granddaughter in March 06/11/2024 - patient returns today for follow-up, no new issues in the interim, still having some urgency and urge incontinence, has not been taking the Myrbetriq, has significant constipation and is seeing GI for this, no gross hematuria or dysuria, flowing well with the b.i.d. Flomax    02/28/2023 - patient returns today for follow-up, overall urination is stable, stream is okay, still has some urgency and urge incontinence, denies any gross hematuria, nocturia times 0-1, denies dysuria    01/25/2022 - patient returns today for follow-up, notes that after a while the medications seemed less effective and feels like his urination is to his baseline, denies any gross hematuria or incomplete emptying, denies UTIs, PSA 0.38 last week    02/23/2021 - patient presents today for follow-up, notes that his symptoms are greatly improved with the addition of the myrbetriq, has decreased his urgency and frequency and now he had plenty of time to make it to the restroom, denies GH, dysuria, incomplete emptying     01/06/2021 - 70 y.o. male that presents for evaluation of BPH.  Patient has had LUTS for about 2 years for which has been managed with Flomax.  His symptoms include incomplete emptying and weak stream as well as a urgency.  He denies incontinence.  He has never been on anticholinergics for his bladder.  Denies a history of urinary tract infections or kidney stones.  Denies gross hematuria.  Denies dysuria.  His most bothersome symptom is his urgency.  Confirms long history of ED but is not interested in medications at this time. Patient also notes that his wife detected a ball on my left ball and wanted him to get  checked out.  It is not bothersome and is not painful.  It is not growing in size.  His wife noticed it about 2 weeks ago.  IPSS - 4/4/4/3/3/3/2 = 23 QOL - 3(mixed mostly)      PMH:  Past Medical History:   Diagnosis Date    Arthritis     Cataract     COPD (chronic obstructive pulmonary disease)     Diabetes mellitus, type 2     Hyperlipidemia     Hypertension     Personal history of colonic polyps        PSH:  Past Surgical History:   Procedure Laterality Date    ANGIOPLASTY      CATARACT EXTRACTION  10/14/2024    JOINT REPLACEMENT  2017    TOTAL KNEE ARTHROPLASTY         Family History:  Family History   Problem Relation Name Age of Onset    Diabetes Mother Mom     Arthritis Mother Mom     Cancer Father Dad        Social History:  Social History     Tobacco Use    Smoking status: Every Day     Types: Cigars     Last attempt to quit: 10/1/2023     Years since quittin.6    Smokeless tobacco: Never    Tobacco comments:     2 cigars each day   Substance Use Topics    Alcohol use: No    Drug use: No        Review of Systems:  General: No fever, chills  Skin: No rashes  Chest:  Denies cough and sputum production  Heart: Denies chest pain  Resp: Denies dyspnea  Abdomen: Denies diarrhea, abdominal pain, hematemesis, or blood in stool.  Musculoskeletal: No joint stiffness or swelling. Denies back pain.  : see HPI  Neuro: no dizziness or weakness    Allergies:  Patient has no known allergies.    Medications:    Current Outpatient Medications:     acetaminophen (TYLENOL) 500 MG tablet, Take 1,000 mg by mouth as needed. , Disp: , Rfl:     albuterol (VENTOLIN HFA) 90 mcg/actuation inhaler, Inhale 2 puffs into the lungs every 6 (six) hours as needed for Wheezing or Shortness of Breath. Rescue, Disp: 25.5 g, Rfl: 3    ascorbic acid, vitamin C, (VITAMIN C) 1000 MG tablet, Take 1,000 mg by mouth once daily., Disp: , Rfl:     aspirin 325 MG tablet, Take 325 mg by mouth once daily., Disp: , Rfl:     blood sugar diagnostic  (BLOOD GLUCOSE TEST) Strp, 1 strip by Misc.(Non-Drug; Combo Route) route 3 (three) times daily., Disp: 200 each, Rfl: 1    blood-glucose meter,continuous (FREESTYLE JASMINE 3 READER) [facility] [place] [facility], Use as directed., Disp: 1 each, Rfl: 0    buPROPion (WELLBUTRIN SR) 150 MG TBSR 12 hr tablet, Take 1 tablet (150 mg total) by mouth 2 (two) times daily., Disp: 180 tablet, Rfl: 2    cetirizine (ZYRTEC) 10 MG tablet, Take 10 mg by mouth as needed for Allergies., Disp: , Rfl:     cholecalciferol, vitamin D3, (VITAMIN D3) 25 mcg (1,000 unit) capsule, Take 1,000 Units by mouth once daily., Disp: , Rfl:     cyanocobalamin (VITAMIN B-12) 500 MCG tablet, Take 500 mcg by mouth once daily. , Disp: , Rfl:     famotidine (PEPCID) 40 MG tablet, Take 1 tablet by mouth once a day, Disp: 30 tablet, Rfl: 4    fluticasone propionate (FLONASE) 50 mcg/actuation nasal spray, Use 1 spray (50 mcg total) in each nostril once daily., Disp: 16 g, Rfl: 3    fluticasone-umeclidin-vilanter (TRELEGY ELLIPTA) 100-62.5-25 mcg DsDv, Inhale 1 puff into the lungs once daily., Disp: 60 each, Rfl: 11    FREESTYLE JASMINE 3 PLUS SENSOR Agnieszka, Change sensor every 15 days, Disp: 2 each, Rfl: 11    insulin aspart U-100 (NOVOLOG FLEXPEN U-100 INSULIN) 100 unit/mL (3 mL) InPn pen, Inject 7 Units into the skin 3 (three) times daily with meals. Add sliding scale if needed., Disp: 45 mL, Rfl: 3    insulin degludec (TRESIBA FLEXTOUCH U-200) 200 unit/mL (3 mL) insulin pen, Inject 8 Units into the skin once daily. Increase by 2 units every 2 days until fasting sugar is below 130. Stop at 50 units., Disp: 36 mL, Rfl: 3    ipratropium (ATROVENT) 21 mcg (0.03 %) nasal spray, Spray 1 or 2 sprays in each nasal passage every 8 hours, if needed, as directed., Disp: 30 mL, Rfl: 12    lancets 30 gauge Misc, 1 lancet by Misc.(Non-Drug; Combo Route) route 3 (three) times daily., Disp: 200 each, Rfl: 0    magnesium oxide (MAG-OX) 400 mg (241.3 mg magnesium) tablet, Take 1 tablet (400 mg total)  "by mouth once daily., Disp: 90 tablet, Rfl: 3    metFORMIN (GLUCOPHAGE) 1000 MG tablet, Take 1 tablet (1,000 mg total) by mouth 2 (two) times daily with meals., Disp: 180 tablet, Rfl: 1    nitroGLYCERIN (NITROSTAT) 0.4 MG SL tablet, Place 1 tablet under the tongue every 5 (five) minutes as needed for Chest pain., Disp: 100 tablet, Rfl: 3    olmesartan (BENICAR) 40 MG tablet, Take 1 tablet (40 mg total) by mouth once daily., Disp: 90 tablet, Rfl: 2    pantoprazole (PROTONIX) 40 MG tablet, Take 1 tablet by mouth twice a day before breakfast and dinner, Disp: 60 tablet, Rfl: 10    pen needle, diabetic (NOVOFINE 32) 32 gauge x 1/4" Ndle, Use with insulin 4 times daily, Disp: 500 each, Rfl: 3    plecanatide (TRULANCE) 3 mg Tab, Take 1 tablet by mouth once a day, Disp: 90 tablet, Rfl: 3    pramipexole (MIRAPEX) 0.125 MG tablet, TAKE 2 TABLETS BY MOUTH EVERY EVENING, Disp: 60 tablet, Rfl: 5    pulse oximeter (PULSE OXIMETER) device, Use twice daily at 8 AM and 3 PM and record the value in MyChart as directed., Disp: 1 each, Rfl: 0    rosuvastatin (CRESTOR) 20 MG tablet, Take 1 tablet (20 mg total) by mouth once daily., Disp: 90 tablet, Rfl: 3    semaglutide (OZEMPIC) 2 mg/dose (8 mg/3 mL) PnIj, Inject 2 mg into the skin every 7 days., Disp: 3 each, Rfl: 3    tamsulosin (FLOMAX) 0.4 mg Cap, Take 2 capsules (0.8 mg total) by mouth once daily., Disp: 180 capsule, Rfl: 3    Physical Exam:  Vitals:    06/11/25 1008   BP: 132/80   Pulse: 97     General: awake, alert, cooperative  Head: NC/AT  Ears: external ears normal  Eyes: sclera normal  Lungs: normal inspiration, NAD  Heart: well-perfused  Abdomen: Soft, NT, ND   2/23: Normal circ'd phallus, meatus normal in size and position, BL testicles palpable, no masses, left testicular cyst noted, nontender, no abnormalities of epididymi  YESICA 2/23: Normal rectal tone, no hemorrhoids. Prostate smooth and normal, no nodules 50 gm SV not palpable. Perineum and anus normal.  Lymphatic: " groin nodes negative  Skin: The skin is warm and dry  Ext: No c/c/e.  Neuro: grossly intact, AOx3    RADIOLOGY:  No recent relevant imaging available for review.    LABS:  I personally reviewed the following lab values:  Lab Results   Component Value Date    WBC 6.60 11/12/2024    HGB 14.3 11/12/2024    HCT 46.8 11/12/2024     11/12/2024     11/12/2024    K 5.3 (H) 11/12/2024     11/12/2024    CREATININE 1.2 11/12/2024    BUN 18 11/12/2024    CO2 27 11/12/2024    TSH 1.499 11/12/2024    PSA 0.25 11/12/2024    INR 0.9 10/04/2017    HGBA1C 7.4 (H) 02/13/2025    CHOL 121 11/12/2024    TRIG 98 11/12/2024    HDL 39 (L) 11/12/2024    ALT 14 11/12/2024    AST 12 11/12/2024       Assessment/Plan:   Stefan Forbes Jr. is a 70 y.o. male with:    BPH - continue flomax BID    Urgency - stable, continue to monitor    Prostate Cancer Screening - PSA 0.25    Stefan Eugene MD  Urology

## 2025-06-19 ENCOUNTER — TELEPHONE (OUTPATIENT)
Dept: SMOKING CESSATION | Facility: CLINIC | Age: 70
End: 2025-06-19
Payer: MEDICARE

## 2025-06-19 NOTE — TELEPHONE ENCOUNTER
Patient called to make an appointment for the program. CTTS sent his information to the referral coordinator to set up an appointment.

## 2025-06-30 ENCOUNTER — TELEPHONE (OUTPATIENT)
Dept: OPHTHALMOLOGY | Facility: CLINIC | Age: 70
End: 2025-06-30
Payer: MEDICARE

## 2025-06-30 NOTE — TELEPHONE ENCOUNTER
Spoke to pt's wife and schedule the first available for a yearly exam; lost to follow 3 p/o dfe from October.     Added to wait list also

## 2025-07-07 ENCOUNTER — TELEPHONE (OUTPATIENT)
Dept: SMOKING CESSATION | Facility: CLINIC | Age: 70
End: 2025-07-07
Payer: MEDICARE

## 2025-07-07 DIAGNOSIS — J44.9 MODERATE COPD (CHRONIC OBSTRUCTIVE PULMONARY DISEASE): ICD-10-CM

## 2025-07-08 ENCOUNTER — CLINICAL SUPPORT (OUTPATIENT)
Dept: SMOKING CESSATION | Facility: CLINIC | Age: 70
End: 2025-07-08
Payer: COMMERCIAL

## 2025-07-08 ENCOUNTER — PATIENT MESSAGE (OUTPATIENT)
Dept: SMOKING CESSATION | Facility: CLINIC | Age: 70
End: 2025-07-08
Payer: MEDICARE

## 2025-07-08 DIAGNOSIS — F17.200 NICOTINE DEPENDENCE: Primary | ICD-10-CM

## 2025-07-08 PROCEDURE — 99999 PR PBB SHADOW E&M-EST. PATIENT-LVL II: CPT | Mod: PBBFAC,,, | Performed by: SPEECH-LANGUAGE PATHOLOGIST

## 2025-07-08 PROCEDURE — 99404 PREV MED CNSL INDIV APPRX 60: CPT | Mod: S$GLB,,, | Performed by: SPEECH-LANGUAGE PATHOLOGIST

## 2025-07-08 RX ORDER — VARENICLINE TARTRATE 1 MG/1
TABLET, FILM COATED ORAL
Qty: 56 TABLET | Refills: 0 | Status: SHIPPED | OUTPATIENT
Start: 2025-07-08

## 2025-07-08 NOTE — PROGRESS NOTES
At SOC, patient reports smoking 4 CIGARS PER DAY. Assessed CO with patient reporting smoking 1.5 CIGARS prior. CO 50. Discussed the role of tobacco cessation program, role of NRT & behavioral changes to assist the patient to reach the goal of being tobacco free. The patient reports willing to utilize VARENICLINE & will return for a follow up visit.  Education & instruction on the role of the NRT, usage & proper dosage instructions. Patient provided a written handout on usage of the NRT. The patient verbalized understanding & willingness to apply. Patient instructed to call CTTS anytime. Follow up visit set with the patient for 7/30/2025 AT 10:30 AM.

## 2025-07-09 RX ORDER — PRAMIPEXOLE DIHYDROCHLORIDE 0.12 MG/1
0.12 TABLET ORAL NIGHTLY
Qty: 60 TABLET | Refills: 5 | Status: SHIPPED | OUTPATIENT
Start: 2025-07-09

## 2025-07-16 ENCOUNTER — TELEPHONE (OUTPATIENT)
Dept: SMOKING CESSATION | Facility: CLINIC | Age: 70
End: 2025-07-16
Payer: MEDICARE

## 2025-07-16 NOTE — TELEPHONE ENCOUNTER
Patient called to report difficulty with taking Varenicline & he states he didn't have that before. He states he did start taking Ozempic & just went up to full dose & wondering if the nausea is due to that or Varenicline & not being able to hold food down. He states he stopped taking the Varenicline to see if the nausea stopped & it did. Patient reports he didn't feel the nausea when he took 1/2 mg once daily & he will resume taking it that way to see if he experiences any nausea & will report back to CTTS.

## 2025-07-28 ENCOUNTER — TELEPHONE (OUTPATIENT)
Dept: DIABETES | Facility: CLINIC | Age: 70
End: 2025-07-28
Payer: MEDICARE

## 2025-07-28 NOTE — TELEPHONE ENCOUNTER
Pt states they use Pharmacy assistance program and was informed that they will need a new prescription for the Farxiga faxed over to [phone]

## 2025-07-28 NOTE — TELEPHONE ENCOUNTER
Copied from CRM #[id number]. Topic: Medications - Medication Question  >> Jul 28, 2025  1:28 PM Ky wrote:  .Type: Patient Call Back        Who called:   PATIENT WIFE      What is the request in detail:  CALLED IN CONCERNING FARXIGA . PATIENT WIFE NEEDS TO GIVE PROVIDER INFORMATION REGARDING THE PRESCRIPTION ASSISTANCE PROGRAM     Can the clinic reply by [facility]?           Would the patient rather a call back or a response via [facility]?        Best call back number:

## 2025-07-29 ENCOUNTER — TELEPHONE (OUTPATIENT)
Dept: DIABETES | Facility: CLINIC | Age: 70
End: 2025-07-29
Payer: MEDICARE

## 2025-07-29 RX ORDER — DAPAGLIFLOZIN 10 MG/1
10 TABLET, FILM COATED ORAL DAILY
Qty: 120 TABLET | Refills: 3 | Status: SHIPPED | OUTPATIENT
Start: 2025-07-29

## 2025-07-30 ENCOUNTER — CLINICAL SUPPORT (OUTPATIENT)
Dept: SMOKING CESSATION | Facility: CLINIC | Age: 70
End: 2025-07-30
Payer: COMMERCIAL

## 2025-07-30 ENCOUNTER — PATIENT MESSAGE (OUTPATIENT)
Dept: SMOKING CESSATION | Facility: CLINIC | Age: 70
End: 2025-07-30
Payer: MEDICARE

## 2025-07-30 DIAGNOSIS — F17.200 NICOTINE DEPENDENCE: Primary | ICD-10-CM

## 2025-07-30 PROCEDURE — 99999 PR PBB SHADOW E&M-EST. PATIENT-LVL II: CPT | Mod: PBBFAC,,, | Performed by: SPEECH-LANGUAGE PATHOLOGIST

## 2025-07-30 PROCEDURE — 99403 PREV MED CNSL INDIV APPRX 45: CPT | Mod: S$GLB,,, | Performed by: SPEECH-LANGUAGE PATHOLOGIST

## 2025-07-30 RX ORDER — NICOTINE POLACRILEX 4 MG/1
LOZENGE ORAL
Qty: 243 EACH | Refills: 0 | Status: SHIPPED | OUTPATIENT
Start: 2025-07-30

## 2025-07-30 NOTE — PROGRESS NOTES
"Individual Follow-Up Form    7/30/2025    Quit Date: TBD    Clinical Status of Patient: Outpatient    Length of Service: 45 minutes    Continuing Medication: yes  Chantix    Other Medications: ordered  4 mg MINI lozenges this date     Target Symptoms: Withdrawal and medication side effects. The following were  rated moderate (3) to severe (4) on TCRS:  Moderate (3): desire/crave  Severe (4): none    Comments: Telephone visit at the patient's request due to his wife has to have eye surgery & he has to bring her. Patient reports took Varenicline for 1.5 weeks & got to the 1 mg twice daily & patient was dealing with "bad" nausea resulting in vomiting so he tried 1 mg daily & continued to deal with nausea so he stopped taking it. After contacting CTTS, he decided to try the .5 mg once daily & has been taking it for around 4 days. Patient has taken Varenicline in the past without issues; however the patient is currently taking Ozempic & has been on the full strength for the last 3 weeks where he wasn't taking that last time which he feels may be part of the reason for the severe nausea when taking the full dosage of Varenicline. Patient reports he has been smoking around 1 cigar. He states he will go a little while without smoking & then due to the urges/craves, he goes to get a pack. Administered TCRS with patient reporting slight symptoms of anger/irritability/frustration, mild symptoms of difficulty concentrating, increased appetite/hunger, insomnia, nausea/upset stomach, constipation (which he reports was premorbid) & moderate symptoms of desire/crave.  Discussed short acting NRT options. Patient states he can't chew the gum; but would like to try the lozenges. Ordered 4 mg mini lozenges this date. Educated patient on lozenge technique & usage & sent written instructions via [employer] in addition to having written instructions from the SOC bag. At the time of this call, patient reports smoking around 1/4th of a cigar " outside with coffee. Discussed routine changes that could include making the porch smoke free & having to move farther away without sitting & not in a covered area if the patient wants to smoke to deter him from going out there,  staying inside to drink coffee, using the lozenge and/or drinking a bottle of water before smoking. Patient verbalized willingness to apply. Session ended to allow the patient to get his wife to her eye surgery. Patient states his goal is to be smoke free. Follow up set for 8/19/2025 at 11:00 am.     Diagnosis: F17.200    Next Visit: 3 weeks

## 2025-08-08 ENCOUNTER — HOSPITAL ENCOUNTER (OUTPATIENT)
Dept: RADIOLOGY | Facility: HOSPITAL | Age: 70
Discharge: HOME | End: 2025-08-08
Attending: INTERNAL MEDICINE
Payer: MEDICARE

## 2025-08-08 ENCOUNTER — CLINICAL SUPPORT (OUTPATIENT)
Dept: PULMONOLOGY | Facility: CLINIC | Age: 70
End: 2025-08-08
Attending: INTERNAL MEDICINE
Payer: MEDICARE

## 2025-08-08 VITALS
HEART RATE: 90 BPM | WEIGHT: 207 LBS | DIASTOLIC BLOOD PRESSURE: 78 MMHG | OXYGEN SATURATION: 99 % | RESPIRATION RATE: 15 BRPM | SYSTOLIC BLOOD PRESSURE: 110 MMHG | HEIGHT: 68 IN | BODY MASS INDEX: 31.37 KG/M2

## 2025-08-08 DIAGNOSIS — Z87.891 PERSONAL HISTORY OF NICOTINE DEPENDENCE: ICD-10-CM

## 2025-08-08 DIAGNOSIS — D50.9 RESTLESS LEG SYNDROME DUE TO IRON DEFICIENCY ANEMIA: Chronic | ICD-10-CM

## 2025-08-08 DIAGNOSIS — E11.69 HYPERLIPIDEMIA ASSOCIATED WITH TYPE 2 DIABETES MELLITUS: ICD-10-CM

## 2025-08-08 DIAGNOSIS — R91.1 PULMONARY NODULE: ICD-10-CM

## 2025-08-08 DIAGNOSIS — J44.9 CHRONIC OBSTRUCTIVE PULMONARY DISEASE, UNSPECIFIED COPD TYPE: ICD-10-CM

## 2025-08-08 DIAGNOSIS — E78.5 HYPERLIPIDEMIA ASSOCIATED WITH TYPE 2 DIABETES MELLITUS: ICD-10-CM

## 2025-08-08 DIAGNOSIS — J44.9 MODERATE COPD (CHRONIC OBSTRUCTIVE PULMONARY DISEASE): Primary | ICD-10-CM

## 2025-08-08 DIAGNOSIS — I15.2 HYPERTENSION ASSOCIATED WITH DIABETES: ICD-10-CM

## 2025-08-08 DIAGNOSIS — E83.42 HYPOMAGNESEMIA: ICD-10-CM

## 2025-08-08 DIAGNOSIS — G47.33 OSA ON CPAP: ICD-10-CM

## 2025-08-08 DIAGNOSIS — E11.59 HYPERTENSION ASSOCIATED WITH DIABETES: ICD-10-CM

## 2025-08-08 DIAGNOSIS — F17.201 TOBACCO ABUSE, IN REMISSION: ICD-10-CM

## 2025-08-08 DIAGNOSIS — G25.81 RESTLESS LEG SYNDROME DUE TO IRON DEFICIENCY ANEMIA: Chronic | ICD-10-CM

## 2025-08-08 PROBLEM — R05.8 UPPER AIRWAY COUGH SYNDROME: Status: RESOLVED | Noted: 2025-02-03 | Resolved: 2025-08-08

## 2025-08-08 LAB
BRPFT: ABNORMAL
FEF 25 75 LLN: 1.07
FEF 25 75 PRE REF: 26.9 %
FEF 25 75 REF: 2.41
FEV1 FVC LLN: 63
FEV1 FVC PRE REF: 63.8 %
FEV1 FVC REF: 77
FEV1 LLN: 2.24
FEV1 PRE REF: 76.9 %
FEV1 REF: 3.07
FVC LLN: 3
FVC PRE REF: 120.3 %
FVC REF: 4.03
PEF LLN: 5.92
PEF PRE REF: 76.5 %
PEF REF: 8.11
PRE FEF 25 75: 0.65 L/S (ref 1.07–4.29)
PRE FET 100: 15.17 SEC
PRE FEV1 FVC: 48.83 % (ref 63.39–88.23)
PRE FEV1: 2.37 L (ref 2.24–3.86)
PRE FVC: 4.84 L (ref 3–5.06)
PRE PEF: 6.2 L/S (ref 5.92–10.29)

## 2025-08-08 PROCEDURE — 99999 PR PBB SHADOW E&M-EST. PATIENT-LVL V: CPT | Mod: PBBFAC,,, | Performed by: INTERNAL MEDICINE

## 2025-08-08 PROCEDURE — 71250 CT THORAX DX C-: CPT | Mod: 26,,, | Performed by: RADIOLOGY

## 2025-08-08 PROCEDURE — 71250 CT THORAX DX C-: CPT | Mod: TC

## 2025-08-08 NOTE — ASSESSMENT & PLAN NOTE
COPD Questionnaire  How often do you cough?: Some of the time  How often do you have phlegm (mucus) in your chest?: Some of the time  How often does your chest feel tight?: Almost never  When you walk up a hill or one flight of stairs, how often are you breathless?: Some of the time  How often are you limited doing any activities at home?: Never  How often are you confident leaving the house despite your lung condition?: All of the time  How often do you sleep soundly?: Most of the time  How often do you have energy?: Some of the time  Total score: 13      FEV1: 2.37L( 76.9%), FEV1/FVC 49    TRELEGY

## 2025-08-08 NOTE — PROGRESS NOTES
Patient Active Problem List   Diagnosis    Controlled type 2 diabetes mellitus with hyperglycemia, without long-term current use of insulin    Hypertension associated with diabetes    Hyperlipidemia associated with type 2 diabetes mellitus    Moderate COPD (chronic obstructive pulmonary disease)    CAD (coronary artery disease)    Vitamin D deficiency    Osteoarthritis of knee    Pulmonary nodule    Routine general medical examination at a health care facility    Nevus of choroid of right eye    History of pulmonary embolism    Hyponatremia    Iron deficiency anemia due to chronic blood loss    MACHO on CPAP    PLMD (periodic limb movement disorder)    BMI 35.0-35.9,adult    Varicose vein of leg    Tobacco abuse, in remission    Tubular adenoma of colon    Benign prostatic hyperplasia with weak urinary stream    Urgency of urination    Aortic atherosclerosis    COVID    Hypomagnesemia    Nicotine dependence    Restless leg syndrome due to iron deficiency anemia     Social History     Tobacco Use   Smoking Status Every Day    Current packs/day: 0.00    Average packs/day: 1 pack/day for 14.0 years (14.0 ttl pk-yrs)    Types: Cigars, Cigarettes    Start date:     Last attempt to quit:     Years since quittin.6   Smokeless Tobacco Never   Tobacco Comments    HAD QUIT 10/1/2023; BUT REPORTS WENT BACK ON 2024 Smoking cigars. 4 CIGARS PER DAY     Immunization History   Administered Date(s) Administered    COVID-19, MRNA, LN-S, PF (Pfizer) (Purple Cap) 2021, 2021, 12/10/2021    COVID-19, mRNA, LNP-S, PF (Moderna) Ages 12+ 2023    Influenza (FLUAD) - Quadrivalent - Adjuvanted - PF *Preferred* (65+) 10/20/2022, 10/05/2023    Influenza - Quadrivalent 2016    Influenza - Quadrivalent - PF *Preferred* (6 months and older) 2014, 10/25/2015, 10/03/2017, 10/04/2018, 2019, 2020, 10/08/2021    Influenza - Trivalent - Fluad - Adjuvanted - PF (65 years and older 10/11/2024     Influenza - Trivalent - Fluarix, Flulaval, Fluzone, Afluria - PF 10/25/2015    Pneumococcal Conjugate - 20 Valent 04/18/2024    Pneumococcal Polysaccharide - 23 Valent 12/22/2014, 07/14/2016    Tdap 02/02/2015    Zoster 09/12/2017       COPD Questionnaire  How often do you cough?: Some of the time  How often do you have phlegm (mucus) in your chest?: Some of the time  How often does your chest feel tight?: Almost never  When you walk up a hill or one flight of stairs, how often are you breathless?: Some of the time  How often are you limited doing any activities at home?: Never  How often are you confident leaving the house despite your lung condition?: All of the time  How often do you sleep soundly?: Most of the time  How often do you have energy?: Some of the time  Total score: 13             SUBJECTIVE:     Chief Complaint   Patient presents with    Moderate COPD (chronic obstructive pulmonary disease)    Review CT scan results        Patient is a 70 y.o. male presents with Moderate COPD , MACHO on CPAP .  Last visit 10/06/2022  CPAP use intermittent  Unable wear all night  Confides sleeps better 5-6 hrs  Bed time 12 MN, wake 6 am  Legs aches, restless, better with moving  Added Mirapex  Has Varicose veins considered see Vascular  Hx bilateral Knee  Slight cough and congestion: attributed to allergies  CAT score 13  Stable on TRELEGY  Flu shot today  FEV1: 2.30L( 72.9%)  Had stopped smoking and relapsed  Signed himself up with cessation        Will need flu shot    No recent COPD exacerbations.  No cough no wheezing no shortness of breath or sputum.    Former smoker 35-40 pack year smoking history :  Currently enrolled in smoking cessation    I have reviewed the patient's medical history in detail and updated the computerized patient record.    History of varicose veins.    Chief Complaint   Patient presents with    Moderate COPD (chronic obstructive pulmonary disease)    Review CT scan results      08/05/2024  Follow up  Here with spouse  LDCT : 7 mm and 9 mm nodule : right side  Former smoker Quit 12 months  45 pack year  COPD: Adherent on Inhaler TRELEGY, VENTOLIN  Not using CPAP  Diagnostic AHI 7.6/hr and REM RDI 22.5    Bed time 12 Mn  Wake time 7 am    10/11/2024  Followup   Reviewed spirometry  COPD score 10   No cough, No wheezing, No SOB  FEV1: 2.36L( 76.1%)  [place] board reveiwed  Using meds  Had cataract surgery: Dr Madison            Tumor Board Conference  8/20/2024  [facility] - [facility] 3rd Fl     Nelida Ojeda PA-C  Pulmonary Disease Pulmonary nodule  Dx     Progress Notes  Nelida Ojeda PA-C (Physician Assistant)  Pulmonary Disease  Encounter Date: 8/20/2024  Creation Time: 8/20/2024  1:47 PM  Signed  Cosigned by: Mazin Wells MD at 8/21/2024  2:51 PM        [facility]monary Nodule Review      Patient Name: Stefan Forbes Jr.     MRN: [id number]     Date of Tumor Board: 08/20/2024     Diagnosis:  Multiple Pulmonary Nodules     Referring Provider:  Mazin Wells MD     Present PCTP Providers: Christopher Fonseca MD, Chris Stallings MD, Mazin Wells MD, DAVID White, Elizabeth LeJeune, NP, DAVID German     Smoking hx:  Former, 35 pack years     Diagnostic Work Up:     Nodify ID:  Pre-test risk 9%, CDT- No significant level, XL2- likely benign, 1% risk malignancy  Imaging:  PET 8/2024- No FDG avid lesions  LDCT 7/2024- Multiple new nodules, RUL 9mm and 7mm RLL ground glass        Board Recommendations:     CT Chest in one year             02/03/2025   Follow-up visit   Lingering cough, nasal congestion, wheezing  Green nasal discharge resolved  No fever  Hx Chr sinusitis  Has Mukesh Med sinus rinse and jordin pot stopped using  Stable on TRELEGY  COPD score 5  Ep[place] 1  Using flonase and atrovent  CXR was clear  Nasal congestion  Will get ct sinus  No HA or fever since Abx      08/08/2025   Follow-up visit   Here with spouse  Back to smoking: stress  family  COPD score 13  [place] 4  Here to review chest CT, spirometry  Recent varicose surgery  Stable on TRELEGY  Chest CT scan shows stable nodule follow up in 12 months    Review of patient's allergies indicates:  No Known Allergies    Current Outpatient Medications   Medication Sig Dispense Refill    acetaminophen (TYLENOL) 500 MG tablet Take 1,000 mg by mouth as needed.       albuterol (VENTOLIN HFA) 90 mcg/actuation inhaler Inhale 2 puffs into the lungs every 6 (six) hours as needed for Wheezing or Shortness of Breath. Rescue 25.5 g 3    ascorbic acid, vitamin C, (VITAMIN C) 1000 MG tablet Take 1,000 mg by mouth once daily.      aspirin 325 MG tablet Take 325 mg by mouth once daily.      blood sugar diagnostic (BLOOD GLUCOSE TEST) Strp 1 strip by Misc.(Non-Drug; Combo Route) route 3 (three) times daily. 200 each 1    blood-glucose meter,continuous (FREESTYLE JASMINE 3 READER) [facility] Use as directed. 1 each 0    buPROPion (WELLBUTRIN SR) 150 MG TBSR 12 hr tablet Take 1 tablet (150 mg total) by mouth 2 (two) times daily. 180 tablet 2    cetirizine (ZYRTEC) 10 MG tablet Take 10 mg by mouth as needed for Allergies.      cholecalciferol, vitamin D3, (VITAMIN D3) 25 mcg (1,000 unit) capsule Take 1,000 Units by mouth once daily.      cyanocobalamin (VITAMIN B-12) 500 MCG tablet Take 500 mcg by mouth once daily.       famotidine (PEPCID) 40 MG tablet Take 1 tablet by mouth once a day 30 tablet 3    FARXIGA 10 mg tablet Take 1 tablet (10 mg total) by mouth once daily. 120 tablet 3    fluticasone propionate (FLONASE) 50 mcg/actuation nasal spray Use 1 spray (50 mcg total) in each nostril once daily. 16 g 3    fluticasone-umeclidin-vilanter (TRELEGY ELLIPTA) 100-62.5-25 mcg DsDv Inhale 1 puff into the lungs once daily. 60 each 11    FREESTYLE JASMINE 3 PLUS SENSOR Agnieszka Change sensor every 15 days 2 each 11    insulin aspart U-100 (NOVOLOG FLEXPEN U-100 INSULIN) 100 unit/mL (3 mL) InPn pen Inject 7 Units into the skin 3 (three)  "times daily with meals. Add sliding scale if needed. 45 mL 3    insulin degludec (TRESIBA FLEXTOUCH U-200) 200 unit/mL (3 mL) insulin pen Inject 8 Units into the skin once daily. Increase by 2 units every 2 days until fasting sugar is below 130. Stop at 50 units. 36 mL 3    ipratropium (ATROVENT) 21 mcg (0.03 %) nasal spray Spray 1 or 2 sprays in each nasal passage every 8 hours, if needed, as directed. 30 mL 12    lancets 30 gauge Misc 1 lancet by Misc.(Non-Drug; Combo Route) route 3 (three) times daily. 200 each 0    magnesium oxide (MAG-OX) 400 mg (241.3 mg magnesium) tablet Take 1 tablet (400 mg total) by mouth once daily. 90 tablet 3    metFORMIN (GLUCOPHAGE) 1000 MG tablet Take 1 tablet (1,000 mg total) by mouth 2 (two) times daily with meals. 180 tablet 1    nicotine, polacrilex, (NICORETTE) 4 mg lzmn Use in place of a cigar not to exceed 10-15 pieces in a day. [place] in cheek. Do not chew. Avoid food/drink for 15 min before/after 243 each 0    nitroGLYCERIN (NITROSTAT) 0.4 MG SL tablet Place 1 tablet under the tongue every 5 (five) minutes as needed for Chest pain. 100 tablet 3    olmesartan (BENICAR) 40 MG tablet Take 1 tablet (40 mg total) by mouth once daily. 90 tablet 2    pantoprazole (PROTONIX) 40 MG tablet Take 1 tablet by mouth twice a day before breakfast and dinner 60 tablet 10    pen needle, diabetic (NOVOFINE 32) 32 gauge x 1/4" Ndle Use with insulin 4 times daily 500 each 3    plecanatide (TRULANCE) 3 mg Tab Take 1 tablet by mouth once a day 90 tablet 3    pramipexole (MIRAPEX) 0.125 MG tablet Take 1 tablet (0.125 mg total) by mouth every evening. 60 tablet 5    pulse oximeter (PULSE OXIMETER) device Use twice daily at 8 AM and 3 PM and [facility]. 1 each 0    rosuvastatin (CRESTOR) 20 MG tablet Take 1 tablet (20 mg total) by mouth once daily. 90 tablet 3    semaglutide (OZEMPIC) 2 mg/dose (8 mg/3 mL) PnIj Inject 2 mg into the skin every 7 days. 3 each 3    tamsulosin " (FLOMAX) 0.4 mg Cap Take 2 capsules (0.8 mg total) by mouth once daily 180 capsule 3    varenicline tartrate (CHANTIX) 1 mg Tab On days 1-3, take 1/2 tablet once in the morning, On days 4-7, take 1/2 tablet once in the morning & 1/2 tablet in the evening.  On days 8 thru the rest of the prescription, take 1 whole tablet in the morning  & 1 whole tablet in the evening  Take with full glass of water & with food to avoid nausea 56 tablet 0     No current facility-administered medications for this visit.       Past Medical History:   Diagnosis Date    Arthritis     Cataract     COPD (chronic obstructive pulmonary disease)     Diabetes mellitus, type 2     Hyperlipidemia     Hypertension     Personal history of colonic polyps      Past Surgical History:   Procedure Laterality Date    ANGIOPLASTY      CATARACT EXTRACTION  10/14/2024    JOINT REPLACEMENT  2017    TOTAL KNEE ARTHROPLASTY       Family History   Problem Relation Name Age of Onset    Diabetes Mother Mom     Arthritis Mother Mom     Cancer Father Dad      Social History     Tobacco Use    Smoking status: Every Day     Current packs/day: 0.00     Average packs/day: 1 pack/day for 14.0 years (14.0 ttl pk-yrs)     Types: Cigars, Cigarettes     Start date:      Last attempt to quit:      Years since quittin.6    Smokeless tobacco: Never    Tobacco comments:     HAD QUIT 10/1/2023; BUT REPORTS WENT BACK ON 2024 Smoking cigars. 4 CIGARS PER DAY   Substance Use Topics    Alcohol use: No    Drug use: No        Review of Systems:  Review of Systems   Constitutional:  Negative for fatigue.   HENT:  Negative for congestion and postnasal drip.    Eyes: Negative.    Respiratory:  Negative for apnea, cough, chest tightness, shortness of breath and wheezing.    Cardiovascular: Negative.         Varicose veins bilaterally   Gastrointestinal: Negative.    Endocrine: Negative.    Genitourinary: Negative.    Musculoskeletal: Negative.    Skin: Negative.   "  Allergic/Immunologic: Negative.    Neurological: Negative.    Hematological: Negative.    Psychiatric/Behavioral:  Negative for sleep disturbance.        OBJECTIVE:     Vital Signs (Most Recent)  Pulse: 90 (08/08/25 1012)  Resp: 15 (08/08/25 1012)  BP: 110/78 (08/08/25 1012)  SpO2: 99 % (08/08/25 1012)  5' 8" (1.727 m)  93.9 kg (207 lb)     Physical Exam:  Physical Exam  Vitals and nursing note reviewed.   Constitutional:       Appearance: He is well-developed.       HENT:      Head: Normocephalic and atraumatic.      Nose: No mucosal edema or rhinorrhea.      Mouth/Throat:      Pharynx: No oropharyngeal exudate.   Eyes:      General: No scleral icterus.        Left eye: No discharge.      Conjunctiva/sclera: Conjunctivae normal.      Pupils: Pupils are equal, round, and reactive to light.   Neck:      Thyroid: No thyromegaly.      Trachea: No tracheal deviation.   Cardiovascular:      Rate and Rhythm: Normal rate and regular rhythm.      Heart sounds: Normal heart sounds. No murmur heard.  Pulmonary:      Effort: Pulmonary effort is normal.      Breath sounds: Examination of the right-lower field reveals decreased breath sounds. Examination of the left-lower field reveals decreased breath sounds. Decreased breath sounds present. No wheezing, rhonchi or rales.   Chest:      Chest wall: No tenderness.   Abdominal:      General: Bowel sounds are normal. There is no distension.      Palpations: Abdomen is soft.   Musculoskeletal:         General: No tenderness or deformity. Normal range of motion.      Cervical back: Normal range of motion and neck supple.   Skin:     General: Skin is warm and dry.      Capillary Refill: Capillary refill takes 2 to 3 seconds.   Neurological:      Mental Status: He is alert and oriented to person, place, and time.      Cranial Nerves: No cranial nerve deficit.         Laboratory  CBC: Reviewed  BMP: Reviewed             Chest  Xray   CT Chest Without Contrast  Narrative: " EXAMINATION:  CT CHEST WITHOUT CONTRAST    CLINICAL HISTORY:  followup nodules; Solitary pulmonary nodule    TECHNIQUE:  Low dose axial images, sagittal and coronal reformations were obtained from the thoracic inlet to the lung bases. Contrast was not administered.    COMPARISON:  08/08/2024 PET-CT; 07/30/2024 CT lung screening    FINDINGS:  Thoracic aorta, great vessels and heart unchanged with atherosclerotic calcification including coronary calcification.  No pericardial or pleural effusion.  No adenopathy.    Lungs demonstrate no acute opacity.  Emphysematous findings noted.  Multiple bilateral small pulmonary nodules both calcified and noncalcified are similar in appearance.  Largest nodule measures 9-10 mm within posterior right lower lobe abutting the pleural, sequence 4 image 284 (non FDG avid on prior PET-CT).    Imaged upper abdominal structures demonstrate no acute abnormality.  No acute osseous abnormality.  Impression: No significant change.  Continued CT lung screening recommended.    Electronically signed by: Babar Canchola MD  Date:    08/08/2025  Time:    10:09                  ASSESSMENT/PLAN:     Problem List Items Addressed This Visit       Restless leg syndrome due to iron deficiency anemia (Chronic)    On Mirapex         BMI 35.0-35.9,adult    Hyperlipidemia associated with type 2 diabetes mellitus    Hypertension associated with diabetes    MACHO on CPAP    Moderate COPD (chronic obstructive pulmonary disease) - Primary    COPD Questionnaire  How often do you cough?: Some of the time  How often do you have phlegm (mucus) in your chest?: Some of the time  How often does your chest feel tight?: Almost never  When you walk up a hill or one flight of stairs, how often are you breathless?: Some of the time  How often are you limited doing any activities at home?: Never  How often are you confident leaving the house despite your lung condition?: All of the time  How often do you sleep soundly?:  Most of the time  How often do you have energy?: Some of the time  Total score: 13      FEV1: 2.37L( 76.9%), FEV1/FVC 49    TRELEGY         Relevant Orders    Spirometry without Bronchodilator    Pulmonary nodule    Stable nodule         Relevant Orders    CT Chest Lung Screening Low Dose    Tobacco abuse, in remission    Dangers of cigarette smoking were reviewed with patient in detail. Patient was Counseled for 3-10 minutes. Nicotine replacement options were discussed. Nicotine replacement was discussed- not prescribed per patient's request         Relevant Orders    CT Chest Lung Screening Low Dose     Other Visit Diagnoses         Personal history of nicotine dependence        Relevant Orders    CT Chest Lung Screening Low Dose            PLAN:Plan     Reassurance   Smoking cessation   Low-dose screening CT scan 12 months   Continue Trelegy Ellipta    Requested Prescriptions      No prescriptions requested or ordered in this encounter         Follow up in about 1 year (around 8/8/2026), or LDCT, gonzalo.    This note was prepared using voice recognition system and is likely to have sound alike errors that may have been overlooked even after proof reading.  Please call me with any questions    Discussed diagnosis, its evaluation, treatment and usual course. All questions answered.    Thank you for the courtesy of participating in the care of this patient    Mazin Wells MD    Orders Placed This Encounter   Procedures    CT Chest Lung Screening Low Dose     Standing Status:   Future     Expected Date:   8/8/2025     Expiration Date:   8/8/2026     Is there documentation of shared decision making for this lung screening exam?:   Yes     Is the patient a current smoker?:   Yes     Does the patient have a 20-pack/year or greater smoke history?:   Yes     Is the patient between the ages 50-80 years old?:   Yes     Does the patient show any signs or symptoms of lung cancer?:   No     Is this the first (baseline)  CT or an annual exam?:   Annual [2]     May the Radiologist modify the order per protocol to meet the clinical needs of the patient?:   Yes     Is this a low dose screening chest CT?:   Yes     Does the patient show any signs or symptoms of lung cancer?:   No    Spirometry without Bronchodilator     Standing Status:   Future     Expiration Date:   8/8/2026     Release to patient:   Immediate

## 2025-08-09 RX ORDER — LANOLIN ALCOHOL/MO/W.PET/CERES
400 CREAM (GRAM) TOPICAL DAILY
Qty: 90 TABLET | Refills: 3 | Status: SHIPPED | OUTPATIENT
Start: 2025-08-09

## 2025-08-19 ENCOUNTER — TELEPHONE (OUTPATIENT)
Dept: SMOKING CESSATION | Facility: CLINIC | Age: 70
End: 2025-08-19
Payer: MEDICARE

## 2025-08-19 ENCOUNTER — CLINICAL SUPPORT (OUTPATIENT)
Dept: SMOKING CESSATION | Facility: CLINIC | Age: 70
End: 2025-08-19
Payer: COMMERCIAL

## 2025-08-19 DIAGNOSIS — F17.200 NICOTINE DEPENDENCE: Primary | ICD-10-CM

## 2025-08-19 PROCEDURE — 99999 PR PBB SHADOW E&M-EST. PATIENT-LVL II: CPT | Mod: PBBFAC,,, | Performed by: SPEECH-LANGUAGE PATHOLOGIST

## 2025-08-19 PROCEDURE — 99402 PREV MED CNSL INDIV APPRX 30: CPT | Mod: S$GLB,,, | Performed by: SPEECH-LANGUAGE PATHOLOGIST

## 2025-08-22 ENCOUNTER — OFFICE VISIT (OUTPATIENT)
Dept: OPHTHALMOLOGY | Facility: CLINIC | Age: 70
End: 2025-08-22
Payer: MEDICARE

## 2025-08-22 DIAGNOSIS — H52.203 ASTIGMATISM WITH PRESBYOPIA, BILATERAL: ICD-10-CM

## 2025-08-22 DIAGNOSIS — H52.4 ASTIGMATISM WITH PRESBYOPIA, BILATERAL: ICD-10-CM

## 2025-08-22 DIAGNOSIS — E11.9 TYPE 2 DIABETES MELLITUS WITHOUT RETINOPATHY: Primary | ICD-10-CM

## 2025-08-22 DIAGNOSIS — D31.31 NEVUS OF CHOROID OF RIGHT EYE: ICD-10-CM

## 2025-08-22 DIAGNOSIS — Z96.1 PSEUDOPHAKIA OF BOTH EYES: ICD-10-CM

## 2025-08-22 DIAGNOSIS — H26.493 PCO (POSTERIOR CAPSULAR OPACIFICATION), BILATERAL: ICD-10-CM

## 2025-08-22 PROCEDURE — 99999 PR PBB SHADOW E&M-EST. PATIENT-LVL III: CPT | Mod: PBBFAC,,, | Performed by: OPTOMETRIST

## 2025-09-03 ENCOUNTER — CLINICAL SUPPORT (OUTPATIENT)
Dept: SMOKING CESSATION | Facility: CLINIC | Age: 70
End: 2025-09-03
Payer: COMMERCIAL

## 2025-09-03 DIAGNOSIS — F17.200 NICOTINE DEPENDENCE: Primary | ICD-10-CM

## 2025-09-03 PROCEDURE — 99404 PREV MED CNSL INDIV APPRX 60: CPT | Mod: S$GLB,,, | Performed by: SPEECH-LANGUAGE PATHOLOGIST

## 2025-09-03 PROCEDURE — 99999 PR PBB SHADOW E&M-EST. PATIENT-LVL II: CPT | Mod: PBBFAC,,, | Performed by: SPEECH-LANGUAGE PATHOLOGIST

## 2025-09-03 RX ORDER — VARENICLINE TARTRATE 1 MG/1
TABLET, FILM COATED ORAL
Qty: 60 TABLET | Refills: 0 | Status: SHIPPED | OUTPATIENT
Start: 2025-09-03

## 2025-09-05 ENCOUNTER — OFFICE VISIT (OUTPATIENT)
Dept: HEMATOLOGY/ONCOLOGY | Facility: CLINIC | Age: 70
End: 2025-09-05
Payer: MEDICARE

## 2025-09-05 VITALS
HEIGHT: 68 IN | TEMPERATURE: 98 F | HEART RATE: 96 BPM | RESPIRATION RATE: 18 BRPM | DIASTOLIC BLOOD PRESSURE: 70 MMHG | BODY MASS INDEX: 31.57 KG/M2 | SYSTOLIC BLOOD PRESSURE: 101 MMHG | WEIGHT: 208.31 LBS

## 2025-09-05 DIAGNOSIS — R76.8 ELEVATED SERUM IMMUNOGLOBULIN FREE LIGHT CHAIN LEVEL: ICD-10-CM

## 2025-09-05 DIAGNOSIS — R53.83 FATIGUE, UNSPECIFIED TYPE: Primary | ICD-10-CM

## 2025-09-05 DIAGNOSIS — D72.828 GRANULOCYTOSIS: ICD-10-CM

## 2025-09-05 DIAGNOSIS — Z86.2 HISTORY OF IRON DEFICIENCY ANEMIA: ICD-10-CM

## 2025-09-05 DIAGNOSIS — E87.5 HYPERKALEMIA: ICD-10-CM

## 2025-09-05 DIAGNOSIS — Z86.2 HISTORY OF ANEMIA DUE TO VITAMIN B12 DEFICIENCY: ICD-10-CM

## 2025-09-05 DIAGNOSIS — D51.8 OTHER VITAMIN B12 DEFICIENCY ANEMIAS: ICD-10-CM

## 2025-09-05 PROCEDURE — 99999 PR PBB SHADOW E&M-EST. PATIENT-LVL V: CPT | Mod: PBBFAC,,, | Performed by: NURSE PRACTITIONER

## 2025-09-05 RX ORDER — SODIUM POLYSTYRENE SULFONATE 4.1 MEQ/G
15 POWDER, FOR SUSPENSION ORAL; RECTAL ONCE
Qty: 15 G | Refills: 0 | Status: SHIPPED | OUTPATIENT
Start: 2025-09-05 | End: 2025-09-06